# Patient Record
Sex: FEMALE | Race: WHITE | NOT HISPANIC OR LATINO | Employment: PART TIME | URBAN - METROPOLITAN AREA
[De-identification: names, ages, dates, MRNs, and addresses within clinical notes are randomized per-mention and may not be internally consistent; named-entity substitution may affect disease eponyms.]

---

## 2017-01-09 NOTE — TELEPHONE ENCOUNTER
Pharmacy sent refill request for sertraline.  Pt due for AE.  Last PHQ-9 was 5/2016. TC.  Bebe Soares RN      Pending Prescriptions:                       Disp   Refills    sertraline (ZOLOFT) 100 MG tablet         90 tab*0            Sig: Take 1 tablet (100 mg) by mouth daily

## 2017-01-10 RX ORDER — SERTRALINE HYDROCHLORIDE 100 MG/1
100 TABLET, FILM COATED ORAL DAILY
Qty: 90 TABLET | Refills: 0 | Status: SHIPPED | OUTPATIENT
Start: 2017-01-10 | End: 2017-04-07

## 2017-01-10 NOTE — TELEPHONE ENCOUNTER
Pt scheduled AE for 4/7/17 with Dr. Han.  Sent PHQ-9 via [employer].  Refill sent.  Bebe Soares RN

## 2017-04-07 ENCOUNTER — OFFICE VISIT (OUTPATIENT)
Dept: OBGYN | Facility: CLINIC | Age: 37
End: 2017-04-07
Payer: COMMERCIAL

## 2017-04-07 VITALS
HEIGHT: 66 IN | SYSTOLIC BLOOD PRESSURE: 96 MMHG | WEIGHT: 139 LBS | BODY MASS INDEX: 22.34 KG/M2 | DIASTOLIC BLOOD PRESSURE: 60 MMHG | TEMPERATURE: 97.7 F

## 2017-04-07 DIAGNOSIS — Z01.419 ENCOUNTER FOR WELL WOMAN EXAM WITH ROUTINE GYNECOLOGICAL EXAM: Primary | ICD-10-CM

## 2017-04-07 PROCEDURE — 99395 PREV VISIT EST AGE 18-39: CPT | Performed by: OBSTETRICS & GYNECOLOGY

## 2017-04-07 PROCEDURE — 87624 HPV HI-RISK TYP POOLED RSLT: CPT | Performed by: OBSTETRICS & GYNECOLOGY

## 2017-04-07 PROCEDURE — G0124 SCREEN C/V THIN LAYER BY MD: HCPCS | Performed by: OBSTETRICS & GYNECOLOGY

## 2017-04-07 PROCEDURE — G0145 SCR C/V CYTO,THINLAYER,RESCR: HCPCS | Performed by: OBSTETRICS & GYNECOLOGY

## 2017-04-07 RX ORDER — SERTRALINE HYDROCHLORIDE 100 MG/1
100 TABLET, FILM COATED ORAL DAILY
Qty: 90 TABLET | Refills: 3 | Status: SHIPPED | OUTPATIENT
Start: 2017-04-07 | End: 2018-04-16

## 2017-04-07 NOTE — NURSING NOTE
"Chief Complaint   Patient presents with     Physical       Initial BP 96/60  Temp 97.7  F (36.5  C) (Oral)  Ht 5' 5.5\" (1.664 m)  Wt 139 lb (63 kg)  LMP 2017  BMI 22.78 kg/m2 Estimated body mass index is 22.78 kg/(m^2) as calculated from the following:    Height as of this encounter: 5' 5.5\" (1.664 m).    Weight as of this encounter: 139 lb (63 kg).  BP completed using cuff size: regular        The following HM Due: pap smear      The following patient reported/Care Every where data was sent to:  P ABSTRACT QUALITY INITIATIVES [[id number]]       N/a      Rachell Thompson MA               "

## 2017-04-07 NOTE — MR AVS SNAPSHOT
After Visit Summary   4/7/2017    Idalia Hinojosa    MRN: [id number]           Patient Information     Date Of Birth          1980        Visit Information        Provider Department      4/7/2017 2:00 PM Monique Han MD [facility]        Today's Diagnoses     Encounter for well woman exam with routine gynecological exam    -  1    Postpartum depression          Care Instructions    IUD choices--Mirena (popular) good for 5 years with less bleeding or no cycles  ParaGard (no hormones) good for 10 years, cycles last day longer and heavier      Would want to take ibuprofen 600-800 mg one hour prior        Follow-ups after your visit        Who to contact     If you have questions or need follow up information about today's clinic visit or your schedule please contact [facility] directly at [phone].  Normal or non-critical lab and imaging results will be communicated to you by Vobihart, letter or phone within 4 business days after the clinic has received the results. If you do not hear from us within 7 days, please contact the clinic through Vobihart or phone. If you have a critical or abnormal lab result, we will notify you by phone as soon as possible.  Submit refill requests through TabletKiosk or call your pharmacy and they will forward the refill request to us. Please allow 3 business days for your refill to be completed.          Additional Information About Your Visit        MyChart Information     [employer] gives you secure access to your electronic health record. If you see a primary care provider, you can also send messages to your care team and make appointments. If you have questions, please call your primary care clinic.  If you do not have a primary care provider, please call [phone] and they will assist you.        Care EveryWhere ID     This is your Care EveryWhere ID. This could be used by other organizations to access your  "[facility] medical records  [id number]        Your Vitals Were     Temperature Height Last Period BMI (Body Mass Index)          97.7  F (36.5  C) (Oral) 5' 5.5\" (1.664 m) 04/02/2017 22.78 kg/m2         Blood Pressure from Last 3 Encounters:   04/07/17 96/60   12/29/15 116/64   11/12/15 116/76    Weight from Last 3 Encounters:   04/07/17 139 lb (63 kg)   12/29/15 149 lb (67.6 kg)   11/12/15 154 lb (69.9 kg)              We Performed the Following     HPV High Risk Types DNA Cervical     Pap imaged thin layer screen with HPV - recommended age 30 - 65 years (select HPV order below)          Where to get your medicines      These medications were sent to [place] [id number] [place] [facility] [address]  [address] [facility] [address]     Phone:  [phone]     sertraline 100 MG tablet          Primary Care Provider Office Phone # Fax #    Monique Han [phone]       [facility] [address] [facility] [address]  [facility] [address]        Thank you!     Thank you for choosing [facility]  for your care. Our goal is always to provide you with excellent care. Hearing back from our patients is one way we can continue to improve our services. Please take a few minutes to complete the written survey that you may receive in the mail after your visit with us. Thank you!             Your Updated Medication List - Protect others around you: Learn how to safely use, store and throw away your medicines at www.disposemymeds.org.          This list is accurate as of: 4/7/17  2:30 PM.  Always use your most recent med list.                   Brand Name Dispense Instructions for use    calcium carbonate 500 MG tablet    OS-NICO 500 mg [place]. [place]     Take 500 mg by mouth 2 times daily       sertraline 100 MG tablet    ZOLOFT    90 tablet    Take 1 tablet (100 mg) by mouth daily         "

## 2017-04-07 NOTE — PATIENT INSTRUCTIONS
IUD choices--Mirena (popular) good for 5 years with less bleeding or no cycles  ParaGard (no hormones) good for 10 years, cycles last day longer and heavier      Would want to take ibuprofen 600-800 mg one hour prior

## 2017-04-07 NOTE — PROGRESS NOTES
Idalia is a 37 year old  female who presents for annual exam.     Menses are regular q 28-30 days and normal and crampy lasting 4 days.  Menses flow: light and medium.  Patient's last menstrual period was 2017.. Using condoms.  She is not currently considering pregnancy.  Besides routine health maintenance,  she would like to discuss  Birth control.  She doesn't want to have anymore kids but spouse doesn't want her to do anything permanent.  She wants something more effective than condoms.  Still breastfeeding daughter who is 17 months. Has had a lot of colds in the last year. Mood doing well on zoloft and scared to wean down or come off since doing well and was in a bad place when we started it.   GYNECOLOGIC HISTORY:  Menarche: 14  Idalia is sexually active with 1 male partner(s) and is currently in monogamous relationship with .    History sexually transmitted infections:No STD history  STI testing offered?  Declined  DEBORA exposure: Unknown  History of abnormal Pap smear: NO - age 30- 65 PAP every 3 years recommended  Family history of breast CA: No  Family history of uterine/ovarian CA: No    Family history of colon CA: No    HEALTH MAINTENANCE:  Cholesterol: (  Cholesterol   Date Value Ref Range Status   12/10/2013 177 0 - 200 mg/dL Final     Comment:     LDL Cholesterol is the primary guide to therapy.   The NCEP recommends further evaluation of: patients with cholesterol greater   than 200 mg/dL if additional risk factors are present, cholesterol greater   than   240 mg/dL, triglycerides greater than 150 mg/dL, or HDL less than 40 mg/dL.    History of abnormal lipids: No    Mammo: no  . History of abnormal Mammo: Not applicable.  Regular Self Breast Exams: No    Current or Past (Physical, Sexual or Emotional):  No  Do you feel safe in your environment:  Yes    Calcium/Vitamin D intake: source:  dairy, dietary supplement(s) Adequate? Yes   Last TDAP? 2015 Offered today? No    TSH: (No  "results found for: TSH )  Pap; (  Lab Results   Component Value Date    PAP NIL 12/10/2013    )    HISTORY:  Obstetric History       T0      TAB0   SAB0   E0   M0   L1       # Outcome Date GA Lbr Alfredito/2nd Weight Sex Delivery Anes PTL Lv   1  10/28/15 34w5d  5 lb 8.5 oz (2.509 kg) F CS-LTranv   Y      Name: Alma Delia      Apgar1:  5                Apgar5: 7        Past Medical History:   Diagnosis Date     Seasonal affective disorder (H)     no meds     Past Surgical History:   Procedure Laterality Date      SECTION N/A 10/28/2015    Procedure:  SECTION;  Surgeon: Mónica Madrid MD;  Location: [facility]     DENTAL SURGERY      wisdom teeth     TONSILLECTOMY  age 10     Family History   Problem Relation Age of Onset     Thyroid Disease Paternal Grandmother      Other Cancer Paternal Grandmother      HEART DISEASE Maternal Grandmother      Social History     Social History     Marital status:      Spouse name: N/A     Number of children: 1     Years of education: N/A     Occupational History      [employer]     Social History Main Topics     Smoking status: Never Smoker     Smokeless tobacco: Never Used     Alcohol use No     Drug use: No     Sexual activity: Yes     Partners: Male     Other Topics Concern     Parent/Sibling W/ Cabg, Mi Or Angioplasty Before 65f 55m? No         Current Outpatient Prescriptions:      sertraline (ZOLOFT) 100 MG tablet, Take 1 tablet (100 mg) by mouth daily, Disp: 90 tablet, Rfl: 3     [DISCONTINUED] sertraline (ZOLOFT) 100 MG tablet, Take 1 tablet (100 mg) by mouth daily, Disp: 90 tablet, Rfl: 0     calcium carbonate (OS-NICO 500 MG [place]. [place]) 500 MG tablet, Take 500 mg by mouth 2 times daily, Disp: , Rfl:      Allergies   Allergen Reactions     Seasonal Allergies        Past medical, surgical, social and family history were reviewed and updated in [facility].      EXAM:  BP 96/60  Temp 97.7  F (36.5  C) (Oral)  Ht 5' 5.5\" (1.664 m)  Wt " 139 lb (63 kg)  LMP 04/02/2017  BMI 22.78 kg/m2   BMI: Body mass index is 22.78 kg/(m^2).  Constitutional: healthy, alert and no distress  Head: Normocephalic. No masses, lesions, tenderness or abnormalities  Neck: Neck supple. Trachea midline. No adenopathy. Thyroid symmetric, normal size.   Cardiovascular: RRR.   Respiratory: Negative.   Breast: Breasts reveal mild symmetric fibrocystic densities, but there are no dominant, discrete, fixed or suspicious masses found.  Gastrointestinal: Abdomen soft, non-tender, non-distended. No masses, organomegaly.  :  Vulva:  No external lesions, normal female hair distribution, no inguinal adenopathy.    Urethra:  Midline, non-tender, well supported, no discharge  Vagina:  Moist, pink, no abnormal discharge, no lesions  Uterus:  Normal size, AV , non-tender, freely mobile  Ovaries:  No masses appreciated, non-tender, mobile  Rectal Exam: deferred  Musculoskeletal: extremities normal  Skin: no suspicious lesions or rashes  Psychiatric: Affect appropriate, cooperative,mentation appears normal.     COUNSELING:   Reviewed preventive health counseling, as reflected in patient instructions       Contraception   reports that she has never smoked. She has never used smokeless tobacco.    Body mass index is 22.78 kg/(m^2).    FRAX Risk Assessment    ASSESSMENT:  37 year old female with satisfactory annual exam  (Z01.419) Encounter for well woman exam with routine gynecological exam  (primary encounter diagnosis)  Comment: condoms  Plan: Pap imaged thin layer screen with HPV -         recommended age 30 - 65 years (select HPV order        below), HPV High Risk Types DNA Cervical        Discussed sending pap smear for HPV typing as well and that if both pap and HPV are negative, then can have q5 year pap smears.    Birthcontrol options discussed and she did not do well with OCP prior.  Discussed IUD options since very effective and yet reversible.  She was given info and can look at  web sites, may make appt for placement if desires.     Due for cholesterol check, but plan for next year since still breastfeeding.    (F53) Postpartum depression  Comment: resolved   Plan: sertraline (ZOLOFT) 100 MG tablet        Refill same dose.  Will not try and come off or down yet.       Monique Han MD

## 2017-04-08 ASSESSMENT — PATIENT HEALTH QUESTIONNAIRE - PHQ9: SUM OF ALL RESPONSES TO PHQ QUESTIONS 1-9: 2

## 2017-04-12 LAB
COPATH REPORT: ABNORMAL
PAP: ABNORMAL

## 2017-04-13 LAB
FINAL DIAGNOSIS: NORMAL
HPV HR 12 DNA CVX QL NAA+PROBE: NEGATIVE
HPV16 DNA SPEC QL NAA+PROBE: NEGATIVE
HPV18 DNA SPEC QL NAA+PROBE: NEGATIVE
SPECIMEN DESCRIPTION: NORMAL

## 2017-05-31 ENCOUNTER — TELEPHONE (OUTPATIENT)
Dept: PSYCHOLOGY | Facility: CLINIC | Age: 37
End: 2017-05-31

## 2017-05-31 NOTE — TELEPHONE ENCOUNTER
"Returned voicemail from patient.  Patient indicated that she has been feeling \"down\" for the past week and wanted to schedule a follow-up appointment.      Scheduled appointment for 6/7 at 11:00 am with this writer.      Cristy Wilkinson PsyD, LP  Licensed Psychologist    "

## 2017-06-07 ENCOUNTER — OFFICE VISIT (OUTPATIENT)
Dept: PSYCHOLOGY | Facility: CLINIC | Age: 37
End: 2017-06-07
Payer: COMMERCIAL

## 2017-06-07 DIAGNOSIS — F33.0 MAJOR DEPRESSIVE DISORDER, RECURRENT EPISODE, MILD (H): Primary | ICD-10-CM

## 2017-06-07 PROCEDURE — 90834 PSYTX W PT 45 MINUTES: CPT | Performed by: PSYCHOLOGIST

## 2017-06-07 NOTE — PROGRESS NOTES
".                   Progress Note - Initial Session    Client Name:  Idalia Hinojosa Date: 6/8/2017         Service Type: Individual      Session Start Time: 11:05 am  Session End Time: 11:55 am      Session Length: 50 minutes     Session #: 1     Attendees: Client and daughter (17 months old)         Diagnostic Assessment in progress.  Unable to complete documentation at the conclusion of the first session due to needing additional time to gather intake information. Please see \"FCC Extended Documentation\" Encounter dated 6/8/2017 for full diagnostic assessment.    Reviewed initial paperwork with patient.    Gathered information around what brings her back to therapy.  Patient had a previous episode of care with this writer in [phone], due to symptoms of depression and anxiety, following the birth of her first child.      Patient reported that she did well after discontinuing therapy in May of 2016.  She stated that more recently, she noticed that her mood has been more \"down\" and she has been having more difficulties with managing her emotions, so her  suggested she schedule an appointment.  She had a list of several things she would like to address in therapy, which include: feeling \"burnt out\" from taking care of her daughter, increased marital conflict (arguing), not getting enough help, increase in worry, and feeling down.  She noted that she thinks she struggles more with irritability around her period.      Mental Status Assessment:  Appearance:   Appropriate   Eye Contact:   Good   Psychomotor Behavior: Normal   Attitude:   Cooperative   Orientation:   All  Speech   Rate / Production: Normal    Volume:  Normal   Mood:    Anxious   Affect:    Congruent with mood   Thought Content:  Clear   Thought Form:  Coherent  Logical   Insight:    Good       Safety Issues and Plan for Safety and Risk Management:  Client denies current fears or concerns for personal safety.  Client denies current or recent " suicidal ideation or behaviors.  Client denies current or recent homicidal ideation or behaviors.  Client denies current or recent self injurious behavior or ideation.  Client denies other safety concerns.  A safety and risk management plan has been developed, which includes: client was instructed to inform care team if safety concerns develop, client given the day-time and after-hours crisis number / 911 should there be a change in any of these risk factors.  Client reports there are no firearms in the house.      Diagnostic Criteria:    Will further assess next session - client has a history of major depression, recurrent episodes.  She identifies current symptoms of feeling down, irritable, increase in worry and feeling overwhelmed at times.  Symptoms have exacerbated in the past month, prompting her  to encourage her to return to therapy.        DSM5 Diagnoses: (Sustained by DSM5 Criteria Listed Above)  Diagnoses: 296.31 (F33.0) Major Depressive Disorder, Recurrent Episode, Mild _  Psychosocial & Contextual Factors: prior history of depression and anxiety, marital conflict   WHODAS 2.0 (12 item) (Please see FCC Extended Documentation Encounter for completed WHODAS)         Collateral Reports Completed:  Routed note to PCP      PLAN: (Homework, other):    1) Follow-up appointment has been scheduled.      2) Patient identified the following goal to work on between now and her next session: Will talk with  about scheduling in at least one dedicated time per week where she can go on a bike ride.      3) Will complete diagnostic assessment next session and begin to work on developing a treatment plan.        Cristy Wilkinson PsyD,   Licensed Psychologist

## 2017-06-07 NOTE — Clinical Note
Dorina Han,   This is Cristy Wilkinson from [facility].    I saw your patient in the past for individual therapy sessions to help treat symptoms of depression and anxiety she experienced following the birth of her daughter.  She has returned to therapy due to an increase in symptoms of depression and anxiety.    Please let me know if you have any questions or concerns.  I look forward to working with Idalia again and I am glad to see she has returned to therapy to help take care of herself -   Best, Cristy Wilkinson PsyD,  Licensed Psychologist [facility]

## 2017-06-07 NOTE — MR AVS SNAPSHOT
MRN:[id number]                      After Visit Summary   6/7/2017    Idalia Hinojosa    MRN: [id number]           Visit Information        Provider Department      6/7/2017 11:00 AM Cristy Wilkinson LP [facility]      Your next 10 appointments already scheduled     Jun 19, 2017 10:00 AM CDT   Return Visit with Cristy Wilkinson LP   [facility] ([facility]    [address]  [address] [place], [place] [id number]  [facility] [id number] [phone]              MyChart Information     [employer] gives you secure access to your electronic health record. If you see a primary care provider, you can also send messages to your care team and make appointments. If you have questions, please call your primary care clinic.  If you do not have a primary care provider, please call [phone] and they will assist you.        Care EveryWhere ID     This is your Care EveryWhere ID. This could be used by other organizations to access your [facility] medical records  [id number]

## 2017-06-19 ENCOUNTER — OFFICE VISIT (OUTPATIENT)
Dept: PSYCHOLOGY | Facility: CLINIC | Age: 37
End: 2017-06-19
Payer: COMMERCIAL

## 2017-06-19 DIAGNOSIS — F32.89 MINOR DEPRESSIVE DISORDER: Primary | ICD-10-CM

## 2017-06-19 PROCEDURE — 90791 PSYCH DIAGNOSTIC EVALUATION: CPT | Performed by: PSYCHOLOGIST

## 2017-06-19 NOTE — MR AVS SNAPSHOT
MRN:[id number]                      After Visit Summary   6/19/2017    Idalia Hinojosa    MRN: [id number]           Visit Information        Provider Department      6/19/2017 10:00 AM Cristy Wilkinson LP [facility] Generic      Your next 10 appointments already scheduled     Jul 10, 2017  1:00 PM CDT   Return Visit with Cristy Wilkinson LP   [facility] ([facility])    [address]  [address]  [facility] [id number] [phone]              MyChart Information     Dblur Technologieshart gives you secure access to your electronic health record. If you see a primary care provider, you can also send messages to your care team and make appointments. If you have questions, please call your primary care clinic.  If you do not have a primary care provider, please call [phone] and they will assist you.        Care EveryWhere ID     This is your Care EveryWhere ID. This could be used by other organizations to access your [place] medical records  [id number]        Equal Access to Services     RAVI RAMIREZ : Hadii abelardo payan hadasho Soomaali, waaxda luqadaha, qaybta kaalmada adeegyagale, fallon oliver. So [facility] [phone].    ATENCIÓN: Si habla español, tiene a willingham disposición servicios gratuitos de asistencia lingüística. Llame al [phone].    We comply with applicable federal civil rights laws and [place] laws. We do not discriminate on the basis of race, color, national origin, age, disability sex, sexual orientation or gender identity.

## 2017-06-21 NOTE — PROGRESS NOTES
"                                                                                               Adult Intake Structured Interview  Standard Diagnostic Assessment        CLIENT'S NAME:                 Idalia Hinojosa  MRN:                                      [id number]  :                                      1980  ACCT. NUMBER:                 [id number]  DATE OF SERVICE:            2017 and 2017        Identifying Information:  Client is a 37 year old, ,  female. Client was self-referred for counseling. Client is currently employed part time. Client attended the session alone.     Progress since last session:    Client reported that since her initial appointment with writer, she is \"feeling better\".  She stated that she made some time for herself, and her  supported this.  She also registered for a tri-athalon with her brother and father, something that she enjoys doing and that provides her with motivation to be more physically active in preparation for this.  She indicated that she has felt more motivated to complete household tasks and has been doing better in keeping up with these tasks.  She received a score of 3 on the PHQ-9 today and 3 on the DAVID-7.          Client's Statement of Presenting Concern:  Client reports the reason for seeking therapy at this time as \"I was feeling down and my  suggested I schedule an appointment\".  Client stated that her symptoms have resulted in the following functional impairments: self-care.          History of Presenting Concern:  Patient saw this writer for a prior episode of care from 2015 to May of 2016 due to \"postpartum worries and sadness\" following the birth of her first daughter, who required a NICU hospitalization.  She had identified experiencing symptoms of depression and anxiety at the onset of her pregnancy, and in particular during her first trimester when she was not feeling well and worried about " "something wrong with her pregnancy.  Please see [facility] extended documentation encounter dated November of 2015 for additional details.  She reported that after she ended in therapy in May of 2016, she did well up until a month or so ago.        Prior to this, client reported previous episodes of depression, dating back to 2005, and again in 2011.  She reported that she did not seek out help for those episodes of depression and struggled with each depressive episode for about one year.       Client has attempted to resolve these concerns in the past by attending therapy and taking an anti-depressant. Client reports that other professional(s) are involved in providing support / services: her OB/GYN Dr. Monique Han MD, prescribed and has been managing her anti-depressant medication.           Social History:  Client reported she grew up in [facility] [place] [facility]. They were the first born of 3 children. This is an intact family and parents remain . Client reported that her childhood was \"happy\". Client described her current relationships with family of origin and  as - \"pretty good\", \"We're working on the marriage thing because we have been fighting a little more so that needs a little work\".   She reported she is connecting \"really well\" with her daughter and enjoys that she is able to play more with her daughter now that she is getting older.  Her daughter is 17 months old.           Client reported a history of 1 committed relationships or marriages. Client has been  for 13 years. Client reported having 1 child. Client identified some stable and meaningful social connections. She reported \"I don't have a lot of friends\" and close friends that she does have from childhood mostly live out of state making it more challenging to be in touch with them.  Has a close friend from the [place].  Client reported that she has not been involved with the legal system.  Client's highest education level " was graduate school classes and recital. Client did not identify any learning problems. There are no ethnic, cultural or Baptism factors that may be relevant for therapy. Client identified her preferred language to be English. Client reported she does not need the assistance of an  or other support involved in therapy. Modifications will not be used to assist communication in therapy. Client did not serve in the .      Client reports family history includes Heart Disease in her maternal grandmother; Other Cancer in her paternal grandmother; Thyroid Disease in her paternal grandmother.     Mental Health History:  Client reported the following biological family members or relatives with mental health issues: Sister experienced an Eating disorder.  Client has had a history of Anxiety and Depression.  Client received mental health services from November of 2015 to May of 2016, and also has been receiving medication management from her primary care provider since 2015.  Hospitalizations: None.         Chemical Health History:  Client reported no family history of chemical health issues. Client has not received chemical dependency treatment in the past. Client is not currently receiving any chemical dependency treatment. Client reports no problems as a result of their drinking / drug use.     Client Reports:  Client reports drinking alcohol once or twice a week, 1/2 drink per time.    Client denies using tobacco.    Client denies using marijuana.  Client reports using caffeine  - 2 total cups of cafeeinated tea per week, one drink per occasion.    Client denies using street drugs.  Client denies the non-medical use of prescription or over the counter drugs.     CAGE: None of the patient's responses to the CAGE screening were positive / Negative CAGE score   Based on the negative Cage-Aid score and clinical interview there  are not indications of drug or alcohol abuse.     Discussed the general effects  "of drugs and alcohol on health and well-being. Therapist gave client printed information about the effects of chemical use on her health and well being.        Significant Losses / Trauma / Abuse / Neglect Issues:  There are indications or report of significant loss, trauma, abuse or neglect issues related to: Client denied experiencing any significant losses, trauma, abuse or neglect issues since her last episode of care with writer.  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  Client also reported that the labor and birth process of her daughter was difficult for her, and was a loss in that it was \"not what I expected it would be\".  She worked in her previous episode of care with this writer on addressing this loss.         Issues of possible neglect are not present.        Medical Issues:  Client has had a physical exam to rule out medical causes for current symptoms. Date of last physical exam was one month ago -The client has a [place] Primary Care Provider, who is named Monique Han. The client reports not having a psychiatrist. Client reports no current medical concerns and no physical pain. There are not significant nutritional concerns, but she reported she believes she could eat healthier than what she is.        Client reports current meds as:   Current Outpatient Prescriptions   Medication Sig     sertraline (ZOLOFT) 100 MG tablet Take 1 tablet (100 mg) by mouth daily     calcium carbonate (OS-NICO 500 MG [place]. [place]) 500 MG tablet Take 500 mg by mouth 2 times daily     No current facility-administered medications for this visit.         Client Allergies:       Allergies   Allergen Reactions     Seasonal Allergies        no known allergies to medications     Medical History:   Past Medical History          Past Medical History   Diagnosis Date     Seasonal affective disorder " "([facility])         no meds               Medication Adherence:  N/A - Client does not have prescribed psychiatric medications.     Client was provided recommendation to follow-up with prescribing physician.  Writer encouraged a medication evaluation to determine if medications may be appropriate to target symptoms of depression and anxiety.       Mental Status Assessment:  Appearance:                                                          Appropriate   Eye Contact:                                                         Good   Psychomotor Behavior:                    Normal   Attitude:                                                                 Cooperative   Orientation:                                                           All  Speech                        Rate / Production:                 Normal                         Volume:                                           Normal   Mood:                                                                     Worried  Affect:                                                                      Congruent with mood   Thought Content:                                        Clear   Thought Form:                                            Coherent  Logical   Insight:                                                                             Good         Review of Symptoms:  Depression:    Depressed mood, overeating, low self-esteem.  She also endorses struggling with negative thinking.    Laury:   No symptoms  Psychosis:   No symptoms  Anxiety:    Not being able to stop or control worrying.  Becoming easily annoyed or irritable.  Feeling worried as if something awful might happen.                    Panic:  No symptoms  Post Traumatic Stress Disorder:  No symptoms   Obsessive Compulsive Disorder:  No symptoms  Eating Disorder:  No symptoms (She reported a history of, at times, being \"hyperfocused\" on what she was eating, few years ago)  Oppositional Defiant Disorder:  No " "symptoms  ADD / ADHD:  No symptoms  Conduct Disorder:  No symptoms       Safety Issues and Plan for Safety and Risk Management:  Client has had a history of suicidal ideation: She reported that with previous depressive episodes in 2005 and 2011 that she had some suicidal ideation when she was feeling \"overwhelmed\".  She denied any suicidal intention or attempts. and denies a history of suicide attempts, self-injurious behavior, homicidal ideation, homicidal behavior and and other safety concerns  Client denies current fears or concerns for personal safety.  Client denies current or recent suicidal ideation or behaviors.  Client denies current or recent homicidal ideation or behaviors.  Client denies current or recent self injurious behavior or ideation.  Client denies other safety concerns.  Client reported that she has had times in the past where she has thrown things and slammed doors when upset, and has hit her .    Client reports there are no firearms in the house.  A safety and risk management plan has been developed including: client was informed of what to do if she were to develop suicidal ideation or any other safety concerns.  She agrees to access crisis or emergency resources should any safety concerns arise.          Patient's Strengths and Limitations:  Client identified the following strengths or resources that will help her succeed in counseling: family support and really care about my family and want to be strong and helpful for them, take care of myself, would really like to feel like myself again. Client identified the following supports: family and spouse, parents Things that may interfere with the clients success in counseling include:client did not identify any barriers.        Diagnostic Criteria:  311, Unspecified Depressive Disorder (F32.9):  Although client has a history of major depression, recurrent episodes, her symptoms do not currently meet criteria for a major depression " "episode.  She does note that although she and her  were feeling concerned by her feeling \"down\", she believes that overall she is doing \"much better\" than she was in November of 2015 when she first sought out psychotherapy with this writer.      She identifies the onset of current symptoms as about one month ago, she reports that symptoms do not occur every day and rather occur several days in a two week period.  She describes symptoms as making it \"somewhat difficult\" for her to function in her daily life.  She reported she wonders if symptoms worsen around her cycle, will encourage her to further monitor this.       Functional Status:  Client's symptoms are causing reduced functional status in the following areas: Activities of Daily Living - especially related to self-care, though has had some difficulties with motivation to complete household tasks as well.          DSM5 Diagnoses: (Sustained by DSM5 Criteria Listed Above)  Diagnoses:            311 (F32.9) Unspecified Depressive Disorder   Psychosocial & Contextual Factors: limited support network, some marital stress with   WHODAS 2.0 (12 item)                          This questionnaire asks about difficulties due to health conditions. Health conditions include disease or illnesses, other health problems that may be short or long lasting, injuries, mental health or emotional problems, and problems with alcohol or drugs.                              Think back over the past 30 days and answer these questions, thinking about how much difficulty you had doing the following activities. For each question, please [place] only one response.     S1 Standing for long periods such as 30 minutes? None =         1   S2 Taking care of household responsibilities? Moderate =   3   S3 Learning a new task, for example, learning how to get to a new place? None =         1   S4 How much of a problem do you have joining community activities (for example, festivals, " Lutheran or other activities) in the same way as anyone else can? None =         1   S5 How much have you been emotionally affected by your health problems? None =         1           In the past 30 days, how much difficulty did you have in:   S6 Concentrating on doing something for ten minutes? None =         1   S7 Walking a long distance such as a kilometer (or equivalent)? None =         1   S8 Washing your whole body? None =         1   S9 Getting dressed? None =         1   S10 Dealing with people you do not know? None =         1   S11 Maintaining a friendship? Mild =           2   S12 Your day to day work? Mild =           2      H1 Overall, in the past 30 days, how many days were these difficulties present? Record number of days 7   H2 In the past 30 days, for how many days were you totally unable to carry out your usual activities or work because of any health condition? Record number of days  0   H3 In the past 30 days, not counting the days that you were totally unable, for how many days did you cut back or reduce your usual activities or work because of any health condition? Record number of days 0      Attendance Agreement:  Client has signed Attendance Agreement:Yes        Preliminary Treatment Plan:  The client reports no currently identified Lutheran, ethnic or cultural issues relevant to therapy.      services are not indicated.     Modifications to assist communication are not indicated.     The concerns identified by the client will be addressed in therapy.     Initial Treatment will focus on: Depressed Mood - identify coping strategies to manage low mood and increase in worry     As a preliminary treatment goal, client will experience a reduction in depressed mood, will develop more effective coping skills to manage depressive symptoms and will develop healthy cognitive patterns and beliefs and will experience a reduction in anxiety, will develop more effective coping skills to  manage anxiety symptoms and will develop healthy cognitive patterns and beliefs.     The focus of initial interventions will be to alleviate anxiety, alleviate depressed mood, facilitate appropriate expression of feelings, increase coping skills, provide homework to reinforce skill development, provide psychoeduction regarding depression and anxiety and teach CBT skills.     The client is receiving treatment / structured support from the following professional(s) / service and treatment. Collaboration will be initiated with: primary care physician.     Referral to another professional/service is not indicated at this time.     A Release of Information is not needed at this time.     Report to child / adult protection services was NA.     Client will have access to their [facility]' medical record.     Cristy Wilkinson PsyD, KYLIE                                6/19/2017  Licensed Psychologist

## 2017-07-10 ENCOUNTER — OFFICE VISIT (OUTPATIENT)
Dept: PSYCHOLOGY | Facility: CLINIC | Age: 37
End: 2017-07-10
Payer: COMMERCIAL

## 2017-07-10 DIAGNOSIS — F32.9 MAJOR DEPRESSION: Primary | ICD-10-CM

## 2017-07-10 PROCEDURE — 90834 PSYTX W PT 45 MINUTES: CPT | Performed by: PSYCHOLOGIST

## 2017-07-10 NOTE — MR AVS SNAPSHOT
MRN:[id number]                      After Visit Summary   7/10/2017    Idalia Hinojosa    MRN: [id number]           Visit Information        Provider Department      7/10/2017 1:00 PM Cristy Wilkinson LP [facility] Generic      Your next 10 appointments already scheduled     Aug 07, 2017  1:00 PM CDT   Return Visit with Cristy Wilkinson LP   [facility] ([facility])    [place] [facility]  [address]  [facility] [phone]              MyChart Information     [employer] gives you secure access to your electronic health record. If you see a primary care provider, you can also send messages to your care team and make appointments. If you have questions, please call your primary care clinic.  If you do not have a primary care provider, please call [phone] and they will assist you.        Care EveryWhere ID     This is your Care EveryWhere ID. This could be used by other organizations to access your [facility] medical records  [id number]        Equal Access to Services     RAVI RAMIREZ : Hadii abelardo payan hadasho Soomaali, waaxda luqadaha, qaybta kaalmada adeegyagale, fallon oliver. So [facility] [phone].    ATENCIÓN: Si habla español, tiene a willingham disposición servicios gratuitos de asistencia lingüística. Llame al [phone].    We comply with applicable federal civil rights laws and [place] laws. We do not discriminate on the basis of race, color, national origin, age, disability sex, sexual orientation or gender identity.

## 2017-07-11 NOTE — PROGRESS NOTES
"                                             Progress Note    Client Name: Idalia Hinojosa  Date: 7/10/2017         Service Type: Individual      Session Start Time: 1:00 pm  Session End Time: 1:45 pm      Session Length: 45 minutes     Session #: 3     Attendees: Client attended alone    Treatment Plan Last Reviewed: Treatment plan developed 07/10/2017  PHQ-9 / DAVID-7 : 6/07/2017     DATA      Progress Since Last Session (Related to Symptoms / Goals / Homework):   Symptoms: Improved- client reported that her mood is \"better\" and she believes it is returning back to \"normal\" for her.    Homework: Achieved / completed to satisfaction.  Client was pleased to say that she has been exercising more, eating healthier, recognizing and challenging negative thinking, and she had a very positive trip to the family cabin that was restorative and fun for her.        Episode of Care Goals: Satisfactory progress - ACTION (Actively working towards change); Intervened by reinforcing change plan / affirming steps taken.  Client is in the beginning stages of this episode of care, however, she has already began to make positive changes that she reports have helped to improve her mood.       Current / Ongoing Stressors and Concerns:   Client presented for therapy at the prompting of her  due to concerns about her mood and symptoms of depression, in the context of having previously experienced episodes of depression.  She has previously participated in individual therapy with this writer.      We talked today about client's goals for therapy and treatment planning.  As she has already began to incorporate some positive changes to her self-care that she believes has positively impacted her mood, therapy will focus on helping her sustain making these positive changes.  We will also monitor her mood and help client with developing a \"wellness plan\" to assist with monitoring her mood, identification of what helps her mood to remain " "stable, identification of early warning signs and steps to take if she experiences warning signs.        Treatment Objective(s) Addressed in This Session:   Development of treatment plan  Identify negative/harsh/judgmental thinking and practice finding a compassionate voice      Intervention:   CBT: psychoeducation on identifcation of cognitive distortions and skill of cognitive reframing  In particular, we talked about some themes present in her thinking about herself as a mother, where she is harsh/critical/judgmental of herself (e.g. \"other moms would be stronger than her\", etc.).  Guided her in examining her self-talk, looking at the impact of her self-talk, and helping her to find a kinder, more compassionate voice.      She would like to begin journaling as a way to increase her awareness of her self-talk.  Suggested she consider devoting some of her journaling to identifying/recognizing positives/\"good things\" from each day.          ASSESSMENT: Current Emotional / Mental Status (status of significant symptoms):   Risk status (Self / Other harm or suicidal ideation)   Client denies current fears or concerns for personal safety.   Client denies current or recent suicidal ideation or behaviors.   Client denies current or recent homicidal ideation or behaviors.   Client denies current or recent self injurious behavior or ideation.   Client denies other safety concerns.   A safety and risk management plan has not been developed at this time, however client was given the after-hours number / 911 should there be a change in any of these risk factors.     Appearance:   Appropriate    Eye Contact:   Good    Psychomotor Behavior: Normal    Attitude:   Cooperative    Orientation:   All   Speech    Rate / Production: Normal     Volume:  Normal    Mood:    Normal, and client reported her mood is \"better\"   Affect:    Appropriate  and congruent with mood    Thought Content:  Clear    Thought Form:  Coherent  Logical "    Insight:    Good      Medication Review:   No changes to current psychiatric medication(s)     Medication Compliance:   Yes     Changes in Health Issues:   None reported     Chemical Use Review:   Substance Use: Chemical use reviewed, no active concerns identified   She describes typical use as 1/2 drink a time, once or twice per week.  However, she stated that recently, she hasn't been drinking any alcohol, because she believes she sleeps better at night when she doesn't drink alcohol.       Tobacco Use: No current tobacco use.       Collateral Reports Completed:   Not Applicable    PLAN: (Client Tasks / Therapist Tasks / Other)    1) Continue the great work you have been doing with self-care and devoting time for exercise, healthy eating, and relaxation.  Continue great work on recognizing and challenging negative thinking.  Find compassionate and kind voice to counter judgmental/self-critical thoughts (practice - what would you say to a friend?)  When journaling, take time to recognize the positives and good things you notice.      2) Follow-up appointment is scheduled for one month. Please call if symptoms worsen or if you believe you need to be seen prior to then.      3) Continue to take medications as prescribed by your doctor.    4) If you develop any suicidal thoughts or safety concerns, please contact [facility]'s crisis number, call 911, or go to the emergency department.        Cristy Wilkinson PsyD,   Licensed Psychologist                                                         ________________________________________________________________________    Treatment Plan    Client's Name: Idalia Hinojosa  YOB: 1980    Date: 07/10/2017    DSM-V Diagnoses:   311 (F32.9) Unspecified Depressive Disorder     Psychosocial / Contextual Factors: limited support system, some marital stress  WHODAS: 16    Referral / Collaboration:  Referral to another professional/service is not indicated at this  "time.  Client was encouraged to continue to work with her primary care provider, Dr. Emely MD, who prescribed and is managing her psychiatric medications.      Anticipated number of session or this episode of care: 6-8      MeasurableTreatment Goal(s) related to diagnosis / functional impairment(s)  Goal 1: Client will incorporate self-care strategies into her daily routine    I will know I've met my goal when I'm exercising and eating well.      Objective #A (Client Action)    Client will complete an assessment of her self-care strategies, identifying stregnths and areas for improvement\".  Status: New - Date: 07/10/2017     Intervention(s)  Therapist will guide client in completing self-assessment and will help client develop self-care goals based upon results of assessment.    Objective #B  Client will incorporate at least two self-care strategies into her regular routine.  Status: New - Date: 07/10/08004     Intervention(s)  Therapist will help client identify any barriers or obstacles to incorporating self-care strategies,  Will help client identify strategies  to overcome barriers or obstacles to help her with sustaining positive behavior change to her self-care.    Objective #C  Client will Decrease frequency and intensity of feeling down, depressed, hopeless.  Status: New - Date: 07/10/2017     Intervention(s)  Therapist will provide psychoeducation on use of cognitive-behavioral strategies to help with management of mood.  Therapist will teach client how to identify and reframe cognitive distortions.  Therapist will help client to understand impact of cognitive disotritons on mood..  Therapist will assign homework assignments to help client practice and strengthen use of cognitive-behavioral strategies.      Objective #A (Client Action)    Status: New - Date: 07/10/2017     Client will develop a \"wellness plan\" that identifies what they need to be doing on a regular basis to manage their mood and " "symptoms, early warning signs, and steps to take if they experience early warning signs.      Intervention(s)  Therapist will provide psychoeducation on importance of monitoring mood, paying attention to warning signs, and steps to take if warning signs develop.  Therapist will guide client in developing a customized \"wellness plan\".        Client has reviewed and agreed to the above plan.      Cristy Wilkinson PsyD, LP  July 10, 2017  Licensed Psychologist  "

## 2017-08-07 ENCOUNTER — OFFICE VISIT (OUTPATIENT)
Dept: PSYCHOLOGY | Facility: CLINIC | Age: 37
End: 2017-08-07
Payer: COMMERCIAL

## 2017-08-07 DIAGNOSIS — F32.A DEPRESSION, UNSPECIFIED DEPRESSION TYPE: Primary | ICD-10-CM

## 2017-08-07 PROCEDURE — 90834 PSYTX W PT 45 MINUTES: CPT | Performed by: PSYCHOLOGIST

## 2017-08-07 NOTE — MR AVS SNAPSHOT
MRN:[id number]                      After Visit Summary   8/7/2017    Idalia Hinojosa    MRN: [id number]           Visit Information        Provider Department      8/7/2017 1:00 PM Cristy Wilkinson LP [facility] Generic      Your next 10 appointments already scheduled     Sep 11, 2017 10:00 AM CDT   Return Visit with Cristy Wilkinson LP   [facility] ([facility])    [place] [facility]  [address]  [facility] [id number] [phone]              MyChart Information     [employer] gives you secure access to your electronic health record. If you see a primary care provider, you can also send messages to your care team and make appointments. If you have questions, please call your primary care clinic.  If you do not have a primary care provider, please call [phone] and they will assist you.        Care EveryWhere ID     This is your Care EveryWhere ID. This could be used by other organizations to access your [facility] medical records  [id number]        Equal Access to Services     RAVI RAMIREZ : Hadii abelardo payan hadasho Soomaali, waaxda luqadaha, qaybta kaalmada adeegyagale, fallon oliver. So [facility] [phone].    ATENCIÓN: Si habla español, tiene a willingham disposición servicios gratuitos de asistencia lingüística. Llame al [phone].    We comply with applicable federal civil rights laws and [place] laws. We do not discriminate on the basis of race, color, national origin, age, disability sex, sexual orientation or gender identity.

## 2017-08-07 NOTE — PROGRESS NOTES
"                                             Progress Note    Client Name: Idalia Hinojosa  Date: 7/10/2017         Service Type: Individual      Session Start Time: 1:00 pm  Session End Time: 1:45 pm      Session Length: 45 minutes     Session #: 3     Attendees: Client attended alone    Treatment Plan Last Reviewed: Treatment plan developed 07/10/2017  PHQ-9 / DAVID-7 : 6/07/2017     DATA      Progress Since Last Session (Related to Symptoms / Goals / Homework):   Symptoms: Sustained improvement- client reported that overall, she continues to feel \"better\" and she is feeling like her \"old self\" again.  She does note having periods of time where she has what she describes as \"crazy perfectionism moments\" and periods of heightened emotion, which she would like to learn how to better manage.      Homework: Achieved / completed to satisfaction.  Patient reported that she has been successful with paying more attention to her self-talk and recognizing negative thinking.  She's also been following through on her goals to be more physically active and has been training for a tri-athalon.         Episode of Care Goals: Satisfactory progress - ACTION (Actively working towards change); Intervened by reinforcing change plan / affirming steps taken.  Client is in the beginning stages of this episode of care, however, she has already began to make positive changes that she reports have helped to improve her mood.       Current / Ongoing Stressors and Concerns:   Client presented for therapy at the prompting of her  due to concerns about her mood and symptoms of depression, in the context of having previously experienced episodes of depression.  She has previously participated in individual therapy with this writer.      Client identified the following concerns she wished to discuss today: perfectionistic thinking and feelings of self-worth (in particular, attaching her self-worth to achievement).        Treatment " "Objective(s) Addressed in This Session:   Identify cognitive distortions and learn how to reframe polarized thinking so that cognitions are more balanced, helpful, and positive  Explore feelings of self-worth and identify at least 1-2 new ways to develop and nurture positive feelings of self-worth     Intervention:   CBT: psychoeducation on identifcation of cognitive distortions and skill of cognitive reframing .  In particular today we talked about her thinking trap of \"polarized\" or \"all or nothing\" thinking.  We examined recent examples of polarized thinking, which led to periods of heightened emotion for her.  We also looked at examples from her past, where polarized thinking contributed to her linking her feelings of self-worth with her achievement.  This offered an opportunity for her to recognize the impact of these cognitive distortions on her emotions and response, as well as led to identifying ways she can reframe her thinking to find more balanced, hopeful, kind, and positive self-talk.  We also further explored the connection she has made between her feelings of self-worth and achievement.  She recognizes that although she does not attach her feelings/care for others to their achievements, that she has done this for her self.  She is beginning to examine the consequences of this and is looking at more healthy and positive ways to define and nurture her feelings of self-worth and identity.          ASSESSMENT: Current Emotional / Mental Status (status of significant symptoms):   Risk status (Self / Other harm or suicidal ideation)   Client denies current fears or concerns for personal safety.   Client denies current or recent suicidal ideation or behaviors.   Client denies current or recent homicidal ideation or behaviors.   Client denies current or recent self injurious behavior or ideation.   Client denies other safety concerns.   A safety and risk management plan has not been developed at this time, " "however client was given the after-hours number / 911 should there be a change in any of these risk factors.     Appearance:   Appropriate    Eye Contact:   Good    Psychomotor Behavior: Normal    Attitude:   Cooperative    Orientation:   All   Speech    Rate / Production: Normal     Volume:  Normal    Mood:    Normal, and client reported her mood is \"better\"   Affect:    Appropriate  and congruent with mood    Thought Content:  Clear    Thought Form:  Coherent  Logical    Insight:    Good      Medication Review:   No changes to current psychiatric medication(s)     Medication Compliance:   Yes     Changes in Health Issues:   None reported     Chemical Use Review:   Substance Use: Chemical use reviewed, no active concerns identified  She stated that recently, she hasn't been drinking any alcohol, because she believes she sleeps better at night when she doesn't drink alcohol.  Her  has also decided to stop drinking alcohol for the next few months, so she wants to support him by not drinking.       Tobacco Use: No current tobacco use.       Collateral Reports Completed:   Not Applicable    PLAN: (Client Tasks / Therapist Tasks / Other)    1) Pay attention to thinking, look for \"all or nothing thinking\" and challenge self to find the \"gray\".  Don't connect self-worth with number on scale (\"remind myself of what makes me me\").      Continue the great work you have been doing with self-care and devoting time for exercise, healthy eating, and relaxation.  Continue great work on recognizing and challenging negative thinking.  Find compassionate and kind voice to counter judgmental/self-critical thoughts (practice - what would you say to a friend?)  When journaling, take time to recognize the positives and good things you notice.      2) Follow-up appointment is scheduled for one month. Please call if symptoms worsen or if you believe you need to be seen prior to then.      3) Continue to take medications as " "prescribed by your doctor.    4) If you develop any suicidal thoughts or safety concerns, please contact [facility]'s crisis number, call 911, or go to the emergency department.        Cristy Wilkinson PsyD,   Licensed Psychologist                                                         ________________________________________________________________________    Treatment Plan    Client's Name: Idalia Hinojosa  YOB: 1980    Date: 07/10/2017    DSM-V Diagnoses:   311 (F32.9) Unspecified Depressive Disorder     Psychosocial / Contextual Factors: limited support system, some marital stress  WHODAS: 16    Referral / Collaboration:  Referral to another professional/service is not indicated at this time.  Client was encouraged to continue to work with her primary care provider, Dr. Emely MD, who prescribed and is managing her psychiatric medications.      Anticipated number of session or this episode of care: 6-8      MeasurableTreatment Goal(s) related to diagnosis / functional impairment(s)  Goal 1: Client will incorporate self-care strategies into her daily routine    I will know I've met my goal when I'm exercising and eating well.      Objective #A (Client Action)    Client will complete an assessment of her self-care strategies, identifying stregnths and areas for improvement\".  Status: New - Date: 07/10/2017     Intervention(s)  Therapist will guide client in completing self-assessment and will help client develop self-care goals based upon results of assessment.    Objective #B  Client will incorporate at least two self-care strategies into her regular routine.  Status: New - Date: 07/10/59608     Intervention(s)  Therapist will help client identify any barriers or obstacles to incorporating self-care strategies,  Will help client identify strategies  to overcome barriers or obstacles to help her with sustaining positive behavior change to her self-care.    Objective #C  Client will Decrease " "frequency and intensity of feeling down, depressed, hopeless.  Status: New - Date: 07/10/2017     Intervention(s)  Therapist will provide psychoeducation on use of cognitive-behavioral strategies to help with management of mood.  Therapist will teach client how to identify and reframe cognitive distortions.  Therapist will help client to understand impact of cognitive disotritons on mood..  Therapist will assign homework assignments to help client practice and strengthen use of cognitive-behavioral strategies.      Objective #A (Client Action)    Status: New - Date: 07/10/2017     Client will develop a \"wellness plan\" that identifies what they need to be doing on a regular basis to manage their mood and symptoms, early warning signs, and steps to take if they experience early warning signs.      Intervention(s)  Therapist will provide psychoeducation on importance of monitoring mood, paying attention to warning signs, and steps to take if warning signs develop.  Therapist will guide client in developing a customized \"wellness plan\".        Client has reviewed and agreed to the above plan.      Cristy Wilkinson PsyD, LP  July 10, 2017  Licensed Psychologist  "

## 2017-09-11 ENCOUNTER — OFFICE VISIT (OUTPATIENT)
Dept: PSYCHOLOGY | Facility: CLINIC | Age: 37
End: 2017-09-11
Payer: COMMERCIAL

## 2017-09-11 ENCOUNTER — TELEPHONE (OUTPATIENT)
Dept: PSYCHOLOGY | Facility: CLINIC | Age: 37
End: 2017-09-11

## 2017-09-11 DIAGNOSIS — Z53.9 ERRONEOUS ENCOUNTER--DISREGARD: Primary | ICD-10-CM

## 2017-09-11 PROCEDURE — 99207 ZZC NO CHARGE LOS: CPT | Performed by: PSYCHOLOGIST

## 2017-09-11 NOTE — MR AVS SNAPSHOT
MRN:[id number]                      After Visit Summary   9/11/2017    Idalia Hinojosa    MRN: [id number]           Visit Information        Provider Department      9/11/2017 10:00 AM Cristy Wilkinson LP [facility] Generic      Your next 10 appointments already scheduled     Oct 02, 2017 10:00 AM CDT   Return Visit with Cristy Wilkinson LP   [facility] ([facility])    [place] [facility]  [address]  [facility] [id number] [phone]              MyChart Information     [employer] gives you secure access to your electronic health record. If you see a primary care provider, you can also send messages to your care team and make appointments. If you have questions, please call your primary care clinic.  If you do not have a primary care provider, please call [phone] and they will assist you.        Care EveryWhere ID     This is your Care EveryWhere ID. This could be used by other organizations to access your [facility] medical records  [id number]        Equal Access to Services     RAVI RAMIREZ : Hadii abelardo payan hadasho Soomaali, waaxda luqadaha, qaybta kaalmada adeegyagale, fallon oliver. So [facility] [phone].    ATENCIÓN: Si habla español, tiene a willingham disposición servicios gratuitos de asistencia lingüística. Llame al [phone].    We comply with applicable federal civil rights laws and [place] laws. We do not discriminate on the basis of race, color, national origin, age, disability sex, sexual orientation or gender identity.

## 2017-09-11 NOTE — TELEPHONE ENCOUNTER
Called and spoke to patient to reschedule her appointment from this morning.    Patient rescheduled her appointment for 10/2.    Checked-in with patient.  She reported that overall, she has been doing pretty well.  She didn't have any urgent concerns and declined needing an appointment for this week.  She will be in touch if she believes she needs to be seen prior to 10/2.    Cristy Wilkinson PsyD, LP

## 2017-10-02 ENCOUNTER — OFFICE VISIT (OUTPATIENT)
Dept: PSYCHOLOGY | Facility: CLINIC | Age: 37
End: 2017-10-02
Payer: COMMERCIAL

## 2017-10-02 DIAGNOSIS — F32.A DEPRESSION, UNSPECIFIED DEPRESSION TYPE: Primary | ICD-10-CM

## 2017-10-02 PROCEDURE — 90834 PSYTX W PT 45 MINUTES: CPT | Performed by: PSYCHOLOGIST

## 2017-10-02 NOTE — PROGRESS NOTES
"                                             Progress Note    Client Name: Idalia Hinojosa  Date: 10/2/2017         Service Type: Individual      Session Start Time: 10:00 am  Session End Time: 10:45 am      Session Length: 45 minutes     Session #: 4     Attendees: Client and her one year old daughter    Treatment Plan Last Reviewed: Treatment plan developed 07/10/2017  PHQ-9 / DAVID-7 : 6/07/2017     DATA      Progress Since Last Session (Related to Symptoms / Goals / Homework):   Symptoms: Sustained improvement- client reported that overall, she continues to feel \"better\" and has been successful with using coping strategies and strategies learned in therapy.  She has some worry about how she will do over the winter months, as that has historically been a difficult time for her.        Homework: Achieved / completed to satisfaction.  She's pleased to say that she's been more physically active and believes this is helping her.  She also has been successful with \"catching\" herself when her thinking is negative and has been reframing her thoughts.  She notices that her thinking is less polarized, and she is finding more balanced and helpful self-talk.  She notes that she has been able to cope well with situations that she believes would have been really difficult for her a year ago.  She also notes that she is not stuck in negative thinking about her self-worth and has been practicing a non-judgmental stance towards herself and others.       Episode of Care Goals: Satisfactory progress - ACTION (Actively working towards change); Intervened by reinforcing change plan / affirming steps taken.  Client is in the beginning stages of this episode of care, however, she has already began to make positive changes that she reports have helped to improve her mood.  This is her second episode of care with this writer and she believes that monthly sessions allow her to practice skills outside of session and check-in to ensure " "sustained improvement.  She has some concerns about how she will do over the winter.       Current / Ongoing Stressors and Concerns:   Client presented for therapy at the prompting of her  due to concerns about her mood and symptoms of depression, in the context of having previously experienced episodes of depression.  She has previously participated in individual therapy with this writer.      Client identified the following concerns she wished to discuss today: some difficulties related to getting things done around the house/low motivation, and some worries about how she will do in the winter when there is less light and it is colder.         Treatment Objective(s) Addressed in This Session:   Learn the \"five-minute\" rule to help with behavioral activation  Identify how to \"cope ahead\" and \"plan for success\" for winter     Intervention:   CBT: Behavioral activation.   Provided psychoeducation on the \"five-minute rule\" as a strategy to help with client's difficulties getting things done around the house.  We also talked about how polarized thinking can fuel avoidance ('if I can't do it perfectly, why bother) to help her with finding more balanced self-talk that supports her breaking things down into small steps, finding the \"good enough\", and giving herself credit for steps taken (rather than only focusing on the end outcome).        CBT: [place] ahead and cognitive restructuring.  Encouraged client to see the negative script she has in her mind about the upcoming winter (I don't do well in winter, I won't be active, I'll eat too much).  Looked at ways she can \"cope ahead\" for the challenges/obstacles and help herself plan for success.  She's very pleased with how physically active she has been, and looked at ways she can re-write her script to help her with incorporating helpful behaviors over the winter months, instead of anticipating she won't be able to incorporate these positive changes during the winter " "months.  Helped client problem-solve ways she might address challenges/obstacles.               ASSESSMENT: Current Emotional / Mental Status (status of significant symptoms):   Risk status (Self / Other harm or suicidal ideation)   Client denies current fears or concerns for personal safety.   Client denies current or recent suicidal ideation or behaviors.   Client denies current or recent homicidal ideation or behaviors.   Client denies current or recent self injurious behavior or ideation.   Client denies other safety concerns.   A safety and risk management plan has not been developed at this time, however client was given the after-hours number / 911 should there be a change in any of these risk factors.     Appearance:   Appropriate    Eye Contact:   Good    Psychomotor Behavior: Normal    Attitude:   Cooperative    Orientation:   All   Speech    Rate / Production: Normal     Volume:  Normal    Mood:    Normal, and client reported her mood is \"better\"   Affect:    Appropriate  and congruent with mood    Thought Content:  Clear    Thought Form:  Coherent  Logical    Insight:    Good      Medication Review:   No changes to current psychiatric medication(s)     Medication Compliance:   Yes     Changes in Health Issues:   None reported     Chemical Use Review:   Substance Use: Chemical use reviewed, no active concerns identified  She reported she hasn't been drinking alcohol.       Tobacco Use: No current tobacco use.       Collateral Reports Completed:   Not Applicable    PLAN: (Client Tasks / Therapist Tasks / Other)    1) Practice the  \"Five minute rule\" to help you with getting tasks done around the house.  Identify at least 2-3 ways you can \"plan for success\" for the winter months.      Pay attention to thinking, look for \"all or nothing thinking\" and challenge self to find the \"gray\".  Don't connect self-worth with number on scale (\"remind myself of what makes me me\").      Continue the great work you have " "been doing with self-care and devoting time for exercise, healthy eating, and relaxation.  Continue great work on recognizing and challenging negative thinking.  Find compassionate and kind voice to counter judgmental/self-critical thoughts (practice - what would you say to a friend?)  When journaling, take time to recognize the positives and good things you notice.      2) Follow-up appointment is scheduled for six weeks. Please call if symptoms worsen or if you believe you need to be seen prior to then.      3) Continue to take medications as prescribed by your doctor.    4) If you develop any suicidal thoughts or safety concerns, please contact [facility]'s crisis number, call 911, or go to the emergency department.        Cristy Wilkinson PsyD,   Licensed Psychologist  10/2/2017                                                           ________________________________________________________________________    Treatment Plan    Client's Name: Idalia Hinojosa  YOB: 1980    Date: 07/10/2017    DSM-V Diagnoses:   311 (F32.9) Unspecified Depressive Disorder     Psychosocial / Contextual Factors: limited support system, some marital stress  WHODAS: 16    Referral / Collaboration:  Referral to another professional/service is not indicated at this time.  Client was encouraged to continue to work with her primary care provider, Dr. Emely MD, who prescribed and is managing her psychiatric medications.      Anticipated number of session or this episode of care: 6-8      MeasurableTreatment Goal(s) related to diagnosis / functional impairment(s)  Goal 1: Client will incorporate self-care strategies into her daily routine    I will know I've met my goal when I'm exercising and eating well.      Objective #A (Client Action)    Client will complete an assessment of her self-care strategies, identifying stregnths and areas for improvement\".  Status: New - Date: 07/10/2017     Intervention(s)  Therapist will " "guide client in completing self-assessment and will help client develop self-care goals based upon results of assessment.    Objective #B  Client will incorporate at least two self-care strategies into her regular routine.  Status: New - Date: 07/10/99956     Intervention(s)  Therapist will help client identify any barriers or obstacles to incorporating self-care strategies,  Will help client identify strategies  to overcome barriers or obstacles to help her with sustaining positive behavior change to her self-care.    Objective #C  Client will Decrease frequency and intensity of feeling down, depressed, hopeless.  Status: New - Date: 07/10/2017     Intervention(s)  Therapist will provide psychoeducation on use of cognitive-behavioral strategies to help with management of mood.  Therapist will teach client how to identify and reframe cognitive distortions.  Therapist will help client to understand impact of cognitive disotritons on mood..  Therapist will assign homework assignments to help client practice and strengthen use of cognitive-behavioral strategies.      Objective #A (Client Action)    Status: New - Date: 07/10/2017     Client will develop a \"wellness plan\" that identifies what they need to be doing on a regular basis to manage their mood and symptoms, early warning signs, and steps to take if they experience early warning signs.      Intervention(s)  Therapist will provide psychoeducation on importance of monitoring mood, paying attention to warning signs, and steps to take if warning signs develop.  Therapist will guide client in developing a customized \"wellness plan\".        Client has reviewed and agreed to the above plan.      Cristy Wilkinson PsyD, LP  July 10, 2017  Licensed Psychologist  "

## 2017-10-02 NOTE — MR AVS SNAPSHOT
MRN:[id number]                      After Visit Summary   10/2/2017    Idalia Hinojosa    MRN: [id number]           Visit Information        Provider Department      10/2/2017 10:00 AM Cristy Wilkinson LP [facility]      Your next 10 appointments already scheduled     Nov 27, 2017 10:30 AM CST   Return Visit with Cristy Wilkinson LP   [facility] ([facility])    [facility]  [address]  [facility] [id number] [phone]              MyChart Information     [employer] gives you secure access to your electronic health record. If you see a primary care provider, you can also send messages to your care team and make appointments. If you have questions, please call your primary care clinic.  If you do not have a primary care provider, please call [phone] and they will assist you.        Care EveryWhere ID     This is your Care EveryWhere ID. This could be used by other organizations to access your [place] medical records  [id number]        Equal Access to Services     RAVI RAMIREZ : Hadii abelardo payan hadasho Soomaali, waaxda luqadaha, qaybta kaalmada adeegyagale, fallon oliver. So [facility] [phone].    ATENCIÓN: Si habla español, tiene a willingham disposición servicios gratuitos de asistencia lingüística. Llame al [phone].    We comply with applicable federal civil rights laws and [place] laws. We do not discriminate on the basis of race, color, national origin, age, disability, sex, sexual orientation, or gender identity.

## 2017-11-13 ENCOUNTER — ALLIED HEALTH/NURSE VISIT (OUTPATIENT)
Dept: NURSING | Facility: CLINIC | Age: 37
End: 2017-11-13
Payer: COMMERCIAL

## 2017-11-13 DIAGNOSIS — Z23 NEED FOR PROPHYLACTIC VACCINATION AND INOCULATION AGAINST INFLUENZA: Primary | ICD-10-CM

## 2017-11-13 PROCEDURE — 90471 IMMUNIZATION ADMIN: CPT

## 2017-11-13 PROCEDURE — 90686 IIV4 VACC NO PRSV 0.5 ML IM: CPT

## 2017-11-13 NOTE — MR AVS SNAPSHOT
After Visit Summary   11/13/2017    Idalia Hinojosa    MRN: [id number]           Patient Information     Date Of Birth          1980        Visit Information        Provider Department      11/13/2017 11:00 AM [facility]        Today's Diagnoses     Need for prophylactic vaccination and inoculation against influenza    -  1       Follow-ups after your visit        Your next 10 appointments already scheduled     Nov 27, 2017 10:30 AM CST   Return Visit with Cristy Wilkinson LP   [facility] ([facility]  [address]  [facility] [address] [phone]              Who to contact     If you have questions or need follow up information about today's clinic visit or your schedule please contact [facility] directly at [phone].  Normal or non-critical lab and imaging results will be communicated to you by [employer], letter or phone within 4 business days after the clinic has received the results. If you do not hear from us within 7 days, please contact the clinic through [employer] or phone. If you have a critical or abnormal lab result, we will notify you by phone as soon as possible.  Submit refill requests through [employer] or call your pharmacy and they will forward the refill request to us. Please allow 3 business days for your refill to be completed.          Additional Information About Your Visit        MyChart Information     [employer] gives you secure access to your electronic health record. If you see a primary care provider, you can also send messages to your care team and make appointments. If you have questions, please call your primary care clinic.  If you do not have a primary care provider, please call [phone] and they will assist you.        Care EveryWhere ID     This is your Care EveryWhere ID. This could be used by other organizations to access  your [place] medical records  [id number]         Blood Pressure from Last 3 Encounters:   04/07/17 96/60   12/29/15 116/64   11/12/15 116/76    Weight from Last 3 Encounters:   04/07/17 139 lb (63 kg)   12/29/15 149 lb (67.6 kg)   11/12/15 154 lb (69.9 kg)              We Performed the Following     FLU VAC, SPLIT VIRUS IM > 3 YO (QUADRIVALENT) [08064]     Vaccine Administration, Initial [93225]        Primary Care Provider Office Phone # Fax #    Monique Han [phone]       [address] [facility] [address]  [facility] [address]        Equal Access to Services     RAVI RAMIREZ : Hadii aad ku hadasho Sobarryali, waaxda luqadaha, qaybta kaalmada adeegyada, waxhelen oliver. So [facility] [phone].    ATENCIÓN: Si habla español, tiene a willingham disposición servicios gratuitos de asistencia lingüística. Nola al [phone].    We comply with applicable federal civil rights laws and [place] laws. We do not discriminate on the basis of race, color, national origin, age, disability, sex, sexual orientation, or gender identity.            Thank you!     Thank you for choosing [facility]  for your care. Our goal is always to provide you with excellent care. Hearing back from our patients is one way we can continue to improve our services. Please take a few minutes to complete the written survey that you may receive in the mail after your visit with us. Thank you!             Your Updated Medication List - Protect others around you: Learn how to safely use, store and throw away your medicines at www.disposemymeds.org.          This list is accurate as of: 11/13/17 11:13 AM.  Always use your most recent med list.                   Brand Name Dispense Instructions for use Diagnosis    calcium carbonate 1250 MG tablet    OS-NICO 500 mg [place]. Ca     Take 500 mg by mouth 2 times daily        sertraline 100 MG tablet    ZOLOFT    90 tablet    Take 1 tablet (100 mg) by  mouth daily    Postpartum depression

## 2017-11-13 NOTE — PROGRESS NOTES

## 2017-11-27 ENCOUNTER — OFFICE VISIT (OUTPATIENT)
Dept: PSYCHOLOGY | Facility: CLINIC | Age: 37
End: 2017-11-27
Payer: COMMERCIAL

## 2017-11-27 DIAGNOSIS — F32.A DEPRESSION, UNSPECIFIED DEPRESSION TYPE: Primary | ICD-10-CM

## 2017-11-27 PROCEDURE — 90834 PSYTX W PT 45 MINUTES: CPT | Performed by: PSYCHOLOGIST

## 2017-11-27 NOTE — PROGRESS NOTES
"                                             Progress Note    Client Name: Idalia Hinojosa  Date: 11/27/2017         Service Type: Individual      Session Start Time: 10:30 am  Session End Time: 11:20 am      Session Length: 45 minutes     Session #: 5     Attendees: Client and her one year old daughter    Treatment Plan Last Reviewed: 11/27/2017  PHQ-9 / DAVID-7 : 6/07/2017     DATA      Progress Since Last Session (Related to Symptoms / Goals / Homework):   Symptoms: Sustained improvement- client reported that overall, she continues to feel \"better\" and is doing well with using coping strategies.          Homework: Achieved / completed to satisfaction.       Episode of Care Goals: Satisfactory progress - ACTION (Actively working towards change); Intervened by reinforcing change plan / affirming steps taken.  Client is in the beginning stages of this episode of care, however, she has already began to make positive changes that she reports have helped to improve her mood.  This is her second episode of care with this writer and she believes that monthly sessions allow her to practice skills outside of session and check-in to ensure sustained improvement.  She has some concerns about how she will do over the winter, which historically has been a difficult time for her, but she is looking at how she can \"plan for success\" to incorporate strategies and habits that will be helpful to her over the winter months.       Current / Ongoing Stressors and Concerns:   Client presented for therapy at the prompting of her  due to concerns about her mood and symptoms of depression, in the context of having previously experienced episodes of depression.  She has previously participated in individual therapy with this writer.      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  This " "discussion appeared particularly salient as she is currently working on re-engaging with her music career and is interested in carrying forward learning from her past (and healing unresolved hurts).           Treatment Objective(s) Addressed in This Session:   Proces previous loss of music career   Identify and reframe cognitive distortions present in thinking       Intervention:     CBT: Processing of previous loss  Guided client in processing previous loss of music career.  We examined her interpretation/the meaning she has attributed to this loss and identified themes present in her thinking - in particular, client identified that the meaning she attributed to this loss was that she was as a failure. Introduced the possibility of her viewing this situation with a compassionate, caring, and non-judgemental stance, that allows client to view the situation from a more balanced place of thinking.  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  She is also seeing her identity in a more multifaceted way than she did when she was younger - realizing that she is not just a musician, but a mother, wife, sister, friend, etc.       CBT: Continue with - [place] ahead and cognitive restructuring.  Encouraged client to see the negative script she has in her mind about the upcoming winter (I don't do well in winter, I won't be active, I'll eat too much).  Looked at ways she can \"cope ahead\" for the challenges/obstacles and help herself plan for success.  She's very pleased with how physically active she has been, and looked at ways she can re-write her script to help her with incorporating helpful behaviors over the winter months, instead of " "anticipating she won't be able to incorporate these positive changes during the winter months.  Helped client problem-solve ways she might address challenges/obstacles.               ASSESSMENT: Current Emotional / Mental Status (status of significant symptoms):   Risk status (Self / Other harm or suicidal ideation)   Client denies current fears or concerns for personal safety.   Client denies current or recent suicidal ideation or behaviors.   Client denies current or recent homicidal ideation or behaviors.   Client denies current or recent self injurious behavior or ideation.   Client denies other safety concerns.   A safety and risk management plan has not been developed at this time, however client was given the after-hours number / 911 should there be a change in any of these risk factors.     Appearance:   Appropriate    Eye Contact:   Good    Psychomotor Behavior: Normal    Attitude:   Cooperative    Orientation:   All   Speech    Rate / Production: Normal     Volume:  Normal    Mood:    Normal, and client reported her mood is \"better\"   Affect:    Appropriate  and congruent with mood    Thought Content:  Clear    Thought Form:  Coherent  Logical    Insight:    Good      Medication Review:   No changes to current psychiatric medication(s)     Medication Compliance:   Yes     Changes in Health Issues:   None reported     Chemical Use Review:   Substance Use: Chemical use reviewed, no active concerns identified  She reported she hasn't been drinking alcohol.       Tobacco Use: No current tobacco use.       Collateral Reports Completed:   Not Applicable    PLAN: (Client Tasks / Therapist Tasks / Other)    1) Practice behavioral activation strategies for incorporating more music practice and engaging in activities when energy/motivation is low -  \"ten minute rule\".    Talk with your  about your goals for music.  Communicate what you think would be helpful/supportive so you can work towards these goals.  " "     Identify at least 2-3 ways you can \"plan for success\" for the winter months.      Pay attention to thinking, look for \"all or nothing thinking\" and challenge self to find the \"gray\".  Don't connect self-worth with number on scale (\"remind myself of what makes me me\").      Continue the great work you have been doing with self-care and devoting time for exercise, healthy eating, and relaxation.  Continue great work on recognizing and challenging negative thinking.  Find compassionate and kind voice to counter judgmental/self-critical thoughts (practice - what would you say to a friend?)  When journaling, take time to recognize the positives and good things you notice.      2) Follow-up appointment is scheduled for six weeks. Please call if symptoms worsen or if you believe you need to be seen prior to then.      3) Continue to take medications as prescribed by your doctor.    4) If you develop any suicidal thoughts or safety concerns, please contact [facility]'s crisis number, call 911, or go to the emergency department.        Cristy Wilkinson PsyD, LP  Licensed Psychologist  11/27/2017                                                           ________________________________________________________________________    Treatment Plan    Client's Name: Idalia Hinojosa  YOB: 1980    Date: 07/10/2017    DSM-V Diagnoses:   311 (F32.9) Unspecified Depressive Disorder     Psychosocial / Contextual Factors: limited support system, some marital stress  WHODAS: 16    Referral / Collaboration:  Referral to another professional/service is not indicated at this time.  Client was encouraged to continue to work with her primary care provider, Dr. Emely MD, who prescribed and is managing her psychiatric medications.      Anticipated number of session or this episode of care: 6-8      MeasurableTreatment Goal(s) related to diagnosis / functional impairment(s)  Goal 1: Client will incorporate self-care " "strategies into her daily routine    I will know I've met my goal when I'm exercising and eating well.      Objective #A (Client Action)    Client will complete an assessment of her self-care strategies, identifying stregnths and areas for improvement\".  Status update: 11/27/2017 - Complete  New - Date: 07/10/2017     Intervention(s)  Therapist will guide client in completing self-assessment and will help client develop self-care goals based upon results of assessment.    Objective #B  Client will incorporate at least two self-care strategies into her regular routine.  Status: Status update - 11/27/2017 - in progress, making good progress with this  New - Date: 07/10/41950     Intervention(s)  Therapist will help client identify any barriers or obstacles to incorporating self-care strategies,  Will help client identify strategies  to overcome barriers or obstacles to help her with sustaining positive behavior change to her self-care.    Objective #C  Client will Decrease frequency and intensity of feeling down, depressed, hopeless.  Status: Status update - 11/27/2017 - in progress - making good progress  New - Date: 07/10/2017     Intervention(s)  Therapist will provide psychoeducation on use of cognitive-behavioral strategies to help with management of mood.  Therapist will teach client how to identify and reframe cognitive distortions.  Therapist will help client to understand impact of cognitive disotritons on mood..  Therapist will assign homework assignments to help client practice and strengthen use of cognitive-behavioral strategies.      Objective #A (Client Action)    Status: 11/27/2017 - we have not been able to address this goal yet, we will focus on this in a future session.    New - Date: 07/10/2017     Client will develop a \"wellness plan\" that identifies what they need to be doing on a regular basis to manage their mood and symptoms, early warning signs, and steps to take if they experience early " "warning signs.      Intervention(s)  Therapist will provide psychoeducation on importance of monitoring mood, paying attention to warning signs, and steps to take if warning signs develop.  Therapist will guide client in developing a customized \"wellness plan\".        Client has reviewed and agreed to the above plan.      Cristy Wilkinson PsyD, LP  July 10, 2017  Licensed Psychologist  "

## 2017-11-27 NOTE — MR AVS SNAPSHOT
MRN:[id number]                      After Visit Summary   11/27/2017    Idalia Hinojosa    MRN: [id number]           Visit Information        Provider Department      11/27/2017 10:30 AM Cristy Wilkinson LP [facility]      Your next 10 appointments already scheduled     Jan 22, 2018 10:00 AM CST   Return Visit with Cristy Wilkinson LP   [facility] ([facility])    [facility]  [address] [place], [place] [id number]  [facility] [id number] [phone]              MyChart Information     [employer] gives you secure access to your electronic health record. If you see a primary care provider, you can also send messages to your care team and make appointments. If you have questions, please call your primary care clinic.  If you do not have a primary care provider, please call [phone] and they will assist you.        Care EveryWhere ID     This is your Care EveryWhere ID. This could be used by other organizations to access your [place] medical records  [id number]        Equal Access to Services     RAVI RAMIREZ : Hadii aad ku hadasho Soomaali, waaxda luqadaha, qaybta kaalmada adeegyagale, fallon oliver. So [facility] [place] [phone].    ATENCIÓN: Si habla español, tiene a willingham disposición servicios gratuitos de asistencia lingüística. Llame al [phone].    We comply with applicable federal civil rights laws and [place] laws. We do not discriminate on the basis of race, color, national origin, age, disability, sex, sexual orientation, or gender identity.

## 2018-01-22 ENCOUNTER — OFFICE VISIT (OUTPATIENT)
Dept: PSYCHOLOGY | Facility: CLINIC | Age: 38
End: 2018-01-22
Payer: COMMERCIAL

## 2018-01-22 DIAGNOSIS — F32.A DEPRESSION, UNSPECIFIED DEPRESSION TYPE: Primary | ICD-10-CM

## 2018-01-22 PROCEDURE — 90834 PSYTX W PT 45 MINUTES: CPT | Performed by: PSYCHOLOGIST

## 2018-01-22 ASSESSMENT — ANXIETY QUESTIONNAIRES
IF YOU CHECKED OFF ANY PROBLEMS ON THIS QUESTIONNAIRE, HOW DIFFICULT HAVE THESE PROBLEMS MADE IT FOR YOU TO DO YOUR WORK, TAKE CARE OF THINGS AT HOME, OR GET ALONG WITH OTHER PEOPLE: NOT DIFFICULT AT ALL
6. BECOMING EASILY ANNOYED OR IRRITABLE: SEVERAL DAYS
1. FEELING NERVOUS, ANXIOUS, OR ON EDGE: NOT AT ALL
2. NOT BEING ABLE TO STOP OR CONTROL WORRYING: NOT AT ALL
GAD7 TOTAL SCORE: 1
3. WORRYING TOO MUCH ABOUT DIFFERENT THINGS: NOT AT ALL
7. FEELING AFRAID AS IF SOMETHING AWFUL MIGHT HAPPEN: NOT AT ALL
5. BEING SO RESTLESS THAT IT IS HARD TO SIT STILL: NOT AT ALL

## 2018-01-22 ASSESSMENT — PATIENT HEALTH QUESTIONNAIRE - PHQ9
SUM OF ALL RESPONSES TO PHQ QUESTIONS 1-9: 1
5. POOR APPETITE OR OVEREATING: NOT AT ALL

## 2018-01-22 NOTE — MR AVS SNAPSHOT
MRN:[id number]                      After Visit Summary   1/22/2018    Idalia Hinojosa    MRN: [id number]           Visit Information        Provider Department      1/22/2018 10:00 AM Cristy Wilkinson LP [facility] Generic      Your next 10 appointments already scheduled     Mar 19, 2018 10:00 AM CDT   Return Visit with Cristy Wilkinson LP   [facility] ([facility])    [facility]  [address] [place], [place] [address]  [facility] [id number] [phone]              MyChart Information     [employer] gives you secure access to your electronic health record. If you see a primary care provider, you can also send messages to your care team and make appointments. If you have questions, please call your primary care clinic.  If you do not have a primary care provider, please call [phone] and they will assist you.        Care EveryWhere ID     This is your Care EveryWhere ID. This could be used by other organizations to access your [place] medical records  [id number]        Equal Access to Services     RAVI RAMIREZ : Hadii abelardo payan hadasho Soomaali, waaxda luqadaha, qaybta kaalmada adeegyagale, fallon oliver. So [facility] [phone].    ATENCIÓN: Si habla español, tiene a willingham disposición servicios gratuitos de asistencia lingüística. Llame al [phone].    We comply with applicable federal civil rights laws and [place] laws. We do not discriminate on the basis of race, color, national origin, age, disability, sex, sexual orientation, or gender identity.

## 2018-01-22 NOTE — PROGRESS NOTES
"                                             Progress Note    Client Name: Idalia Hinojosa  Date: 1/22/2018         Service Type: Individual      Session Start Time: 10:30 am  Session End Time: 11:20 am      Session Length: 45 minutes     Session #: 6     Attendees: Client and her one year old daughter    Treatment Plan Last Reviewed: 11/27/2017  PHQ-9 / DAVID-7 : 1/22/2018     DATA      Progress Since Last Session (Related to Symptoms / Goals / Homework):   Symptoms: Sustained improvement- client reported that she continues to feel better and was proud to say \"I'm the happiest I've ever been in the winter!\"        Homework: Achieved / completed to satisfaction - has been successfully engaging mood management strategies, including engaging in hobbies, getting physical exercise, practicing her musical instrument, spending fun time with her daughter, getting adequate rest, and engaging in positive/enjoyable activities.       Episode of Care Goals: Satisfactory progress - ACTION (Actively working towards change); Intervened by reinforcing change plan / affirming steps taken.  Client is in the middle stages of this episode of care.  She has made and sustained positive changes and has noted a positive impact on her mood and she has experienced a decrease in symptoms of depression and anxiety.  This is her second episode of care with this writert.  She had some concerns about how she would do over the winter, which historically has been a difficult time for her, but she has been able to incorporate positive strategies and has been doing well over the winter.  We were meeting on a monthly basis, she feels ready to decrease frequency of sessions due to sustained improvement - we will check-in in two months to assess how she is doing. She is aware that she can call if needed before then or if she believes she needs a sooner appointment.        Current / Ongoing Stressors and Concerns:   Client presented for therapy at the " "prompting of her  due to concerns about her mood and symptoms of depression, in the context of having previously experienced episodes of depression.  She has previously participated in individual therapy with this writer.      Client identified the following concerns she wished to discuss today: continue to sustain good progress she is making, think about ways she can \"plan ahead\" to continue with her successes. She noted that registering for a tri-athalon last year really helped her to have a positive summer, she's been wanting to do that again for this year but has had some difficulties taking the next steps.          Treatment Objective(s) Addressed in This Session:   Identify what has been helpful with making positive changes you have made so far   Identify barriers and obstacles to following through on your goal to do a tri-athalon this summer, identify ways to overcome barriers and obstacles  Consider making \"wellness plan\" at next sesion  Symptom check        Intervention:     CBT: .Client received a score of 1 on both the PHQ-9 and DAVID-7 today.  She identified that positive changes she has been able to make have contributed to her feeling better - engaging in hobbies, riding her bike inside, doing yoga, using exercise videos, knitting, doing puzzles, practicing her instrument and sleeping well. She was also happy to say that she is really enjoying her time with her daughter - \"she's so fun!\".  She was pleased to say that this is the happiest she has felt in the winter. We acknowledged her successes in \"changing her script\" for the winter months (instead of saying \"I can't do it, winter is always so tough, it never goes well, etc. She has been able to focus on doing things she enjoys and taking care of herself!)  She would like to do something similar for herself for the summer - registering for a tri-athalon last summer really helped her as it gave her a positive focus and gave her something to work " "towards. She identified that she's had a hard time getting herself to take the next step to register for the tri-athalon.  She engaged in some problem-solving around how she can overcome barriers/obstacles to doing something positive for herself and set goals for herself to work on to help her take the next steps for this.  Provided her with encouragement and affirmation for all the positive steps she has taken - we also reviewed concepts related to behavioral activation - that motivation often follows, not precedes, positive behavior changes.          ASSESSMENT: Current Emotional / Mental Status (status of significant symptoms):   Risk status (Self / Other harm or suicidal ideation)   Client denies current fears or concerns for personal safety.   Client denies current or recent suicidal ideation or behaviors.   Client denies current or recent homicidal ideation or behaviors.   Client denies current or recent self injurious behavior or ideation.   Client denies other safety concerns.   A safety and risk management plan has not been developed at this time, however client was given the after-hours number / 911 should there be a change in any of these risk factors.     Appearance:   Appropriate    Eye Contact:   Good    Psychomotor Behavior: Normal    Attitude:   Cooperative    Orientation:   All   Speech    Rate / Production: Normal     Volume:  Normal    Mood:    Normal, and bright, client reported her mood is \"better\"   Affect:    Appropriate  and congruent with mood    Thought Content:  Clear    Thought Form:  Coherent  Logical    Insight:    Good      Medication Review:   No changes to current psychiatric medication(s)     Medication Compliance:   Yes     Changes in Health Issues:   None reported     Chemical Use Review:   Substance Use: Chemical use reviewed, no active concerns identified  She reported she hasn't been drinking alcohol.       Tobacco Use: No current tobacco use.       Collateral Reports " "Completed:   Not Applicable    PLAN: (Client Tasks / Therapist Tasks / Other)    1) Look into the dates for the Women's Tri-athalon, talk to  about registering for tri-athalon.  [place] by end of February or sooner.  Continue to stay physically active - use bike  to prepare for tri-athalon, continue to do exercise videos,         Continue with:    behavioral activation strategies for incorporating more music practice and engaging in activities when energy/motivation is low -  \"ten minute rule\".     Pay attention to thinking, look for \"all or nothing thinking\" and challenge self to find the \"gray\".  Don't connect self-worth with number on scale (\"remind myself of what makes me me\").      Continue the great work you have been doing with self-care and devoting time for exercise, healthy eating, and relaxation.  Continue great work on recognizing and challenging negative thinking.  Find compassionate and kind voice to counter judgmental/self-critical thoughts (practice - what would you say to a friend?)  When journaling, take time to recognize the positives and good things you notice.      2) Follow-up appointment is scheduled for two months. Please call if symptoms worsen or if you believe you need to be seen prior to then.      3) Continue to take medications as prescribed by your doctor.    4) If you develop any suicidal thoughts or safety concerns, please contact [facility]'s crisis number, call 911, or go to the emergency department.        Cristy Wilkinson PsyD, LP  Licensed Psychologist  1/22/2018                                                             ________________________________________________________________________    Treatment Plan    Client's Name: Idalia Hinojosa  YOB: 1980    Date: 07/10/2017    DSM-V Diagnoses:   311 (F32.9) Unspecified Depressive Disorder     Psychosocial / Contextual Factors: limited support system, some marital stress  WHODAS: 16    Referral / " "Collaboration:  Referral to another professional/service is not indicated at this time.  Client was encouraged to continue to work with her primary care provider, Dr. Emely MD, who prescribed and is managing her psychiatric medications.      Anticipated number of session or this episode of care: 6-8      MeasurableTreatment Goal(s) related to diagnosis / functional impairment(s)  Goal 1: Client will incorporate self-care strategies into her daily routine    I will know I've met my goal when I'm exercising and eating well.      Objective #A (Client Action)    Client will complete an assessment of her self-care strategies, identifying stregnths and areas for improvement\".  Status update: 11/27/2017 - Complete  New - Date: 07/10/2017     Intervention(s)  Therapist will guide client in completing self-assessment and will help client develop self-care goals based upon results of assessment.    Objective #B  Client will incorporate at least two self-care strategies into her regular routine.  Status: Status update - 11/27/2017 - in progress, making good progress with this  New - Date: 07/10/64618     Intervention(s)  Therapist will help client identify any barriers or obstacles to incorporating self-care strategies,  Will help client identify strategies  to overcome barriers or obstacles to help her with sustaining positive behavior change to her self-care.    Objective #C  Client will Decrease frequency and intensity of feeling down, depressed, hopeless.  Status: Status update - 11/27/2017 - in progress - making good progress  New - Date: 07/10/2017     Intervention(s)  Therapist will provide psychoeducation on use of cognitive-behavioral strategies to help with management of mood.  Therapist will teach client how to identify and reframe cognitive distortions.  Therapist will help client to understand impact of cognitive disotritons on mood..  Therapist will assign homework assignments to help client practice and " "strengthen use of cognitive-behavioral strategies.      Objective #A (Client Action)    Status: 11/27/2017 - we have not been able to address this goal yet, we will focus on this in a future session.    New - Date: 07/10/2017     Client will develop a \"wellness plan\" that identifies what they need to be doing on a regular basis to manage their mood and symptoms, early warning signs, and steps to take if they experience early warning signs.      Intervention(s)  Therapist will provide psychoeducation on importance of monitoring mood, paying attention to warning signs, and steps to take if warning signs develop.  Therapist will guide client in developing a customized \"wellness plan\".        Client has reviewed and agreed to the above plan.      Cristy Wilkinson PsyD, LP  July 10, 2017  Licensed Psychologist  "

## 2018-01-23 ASSESSMENT — ANXIETY QUESTIONNAIRES: GAD7 TOTAL SCORE: 1

## 2018-04-16 RX ORDER — SERTRALINE HYDROCHLORIDE 100 MG/1
100 TABLET, FILM COATED ORAL DAILY
Qty: 90 TABLET | Refills: 0 | Status: SHIPPED | OUTPATIENT
Start: 2018-04-16 | End: 2018-07-01

## 2018-04-16 NOTE — TELEPHONE ENCOUNTER
Pending Prescriptions:                       Disp   Refills    sertraline (ZOLOFT) 100 MG tablet         90 tab*0            Sig: Take 1 tablet (100 mg) by mouth daily      Last OV was 4/7/17.  Due for AE. TC to patient. Scheduled AE for 5/18/18. Refill sent to pharmacy.   Mariella Burris RN-BSN      PHQ-9 SCORE 4/7/2017 6/19/2017 1/22/2018   Total Score - - -   Total Score 2 3 1     DAVID-7 SCORE 5/17/2016 6/19/2017 1/22/2018   Total Score 2 3 1

## 2018-06-27 ENCOUNTER — OFFICE VISIT (OUTPATIENT)
Dept: OBGYN | Facility: CLINIC | Age: 38
End: 2018-06-27
Payer: COMMERCIAL

## 2018-06-27 VITALS
HEIGHT: 66 IN | BODY MASS INDEX: 25.18 KG/M2 | WEIGHT: 156.7 LBS | SYSTOLIC BLOOD PRESSURE: 110 MMHG | DIASTOLIC BLOOD PRESSURE: 68 MMHG | HEART RATE: 78 BPM

## 2018-06-27 DIAGNOSIS — Z01.419 ENCOUNTER FOR GYNECOLOGICAL EXAMINATION WITHOUT ABNORMAL FINDING: Primary | ICD-10-CM

## 2018-06-27 DIAGNOSIS — Z13.220 SCREENING FOR LIPOID DISORDERS: ICD-10-CM

## 2018-06-27 LAB
ERYTHROCYTE [DISTWIDTH] IN BLOOD BY AUTOMATED COUNT: 12.4 % (ref 10–15)
HCT VFR BLD AUTO: 39.2 % (ref 35–47)
HGB BLD-MCNC: 13.1 G/DL (ref 11.7–15.7)
MCH RBC QN AUTO: 32 PG (ref 26.5–33)
MCHC RBC AUTO-ENTMCNC: 33.4 G/DL (ref 31.5–36.5)
MCV RBC AUTO: 96 FL (ref 78–100)
PLATELET # BLD AUTO: 244 10E9/L (ref 150–450)
RBC # BLD AUTO: 4.09 10E12/L (ref 3.8–5.2)
WBC # BLD AUTO: 6.1 10E9/L (ref 4–11)

## 2018-06-27 PROCEDURE — 36415 COLL VENOUS BLD VENIPUNCTURE: CPT | Performed by: OBSTETRICS & GYNECOLOGY

## 2018-06-27 PROCEDURE — 85027 COMPLETE CBC AUTOMATED: CPT | Performed by: OBSTETRICS & GYNECOLOGY

## 2018-06-27 PROCEDURE — 99395 PREV VISIT EST AGE 18-39: CPT | Performed by: OBSTETRICS & GYNECOLOGY

## 2018-06-27 PROCEDURE — 80061 LIPID PANEL: CPT | Performed by: OBSTETRICS & GYNECOLOGY

## 2018-06-27 ASSESSMENT — ANXIETY QUESTIONNAIRES
7. FEELING AFRAID AS IF SOMETHING AWFUL MIGHT HAPPEN: NOT AT ALL
GAD7 TOTAL SCORE: 3
6. BECOMING EASILY ANNOYED OR IRRITABLE: SEVERAL DAYS
2. NOT BEING ABLE TO STOP OR CONTROL WORRYING: NOT AT ALL
3. WORRYING TOO MUCH ABOUT DIFFERENT THINGS: SEVERAL DAYS
1. FEELING NERVOUS, ANXIOUS, OR ON EDGE: SEVERAL DAYS
5. BEING SO RESTLESS THAT IT IS HARD TO SIT STILL: NOT AT ALL

## 2018-06-27 ASSESSMENT — PATIENT HEALTH QUESTIONNAIRE - PHQ9: 5. POOR APPETITE OR OVEREATING: NOT AT ALL

## 2018-06-27 NOTE — MR AVS SNAPSHOT
"              After Visit Summary   6/27/2018    Idalia Hinojosa    MRN: [id number]           Patient Information     Date Of Birth          1980        Visit Information        Provider Department      6/27/2018 2:15 PM Monique Han MD [facility] [place]        Today's Diagnoses     Encounter for gynecological examination without abnormal finding    -  1    Postpartum depression        Screening for lipoid disorders           Follow-ups after your visit        Who to contact     If you have questions or need follow up information about today's clinic visit or your schedule please contact [facility] directly at [phone].  Normal or non-critical lab and imaging results will be communicated to you by MyChart, letter or phone within 4 business days after the clinic has received the results. If you do not hear from us within 7 days, please contact the clinic through [employer] or phone. If you have a critical or abnormal lab result, we will notify you by phone as soon as possible.  Submit refill requests through [employer] or call your pharmacy and they will forward the refill request to us. Please allow 3 business days for your refill to be completed.          Additional Information About Your Visit        MyChart Information     Lum[employer] gives you secure access to your electronic health record. If you see a primary care provider, you can also send messages to your care team and make appointments. If you have questions, please call your primary care clinic.  If you do not have a primary care provider, please call [phone] and they will assist you.        Care EveryWhere ID     This is your Care EveryWhere ID. This could be used by other organizations to access your [facility] medical records  [id number]        Your Vitals Were     Pulse Height Last Period BMI (Body Mass Index)          78 5' 5.5\" (1.664 m) 06/15/2018 25.68 kg/m2         Blood Pressure from Last 3 Encounters: "   06/27/18 110/68   04/07/17 96/60   12/29/15 116/64    Weight from Last 3 Encounters:   06/27/18 156 lb 11.2 oz (71.1 kg)   04/07/17 139 lb (63 kg)   12/29/15 149 lb (67.6 kg)              We Performed the Following     CBC with platelets     Lipid Profile          Today's Medication Changes          These changes are accurate as of 6/27/18 11:59 PM.  If you have any questions, ask your nurse or doctor.               These medicines have changed or have updated prescriptions.        Dose/Directions    sertraline 50 MG tablet   Commonly known as:  ZOLOFT   This may have changed:    - medication strength  - how much to take   Used for:  Postpartum depression   Changed by:  Monique Han MD        Dose:  50 mg   Take 1 tablet (50 mg) by mouth daily   Quantity:  90 tablet   Refills:  3            Where to get your medicines      These medications were sent to [place] [id number] [place] [facility] [address]  [address] [facility] [address]     Phone:  [phone]     sertraline 50 MG tablet                Primary Care Provider Office Phone # Fax #    Monique Han [phone]       [address]  [facility] [address]        Equal Access to Services     RAVI RAMIREZ AH: Hadii abelardo payan hadasho Soomaali, waaxda luqadaha, qaybta kaalmada adeegyada, fallon locke hayrigoberto herrera . So [facility] [phone].    ATENCIÓN: Si habla español, tiene a willingham disposición servicios gratuitos de asistencia lingüística. Llame al [phone].    We comply with applicable federal civil rights laws and [place] laws. We do not discriminate on the basis of race, color, national origin, age, disability, sex, sexual orientation, or gender identity.            Thank you!     Thank you for choosing [facility]  for your care. Our goal is always to provide you with excellent care. Hearing back from our patients is one way we can continue to improve our services.  Please take a few minutes to complete the written survey that you may receive in the mail after your visit with us. Thank you!             Your Updated Medication List - Protect others around you: Learn how to safely use, store and throw away your medicines at www.disposemymeds.org.          This list is accurate as of 6/27/18 11:59 PM.  Always use your most recent med list.                   Brand Name Dispense Instructions for use Diagnosis    calcium carbonate 500 MG tablet    OS-NICO 500 mg [place]. [place]     Take 500 mg by mouth 2 times daily        sertraline 50 MG tablet    ZOLOFT    90 tablet    Take 1 tablet (50 mg) by mouth daily    Postpartum depression

## 2018-06-27 NOTE — PROGRESS NOTES
Idalia is a 38 year old  female who presents for annual exam.     Menses are regular q 28-30 days and normal lasting 4-5 days.  Menses flow: normal.  Patient's last menstrual period was 06/15/2018.. Using condoms for contraception.  She is not currently considering pregnancy.  Besides routine health maintenance, she has no other health concerns today .  Daughter is 2.4 y/o and doing well. Mood stable on Zoloft but has had some sexual side effects and wonders if due to the medication. Has been doing so well has not been back to therapist for awhile now.  Not planning more kids and spouse may get vasectomy done.  Has a mole on her head that has been there for years but wants that looked at today.   GYNECOLOGIC HISTORY:  Menarche:   Idalia is sexually active with 1male partner(s) and is currently in monogamous relationship.    History sexually transmitted infections:No STD history  STI testing offered?  Declined  DEBORA exposure: Unknown  History of abnormal Pap smear: NO - age 30- 65 PAP every 3 years recommended  Family history of breast CA: No  Family history of uterine/ovarian CA: No    Family history of colon CA: No    HEALTH MAINTENANCE:  Cholesterol: (  Cholesterol   Date Value Ref Range Status   12/10/2013 177 0 - 200 mg/dL Final     Comment:     LDL Cholesterol is the primary guide to therapy.   The NCEP recommends further evaluation of: patients with cholesterol greater   than 200 mg/dL if additional risk factors are present, cholesterol greater   than   240 mg/dL, triglycerides greater than 150 mg/dL, or HDL less than 40 mg/dL.    History of abnormal lipids: No  Mammo: NA . History of abnormal Mammo: NA.  Regular Self Breast Exams: No  Calcium/Vitamin D intake: source:  none Adequate? No  TSH: (No results found for: TSH )  Pap; (  Lab Results   Component Value Date    PAP ASC-US 2017    PAP NIL 12/10/2013    )    HISTORY:  Obstetric History       T0      L1     SAB0   TAB0    "Ectopic0   Multiple0   Live Births1       # Outcome Date GA Lbr Alfredito/2nd Weight Sex Delivery Anes PTL Lv   1  10/28/15 34w5d  5 lb 8.5 oz (2.509 kg) F CS-LTranv   OPHELIA      Name: Alma Delia      Apgar1:  5                Apgar5: 7        Past Medical History:   Diagnosis Date     ASCUS of cervix with negative high risk HPV 2017 ASCUS, Neg HPV     Seasonal affective disorder (H)     no meds     Past Surgical History:   Procedure Laterality Date      SECTION N/A 10/28/2015    Procedure:  SECTION;  Surgeon: Mónica Madrid MD;  Location:  L+D     DENTAL SURGERY      wisdom teeth     TONSILLECTOMY  age 10     Family History   Problem Relation Age of Onset     Thyroid Disease Paternal Grandmother      Other Cancer Paternal Grandmother      HEART DISEASE Maternal Grandmother      Social History     Social History     Marital status:      Spouse name: N/A     Number of children: 1     Years of education: N/A     Occupational History      [employer]     Social History Main Topics     Smoking status: Never Smoker     Smokeless tobacco: Never Used     Alcohol use No     Drug use: No     Sexual activity: Yes     Partners: Male     Other Topics Concern     Parent/Sibling W/ Cabg, Mi Or Angioplasty Before 65f 55m? No         Current Outpatient Prescriptions:      calcium carbonate (OS-NICO 500 MG [place]. [place]) 500 MG tablet, Take 500 mg by mouth 2 times daily, Disp: , Rfl:      sertraline (ZOLOFT) 100 MG tablet, Take 1 tablet (100 mg) by mouth daily, Disp: 90 tablet, Rfl: 0     Allergies   Allergen Reactions     Seasonal Allergies        Past medical, surgical, social and family history were reviewed and updated in EPIC.      EXAM:  /68  Pulse 78  Ht 5' 5.5\" (1.664 m)  Wt 156 lb 11.2 oz (71.1 kg)  LMP 06/15/2018  BMI 25.68 kg/m2   BMI: Body mass index is 25.68 kg/(m^2).  Constitutional: healthy, alert and no distress  Head: Normocephalic. No masses, tenderness or " abnormalities. Has elevated mole on left side about 5 mm without color in hair.  Neck: Neck supple. Trachea midline. No adenopathy. Thyroid symmetric, normal size.   Cardiovascular: RRR.   Respiratory: Negative.   Breast: Breasts reveal mild symmetric fibrocystic densities, but there are no dominant, discrete, fixed or suspicious masses found.  Gastrointestinal: Abdomen soft, non-tender, non-distended. No masses, organomegaly.  : deferred  Musculoskeletal: extremities normal  Skin: no suspicious lesions or rashes  Psychiatric: Affect appropriate, cooperative,mentation appears normal.     COUNSELING:   Reviewed preventive health counseling, as reflected in patient instructions       Contraception   reports that she has never smoked. She has never used smokeless tobacco.    Body mass index is 25.68 kg/(m^2).--she did not want to discuss weight at today's visit    FRAX Risk Assessment    ASSESSMENT:  38 year old female with satisfactory annual exam  (Z01.419) Encounter for gynecological examination without abnormal finding  (primary encounter diagnosis)  Comment: condoms  Plan: CBC with platelets        Pap due 2020    (F53) Postpartum depression  Comment:   PHQ-9 SCORE 1/22/2018 6/27/2018 6/27/2018   Total Score - - -   Total Score 1 4 4     DAVID-7 SCORE 6/19/2017 1/22/2018 6/27/2018   Total Score 3 1 3       Plan: sertraline (ZOLOFT) 50 MG tablet        Will decrease dose of zoloft to 50mg and see how tolerates that. Discussed sexual side effects should improve and want to make sure mood is stable. Will resend david and phq in 6-8 weeks. If stable, then consider weaning down and off in 2019.    (Z13.220) Screening for lipoid disorders  Comment: not done prior  Plan: Lipid Profile        Check today, random.        Monique Han MD

## 2018-06-28 LAB
CHOLEST SERPL-MCNC: 193 MG/DL
HDLC SERPL-MCNC: 61 MG/DL
LDLC SERPL CALC-MCNC: 115 MG/DL
NONHDLC SERPL-MCNC: 132 MG/DL
TRIGL SERPL-MCNC: 86 MG/DL

## 2018-06-28 ASSESSMENT — ANXIETY QUESTIONNAIRES: GAD7 TOTAL SCORE: 3

## 2018-06-28 ASSESSMENT — PATIENT HEALTH QUESTIONNAIRE - PHQ9: SUM OF ALL RESPONSES TO PHQ QUESTIONS 1-9: 4

## 2018-11-07 ENCOUNTER — ALLIED HEALTH/NURSE VISIT (OUTPATIENT)
Dept: NURSING | Facility: CLINIC | Age: 38
End: 2018-11-07
Payer: COMMERCIAL

## 2018-11-07 DIAGNOSIS — Z23 NEED FOR PROPHYLACTIC VACCINATION AND INOCULATION AGAINST INFLUENZA: Primary | ICD-10-CM

## 2018-11-07 PROCEDURE — 90686 IIV4 VACC NO PRSV 0.5 ML IM: CPT

## 2018-11-07 PROCEDURE — 90471 IMMUNIZATION ADMIN: CPT

## 2018-11-07 PROCEDURE — 99207 ZZC NO CHARGE NURSE ONLY: CPT

## 2018-11-07 NOTE — MR AVS SNAPSHOT
After Visit Summary   11/7/2018    Idalia Hinojosa    MRN: [id number]           Patient Information     Date Of Birth          1980        Visit Information        Provider Department      11/7/2018 9:30 AM HP FLU CLINIC NURSE [facility]        Today's Diagnoses     Need for prophylactic vaccination and inoculation against influenza    -  1       Follow-ups after your visit        Who to contact     If you have questions or need follow up information about today's clinic visit or your schedule please contact [facility] directly at [phone].  Normal or non-critical lab and imaging results will be communicated to you by JLG[employer]hart, letter or phone within 4 business days after the clinic has received the results. If you do not hear from us within 7 days, please contact the clinic through [employer] or phone. If you have a critical or abnormal lab result, we will notify you by phone as soon as possible.  Submit refill requests through [employer] or call your pharmacy and they will forward the refill request to us. Please allow 3 business days for your refill to be completed.          Additional Information About Your Visit        MyChart Information     [employer] gives you secure access to your electronic health record. If you see a primary care provider, you can also send messages to your care team and make appointments. If you have questions, please call your primary care clinic.  If you do not have a primary care provider, please call [phone] and they will assist you.        Care EveryWhere ID     This is your Care EveryWhere ID. This could be used by other organizations to access your [place] medical records  [id number]         Blood Pressure from Last 3 Encounters:   06/27/18 110/68   04/07/17 96/60   12/29/15 116/64    Weight from Last 3 Encounters:   06/27/18 156 lb 11.2 oz (71.1 kg)   04/07/17 139 lb (63 kg)   12/29/15 149 lb (67.6 kg)              We  Performed the Following     FLU VACCINE, SPLIT VIRUS, IM (QUADRIVALENT) [93859]- >3 YRS     Vaccine Administration, Initial [17748]        Primary Care Provider Office Phone # Fax #    Monique Han [phone]       [address] [facility] [address]  [facility] [address]        Equal Access to Services     [facility]: Hadii aad ku hadasho Soomaali, waaxda luqadaha, qaybta kaalmada adeegyada, waxay idiin hayaan adeeg kharash la'aan . So [facility] [phone].    ATENCIÓN: Si habla español, tiene a willingham disposición servicios gratuitos de asistencia lingüística. Nola al [phone].    We comply with applicable federal civil rights laws and [place] laws. We do not discriminate on the basis of race, color, national origin, age, disability, sex, sexual orientation, or gender identity.            Thank you!     Thank you for choosing [facility]  for your care. Our goal is always to provide you with excellent care. Hearing back from our patients is one way we can continue to improve our services. Please take a few minutes to complete the written survey that you may receive in the mail after your visit with us. Thank you!             Your Updated Medication List - Protect others around you: Learn how to safely use, store and throw away your medicines at www.disposemymeds.org.          This list is accurate as of 11/7/18 10:37 AM.  Always use your most recent med list.                   Brand Name Dispense Instructions for use Diagnosis    calcium carbonate 500 mg (elemental) 500 MG tablet    OS-NICO     Take 500 mg by mouth 2 times daily        sertraline 50 MG tablet    ZOLOFT    90 tablet    Take 1 tablet (50 mg) by mouth daily    Postpartum depression

## 2018-11-07 NOTE — PROGRESS NOTES

## 2018-11-20 ENCOUNTER — OFFICE VISIT (OUTPATIENT)
Dept: FAMILY MEDICINE | Facility: CLINIC | Age: 38
End: 2018-11-20
Payer: COMMERCIAL

## 2018-11-20 VITALS
RESPIRATION RATE: 20 BRPM | BODY MASS INDEX: 25.89 KG/M2 | WEIGHT: 158 LBS | HEART RATE: 64 BPM | OXYGEN SATURATION: 96 % | SYSTOLIC BLOOD PRESSURE: 103 MMHG | DIASTOLIC BLOOD PRESSURE: 65 MMHG | TEMPERATURE: 97.5 F

## 2018-11-20 DIAGNOSIS — J01.00 ACUTE NON-RECURRENT MAXILLARY SINUSITIS: Primary | ICD-10-CM

## 2018-11-20 PROCEDURE — 99213 OFFICE O/P EST LOW 20 MIN: CPT | Performed by: PHYSICIAN ASSISTANT

## 2018-11-20 RX ORDER — FLUTICASONE PROPIONATE 50 MCG
2 SPRAY, SUSPENSION (ML) NASAL DAILY
Qty: 16 G | Refills: 3 | Status: SHIPPED | OUTPATIENT
Start: 2018-11-20 | End: 2019-10-08

## 2018-11-20 NOTE — PATIENT INSTRUCTIONS
Encouraged mucinex/guafenisin, warm salt water gargles, cepacol spray, soothers/lozenges, sinus rinses (neilmed), flonase (2 sprays per nostril daily x 2 weeks), vitamin c, fluids and rest.  May alternate tylenol and NSAIDS (ibuprofen, advil, aleve type products) every 4-6 hours for the next few days as needed.    NO need for oral antibiotic at this time.  Return to clinic with any worsening or changes in symptoms.

## 2018-11-20 NOTE — PROGRESS NOTES
SUBJECTIVE:   Idalia Hinojosa is a 38 year old female who presents to clinic today for the following health issues:      RESPIRATORY SYMPTOMS      Duration: 6 days    Description  nasal congestion, ear pain left and hoarse voice    Severity: mild    Accompanying signs and symptoms: None    History (predisposing factors):  none    Therapies tried and outcome:  Dayquil, nyquil, tylenol sinus           Problem list and histories reviewed & adjusted, as indicated.  Additional history: as documented    Patient Active Problem List   Diagnosis     Seasonal affective disorder (H)     Postpartum depression     ASCUS of cervix with negative high risk HPV     Past Surgical History:   Procedure Laterality Date      SECTION N/A 10/28/2015    Procedure:  SECTION;  Surgeon: Mónica Madrid MD;  Location:  L+D     DENTAL SURGERY      wisdom teeth     TONSILLECTOMY  age 10       Social History   Substance Use Topics     Smoking status: Never Smoker     Smokeless tobacco: Never Used     Alcohol use 0.0 oz/week     0 Standard drinks or equivalent per week      Comment: 0-2 PER WK     Family History   Problem Relation Age of Onset     Thyroid Disease Paternal Grandmother      Other Cancer Paternal Grandmother      HEART DISEASE Maternal Grandmother          Current Outpatient Prescriptions   Medication Sig Dispense Refill     calcium carbonate (OS-NICO 500 MG [place]. [place]) 500 MG tablet Take 500 mg by mouth 2 times daily       fluticasone (FLONASE) 50 MCG/ACT spray Spray 2 sprays into both nostrils daily 16 g 3     sertraline (ZOLOFT) 50 MG tablet Take 1 tablet (50 mg) by mouth daily 90 tablet 3     Allergies   Allergen Reactions     Seasonal Allergies        Reviewed and updated as needed this visit by clinical staff  Tobacco  Allergies  Meds  Problems  Med Hx  Surg Hx  Fam Hx  Soc Hx        Reviewed and updated as needed this visit by Provider  Allergies  Meds  Problems         ROS:  Constitutional,  HEENT, cardiovascular, pulmonary, GI, , musculoskeletal, neuro, skin, endocrine and psych systems are negative, except as otherwise noted.    OBJECTIVE:     /65 (BP Location: Left arm, Patient Position: Sitting, Cuff Size: Adult Regular)  Pulse 64  Temp 97.5  F (36.4  C) (Oral)  Resp 20  Wt 158 lb (71.7 kg)  SpO2 96%  BMI 25.89 kg/m2  Body mass index is 25.89 kg/(m^2).  GENERAL: healthy, alert and no distress  EYES: Eyes grossly normal to inspection, PERRL and conjunctivae and sclerae normal  HENT: ear canals and TM's normal, nose and mouth without ulcers or lesions  NECK: no adenopathy, no asymmetry, masses, or scars and thyroid normal to palpation  RESP: lungs clear to auscultation - no rales, rhonchi or wheezes  CV: regular rate and rhythm, normal S1 S2, no S3 or S4, no murmur, click or rub, no peripheral edema and peripheral pulses strong  PSYCH: mentation appears normal, affect normal/bright    Diagnostic Test Results:  none     ASSESSMENT/PLAN:       ICD-10-CM    1. Acute non-recurrent maxillary sinusitis J01.00 fluticasone (FLONASE) 50 MCG/ACT spray       Patient Instructions   Encouraged mucinex/guafenisin, warm salt water gargles, cepacol spray, soothers/lozenges, sinus rinses (neilmed), flonase (2 sprays per nostril daily x 2 weeks), vitamin c, fluids and rest.  May alternate tylenol and NSAIDS (ibuprofen, advil, aleve type products) every 4-6 hours for the next few days as needed.    NO need for oral antibiotic at this time.  Return to clinic with any worsening or changes in symptoms.       Margarita Singer PA-C  Formerly named [facility]

## 2018-11-20 NOTE — MR AVS SNAPSHOT
After Visit Summary   11/20/2018    Idalia Hinojosa    MRN: [id number]           Patient Information     Date Of Birth          1980        Visit Information        Provider Department      11/20/2018 11:40 AM Margarita Singer PA-C [facility]        Today's Diagnoses     Acute non-recurrent maxillary sinusitis    -  1      Care Instructions    Encouraged mucinex/guafenisin, warm salt water gargles, cepacol spray, soothers/lozenges, sinus rinses (neilmed), flonase (2 sprays per nostril daily x 2 weeks), vitamin c, fluids and rest.  May alternate tylenol and NSAIDS (ibuprofen, advil, aleve type products) every 4-6 hours for the next few days as needed.    NO need for oral antibiotic at this time.  Return to clinic with any worsening or changes in symptoms.           Follow-ups after your visit        Your next 10 appointments already scheduled     Nov 20, 2018 11:40 AM CST   Office Visit with Margarita Singer PA-C   [facility] ([facility])    [address] [place] [id number] [phone]           Bring a current list of meds and any records pertaining to this visit. For Physicals, please bring immunization records and any forms needing to be filled out. Please arrive 10 minutes early to complete paperwork.              Who to contact     If you have questions or need follow up information about today's clinic visit or your schedule please contact [facility] directly at [phone].  Normal or non-critical lab and imaging results will be communicated to you by MyChart, letter or phone within 4 business days after the clinic has received the results. If you do not hear from us within 7 days, please contact the clinic through MyChart or phone. If you have a critical or abnormal lab result, we will notify you by phone as soon as possible.  Submit refill requests through [employer] or call your pharmacy and  they will forward the refill request to us. Please allow 3 business days for your refill to be completed.          Additional Information About Your Visit        Rocker[employer] Information     "[employer] gives you secure access to your electronic health record. If you see a primary care provider, you can also send messages to your care team and make appointments. If you have questions, please call your primary care clinic.  If you do not have a primary care provider, please call [phone] and they will assist you.        Care EveryWhere ID     This is your Care EveryWhere ID. This could be used by other organizations to access your [place] medical records  [id number]        Your Vitals Were     Pulse Temperature Respirations Pulse Oximetry BMI (Body Mass Index)       64 97.5  F (36.4  C) (Oral) 20 96% 25.89 kg/m2        Blood Pressure from Last 3 Encounters:   11/20/18 103/65   06/27/18 110/68   04/07/17 96/60    Weight from Last 3 Encounters:   11/20/18 158 lb (71.7 kg)   06/27/18 156 lb 11.2 oz (71.1 kg)   04/07/17 139 lb (63 kg)              Today, you had the following     No orders found for display         Today's Medication Changes          These changes are accurate as of 11/20/18 11:37 AM.  If you have any questions, ask your nurse or doctor.               Start taking these medicines.        Dose/Directions    fluticasone 50 MCG/ACT spray   Commonly known as:  FLONASE   Used for:  Acute non-recurrent maxillary sinusitis   Started by:  Margarita Singer PA-C        Dose:  2 spray   Spray 2 sprays into both nostrils daily   Quantity:  16 g   Refills:  3            Where to get your medicines      These medications were sent to [place] [address] [place] [facility] [address] [facility]  [address] [facility] [address]     Phone:  [phone]     fluticasone 50 MCG/ACT spray                Primary Care Provider Office Phone # Fax #    Monique Han [phone] [facility] [address]  [facility] [address]        Equal Access to Services     RAVI RAMIREZ : Hadii aad ku hadsophiachapo Marisabelumu, wamatthewda alfredcintiaha, sukihoward muñozanthonygale jordan, fallon anthonyurbanomalgorzata oliver. So [facility] [phone].    ATENCIÓN: Si habla español, tiene a willingham disposición servicios gratuitos de asistencia lingüística. Llame al [phone].    We comply with applicable federal civil rights laws and [place] laws. We do not discriminate on the basis of race, color, national origin, age, disability, sex, sexual orientation, or gender identity.            Thank you!     Thank you for choosing [facility]  for your care. Our goal is always to provide you with excellent care. Hearing back from our patients is one way we can continue to improve our services. Please take a few minutes to complete the written survey that you may receive in the mail after your visit with us. Thank you!             Your Updated Medication List - Protect others around you: Learn how to safely use, store and throw away your medicines at www.disposemymeds.org.          This list is accurate as of 11/20/18 11:37 AM.  Always use your most recent med list.                   Brand Name Dispense Instructions for use Diagnosis    calcium carbonate 500 mg (elemental) 500 MG tablet    OS-NICO     Take 500 mg by mouth 2 times daily        fluticasone 50 MCG/ACT spray    FLONASE    16 g    Spray 2 sprays into both nostrils daily    Acute non-recurrent maxillary sinusitis       sertraline 50 MG tablet    ZOLOFT    90 tablet    Take 1 tablet (50 mg) by mouth daily    Postpartum depression

## 2018-12-12 ENCOUNTER — OFFICE VISIT (OUTPATIENT)
Dept: PSYCHOLOGY | Facility: CLINIC | Age: 38
End: 2018-12-12
Payer: COMMERCIAL

## 2018-12-12 DIAGNOSIS — F41.9 ANXIETY: Primary | ICD-10-CM

## 2018-12-12 PROCEDURE — 90834 PSYTX W PT 45 MINUTES: CPT | Performed by: PSYCHOLOGIST

## 2018-12-12 ASSESSMENT — PATIENT HEALTH QUESTIONNAIRE - PHQ9
SUM OF ALL RESPONSES TO PHQ QUESTIONS 1-9: 2
5. POOR APPETITE OR OVEREATING: SEVERAL DAYS

## 2018-12-12 ASSESSMENT — ANXIETY QUESTIONNAIRES
3. WORRYING TOO MUCH ABOUT DIFFERENT THINGS: SEVERAL DAYS
1. FEELING NERVOUS, ANXIOUS, OR ON EDGE: MORE THAN HALF THE DAYS
2. NOT BEING ABLE TO STOP OR CONTROL WORRYING: SEVERAL DAYS
GAD7 TOTAL SCORE: 8
7. FEELING AFRAID AS IF SOMETHING AWFUL MIGHT HAPPEN: NOT AT ALL
IF YOU CHECKED OFF ANY PROBLEMS ON THIS QUESTIONNAIRE, HOW DIFFICULT HAVE THESE PROBLEMS MADE IT FOR YOU TO DO YOUR WORK, TAKE CARE OF THINGS AT HOME, OR GET ALONG WITH OTHER PEOPLE: VERY DIFFICULT
5. BEING SO RESTLESS THAT IT IS HARD TO SIT STILL: NOT AT ALL
6. BECOMING EASILY ANNOYED OR IRRITABLE: NEARLY EVERY DAY

## 2018-12-12 NOTE — Clinical Note
Dorina Han,Idalia has returned to therapy with me.  She's noticed feeling more irritable and angry over the past few months and has been feeling stressed by parenting challenges with her three year old.  I look forward to working with her and helping her with these concerns.  She had mentioned that at her last appointment with you some changes were made to her psychiatric medications due to sexual side-effects she was experiencing.  I'm not sure how much this could be impacting the changes she has noticed in her mood, but I did encourage her to connect with you as well.  Please let me know if you have any questions or any thoughts regarding Idalia's care.  [facility] Services

## 2018-12-13 ASSESSMENT — ANXIETY QUESTIONNAIRES: GAD7 TOTAL SCORE: 8

## 2019-01-14 ENCOUNTER — OFFICE VISIT (OUTPATIENT)
Dept: PSYCHOLOGY | Facility: CLINIC | Age: 39
End: 2019-01-14
Payer: COMMERCIAL

## 2019-01-14 DIAGNOSIS — F41.9 ANXIETY DISORDER, UNSPECIFIED TYPE: Primary | ICD-10-CM

## 2019-01-14 PROCEDURE — 90791 PSYCH DIAGNOSTIC EVALUATION: CPT | Performed by: PSYCHOLOGIST

## 2019-01-14 NOTE — PROGRESS NOTES
"                                                                                                                                                                      Adult Intake Structured Interview  Standard Diagnostic Assessment      CLIENT'S NAME: Idalia Hinojosa  MRN:   [id number]  :   1980  ACCT. NUMBER: [id number]  DATE OF SERVICE: 19, start time 1:00 pm, end time 1:50 pm  VIDEO VISIT: No    Identifying Information:  Client is a 39 year old, ,  female. Client was referred for counseling by self. Client is currently employed part time. Client attended the session alone.       Client's Statement of Presenting Concern:  Client reports the reason for seeking therapy at this time as \"anger management problems\".  Client also reported having difficulties in her relationship with her  and parenting her three year old.  Client stated that her symptoms have resulted in the following functional impairments: childcare / parenting, management of the household and or completion of tasks and relationship(s)      History of Presenting Concern:  Client reports that these problem(s) began a few months ago. Client has attempted to resolve these concerns in the past through therapy. Client reports that other professional(s) are involved in providing support / services.  The client reported that her OBGYN, Dr. Monique Han, has prescribed psychiatric medications for her.        Social History:  Client reported she grew up in [facility] [place]. They were the first born of 2 children. This is an intact family and parents remain . Client reported that her childhood was \"great\". Client described her current relationships with family of origin as \"good\".    Client reported a history of 1 committed relationships or marriages. Client  Is  and Client reported having 1 child, who is [age] years old. Client identified some stable and meaningful social connections. Client " reported that she has not been involved with the legal system.   Client's highest education level was some masters level coursework. Client did not identify any learning problems. There are no ethnic, cultural or Yazidism factors that may be relevant for therapy. Client identified her preferred language to be English. Client reported she does not need the assistance of an  or other support involved in therapy. Modifications will not be used to assist communication in therapy. Client did not serve in the .     Client reports family history includes Heart Disease in her maternal grandmother; Other Cancer in her paternal grandmother; Thyroid Disease in her paternal grandmother.    Mental Health History:  Client reported the following biological family members or relatives with mental health issues: cousins reportedly have depression and sister reportedly was diagnosed wtih an eating disroder.  Client previously received the following mental health diagnosis: depression following the birth of her daughter three years ago.  Client has received the following mental health services in the past: counseling and medication(s) from physician / PCP.  Hospitalizations: None.  Client is currently receiving the following services: medication(s) from physician / PCP.      Chemical Health History:  Client reported no family history of chemical health issues. Client has not received chemical dependency treatment in the past. Client is not currently receiving any chemical dependency treatment. Client reports no problems as a result of their drinking / drug use.      Client Reports:  Client reports using alcohol 0-3 times per week and has 1-3 drinks at a time. Client first started drinking at age unknown. (client reported that she rarely drinks alcohol and stated that she commonly has no alcohol at all in any given week).    Client denies using tobacco.  Client denies using marijuana.  Client denies using  caffeine.  Client denies using street drugs.  Client denies the non-medical use of prescription or over the counter drugs.    CAGE: None of the patient's responses to the CAGE screening were positive / Negative CAGE score   Based on the negative Cage-Aid score and clinical interview there  are not indications of drug or alcohol abuse.    Discussed the general effects of drugs and alcohol on health and well-being. Therapist gave client printed information about the effects of chemical use on her health and well being.      Significant Losses / Trauma / Abuse / Neglect Issues:  There are no indications or report of: significant losses, trauma, abuse or neglect.    Issues of possible neglect are not present.      Medical Issues:  Client has not had a physical exam to rule out medical causes for current symptoms. Date of last physical exam was within the past year. Symptoms have developed since last physical exam and client was encouraged to follow up with PCP.  . The client has a [facility] Primary Care Provider, who is named Monique Han. The client reports not having a psychiatrist. Client reports no current medical concerns. The client denies the presence of chronic or episodic pain. There are not significant nutritional concerns.  The patient reported she had some concerns about her eating habits a few months ago, but she stated she has since taken steps to resolve the eating concerns and believes her habits are improved now.      Client reports current meds as:   Current Outpatient Medications   Medication Sig     calcium carbonate (OS-NICO 500 MG [place]. [place]) 500 MG tablet Take 500 mg by mouth 2 times daily     fluticasone (FLONASE) 50 MCG/ACT spray Spray 2 sprays into both nostrils daily     sertraline (ZOLOFT) 50 MG tablet Take 1 tablet (50 mg) by mouth daily     No current facility-administered medications for this visit.        Client Allergies:  Allergies   Allergen Reactions     Seasonal Allergies   "    no known allergies to medications    Medical History:  Past Medical History:   Diagnosis Date     ASCUS of cervix with negative high risk HPV 04/07/2017 04/07/17 ASCUS, Neg HPV     Seasonal affective disorder (H)     no meds         Medication Adherence:  Client reports taking prescribed medications as prescribed.    Client was provided recommendation to follow-up with prescribing physician.    Mental Status Assessment:  Appearance:   Appropriate   Eye Contact:   Good   Psychomotor Behavior: Normal   Attitude:   Cooperative   Orientation:   All  Speech   Rate / Production: Normal    Volume:  Normal   Mood:    Normal  Affect:    Appropriate   Thought Content:  Clear   Thought Form:  Coherent  Logical   Insight:    Good       Review of Symptoms:  Depression: difficulties sleeping, feeling tired/low energy.  PHQ-9 score of 2.  Patient denied depressed mood and anhedonia.    Laury:  No symptoms  Psychosis: No symptoms  Anxiety: Score of 8 on the DAVID-7.  Patient reported feeling nervous and anxious, not being able to control or stop worrying, worrying too much about many things, difficulties relaxing, and becoming easily annoyed or irritable.    Panic:  No symptoms  Post Traumatic Stress Disorder: No symptoms  Obsessive Compulsive Disorder: No symptoms  Eating Disorder: No symptoms  Oppositional Defiant Disorder: No symptoms  ADD / ADHD: No symptoms  Conduct Disorder: No symptoms      Safety Assessment:    History of Safety Concerns:   Client reported a history of suicidal ideation.  Client reported that she had suicidal ideation many years ago, and hasn't experienced any suicidal ideation \"for years\".  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  She reported she did not attempt suicide and has never had a suicide plan.    Client denied a history of suicide attempts.    Client denied a history of " homicidal ideation.    Client denied a history of self-injurious ideation and behaviors.    Client denied a history of personal safety concerns.    Client denied a history of assaultive behaviors.        Current Safety Concerns:  Client denies current suicidal ideation.    Client denies current homicidal ideation and behaviors.  Client denies current self-injurious ideation and behaviors.    Client denies current concerns for personal safety.    Client reports the following protective factors: positive relationships positive family connections, forward/future oriented thinking, dedication to family/friends, safe and stable environment, regular sleep, living with other people, daily obligations and committment to well-being, intelligence and commitment.      Client reports there are no firearms in the house.     Plan for Safety and Risk Management:  A safety and risk management plan has been developed- client was given the after-hours number / 911 should there be a change in any of these risk factors.    Client's Strengths and Limitations:  Client identified the following strengths or resources that will help her succeed in counseling: commitment to health and well being, family support and intelligence. Client identified the following supports: family. Things that may interfere with the client's success in counseling include: few friends.      Diagnostic Criteria:  Unspecified Anxiety disorder:  The client does not report enough symptoms for the full criteria of any specific Anxiety Disorder to have been met (patient does not meet duration criteria for a diagnosis of generalized anxiety disorder)  Client reports the following symptoms of anxiety:   - Excessive anxiety and worry about a number of events or activities (such as work or school performance).    - The person finds it difficult to control the worry.   - Being easily fatigued.    - Irritability.    - Sleep disturbance (difficulty falling or staying asleep,  "or restless unsatisfying sleep).    - The focus of the anxiety and worry is not confined to features of an Axis I disorder.   - The anxiety, worry, or physical symptoms cause clinically significant distress or impairment in social, occupational, or other important areas of functioning.    - The disturbance is not due to the direct physiological effects of a substance (e.g., a drug of abuse, a medication) or a general medical condition (e.g., hyperthyroidism) and does not occur exclusively during a Mood Disorder, a Psychotic Disorder, or a Pervasive Developmental Disorder.    The patient reported that the symptoms began several months ago, and symptoms are experienced as making it \"extremely difficult\" to take care of the things she needs to and get along with others.  Frequency of symptoms is between several days to nearly every day per week.        Functional Status:  Client's symptoms are causing reduced functional status in the following areas: Activities of Daily Living - parenting, management of the household  Social / Relational - relationship with       DSM5 Diagnoses: (Sustained by DSM5 Criteria Listed Above)  Diagnoses: 300.00 (F41.9) Unspecified Anxiety Disorder  Psychosocial & Contextual Factors: marital problems, parenting challenges/concerns  WHODAS 2.0 (12 item)            This questionnaire asks about difficulties due to health conditions. Health conditions  include  disease or illnesses, other health problems that may be short or long lasting,  injuries, mental health or emotional problems, and problems with alcohol or drugs.                     Think back over the past 30 days and answer these questions, thinking about how much  difficulty you had doing the following activities. For each question, please [place] only  one response.    S1 Standing for long periods such as 30 minutes? None =         1   S2 Taking care of household responsibilities? None =         1   S3 Learning a new task, for " example, learning how to get to a new place? None =         1   S4 How much of a problem do you have joining community activities (for example, festivals, Rastafari or other activities) in the same way as anyone else can? None =         1   S5 How much have you been emotionally affected by your health problems? Mild =           2     In the past 30 days, how much difficulty did you have in:   S6 Concentrating on doing something for ten minutes? None =         1   S7 Walking a long distance such as a kilometer (or equivalent)? None =         1   S8 Washing your whole body? None =         1   S9 Getting dressed? None =         1   S10 Dealing with people you do not know? None =         1   S11 Maintaining a friendship? None =         1   S12 Your day to day work? None =         1     H1 Overall, in the past 30 days, how many days were these difficulties present? Record number of days 0   H2 In the past 30 days, for how many days were you totally unable to carry out your usual activities or work because of any health condition? Record number of days  0   H3 In the past 30 days, not counting the days that you were totally unable, for how many days did you cut back or reduce your usual activities or work because of any health condition? Record number of days 0     Attendance Agreement:  Client has signed Attendance Agreement:Yes      Collaboration:  The client is receiving treatment / structured support from the following professional(s) / service and treatment. Collaboration will be initiated with: primary care physician.      Preliminary Treatment Plan:  The client reports no currently identified Rastafari, ethnic or cultural issues relevant to therapy.     services are not indicated.    Modifications to assist communication are not indicated.    The concerns identified by the client will be addressed in therapy.    Initial Treatment will focus on: Anxiety, anger management, and parenting    As a preliminary  treatment goal, client will experience a reduction in anxiety and will develop more effective coping skills to manage anxiety symptoms, will learn and practice emotion regulation strategies and will learn parenting strategies..    The focus of initial interventions will be to provide homework to reinforce skill development, provide psychoeduction regarding anxiety, teach CBT skills, teach distress tolerance skills, teach emotional regulation and teach calming strategies.    Referral to another professional/service is not indicated at this time.    A Release of Information is not needed at this time.    Report to child / adult protection services was NA.    Client will have access to their [facility]' medical record.    Plan -     1) Practice - when spouse is feeling emotional/upset - don't personalize his behavior, focus on your own reaction (using calming strategies), recognize tendency to want to control.      Cristy Wilkinson, KYLIE January 14, 2019

## 2019-01-17 NOTE — PROGRESS NOTES
"               Progress Note - Initial Session    Client Name:  Idalia Hinojosa Date: 12/12/2018         Service Type: Individual      Session Start Time: 10:30 am Session End Time: 11:15 am      Session Length: 45 minutes     Session #: 1     Attendees: Client attended alone         Diagnostic Assessment in progress.  Today's session also focused on helping client to manage distress she is experiencing related to parenting her three year old.  Provided psychoeducation regarding developmental norms for toddlers and also provided psychoeducation on parenting strategies.  We also reviewed calming strategies she can engage when feeling frustrated and also talked about the concept of a \"reverse time-out\".       This client is well known to writer as she has previously participated in several episodes of care with this writer.  Client reported that things have been going mostly well since she last saw this writer, however, she has noticed herself feeling more challenged by parenting since her daughter has grown a little older and has entered the toddler phase.  In particular, she has been feeling challenged by her daughter not listening, pushing the limits, and potty training.  She notes that she has been yelling, and she doesn't want to yell at her daughter.  She denies ever becoming physically aggressive with her daughter.  She reports always feeling able to call her mother for help if she ever needs it, and says she wouldn't hesitate to do this.        She noticed that she's felt more angry over the past several months, which prompted her to call to seek out therapy.  She also has noticed that the difficulties are more common around the time of her period.  Their house is currently being remodeled which she believes has also contributed to her stress.  She's also noticed feeling more stress in regards to her marriage, and notes that she and her  haven't gone on a date in a while and don't get much time alone. "        Mental Status Assessment:  Appearance:   Appropriate   Eye Contact:   Good   Psychomotor Behavior: Normal   Attitude:   Cooperative   Orientation:   All  Speech   Rate / Production: Normal    Volume:  Normal   Mood:    Anxious   Affect:    Congruent with mood   Thought Content:  Clear   Thought Form:  Coherent  Logical   Insight:    Good       Safety Issues and Plan for Safety and Risk Management:  Client denies current fears or concerns for personal safety.  Client denies current or recent suicidal ideation or behaviors.  Client denies current or recent homicidal ideation or behaviors.  Client denies current or recent self injurious behavior or ideation.  Client denies other safety concerns.  A safety and risk management plan has not been developed at this time, however client was given the after-hours number / 911 should there be a change in any of these risk factors.  Client reports there are no firearms in the house.      Diagnostic Criteria:  Unspecified anxiety disorder - further assessment of anxiety is needed -will further assess next session.    Anxiety disorder is present, but at this time therapist is unable to determine whether it is primary.  Further assessment needed.   - The person finds it difficult to control the worry.   - Irritability.         DSM5 Diagnoses: (Sustained by DSM5 Criteria Listed Above)  Diagnoses: 300.00 (F41.9) Unspecified Anxiety Disorder  Psychosocial & Contextual Factors: marital problems, difficulties with parenting  WHODAS 2.0 (12 item)            This questionnaire asks about difficulties due to health conditions. Health conditions  include  disease or illnesses, other health problems that may be short or long lasting,  injuries, mental health or emotional problems, and problems with alcohol or drugs.                     Think back over the past 30 days and answer these questions, thinking about how much  difficulty you had doing the following activities. For each  "question, please [place] only  one response.    S1 Standing for long periods such as 30 minutes? None =         1   S2 Taking care of household responsibilities? None =         1   S3 Learning a new task, for example, learning how to get to a new place? None =         1   S4 How much of a problem do you have joining community activities (for example, festivals, Muslim or other activities) in the same way as anyone else can? None =         1   S5 How much have you been emotionally affected by your health problems? Mild =           2     In the past 30 days, how much difficulty did you have in:   S6 Concentrating on doing something for ten minutes? None =         1   S7 Walking a long distance such as a kilometer (or equivalent)? None =         1   S8 Washing your whole body? None =         1   S9 Getting dressed? None =         1   S10 Dealing with people you do not know? None =         1   S11 Maintaining a friendship? None =         1   S12 Your day to day work? None =         1     H1 Overall, in the past 30 days, how many days were these difficulties present? Record number of days 0   H2 In the past 30 days, for how many days were you totally unable to carry out your usual activities or work because of any health condition? Record number of days  0   H3 In the past 30 days, not counting the days that you were totally unable, for how many days did you cut back or reduce your usual activities or work because of any health condition? Record number of days 0       Collateral Reports Completed:  Routed note to PCP      PLAN: (Homework, other):  1) Plan some time alone to engage in activities you enjoy - Call mom to arrange time to watch daughter.    Remember \"the little adult assumption\" when feeling frustrated/challenged by daughter's behavior (expecting children to change their behavior because we've provided them with a logical reason why they should).  Keep your focus on your response, versus her behavior (if " you... Then...)    Practice calming strategies when feeling frustrated    2) Schedule an appointment with PCP to rule out any medical concerns that may be contributing to her symptoms.       3) Follow-up appointment scheduled.      4) If urgent concerns arise prior to next scheduled appointment, please contact [facility]'s daytime and after-hours crisis number at [phone].  If an emergency arises, please call 911 or go to the nearest emergency room.        Cristy Wilkinson PsyD, LP  Licensed Psychologist

## 2019-02-07 ENCOUNTER — OFFICE VISIT (OUTPATIENT)
Dept: PSYCHOLOGY | Facility: CLINIC | Age: 39
End: 2019-02-07
Payer: COMMERCIAL

## 2019-02-07 DIAGNOSIS — F41.9 ANXIETY DISORDER, UNSPECIFIED TYPE: Primary | ICD-10-CM

## 2019-02-07 PROCEDURE — 90834 PSYTX W PT 45 MINUTES: CPT | Performed by: PSYCHOLOGIST

## 2019-02-07 NOTE — PROGRESS NOTES
"                                           Progress Note    Client Name: Idalia Hinojosa  Date: 2/7/2019         Service Type: Individual      Session Start Time: 1:00 pm  Session End Time: 1:50 pm      Session Length: 50 minutes     Session #: 3     Attendees: Client attended alone    Treatment Plan Last Reviewed: treatment plan will be developed next session  PHQ-9 / DAVID-7 : 1/14/2019     DATA    Interactive Complexity: No  Crisis: No       Progress Since Last Session (Related to Symptoms / Goals / Homework):   Symptoms: Improving the patient reported she is feeling \"better\", which she further described as less irritable and less \"cranky\".   She reported that she has been less \"reactive\" to challenges and frustrations in parenting her daughter, and has been patient and calm.  She notes she is not yelling as much and has been allowing herself to \"slow down\" and not be in such a garcia, which has been helpful for her daughter.  She reported that she and her  also went on vacation together, which was a really positive and re-energizing experience for her.      Homework: Achieved / completed to satisfaction      Episode of Care Goals: Satisfactory progress - PREPARATION (Decided to change - considering how); Intervened by negotiating a change plan and determining options / strategies for behavior change, identifying triggers, exploring social supports, and working towards setting a date to begin behavior change Client is in the beginning of this episode of care.  The diagnostic assessment has been completed, and we are working on developing the treatment plan.  The patient has been active in identifying goals for her treatment plan.       Current / Ongoing Stressors and Concerns:   The patient reported that she has been able to apply some of the concepts she has learned already about parenting.  For example, learning about \"the little adult assumption\" last session has helped her to be more patient with her " daughter, and she notes she has been less reactive to challenges and frustrations by thinking differently about her daughter/applying understanding of developmental norms for three year olds and focusing on her response as a parent.  She reported that the re-modeling is complete in their house, which has also helped her to feel more calm, as they are able to settle back into all of their living space.       Treatment Objective(s) Addressed in This Session:   In-depth review of presenting problems and active identification of patient's goals for therapy       Intervention:   Development of treatment plan        ASSESSMENT: Current Emotional / Mental Status (status of significant symptoms):   Risk status (Self / Other harm or suicidal ideation)   Client denies current fears or concerns for personal safety.   Client denies current or recent suicidal ideation or behaviors.   Client denies current or recent homicidal ideation or behaviors.   Client denies current or recent self injurious behavior or ideation.   Client denies other safety concerns.   Client Client reports there has been no change in risk factors since their last session.     Client Client reports there has been no change in protective factors since their last session.     A safety and risk management plan has been developed-client was given the after-hours number / 911 should there be a change in any of these risk factors.     Appearance:   Appropriate    Eye Contact:   Good    Psychomotor Behavior: Normal    Attitude:   Cooperative    Orientation:   All   Speech    Rate / Production: Normal     Volume:  Normal    Mood:    Anxious    Affect:    congruent with mood    Thought Content:  Clear    Thought Form:  Coherent  Logical    Insight:    Good      Medication Review:   No changes to current psychiatric medication(s)     Medication Compliance:   Yes     Changes in Health Issues:   None reported     Chemical Use Review:   Substance Use: Chemical use  reviewed, no active concerns identified     She reported that she has noticed an increase in her alcohol use- she has been drinking every other day (when typically she doesn't drink at all, or just once a week)- usually one drink, but sometimes 2-3, which she says is unusual for her.  We agreed to keep monitoring - assessing how she feels, and assessing any changes to her alcohol use.  I inquired about why she thinks she is drinking more and if it is to cope with emotions or stresses.  She denied this, and rather, she explained that her  had stopped drinking alcohol for a while, so she has found that they are enjoying having a drink in the evenings.  She denied any concerns or problems about this, but wanted me to be aware.  Provided psychoeducation on how alcohol use can impact mood - she agrees to continue to monitor.         Tobacco Use: No current tobacco use.        Diagnosis:   Anxiety disorder unspecified, F41.9       Collateral Reports Completed:   Not Applicable    PLAN: (Client Tasks / Therapist Tasks / Other)    1) Client identified the following things she wanted to work on between now and her next session: Check out my assumptions when feeling upset, don't assume its personal.  Maybe I don't always have to cheer everyone up.  Recgonize when predicting the future (don't lump in 's behavior with dad).      When spouse is feeling emotional/upset - don't personalize his behavior, focus on your own reaction (using calming strategies), recognize tendency to want to control    2)  Next appointment is scheduled for three weeks.  Please call if concerns arise and you need a sooner appointment.      3) Please utilize [facility]'s daytime/evening crisis number if urgent concerns arise prior to next scheduled session.  Please go to the emergency department or call 911 for any emergent safety concerns.          Cristy Wilkinson PsyD, LP  Licensed  "Psychologist  02/07/2019    ______________________________________________________________________    Treatment Plan    Client's Name: Idalia Hinojosa  YOB: 1980    Date: 02/07/2019    DSM-V Diagnoses: 300.00 (F41.9) Unspecified Anxiety Disorder  Psychosocial / Contextual Factors: relationship problems with , difficulties related to parenting  WHODAS: 13    Referral / Collaboration:  Referral to another professional/service is not indicated at this time..    Anticipated number of session or this episode of care: 8-12      MeasurableTreatment Goal(s) related to diagnosis / functional impairment(s)  Goal 1: Client will learn and practice at least 3-5 new emotion regulation strategies    I will know I've met my goal when \"I'm more patient with my daughter and I'm yelling less.      Objective #A (Client Action)    Client will identify at least 3-5 fears / thoughts that contribute to feeling anxious.  Status: New - Date: 2/7/2019     Intervention(s)  Therapist will assign homework that facilitates monitoring, tracking and self-awareness of triggers'.    Objective #B  Client will learn and practice at least 3-5 new emotion regulation strategies.  Status: New - Date: 2/7/2019     Intervention(s)  Therapist will provide psychoeducation on emotion regulation strategies and will provide homework assignments that reinforce skill development outside of therapy sessions.    Objective #C  Client will learn and practice parenting strategies.  Status: New - Date: 2/7/2019     Intervention(s)  Therapist will provide psychoeducation on developmental norms and parenting strategies.  Therapist will provide recommendations regarding parenting books.  Therapist will provide assignments that reinfroce skill development'.      Goal 2: Client will learn and practice interpersonal effectiveness strategies    I will know I've met my goal when \"I'm not taking my 's behavior personally, I will check out my " assumptions, and I will figure out what my role is when Donald's upset.      Objective #A (Client Action)    Status: New - Date: 2/7/2019     Client will learn & utilize at least 2-3 assertive communication skills weekly.    Intervention(s)  Therapist will provide psychoeducation on interpersonal effectiveness strategies and homework assigments to reinforce skill development.    Objective #B  Client will learn how to identify and challenge cognitive distortions that contribute to distress.    Status: New - Date: 2/7/2019     Intervention(s)  Therapist will teach how to complete a thought record and will provide psychoeducation on different types of cognitive distortions and how to reframe negative thinking.  .    Objective #C  Client will compile a list of boundaries that they would like to set with others. Client will practice establishing and maintaining healthy boundaries with others.  .  Status: New - Date: 2/7/2019     Intervention(s)  Therapist will guide client indentifying healthy boundaries in relationships and how to set and maintain healthy boundaries.  .      Patient participated in the co-development of this treatment plan today.  Will further review this treatment plan with the patient next session.        Cristy Wilkinson PsyD, LP  February 21, 2019

## 2019-03-20 ENCOUNTER — OFFICE VISIT (OUTPATIENT)
Dept: PSYCHOLOGY | Facility: CLINIC | Age: 39
End: 2019-03-20
Payer: COMMERCIAL

## 2019-03-20 DIAGNOSIS — F41.9 ANXIETY: Primary | ICD-10-CM

## 2019-03-20 PROCEDURE — 90834 PSYTX W PT 45 MINUTES: CPT | Performed by: PSYCHOLOGIST

## 2019-03-20 NOTE — PROGRESS NOTES
"                                           Progress Note    Client Name: Idalia Hinojosa  Date: 3/20/2019         Service Type: Individual      Session Start Time: 1:00 pm  Session End Time: 1:50 pm      Session Length: 50 minutes     Session #: 4     Attendees: Client attended alone    Treatment Plan Last Reviewed: treatment plan developed   PHQ-9 / DAVID-7 : 1/14/2019     DATA    Interactive Complexity: No  Crisis: No       Progress Since Last Session (Related to Symptoms / Goals / Homework):   Symptoms: Improving the patient reported her mood has been \"good\" - less depressed and anxious.          Homework: Achieved / completed to satisfaction      Episode of Care Goals: Satisfactory progress - PREPARATION (Decided to change - considering how); Intervened by negotiating a change plan and determining options / strategies for behavior change, identifying triggers, exploring social supports, and working towards setting a date to begin behavior change Client is in the middle of this episode of care.  The diagnostic assessment has been completed, treatment plan has been developed and reviewed, and the patient is beginning to work on goals from her treatment plan and making initial strides/progress.       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  She has been reflecting about this, as auditions in the past have been stressful for her and contributed to distress.  She believes that who she is \"now\" and what she has learned will help her to respond differently than she has in the past.       Treatment Objective(s) Addressed in This Session:    Patient will learn how to identify and challenge cognitive distortions that contribute to distress.  Patient will learn how to re-frame polarized/black and white thinking.   Patient will " "learn basic principles of mindfulness, including \"why\" and \"how\" and \"when\" this strategy may be helpful for her.           Intervention:   CBT: Provided psychoeducation on impact of cognitions on emotions.  In particular, identified theme present of polarized/black and white thinking when the patient describes situations that are upsetting for her.  We began to look at ways she can reframe polarized thinking to find more balanced, calm and hopeful thinking, that allows her to say the \"gray\" in situations.  We also talked about mindfulness, and in particular, ways to help herself stay oriented to the present moment, when she finds herself dwelling in an unhelpful way on things from the past, or worrying in an unproductive fashion about the future.          ASSESSMENT: Current Emotional / Mental Status (status of significant symptoms):   Risk status (Self / Other harm or suicidal ideation)   Client denies current fears or concerns for personal safety.   Client denies current or recent suicidal ideation or behaviors.   Client denies current or recent homicidal ideation or behaviors.   Client denies current or recent self injurious behavior or ideation.   Client denies other safety concerns.   Client Client reports there has been no change in risk factors since their last session.     Client Client reports there has been no change in protective factors since their last session.     A safety and risk management plan has been developed-client was given the after-hours number / 911 should there be a change in any of these risk factors.     Appearance:   Appropriate    Eye Contact:   Good    Psychomotor Behavior: Normal    Attitude:   Cooperative    Orientation:   All   Speech    Rate / Production: Normal     Volume:  Normal    Mood:    Anxious    Affect:    congruent with mood    Thought Content:  Clear    Thought Form:  Coherent  Logical    Insight:    Good      Medication Review:   No changes to current psychiatric " medication(s)     Medication Compliance:   Yes     Changes in Health Issues:   None reported     Chemical Use Review:   Substance Use: Chemical use reviewed, no active concerns identified     She reported that she and her  continue to enjoy a drink in the evenings a couple of times during the week.  Provided psychoeducation on how alcohol use can impact mood - she agrees to continue to monitor.         Tobacco Use: No current tobacco use.        Diagnosis:   Anxiety disorder unspecified, F41.9       Collateral Reports Completed:   Not Applicable    PLAN: (Client Tasks / Therapist Tasks / Other)    1) Client identified the following things she wanted to work on between now and her next session:  During stressful interactions with  - Check out assumptions, don't assume it is personal, validate, ask how I can help.  Have converesation with  around relationship, balance    Recgonize when predicting the future (don't lump in 's behavior with dad).      When spouse is feeling emotional/upset - don't personalize his behavior, focus on your own reaction (using calming strategies), recognize tendency to want to control    2)  Next appointment is scheduled for three weeks.  Please call if concerns arise and you need a sooner appointment.      3) Please utilize [facility]'s daytime/evening crisis number if urgent concerns arise prior to next scheduled session.  Please go to the emergency department or call 911 for any emergent safety concerns.          Cristy Wilkinson PsyD, LP  Licensed Psychologist  3/20/2019      ______________________________________________________________________    Treatment Plan    Client's Name: Idalia Hinojosa  YOB: 1980    Date: 02/07/2019    DSM-V Diagnoses: 300.00 (F41.9) Unspecified Anxiety Disorder  Psychosocial / Contextual Factors: relationship problems with , difficulties related to parenting  WHODAS: 13    Referral / Collaboration:  Referral to  "another professional/service is not indicated at this time..    Anticipated number of session or this episode of care: 8-12      MeasurableTreatment Goal(s) related to diagnosis / functional impairment(s)  Goal 1: Client will learn and practice at least 3-5 new emotion regulation strategies    I will know I've met my goal when \"I'm more patient with my daughter and I'm yelling less.      Objective #A (Client Action)    Client will identify at least 3-5 fears / thoughts that contribute to feeling anxious.  Status: New - Date: 2/7/2019     Intervention(s)  Therapist will assign homework that facilitates monitoring, tracking and self-awareness of triggers'.    Objective #B  Client will learn and practice at least 3-5 new emotion regulation strategies.  Status: New - Date: 2/7/2019     Intervention(s)  Therapist will provide psychoeducation on emotion regulation strategies and will provide homework assignments that reinforce skill development outside of therapy sessions.    Objective #C  Client will learn and practice parenting strategies.  Status: New - Date: 2/7/2019     Intervention(s)  Therapist will provide psychoeducation on developmental norms and parenting strategies.  Therapist will provide recommendations regarding parenting books.  Therapist will provide assignments that reinfroce skill development'.      Goal 2: Client will learn and practice interpersonal effectiveness strategies    I will know I've met my goal when \"I'm not taking my 's behavior personally, I will check out my assumptions, and I will figure out what my role is when Donald's upset.      Objective #A (Client Action)    Status: New - Date: 2/7/2019     Client will learn & utilize at least 2-3 assertive communication skills weekly.    Intervention(s)  Therapist will provide psychoeducation on interpersonal effectiveness strategies and homework assigments to reinforce skill development.    Objective #B  Client will learn how to identify " and challenge cognitive distortions that contribute to distress.    Status: New - Date: 2/7/2019     Intervention(s)  Therapist will teach how to complete a thought record and will provide psychoeducation on different types of cognitive distortions and how to reframe negative thinking.  .    Objective #C  Client will compile a list of boundaries that they would like to set with others. Client will practice establishing and maintaining healthy boundaries with others.  .  Status: New - Date: 2/7/2019     Intervention(s)  Therapist will guide client indentifying healthy boundaries in relationships and how to set and maintain healthy boundaries.  .      Patient participated in the co-development of this treatment plan.   Reviewed treatment plan with patient, patient agrees with treatment plan.         Cristy Wilkinson PsyD, LP 3/20/2019

## 2019-04-29 ENCOUNTER — OFFICE VISIT (OUTPATIENT)
Dept: PSYCHOLOGY | Facility: CLINIC | Age: 39
End: 2019-04-29
Payer: COMMERCIAL

## 2019-04-29 DIAGNOSIS — F41.9 ANXIETY: Primary | ICD-10-CM

## 2019-04-29 PROCEDURE — 90834 PSYTX W PT 45 MINUTES: CPT | Performed by: PSYCHOLOGIST

## 2019-04-29 NOTE — PROGRESS NOTES
Progress Note    Client Name: Idalia Hinojosa  Date: 4/29/2019         Service Type: Individual      Session Start Time: 2:00 pm  Session End Time: 2:50 pm      Session Length: 50 minutes     Session #: 5     Attendees: Client attended alone    Treatment Plan Last Reviewed: treatment plan developed 02/07/2019  PHQ-9 / DAVID-7 : 1/14/2019     DATA    Interactive Complexity: No  Crisis: No       Progress Since Last Session (Related to Symptoms / Goals / Homework):   Symptoms: Stable - (overall decreased level of depression and anxiety)        Homework: Achieved / completed to satisfaction      Episode of Care Goals: Satisfactory progress - PREPARATION (Decided to change - considering how); Intervened by negotiating a change plan and determining options / strategies for behavior change, identifying triggers, exploring social supports, and working towards setting a date to begin behavior change Client is in the middle of this episode of care.  The diagnostic assessment has been completed, treatment plan has been developed and reviewed, and the patient is beginning to work on goals from her treatment plan and making initial strides/progress.       Current / Ongoing Stressors and Concerns:  Patient reported she'd like to work on ways to calm when feeling anxious or irritated.     Treatment Objective(s) Addressed in This Session:   Patient will learn how to decrease vulnerability to emotion mind by learning factors that increase vulnerability to emotion mind.    Patient will identify factors that increase her vulnerability to emotion mind   Patient will identify ways to address factors that increase vulnerability to emotion mind.        Intervention:   DBT: . Provided psychoeducation on decreasing vulnerability to emotion mind.  The patient began to identify factors that contribute to her vulnerability, which include being hungry, stressed, and in a hurry/moving too fast.  We  talked about ways she can decrease vulnerability to emotion mind by eating on a regular basis (not skipping meals, etc.), decreasing the need to rush/slowing down, and identifying factors that has control over that contribute to her feeling stressed.  She identified that the early morning before she eats breakfast is often a difficult time for her, while she waits to cook breakfast.  She'll consider having a small snack (and having snacks that are easily accessible) to see if this helps.          ASSESSMENT: Current Emotional / Mental Status (status of significant symptoms):   Risk status (Self / Other harm or suicidal ideation)   Client denies current fears or concerns for personal safety.   Client denies current or recent suicidal ideation or behaviors.   Client denies current or recent homicidal ideation or behaviors.   Client denies current or recent self injurious behavior or ideation.   Client denies other safety concerns.   Client Client reports there has been no change in risk factors since their last session.     Client Client reports there has been no change in protective factors since their last session.     A safety and risk management plan has been developed-client was given the after-hours number / 911 should there be a change in any of these risk factors.     Appearance:   Appropriate    Eye Contact:   Good    Psychomotor Behavior: Normal    Attitude:   Cooperative    Orientation:   All   Speech    Rate / Production: Normal     Volume:  Normal    Mood:    Anxious    Affect:    congruent with mood    Thought Content:  Clear    Thought Form:  Coherent  Logical    Insight:    Good      Medication Review:   No changes to current psychiatric medication(s)     Medication Compliance:   Yes     Changes in Health Issues:   None reported     Chemical Use Review:   Substance Use: Chemical use reviewed, no active concerns identified     She reported that she and her  continue to enjoy a drink in the  evenings a couple of times during the week.  Provided psychoeducation on how alcohol use can impact mood - she agrees to continue to monitor.         Tobacco Use: No current tobacco use.        Diagnosis:   Anxiety disorder unspecified, F41.9       Collateral Reports Completed:   Not Applicable    PLAN: (Client Tasks / Therapist Tasks / Other)    1) Client identified the following things she wanted to work on between now and her next session: Practice slowing down (when rushed).  Practice setting an intention before you go to bed and in the morning.  Practice Decreasing vulnerability to emotion mind: eating regular meals, having a snack in the morning.      Continue to work on:    During stressful interactions with  - Check out assumptions, don't assume it is personal, validate, ask how I can help.  Have converesation with  around relationship, balance    Recgonize when predicting the future (don't lump in 's behavior with dad).      When spouse is feeling emotional/upset - don't personalize his behavior, focus on your own reaction (using calming strategies), recognize tendency to want to control    2)  Next appointment is scheduled for three weeks.  Please call if concerns arise and you need a sooner appointment.      3) Please utilize [facility]'s daytime/evening crisis number if urgent concerns arise prior to next scheduled session.  Please go to the emergency department or call 911 for any emergent safety concerns.          Cristy Wilkinson PsyD, LP  Licensed Psychologist  4/29/2019        ______________________________________________________________________    Treatment Plan    Client's Name: Idalia Hinojosa  YOB: 1980    Date: 02/07/2019    DSM-V Diagnoses: 300.00 (F41.9) Unspecified Anxiety Disorder  Psychosocial / Contextual Factors: relationship problems with , difficulties related to parenting  WHODAS: 13    Referral / Collaboration:  Referral to another  "professional/service is not indicated at this time..    Anticipated number of session or this episode of care: 8-12      MeasurableTreatment Goal(s) related to diagnosis / functional impairment(s)  Goal 1: Client will learn and practice at least 3-5 new emotion regulation strategies    I will know I've met my goal when \"I'm more patient with my daughter and I'm yelling less.      Objective #A (Client Action)    Client will identify at least 3-5 fears / thoughts that contribute to feeling anxious.  Status: New - Date: 2/7/2019     Intervention(s)  Therapist will assign homework that facilitates monitoring, tracking and self-awareness of triggers'.    Objective #B  Client will learn and practice at least 3-5 new emotion regulation strategies.  Status: New - Date: 2/7/2019     Intervention(s)  Therapist will provide psychoeducation on emotion regulation strategies and will provide homework assignments that reinforce skill development outside of therapy sessions.    Objective #C  Client will learn and practice parenting strategies.  Status: New - Date: 2/7/2019     Intervention(s)  Therapist will provide psychoeducation on developmental norms and parenting strategies.  Therapist will provide recommendations regarding parenting books.  Therapist will provide assignments that reinfroce skill development'.      Goal 2: Client will learn and practice interpersonal effectiveness strategies    I will know I've met my goal when \"I'm not taking my 's behavior personally, I will check out my assumptions, and I will figure out what my role is when Donald's upset.      Objective #A (Client Action)    Status: New - Date: 2/7/2019     Client will learn & utilize at least 2-3 assertive communication skills weekly.    Intervention(s)  Therapist will provide psychoeducation on interpersonal effectiveness strategies and homework assigments to reinforce skill development.    Objective #B  Client will learn how to identify and " challenge cognitive distortions that contribute to distress.    Status: New - Date: 2/7/2019     Intervention(s)  Therapist will teach how to complete a thought record and will provide psychoeducation on different types of cognitive distortions and how to reframe negative thinking.  .    Objective #C  Client will compile a list of boundaries that they would like to set with others. Client will practice establishing and maintaining healthy boundaries with others.  .  Status: New - Date: 2/7/2019     Intervention(s)  Therapist will guide client indentifying healthy boundaries in relationships and how to set and maintain healthy boundaries.  .      Patient participated in the co-development of this treatment plan.   Reviewed treatment plan with patient, patient agrees with treatment plan.         Cristy Wilkinson PsyD, LP 3/20/2019

## 2019-06-24 ENCOUNTER — OFFICE VISIT (OUTPATIENT)
Dept: PSYCHOLOGY | Facility: CLINIC | Age: 39
End: 2019-06-24
Payer: COMMERCIAL

## 2019-06-24 DIAGNOSIS — F41.9 ANXIETY: Primary | ICD-10-CM

## 2019-06-24 PROCEDURE — 90834 PSYTX W PT 45 MINUTES: CPT | Performed by: PSYCHOLOGIST

## 2019-06-24 NOTE — Clinical Note
Dorina Han,I wanted to keep you in the loop that your patient [facility].  She's done a great job in therapy accomplishing her therapy goals and reported feeling prepared to end her therapy at this time.  I've encouraged she reach out at any time in the future if she would like to re-connect with therapy or if symptoms re-emerge/new symptoms develop.  She did a great job developing her own wellness plan today, highlighting the things she needs to do on a regular basis to keep herself feeling well and identifying what her warning signs are and steps she can take if she experiences them.  Please let me know if you have any questions!Best,Cristy Wilkinson

## 2019-06-24 NOTE — PROGRESS NOTES
Discharge Summary  Multiple Sessions    Client Name: Idalia Hinojosa MRN#: [id number] YOB: 1980    Discharge Date:   June 24, 2019     Service Type: Individual      Session Start Time: 12:00 pm  Session End Time: 12:50 pm      Session Length: 50 minutes     Session #: 6     Attendees: Client attended alone    Treatment Plan Last Reviewed: 6/24/2019  PHQ-9 / DAVID-7 : 1/14/2019      Focus of Treatment Objective(s):  Client's presenting concerns included: The patient presented for therapy due to symptoms of anxiety, difficulties managing irritability/anger, and stresses related to her marriage and parenting her toddler.    Stage of Change at time of Discharge: MAINTENANCE - the patient has actively worked in therapy on learning and practicing new strategies to manage challenges.  She has experienced an improvement in her mood and coping and reported that she feels ready to discharge from therapy sessions.      Created a wellness plan with the patient today, which included identification of things she needs to do on a regular basis to stay well, identification of warning signs, and identification of steps she can take if she experiences warning signs.  We also talked about what she needs from her supports and encouraged she share this information with her support system.  She was readily able to identify things she has learned that help her to feel well.  We talked in detail about warning signs and steps to take if she experiences warning signs.  Encouraged her to reach back out to schedule a follow-up if symptoms re-emerge, if new symptoms develop, or if she needs any further assistance.  She declined a three month follow-up visit to check-in regarding stability of her mood, as she indicated feeling able to call in and schedule if she needs help.  Emphasized the importance of recognizing and reaching out early for help if symptoms develop.          Medication  Adherence:  Yes    Chemical Use:  No current concerns.  The patient reported that she occasionally (approximately once a week) has one or two drinks.      Assessment: Current Emotional / Mental Status (status of significant symptoms):    Risk status (Self / Other harm or suicidal ideation)  Client denies current fears or concerns for personal safety.  Client denies current or recent suicidal ideation or behaviors.  Client denies current or recent homicidal ideation or behaviors.  Client denies current or recent self injurious behavior or ideation.  Client denies other safety concerns.  A safety and risk management plan has been developed including the patient is agreeable to accessing crisis or emergency resources if any safety concerns arise.    Appearance:   Appropriate   Eye Contact:   Good   Psychomotor Behavior: Normal   Attitude:   Cooperative   Orientation:   All  Speech   Rate / Production: Normal    Volume:  Normal   Mood:    Improved, calm   Affect:    Appropriate  And congruent with mood   Thought Content:  Clear   Thought Form:  Coherent  Logical   Insight:   Good     DSM5 Diagnoses: (Sustained by DSM5 Criteria Listed Above)  Diagnoses: Anxiety disorder unspecified, F41.9  Psychosocial & Contextual Factors: marital stressors  WHODAS 2.0 (12 item) Score: 13    Reason for Discharge:  Client is satisfied with progress and Goals completed      Aftercare Plan:    Patient may resume counseling services at any time in the future by calling the [facility] Intake Office, [phone].   Therapist coordinated discharge plan with primary care team.  The patient was encouraged to continue to schedule visits with her primary care provider as recommended by her primary care team, and to reach out to her PCP with any concerns or problems.    Co-created a wellness plan with the patient today, which included identification of things she needs to do on a regular basis to stay well, identification of warning signs, and  "identification of steps she can take if she experiences warning signs.  We also talked about what she needs from her supports and encouraged she share this information with her support system.    Please contact [facility] crisis numbers for any crisis concerns.  Please go to the emergency department or call 911 for any emergent safety concerns.      Cristy Wilkinson PsyD,   Licensed Psychologist    6/24/2019    ______________________________________________________________________    Treatment Plan    Client's Name: Idalia Hinojosa  YOB: 1980    Date: 02/07/2019    DSM-V Diagnoses: 300.00 (F41.9) Unspecified Anxiety Disorder  Psychosocial / Contextual Factors: relationship problems with , difficulties related to parenting  WHODAS: 13    Referral / Collaboration:  Referral to another professional/service is not indicated at this time..    Anticipated number of session or this episode of care: 8-12      MeasurableTreatment Goal(s) related to diagnosis / functional impairment(s)  Goal 1: Client will learn and practice at least 3-5 new emotion regulation strategies    I will know I've met my goal when \"I'm more patient with my daughter and I'm yelling less.      Objective #A (Client Action)    Client will identify at least 3-5 fears / thoughts that contribute to feeling anxious.  Status: New - Date: 2/7/2019 , completed 6/24/2019      Intervention(s)  Therapist will assign homework that facilitates monitoring, tracking and self-awareness of triggers'.    Objective #B  Client will learn and practice at least 3-5 new emotion regulation strategies.  Status: New - Date: 2/7/2019 , completed 6/24/2019      Intervention(s)  Therapist will provide psychoeducation on emotion regulation strategies and will provide homework assignments that reinforce skill development outside of therapy sessions.    Objective #C  Client will learn and practice parenting strategies.  Status: New - Date: 2/7/2019, completed " "6/24/2019      Intervention(s)  Therapist will provide psychoeducation on developmental norms and parenting strategies.  Therapist will provide recommendations regarding parenting books.  Therapist will provide assignments that reinfroce skill development'.    Status: New - Date: 2/7/2019, completed 6/24/2019        Goal 2: Client will learn and practice interpersonal effectiveness strategies    I will know I've met my goal when \"I'm not taking my 's behavior personally, I will check out my assumptions, and I will figure out what my role is when Donald's upset.      Objective #A (Client Action)    Status: New - Date: 2/7/2019 , completed 6/24/2019      Client will learn & utilize at least 2-3 assertive communication skills weekly.    Intervention(s)  Therapist will provide psychoeducation on interpersonal effectiveness strategies and homework assigments to reinforce skill development.    Objective #B  Client will learn how to identify and challenge cognitive distortions that contribute to distress.    Status: New - Date: 2/7/2019 ,completed 6/24/2019      Intervention(s)  Therapist will teach how to complete a thought record and will provide psychoeducation on different types of cognitive distortions and how to reframe negative thinking.  .    Objective #C  Client will compile a list of boundaries that they would like to set with others. Client will practice establishing and maintaining healthy boundaries with others.  .  Status: New - Date: 2/7/2019, completed 6/24/2019     Intervention(s)  Therapist will guide client indentifying healthy boundaries in relationships and how to set and maintain healthy boundaries.  .      Patient participated in the co-development of this treatment plan.   Reviewed treatment plan with patient, patient agrees with treatment plan. Patient reported she is satisfied with the progress made in therapy and feels prepared to discharge from therapy sessions at this time.        Cristy " Jim Wilkinson PsyD, LP 6/24/2019

## 2019-08-26 NOTE — TELEPHONE ENCOUNTER
Pending Prescriptions:                       Disp   Refills    sertraline (ZOLOFT) 50 MG tablet          90 tab*0            Sig: Take 1 tablet (50 mg) by mouth daily      Last OV was 6/27/18. Due for AE. Needs updated questionnaires. Pt is scheduled for AE on 10/8/19. [employer] message sent to patient with questionnaires. Will send refill once those are filled out.     Mariella Burris RN-BSN      PHQ-9 SCORE 6/27/2018 8/20/2018 12/12/2018   PHQ-9 Total Score - - -   PHQ-9 Total Score MyChart - 0 -   PHQ-9 Total Score 4 0 2     DAVID-7 SCORE 6/27/2018 8/20/2018 12/12/2018   Total Score - 1 (minimal anxiety) -   Total Score 3 1 8

## 2019-08-26 NOTE — TELEPHONE ENCOUNTER
PHQ-9 score:    PHQ-9 SCORE 8/26/2019   PHQ-9 Total Score -   PHQ-9 Total Score MyChart 2 (Minimal depression)   PHQ-9 Total Score 2     GAD7 score: 1    Refill sent to pharmacy.   Mariella Burris, RN-BSN

## 2019-10-08 ENCOUNTER — OFFICE VISIT (OUTPATIENT)
Dept: OBGYN | Facility: CLINIC | Age: 39
End: 2019-10-08
Payer: COMMERCIAL

## 2019-10-08 VITALS
DIASTOLIC BLOOD PRESSURE: 58 MMHG | BODY MASS INDEX: 24.59 KG/M2 | SYSTOLIC BLOOD PRESSURE: 98 MMHG | WEIGHT: 153 LBS | TEMPERATURE: 98 F | HEIGHT: 66 IN

## 2019-10-08 DIAGNOSIS — Z01.419 ENCOUNTER FOR GYNECOLOGICAL EXAMINATION WITHOUT ABNORMAL FINDING: Primary | ICD-10-CM

## 2019-10-08 DIAGNOSIS — Z13.220 SCREENING FOR LIPID DISORDERS: ICD-10-CM

## 2019-10-08 DIAGNOSIS — Z23 NEED FOR PROPHYLACTIC VACCINATION AND INOCULATION AGAINST INFLUENZA: ICD-10-CM

## 2019-10-08 DIAGNOSIS — F32.5 MAJOR DEPRESSIVE DISORDER WITH SINGLE EPISODE, IN FULL REMISSION (H): ICD-10-CM

## 2019-10-08 PROCEDURE — 90471 IMMUNIZATION ADMIN: CPT | Performed by: OBSTETRICS & GYNECOLOGY

## 2019-10-08 PROCEDURE — 99395 PREV VISIT EST AGE 18-39: CPT | Mod: 25 | Performed by: OBSTETRICS & GYNECOLOGY

## 2019-10-08 PROCEDURE — 90686 IIV4 VACC NO PRSV 0.5 ML IM: CPT | Performed by: OBSTETRICS & GYNECOLOGY

## 2019-10-08 SDOH — HEALTH STABILITY: MENTAL HEALTH: HOW OFTEN DO YOU HAVE 6 OR MORE DRINKS ON ONE OCCASION?: NEVER

## 2019-10-08 SDOH — HEALTH STABILITY: MENTAL HEALTH: HOW OFTEN DO YOU HAVE A DRINK CONTAINING ALCOHOL?: MONTHLY OR LESS

## 2019-10-08 SDOH — HEALTH STABILITY: MENTAL HEALTH: HOW MANY STANDARD DRINKS CONTAINING ALCOHOL DO YOU HAVE ON A TYPICAL DAY?: 1 OR 2

## 2019-10-08 ASSESSMENT — ANXIETY QUESTIONNAIRES
2. NOT BEING ABLE TO STOP OR CONTROL WORRYING: NOT AT ALL
6. BECOMING EASILY ANNOYED OR IRRITABLE: MORE THAN HALF THE DAYS
1. FEELING NERVOUS, ANXIOUS, OR ON EDGE: MORE THAN HALF THE DAYS
5. BEING SO RESTLESS THAT IT IS HARD TO SIT STILL: NOT AT ALL
3. WORRYING TOO MUCH ABOUT DIFFERENT THINGS: SEVERAL DAYS
GAD7 TOTAL SCORE: 6
7. FEELING AFRAID AS IF SOMETHING AWFUL MIGHT HAPPEN: NOT AT ALL
IF YOU CHECKED OFF ANY PROBLEMS ON THIS QUESTIONNAIRE, HOW DIFFICULT HAVE THESE PROBLEMS MADE IT FOR YOU TO DO YOUR WORK, TAKE CARE OF THINGS AT HOME, OR GET ALONG WITH OTHER PEOPLE: SOMEWHAT DIFFICULT

## 2019-10-08 ASSESSMENT — PATIENT HEALTH QUESTIONNAIRE - PHQ9
SUM OF ALL RESPONSES TO PHQ QUESTIONS 1-9: 5
5. POOR APPETITE OR OVEREATING: SEVERAL DAYS

## 2019-10-08 ASSESSMENT — MIFFLIN-ST. JEOR: SCORE: 1387.81

## 2019-10-08 NOTE — PROGRESS NOTES
Idalia is a 39 year old  female who presents for annual exam.     Menses are regular q 28-30 days and normal lasting 5 days.  Menses flow: normal.  Patient's last menstrual period was 10/05/2019.. Using condoms for contraception.  She is not currently considering pregnancy.  Besides routine health maintenance, she has no other health concerns today . Went back to work as daughter went to  so has been more stressed.  Trying to do a little too much and she and her spouse are working on a better schedule.  Feels Zoloft dose is appropriate and would like a refill.  Does not want to discuss her weight as she puts too high pressure on herself.  Neck plans a vasectomy.  They are not going to have any more children.  GYNECOLOGIC HISTORY:  Menarche:   Idalia is sexually active with 1male partner(s) and is currently in monogamous relationship.    History sexually transmitted infections:No STD history  STI testing offered?  Declined  DEBORA exposure: Unknown  History of abnormal Pap smear: NO - age 30-65 PAP every 5 years with negative HPV co-testing recommended  Family history of breast CA: No  Family history of uterine/ovarian CA: No    Family history of colon CA: No    HEALTH MAINTENANCE:  Cholesterol: (  Cholesterol   Date Value Ref Range Status   2018 193 <200 mg/dL Final   12/10/2013 177 0 - 200 mg/dL Final     Comment:     LDL Cholesterol is the primary guide to therapy.   The NCEP recommends further evaluation of: patients with cholesterol greater   than 200 mg/dL if additional risk factors are present, cholesterol greater   than   240 mg/dL, triglycerides greater than 150 mg/dL, or HDL less than 40 mg/dL.    History of abnormal lipids: No  Mammo: na . History of abnormal Mammo: Not applicable.  Regular Self Breast Exams: No  Calcium/Vitamin D intake: source:  none Adequate? No  TSH: (No results found for: TSH )  Pap; (  Lab Results   Component Value Date    PAP ASC-US 2017    PAP NIL  12/10/2013    )    HISTORY:  OB History    Para Term  AB Living   1 1 0 1 0 1   SAB TAB Ectopic Multiple Live Births   0 0 0 0 1      # Outcome Date GA Lbr Alfredito/2nd Weight Sex Delivery Anes PTL Lv   1  10/28/15 34w5d  2.509 kg (5 lb 8.5 oz) F CS-LTranv   OPHELIA      Birth Comments: arrest of descent, LOP, cat 2 tracing after PPROM      Name: Alma Delia      Apgar1: 5  Apgar5: 7     Past Medical History:   Diagnosis Date     ASCUS of cervix with negative high risk HPV 2017 ASCUS, Neg HPV     Seasonal affective disorder (H)     no meds     Past Surgical History:   Procedure Laterality Date      SECTION N/A 10/28/2015    Procedure:  SECTION;  Surgeon: Mónica Madrid MD;  Location: [facility]     DENTAL SURGERY      wisdom teeth     TONSILLECTOMY  age 10     Family History   Problem Relation Age of Onset     Thyroid Disease Paternal Grandmother      Other Cancer Paternal Grandmother      Heart Disease Maternal Grandmother      Social History     Socioeconomic History     Marital status:      Spouse name: Not on file     Number of children: 1     Years of education: Not on file     Highest education level: Not on file   Occupational History     Employer: [employer]   Social Needs     Financial resource strain: Not on file     Food insecurity:     Worry: Not on file     Inability: Not on file     Transportation needs:     Medical: Not on file     Non-medical: Not on file   Tobacco Use     Smoking status: Never Smoker     Smokeless tobacco: Never Used   Substance and Sexual Activity     Alcohol use: Yes     Alcohol/week: 0.0 standard drinks     Comment: 0-2 PER WK     Drug use: No     Sexual activity: Yes     Partners: Male   Lifestyle     Physical activity:     Days per week: Not on file     Minutes per session: Not on file     Stress: Not on file   Relationships     Social connections:     Talks on phone: Not on file     Gets together: Not on file      "Attends Sikhism service: Not on file     Active member of club or organization: Not on file     Attends meetings of clubs or organizations: Not on file     Relationship status: Not on file     Intimate partner violence:     Fear of current or ex partner: Not on file     Emotionally abused: Not on file     Physically abused: Not on file     Forced sexual activity: Not on file   Other Topics Concern     Parent/sibling w/ CABG, MI or angioplasty before 65F 55M? No   Social History Narrative               Current Outpatient Medications:      sertraline (ZOLOFT) 50 MG tablet, Take 1 tablet (50 mg) by mouth daily, Disp: 90 tablet, Rfl: 3     Allergies   Allergen Reactions     Seasonal Allergies          EXAM:  BP 98/58   Temp 98  F (36.7  C) (Oral)   Ht 1.664 m (5' 5.5\")   Wt 69.4 kg (153 lb)   LMP 10/05/2019   BMI 25.07 kg/m     BMI: Body mass index is 25.07 kg/m .  Constitutional: healthy, alert and no distress  Head: Normocephalic. No masses, lesions, tenderness or abnormalities  Neck: Neck supple. Trachea midline. No adenopathy. Thyroid symmetric, normal size.   Cardiovascular: RRR.   Respiratory: Negative.   Breast: Breasts reveal mild symmetric fibrocystic densities, but there are no dominant, discrete, fixed or suspicious masses found.  Gastrointestinal: Abdomen soft, non-tender, non-distended. No masses, organomegaly.  : deferred--on menses  Musculoskeletal: extremities normal  Skin: no suspicious lesions or rashes  Psychiatric: Affect appropriate, cooperative,mentation appears normal.     COUNSELING:   Reviewed preventive health counseling, as reflected in patient instructions   reports that she has never smoked. She has never used smokeless tobacco.    Body mass index is 25.07 kg/m .  Weight management plan: Weight down 3 pounds since last year  FRAX Risk Assessment    ASSESSMENT:  39 year old female with satisfactory annual exam  (Z01.419) Encounter for gynecological examination without abnormal " finding  (primary encounter diagnosis)  Comment: Condoms  Plan: Pap is due in 2020 per Pap guidelines    (Z23) Need for prophylactic vaccination and inoculation against influenza  Plan: INFLUENZA VACCINE IM > 6 MONTHS VALENT IIV4         [35613], Vaccine Administration, Initial         [41799]    (Z13.220) Screening for lipid disorders  Comment: Checked random last year and was elevated  Plan: Lipid Profile        Return to clinic fasting for lab    (F32.5) Major depressive disorder with single episode, in full remission (H)  Comment: Mood is stable  Plan: sertraline (ZOLOFT) 50 MG tablet        Refill was done    Monique Han MD

## 2019-10-08 NOTE — PATIENT INSTRUCTIONS
Make lab only appointment fasting (nothing but water 10 hours prior to blood draw) at any [facility].

## 2019-10-09 ASSESSMENT — ANXIETY QUESTIONNAIRES: GAD7 TOTAL SCORE: 6

## 2019-12-11 ENCOUNTER — OFFICE VISIT (OUTPATIENT)
Dept: PSYCHOLOGY | Facility: CLINIC | Age: 39
End: 2019-12-11
Payer: COMMERCIAL

## 2019-12-11 DIAGNOSIS — F41.9 ANXIETY DISORDER, UNSPECIFIED TYPE: Primary | ICD-10-CM

## 2019-12-11 PROCEDURE — 90834 PSYTX W PT 45 MINUTES: CPT | Performed by: PSYCHOLOGIST

## 2019-12-11 NOTE — Clinical Note
Dorina Han,Keeping you in the loop that your patient has returned to individual therapy with me.  I have worked with her in the past for individual therapy.Her goals for therapy are to learn emotion regulation strategies and parenting strategies to help with managing challenging behaviors with her four year old.  She's responded well to therapy in the past and I'm glad she has reached out for help - Please let me know if you have any questions or concerns! Brandon,Cristy Wilkinson PsyD, [place] [employer] Psychologist
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
## 2019-12-17 ENCOUNTER — OFFICE VISIT (OUTPATIENT)
Dept: PSYCHOLOGY | Facility: CLINIC | Age: 39
End: 2019-12-17
Payer: COMMERCIAL

## 2019-12-17 DIAGNOSIS — F41.9 ANXIETY DISORDER, UNSPECIFIED TYPE: Primary | ICD-10-CM

## 2019-12-17 PROCEDURE — 90834 PSYTX W PT 45 MINUTES: CPT | Performed by: PSYCHOLOGIST

## 2019-12-17 ASSESSMENT — ANXIETY QUESTIONNAIRES
IF YOU CHECKED OFF ANY PROBLEMS ON THIS QUESTIONNAIRE, HOW DIFFICULT HAVE THESE PROBLEMS MADE IT FOR YOU TO DO YOUR WORK, TAKE CARE OF THINGS AT HOME, OR GET ALONG WITH OTHER PEOPLE: SOMEWHAT DIFFICULT
2. NOT BEING ABLE TO STOP OR CONTROL WORRYING: SEVERAL DAYS
6. BECOMING EASILY ANNOYED OR IRRITABLE: SEVERAL DAYS
3. WORRYING TOO MUCH ABOUT DIFFERENT THINGS: SEVERAL DAYS
7. FEELING AFRAID AS IF SOMETHING AWFUL MIGHT HAPPEN: NOT AT ALL
1. FEELING NERVOUS, ANXIOUS, OR ON EDGE: MORE THAN HALF THE DAYS
5. BEING SO RESTLESS THAT IT IS HARD TO SIT STILL: NOT AT ALL
GAD7 TOTAL SCORE: 6

## 2019-12-17 ASSESSMENT — PATIENT HEALTH QUESTIONNAIRE - PHQ9
5. POOR APPETITE OR OVEREATING: SEVERAL DAYS
SUM OF ALL RESPONSES TO PHQ QUESTIONS 1-9: 4

## 2019-12-17 NOTE — PROGRESS NOTES
Progress Note    Patient Name: Idalia Hinojosa  Date: 12/17/2019         Service Type: Individual  Video Visit: No     Session Start Time: 12:00 pm  Session End Time: 12:50 pm     Session Length: 50 minutes    Session #: 2    Attendees: Client     Treatment Plan Last Reviewed: treatment plan will be developed at next session   PHQ-9 / DAVID-7 : 12/17/2019    DATA  Interactive Complexity: No  Crisis: No       Progress Since Last Session (Related to Symptoms / Goals / Homework):   Symptoms: Improving the patient reports some initial improvement in her symptoms.   She reported she was able to use the calming plan developed last session and found this to be helpful.  She also notes that giving herself permission to slow down and not rush has been helpful.  PHQ-9=4, and DAVID-7=6, both gathered today.         Homework: Achieved / completed to satisfaction.  She was successful with using her calming plan, she noticed and paid attention to warning signs/signals that she was feeling stressed, and she gave herself permission to slow down.  She and her  also went out on a date together and this was helpful.         Episode of Care Goals: Focus of today's session was to continue with assessment and focus on emotion regulation strategies; will focus in future sessions on identifying treatment goals and treatment plan.  The patient stated that her main goals for therapy would be to learn emotion regulation strategies (in particular, to help with when she is feeling upset/angry/frustrated/distressed).  She also thinks it may be helpful for her and her  to participate in couples therapy.  I recommended that she seek out a separate therapist for couples therapy, and I can provide recommendations if needed.       Current / Ongoing Stressors and Concerns:   Current: She wished to focus today on managing her emotional reaction when her  is upset/angry.  She also wished to  "talk today about parenting strategies with her four year old.      Ongoing:Managing recent changes in their daily routine with her four year old due to school starting and returning to work, which has contributed to some adjustment and parenting challenges, lack of effective emotion regulation strategies for managing daily frustrations and upsets and marital discord due to differences in parenting styles     Treatment Objective(s) Addressed in This Session:   Today's session focused on discussing parenting strategies in regards to current challenges she is experiencing with her four year old daughter.     Identify and practice at least 1-2 new emotion regulation strategies      Intervention:   CBT: Focus on building cognitive and behavioral strategies to help with managing emotions and difficult situations more effectively.    Today's session focused on discussing parenting strategies in regards to her four year old daughter.  She talked about frustration she experiences when her daughter is \"stubborn\".  Provided psychoeducation on developmental norms and ways to offer daughter more choices when appropriate (e.g. do you want to wear the blue shirt or the red shirt) versus engaging power struggles.     She also wished to talk about ways to manage her own emotional reaction when her  is feeling upset.  She notes that she tends to personalize his emotions and assume that he is upset with her.  We talked about her checking her assumptions out with him versus mind reading and asking if there are things she can do to help when he is feeling upset.  She noted that she tends to want to \"fix\" the problem when he is feeling upset versus letting him feel his emotions and accepting that giving him space, if this is what he needs, is doing something helpful!  She identified that distraction/focusing on other things would be a helpful strategy for her to re-direct her own emotional response.          ASSESSMENT: Current " Emotional / Mental Status (status of significant symptoms):   Risk status (Self / Other harm or suicidal ideation)   Patient denies current fears or concerns for personal safety.   Patient denies current or recent suicidal ideation or behaviors.   Patientdenies current or recent homicidal ideation or behaviors.   Patient denies current or recent self injurious behavior or ideation.   Patient denies other safety concerns.   Patient Patient reports there has been no change in risk factors since their last session.     PatientPatient reports there has been no change in protective factors since their last session.     Recommended that patient call 911 or go to the local ED should there be a change in any of these risk factors.  Reviewed with the patient how to contact [facility] crisis number and [facility] crisis/COPE for urgent/crisis concerns 24/7.       Appearance:   Appropriate    Eye Contact:   Good    Psychomotor Behavior: Normal    Attitude:   Cooperative    Orientation:   All   Speech    Rate / Production: Normal     Volume:  Normal    Mood:    Anxious    Affect:    Worrisome    Thought Content:  Clear    Thought Form:  Coherent  Logical    Insight:    Good      Medication Review:   No changes to current psychiatric medication(s)     Medication Compliance:   Yes     Changes in Health Issues:   None reported     Chemical Use Review:   Substance Use: Chemical use reviewed, no active concerns identified      Tobacco Use: No current tobacco use.      Diagnosis:  Anxiety disorder unspecified    Collateral Reports Completed:   Not Applicable      PLAN: (Patient Tasks / Therapist Tasks / Other)    1) When notice Kellee is feeling upset, ask if there's anything he needs from you.  Reassure myself I don't need to fix it and that I don't need to feel the same way as him.        Continue with observing/paying attention to warning signs and signals and give self persmission to slow down,  "especially during the morning routine with Kaylee.  Practice offerring Kaylee choices when appropriate.        Continue with: Use \"my plan for success\" to help remind you of calming strategies to practice when feeling upset.  Practice decreasing overall vulnerability to emotion mind through getting adequate rest, nutrition, time for yourself, and physical activity.        Consider reading \"Fighting for your marriage\".        2) Follow-up appointment is scheduled for 1/6/2020.  If urgent or crisis concerns arise prior to next scheduled appointment, please reach out to crisis resources, call 911, or go to the emergency department.      3) We will develop your treatment plan at your next appointment, continue to think about goals and what you would like to accomplish in therapy.        Cristy Wilkinson PsyD,   Licensed Psychologist  12/17/2019                                                             ______________________________________________________________________    "

## 2019-12-18 ASSESSMENT — ANXIETY QUESTIONNAIRES: GAD7 TOTAL SCORE: 6

## 2020-01-06 ENCOUNTER — OFFICE VISIT (OUTPATIENT)
Dept: PSYCHOLOGY | Facility: CLINIC | Age: 40
End: 2020-01-06
Payer: COMMERCIAL

## 2020-01-06 DIAGNOSIS — F41.9 ANXIETY DISORDER, UNSPECIFIED TYPE: Primary | ICD-10-CM

## 2020-01-06 PROCEDURE — 90834 PSYTX W PT 45 MINUTES: CPT | Performed by: PSYCHOLOGIST

## 2020-01-06 NOTE — PROGRESS NOTES
"                                           Progress Note    Patient Name: Idalia Hinojosa  Date: 01/06/2020         Service Type: Individual  Video Visit: No     Session Start Time: 10:00 am  Session End Time: 10:50 am     Session Length: 50 minutes    Session #: 3    Attendees: Client     Treatment Plan Last Reviewed: Developed 1/06/2020  PHQ-9 / DAVID-7 : 12/17/2019    DATA  Interactive Complexity: No  Crisis: No       Progress Since Last Session (Related to Symptoms / Goals / Homework):   Symptoms: Stable - reports that her mood continues to be \"pretty good\"     PHQ-9=4, and DAVID-7=6, gathered 12/17.         Homework: Achieved / completed to satisfaction.  She found that refocusing her thinking/energy and using distractions when her  was upset was helpful for her.  She also noted that providing her daughter with choices was helpful.        Episode of Care Goals: Focus of today's session was to continue with assessment and focus on emotion regulation strategies; will focus in future sessions on identifying treatment goals and treatment plan.  The patient stated that her main goals for therapy would be to learn emotion regulation strategies (in particular, to help with when she is feeling upset/angry/frustrated/distressed).  She also thinks it may be helpful for her and her  to participate in couples therapy.  I recommended that she seek out a separate therapist for couples therapy, and I can provide recommendations if needed.       Current / Ongoing Stressors and Concerns:   Current: She enjoyed the holidays and in particular noted that engaging in new outdoor activities for herself and with her daughter was both enjoyable and helpful for her.     Ongoing:Managing recent changes in their daily routine with her four year old due to school starting and returning to work, which has contributed to some adjustment and parenting challenges, lack of effective emotion regulation strategies for managing daily " "frustrations and upsets and marital discord due to differences in parenting styles     Treatment Objective(s) Addressed in This Session:   Development of treatment plan   Continue with: Identify and practice at least 1-2 new emotion regulation strategies      Intervention:   CBT: Develop treatment plan.  We reviewed the patient's goals in depth today and developed her treatment plan (see full treatment plan below).  We also talked abut the lamont she found in going cross country skiing as well as in going ice skating with her daughter and she would like to focus more on enjoying her time and relationship with her daughter.  We talked about the concept of \"mindfulness\" to help with being in the present moment.         ASSESSMENT: Current Emotional / Mental Status (status of significant symptoms):   Risk status (Self / Other harm or suicidal ideation)   Patient denies current fears or concerns for personal safety.   Patient denies current or recent suicidal ideation or behaviors.   Patientdenies current or recent homicidal ideation or behaviors.   Patient denies current or recent self injurious behavior or ideation.   Patient denies other safety concerns.   Patient Patient reports there has been no change in risk factors since their last session.     PatientPatient reports there has been no change in protective factors since their last session.     Recommended that patient call 911 or go to the local ED should there be a change in any of these risk factors.  Reviewed with the patient how to contact [facility] crisis number and [facility] crisis/COPE for urgent/crisis concerns 24/7.       Appearance:   Appropriate    Eye Contact:   Good    Psychomotor Behavior: Normal    Attitude:   Cooperative    Orientation:   All   Speech    Rate / Production: Normal     Volume:  Normal    Mood:    patient reported her mood is \"pretty good\", appears less anxious    Affect:    congruent with mood    Thought " "Content:  Clear    Thought Form:  Coherent  Logical    Insight:    Good      Medication Review:   No changes to current psychiatric medication(s)     Medication Compliance:   Yes     Changes in Health Issues:   Yes: some concerns about allergies and she thinks it would be helpful to schedule an appointment with an allergist.     Chemical Use Review:   Substance Use: Chemical use reviewed, no active concerns identified      Tobacco Use: No current tobacco use.      Diagnosis:  Anxiety disorder unspecified    Collateral Reports Completed:   Not Applicable      PLAN: (Patient Tasks / Therapist Tasks / Other)    1) Plan fun things/activities to do outside with Kaylee (go skating), consider looking into ECFE, set up date night with  for this month and be mindful of planning dates on a more regular basis, keep working on being patient when things are tense around me.        Continue with observing/paying attention to warning signs and signals and give self persmission to slow down, especially during the morning routine with Kaylee.  Practice offerring Kaylee choices when appropriate.        Continue with: Use \"my plan for success\" to help remind you of calming strategies to practice when feeling upset.  Practice decreasing overall vulnerability to emotion mind through getting adequate rest, nutrition, time for yourself, and physical activity.        Consider reading \"Fighting for your marriage\".        2) Follow-up appointment is scheduled.   If urgent or crisis concerns arise prior to next scheduled appointment, please reach out to crisis resources, call 911, or go to the emergency department.      Cristy Wilkinson PsyD, LP  Licensed Psychologist  1/6/2020                                             ____________________________________    Treatment Plan    Patient's Name: Idalia Hinojosa  YOB: 1980    Date: 1/6/2020    DSM5 Diagnoses: 300.00 (F41.9) Unspecified Anxiety Disorder  Psychosocial / Contextual " Factors: strain in marital relationship, parenting challenges, adjustment challenges  WHODAS: will gather at diagnostic assessment update    Referral / Collaboration:  We discussed a referral to a couples/marital therapist.  The patient is thinking about this and has talked with her  about the referral, but they are unsure if they would like to follow through at this time.      Anticipated number of session or this episode of care: 8-12    MeasurableTreatment Goal(s) related to diagnosis / functional impairment(s)  Goal 1: Patient will learn emotion regulation strategies to help when she is feeling upset/angry/frustrated/distressed    I will know I've met my goal when I would yell less, I would feel calmer, and I would not push others.  I would be less physical when I'm angry (e.g. wouldn't slam doors or hit things)       Objective #A (Patient Action)    Patient will learn and practice at least 2 new emotion regulation strategies.  Status: New - Date: 01/06/2020     Intervention(s)  Therapist will provide psychoeducation on emotion regulation module from DBT and will assign patient homework to help reinforce skill development.    Objective #B  Patient will identify factors that increase vulnerability to emotion mind and identify ways to decrease vulnerability to emotion mind.  Status: New - Date: 01/.06/2020     Intervention(s)  Therapist will provide information on factors that increase vulnerabiliyt to emotion mind.    Objective #C  Patient will identify warning signs and signals that emotions are escalating and will learn ways to skillfully intervene early on.  Status: New - Date: 01/06/2020     Intervention(s)  Therapist will help patient identify warning signs and signals, and will guide the patient in identifying skillful ways to intervene when exeriencing warning signs and signals.      Goal 2: Patient will learn new parenting strategies to help with managing parenting challenges.  Parent will work on  "improving relationship with her daughter and defining what she would like this relationship to look like.      I will know I've met my goal when I'm enjoying time with my daughter, teaching her new things, and not waiting for things to change      Objective #A (Patient Action)    Status: New - Date: 01/06/2020     Patient will increase understanding of developmental norms for her daughter and ways to manage challenging behaviors.    Intervention(s)  Therapist will provide psychoeducation on developmental norms and parenting strategies.    Objective #B  Patient will spend time reflecting on her relationship with her daughter and defining what she would like this relaitonship to look like.    Status: New - Date: 01/06/2020     Intervention(s)  Therapist will guide the patient in reflecting upon her relationship with her daughter and help her define ways to move towards what she vaues in her relationship with her daughter.    Objective #C  Patient will increase time spent on fun activity and play with her daughter.  Status: New - Date: 01/06/2020     Intervention(s)  Therapist will guide the patient in identifying ways she can increase positive time with her daughter.  Therapist will also teach mindfulness strategies to help patient with being in the present moment when appropriate - .      Goal 3: Patiient will improve communication with her .      I will know I've met my goal when I feel less irritated with my  and communicate more openly with my .  I would like to be more assertive and less aggressive.   I would like to not avoid telling him things because I'm worried about his reaction or don't think he will react well.  I would like to be more present to the moment during times when this would be helpful.\"      Objective #A (Patient Action)    Status: New - Date: 01/06/2020     Patient will learn and practice at least two new interpersonal effectiveness " strategies.    Intervention(s)  Therapist will teach strategies from DBT module on interpersonal effectiveness, and will assign homework to help reinforce skill development.      Patient has reviewed and agreed to the above plan.      Cristy Wilkinson PsyD, LP  Licensed Psychologist  January 6, 2020

## 2020-01-29 ENCOUNTER — OFFICE VISIT (OUTPATIENT)
Dept: PSYCHOLOGY | Facility: CLINIC | Age: 40
End: 2020-01-29
Payer: COMMERCIAL

## 2020-01-29 DIAGNOSIS — F41.9 ANXIETY DISORDER, UNSPECIFIED TYPE: Primary | ICD-10-CM

## 2020-01-29 PROCEDURE — 90834 PSYTX W PT 45 MINUTES: CPT | Performed by: PSYCHOLOGIST

## 2020-01-29 NOTE — PROGRESS NOTES
Progress Note    Patient Name: Idalia Hinojosa  Date:1/29/2020         Service Type: Individual  Video Visit: No     Session Start Time: 2:00 pm  Session End Time: 2:50 pm     Session Length: 50 minutes    Session #: 4    Attendees: Client     Treatment Plan Last Reviewed: Developed 1/06/2020  PHQ-9 / DAVID-7 : 12/17/2019    DATA  Interactive Complexity: No  Crisis: No       Progress Since Last Session (Related to Symptoms / Goals / Homework):   Symptoms: Worsening she reports that she has felt more irritable and frustrated over the last few weeks, which she believes may in part be due to injuring her back when sledding and having more physical pain.  She does report that the pain/injury has been improving.       PHQ-9=4, and DAVID-7=6, gathered 12/17.         Homework: Achieved / completed to satisfaction.  She did plan more fun outings with her daughter, but unfortunately she also got injured on a sledding outing.        Episode of Care Goals: Focus of today's session was to continue with assessment and focus on emotion regulation strategies; will focus in future sessions on identifying treatment goals and treatment plan.  The patient stated that her main goals for therapy would be to learn emotion regulation strategies (in particular, to help with when she is feeling upset/angry/frustrated/distressed).  She also thinks it may be helpful for her and her  to participate in couples therapy.  I recommended that she seek out a separate therapist for couples therapy, and I can provide recommendations if needed.       Current / Ongoing Stressors and Concerns:   Current: She'd like to focus on managing frustrations and upsets - developing emotion regulation strategies - and also she would like to work on improving her relationship with her daughter and enjoying their time together more.    Ongoing:Managing recent changes in their daily routine with her four year old due to  "school starting and returning to work, which has contributed to some adjustment and parenting challenges, lack of effective emotion regulation strategies for managing daily frustrations and upsets and marital discord due to differences in parenting styles     Treatment Objective(s) Addressed in This Session:   Continue with - identify and practice at least 1-2 new emotion regulation strategies   Identify ways to reframe polarized/black and white thinking with more balanced and helpful/hopeful thinking     Intervention:   CBT: Identify ways to reframe polarized/black and white thinking with balanced thinking.   She was pleased to share how well she and her  did with managing a challenging situation with their daughter (temper tantrum) by not engaging a power struggle and noted that the outcome was positive for all!  She was discouraged to share that she had an incident where her daughter was going down the stairs on her stomach to grab something from her purse; she said she was worried about her daughter hurting herself with the way she was going down the stairs and also didn't want her going in her purse, so she went to grab her quickly and believes she inadvertently/accidentally grabbed her arm too hard.  She denied that her daughter was hurt or upset by the incident, but she felt frustrated with herself because she said she really would like to manage these situations without using any kind of physical intervention at all and more calmly communicating with her daughter.  She noted that when she has a challenging situation with her daughter, or when she doesn't handle something well, her thinking can become extreme (\"I'm a bad parent...).  Introduced/reviewed the concept of polarized/black and white thinking (which we have talked about in a previous episode of care) and how this impacts her mood.  We talked about an alternate response of looking to find the gray in between the extremes she has created of " "\"I'm a perfect parent who can handle everything or I'm a terrible mom\".  Normalized that all parents (and people) experience challenges; encouraged her to embrace the learning opportunities and recognize her strengths and resilience.  Pointed out to her that she is refecting upon her experiences, identifying changes she would like to make, seeking help from therapy, practicing new skills, being real about the challenges inherent in making changes and being a parent - all things that make her a good and caring parent.    She also reflected that at times she wonders if she wants to be a stay at home mom and that makes her feel badly.  Encouraged her to view this from a non-judgmental perspective, with this being neither right nor wrong, but rather a choice to make, and that different parents make different choices based upon what is right for them and their situation.     Learn and practice at least 1-2 new emotion regulation strategies.  We walked through some situations she encountered over the last few weeks where she felt frustrated and identified skills she could use to intervene differently - including slowing down, taking deep breaths, stretching or moving her body, and managing her expectations (both in terms of expectations she has for others and expectations she has for herself).    Improving relationship with her daughter - She noted that being more purposeful around having activities to do would be helpful.  We talked about some ways they might do this, including looking into ECFE (opportunity to connect with other parents and kids too) and incorporating balance during the day (some time playing together, some time for free play or quiet time, some time for getting out of the house, some time for mom, etc.)        ASSESSMENT: Current Emotional / Mental Status (status of significant symptoms):   Risk status (Self / Other harm or suicidal ideation)   Patient denies current fears or concerns for personal " safety.   Patient denies current or recent suicidal ideation or behaviors.   Patientdenies current or recent homicidal ideation or behaviors.   Patient denies current or recent self injurious behavior or ideation.   Patient denies other safety concerns.   Patient Patient reports there has been no change in risk factors since their last session.     PatientPatient reports there has been no change in protective factors since their last session.     Recommended that patient call 911 or go to the local ED should there be a change in any of these risk factors.  Reviewed with the patient how to contact [facility] crisis number and [facility] crisis/COPE for urgent/crisis concerns 24/7.       Appearance:   Appropriate    Eye Contact:   Good    Psychomotor Behavior: Normal    Attitude:   Cooperative    Orientation:   All   Speech    Rate / Production: Normal     Volume:  Normal    Mood:    patient reported that she has been feeling more irritable and frustrated    Affect:    congruent with mood    Thought Content:  Clear    Thought Form:  Coherent  Logical    Insight:    Good      Medication Review:   No changes to current psychiatric medication(s)     Medication Compliance:   Yes     Changes in Health Issues:   None reported (she has an appointment scheduled to review concerns about allergies)     Chemical Use Review:   Substance Use: Chemical use reviewed, no active concerns identified      Tobacco Use: No current tobacco use.      Diagnosis:  Anxiety disorder unspecified    Collateral Reports Completed:   Not Applicable      PLAN: (Patient Tasks / Therapist Tasks / Other)    1) Plan activities for the day (be purposeful), look for the gray with thinking, slow down, take deep breaths, manage expectations of self and others.      Continue with: consider looking into ECFE, set up date night with  for this month and be mindful of planning dates on a more regular basis, keep working on  "being patient when things are tense around me.  Continue with observing/paying attention to warning signs and signals and give self persmission to slow down, especially during the morning routine with Kaylee.  Practice offerring Kaylee choices when appropriate.        Use \"my plan for success\" to help remind you of calming strategies to practice when feeling upset.  Practice decreasing overall vulnerability to emotion mind through getting adequate rest, nutrition, time for yourself, and physical activity.        Consider reading \"Fighting for your marriage\".        2) Follow-up appointment is scheduled.   If urgent or crisis concerns arise prior to next scheduled appointment, please reach out to crisis resources, call 911, or go to the emergency department.      Cristy Wilkinson PsyD, LP  Licensed Psychologist  1/29/2020                                               ____________________________________    Treatment Plan    Patient's Name: Idalia Hinojosa  YOB: 1980    Date: 1/6/2020    DSM5 Diagnoses: 300.00 (F41.9) Unspecified Anxiety Disorder  Psychosocial / Contextual Factors: strain in marital relationship, parenting challenges, adjustment challenges  WHODAS: will gather at diagnostic assessment update    Referral / Collaboration:  We discussed a referral to a couples/marital therapist.  The patient is thinking about this and has talked with her  about the referral, but they are unsure if they would like to follow through at this time.      Anticipated number of session or this episode of care: 8-12    MeasurableTreatment Goal(s) related to diagnosis / functional impairment(s)  Goal 1: Patient will learn emotion regulation strategies to help when she is feeling upset/angry/frustrated/distressed    I will know I've met my goal when I would yell less, I would feel calmer, and I would not push others.  I would be less physical when I'm angry (e.g. wouldn't slam doors or hit things)       Objective " #A (Patient Action)    Patient will learn and practice at least 2 new emotion regulation strategies.  Status: New - Date: 01/06/2020     Intervention(s)  Therapist will provide psychoeducation on emotion regulation module from DBT and will assign patient homework to help reinforce skill development.    Objective #B  Patient will identify factors that increase vulnerability to emotion mind and identify ways to decrease vulnerability to emotion mind.  Status: New - Date: 01/.06/2020     Intervention(s)  Therapist will provide information on factors that increase vulnerabiliyt to emotion mind.    Objective #C  Patient will identify warning signs and signals that emotions are escalating and will learn ways to skillfully intervene early on.  Status: New - Date: 01/06/2020     Intervention(s)  Therapist will help patient identify warning signs and signals, and will guide the patient in identifying skillful ways to intervene when exeriencing warning signs and signals.      Goal 2: Patient will learn new parenting strategies to help with managing parenting challenges.  Parent will work on improving relationship with her daughter and defining what she would like this relationship to look like.      I will know I've met my goal when I'm enjoying time with my daughter, teaching her new things, and not waiting for things to change      Objective #A (Patient Action)    Status: New - Date: 01/06/2020     Patient will increase understanding of developmental norms for her daughter and ways to manage challenging behaviors.    Intervention(s)  Therapist will provide psychoeducation on developmental norms and parenting strategies.    Objective #B  Patient will spend time reflecting on her relationship with her daughter and defining what she would like this relaitonship to look like.    Status: New - Date: 01/06/2020     Intervention(s)  Therapist will guide the patient in reflecting upon her relationship with her daughter and help her  "define ways to move towards what she vaues in her relationship with her daughter.    Objective #C  Patient will increase time spent on fun activity and play with her daughter.  Status: New - Date: 01/06/2020     Intervention(s)  Therapist will guide the patient in identifying ways she can increase positive time with her daughter.  Therapist will also teach mindfulness strategies to help patient with being in the present moment when appropriate - .      Goal 3: Patiient will improve communication with her .      I will know I've met my goal when I feel less irritated with my  and communicate more openly with my .  I would like to be more assertive and less aggressive.   I would like to not avoid telling him things because I'm worried about his reaction or don't think he will react well.  I would like to be more present to the moment during times when this would be helpful.\"      Objective #A (Patient Action)    Status: New - Date: 01/06/2020     Patient will learn and practice at least two new interpersonal effectiveness strategies.    Intervention(s)  Therapist will teach strategies from DBT module on interpersonal effectiveness, and will assign homework to help reinforce skill development.      Patient has reviewed and agreed to the above plan.      Cristy Wilkinson PsyD,   Licensed Psychologist  January 6, 2020  "

## 2020-02-19 ENCOUNTER — OFFICE VISIT (OUTPATIENT)
Dept: PSYCHOLOGY | Facility: CLINIC | Age: 40
End: 2020-02-19
Payer: COMMERCIAL

## 2020-02-19 DIAGNOSIS — F41.9 ANXIETY DISORDER, UNSPECIFIED TYPE: Primary | ICD-10-CM

## 2020-02-19 PROCEDURE — 90834 PSYTX W PT 45 MINUTES: CPT | Performed by: PSYCHOLOGIST

## 2020-02-19 NOTE — PROGRESS NOTES
"                                           Progress Note    Patient Name: Idalia Hinojosa  Date: 2/19/2020         Service Type: Individual  Video Visit: No     Session Start Time: 2:00 pm  Session End Time: 2:50 pm     Session Length: 50 minutes    Session #: 5    Attendees: Client     Treatment Plan Last Reviewed: Developed 1/06/2020  PHQ-9 / DAVID-7 : 12/17/2019    DATA  Interactive Complexity: No  Crisis: No       Progress Since Last Session (Related to Symptoms / Goals / Homework):   Symptoms: Improving She reports that overall her mood has been \"mostly better\" - less anxious and depressed   PHQ-9=4, and DAVID-7=6, gathered 12/17.         Homework: Achieved / completed to satisfaction.        Episode of Care Goals: Satisfactory progress - ACTION (Actively working towards change); Intervened by reinforcing change plan / affirming steps taken.  Diagnostic assessment has been completed and treatment plan has been developed.  The patient stated that her main goals for therapy would be to learn emotion regulation strategies (in particular, to help with when she is feeling upset/angry/frustrated/distressed).  We have begun to work on goals from treatment plan and the patient is making good initial progress.       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  She wished to focus today on learning emotion regulation strategies and how to help her daughter become more flexible,     Ongoing:Managing recent changes in their daily routine with her four year old due to school starting and returning to work, which has contributed to some adjustment and parenting challenges, lack of effective emotion regulation strategies for managing daily frustrations and upsets and marital discord due to differences in parenting styles     Treatment Objective(s) Addressed in This Session:   Continue with - identify and practice at least 1-2 new " "emotion regulation strategies   Identify ways to reframe polarized/black and white thinking with more balanced and helpful/hopeful thinking  Learn and practice mindfulness strategies     Intervention:   DBT: Introduced concept of mindfulness strategies.  Taught mindfulness strategy of \"observe, just notice\" and taking a non-judgmental stance.  Suggested strategies she can practice during the day to help with this.  Encouraged that she continue to focus on finding ways to incorporate structure and expectations into the daily routine with her daughter.  Suggested ways she could help her daughter with flexibility (and finding ways to practice this through play when emotions are not heightened, rewarding and reinforcing with practice and positives her demonstration of flexibility).          ASSESSMENT: Current Emotional / Mental Status (status of significant symptoms):   Risk status (Self / Other harm or suicidal ideation)   Patient denies current fears or concerns for personal safety.   Patient denies current or recent suicidal ideation or behaviors.   Patientdenies current or recent homicidal ideation or behaviors.   Patient denies current or recent self injurious behavior or ideation.   Patient denies other safety concerns.   Patient Patient reports there has been no change in risk factors since their last session.     PatientPatient reports there has been no change in protective factors since their last session.     Recommended that patient call 911 or go to the local ED should there be a change in any of these risk factors.  Reviewed with the patient how to contact [facility] crisis number and [facility] crisis/COPE for urgent/crisis concerns 24/7.       Appearance:   Appropriate    Eye Contact:   Good    Psychomotor Behavior: Normal    Attitude:   Cooperative    Orientation:   All   Speech    Rate / Production: Normal     Volume:  Normal    Mood:    patient reported that she has been " "feeling more irritable and frustrated    Affect:    congruent with mood    Thought Content:  Clear    Thought Form:  Coherent  Logical    Insight:    Good      Medication Review:   No changes to current psychiatric medication(s)    Went to an allergist, she learned she is allergic to dust mites.  She was prescribed a nasal spray to help with this.       Medication Compliance:   Yes     Changes in Health Issues:   None reported     Chemical Use Review:   Substance Use: Chemical use reviewed, no active concerns identified      Tobacco Use: No current tobacco use.      Diagnosis:  Anxiety disorder unspecified    Collateral Reports Completed:   Not Applicable      PLAN: (Patient Tasks / Therapist Tasks / Other)    1) \"It's just a thought\" - non-judgmental, observe, float down the river on a leaf, clouds in the gege, reframing unhelpful thoughts -     Plan activities for the day (be purposeful), look for the gray with thinking, slow down, take deep breaths, manage expectations of self and others.      Continue with: consider looking into ECFE, set up date night with  for this month and be mindful of planning dates on a more regular basis, keep working on being patient when things are tense around me.  Continue with observing/paying attention to warning signs and signals and give self persmission to slow down, especially during the morning routine with Kaylee.  Practice offerring Kaylee choices when appropriate.        Use \"my plan for success\" to help remind you of calming strategies to practice when feeling upset.  Practice decreasing overall vulnerability to emotion mind through getting adequate rest, nutrition, time for yourself, and physical activity.        Consider reading \"Fighting for your marriage\".        2) Follow-up appointment is scheduled.   If urgent or crisis concerns arise prior to next scheduled appointment, please reach out to crisis resources, call 911, or go to the emergency department.      Cristy " Joana Wilkinson, KYLIE  Licensed Psychologist  2/19/2020                                                 ____________________________________    Treatment Plan    Patient's Name: Idalia Hinojosa  YOB: 1980    Date: 1/6/2020    DSM5 Diagnoses: 300.00 (F41.9) Unspecified Anxiety Disorder  Psychosocial / Contextual Factors: strain in marital relationship, parenting challenges, adjustment challenges  WHODAS: will gather at diagnostic assessment update    Referral / Collaboration:  We discussed a referral to a couples/marital therapist.  The patient is thinking about this and has talked with her  about the referral, but they are unsure if they would like to follow through at this time.      Anticipated number of session or this episode of care: 8-12    MeasurableTreatment Goal(s) related to diagnosis / functional impairment(s)  Goal 1: Patient will learn emotion regulation strategies to help when she is feeling upset/angry/frustrated/distressed    I will know I've met my goal when I would yell less, I would feel calmer, and I would not push others.  I would be less physical when I'm angry (e.g. wouldn't slam doors or hit things)       Objective #A (Patient Action)    Patient will learn and practice at least 2 new emotion regulation strategies.  Status: New - Date: 01/06/2020     Intervention(s)  Therapist will provide psychoeducation on emotion regulation module from DBT and will assign patient homework to help reinforce skill development.    Objective #B  Patient will identify factors that increase vulnerability to emotion mind and identify ways to decrease vulnerability to emotion mind.  Status: New - Date: 01/.06/2020     Intervention(s)  Therapist will provide information on factors that increase vulnerabiliyt to emotion mind.    Objective #C  Patient will identify warning signs and signals that emotions are escalating and will learn ways to skillfully intervene early on.  Status: New - Date:  01/06/2020     Intervention(s)  Therapist will help patient identify warning signs and signals, and will guide the patient in identifying skillful ways to intervene when exeriencing warning signs and signals.      Goal 2: Patient will learn new parenting strategies to help with managing parenting challenges.  Parent will work on improving relationship with her daughter and defining what she would like this relationship to look like.      I will know I've met my goal when I'm enjoying time with my daughter, teaching her new things, and not waiting for things to change      Objective #A (Patient Action)    Status: New - Date: 01/06/2020     Patient will increase understanding of developmental norms for her daughter and ways to manage challenging behaviors.    Intervention(s)  Therapist will provide psychoeducation on developmental norms and parenting strategies.    Objective #B  Patient will spend time reflecting on her relationship with her daughter and defining what she would like this relaitonship to look like.    Status: New - Date: 01/06/2020     Intervention(s)  Therapist will guide the patient in reflecting upon her relationship with her daughter and help her define ways to move towards what she vaues in her relationship with her daughter.    Objective #C  Patient will increase time spent on fun activity and play with her daughter.  Status: New - Date: 01/06/2020     Intervention(s)  Therapist will guide the patient in identifying ways she can increase positive time with her daughter.  Therapist will also teach mindfulness strategies to help patient with being in the present moment when appropriate - .      Goal 3: Patiient will improve communication with her .      I will know I've met my goal when I feel less irritated with my  and communicate more openly with my .  I would like to be more assertive and less aggressive.   I would like to not avoid telling him things because I'm worried  "about his reaction or don't think he will react well.  I would like to be more present to the moment during times when this would be helpful.\"      Objective #A (Patient Action)    Status: New - Date: 01/06/2020     Patient will learn and practice at least two new interpersonal effectiveness strategies.    Intervention(s)  Therapist will teach strategies from DBT module on interpersonal effectiveness, and will assign homework to help reinforce skill development.      Patient has reviewed and agreed to the above plan.      Cristy Wilkinson PsyD, LP  Licensed Psychologist  January 6, 2020  "

## 2020-03-18 ENCOUNTER — VIRTUAL VISIT (OUTPATIENT)
Dept: PSYCHOLOGY | Facility: CLINIC | Age: 40
End: 2020-03-18
Payer: COMMERCIAL

## 2020-03-18 DIAGNOSIS — F41.9 ANXIETY DISORDER, UNSPECIFIED TYPE: Primary | ICD-10-CM

## 2020-03-18 PROCEDURE — 90832 PSYTX W PT 30 MINUTES: CPT | Mod: TEL | Performed by: PSYCHOLOGIST

## 2020-03-18 NOTE — PROGRESS NOTES
"                                           Progress Note    Patient Name: Idalia Hinojosa  Date: 3/18/2020         Service Type: Phone Visit  Video Visit: No     Session Start Time: 1:35  Session End Time: 2:05 pm     Session Length: 30  minutes    Session #: 6    Attendees: Client     Treatment Plan Last Reviewed: Developed 1/06/2020  PHQ-9 / DAVID-7 : 12/17/2019    DATA  Interactive Complexity: No  Crisis: No     Idalia Hinojosa is a 40 year old female who is being evaluated via a billable telephone visit.      The patient has been notified of following:     \"This telephone visit will be conducted via a call between you and your provider. We have found that certain health care needs can be provided without the need for a physical exam.  This service lets us provide the care you need with a short phone conversation.    If during the course of the call the physician/provider feels a telephone visit is not appropriate, you will not be charged for this service.\"       Idalia Hinojosa complains of: \"I've been feeling really anxious\"    I have reviewed and updated the patient's Past Medical History, Social History, Family History and Medication List.    ALLERGIES  Seasonal allergies    Additional provider notes: Please see progress note below for full details related to today's phone visit    Assessment/Plan:  (F41.9) Anxiety disorder, unspecified type  (primary encounter diagnosis)    Phone call contact time  Call Started at 1:35 pm  Call Ended at 2:05 pm    Cristy Wilkinson PsyD,             Progress Since Last Session (Related to Symptoms / Goals / Homework):   Symptoms: Worsening Patient reports that anxiety has worsened in the context of COVID concerns.  She stated she doesn't worry so much about getting sick, she stated she worries about how she will manage the restrictions.  She rated her anxiety as overall at a level 8 on a ten point scale (10= worst), however, she stated that she is feeling better today " "and rates her anxiety at a 4, stating that she went outside this morning, had a dance party, listened to great music, and that really helped her!   PHQ-9=4, and DAVID-7=6, gathered 12/17.         Homework: Achieved / completed to satisfaction.        Episode of Care Goals: Satisfactory progress - ACTION (Actively working towards change); Intervened by reinforcing change plan / affirming steps taken.  Diagnostic assessment has been completed and treatment plan has been developed.  The patient stated that her main goals for therapy would be to learn emotion regulation strategies (in particular, to help with when she is feeling upset/angry/frustrated/distressed).  We have begun to work on goals from treatment plan and the patient is making good initial progress.       Current / Ongoing Stressors and Concerns:   Current: Increase in feeling anxious due to restrictions that are in place as a result of COVID.  She reported that things that are normally helpful for her, like going to work, her parents helping out, and her daughter having some time in , are no longer available to her.     Ongoing:Managing recent changes in their daily routine with her four year old due to school starting and returning to work, which has contributed to some adjustment and parenting challenges, lack of effective emotion regulation strategies for managing daily frustrations and upsets and marital discord due to differences in parenting styles     Treatment Objective(s) Addressed in This Session:   Identify ways to increase and implement structure during the day, as part of developing a \"new normal\" and new routines        Intervention:   CBT: and solution-focused:  We reviewed ways she can incorporate more structure into her day and create a \"new normal/new routines\" in light of COVID related restrictions.  We talked about the following as elements that may be helpful to incorporate: getting outside every day for fresh air and movement, " educational time/reading, arts and crafts/coloring, dance party, music, free play, quiet time after lunch, healthy meals and snacks, one cartoon episode for her daughter so she can practice her instrument, etc.  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  After our session today, she was going to work on creating a daily schedule and identify times to incorporate these different elements into her day.         ASSESSMENT: Current Emotional / Mental Status (status of significant symptoms):   Risk status (Self / Other harm or suicidal ideation)   Patient denies current fears or concerns for personal safety.   Patient denies current or recent suicidal ideation or behaviors.   Patientdenies current or recent homicidal ideation or behaviors.   Patient denies current or recent self injurious behavior or ideation.   Patient denies other safety concerns.   Patient Patient reports there has been no change in risk factors since their last session.     PatientPatient reports there has been no change in protective factors since their last session.     Recommended that patient call 911 or go to the local ED should there be a change in any of these risk factors.  [facility] with the patient [facility] and [facility] crisis/COPE for urgent/crisis concerns 24/7.       Appearance:   unable to assess- phone visit    Eye Contact:   unable to assess - phone visit    Psychomotor Behavior: unable to assess - phone visit     Attitude:   Cooperative    Orientation:   All   Speech    Rate / Production: Normal     Volume:  Normal    Mood:    Anxious    Affect:    congruent with mood    Thought Content:  Clear    Thought Form:  Coherent  Logical    Insight:    Good      Medication Review:   No changes to  "current psychiatric medication(s)       Medication Compliance:   Yes     Changes in Health Issues:   None reported (recent allergies identified, she has been using nasal spray to help with this)     Chemical Use Review:   Substance Use: Chemical use reviewed, no active concerns identified      Tobacco Use: No current tobacco use.      Diagnosis:  Anxiety disorder unspecified    Collateral Reports Completed:   Not Applicable      PLAN: (Patient Tasks / Therapist Tasks / Other)    1) Build a daily schedule and work on practicing a \"new normal/new routines\".  Get outside every day.  Play your instrument.  Sing!       Continue with: \"It's just a thought\" - non-judgmental, observe, float down the river on a leaf, clouds in the gege, reframing unhelpful thoughts -     Plan activities for the day (be purposeful), look for the gray with thinking, slow down, take deep breaths, manage expectations of self and others.       keep working on being patient when things are tense around me.  Continue with observing/paying attention to warning signs and signals and give self persmission to slow down, especially during the morning routine with Kaylee.  Practice offerring Kaylee choices when appropriate.        Use \"my plan for success\" to help remind you of calming strategies to practice when feeling upset.  Practice decreasing overall vulnerability to emotion mind through getting adequate rest, nutrition, time for yourself, and physical activity.        Consider reading \"Fighting for your marriage\".        2) Follow-up telephone visit is scheduled for two weeks.   If urgent or crisis concerns arise prior to next scheduled appointment, please reach out to crisis resources, call 911, or go to the emergency department.      Cristy Wilkinson PsyD, LP  Licensed Psychologist  3/18/2020                                                   ____________________________________    Treatment Plan    Patient's Name: Idalia Hinojosa  Date Of " Birth: 1980    Date: 1/6/2020    DSM5 Diagnoses: 300.00 (F41.9) Unspecified Anxiety Disorder  Psychosocial / Contextual Factors: strain in marital relationship, parenting challenges, adjustment challenges  WHODAS: will gather at diagnostic assessment update    Referral / Collaboration:  We discussed a referral to a couples/marital therapist.  The patient is thinking about this and has talked with her  about the referral, but they are unsure if they would like to follow through at this time.      Anticipated number of session or this episode of care: 8-12    MeasurableTreatment Goal(s) related to diagnosis / functional impairment(s)  Goal 1: Patient will learn emotion regulation strategies to help when she is feeling upset/angry/frustrated/distressed    I will know I've met my goal when I would yell less, I would feel calmer, and I would not push others.  I would be less physical when I'm angry (e.g. wouldn't slam doors or hit things)       Objective #A (Patient Action)    Patient will learn and practice at least 2 new emotion regulation strategies.  Status: New - Date: 01/06/2020     Intervention(s)  Therapist will provide psychoeducation on emotion regulation module from DBT and will assign patient homework to help reinforce skill development.    Objective #B  Patient will identify factors that increase vulnerability to emotion mind and identify ways to decrease vulnerability to emotion mind.  Status: New - Date: 01/.06/2020     Intervention(s)  Therapist will provide information on factors that increase vulnerabiliyt to emotion mind.    Objective #C  Patient will identify warning signs and signals that emotions are escalating and will learn ways to skillfully intervene early on.  Status: New - Date: 01/06/2020     Intervention(s)  Therapist will help patient identify warning signs and signals, and will guide the patient in identifying skillful ways to intervene when exeriencing warning signs and  "signals.      Goal 2: Patient will learn new parenting strategies to help with managing parenting challenges.  Parent will work on improving relationship with her daughter and defining what she would like this relationship to look like.      I will know I've met my goal when I'm enjoying time with my daughter, teaching her new things, and not waiting for things to change      Objective #A (Patient Action)    Status: New - Date: 01/06/2020     Patient will increase understanding of developmental norms for her daughter and ways to manage challenging behaviors.    Intervention(s)  Therapist will provide psychoeducation on developmental norms and parenting strategies.    Objective #B  Patient will spend time reflecting on her relationship with her daughter and defining what she would like this relaitonship to look like.    Status: New - Date: 01/06/2020     Intervention(s)  Therapist will guide the patient in reflecting upon her relationship with her daughter and help her define ways to move towards what she vaues in her relationship with her daughter.    Objective #C  Patient will increase time spent on fun activity and play with her daughter.  Status: New - Date: 01/06/2020     Intervention(s)  Therapist will guide the patient in identifying ways she can increase positive time with her daughter.  Therapist will also teach mindfulness strategies to help patient with being in the present moment when appropriate - .      Goal 3: Patiient will improve communication with her .      I will know I've met my goal when I feel less irritated with my  and communicate more openly with my .  I would like to be more assertive and less aggressive.   I would like to not avoid telling him things because I'm worried about his reaction or don't think he will react well.  I would like to be more present to the moment during times when this would be helpful.\"      Objective #A (Patient Action)    Status: New - Date: " 01/06/2020     Patient will learn and practice at least two new interpersonal effectiveness strategies.    Intervention(s)  Therapist will teach strategies from DBT module on interpersonal effectiveness, and will assign homework to help reinforce skill development.      Patient has reviewed and agreed to the above plan.      Cristy Wilkinson PsyD, LP  Licensed Psychologist  January 6, 2020

## 2020-04-02 ENCOUNTER — VIRTUAL VISIT (OUTPATIENT)
Dept: PSYCHOLOGY | Facility: CLINIC | Age: 40
End: 2020-04-02
Payer: COMMERCIAL

## 2020-04-02 DIAGNOSIS — F41.9 ANXIETY DISORDER, UNSPECIFIED TYPE: Primary | ICD-10-CM

## 2020-04-02 PROCEDURE — 90834 PSYTX W PT 45 MINUTES: CPT | Mod: TEL | Performed by: PSYCHOLOGIST

## 2020-04-02 NOTE — PROGRESS NOTES
"                                           Progress Note    Patient Name: Idalia Hinojosa  Date: 04/02/2020         Service Type: Phone Visit  Video Visit: No     Session Start Time: 2:05 pm  Session End Time: 2:55 pm     Session Length: 50  minutes    Session #: 7    Attendees: Client     Treatment Plan Last Reviewed: Developed 1/06/2020  PHQ-9 / DAVID-7 : 12/17/2019    DATA  Interactive Complexity: No  Crisis: No         Idalia Hinojosa is a 40 year old female who is being evaluated via a billable telephone visit.      The patient has been notified of following:     \"This telephone visit will be conducted via a call between you and your provider. We have found that certain health care needs can be provided without the need for a physical exam.  This service lets us provide the care you need with a short phone conversation.    If during the course of the call the physician/provider feels a telephone visit is not appropriate, you will not be charged for this service.\"     Idalia Hinojosa complains of: \"II've been having ups and downs - but more good days.  I'd like to figure out how to avoid yelling when I'm feeling angry\"    I have reviewed and updated the patient's Past Medical History, Social History, Family History and Medication List.    ALLERGIES  Seasonal allergies    Additional provider notes: Please see progress note below for full details related to today's phone visit    Assessment/Plan:  (F41.9) Anxiety disorder, unspecified type  (primary encounter diagnosis)    Phone call contact time  Call Started at 2:05 pm  Call Ended at 2: 55 pm    Please note that we attempted a video visit, but due to difficulties with the technology (prolonged delays with making a successful video connection) we elected to meet by telephone today, which the patient consented to.      Cristy Wilkinson PsyD,  LP           Progress Since Last Session (Related to Symptoms / Goals / Homework):   Symptoms: Improving The patient " "reported that while she has continued to have what she describes as \"ups and downs\" with her mood, she reported that overall, she is having more good days.   She reported that she was feeling more depressed for a while, but she is feeling \"beter\" again and notes that overall she is feeling less anxious.         Homework: Achieved / completed to satisfaction.  She noted that she was successful with creating a new schedule/routine for the day and this has been very helpful.  She said that she and her daughter are getting outside every day and this has helped. She said that they are also doing quiet time after lunch, and are embracing the activities that her daughter's  is sending them.  She's also been playing her instrument and singing, which helps her.        Episode of Care Goals: Satisfactory progress - ACTION (Actively working towards change); Intervened by reinforcing change plan / affirming steps taken.  Diagnostic assessment has been completed and treatment plan has been developed.  The patient stated that her main goals for therapy would be to learn emotion regulation strategies (in particular, to help with when she is feeling upset/angry/frustrated/distressed).  We have begun to work on goals from treatment plan and the patient is making good initial progress.  More recently, the changes in her daily life due to COVID restrictions have been a focus of our work together.       Current / Ongoing Stressors and Concerns:   Current: She has made some positive changes to help cope with changes and restrictions in place due to COVID.  She reported that today she would like to focus on ways to manage when she is feeling angry so that she doesn't yell.     Ongoing:Managing recent changes in their daily routine with her four year old due to school starting and returning to work, which has contributed to some adjustment and parenting challenges, lack of effective emotion regulation strategies for managing " "daily frustrations and upsets and marital discord due to differences in parenting styles     Treatment Objective(s) Addressed in This Session:   Continue with:   Identify ways to increase and implement structure during the day, as part of developing a \"new normal\" and new routines   New today: Identify ways to calm when feeling frustrated, give self permission to take a break       Intervention:   CBT: and solution-focused: We talked about ways for her to identify when she is feeling frustrated/irritable/angry/upset and give herself permission to engage calming and self-soothing strategies.  Finding a \"flexible\" mindset was also discussed, and we discussed how engaging calming strategies can help her to more easily find and embrace flexibility in her thinking.  She also identified that setting limits on serious conversations with her spouse (e.g. both identifying when would be and would not be a good time for these discussions) would help - as there were some recent conversations when one or both wasn't in a good space to have the talk.  I encouraged her to proactively discuss this with her  - and how they could increase their communication around this -        ASSESSMENT: Current Emotional / Mental Status (status of significant symptoms):   Risk status (Self / Other harm or suicidal ideation)   Patient denies current fears or concerns for personal safety.   Patient denies current or recent suicidal ideation or behaviors.  She noted that she had what she called a \"weird thought\" - where after a fight with her  she said she had a brief passing thought of exposing herself to the virus (COVID) - she said the thought quickly came and left - she said \"like a balloon I let it go\".  She said she had no intention or plan to want to get sick, or hurt herself in anyway, but she she just noticed that she had the thought and wanted to share it.  I thanked her for sharing this with me, and I was glad she was able " to use a strategy to hep her let the thought go.  She felt able to continue to use strategies if any future thoughts like this were to arise, and she agrees to let me know if thoughts change or increase.  She also knows how to access crisis and emergency services if needed.    Patientdenies current or recent homicidal ideation or behaviors.   Patient denies current or recent self injurious behavior or ideation.   Patient denies other safety concerns.   Patient Patient reports there has been no change in risk factors since their last session.     PatientPatient reports there has been no change in protective factors since their last session.     Recommended that patient call 911 or go to the local ED should there be a change in any of these risk factors.  Reviewed with the patient how [facility] and [facility] crisis/COPE for urgent/crisis concerns 24/7.       Appearance:   unable to assess- phone visit    Eye Contact:   unable to assess - phone visit    Psychomotor Behavior: unable to assess - phone visit     Attitude:   Cooperative    Orientation:   All   Speech    Rate / Production: Normal     Volume:  Normal    Mood:    less anxious and depressed   Affect:    congruent with mood    Thought Content:  Clear    Thought Form:  Coherent  Logical    Insight:    Good      Medication Review:   No changes to current psychiatric medication(s)       Medication Compliance:   Yes     Changes in Health Issues:   None reported (recent allergies identified, she has been using nasal spray to help with this)     Chemical Use Review:   Substance Use: Chemical use reviewed, no active concerns identified      Tobacco Use: No current tobacco use.      Diagnosis:  Anxiety disorder unspecified    Collateral Reports Completed:   Not Applicable      PLAN: (Patient Tasks / Therapist Tasks / Other)    1) Practice recognizing when feeling angry, and giving self permission to use calming  "and self-soothing strategies and take a break. Look for the gray with thinking, slow down, take deep breaths, manage expectations of self and others.      Continue with: Journal every day.  Practice flexing \"flexiblity and creativity\" -     Continue the great work you are doing with your daily schedule.  Get outside every day.  Play your instrument.  Sing!       Continue with: \"It's just a thought\" - non-judgmental, observe, float down the river on a leaf, clouds in the gege, reframing unhelpful thoughts -      keep working on being patient when things are tense around me.  Continue with observing/paying attention to warning signs and signals and give self persmission to slow down, especially during the morning routine with Kaylee.  Practice offerring Kaylee choices when appropriate.        Use \"my plan for success\" to help remind you of calming strategies to practice when feeling upset.  Practice decreasing overall vulnerability to emotion mind through getting adequate rest, nutrition, time for yourself, and physical activity.        Consider reading \"Fighting for your marriage\".        2) Follow-up visit is scheduled for three weeks - we will try a video visit again.   Please call if you need a sooner appointment.  If urgent or crisis concerns arise prior to next scheduled appointment, please reach out to crisis resources, call 911, or go to the emergency department.      Cristy Wilkinson PsyD, LP  Licensed Psychologist  04/02/2020                                               ____________________________________    Treatment Plan    Patient's Name: Idalia Hinojosa  YOB: 1980    Date: 1/6/2020    DSM5 Diagnoses: 300.00 (F41.9) Unspecified Anxiety Disorder  Psychosocial / Contextual Factors: strain in marital relationship, parenting challenges, adjustment challenges  WHODAS: will gather at diagnostic assessment update    Referral / Collaboration:  We discussed a referral to a couples/marital therapist.  The " patient is thinking about this and has talked with her  about the referral, but they are unsure if they would like to follow through at this time.      Anticipated number of session or this episode of care: 8-12    MeasurableTreatment Goal(s) related to diagnosis / functional impairment(s)  Goal 1: Patient will learn emotion regulation strategies to help when she is feeling upset/angry/frustrated/distressed    I will know I've met my goal when I would yell less, I would feel calmer, and I would not push others.  I would be less physical when I'm angry (e.g. wouldn't slam doors or hit things)       Objective #A (Patient Action)    Patient will learn and practice at least 2 new emotion regulation strategies.  Status: New - Date: 01/06/2020     Intervention(s)  Therapist will provide psychoeducation on emotion regulation module from DBT and will assign patient homework to help reinforce skill development.    Objective #B  Patient will identify factors that increase vulnerability to emotion mind and identify ways to decrease vulnerability to emotion mind.  Status: New - Date: 01/.06/2020     Intervention(s)  Therapist will provide information on factors that increase vulnerabiliyt to emotion mind.    Objective #C  Patient will identify warning signs and signals that emotions are escalating and will learn ways to skillfully intervene early on.  Status: New - Date: 01/06/2020     Intervention(s)  Therapist will help patient identify warning signs and signals, and will guide the patient in identifying skillful ways to intervene when exeriencing warning signs and signals.      Goal 2: Patient will learn new parenting strategies to help with managing parenting challenges.  Parent will work on improving relationship with her daughter and defining what she would like this relationship to look like.      I will know I've met my goal when I'm enjoying time with my daughter, teaching her new things, and not waiting for  "things to change      Objective #A (Patient Action)    Status: New - Date: 01/06/2020     Patient will increase understanding of developmental norms for her daughter and ways to manage challenging behaviors.    Intervention(s)  Therapist will provide psychoeducation on developmental norms and parenting strategies.    Objective #B  Patient will spend time reflecting on her relationship with her daughter and defining what she would like this relaitonship to look like.    Status: New - Date: 01/06/2020     Intervention(s)  Therapist will guide the patient in reflecting upon her relationship with her daughter and help her define ways to move towards what she vaues in her relationship with her daughter.    Objective #C  Patient will increase time spent on fun activity and play with her daughter.  Status: New - Date: 01/06/2020     Intervention(s)  Therapist will guide the patient in identifying ways she can increase positive time with her daughter.  Therapist will also teach mindfulness strategies to help patient with being in the present moment when appropriate - .      Goal 3: Patiient will improve communication with her .      I will know I've met my goal when I feel less irritated with my  and communicate more openly with my .  I would like to be more assertive and less aggressive.   I would like to not avoid telling him things because I'm worried about his reaction or don't think he will react well.  I would like to be more present to the moment during times when this would be helpful.\"      Objective #A (Patient Action)    Status: New - Date: 01/06/2020     Patient will learn and practice at least two new interpersonal effectiveness strategies.    Intervention(s)  Therapist will teach strategies from DBT module on interpersonal effectiveness, and will assign homework to help reinforce skill development.      Patient has reviewed and agreed to the above plan.      Cristy Wilkinson PsyD, " KYLIE  Licensed Psychologist  January 6, 2020

## 2020-04-13 DIAGNOSIS — F40.10 SOCIAL ANXIETY DISORDER: ICD-10-CM

## 2020-04-13 RX ORDER — PROPRANOLOL HYDROCHLORIDE 20 MG/1
20 TABLET ORAL
Qty: 30 TABLET | Refills: 0 | OUTPATIENT
Start: 2020-04-13

## 2020-04-13 NOTE — TELEPHONE ENCOUNTER
Pharmacy sent refill request. TC to patient to find out if she requested or if it was an auto-fax. LMTC.     Pending Prescriptions:                       Disp   Refills    propranolol (INDERAL) 20 MG tablet        30 tab*0            Sig: Take 1 tablet (20 mg) by mouth once as needed           (anxiety) 30-60 minutes prior to event    Mariella Burris, INDU-BSN

## 2020-04-23 ENCOUNTER — VIRTUAL VISIT (OUTPATIENT)
Dept: PSYCHOLOGY | Facility: CLINIC | Age: 40
End: 2020-04-23
Payer: COMMERCIAL

## 2020-04-23 DIAGNOSIS — F41.9 ANXIETY DISORDER, UNSPECIFIED TYPE: Primary | ICD-10-CM

## 2020-04-23 PROCEDURE — 90834 PSYTX W PT 45 MINUTES: CPT | Mod: GT | Performed by: PSYCHOLOGIST

## 2020-05-11 NOTE — PROGRESS NOTES
"                                           Progress Note    Patient Name: Idalia Hinojosa  Date: 4/23/2020         Service Type: Individual  Video Visit: Yes, the patient's condition can be safely assessed and treated via synchronous audio and visual telemedicine encounter.      Reason for Video Visit: Services only offered telehealth    Location of Originating Site: Patient's home    Distant Site: Provider Remote Setting     Session Start Time: 1:05 pm  Session End Time:  1:55 pm     Session Length: 50  minutes    Session #: 8    Attendees: Client     Treatment Plan Last Reviewed: Developed 1/06/2020, reviewed 4/23/2020  PHQ-9 / DAVID-7 : 12/17/2019    DATA  Interactive Complexity: No  Crisis: No     Consent:  The patient/guardian has verbally consented to: the potential risks and benefits of telemedicine (video visit) versus in person care; bill my insurance or make self-payment for services provided; and responsibility for payment of non-covered services.     Mode of Communication:  Video Conference via [employer]    As the provider I attest to compliance with applicable laws and regulations related to telemedicine.        Progress Since Last Session (Related to Symptoms / Goals / Homework):   Symptoms: sustained improvement - the patient reported that her mood is \"mostly better\". She said she continues to have some \"ups and downs\", but overall believes her mood is better. She reported that she was feeling more depressed for a while, but she is feeling \"beter\" again and notes that overall she is feeling less anxious.         Homework: Achieved / completed to satisfaction.  She noted that she's continued to be successful with incorporating structure and helpful routine into her days with her daughter. She reported that they have been getting out of the house every day which has been very helpful for her.       Episode of Care Goals: Satisfactory progress - ACTION (Actively working towards change); Intervened by " "reinforcing change plan / affirming steps taken.  Diagnostic assessment has been completed and treatment plan has been developed.  The patient stated that her main goals for therapy would be to learn emotion regulation strategies (in particular, to help with when she is feeling upset/angry/frustrated/distressed).  We have begun to work on goals from treatment plan and the patient is making good initial progress.  More recently, the changes in her daily life due to COVID restrictions have been a focus of our work together.       Current / Ongoing Stressors and Concerns:   Current: She stated she continues to do well with implementing structure and routine into their day and finding a \"new normal\" in light of COVID restrictions. She said that today she wished to continue focusing on how she can manage her own reaction when she is feeling upset or frustrated.       Ongoing:Managing recent changes in their daily routine with her four year old due to school starting and returning to work, which has contributed to some adjustment and parenting challenges, lack of effective emotion regulation strategies for managing daily frustrations and upsets and marital discord due to differences in parenting styles     Treatment Objective(s) Addressed in This Session:   Continue with:   Identify ways to increase and implement structure during the day, as part of developing a \"new normal\" and new routines    Identify ways to calm when feeling frustrated, give self permission to take a break       Intervention:   CBT: and solution-focused: We continued the focus today on helping her to identify ways to manage her reaction when she is feeling frustrated.  She noted that when her daughter is upset or frustrated with something, this can cause her to feel upset.  She noted that her daughter has seemed more \"grumpy\".  We talked about how all of the many changes due to COVID can impact children and ways to help support her daughter as well " "who doesn't have all of the words to express her feelings and skills/tools to manage her own feelings.      We talked about ways for her to separate and manage her reaction, while also helping her daughter learn how to manage her emotions.  We talked about how when she makes choices to practice calming and managing her upset, that this will also help her daughter.  We talked about ways to validate, label, and teach (e.g. we can validate anger while also setting limits on what's appropriate).      We came up with the acronym \"STOP\" as an easy tool for her to remember:  S: Slow down  T: Take deep breaths  O: Observe (what do I notice?  What is going on?  What is contributing to how I'm feeling or how others are feeling/reacting?\"  P: Pick what will help me!       ASSESSMENT: Current Emotional / Mental Status (status of significant symptoms):   Risk status (Self / Other harm or suicidal ideation)   Patient denies current fears or concerns for personal safety.   Patient denies current or recent suicidal ideation or behaviors.  S   Patientdenies current or recent homicidal ideation or behaviors.   Patient denies current or recent self injurious behavior or ideation.   Patient denies other safety concerns.   Patient Patient reports there has been no change in risk factors since their last session.     PatientPatient reports there has been no change in protective factors since their last session.     Recommended that patient call 911 or go to the local ED should there be a change in any of these risk factors.  Reviewed with the patient how to contact [facility] crisis number and [facility] crisis/COPE for urgent/crisis concerns 24/7.       Appearance:   Appropriate    Eye Contact:   Good    Psychomotor Behavior: Normal    Attitude:   Cooperative    Orientation:   All   Speech    Rate / Production: Normal     Volume:  Normal    Mood:    less anxious and depressed   Affect:    congruent with " "mood    Thought Content:  Clear    Thought Form:  Coherent  Logical    Insight:    Good      Medication Review:   No changes to current psychiatric medication(s)       Medication Compliance:   Yes     Changes in Health Issues:   None reported      Chemical Use Review:   Substance Use: Chemical use reviewed, no active concerns identified      Tobacco Use: No current tobacco use.      Diagnosis:  Anxiety disorder unspecified    Collateral Reports Completed:   Not Applicable      PLAN: (Patient Tasks / Therapist Tasks / Other)    1) Practice \"STOP\" technique when you recognize yourself feeling angry    Be a  - what do you notice is happening when Kaylee feels upset?       Continue with: Practice recognizing when feeling angry, and giving self permission to use calming and self-soothing strategies and take a break. Look for the gray with thinking, slow down, take deep breaths, manage expectations of self and others.      Continue with: Journal every day.  Practice flexing \"flexiblity and creativity\" -     Continue the great work you are doing with your daily schedule.  Get outside every day.  Play your instrument.  Sing!       Continue with: \"It's just a thought\" - non-judgmental, observe, float down the river on a leaf, clouds in the gege, reframing unhelpful thoughts -      keep working on being patient when things are tense around me.  Continue with observing/paying attention to warning signs and signals and give self persmission to slow down, especially during the morning routine with Kaylee.  Practice offerring Kaylee choices when appropriate.        Use \"my plan for success\" to help remind you of calming strategies to practice when feeling upset.  Practice decreasing overall vulnerability to emotion mind through getting adequate rest, nutrition, time for yourself, and physical activity.        Consider reading \"Fighting for your marriage\".        2) Follow-up visit is scheduled for four weeks -  - May 21st at 1:00 " - we will try a video visit again.   Please call if you need a sooner appointment.  If urgent or crisis concerns arise prior to next scheduled appointment, please reach out to crisis resources, call 911, or go to the emergency department.      Cristy Wilkinson PsyD,   Licensed Psychologist  4/23/2020                                               ____________________________________    Treatment Plan    Patient's Name: Idalia Hinojosa  YOB: 1980    Date: 1/6/2020    DSM5 Diagnoses: 300.00 (F41.9) Unspecified Anxiety Disorder  Psychosocial / Contextual Factors: strain in marital relationship, parenting challenges, adjustment challenges  WHODAS: will gather at diagnostic assessment update    Referral / Collaboration:  We discussed a referral to a couples/marital therapist.  The patient is thinking about this and has talked with her  about the referral, but they are unsure if they would like to follow through at this time.      Anticipated number of session or this episode of care: 8-12    MeasurableTreatment Goal(s) related to diagnosis / functional impairment(s)  Goal 1: Patient will learn emotion regulation strategies to help when she is feeling upset/angry/frustrated/distressed    I will know I've met my goal when I would yell less, I would feel calmer, and I would not push others.  I would be less physical when I'm angry (e.g. wouldn't slam doors or hit things)       Objective #A (Patient Action)    Patient will learn and practice at least 2 new emotion regulation strategies.  Status: Continued - Date(s):4/23/2020 (has begun to learn new emotion regulation strategies and is making good progress)     Intervention(s)  Therapist will provide psychoeducation on emotion regulation module from DBT and will assign patient homework to help reinforce skill development.    Objective #B  Patient will identify factors that increase vulnerability to emotion mind and identify ways to decrease  vulnerability to emotion mind.  Status: Continued - Date(s):4/23/2020 (has learned factors that make her more vulnerable to emotion mind, and has been working on addressing vulnerability related to feeling hungry, tired and rushed)     Intervention(s)  Therapist will provide information on factors that increase vulnerabiliyt to emotion mind.    Objective #C  Patient will identify warning signs and signals that emotions are escalating and will learn ways to skillfully intervene early on.  Status: New - Date: 01/06/2020     Intervention(s)  Therapist will help patient identify warning signs and signals, and will guide the patient in identifying skillful ways to intervene when exeriencing warning signs and signals.      Goal 2: Patient will learn new parenting strategies to help with managing parenting challenges.  Parent will work on improving relationship with her daughter and defining what she would like this relationship to look like.      I will know I've met my goal when I'm enjoying time with my daughter, teaching her new things, and not waiting for things to change      Objective #A (Patient Action)    Status: Continued - Date(s):4/23/2020     Patient will increase understanding of developmental norms for her daughter and ways to manage challenging behaviors.    Intervention(s)  Therapist will provide psychoeducation on developmental norms and parenting strategies.    Objective #B  Patient will spend time reflecting on her relationship with her daughter and defining what she would like this relaitonship to look like.    Status: Continued - Date(s):4/23/2020     Intervention(s)  Therapist will guide the patient in reflecting upon her relationship with her daughter and help her define ways to move towards what she vaues in her relationship with her daughter.    Objective #C  Patient will increase time spent on fun activity and play with her daughter.  Status: Continued - Date(s):4/23/2020  "    Intervention(s)  Therapist will guide the patient in identifying ways she can increase positive time with her daughter.  Therapist will also teach mindfulness strategies to help patient with being in the present moment when appropriate - .      Goal 3: Patiient will improve communication with her .      I will know I've met my goal when I feel less irritated with my  and communicate more openly with my .  I would like to be more assertive and less aggressive.   I would like to not avoid telling him things because I'm worried about his reaction or don't think he will react well.  I would like to be more present to the moment during times when this would be helpful.\"      Objective #A (Patient Action)    Status: Continued - Date(s):4/23/2020     Patient will learn and practice at least two new interpersonal effectiveness strategies.    Intervention(s)  Therapist will teach strategies from DBT module on interpersonal effectiveness, and will assign homework to help reinforce skill development.      Patient has reviewed and agreed to the above plan.      Cristy Wilkinson PsyD, LP  Licensed Psychologist  January 6, 2020, reviewed on 4/23/2020  "

## 2020-05-21 ENCOUNTER — VIRTUAL VISIT (OUTPATIENT)
Dept: PSYCHOLOGY | Facility: CLINIC | Age: 40
End: 2020-05-21
Payer: COMMERCIAL

## 2020-05-21 DIAGNOSIS — F41.9 ANXIETY DISORDER, UNSPECIFIED TYPE: Primary | ICD-10-CM

## 2020-05-21 PROCEDURE — 90834 PSYTX W PT 45 MINUTES: CPT | Mod: GT | Performed by: PSYCHOLOGIST

## 2020-07-08 ENCOUNTER — VIRTUAL VISIT (OUTPATIENT)
Dept: PSYCHOLOGY | Facility: CLINIC | Age: 40
End: 2020-07-08
Payer: COMMERCIAL

## 2020-07-08 DIAGNOSIS — F41.9 ANXIETY DISORDER, UNSPECIFIED TYPE: Primary | ICD-10-CM

## 2020-07-08 PROCEDURE — 90834 PSYTX W PT 45 MINUTES: CPT | Mod: TEL | Performed by: PSYCHOLOGIST

## 2020-07-08 ASSESSMENT — PATIENT HEALTH QUESTIONNAIRE - PHQ9
5. POOR APPETITE OR OVEREATING: SEVERAL DAYS
SUM OF ALL RESPONSES TO PHQ QUESTIONS 1-9: 6

## 2020-07-08 ASSESSMENT — ANXIETY QUESTIONNAIRES
2. NOT BEING ABLE TO STOP OR CONTROL WORRYING: NOT AT ALL
IF YOU CHECKED OFF ANY PROBLEMS ON THIS QUESTIONNAIRE, HOW DIFFICULT HAVE THESE PROBLEMS MADE IT FOR YOU TO DO YOUR WORK, TAKE CARE OF THINGS AT HOME, OR GET ALONG WITH OTHER PEOPLE: VERY DIFFICULT
5. BEING SO RESTLESS THAT IT IS HARD TO SIT STILL: NOT AT ALL
3. WORRYING TOO MUCH ABOUT DIFFERENT THINGS: SEVERAL DAYS
7. FEELING AFRAID AS IF SOMETHING AWFUL MIGHT HAPPEN: NOT AT ALL
1. FEELING NERVOUS, ANXIOUS, OR ON EDGE: MORE THAN HALF THE DAYS
GAD7 TOTAL SCORE: 6
6. BECOMING EASILY ANNOYED OR IRRITABLE: MORE THAN HALF THE DAYS

## 2020-07-08 NOTE — PROGRESS NOTES
"                                           Progress Note    Patient Name: Idalia Hinojosa  Date: 7/8/2020         Service Type: Individual      Session Start Time: 1:10 pm  Session End Time: 2:00 pm     Session Length: 50 minutes    Session #: 10    Attendees: Client    Service Modality:  Phone Visit:    The patient has been notified of the following:      \"We have found that certain health care needs can be provided without the need for a face to face visit.  This service lets us provide the care you need with a phone conversation.       I will have full access to your [facility] medical record during this entire phone call.   I will be taking notes for your medical record.      Since this is like an office visit, we will bill your insurance company for this service.       There are potential benefits and risks of telephone visits (e.g. limits to patient confidentiality) that differ from in-person visits.?  Confidentiality still applies for telephone services, and nobody will record the visit.  It is important to be in a quiet, private space that is free of distractions (including cell phone or other devices) during the visit.??      If during the course of the call I believe a telephone visit is not appropriate, you will not be charged for this service\"     Consent has been obtained for this service by care team member: Yes     Reason for Phone Visit: Services only offered telehealth    Originating Site (Patient Location): Patient's home    Distant Site (Provider Location): Provider Remote Setting    As the provider I attest to compliance with applicable laws and regulations related to telemedicine     Treatment Plan Last Reviewed: Developed 1/06/2020, reviewed 4/23/2020, reviewed 7/8/2020  PHQ-9/DAVID-7: 7/8/2020      DATA  Interactive Complexity: No  Crisis: No   Please note that the first ten minutes was a video visit, at which point the video froze.  Patient requested the session be completed via phone versus " attempting another video visit.        Progress Since Last Session (Related to Symptoms / Goals / Homework):   Symptoms: Worsening she reported that she noticed for a few weeks her mood was good, but about a week before her period starts she is noticing a pattern that she becomes more irritable and angry.   She reported that she lost her temper a few times with her .      Homework: Partially completed.  She noted that when she is using the strategies she is learning she finds them to be helpful, but she is having a hard time implementing the strategies on a regular basis (e.g. she will use them for a while, they will be helpful, and then she will stop using them and will notice she starts having difficulties again).        Episode of Care Goals: Satisfactory progress - PREPARATION (Decided to change - considering how); Intervened by negotiating a change plan and determining options / strategies for behavior change, identifying triggers, exploring social supports, and working towards setting a date to begin behavior change.  Diagnostic assessment has been completed and treatment plan has been developed.  The patient stated that her main goals for therapy would be to learn emotion regulation strategies (in particular, to help with when she is feeling upset/angry/frustrated/distressed).  We have begun to work on goals from treatment plan and reviewed her treatment plan today. More recently, the changes in her daily life due to COVID restrictions have been a focus of our work together.       Current / Ongoing Stressors and Concerns:   Current: She reported concerns with implementing strategies/incorporating changes on a regular basis.  She also wants to continue to work on improving her interactions with her daughter (e.g. cited an example where she grabbed a toy from her daughter, rather than asking her daughter to give it to her.  She denied that her daughter was hurt or harmed, and denied worry about harming  "her daughter, but recognizes that there was not a need to grab the toy from her daughter - e.g. her daughter wasn't in harms way).       Ongoing:Managing recent changes in their daily routine with her four year old due to school starting and returning to work, which has contributed to some adjustment and parenting challenges, lack of effective emotion regulation strategies for managing daily frustrations and upsets and marital discord due to differences in parenting styles     Treatment Objective(s) Addressed in This Session:   Gather updated PHQ-9 and DAVID-7 scores   Review of treatment plan, progress in therapy, identify areas of focus,   Psychoeducation on skill development (practicing skills on a regular basis, akin to \"building a muscle\" so it is strong/resilient when needed, etc.)  .     Intervention:   CBT: and solution-focused: We reviewed two patterns the patient has identified 1) her mood worsens about a week before her period - she notices she becomes more angry and irritable, and has more difficulties managing her anger.  She reported that her mood is better and anger is not as intense the other three weeks of the month 2) She notices that she does better when she uses her skills, but also notices that when she starts to feel better she stops using her strategies, and mood tends to worsen).    Commended her for her self-awareness and identifying patterns.  Suggested that for #1) she reach out to DR. Han, who manages her psychiatric medications, to review this information and see if she has any suggestions or recommendations.  #2) We talked about the importance of practicing strategies on a regular basis (e.g. just as a runner would prepare for a race, or just as one does regular practice to strengthen a muscle, etc.).      We reviewed her treatment plan in detail, as well as gathered updated PHQ-9 and DAVID-7 (score of 6 on both).  For now, we will increase the frequency of sessions, to help support " "regular practice of skills and strategies and help address any barriers or challenges.      She will focus on completing a daily \"scan\" of her mood and notice what may be contributing to her feelings.  Also suggested the yocasta \"headspace\" to help with practicing meditations focused on calming.       ASSESSMENT: Current Emotional / Mental Status (status of significant symptoms):   Risk status (Self / Other harm or suicidal ideation)   Patient denies current fears or concerns for personal safety.   Patient denies current or recent suicidal ideation or behaviors.     Patientdenies current or recent homicidal ideation or behaviors.   Patient denies current or recent self injurious behavior or ideation.   Patient denies other safety concerns.   Patient Patient reports there has been no change in risk factors since their last session.     PatientPatient reports there has been no change in protective factors since their last session.     Recommended that patient call 911 or go to the local ED should there be a change in any of these risk factors.  Reviewed with the patient how to contact [facility] crisis number and [facility] crisis/COPE for urgent/crisis concerns 24/7.       Appearance:   Appropriate    Eye Contact:   Good    Psychomotor Behavior: Normal    Attitude:   Cooperative    Orientation:   All   Speech    Rate / Production: Normal     Volume:  Normal    Mood:    less anxious and depressed   Affect:    congruent with mood    Thought Content:  Clear    Thought Form:  Coherent  Logical    Insight:    Good      Medication Review:   No changes to current psychiatric medication(s)       Medication Compliance:   Yes     Changes in Health Issues:   None reported      Chemical Use Review:   Substance Use: Chemical use reviewed, no active concerns identified      Tobacco Use: No current tobacco use.      Diagnosis:  Anxiety disorder unspecified    Collateral Reports Completed:   Routed note to " "Dr. Han,for her input, also suggested to patient that she reach out to Dr. Han as well to review her concerns      PLAN: (Patient Tasks / Therapist Tasks / Other)    1) Complete daily \"scan\" of how you are feeling, what you notice is contributing to how you are feeling.    Consider free yocasta called \"headspace\" to help with practicing meditation (in particular calming meditations) on a regular basis.      Previous skills and strategies to remind self of and to do as needed:  Practice \"STOP\" technique when you recognize yourself feeling angry   Be a  - what do you notice is happening when Kaylee feels upset?     Practice recognizing when feeling angry, and giving self permission to use calming and self-soothing strategies and take a break.   Look for the gray with thinking  slow down  take deep breaths  manage expectations of self and others.    Journal   Practice flexing \"flexiblity and creativity\" -   Continue the great work you are doing with your daily schedule.    Get outside every day.    Play your instrument.    Sing!    Continue with: \"It's just a thought\" - non-judgmental, observe, float down the river on a leaf, clouds in the gege, reframing unhelpful thoughts -   Keep working on being patient when things are tense around me.    Continue with observing/paying attention to warning signs and signals and give self persmission to slow down, especially during the morning routine with Kaylee.    Practice offerring Kaylee choices when appropriate.      Use \"my plan for success\" to help remind you of calming strategies to practice when feeling upset.    Practice decreasing overall vulnerability to emotion mind through getting adequate rest, nutrition, time for yourself, and physical activity.         Consider reading \"Fighting for your marriage\".        2) Follow-up visit is scheduled for two weeks -  July 22nd at 1:00 -  Please call if you need a sooner appointment.  If urgent or crisis concerns arise prior " "to next scheduled appointment, please reach out to crisis resources, call 911, or go to the emergency department.      Cristy Wilkinson PsyD, LP  Licensed Psychologist  7/8/2020                                                 ____________________________________    Treatment Plan    Patient's Name: Idalia Hinojosa  YOB: 1980    Date: 1/6/2020    DSM5 Diagnoses: 300.00 (F41.9) Unspecified Anxiety Disorder  Psychosocial / Contextual Factors: strain in marital relationship, parenting challenges, adjustment challenges  WHODAS: will gather at diagnostic assessment update    Referral / Collaboration:  We discussed a referral to a couples/marital therapist.  The patient is thinking about this and has talked with her  about the referral, but they are unsure if they would like to follow through at this time.      Anticipated number of session or this episode of care: 8-12    MeasurableTreatment Goal(s) related to diagnosis / functional impairment(s)  Goal 1: Patient will learn emotion regulation strategies to help when she is feeling upset/angry/frustrated/distressed    I will know I've met my goal when I would yell less, I would feel calmer, and I would not push others.  I would be less physical when I'm angry (e.g. wouldn't slam doors or hit things)       Objective #A (Patient Action)    Patient will learn and practice at least 2 new emotion regulation strategies.  Status: Continued - Date(s):4/23/2020 (has begun to learn new emotion regulation strategies and is making good progress)   Update: 7/8/2020: \"I've learned new strategies but I'm bad at practicing them\" - will help patient incorporate regular practice and identify and problem-solve barriers.        Intervention(s)  Therapist will provide psychoeducation on emotion regulation module from DBT and will assign patient homework to help reinforce skill development.    Objective #B  Patient will identify factors that increase vulnerability to " "emotion mind and identify ways to decrease vulnerability to emotion mind.  Status: Continued - Date(s):4/23/2020 (has learned factors that make her more vulnerable to emotion mind, and has been working on addressing vulnerability related to feeling hungry, tired and rushed)   Update: 7/8/20: \"This one is harder for me.  I know I get angry when I'm hungry, and when I slow down I'm less inclined to get angry\".  Challenges - not always having food ready, not always planning in advance, (planing breakfast the night before)    Intervention(s)  Therapist will provide information on factors that increase vulnerabiliyt to emotion mind.    Objective #C  Patient will identify warning signs and signals that emotions are escalating and will learn ways to skillfully intervene early on.  Status: New - Date: 01/06/2020 7/8/2020- in progress        Intervention(s)  Therapist will help patient identify warning signs and signals, and will guide the patient in identifying skillful ways to intervene when exeriencing warning signs and signals.      Goal 2: Patient will learn new parenting strategies to help with managing parenting challenges.  Parent will work on improving relationship with her daughter and defining what she would like this relationship to look like.      I will know I've met my goal when I'm enjoying time with my daughter, teaching her new things, and not waiting for things to change      Objective #A (Patient Action)    Status: Continued - Date(s):4/23/2020     Patient will increase understanding of developmental norms for her daughter and ways to manage challenging behaviors.    Intervention(s)  Therapist will provide psychoeducation on developmental norms and parenting strategies.    Objective #B  Patient will spend time reflecting on her relationship with her daughter and defining what she would like this relaitonship to look like.    Status: Continued - Date(s):4/23/2020   Update: 7/8/2020 - \"I think some things " "are going better, she's more helpful with things.  Some things are more challenging with the pandemic and thinking of things to do with her\".        Intervention(s)  Therapist will guide the patient in reflecting upon her relationship with her daughter and help her define ways to move towards what she vaues in her relationship with her daughter.    Objective #C  Patient will increase time spent on fun activity and play with her daughter.  Status: Continued - Date(s):4/23/2020 7/8/2020 -       Intervention(s)  Therapist will guide the patient in identifying ways she can increase positive time with her daughter.  Therapist will also teach mindfulness strategies to help patient with being in the present moment when appropriate - .      Goal 3: Patiient will improve communication with her .      I will know I've met my goal when I feel less irritated with my  and communicate more openly with my .  I would like to be more assertive and less aggressive.   I would like to not avoid telling him things because I'm worried about his reaction or don't think he will react well.  I would like to be more present to the moment during times when this would be helpful.\"      Objective #A (Patient Action)    Status: Continued - Date(s):4/23/2020 7/8/2020 - suggested couples therapy to help with this.    Individual therapy focus - identify what makes it hard to be more honest and open      Patient will learn and practice at least two new interpersonal effectiveness strategies.    Intervention(s)  Therapist will teach strategies from DBT module on interpersonal effectiveness, and will assign homework to help reinforce skill development.      Patient has reviewed and agreed to the above plan.      Cristy Wilkisnon PsyD,   Licensed Psychologist  January 6, 2020, reviewed on 4/23/2020, reviewed 7/8/2020                                       "

## 2020-07-08 NOTE — PROGRESS NOTES
"                                           Progress Note    Patient Name: Idalia Hinojosa  Date: 5/21/2020         Service Type: Individual  Video Visit: Yes, the patient's condition can be safely assessed and treated via synchronous audio and visual telemedicine encounter.      Reason for Video Visit: Services only offered telehealth    Location of Originating Site: Patient's home    Distant Site: Provider Remote Setting     Session Start Time: 1:15 pm  Session End Time:  2:05 pm     Session Length: 50  minutes    Session #: 9    Attendees: Client     Treatment Plan Last Reviewed: Developed 1/06/2020, reviewed 4/23/2020  PHQ-9 / DAVID-7 : 12/17/2019    DATA  Interactive Complexity: No  Crisis: No     Consent:  The patient/guardian has verbally consented to: the potential risks and benefits of telemedicine (video visit) versus in person care; bill my insurance or make self-payment for services provided; and responsibility for payment of non-covered services.     Mode of Communication:  Video Conference via [employer]    As the provider I attest to compliance with applicable laws and regulations related to telemedicine.        Progress Since Last Session (Related to Symptoms / Goals / Homework):   Symptoms: Sustained improvement - she reported that she has gone back to work and \"overall things are pretty good\"         Homework: Achieved / completed to satisfaction.  She reported being successful with practicing the \"STOP\" technique and has found this to be helpful.  She has also been paying attention to what is going on when her daughter Kaylee is becoming upset or frustrated.        Episode of Care Goals: Satisfactory progress - ACTION (Actively working towards change); Intervened by reinforcing change plan / affirming steps taken.  Diagnostic assessment has been completed and treatment plan has been developed.  The patient stated that her main goals for therapy would be to learn emotion regulation strategies (in " "particular, to help with when she is feeling upset/angry/frustrated/distressed).  We have begun to work on goals from treatment plan and the patient is making good initial progress.  More recently, the changes in her daily life due to COVID restrictions, and the impact of this on her, have been a main focus of our work together.       Current / Ongoing Stressors and Concerns:   Current: She reported that her main/biggest challenge is coming up with things for her and her daughter to do during the day in light of COVID restrictions.  She also wished to talk about how she can help her daughter when her daughter is angry/frustrated.     Ongoing:Managing recent changes in their daily routine with her four year old due to school starting and returning to work, which has contributed to some adjustment and parenting challenges, lack of effective emotion regulation strategies for managing daily frustrations and upsets and marital discord due to differences in parenting styles     Treatment Objective(s) Addressed in This Session:   Continue with:   Identify ways to increase and implement structure during the day, as part of developing a \"new normal\" and new routines    Identify ways to calm when feeling frustrated, give self permission to take a break  Identify developmentally appropriate ways to help daughter calm when feeling upset       Intervention:   CBT: and solution-focused: The patient wished to talk about ideas for structuring time with her daughter during the day.  We talked about ways she might approach this (e.g. setting a schedule of structure with main categories, such as art time, physical activity/outside time, chores time, free choice time, reading/learning time, etc.) to help with having basic routines for the day.      She also wished to focus on ways she can help her daughter when her daughter gets upset or frustrated.  Encouraged her to continue to notice what is happening when her daughter is upset, " "look for the \"clues\" around what her daughter might be struggling with to help determine how to help.  One of the themes she notices is her daughter's difficulties with flexibility, so we talked about ways she might help her daughter practice, and reinforce/praise flexibility (e.g. demonstrating in play, with her favorite toys/dolls/animals - being flexible when something doesn't go the way they wanted it to, etc.)    We also talked about the concept of a \"calming corner\", with various kid friendly calming tools (such as a breathing ball, stuffed animal, etc) to reinforce the notion of taking a break and engaging things that help with \"calming\" when feeling upset.      We talked about what can help her remain calm when her daughter is upset - reminding herself to breathe, giving herself permission to take a break, focusing on helping her daughter to learn (versus expecting her daughter shouldn't get upset, etc).       Review of acronym \"STOP\" as an easy tool for her to remember:  S: Slow down  T: Take deep breaths  O: Observe (what do I notice?  What is going on?  What is contributing to how I'm feeling or how others are feeling/reacting?\"  P: Pick what will help me!       ASSESSMENT: Current Emotional / Mental Status (status of significant symptoms):   Risk status (Self / Other harm or suicidal ideation)   Patient denies current fears or concerns for personal safety.   Patient denies current or recent suicidal ideation or behaviors.     Patientdenies current or recent homicidal ideation or behaviors.   Patient denies current or recent self injurious behavior or ideation.   Patient denies other safety concerns.   Patient Patient reports there has been no change in risk factors since their last session.     PatientPatient reports there has been no change in protective factors since their last session.     Recommended that patient call 911 or go to the local ED should there be a change in any of these risk factors.  " "Reviewed with the patient how to contact [facility] crisis number and [facility] crisis/COPE for urgent/crisis concerns 24/7.       Appearance:   Appropriate    Eye Contact:   Good    Psychomotor Behavior: Normal    Attitude:   Cooperative    Orientation:   All   Speech    Rate / Production: Normal     Volume:  Normal    Mood:    less anxious and depressed   Affect:    congruent with mood    Thought Content:  Clear    Thought Form:  Coherent  Logical    Insight:    Good      Medication Review:   No changes to current psychiatric medication(s)       Medication Compliance:   Yes     Changes in Health Issues:   None reported      Chemical Use Review:   Substance Use: Chemical use reviewed, no active concerns identified      Tobacco Use: No current tobacco use.      Diagnosis:  Anxiety disorder unspecified    Collateral Reports Completed:   Not Applicable      PLAN: (Patient Tasks / Therapist Tasks / Other)    1) Talk with Kellee and plan a schedule so you can go running.  Make a calming corner to help Kaylee have access to tools that will help her calm when she's feeling frustrated.  Remind yourself to breathe!      Previous skills and strategies to remind self of and to do as needed:  Practice \"STOP\" technique when you recognize yourself feeling angry   Be a  - what do you notice is happening when Kaylee feels upset?     Practice recognizing when feeling angry, and giving self permission to use calming and self-soothing strategies and take a break.   Look for the gray with thinking  slow down  take deep breaths  manage expectations of self and others.    Journal   Practice flexing \"flexiblity and creativity\" -   Continue the great work you are doing with your daily schedule.    Get outside every day.    Play your instrument.    Sing!    Continue with: \"It's just a thought\" - non-judgmental, observe, float down the river on a leaf, clouds in the gege, reframing unhelpful thoughts -   Keep " "working on being patient when things are tense around me.    Continue with observing/paying attention to warning signs and signals and give self persmission to slow down, especially during the morning routine with Kaylee.    Practice offerring Kaylee choices when appropriate.      Use \"my plan for success\" to help remind you of calming strategies to practice when feeling upset.    Practice decreasing overall vulnerability to emotion mind through getting adequate rest, nutrition, time for yourself, and physical activity.        Consider reading \"Fighting for your marriage\".        2) Follow-up visit is scheduled for four weeks -  June 25th  at 1:00 - video visit.   Please call if you need a sooner appointment.  If urgent or crisis concerns arise prior to next scheduled appointment, please reach out to crisis resources, call 911, or go to the emergency department.      Cristy Wilkinson PsyD, LP  Licensed Psychologist  5/21/2020                                               ____________________________________    Treatment Plan    Patient's Name: Idalia Hinojosa  YOB: 1980    Date: 1/6/2020    DSM5 Diagnoses: 300.00 (F41.9) Unspecified Anxiety Disorder  Psychosocial / Contextual Factors: strain in marital relationship, parenting challenges, adjustment challenges  WHODAS: will gather at diagnostic assessment update    Referral / Collaboration:  We discussed a referral to a couples/marital therapist.  The patient is thinking about this and has talked with her  about the referral, but they are unsure if they would like to follow through at this time.      Anticipated number of session or this episode of care: 8-12    MeasurableTreatment Goal(s) related to diagnosis / functional impairment(s)  Goal 1: Patient will learn emotion regulation strategies to help when she is feeling upset/angry/frustrated/distressed    I will know I've met my goal when I would yell less, I would feel calmer, and I would not push " others.  I would be less physical when I'm angry (e.g. wouldn't slam doors or hit things)       Objective #A (Patient Action)    Patient will learn and practice at least 2 new emotion regulation strategies.  Status: Continued - Date(s):4/23/2020 (has begun to learn new emotion regulation strategies and is making good progress)     Intervention(s)  Therapist will provide psychoeducation on emotion regulation module from DBT and will assign patient homework to help reinforce skill development.    Objective #B  Patient will identify factors that increase vulnerability to emotion mind and identify ways to decrease vulnerability to emotion mind.  Status: Continued - Date(s):4/23/2020 (has learned factors that make her more vulnerable to emotion mind, and has been working on addressing vulnerability related to feeling hungry, tired and rushed)     Intervention(s)  Therapist will provide information on factors that increase vulnerabiliyt to emotion mind.    Objective #C  Patient will identify warning signs and signals that emotions are escalating and will learn ways to skillfully intervene early on.  Status: New - Date: 01/06/2020     Intervention(s)  Therapist will help patient identify warning signs and signals, and will guide the patient in identifying skillful ways to intervene when exeriencing warning signs and signals.      Goal 2: Patient will learn new parenting strategies to help with managing parenting challenges.  Parent will work on improving relationship with her daughter and defining what she would like this relationship to look like.      I will know I've met my goal when I'm enjoying time with my daughter, teaching her new things, and not waiting for things to change      Objective #A (Patient Action)    Status: Continued - Date(s):4/23/2020     Patient will increase understanding of developmental norms for her daughter and ways to manage challenging behaviors.    Intervention(s)  Therapist will provide  "psychoeducation on developmental norms and parenting strategies.    Objective #B  Patient will spend time reflecting on her relationship with her daughter and defining what she would like this relaitonship to look like.    Status: Continued - Date(s):4/23/2020     Intervention(s)  Therapist will guide the patient in reflecting upon her relationship with her daughter and help her define ways to move towards what she vaues in her relationship with her daughter.    Objective #C  Patient will increase time spent on fun activity and play with her daughter.  Status: Continued - Date(s):4/23/2020     Intervention(s)  Therapist will guide the patient in identifying ways she can increase positive time with her daughter.  Therapist will also teach mindfulness strategies to help patient with being in the present moment when appropriate - .      Goal 3: Patiient will improve communication with her .      I will know I've met my goal when I feel less irritated with my  and communicate more openly with my .  I would like to be more assertive and less aggressive.   I would like to not avoid telling him things because I'm worried about his reaction or don't think he will react well.  I would like to be more present to the moment during times when this would be helpful.\"      Objective #A (Patient Action)    Status: Continued - Date(s):4/23/2020     Patient will learn and practice at least two new interpersonal effectiveness strategies.    Intervention(s)  Therapist will teach strategies from DBT module on interpersonal effectiveness, and will assign homework to help reinforce skill development.      Patient has reviewed and agreed to the above plan.      Cristy Wilkinson PsyD, KYLIE  Licensed Psychologist  January 6, 2020, reviewed on 4/23/2020  "

## 2020-07-08 NOTE — Clinical Note
Dorina Han,    I hope this message finds you doing well.    I wanted to be in touch about our patient, Idalia Hinojosa.      She's noticed a pattern where a week before her period her mood becomes more angry and irritable, and that it is more difficult for her to find success with using strategies that she otherwise uses to help with managing her mood.      I wanted to loop you in case you had any thoughts or suggestions that might be beneficial for her-    Brandon,  Cristy Wilkinson PsyD, KYLIE  Licensed Psychologist

## 2020-07-09 ASSESSMENT — ANXIETY QUESTIONNAIRES: GAD7 TOTAL SCORE: 6

## 2020-07-20 ENCOUNTER — VIRTUAL VISIT (OUTPATIENT)
Dept: PSYCHOLOGY | Facility: CLINIC | Age: 40
End: 2020-07-20
Payer: COMMERCIAL

## 2020-07-20 DIAGNOSIS — F41.9 ANXIETY DISORDER, UNSPECIFIED TYPE: Primary | ICD-10-CM

## 2020-07-20 PROCEDURE — 90834 PSYTX W PT 45 MINUTES: CPT | Mod: TEL | Performed by: PSYCHOLOGIST

## 2020-07-20 NOTE — PROGRESS NOTES
"                                           Progress Note    Patient Name: Idalia Hinojosa  Date: 7/20/2020         Service Type: Individual      Session Start Time: 1:10 pm  Session End Time: 2:00 pm     Session Length: 50 minutes    Session #: 11    Attendees: Client    Service Modality:  Phone Visit:    The patient has been notified of the following:      \"We have found that certain health care needs can be provided without the need for a face to face visit.  This service lets us provide the care you need with a phone conversation.       I will have full access to your [place] medical record during this entire phone call.   I will be taking notes for your medical record.      Since this is like an office visit, we will bill your insurance company for this service.       There are potential benefits and risks of telephone visits (e.g. limits to patient confidentiality) that differ from in-person visits.?  Confidentiality still applies for telephone services, and nobody will record the visit.  It is important to be in a quiet, private space that is free of distractions (including cell phone or other devices) during the visit.??      If during the course of the call I believe a telephone visit is not appropriate, you will not be charged for this service\"     Consent has been obtained for this service by care team member: Yes     Reason for Phone Visit: Services only offered telehealth    Originating Site (Patient Location): Patient's home    Distant Site (Provider Location): Provider Remote Setting    As the provider I attest to compliance with applicable laws and regulations related to telemedicine     Treatment Plan Last Reviewed: Developed 1/06/2020, reviewed 4/23/2020, reviewed 7/8/2020  PHQ-9/DAVID-7: 7/8/2020      DATA  Interactive Complexity: No  Crisis: No   Please note that a video visit was attempted, but the patient's screen was frozen again, and she wished to complete the visit via phone rather than " "video.  I have provided her with instructions on how to contact IT support to trouble-shoot video problems for future visits.        Progress Since Last Session (Related to Symptoms / Goals / Homework):   Symptoms: Worsening she reported she has felt increasingly \"stressed out\" and \"overwhelmed\". She reported that stresses related to the pandemic and social unrest have increasingly upset her since her last appointment.      Homework: Achieved / completed to satisfaction.  She reported that she used the meditation yocasta but didn't find it to be particularly helpful.  She reported that she has been tracking her mood on a daily basis also.         Episode of Care Goals: Satisfactory progress - ACTION (Actively working towards change); Intervened by reinforcing change plan / affirming steps taken.  Diagnostic assessment has been completed and treatment plan has been developed.  The patient stated that her main goals for therapy would be to learn emotion regulation strategies (in particular, to help with when she is feeling upset/angry/frustrated/distressed).  We have begun to work on goals from treatment plan and reviewed her treatment plan today. More recently, the changes in her daily life due to COVID restrictions have been a focus of our work together.       Current / Ongoing Stressors and Concerns:   Current:  She has reported feeling increasingly distressed by the pandemic and social unrest.  She noted that she \"spiraled\" over the weekend, (which she attributed to spending more time watching the news, and hearing from friends upsetting information about social unrest in their home towns) but was able to take a step back, recognize that she was getting caught in polarized/all or nothing thinking, and was able to find the \"gray\" and find more balanced and helpful way of thinking about things.         Ongoing:Managing recent changes in their daily routine with her four year old due to school starting and returning to " "work, which has contributed to some adjustment and parenting challenges, lack of effective emotion regulation strategies for managing daily frustrations and upsets and marital discord due to differences in parenting styles     Treatment Objective(s) Addressed in This Session:    Patient will learn and practice at least 2 new emotion regulation strategies.  Patient will identify steps they can take if experiencing a crisis     Intervention:   DBT: Provided psychoeducation on concept of justified versus unjustified guilt.  She noted feeling guilt when  encouraged her to take part in self-care activities that sounded really helpful to her, but then worried that she was being \"selfish\".  Encouraged her to recognize and release unjustified guilt.    We also talked about ways to manage distress related to news/media by limiting exposure, avoiding when not in a good place, and using emotion regulation strategies when feeling upset (practice balanced thinking, focus on what you can control).    We reviewed safety planning.  If safety concerns develop, we reviewed supports she can reach out to who would be helpful to her (she identified her ) as well as crisis/emergency supports she can access.  She agreed to access resources if safety concerns develop.  Information is included in after-visit summary and patient was instructed how to access via MY-CHART.         ASSESSMENT: Current Emotional / Mental Status (status of significant symptoms):   Risk status (Self / Other harm or suicidal ideation)   Patient denies current fears or concerns for personal safety.   Patient denies current or recent suicidal ideation or behaviors.   She reported that when she was feeling upset over the weekend, she had thoughts of \"would it matter if something happened to me\", which she described as thinking about if she got hit by a car, would it matter?  She further described that she had no intention, plan, or thoughts to harm " "herself, but described it as being more of a contemplation of \"would it matter\" if something happened to her.  She reported when she had this thought, she was feeling frustrated by news stories related to some people not wanting to wear masks, and this made her worry that even if she is taking steps to protect herself, others might not be making good choices.  She also worried about the unrest - wanting to do her part - but then feeling overwhelmed.  She stated that she had ultimately talked to her  and was able to recognize that she was thinking in a polarized fashion, and encouraged herself to find the gray, which was that it is important for her to take care of herself and do her part, and that she can play a role in change.  She stated she also recognized that it would matter if something happened to her.  She adamantly denied any current suicidal ideation, intention or plan, and stated she would reach out to her  for help, and use crisis/emergency resources, if safety concerns developed.     Patientdenies current or recent homicidal ideation or behaviors.   Patient denies current or recent self injurious behavior or ideation.   Patient denies other safety concerns.   Patient Patient reports there has been no change in risk factors since their last session.     PatientPatient reports there has been no change in protective factors since their last session.     Recommended that patient call 911 or go to the local ED should there be a change in any of these risk factors.  Reviewed with the patient how to contact [facility] crisis number and [facility] crisis/COPE for urgent/crisis concerns 24/7.       Appearance:   NA - phone call    Eye Contact:   NA- phone call    Psychomotor Behavior: NA- phone call    Attitude:   Cooperative    Orientation:   All   Speech    Rate / Production: Normal     Volume:  Normal    Mood:    Anxious  Depressed    Affect:    congruent with mood "    Thought Content:  Clear    Thought Form:  Coherent  Logical    Insight:    Good      Medication Review:   No changes to current psychiatric medication(s)       Medication Compliance:   Yes     Changes in Health Issues:   None reported      Chemical Use Review:   Substance Use: Chemical use reviewed, no active concerns identified      Tobacco Use: No current tobacco use.      Diagnosis:  Anxiety disorder unspecified    Collateral Reports Completed:   Reviewed with patient Dr. Han's message that OCP may be helpful given the difficulties she is experiencin with her mood one week before her cycle, suggested she reach out to Dr. Han if she would like to discuss further,      PLAN: (Patient Tasks / Therapist Tasks / Other)    1) Technical Assistance for video visits:    Offer patient the website ([email or web address]) and/or phone number ([phone]) for video visit instructions/assistance if needed.    1) Things to focus on between now and my next appointment:    a. Pay attention to and write down how I m feeling each day and what I need for my well-being.  b. Limit exposure to news and media.  If feeling upset by news/media, re-focus my self on the following: Find balanced and hopeful thinking, and focus on what I can control.  c. Recognize  unjustified  guilt and give myself permission to release unjustified guilt.   d. Allow myself to have a good time at the cabin and remind myself that self-care is NOT selfish  e. Consider reaching out to Dr. Han to further discuss her recommendation that OCP may be helpful given the worsening in symptoms you experience the week before your cycle.    f. Next appointment is 7/28 at 2:00 pm.      2) Professionals or agencies I can contact during a crisis:    [facility] Daytime Number: [phone]    Suicide Prevention Lifeline: 1-475-361-TALK (5519)    Crisis Text Line Service (available 24 hours a day, 7 days a week): Text [place] to  "707238    Local Crisis Services: [facility] Crisis Services: [phone]    Call 911 or go to my nearest emergency department.     I also thought you might find this information helpful about the Crisis Text Line Service, and I ve also included a link to Frequently Asked questions.    Is Crisis Text Line Confidential?  Yes. Crisis Counselors only know what texters share with them, and that information stays between you, unless sharing it with emergency services is absolutely necessary for your safety. We take your confidentiality seriously. Check out our Terms of Service here.    https://www.crisistextline.org/about-us/faq/      2) Follow-up visit is scheduled for one week - July 28th at 2:00 pm -  Please call if you need a sooner appointment.  If urgent or crisis concerns arise prior to next scheduled appointment, please reach out to crisis resources, call 911, or go to the emergency department.      Cristy Wilkinson PsyD, LP  Licensed Psychologist  7/20/2020      Previous skills and strategies to remind self of and to do as needed:    Practice \"STOP\" technique when you recognize yourself feeling angry   Be a  - what do you notice is happening when Kaylee feels upset?     Practice recognizing when feeling angry, and giving self permission to use calming and self-soothing strategies and take a break.   Look for the gray with thinking  slow down  take deep breaths  manage expectations of self and others.    Journal   Practice flexing \"flexiblity and creativity\" -   Continue the great work you are doing with your daily schedule.    Get outside every day.    Play your instrument.    Sing!    Continue with: \"It's just a thought\" - non-judgmental, observe, float down the river on a leaf, clouds in the gege, reframing unhelpful thoughts -   Keep working on being patient when things are tense around me.    Continue with observing/paying attention to warning signs and signals and give self persmission to slow down, " "especially during the morning routine with Kaylee.    Practice offerring Kaylee choices when appropriate.      Use \"my plan for success\" to help remind you of calming strategies to practice when feeling upset.    Practice decreasing overall vulnerability to emotion mind through getting adequate rest, nutrition, time for yourself, and physical activity.         Consider reading \"Fighting for your marriage\".                                                   ____________________________________    Treatment Plan    Patient's Name: Idalia Hinojosa  YOB: 1980    Date: 1/6/2020    DSM5 Diagnoses: 300.00 (F41.9) Unspecified Anxiety Disorder  Psychosocial / Contextual Factors: strain in marital relationship, parenting challenges, adjustment challenges  WHODAS: will gather at diagnostic assessment update    Referral / Collaboration:  We discussed a referral to a couples/marital therapist.  The patient is thinking about this and has talked with her  about the referral, but they are unsure if they would like to follow through at this time.      Anticipated number of session or this episode of care: 8-12    MeasurableTreatment Goal(s) related to diagnosis / functional impairment(s)  Goal 1: Patient will learn emotion regulation strategies to help when she is feeling upset/angry/frustrated/distressed    I will know I've met my goal when I would yell less, I would feel calmer, and I would not push others.  I would be less physical when I'm angry (e.g. wouldn't slam doors or hit things)       Objective #A (Patient Action)    Patient will learn and practice at least 2 new emotion regulation strategies.  Status: Continued - Date(s):4/23/2020 (has begun to learn new emotion regulation strategies and is making good progress)   Update: 7/8/2020: \"I've learned new strategies but I'm bad at practicing them\" - will help patient incorporate regular practice and identify and problem-solve barriers.  " "      Intervention(s)  Therapist will provide psychoeducation on emotion regulation module from DBT and will assign patient homework to help reinforce skill development.    Objective #B  Patient will identify factors that increase vulnerability to emotion mind and identify ways to decrease vulnerability to emotion mind.  Status: Continued - Date(s):4/23/2020 (has learned factors that make her more vulnerable to emotion mind, and has been working on addressing vulnerability related to feeling hungry, tired and rushed)   Update: 7/8/20: \"This one is harder for me.  I know I get angry when I'm hungry, and when I slow down I'm less inclined to get angry\".  Challenges - not always having food ready, not always planning in advance, (planing breakfast the night before)    Intervention(s)  Therapist will provide information on factors that increase vulnerabiliyt to emotion mind.    Objective #C  Patient will identify warning signs and signals that emotions are escalating and will learn ways to skillfully intervene early on.  Status: New - Date: 01/06/2020 7/8/2020- in progress        Intervention(s)  Therapist will help patient identify warning signs and signals, and will guide the patient in identifying skillful ways to intervene when exeriencing warning signs and signals.      Goal 2: Patient will learn new parenting strategies to help with managing parenting challenges.  Parent will work on improving relationship with her daughter and defining what she would like this relationship to look like.      I will know I've met my goal when I'm enjoying time with my daughter, teaching her new things, and not waiting for things to change      Objective #A (Patient Action)    Status: Continued - Date(s):4/23/2020     Patient will increase understanding of developmental norms for her daughter and ways to manage challenging behaviors.    Intervention(s)  Therapist will provide psychoeducation on developmental norms and parenting " "strategies.    Objective #B  Patient will spend time reflecting on her relationship with her daughter and defining what she would like this relaitonship to look like.    Status: Continued - Date(s):4/23/2020   Update: 7/8/2020 - \"I think some things are going better, she's more helpful with things.  Some things are more challenging with the pandemic and thinking of things to do with her\".        Intervention(s)  Therapist will guide the patient in reflecting upon her relationship with her daughter and help her define ways to move towards what she vaues in her relationship with her daughter.    Objective #C  Patient will increase time spent on fun activity and play with her daughter.  Status: Continued - Date(s):4/23/2020 7/8/2020 -       Intervention(s)  Therapist will guide the patient in identifying ways she can increase positive time with her daughter.  Therapist will also teach mindfulness strategies to help patient with being in the present moment when appropriate - .      Goal 3: Patiient will improve communication with her .      I will know I've met my goal when I feel less irritated with my  and communicate more openly with my .  I would like to be more assertive and less aggressive.   I would like to not avoid telling him things because I'm worried about his reaction or don't think he will react well.  I would like to be more present to the moment during times when this would be helpful.\"      Objective #A (Patient Action)    Status: Continued - Date(s):4/23/2020 7/8/2020 - suggested couples therapy to help with this.    Individual therapy focus - identify what makes it hard to be more honest and open      Patient will learn and practice at least two new interpersonal effectiveness strategies.    Intervention(s)  Therapist will teach strategies from DBT module on interpersonal effectiveness, and will assign homework to help reinforce skill development.      Patient has reviewed and " agreed to the above plan.      Cristy Wilkinson PsyD, LP  Licensed Psychologist  January 6, 2020, reviewed on 4/23/2020, reviewed 7/8/2020

## 2020-07-20 NOTE — PATIENT INSTRUCTIONS
Dorina Friedman,    Here is a summary of what we talked about today.  Please let me know if you have any questions.  I hope you find your trip to the cabin to be enjoyable and rejuvenating!  Please take good care-      1) Technical Assistance for video visits:    Offer patient the website ([email or web address]) and/or phone number (021-866-4154) for video visit instructions/assistance if needed.    1) Things to focus on between now and my next appointment:    a. Pay attention to and write down how I m feeling each day and what I need for my well-being.  b. Limit exposure to news and media.  If feeling upset by news/media, re-focus my self on the following: Find balanced and hopeful thinking, and focus on what I can control.  c. Recognize  unjustified  guilt and give myself permission to release unjustified guilt.   d. Allow myself to have a good time at the cabin and remind myself that self-care is NOT selfish  e. Consider reaching out to Dr. Han to further discuss her recommendation that OCP may be helpful given the worsening in symptoms you experience the week before your cycle.    f. Next appointment is 7/28 at 2:00 pm.      2) Professionals or agencies I can contact during a crisis:    [facility] Daytime Number: [phone]    Suicide Prevention Lifeline: 4-625-964-TALK (8255)    Crisis Text Line Service (available 24 hours a day, 7 days a week): Text [place] to 266980    Local Crisis Services: [facility] Crisis Services: [phone]    Call 911 or go to my nearest emergency department.     I also thought you might find this information helpful about the Crisis Text Line Service, and I ve also included a link to Frequently Asked questions.    Is Crisis Text Line Confidential?  Yes. Crisis Counselors only know what texters share with them, and that information stays between you, unless sharing it with emergency services is absolutely necessary for your safety. We take your  confidentiality seriously. Check out our Terms of Service here.    https://www.crisistextline.org/about-us/faq/

## 2020-07-28 ENCOUNTER — VIRTUAL VISIT (OUTPATIENT)
Dept: PSYCHOLOGY | Facility: CLINIC | Age: 40
End: 2020-07-28
Payer: COMMERCIAL

## 2020-07-28 DIAGNOSIS — F41.9 ANXIETY DISORDER, UNSPECIFIED TYPE: Primary | ICD-10-CM

## 2020-07-28 PROCEDURE — 90834 PSYTX W PT 45 MINUTES: CPT | Mod: TEL | Performed by: PSYCHOLOGIST

## 2020-07-28 NOTE — PROGRESS NOTES
"                                           Progress Note    Patient Name: Idalia Hinojosa  Date: 7/28/2020         Service Type: Individual      Session Start Time: 2:04 pm  Session End Time: 2:54 pm     Session Length: 50 minutes    Session #: 12    Attendees: Client    Service Modality:  Phone Visit:    The patient has been notified of the following:      \"We have found that certain health care needs can be provided without the need for a face to face visit.  This service lets us provide the care you need with a phone conversation.       I will have full access to your [place] medical record during this entire phone call.   I will be taking notes for your medical record.      Since this is like an office visit, we will bill your insurance company for this service.       There are potential benefits and risks of telephone visits (e.g. limits to patient confidentiality) that differ from in-person visits.?  Confidentiality still applies for telephone services, and nobody will record the visit.  It is important to be in a quiet, private space that is free of distractions (including cell phone or other devices) during the visit.??      If during the course of the call I believe a telephone visit is not appropriate, you will not be charged for this service\"     Consent has been obtained for this service by care team member: Yes     Reason for Phone Visit: Services only offered telehealth    Originating Site (Patient Location): Patient's home    Distant Site (Provider Location): Provider Remote Setting    As the provider I attest to compliance with applicable laws and regulations related to telemedicine     Treatment Plan Last Reviewed: Developed 1/06/2020, reviewed 4/23/2020, reviewed 7/8/2020  PHQ-9/DAVID-7: 7/8/2020      DATA  Interactive Complexity: No  Crisis: No   Please note that a video visit was attempted, and the first few minutes were successful, and then the patient was unable to hear or see this writer, and " "she wished to complete the remainder of the visit via phone rather than trying the video visit again.  I have provided her with instructions on how to contact IT support to trouble-shoot video problems for future visits.        Progress Since Last Session (Related to Symptoms / Goals / Homework):   Symptoms: Improving She reported that her trip to the cabin was \"amazing\" and really helped her.   She was able to use the time to take a break, do things she enjoyed, as well as reflect upon herself and practice meditation strategies from [email or web address] yocasta.  It also provided her with some helpful realizations/insights.       Homework: Achieved / completed to satisfaction.  She reported that she tried using the [email or web address] yocasta again and this time found it helpful.  She gave herself permission to slow down, which was helpful!  She also did assignment on both tracking her mood and paying attention to what she needed.        Episode of Care Goals: Satisfactory progress - ACTION (Actively working towards change); Intervened by reinforcing change plan / affirming steps taken.  Diagnostic assessment has been completed and treatment plan has been developed.  The patient stated that her main goals for therapy would be to learn emotion regulation strategies (in particular, to help with when she is feeling upset/angry/frustrated/distressed).  More recently, the changes in her daily life due to COVID restrictions have been a focus of our work together.       Current / Ongoing Stressors and Concerns:   Current:  She reported her trip to the cabin was a powerful experience for her.  She not only needed a break and was able to take one, but she used the time to reflect about what she needed.         Ongoing:Managing recent changes in their daily routine with her four year old due to school starting and returning to work, which has contributed to some adjustment and parenting challenges, lack of effective emotion regulation strategies for " "managing daily frustrations and upsets and marital discord due to differences in parenting styles     Treatment Objective(s) Addressed in This Session:    Patient will learn and practice at least 2 new emotion regulation strategies.  Patient will identify impact of avoidance on anxiety and ways to decrease avoidant and increase constructive coping     Intervention:   CBT: The patient noted that she gave herself persmission to slow down when at the cabin, and when doing so, she was able to have powerful \"mindful moments\" - wher she was able to really notice, observe and enjoy what she was doing.  She said she is often otherwise focused on an \"outcome\", finishing or completing a task.  We talked about other times in her life it might be helpful for her to slow down and practice mindfulness.      She also noted that time at the cabin and being mindful provided her with a few helpful insights - including the recognition she has been using distraction/avoidance and not allowing herself to pay attention to what is contributing to her anxiety - and was experiencing the short-term benefits of relief but the longer-term costs of lack of resolution of stressors.  As she allowed herself to be present with her feelings and thoughts, and pay attention to what it was telling her, she reflected that she believes she needs to talk more with others about how she is feeling and what she needs.  She also recognized that she had some thoughts and worries that she didn't want to acknowledge/admit, out of worry about what others will think about her.  She was willing to explore these thoughts further and recognized that some of her worries come from the pressures she places on herself to be \"perfect\" and how she can view things in a polarized fashion.  She recognized that when she makes a mistake or has a hard time with something, she tends to think she's not a good mom, etc.and it makes her worry if she made the right decision in " becoming a mom.  This opened the opportunity for her to look for the gray when her thinking becomes polarized.  She also tends to feel guilty when she wants a break, or when she misses things she used to be able to do, like sleeping in, which is another opportunity for her to practice identifying whether her guilt is justified or unjustified.      She noted that they are going back to the cabin next week as a family.  Encouraged her to identify what might be helpful for her at the cabin  - she identified reading - running - time to play her instruments -sleeping in - She said she struggles with being able to ask her  for what she needs or what would be helpful, even though he has encouraged her to practice self-care.  Suggestd she spend some time reflecting upon what she thinks gets in the way of her asking for what she needs and bring to her next session -        ASSESSMENT: Current Emotional / Mental Status (status of significant symptoms):   Risk status (Self / Other harm or suicidal ideation)   Patient denies current fears or concerns for personal safety.   Patient denies current or recent suicidal ideation or behaviors.      Patientdenies current or recent homicidal ideation or behaviors.   Patient denies current or recent self injurious behavior or ideation.   Patient denies other safety concerns.   Patient Patient reports there has been no change in risk factors since their last session.     PatientPatient reports there has been no change in protective factors since their last session.     Recommended that patient call 911 or go to the local ED should there be a change in any of these risk factors.  Reviewed with the patient how to contact [facility] crisis number and [facility] crisis/COPE for urgent/crisis concerns 24/7.  Provided patient with crisis resources and she agrees to use safety plan should any safety concerns arise.      Professionals or agencies I can contact  "during a crisis:    [facility] Daytime Number: [phone]    Suicide Prevention Lifeline: 0-267-654-QDBN (1185)    Crisis Text Line Service (available 24 hours a day, 7 days a week): Text [place] to 054933    [facility] Crisis Services: [facility] Crisis Services: [phone]    Call 911 or go to my nearest emergency department.        Appearance:   NA - phone call    Eye Contact:   NA- phone call    Psychomotor Behavior: NA- phone call    Attitude:   Cooperative    Orientation:   All   Speech    Rate / Production: Normal     Volume:  Normal    Mood:    Anxious  .   Affect:    congruent with mood    Thought Content:  Clear    Thought Form:  Coherent  Logical    Insight:    Good      Medication Review:   No changes to current psychiatric medication(s)       Medication Compliance:   Yes     Changes in Health Issues:   None reported      Chemical Use Review:   Substance Use: Chemical use reviewed, no active concerns identified      Tobacco Use: No current tobacco use.      Diagnosis:  Anxiety disorder unspecified    Collateral Reports Completed:   Not Applicable,      PLAN: (Patient Tasks / Therapist Tasks / Other)    1) Continue to give myself permission to slow down and practice mindful moments!  Continue to practice meditations on [employer] yocasta.  Continue to practice awareness of emotions and thoughts and pay attention to what they may be telling you you need.  Pay attention to what makes it hard to ask  for what you need.          2) Follow-up visit is scheduled for two weeks - August 11th at 2:00 pm -  Please call if you need a sooner appointment.  If urgent or crisis concerns arise prior to next scheduled appointment, please reach out to crisis resources, call 911, or go to the emergency department.      Cristy Wilkinson PsyD, LP  Licensed Psychologist  7/28/2020      Previous skills and strategies to remind self of and to do as needed:    Practice \"STOP\" technique when you recognize yourself " "feeling angry   Be a  - what do you notice is happening when Kaylee feels upset?     Practice recognizing when feeling angry, and giving self permission to use calming and self-soothing strategies and take a break.   Look for the gray with thinking  slow down  take deep breaths  manage expectations of self and others.    Journal   Practice flexing \"flexiblity and creativity\" -   Continue the great work you are doing with your daily schedule.    Get outside every day.    Play your instrument.    Sing!    Continue with: \"It's just a thought\" - non-judgmental, observe, float down the river on a leaf, clouds in the gege, reframing unhelpful thoughts -   Keep working on being patient when things are tense around me.    Continue with observing/paying attention to warning signs and signals and give self persmission to slow down, especially during the morning routine with Kaylee.    Practice offerring Kaylee choices when appropriate.      Use \"my plan for success\" to help remind you of calming strategies to practice when feeling upset.    Practice decreasing overall vulnerability to emotion mind through getting adequate rest, nutrition, time for yourself, and physical activity.         Consider reading \"Fighting for your marriage\".      Technical Assistance for video visits:    Offer patient the website ([email or web address]) and/or phone number ([phone]) for video visit instructions/assistance if needed.    Things to focus on between now and my next appointment:    a. Pay attention to and write down how I m feeling each day and what I need for my well-being.  b. Limit exposure to news and media.  If feeling upset by news/media, re-focus my self on the following: Find balanced and hopeful thinking, and focus on what I can control.  c. Recognize  unjustified  guilt and give myself permission to release unjustified guilt.   d. Allow myself to have a good time at the cabin and remind myself that self-care is NOT " "selfish  e. Consider reaching out to Dr. Han to further discuss her recommendation that OCP may be helpful given the worsening in symptoms you experience the week before your cycle.                                                     ____________________________________    Treatment Plan    Patient's Name: Idalia Hinojosa  YOB: 1980    Date: 1/6/2020    DSM5 Diagnoses: 300.00 (F41.9) Unspecified Anxiety Disorder  Psychosocial / Contextual Factors: strain in marital relationship, parenting challenges, adjustment challenges  WHODAS: will gather at diagnostic assessment update    Referral / Collaboration:  We discussed a referral to a couples/marital therapist.  The patient is thinking about this and has talked with her  about the referral, but they are unsure if they would like to follow through at this time.      Anticipated number of session or this episode of care: 8-12    MeasurableTreatment Goal(s) related to diagnosis / functional impairment(s)  Goal 1: Patient will learn emotion regulation strategies to help when she is feeling upset/angry/frustrated/distressed    I will know I've met my goal when I would yell less, I would feel calmer, and I would not push others.  I would be less physical when I'm angry (e.g. wouldn't slam doors or hit things)       Objective #A (Patient Action)    Patient will learn and practice at least 2 new emotion regulation strategies.  Status: Continued - Date(s):4/23/2020 (has begun to learn new emotion regulation strategies and is making good progress)   Update: 7/8/2020: \"I've learned new strategies but I'm bad at practicing them\" - will help patient incorporate regular practice and identify and problem-solve barriers.        Intervention(s)  Therapist will provide psychoeducation on emotion regulation module from DBT and will assign patient homework to help reinforce skill development.    Objective #B  Patient will identify factors that increase " "vulnerability to emotion mind and identify ways to decrease vulnerability to emotion mind.  Status: Continued - Date(s):4/23/2020 (has learned factors that make her more vulnerable to emotion mind, and has been working on addressing vulnerability related to feeling hungry, tired and rushed)   Update: 7/8/20: \"This one is harder for me.  I know I get angry when I'm hungry, and when I slow down I'm less inclined to get angry\".  Challenges - not always having food ready, not always planning in advance, (planing breakfast the night before)    Intervention(s)  Therapist will provide information on factors that increase vulnerabiliyt to emotion mind.    Objective #C  Patient will identify warning signs and signals that emotions are escalating and will learn ways to skillfully intervene early on.  Status: New - Date: 01/06/2020 7/8/2020- in progress        Intervention(s)  Therapist will help patient identify warning signs and signals, and will guide the patient in identifying skillful ways to intervene when exeriencing warning signs and signals.      Goal 2: Patient will learn new parenting strategies to help with managing parenting challenges.  Parent will work on improving relationship with her daughter and defining what she would like this relationship to look like.      I will know I've met my goal when I'm enjoying time with my daughter, teaching her new things, and not waiting for things to change      Objective #A (Patient Action)    Status: Continued - Date(s):4/23/2020     Patient will increase understanding of developmental norms for her daughter and ways to manage challenging behaviors.    Intervention(s)  Therapist will provide psychoeducation on developmental norms and parenting strategies.    Objective #B  Patient will spend time reflecting on her relationship with her daughter and defining what she would like this relaitonship to look like.    Status: Continued - Date(s):4/23/2020   Update: 7/8/2020 - \"I " "think some things are going better, she's more helpful with things.  Some things are more challenging with the pandemic and thinking of things to do with her\".        Intervention(s)  Therapist will guide the patient in reflecting upon her relationship with her daughter and help her define ways to move towards what she vaues in her relationship with her daughter.    Objective #C  Patient will increase time spent on fun activity and play with her daughter.  Status: Continued - Date(s):4/23/2020 7/8/2020 -       Intervention(s)  Therapist will guide the patient in identifying ways she can increase positive time with her daughter.  Therapist will also teach mindfulness strategies to help patient with being in the present moment when appropriate - .      Goal 3: Patiient will improve communication with her .      I will know I've met my goal when I feel less irritated with my  and communicate more openly with my .  I would like to be more assertive and less aggressive.   I would like to not avoid telling him things because I'm worried about his reaction or don't think he will react well.  I would like to be more present to the moment during times when this would be helpful.\"      Objective #A (Patient Action)    Status: Continued - Date(s):4/23/2020 7/8/2020 - suggested couples therapy to help with this.    Individual therapy focus - identify what makes it hard to be more honest and open      Patient will learn and practice at least two new interpersonal effectiveness strategies.    Intervention(s)  Therapist will teach strategies from DBT module on interpersonal effectiveness, and will assign homework to help reinforce skill development.      Patient has reviewed and agreed to the above plan.      Cristy Wilkinson PsyD,   Licensed Psychologist  January 6, 2020, reviewed on 4/23/2020, reviewed 7/8/2020                                   "

## 2020-08-11 ENCOUNTER — VIRTUAL VISIT (OUTPATIENT)
Dept: PSYCHOLOGY | Facility: CLINIC | Age: 40
End: 2020-08-11
Payer: COMMERCIAL

## 2020-08-11 DIAGNOSIS — F41.9 ANXIETY DISORDER, UNSPECIFIED TYPE: Primary | ICD-10-CM

## 2020-08-11 PROCEDURE — 90834 PSYTX W PT 45 MINUTES: CPT | Mod: TEL | Performed by: PSYCHOLOGIST

## 2020-08-11 NOTE — PROGRESS NOTES
"                                           Progress Note    Patient Name: Idalia Hinojosa  Date: 8/11/2020         Service Type: Individual      Session Start Time: 2:04 pm  Session End Time: 2:54 pm     Session Length: 50 minutes    Session #: 12    Attendees: Client    Service Modality:  Phone Visit:    The patient has been notified of the following:      \"We have found that certain health care needs can be provided without the need for a face to face visit.  This service lets us provide the care you need with a phone conversation.       I will have full access to your [place] medical record during this entire phone call.   I will be taking notes for your medical record.      Since this is like an office visit, we will bill your insurance company for this service.       There are potential benefits and risks of telephone visits (e.g. limits to patient confidentiality) that differ from in-person visits.?  Confidentiality still applies for telephone services, and nobody will record the visit.  It is important to be in a quiet, private space that is free of distractions (including cell phone or other devices) during the visit.??      If during the course of the call I believe a telephone visit is not appropriate, you will not be charged for this service\"     Consent has been obtained for this service by care team member: Yes     Reason for Phone Visit: Services only offered telehealth    Originating Site (Patient Location): Patient's home    Distant Site (Provider Location): Provider Remote Setting    As the provider I attest to compliance with applicable laws and regulations related to telemedicine     Treatment Plan Last Reviewed: Developed 1/06/2020, reviewed 4/23/2020, reviewed 7/8/2020  PHQ-9/DAVID-7: 7/8/2020      DATA  Interactive Complexity: No  Crisis: No   Please note that a video visit was attempted again, and the first 15 minutes were successful, and then the patient was unable to hear or see this " "writer, and she wished to complete the remainder of the visit via phone rather than trying the video visit again.  I have provided her with instructions on how to contact IT support to trouble-shoot video problems for future visits, but she states she prefers phone visits.        Progress Since Last Session (Related to Symptoms / Goals / Homework):   Symptoms: Improving She reported that her mood is \"mostly better\".  She stated she had a few \"sad\" days, but most were better.  They went to a cabin as a family and she stated this was really good for her.         Homework: Achieved / completed to satisfaction.  She reported that she continues to use the [employer] yocasta and finds this to be very helpful.  She continues to practice mindfulness strategies to help with staying present to the moment, to counteract worrying about future unknowns, jumping to conclusions, or predicting the worst outcomes.         Episode of Care Goals: Satisfactory progress - ACTION (Actively working towards change); Intervened by reinforcing change plan / affirming steps taken.  Diagnostic assessment has been completed and treatment plan has been developed.  The patient stated that her main goals for therapy would be to learn emotion regulation strategies (in particular, to help with when she is feeling upset/angry/frustrated/distressed).  More recently, the changes in her daily life due to COVID restrictions have been a focus of our work together.       Current / Ongoing Stressors and Concerns:   Current:  She reported that a week at the cabin with her family was really helpful for her.  She stated she's been reflecting more upon the pandemic, loss she has felt, connection she has missed and the challenges of things being monotonous.  Due to childcare needs, she and her  have also decided she won't be returning to work.  She wished to focus today on \"how to keep living day by day\" and not getting caught up in " "anxious/futuristic/worrysome thinking.         Ongoing:Managing daily routine which has contributed to some adjustment and parenting challenges, lack of effective emotion regulation strategies for managing daily frustrations and upsets and marital discord due to differences in parenting styles.       Treatment Objective(s) Addressed in This Session:    Patient will learn and practice at least 2 new mindfulness strategies.   Patient will learn importance of practicing mindfulness strategies on a regular basis to help \"build mindfulness muscles\"  Continue with:  Patient will identify impact of avoidance on anxiety and ways to decrease avoidant and increase constructive coping     Intervention:   DBT: Today's session focused on mindfulness.  She noticed that her practice of mindfulness thus far has helped her to be more aware to physical sensations, and we talked about how helpful this is to recognize what she notices in her body and what she needs (e.g. muscles tense - engage a calming strategy or deep breathing, hunger - focus on healthy eating, etc.).  She notes an obstacle that surfaces for her when she is trying to be in the moment, such as spending time with others, she thinks \"I should be doing something else\".   Reinforced evaluating the thoughts and re-direct unhelpful thinking.        We talked about the following process to help with practicing mindfulness: 1) Recognize and label when mind is worrying about the future (she identified she will call this \"futuring\").  2) Re-direct back to the present moment (\"This is now\", \"Be Here Now\").    We also talked about the loss of connection she feels due to the pandemic, and ways to nurture connection with others during a pandemic.  She set goals related to reaching out to friends and setting a time to talk with them by phone.         ASSESSMENT: Current Emotional / Mental Status (status of significant symptoms):   Risk status (Self / Other harm or suicidal " "ideation)   Patient denies current fears or concerns for personal safety.   Patient denies current or recent suicidal ideation or behaviors.      Patientdenies current or recent homicidal ideation or behaviors.   Patient denies current or recent self injurious behavior or ideation.   Patient denies other safety concerns.   Patient Patient reports there has been no change in risk factors since their last session.     PatientPatient reports there has been no change in protective factors since their last session.     Recommended that patient call 911 or go to the local ED should there be a change in any of these risk factors.  Reviewed with the patient how to contact [facility] crisis number and [facility] crisis/COPE for urgent/crisis concerns 24/7.  Provided patient with crisis resources and she agrees to use safety plan should any safety concerns arise.      Professionals or agencies I can contact during a crisis:    [facility] Daytime Number: [phone]    Suicide Prevention Lifeline: 6-136-104-TALK (8255)    Crisis Text Line Service (available 24 hours a day, 7 days a week): Text [place] to 883357    Local Crisis Services: [facility] Crisis Services: [phone]    Call 911 or go to my nearest emergency department.        Appearance:   NA - phone call    Eye Contact:   NA- phone call    Psychomotor Behavior: NA- phone call    Attitude:   Cooperative    Orientation:   All   Speech    Rate / Production: Normal     Volume:  Normal    Mood:    She reports her mood is \"mostly better\", some days when feeling sad, other days mood was better.   Affect:    congruent with mood    Thought Content:  Clear    Thought Form:  Coherent  Logical    Insight:    Good      Medication Review:   No changes to current psychiatric medication(s)       Medication Compliance:   Yes     Changes in Health Issues:   None reported   She reported she has an upcoming appointment with Dr. Han " "to discuss concerns she has about her mood worsening before her cycle.       Chemical Use Review:   Substance Use: Chemical use reviewed, no active concerns identified      Tobacco Use: No current tobacco use.      Diagnosis:  Anxiety disorder unspecified    Collateral Reports Completed:   Not Applicable,      PLAN: (Patient Tasks / Therapist Tasks / Other)    1) Try to notice when my mind is \"futuring\".  Send a message to friends who live elsewhere - (good friend in high school in [place], orchestra friend in [place]) set up a time to talk to friend in [place], send mayte friend a message before the next time we talk -      Continue to give myself permission to slow down and practice mindful moments!  Continue to practice meditations on [email or web address] yocasta.  Continue to practice awareness of emotions and thoughts and pay attention to what they may be telling you you need.  Pay attention to what makes it hard to ask  for what you need.          2) Follow-up visit is scheduled for two weeks - August 26th at 1:00 pm- appointment will be phone appointment -  Please call if you need a sooner appointment.  If urgent or crisis concerns arise prior to next scheduled appointment, please reach out to crisis resources, call 911, or go to the emergency department.      Cristy Wilkinson PsyD, LP  Licensed Psychologist  8/11/2020        Previous skills and strategies to remind self of and to do as needed:    Practice \"STOP\" technique when you recognize yourself feeling angry   Be a  - what do you notice is happening when Kaylee feels upset?     Practice recognizing when feeling angry, and giving self permission to use calming and self-soothing strategies and take a break.   Look for the gray with thinking  slow down  take deep breaths  manage expectations of self and others.    Journal   Practice flexing \"flexiblity and creativity\" -   Continue the great work you are doing with your daily schedule.    Get outside every " "day.    Play your instrument.    Sing!    Continue with: \"It's just a thought\" - non-judgmental, observe, float down the river on a leaf, clouds in the gege, reframing unhelpful thoughts -   Keep working on being patient when things are tense around me.    Continue with observing/paying attention to warning signs and signals and give self persmission to slow down, especially during the morning routine with Kaylee.    Practice offerring Kaylee choices when appropriate.      Use \"my plan for success\" to help remind you of calming strategies to practice when feeling upset.    Practice decreasing overall vulnerability to emotion mind through getting adequate rest, nutrition, time for yourself, and physical activity.         Consider reading \"Fighting for your marriage\".      Technical Assistance for video visits:    Offer patient the website ([email or web address]) and/or phone number ([phone]) for video visit instructions/assistance if needed.    Things to focus on between now and my next appointment:    a. Pay attention to and write down how I m feeling each day and what I need for my well-being.  b. Limit exposure to news and media.  If feeling upset by news/media, re-focus my self on the following: Find balanced and hopeful thinking, and focus on what I can control.  c. Recognize  unjustified  guilt and give myself permission to release unjustified guilt.   d. Allow myself to have a good time at the cabin and remind myself that self-care is NOT selfish  e. Consider reaching out to Dr. Han to further discuss her recommendation that OCP may be helpful given the worsening in symptoms you experience the week before your cycle.                                                     ____________________________________    Treatment Plan    Patient's Name: Idalia Hinojosa  YOB: 1980    Date: 1/6/2020    DSM5 Diagnoses: 300.00 (F41.9) Unspecified Anxiety Disorder  Psychosocial / Contextual Factors: " "strain in marital relationship, parenting challenges, adjustment challenges  WHODAS: will gather at diagnostic assessment update    Referral / Collaboration:  We discussed a referral to a couples/marital therapist.  The patient is thinking about this and has talked with her  about the referral, but they are unsure if they would like to follow through at this time.      Anticipated number of session or this episode of care: 8-12    MeasurableTreatment Goal(s) related to diagnosis / functional impairment(s)  Goal 1: Patient will learn emotion regulation strategies to help when she is feeling upset/angry/frustrated/distressed    I will know I've met my goal when I would yell less, I would feel calmer, and I would not push others.  I would be less physical when I'm angry (e.g. wouldn't slam doors or hit things)       Objective #A (Patient Action)    Patient will learn and practice at least 2 new emotion regulation strategies.  Status: Continued - Date(s):4/23/2020 (has begun to learn new emotion regulation strategies and is making good progress)   Update: 7/8/2020: \"I've learned new strategies but I'm bad at practicing them\" - will help patient incorporate regular practice and identify and problem-solve barriers.        Intervention(s)  Therapist will provide psychoeducation on emotion regulation module from DBT and will assign patient homework to help reinforce skill development.    Objective #B  Patient will identify factors that increase vulnerability to emotion mind and identify ways to decrease vulnerability to emotion mind.  Status: Continued - Date(s):4/23/2020 (has learned factors that make her more vulnerable to emotion mind, and has been working on addressing vulnerability related to feeling hungry, tired and rushed)   Update: 7/8/20: \"This one is harder for me.  I know I get angry when I'm hungry, and when I slow down I'm less inclined to get angry\".  Challenges - not always having food ready, not " "always planning in advance, (planing breakfast the night before)    Intervention(s)  Therapist will provide information on factors that increase vulnerabiliyt to emotion mind.    Objective #C  Patient will identify warning signs and signals that emotions are escalating and will learn ways to skillfully intervene early on.  Status: New - Date: 01/06/2020 7/8/2020- in progress        Intervention(s)  Therapist will help patient identify warning signs and signals, and will guide the patient in identifying skillful ways to intervene when exeriencing warning signs and signals.      Goal 2: Patient will learn new parenting strategies to help with managing parenting challenges.  Parent will work on improving relationship with her daughter and defining what she would like this relationship to look like.      I will know I've met my goal when I'm enjoying time with my daughter, teaching her new things, and not waiting for things to change      Objective #A (Patient Action)    Status: Continued - Date(s):4/23/2020     Patient will increase understanding of developmental norms for her daughter and ways to manage challenging behaviors.    Intervention(s)  Therapist will provide psychoeducation on developmental norms and parenting strategies.    Objective #B  Patient will spend time reflecting on her relationship with her daughter and defining what she would like this relaitonship to look like.    Status: Continued - Date(s):4/23/2020   Update: 7/8/2020 - \"I think some things are going better, she's more helpful with things.  Some things are more challenging with the pandemic and thinking of things to do with her\".        Intervention(s)  Therapist will guide the patient in reflecting upon her relationship with her daughter and help her define ways to move towards what she vaues in her relationship with her daughter.    Objective #C  Patient will increase time spent on fun activity and play with her daughter.  Status: " "Continued - Date(s):4/23/2020 7/8/2020 -       Intervention(s)  Therapist will guide the patient in identifying ways she can increase positive time with her daughter.  Therapist will also teach mindfulness strategies to help patient with being in the present moment when appropriate - .      Goal 3: Patiient will improve communication with her .      I will know I've met my goal when I feel less irritated with my  and communicate more openly with my .  I would like to be more assertive and less aggressive.   I would like to not avoid telling him things because I'm worried about his reaction or don't think he will react well.  I would like to be more present to the moment during times when this would be helpful.\"      Objective #A (Patient Action)    Status: Continued - Date(s):4/23/2020 7/8/2020 - suggested couples therapy to help with this.    Individual therapy focus - identify what makes it hard to be more honest and open      Patient will learn and practice at least two new interpersonal effectiveness strategies.    Intervention(s)  Therapist will teach strategies from DBT module on interpersonal effectiveness, and will assign homework to help reinforce skill development.      Patient has reviewed and agreed to the above plan.      Cristy Wilkinson PsyD, LP  Licensed Psychologist  January 6, 2020, reviewed on 4/23/2020, reviewed 7/8/2020                                   "

## 2020-08-20 ENCOUNTER — VIRTUAL VISIT (OUTPATIENT)
Dept: OBGYN | Facility: CLINIC | Age: 40
End: 2020-08-20
Payer: COMMERCIAL

## 2020-08-20 DIAGNOSIS — F32.81 PMDD (PREMENSTRUAL DYSPHORIC DISORDER): Primary | ICD-10-CM

## 2020-08-20 PROCEDURE — 99214 OFFICE O/P EST MOD 30 MIN: CPT | Mod: GT | Performed by: OBSTETRICS & GYNECOLOGY

## 2020-08-20 RX ORDER — NORETHINDRONE ACETATE AND ETHINYL ESTRADIOL .02; 1 MG/1; MG/1
1 TABLET ORAL DAILY
Qty: 84 TABLET | Refills: 4 | Status: SHIPPED | OUTPATIENT
Start: 2020-08-20 | End: 2021-01-11

## 2020-08-20 RX ORDER — MONTELUKAST SODIUM 10 MG/1
10 TABLET ORAL AT BEDTIME
COMMUNITY
End: 2021-08-17

## 2020-08-20 RX ORDER — MOMETASONE FUROATE MONOHYDRATE 50 UG/1
2 SPRAY, METERED NASAL DAILY
COMMUNITY
End: 2021-08-17

## 2020-08-20 NOTE — PROGRESS NOTES
"Idalia Hinojosa is a 40 year old female who is being evaluated via a billable video visit.      The patient has been notified of following:     \"This video visit will be conducted via a call between you and your physician/provider. We have found that certain health care needs can be provided without the need for an in-person physical exam.  This service lets us provide the care you need with a video conversation.  If a prescription is necessary we can send it directly to your pharmacy.  If lab work is needed we can place an order for that and you can then stop by our lab to have the test done at a later time.    Video visits are billed at different rates depending on your insurance coverage.  Please reach out to your insurance provider with any questions.    If during the course of the call the physician/provider feels a video visit is not appropriate, you will not be charged for this service.\"    Patient has given verbal consent for Video visit? Yes  How would you like to obtain your AVS? MyChart  If you are dropped from the video visit, the video invite should be resent to: Text to cell phone: [phone]  Will anyone else be joining your video visit? No      Video-Visit Details    Type of service:  Video Visit    Video Start Time: 11:43 AM  Video End Time: 11:57 AM    Originating Location (pt. Location): Home    Distant Location (provider location):  [facility]     Platform used for Video Visit: Doximity     Patient states she has been talking to her therapist about her mood changing with her menses.  Overall mood is very stable but week prior to her menses she notes becoming a lot more angry and irritable.  Cannot seem to control her temper.  Within 1 to 2 days of getting her menses, those feelings go away and her mood is totally fine.  Has been very stable on 50 mg of Zoloft.    Has been using condoms for birth control.    (F32.81) PMDD (premenstrual dysphoric disorder)  (primary encounter " diagnosis)  Comment: Irritability prior to cycles  Plan: norethindrone-ethinyl estradiol (MICROGESTIN         1/20) 1-20 MG-MCG tablet        Discussed best way to suppress ovulation is with birth control pill.  She had heard a lot about birth control pills and bad side effects in college but has never taken them herself.  Discussed how the birth control works and expected side effects.  She is willing to try this with her next menses and reviewed if it does suppress ovulation, she should notice a difference with that cycle.    We will do a low dose to minimize side effects and discussed may need to increase if has a lot of breakthrough bleeding.  Discussed monophasic OCP and that we can do pill stacking after we find appropriate birth control pill.  Questions were answered and she will contact me if having any issues.    CHRIS ODEN MD

## 2020-08-20 NOTE — PATIENT INSTRUCTIONS
"1) Try to notice when my mind is \"futuring\".  Practice the two step process we talked about to help with this -        1) Recognize and label when mind is worrying about the future (\"futuring\").     2) Re-direct back to the present moment (\"This is now\", \"Be Here Now\")       2) Send a message to friends who live elsewhere - (good friend in high school in [place], orchestra friend in [place]) set up a time to talk to friend in [place], send mayte friend a message before the next time we talk -      3) Continue to give myself permission to slow down and practice mindful moments!  Continue to practice meditations on [employer] yocasta.  Continue to practice awareness of emotions and thoughts and pay attention to what they may be telling you you need.  Pay attention to what makes it hard to ask  for what you need.          4) Follow-up visit is scheduled for two weeks - August 26th at 1:00 pm- appointment will be phone appointment -  Please call if you need a sooner appointment.  If urgent or crisis concerns arise prior to next scheduled appointment, please reach out to crisis resources, call 911, or go to the emergency department.       Professionals or agencies I can contact during a crisis:    [facility] Daytime Number: [phone]    Suicide Prevention Lifeline: 7-982-561-YKEQ (8034)    Crisis Text Line Service (available 24 hours a day, 7 days a week): Text [place] to 052229    Local Crisis Services: [facility] Crisis Services: [phone]    Call 911 or go to my nearest emergency department.           "

## 2020-08-27 ENCOUNTER — VIRTUAL VISIT (OUTPATIENT)
Dept: PSYCHOLOGY | Facility: CLINIC | Age: 40
End: 2020-08-27
Payer: COMMERCIAL

## 2020-08-27 DIAGNOSIS — F41.9 ANXIETY DISORDER, UNSPECIFIED TYPE: Primary | ICD-10-CM

## 2020-08-27 PROCEDURE — 90837 PSYTX W PT 60 MINUTES: CPT | Mod: TEL | Performed by: PSYCHOLOGIST

## 2020-08-27 NOTE — PROGRESS NOTES
"                                              Progress Note    Patient Name: Idalia Hinojosa  Date: 8/27/2020         Service Type: Individual      Session Start Time: 1:05  pm  Session End Time: 2:00 pm     Session Length: 50 minutes    Session #: 13    Attendees: Client    Service Modality:  Phone Visit:    The patient has been notified of the following:      \"We have found that certain health care needs can be provided without the need for a face to face visit.  This service lets us provide the care you need with a phone conversation.       I will have full access to your [place] medical record during this entire phone call.   I will be taking notes for your medical record.      Since this is like an office visit, we will bill your insurance company for this service.       There are potential benefits and risks of telephone visits (e.g. limits to patient confidentiality) that differ from in-person visits.?  Confidentiality still applies for telephone services, and nobody will record the visit.  It is important to be in a quiet, private space that is free of distractions (including cell phone or other devices) during the visit.??      If during the course of the call I believe a telephone visit is not appropriate, you will not be charged for this service\"     Consent has been obtained for this service by care team member: Yes     Reason for Phone Visit: Services only offered telehealth    Originating Site (Patient Location): Patient's home    Distant Site (Provider Location): Provider Remote Setting    As the provider I attest to compliance with applicable laws and regulations related to telemedicine     Treatment Plan Last Reviewed: Developed 1/06/2020, reviewed 4/23/2020, reviewed 7/8/2020  PHQ-9/DAVID-7: 7/8/2020      DATA  Interactive Complexity: No  Crisis: No        Progress Since Last Session (Related to Symptoms / Goals / Homework):   Symptoms: Worsening she reported she's been feeling more anxious.  She " "also reported feeling urges to go out and do something \"daring\", which she described as taking on a big project like building a garage.  Further assessed mood and symptoms - she denied symptoms of colten and hypomania, including decreased need for sleep, hyperverbal, racing thoughts, grandiose thinking, engaging in risk-taking behaviors, elevated and expansive mood.  She does endorse some feelings of irritability and anger.  She notes that urges to do something \"daring\" or desire to take on big projects stem from feelings of boredom due to COVID, monotony due to same routines every day and inability to do a lot of the things she enjoys doing outside of the house.  She explained she feels bored with the things she used to consider fun, mostly because she has already been doing them so much (e.g. puzzles, reading, etc.)    Homework: Achieved / completed to satisfaction.  She was successful with reaching out to both friends she had planned to.  One friend responded, the other friend did not.        Episode of Care Goals: Satisfactory progress - ACTION (Actively working towards change); Intervened by reinforcing change plan / affirming steps taken.  Diagnostic assessment has been completed and treatment plan has been developed.  The patient stated that her main goals for therapy would be to learn emotion regulation strategies (in particular, to help with when she is feeling upset/angry/frustrated/distressed).  More recently, the changes in her daily life due to COVID restrictions have been a focus of our work together.       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  She also noted she feels more impatient, anxious and \"bored\" from the monotony of daily routines during COVID.        Ongoing:Managing daily routine which has contributed to some adjustment and " "parenting challenges, lack of effective emotion regulation strategies for managing daily frustrations and upsets and marital discord due to differences in parenting styles.       Treatment Objective(s) Addressed in This Session:    Patient will learn and practice at least 2 new mindfulness strategies.   Patient will learn importance of practicing mindfulness strategies on a regular basis to help \"build mindfulness muscles\"  Continue with:  Patient will identify strategies to help her honor and work through feelings of grief and loss   Patient will identify constructive ways to respond to urges     Intervention:   CBT: Today's session focused on helping patient identify ways to recognize when she is experiencing urges and respond constructively.   1) Recognize the urge 2) Label the feeling 3) Pause, take deep breaths 4) Engage rational thinking/problem-solving on how to respond to the emotional need.    We also talked about doing an assessment of her needs - physical, emotional, interpersonal/social, spiritual - to identify areas that need attention.  IN particular, she talked about the isolation she feels and we continue to discuss ways to nurture connection with others during a pandemic.  She set additional goals related to reaching out to others -     Normalized and validated the sense of loss she feels about her job and loss of music, and talked about ways for her to recognize, honor, and work through/move through feelings of grief and loss -        ASSESSMENT: Current Emotional / Mental Status (status of significant symptoms):   Risk status (Self / Other harm or suicidal ideation)   Patient denies current fears or concerns for personal safety.   Patient denies current or recent suicidal ideation or behaviors.      Patientdenies current or recent homicidal ideation or behaviors.   Patient denies current or recent self injurious behavior or ideation.   Patient denies other safety concerns.   Patient Patient " reports there has been no change in risk factors since their last session.     PatientPatient reports there has been no change in protective factors since their last session.     Recommended that patient call 911 or go to the local ED should there be a change in any of these risk factors.  Reviewed with the patient how to contact [facility] crisis number and [facility] crisis/COPE for urgent/crisis concerns 24/7.  Provided patient with crisis resources and she agrees to use safety plan should any safety concerns arise.      Professionals or agencies I can contact during a crisis:    [facility] Daytime Number: [phone]    Suicide Prevention Lifeline: 2-067-280-TALK (9983)    Crisis Text Line Service (available 24 hours a day, 7 days a week): Text [place] to 049415    [facility] Crisis Services: [facility] Crisis Services: [phone]    Call 911 or go to my nearest emergency department.        Appearance:   NA - phone call    Eye Contact:   NA- phone call    Psychomotor Behavior: NA- phone call    Attitude:   Cooperative    Orientation:   All   Speech    Rate / Production: Normal     Volume:  Normal    Mood:    Anxious  and impatient, sad due to loss   Affect:    congruent with mood    Thought Content:  Clear    Thought Form:  Coherent  Logical    Insight:    Good      Medication Review:   No changes to current psychiatric medication(s)    OB/GYN, Dr. Han, started her on OCP to help with PMDD.        Medication Compliance:   Yes     Changes in Health Issues:   None reported        Chemical Use Review:   Substance Use: Chemical use reviewed, no active concerns identified      Tobacco Use: No current tobacco use.      Diagnosis:  Anxiety disorder unspecified    Collateral Reports Completed:   Not Applicable,      PLAN: (Patient Tasks / Therapist Tasks / Other)    1) Focus on one day at a time.  Invite brother and/or neighbor over for a gathering.      Continue with:  "Try to notice when my mind is \"futuring\".  Continue to give myself permission to slow down and practice mindful moments!  Continue to practice meditations on headspace yocasta.  Continue to practice awareness of emotions and thoughts and pay attention to what they may be telling you you need.  Pay attention to what makes it hard to ask  for what you need.          2) Follow-up visit is scheduled for 9/15/2020  at 2:30 pm- patient would like to try a video visit again, with a text invitation instead of entering via my-chart-  Please call if you need a sooner appointment.  If urgent or crisis concerns arise prior to next scheduled appointment, please reach out to crisis resources, call 911, or go to the emergency department.      Cristy Wilkinson PsyD,   Licensed Psychologist  8/27/2020          Previous skills and strategies to remind self of and to do as needed:    Practice \"STOP\" technique when you recognize yourself feeling angry   Be a  - what do you notice is happening when Kaylee feels upset?     Practice recognizing when feeling angry, and giving self permission to use calming and self-soothing strategies and take a break.   Look for the gray with thinking  slow down  take deep breaths  manage expectations of self and others.    Journal   Practice flexing \"flexiblity and creativity\" -   Continue the great work you are doing with your daily schedule.    Get outside every day.    Play your instrument.    Sing!    Continue with: \"It's just a thought\" - non-judgmental, observe, float down the river on a leaf, clouds in the gege, reframing unhelpful thoughts -   Keep working on being patient when things are tense around me.    Continue with observing/paying attention to warning signs and signals and give self persmission to slow down, especially during the morning routine with Kaylee.    Practice offerring Kaylee choices when appropriate.      Use \"my plan for success\" to help remind you of calming strategies to " "practice when feeling upset.    Practice decreasing overall vulnerability to emotion mind through getting adequate rest, nutrition, time for yourself, and physical activity.         Consider reading \"Fighting for your marriage\".      Technical Assistance for video visits:    Offer patient the website ([email or web address]) and/or phone number ([phone]) for video visit instructions/assistance if needed.                                             ____________________________________    Treatment Plan    Patient's Name: Idalia Hinojosa  YOB: 1980    Date: 1/6/2020    DSM5 Diagnoses: 300.00 (F41.9) Unspecified Anxiety Disorder  Psychosocial / Contextual Factors: strain in marital relationship, parenting challenges, adjustment challenges  WHODAS: will gather at diagnostic assessment update    Referral / Collaboration:  We discussed a referral to a couples/marital therapist.  The patient is thinking about this and has talked with her  about the referral, but they are unsure if they would like to follow through at this time.      Anticipated number of session or this episode of care: 8-12    MeasurableTreatment Goal(s) related to diagnosis / functional impairment(s)  Goal 1: Patient will learn emotion regulation strategies to help when she is feeling upset/angry/frustrated/distressed    I will know I've met my goal when I would yell less, I would feel calmer, and I would not push others.  I would be less physical when I'm angry (e.g. wouldn't slam doors or hit things)       Objective #A (Patient Action)    Patient will learn and practice at least 2 new emotion regulation strategies.  Status: Continued - Date(s):4/23/2020 (has begun to learn new emotion regulation strategies and is making good progress)   Update: 7/8/2020: \"I've learned new strategies but I'm bad at practicing them\" - will help patient incorporate regular practice and identify and problem-solve barriers.  " "      Intervention(s)  Therapist will provide psychoeducation on emotion regulation module from DBT and will assign patient homework to help reinforce skill development.    Objective #B  Patient will identify factors that increase vulnerability to emotion mind and identify ways to decrease vulnerability to emotion mind.  Status: Continued - Date(s):4/23/2020 (has learned factors that make her more vulnerable to emotion mind, and has been working on addressing vulnerability related to feeling hungry, tired and rushed)   Update: 7/8/20: \"This one is harder for me.  I know I get angry when I'm hungry, and when I slow down I'm less inclined to get angry\".  Challenges - not always having food ready, not always planning in advance, (planing breakfast the night before)    Intervention(s)  Therapist will provide information on factors that increase vulnerabiliyt to emotion mind.    Objective #C  Patient will identify warning signs and signals that emotions are escalating and will learn ways to skillfully intervene early on.  Status: New - Date: 01/06/2020 7/8/2020- in progress        Intervention(s)  Therapist will help patient identify warning signs and signals, and will guide the patient in identifying skillful ways to intervene when exeriencing warning signs and signals.      Goal 2: Patient will learn new parenting strategies to help with managing parenting challenges.  Parent will work on improving relationship with her daughter and defining what she would like this relationship to look like.      I will know I've met my goal when I'm enjoying time with my daughter, teaching her new things, and not waiting for things to change      Objective #A (Patient Action)    Status: Continued - Date(s):4/23/2020     Patient will increase understanding of developmental norms for her daughter and ways to manage challenging behaviors.    Intervention(s)  Therapist will provide psychoeducation on developmental norms and parenting " "strategies.    Objective #B  Patient will spend time reflecting on her relationship with her daughter and defining what she would like this relaitonship to look like.    Status: Continued - Date(s):4/23/2020   Update: 7/8/2020 - \"I think some things are going better, she's more helpful with things.  Some things are more challenging with the pandemic and thinking of things to do with her\".        Intervention(s)  Therapist will guide the patient in reflecting upon her relationship with her daughter and help her define ways to move towards what she vaues in her relationship with her daughter.    Objective #C  Patient will increase time spent on fun activity and play with her daughter.  Status: Continued - Date(s):4/23/2020 7/8/2020 -       Intervention(s)  Therapist will guide the patient in identifying ways she can increase positive time with her daughter.  Therapist will also teach mindfulness strategies to help patient with being in the present moment when appropriate - .      Goal 3: Patiient will improve communication with her .      I will know I've met my goal when I feel less irritated with my  and communicate more openly with my .  I would like to be more assertive and less aggressive.   I would like to not avoid telling him things because I'm worried about his reaction or don't think he will react well.  I would like to be more present to the moment during times when this would be helpful.\"      Objective #A (Patient Action)    Status: Continued - Date(s):4/23/2020 7/8/2020 - suggested couples therapy to help with this.    Individual therapy focus - identify what makes it hard to be more honest and open      Patient will learn and practice at least two new interpersonal effectiveness strategies.    Intervention(s)  Therapist will teach strategies from DBT module on interpersonal effectiveness, and will assign homework to help reinforce skill development.      Patient has reviewed and " agreed to the above plan.      Cristy Wilkinson PsyD, LP  Licensed Psychologist    January 6, 2020, reviewed on 4/23/2020, reviewed 7/8/2020

## 2020-09-17 ENCOUNTER — VIRTUAL VISIT (OUTPATIENT)
Dept: PSYCHOLOGY | Facility: CLINIC | Age: 40
End: 2020-09-17
Payer: COMMERCIAL

## 2020-09-17 DIAGNOSIS — F41.9 ANXIETY DISORDER, UNSPECIFIED TYPE: Primary | ICD-10-CM

## 2020-09-17 PROCEDURE — 90834 PSYTX W PT 45 MINUTES: CPT | Mod: GT | Performed by: PSYCHOLOGIST

## 2020-09-17 NOTE — PROGRESS NOTES
Progress Note    Patient Name: Idalia Hinojosa  Date: 9/17/2020         Service Type: Individual      Session Start Time: 10:04 am  Session End Time: 10:56 am     Session Length: 52 minutes    Session #: 14    Attendees: Client    Service Modality:  Video visit    Telemedicine Visit: The patient's condition can be safely assessed and treated via synchronous audio and visual telemedicine encounter.    Reason for Telemedicine Visit: Services only offered telehealth    Originating Site (Patient Location): Patient's home    Distant Site (Provider Location): Provider Remote Setting    Consent:  The patient/guardian has verbally consented to: the potential risks and benefits of telemedicine (video visit) versus in person care; bill my insurance or make self-payment for services provided; and responsibility for payment of non-covered services.     Mode of Communication:  Video Conference via [employer]    As the provider I attest to compliance with applicable laws and regulations related to telemedicine.    Reason for Phone Visit: Services only offered telehealth    Originating Site (Patient Location): Patient's home    Distant Site (Provider Location): Provider Remote Setting    As the provider I attest to compliance with applicable laws and regulations related to telemedicine     Treatment Plan Last Reviewed: Developed 1/06/2020, reviewed 4/23/2020, reviewed 7/8/2020  PHQ-9/DAVID-7: 7/8/2020      DATA  Interactive Complexity: No  Crisis: No        Progress Since Last Session (Related to Symptoms / Goals / Homework):   Symptoms: Worsening she reported that she's felt an increase in irritability since she started taking birth control pills, she reached out to her OB/GYN to review this.  She reported they indicated this can happen during the transition period of starting on birth control.      Homework: Achieved / completed to satisfaction.  She was successful with reaching  "out to friends, got in touch with orchestra friend, was really good to catch up, scheduled gathering with neighbor, and really enjoyed the opportunity for adult conversation and they plan to do this again this weekend.        Episode of Care Goals: Satisfactory progress - ACTION (Actively working towards change); Intervened by reinforcing change plan / affirming steps taken.  Diagnostic assessment has been completed and treatment plan has been developed.  The patient stated that her main goals for therapy would be to learn emotion regulation strategies (in particular, to help with when she is feeling upset/angry/frustrated/distressed).  More recently, the changes in her daily life due to COVID restrictions have been a focus of our work together.       Current / Ongoing Stressors and Concerns:   Current:  Recent increase in irritability since starting birth control pills, she had a situation at [employer] yesterday where she lost her temper and yelled and was upset by this because she said this isn't normal or typical for her.  Her daughter has returned to pre-school, so some adjustments to new routines with this.         Ongoing:Managing daily routine which has contributed to some adjustment and parenting challenges, lack of effective emotion regulation strategies for managing daily frustrations and upsets and marital discord due to differences in parenting styles.       Treatment Objective(s) Addressed in This Session:     Patient will identify ways to adopt a flexible mindset   Continue with:   Patient will learn and practice at least 2 new mindfulness strategies.   Patient will learn importance of practicing mindfulness strategies on a regular basis to help \"build mindfulness muscles\"     Intervention:   CBT: Today's session focused on helping patient recognize how adopting a flexible mindset can help her find more patience with situations  that are frustrating/upsetting for her.   Processed the incident from [employer] " "yesterday - she was able to recognize self-talk that exacerbated feeling frustrated and irritable (in particular, expectations that things should have happened in a different way).  We talked about ways she can recognize automatic thoughts and rigid thoughts, and practice/choose a more flexible mindset.    We also talked about ways to support and nurture development of new routines during transition period with daughter going back to .      She identified that for her own self-care, finding new routines for exercise and practicing headspace yocasta would be helpful for her.  She stated that when she does these things, she feels better.  With recent transitions and changes in routines, she hasn't been doing either.  She will work on identifying a new schedule, while also practicing a flexible mindset when other things come up.         ASSESSMENT: Current Emotional / Mental Status (status of significant symptoms):   Risk status (Self / Other harm or suicidal ideation)   Patient denies current fears or concerns for personal safety.   Patient reports the following current or recent suicidal ideation or behaviors: she reported that two weeks ago, she was feeling upset following an argument with her spouse, and had suicidal thoughts..  She described the thoughts as ego-dystomic, stating she had no intention or plan to act on the thoughts, and was upset by them.  She stated that she practiced distraction, talked to her , received comfort from her , reminded herself that she is getting help and has a treatment team who is helping her, and went to bed.  She noted that the thoughts resolved by the next morning, and she hasn't had any since.  She continues to agree to use a safety plan should any additional thoughts arise, and will remind herself \"I've gotten through this before and can get through this again\".  She also agrees to reach our to her spouse or a family member for help if needed, or access " crisis/emergency resources.        Patientdenies current or recent homicidal ideation or behaviors.   Patient denies current or recent self injurious behavior or ideation.   Patient denies other safety concerns.   Patient Patient reports there has been no change in risk factors since their last session.     PatientPatient reports there has been no change in protective factors since their last session.     Recommended that patient call 911 or go to the local ED should there be a change in any of these risk factors.  Reviewed with the patient how to contact [facility] crisis number and [facility] crisis/COPE for urgent/crisis concerns 24/7.  Provided patient with crisis resources and she agrees to use safety plan should any safety concerns arise.      Professionals or agencies I can contact during a crisis:    [facility] Daytime Number: [phone]    Suicide Prevention Lifeline: 9-202-782-TALK (4355)    Crisis Text Line Service (available 24 hours a day, 7 days a week): Text [place] to 177612    Local Crisis Services: [facility] Crisis Services: [phone]    Call 911 or go to my nearest emergency department.        Appearance:   Appropriate    Eye Contact:   Good    Psychomotor Behavior: Normal    Attitude:   Cooperative    Orientation:   All   Speech    Rate / Production: Normal     Volume:  Normal    Mood:    Anxious ,   Affect:    congruent with mood    Thought Content:  Clear    Thought Form:  Coherent  Logical    Insight:    Good      Medication Review:   No changes to current psychiatric medication(s)    OB/GYN, Dr. Han, recently started her on OCP to help with PMDD.   She reported she's noticed an increase in irritability since she started taking OCP, which she has reviewed with Dr. Han.         Medication Compliance:   Yes     Changes in Health Issues:   None reported        Chemical Use Review:   Substance Use: Chemical use reviewed, no active  "concerns identified      Tobacco Use: No current tobacco use.      Diagnosis:  Anxiety disorder unspecified    Collateral Reports Completed:   Not Applicable,      PLAN: (Patient Tasks / Therapist Tasks / Other)    1) Focus on one day at a time.  Create a routine/schedule for regular exercise and practice of head space yocasta, while also allowing myself to have a flexible mindset.  Practice being patient with self and others- mindful awareness of expectations, allow self to create realistic expectations.      Continue with reaching out/connecting with others.       Continue with: Try to notice when my mind is \"futuring\".  Continue to give myself permission to slow down and practice mindful moments!  Continue to practice meditations on headspace yocasta.  Continue to practice awareness of emotions and thoughts and pay attention to what they may be telling you you need.  Pay attention to what makes it hard to ask  for what you need.        2) Follow-up visit is scheduled for 10/1/2020  at 10:00 am- video visit - text invitation will be sent.   Please call if you need a sooner appointment.  If urgent or crisis concerns arise prior to next scheduled appointment, please reach out to crisis resources, call 911, or go to the emergency department.      Cristy Wilkinson PsyD, LP  Licensed Psychologist  9/17/2020            Previous skills and strategies to remind self of and to do as needed:    Practice \"STOP\" technique when you recognize yourself feeling angry   Be a  - what do you notice is happening when Kaylee feels upset?     Practice recognizing when feeling angry, and giving self permission to use calming and self-soothing strategies and take a break.   Look for the gray with thinking  slow down  take deep breaths  manage expectations of self and others.    Journal   Practice flexing \"flexiblity and creativity\" -   Continue the great work you are doing with your daily schedule.    Get outside every day.    Play " "your instrument.    Sing!    Continue with: \"It's just a thought\" - non-judgmental, observe, float down the river on a leaf, clouds in the gege, reframing unhelpful thoughts -   Keep working on being patient when things are tense around me.    Continue with observing/paying attention to warning signs and signals and give self persmission to slow down, especially during the morning routine with Kaylee.    Practice offerring Kaylee choices when appropriate.      Use \"my plan for success\" to help remind you of calming strategies to practice when feeling upset.    Practice decreasing overall vulnerability to emotion mind through getting adequate rest, nutrition, time for yourself, and physical activity.         Consider reading \"Fighting for your marriage\".      Technical Assistance for video visits:    Offer patient the website ([email or web address]) and/or phone number ([phone]) for video visit instructions/assistance if needed.                                             ____________________________________    Treatment Plan    Patient's Name: Idalia Hinojosa  YOB: 1980    Date: 1/6/2020    DSM5 Diagnoses: 300.00 (F41.9) Unspecified Anxiety Disorder  Psychosocial / Contextual Factors: strain in marital relationship, parenting challenges, adjustment challenges  WHODAS: will gather at diagnostic assessment update    Referral / Collaboration:  We discussed a referral to a couples/marital therapist.  The patient is thinking about this and has talked with her  about the referral, but they are unsure if they would like to follow through at this time.      Anticipated number of session or this episode of care: 8-12    MeasurableTreatment Goal(s) related to diagnosis / functional impairment(s)  Goal 1: Patient will learn emotion regulation strategies to help when she is feeling upset/angry/frustrated/distressed    I will know I've met my goal when I would yell less, I would feel calmer, and I " "would not push others.  I would be less physical when I'm angry (e.g. wouldn't slam doors or hit things)       Objective #A (Patient Action)    Patient will learn and practice at least 2 new emotion regulation strategies.  Status: Continued - Date(s):4/23/2020 (has begun to learn new emotion regulation strategies and is making good progress)   Update: 7/8/2020: \"I've learned new strategies but I'm bad at practicing them\" - will help patient incorporate regular practice and identify and problem-solve barriers.        Intervention(s)  Therapist will provide psychoeducation on emotion regulation module from DBT and will assign patient homework to help reinforce skill development.    Objective #B  Patient will identify factors that increase vulnerability to emotion mind and identify ways to decrease vulnerability to emotion mind.  Status: Continued - Date(s):4/23/2020 (has learned factors that make her more vulnerable to emotion mind, and has been working on addressing vulnerability related to feeling hungry, tired and rushed)   Update: 7/8/20: \"This one is harder for me.  I know I get angry when I'm hungry, and when I slow down I'm less inclined to get angry\".  Challenges - not always having food ready, not always planning in advance, (planing breakfast the night before)    Intervention(s)  Therapist will provide information on factors that increase vulnerabiliyt to emotion mind.    Objective #C  Patient will identify warning signs and signals that emotions are escalating and will learn ways to skillfully intervene early on.  Status: New - Date: 01/06/2020 7/8/2020- in progress        Intervention(s)  Therapist will help patient identify warning signs and signals, and will guide the patient in identifying skillful ways to intervene when exeriencing warning signs and signals.      Goal 2: Patient will learn new parenting strategies to help with managing parenting challenges.  Parent will work on improving relationship " "with her daughter and defining what she would like this relationship to look like.      I will know I've met my goal when I'm enjoying time with my daughter, teaching her new things, and not waiting for things to change      Objective #A (Patient Action)    Status: Continued - Date(s):4/23/2020     Patient will increase understanding of developmental norms for her daughter and ways to manage challenging behaviors.    Intervention(s)  Therapist will provide psychoeducation on developmental norms and parenting strategies.    Objective #B  Patient will spend time reflecting on her relationship with her daughter and defining what she would like this relaitonship to look like.    Status: Continued - Date(s):4/23/2020   Update: 7/8/2020 - \"I think some things are going better, she's more helpful with things.  Some things are more challenging with the pandemic and thinking of things to do with her\".        Intervention(s)  Therapist will guide the patient in reflecting upon her relationship with her daughter and help her define ways to move towards what she vaues in her relationship with her daughter.    Objective #C  Patient will increase time spent on fun activity and play with her daughter.  Status: Continued - Date(s):4/23/2020 7/8/2020 -       Intervention(s)  Therapist will guide the patient in identifying ways she can increase positive time with her daughter.  Therapist will also teach mindfulness strategies to help patient with being in the present moment when appropriate - .      Goal 3: Patiient will improve communication with her .      I will know I've met my goal when I feel less irritated with my  and communicate more openly with my .  I would like to be more assertive and less aggressive.   I would like to not avoid telling him things because I'm worried about his reaction or don't think he will react well.  I would like to be more present to the moment during times when this would be " "helpful.\"      Objective #A (Patient Action)    Status: Continued - Date(s):4/23/2020 7/8/2020 - suggested couples therapy to help with this.    Individual therapy focus - identify what makes it hard to be more honest and open      Patient will learn and practice at least two new interpersonal effectiveness strategies.    Intervention(s)  Therapist will teach strategies from DBT module on interpersonal effectiveness, and will assign homework to help reinforce skill development.      Patient has reviewed and agreed to the above plan.      Cristy Wilkinson PsyD, LP  Licensed Psychologist    January 6, 2020, reviewed on 4/23/2020, reviewed 7/8/2020                                          "

## 2020-09-17 NOTE — PATIENT INSTRUCTIONS
1) Focus on one day at a time.  Create a routine/schedule for regular exercise and practice of head space yocasta, while also allowing myself to have a flexible mindset.  Practice being patient with self and others- mindful awareness of expectations, allow self to create realistic expectations.      Continue with reaching out/connecting with others.         2) Follow-up visit is scheduled for 10/1/2020  at 10:00 am- video visit - text invitation will be sent.   Please call if you need a sooner appointment.  If urgent or crisis concerns arise prior to next scheduled appointment, please reach out to crisis resources, call 911, or go to the emergency department.       Professionals or agencies I can contact during a crisis:      [facility] Daytime Number: [phone]    Suicide Prevention Lifeline: 7-055-090-TALK (6411)    Crisis Text Line Service (available 24 hours a day, 7 days a week): Text [place] to 923058    Local Crisis Services: [facility] Crisis Services: [phone]    Call 911 or go to my nearest emergency department.

## 2020-09-29 ENCOUNTER — MYC MEDICAL ADVICE (OUTPATIENT)
Dept: OBGYN | Facility: CLINIC | Age: 40
End: 2020-09-29

## 2020-09-30 NOTE — TELEPHONE ENCOUNTER
"Pt started new birth control on 9/7/2020. Has been having side effects of it with irritability on and off of the pill. She is on her 2nd day of the \"off week\" and now feeling depressed. Does she need to take the whole week off and then start again? When can this medicine start helping her feel better more regularly? Would you recommend she take continuously and start the pills again? Please see [employer] message from 9/16 as well. Thanks.   Mariella Burris, RN-BSN     "

## 2020-10-01 ENCOUNTER — VIRTUAL VISIT (OUTPATIENT)
Dept: PSYCHOLOGY | Facility: CLINIC | Age: 40
End: 2020-10-01
Payer: COMMERCIAL

## 2020-10-01 DIAGNOSIS — F41.9 ANXIETY DISORDER, UNSPECIFIED TYPE: Primary | ICD-10-CM

## 2020-10-01 PROCEDURE — 90834 PSYTX W PT 45 MINUTES: CPT | Mod: TEL | Performed by: PSYCHOLOGIST

## 2020-10-01 ASSESSMENT — PATIENT HEALTH QUESTIONNAIRE - PHQ9: SUM OF ALL RESPONSES TO PHQ QUESTIONS 1-9: 6

## 2020-10-01 NOTE — PROGRESS NOTES
"                                              Progress Note    Patient Name: Idalia Hinojosa  Date: 10/01/2020         Service Type: Individual      Session Start Time: 10:10 am  Session End Time: 11:00 am     Session Length: 50 minutes    Session #: 15    Attendees: Client    Service Modality:  Telephone visit    Idalia Hinojosa is a 40 year old female who is being evaluated via a telephone visit.      The patient has been notified of the following:     \"We have found that certain health care needs can be provided without the need for a face to face visit.  This service lets us provide the care you need with a short phone conversation.      I will have full access to your [facility] medical record during this entire phone call.   I will be taking notes for your medical record.     Since this is like an office visit, we will bill your insurance company for this service.  Please check with your medical insurance if this type of telephone visit/virtual care is covered.  You may be responsible for the cost of this service if insurance coverage is denied.      There are potential benefits and risks of telephone visits (e.g. limits to patient confidentiality) that differ from in-person visits.?  Confidentiality still applies for telephone services, and nobody will record the visit.  It is important to be in a quiet, private space that is free of distractions (including cell phone or other devices) during the visit.??     If during the course of the call I believe a telephone visit is not appropriate, you will not be charged for this service\"    Consent has been obtained for this service by care team member: yes.    Start time: 10:10 am    End time: 11:00 am    As the provider I attest to compliance with applicable laws and regulations related to telemedicine.    Reason for Phone Visit: Services only offered telehealth      Treatment Plan Last Reviewed: Developed 1/06/2020, reviewed 4/23/2020, reviewed " 7/8/2020  PHQ-9/DAVID-7: 7/8/2020      DATA  Interactive Complexity: No  Crisis: No        Progress Since Last Session (Related to Symptoms / Goals / Homework):   Symptoms: Some improvement, but not consistent..  She reported that she had experienced an initial improvement in her mood, but noted she did have periods of irritability.  She reported that she's been following the directions for taking the OCP, but noted that the starting and stopping of the pills (per the directions) has been hard, and she's reached out to her prescriber to review this concern.      Homework: Achieved / completed to satisfaction.  She reported that she's been doing the hetras yocasta almost every day and finds this to be really helpful.  She's also been practicing having a flexible mindset and finds this helpful too.  She's still trying to figure out what is the best new routine for her to incorporate exercise.        Episode of Care Goals: Satisfactory progress - ACTION (Actively working towards change); Intervened by reinforcing change plan / affirming steps taken.  Diagnostic assessment has been completed and treatment plan has been developed.  The patient stated that her main goals for therapy would be to learn emotion regulation strategies (in particular, to help with when she is feeling upset/angry/frustrated/distressed).  More recently, the changes in her daily life due to COVID restrictions have been a focus of our work together.       Current / Ongoing Stressors and Concerns:   Current:  Adjusting to new routines with daughter returning to , identifying what has been missing from her self-care and how to incorporate this into a regular routine.        Ongoing:Managing daily routine which has contributed to some adjustment and parenting challenges, lack of effective emotion regulation strategies for managing daily frustrations and upsets and marital discord due to differences in parenting styles.       Treatment  "Objective(s) Addressed in This Session:     Identify areas of strength and opportunities for improvement with self-care   Identify strategies to incorporate self-care into a regular routine    Continue with:   Patient will learn and practice at least 2 new mindfulness strategies.   Patient will learn importance of practicing mindfulness strategies on a regular basis to help \"build mindfulness muscles\"     Intervention:   CBT: Today's session focused on identifying stregnths and opportunities for improvement in regards to having balanced self-care. She noted last session that finding new routines for exercise and practicing her headspace yocasta would be really helpful for her, but that she's struggled to find a routine that supports her doing these things.  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   We talked about strategies for incorporating new routines/structure to support her self-care, while also having a \"flexible mindset\" and making adjustments as needed.  We reviewed the five minute rule, as a behavioral activation strategy to help counteract low motivation.       ASSESSMENT: Current Emotional / Mental Status (status of significant symptoms):   Risk status (Self / Other harm or suicidal ideation)   Patient denies current fears or concerns for personal safety.   Patient denies current or recent suicidal ideation or behaviors.  She shared a success that she had a difficult situation, recognized her distress, and proactively reached out to her supports and engaged positive coping, and she noted that it helped her to mange the situation without it escalating.    She continues to agree to use a safety plan should any safety concerns arise, and will remind herself \"I've gotten through this before and can get through this again\".  " She also agrees to reach our to her spouse or a family member for help if needed, or access crisis/emergency resources.        Patientdenies current or recent homicidal ideation or behaviors.   Patient denies current or recent self injurious behavior or ideation.   Patient denies other safety concerns.   Patient Patient reports there has been no change in risk factors since their last session.     PatientPatient reports there has been no change in protective factors since their last session.     Recommended that patient call 911 or go to the local ED should there be a change in any of these risk factors.  Reviewed with the patient how to contact [facility] crisis number and [facility] crisis/COPE for urgent/crisis concerns 24/7.  Provided patient with crisis resources and she agrees to use safety plan should any safety concerns arise.      Professionals or agencies I can contact during a crisis:    [facility] Daytime Number: [phone]    Suicide Prevention Lifeline: 9-220-973-TALK (8255)    Crisis Text Line Service (available 24 hours a day, 7 days a week): Text [place] to 010813    Local Crisis Services: [facility] Crisis Services: [phone]    Call 911 or go to my nearest emergency department.        Appearance:   unable to assess (phone)    Eye Contact:   unable to assess/phone    Psychomotor Behavior: unable to assess (phone)    Attitude:   Cooperative    Orientation:   All   Speech    Rate / Production: Normal     Volume:  Normal    Mood:    calm,   Affect:    congruent with mood    Thought Content:  Clear    Thought Form:  Coherent  Logical    Insight:    Good      Medication Review:   No changes to current psychiatric medication(s)    OB/GYN, Dr. Han, recently started her on OCP to help with PMDD.   She reported she's noticed an increase in irritability since she started taking OCP, she's been communicating with Dr. Han to get further direction on  "this.        Medication Compliance:   Yes     Changes in Health Issues:   None reported        Chemical Use Review:   Substance Use: Chemical use reviewed, no active concerns identified      Tobacco Use: No current tobacco use.      Diagnosis:  Anxiety disorder unspecified    Collateral Reports Completed:   Not Applicable,      PLAN: (Patient Tasks / Therapist Tasks / Other)    1) Keep doing headspace every day (regular every day helps), practice the five minute rule when motivation or energy is low, start playing oboe again while Kaylee is at school, (every other day, 3 days a week, be patient with self, think about what you would tell a student).       Focus on one day at a time.  Create a routine/schedule for regular exercise and practice of head space yocasta, while also allowing myself to have a flexible mindset.  Practice being patient with self and others- mindful awareness of expectations, allow self to create realistic expectations.      Continue with reaching out/connecting with others.       Continue with: Try to notice when my mind is \"futuring\".  Continue to give myself permission to slow down and practice mindful moments!  Continue to practice meditations on headspace yocasta.  Continue to practice awareness of emotions and thoughts and pay attention to what they may be telling you you need.  Pay attention to what makes it hard to ask  for what you need.        2) Follow-up visit is scheduled for 10/8/2020  at 10:00 am- phone visit.   Please call if you need a sooner appointment.  If urgent or crisis concerns arise prior to next scheduled appointment, please reach out to crisis resources, call 911, or go to the emergency department.      Cristy Wilkinson PsyD, LP  Licensed Psychologist  10/01/2020            Previous skills and strategies to remind self of and to do as needed:    Practice \"STOP\" technique when you recognize yourself feeling angry   Be a  - what do you notice is happening when Kaylee " "feels upset?     Practice recognizing when feeling angry, and giving self permission to use calming and self-soothing strategies and take a break.   Look for the gray with thinking  slow down  take deep breaths  manage expectations of self and others.    Journal   Practice flexing \"flexiblity and creativity\" -   Continue the great work you are doing with your daily schedule.    Get outside every day.    Play your instrument.    Sing!    Continue with: \"It's just a thought\" - non-judgmental, observe, float down the river on a leaf, clouds in the gege, reframing unhelpful thoughts -   Keep working on being patient when things are tense around me.    Continue with observing/paying attention to warning signs and signals and give self persmission to slow down, especially during the morning routine with Kaylee.    Practice offerring Kaylee choices when appropriate.      Use \"my plan for success\" to help remind you of calming strategies to practice when feeling upset.    Practice decreasing overall vulnerability to emotion mind through getting adequate rest, nutrition, time for yourself, and physical activity.         Consider reading \"Fighting for your marriage\".      Technical Assistance for video visits:    Offer patient the website ([email or web address]) and/or phone number ([phone]) for video visit instructions/assistance if needed.                                             ____________________________________    Treatment Plan    Patient's Name: Idalia Hinojosa  YOB: 1980    Date: 1/6/2020    DSM5 Diagnoses: 300.00 (F41.9) Unspecified Anxiety Disorder  Psychosocial / Contextual Factors: strain in marital relationship, parenting challenges, adjustment challenges  WHODAS: will gather at diagnostic assessment update    Referral / Collaboration:  We discussed a referral to a couples/marital therapist.  The patient is thinking about this and has talked with her  about the referral, but they " "are unsure if they would like to follow through at this time.      Anticipated number of session or this episode of care: 8-12    MeasurableTreatment Goal(s) related to diagnosis / functional impairment(s)  Goal 1: Patient will learn emotion regulation strategies to help when she is feeling upset/angry/frustrated/distressed    I will know I've met my goal when I would yell less, I would feel calmer, and I would not push others.  I would be less physical when I'm angry (e.g. wouldn't slam doors or hit things)       Objective #A (Patient Action)    Patient will learn and practice at least 2 new emotion regulation strategies.  Status: Continued - Date(s):4/23/2020 (has begun to learn new emotion regulation strategies and is making good progress)   Update: 7/8/2020: \"I've learned new strategies but I'm bad at practicing them\" - will help patient incorporate regular practice and identify and problem-solve barriers.        Intervention(s)  Therapist will provide psychoeducation on emotion regulation module from DBT and will assign patient homework to help reinforce skill development.    Objective #B  Patient will identify factors that increase vulnerability to emotion mind and identify ways to decrease vulnerability to emotion mind.  Status: Continued - Date(s):4/23/2020 (has learned factors that make her more vulnerable to emotion mind, and has been working on addressing vulnerability related to feeling hungry, tired and rushed)   Update: 7/8/20: \"This one is harder for me.  I know I get angry when I'm hungry, and when I slow down I'm less inclined to get angry\".  Challenges - not always having food ready, not always planning in advance, (planing breakfast the night before)    Intervention(s)  Therapist will provide information on factors that increase vulnerabiliyt to emotion mind.    Objective #C  Patient will identify warning signs and signals that emotions are escalating and will learn ways to skillfully intervene " "early on.  Status: New - Date: 01/06/2020 7/8/2020- in progress        Intervention(s)  Therapist will help patient identify warning signs and signals, and will guide the patient in identifying skillful ways to intervene when exeriencing warning signs and signals.      Goal 2: Patient will learn new parenting strategies to help with managing parenting challenges.  Parent will work on improving relationship with her daughter and defining what she would like this relationship to look like.      I will know I've met my goal when I'm enjoying time with my daughter, teaching her new things, and not waiting for things to change      Objective #A (Patient Action)    Status: Continued - Date(s):4/23/2020     Patient will increase understanding of developmental norms for her daughter and ways to manage challenging behaviors.    Intervention(s)  Therapist will provide psychoeducation on developmental norms and parenting strategies.    Objective #B  Patient will spend time reflecting on her relationship with her daughter and defining what she would like this relaitonship to look like.    Status: Continued - Date(s):4/23/2020   Update: 7/8/2020 - \"I think some things are going better, she's more helpful with things.  Some things are more challenging with the pandemic and thinking of things to do with her\".        Intervention(s)  Therapist will guide the patient in reflecting upon her relationship with her daughter and help her define ways to move towards what she vaues in her relationship with her daughter.    Objective #C  Patient will increase time spent on fun activity and play with her daughter.  Status: Continued - Date(s):4/23/2020 7/8/2020 -       Intervention(s)  Therapist will guide the patient in identifying ways she can increase positive time with her daughter.  Therapist will also teach mindfulness strategies to help patient with being in the present moment when appropriate - .      Goal 3: Patiient will " "improve communication with her .      I will know I've met my goal when I feel less irritated with my  and communicate more openly with my .  I would like to be more assertive and less aggressive.   I would like to not avoid telling him things because I'm worried about his reaction or don't think he will react well.  I would like to be more present to the moment during times when this would be helpful.\"      Objective #A (Patient Action)    Status: Continued - Date(s):4/23/2020 7/8/2020 - suggested couples therapy to help with this.    Individual therapy focus - identify what makes it hard to be more honest and open      Patient will learn and practice at least two new interpersonal effectiveness strategies.    Intervention(s)  Therapist will teach strategies from DBT module on interpersonal effectiveness, and will assign homework to help reinforce skill development.      Patient has reviewed and agreed to the above plan.      Cristy Wilkinson PsyD, LP  Licensed Psychologist    January 6, 2020, reviewed on 4/23/2020, reviewed 7/8/2020                                        at 10:00 am- video visit - text invitation will be sent.   Please call if you need a sooner appointment.  If urgent or crisis concerns arise prior to next scheduled appointment, please reach out to crisis resources, call 911, or go to the emergency department.       Professionals or agencies I can contact during a crisis:      [facility] Daytime Number: [phone]    Suicide Prevention Lifeline: 9-273-722-TALK (8255)    Crisis Text Line Service (available 24 hours a day, 7 days a week): Text [place] to 950115    Local Crisis Services: [facility] Crisis Services: [phone]    Call 911 or go to my nearest emergency department.     "

## 2020-10-06 ENCOUNTER — VIRTUAL VISIT (OUTPATIENT)
Dept: FAMILY MEDICINE | Facility: OTHER | Age: 40
End: 2020-10-06
Payer: COMMERCIAL

## 2020-10-06 PROCEDURE — 99421 OL DIG E/M SVC 5-10 MIN: CPT | Performed by: EMERGENCY MEDICINE

## 2020-10-06 NOTE — PROGRESS NOTES
"Date: 10/06/2020 07:40:18  Clinician: Siddharth Guillory  Clinician NPI: [id number]  Patient: Idalia Hinojosa  Patient : 1980  Patient Address: [address] [place] [address]  Patient Phone: [phone]  Visit Protocol: URI  Patient Summary:  Idalia is a 40 year old ( : 1980 ) female who initiated a [facility] Visit for COVID-19 (Coronavirus) evaluation and screening. When asked the question \"Please sign me up to receive news, health information and promotions from [facility].\", Idalia responded \"No\".    Idalia states her symptoms started 1-2 days ago.   Her symptoms consist of nasal congestion and a sore throat. She is experiencing difficulty breathing due to nasal congestion but she is not short of breath.   Symptom details     Nasal secretions: The color of her mucus is clear.    Sore throat: Idalia reports having moderate throat pain (4-6 on a 10 point pain scale), does not have exudate on her tonsils, and can swallow liquids. She is not sure if the lymph nodes in her neck are enlarged. A rash has not appeared on the skin since the sore throat started.      Idalia denies having ear pain, headache, wheezing, fever, cough, anosmia, vomiting, rhinitis, nausea, facial pain or pressure, myalgias, chills, malaise, teeth pain, ageusia, and diarrhea. She also denies taking antibiotic medication in the past month and having recent facial or sinus surgery in the past 60 days.   Precipitating events  Within the past week, Idalia has not been exposed to someone with strep throat.   Pertinent COVID-19 (Coronavirus) information  In the past 14 days, Idalia has not worked in a congregate living setting.   She does not work or volunteer as healthcare worker or a  and does not work or volunteer in a healthcare facility.   Idalia also has not lived in a congregate living setting in the past 14 days. She does not live with a healthcare worker.   Idalia has not had a close contact with a " laboratory-confirmed COVID-19 patient within 14 days of symptom onset.   Since December 2019, Idalia and has not had upper respiratory infection or influenza-like illness. Has not been diagnosed with lab-confirmed COVID-19 test   Pertinent medical history  Idalia does not get yeast infections when she takes antibiotics.   Idalia does not need a return to work/school note.   Weight: 160 lbs   Idalia does not smoke or use smokeless tobacco.   She denies pregnancy and denies breastfeeding. Her last period was over a month ago.   Additional information as reported by the patient (free text): My  also has a sore throat this morning and my daughter has a stuffy nose and has coughed a few times   Weight: 160 lbs    MEDICATIONS: mometasone nasal, montelukast oral, norethindrone-ethinyl estradiol oral, sertraline oral, ALLERGIES: NKDA  Clinician Response:  Dear Idalia,   Your symptoms show that you may have coronavirus (COVID-19). This illness can cause fever, cough and trouble breathing. Many people get a mild case and get better on their own. Some people can get very sick.  What should I do?  We would like to test you for this virus.   1. Please call [phone] to schedule your visit. Explain that you were referred by [facility] to have a COVID-19 test. Be ready to share your [facility] visit ID number.  The following will serve as your written order for this COVID Test, ordered by me, for the indication of suspected COVID [Z20.828]: The test will be ordered in [facility], our electronic health record, after you are scheduled. It will show as ordered and authorized by Byron Loera MD.  Order: COVID-19 (Coronavirus) PCR for SYMPTOMATIC testing from [facility].      2. When it's time for your COVID test:  Stay at least 6 feet away from others. (If someone will drive you to your test, stay in the backseat, as far away from the  as you can.)   Cover your mouth and nose with a mask, tissue or washcloth.  Go straight to the  "testing site. Don't make any stops on the way there or back.      3.Starting now: Stay home and away from others (self-isolate) until:   You've had no fever---and no medicine that reduces fever---for one full day (24 hours). And...   Your other symptoms have gotten better. For example, your cough or breathing has improved. And...   At least 10 days have passed since your symptoms started.       During this time, don't leave the house except for testing or medical care.   Stay in your own room, even for meals. Use your own bathroom if you can.   Stay away from others in your home. No hugging, kissing or shaking hands. No visitors.  Don't go to work, school or anywhere else.    Clean \"high touch\" surfaces often (doorknobs, counters, handles, etc.). Use a household cleaning spray or wipes. You'll find a full list of  on the EPA website: www.epa.gov/pesticide-registration/list-n-disinfectants-use-against-sars-cov-2.   Cover your mouth and nose with a mask, tissue or washcloth to avoid spreading germs.  Wash your hands and face often. Use soap and water.  Caregivers in these groups are at risk for severe illness due to COVID-19:  o People 65 years and older  o People who live in a nursing home or long-term care facility  o People with chronic disease (lung, heart, cancer, diabetes, kidney, liver, immunologic)  o People who have a weakened immune system, including those who:   Are in cancer treatment  Take medicine that weakens the immune system, such as corticosteroids  Had a bone marrow or organ transplant  Have an immune deficiency  Have poorly controlled HIV or AIDS  Are obese (body mass index of 40 or higher)  Smoke regularly   o Caregivers should wear gloves while washing dishes, handling laundry and cleaning bedrooms and bathrooms.  o Use caution when washing and drying laundry: Don't shake dirty laundry, and use the warmest water setting that you can.  o For more tips, go to " www.cdc.gov/coronavirus/2019-ncov/downloads/10Things.pdf.    4.Sign up for [employer]. We know it's scary to hear that you might have COVID-19. We want to track your symptoms to make sure you're okay over the next 2 weeks. Please look for an email from [employer]---this is a free, online program that we'll use to keep in touch. To sign up, follow the link in the email. Learn more at [email or web address]  How can I take care of myself?   Get lots of rest. Drink extra fluids (unless a doctor has told you not to).   Take Tylenol (acetaminophen) for fever or pain. If you have liver or kidney problems, ask your family doctor if it's okay to take Tylenol.   Adults can take either:    650 mg (two 325 mg pills) every 4 to 6 hours, or...   1,000 mg (two 500 mg pills) every 8 hours as needed.    Note: Don't take more than 3,000 mg in one day. Acetaminophen is found in many medicines (both prescribed and over-the-counter medicines). Read all labels to be sure you don't take too much.   For children, check the Tylenol bottle for the right dose. The dose is based on the child's age or weight.    If you have other health problems (like cancer, heart failure, an organ transplant or severe kidney disease): Call your specialty clinic if you don't feel better in the next 2 days.       Know when to call 911. Emergency warning signs include:    Trouble breathing or shortness of breath Pain or pressure in the chest that doesn't go away Feeling confused like you haven't felt before, or not being able to wake up Bluish-colored lips or face.  Where can I get more information?    [facility] -- About COVID-19: [email or web address]   CDC -- What to Do If You're Sick: www.cdc.gov/coronavirus/2019-ncov/about/steps-when-sick.html   CDC -- Ending Home Isolation: www.cdc.gov/coronavirus/2019-ncov/hcp/disposition-in-home-patients.html   CDC -- Caring for Someone:  www.cdc.gov/coronavirus/2019-ncov/if-you-are-sick/care-for-someone.html   [facility] -- Interim Guidance for Hospital Discharge to Home: www.health.[facility].[place]/diseases/coronavirus/hcp/hospdischarge.pdf   [facility] clinical trials (COVID-19 research studies): clinicalaffairs.[facility]-clinical-trials    Below are the COVID-19 hotlines at the [place] Department of [facility] ([facility]). Interpreters are available.    For health questions: Call 675-997-8023 or 1-281.434.8371 (7 a.m. to 7 p.m.) For questions about schools and childcare: Call [phone] or 1-675.788.3458 (7 a.m. to 7 p.m.)    Diagnosis: Cough  Diagnosis ICD: R05

## 2020-10-08 ENCOUNTER — VIRTUAL VISIT (OUTPATIENT)
Dept: PSYCHOLOGY | Facility: CLINIC | Age: 40
End: 2020-10-08
Payer: COMMERCIAL

## 2020-10-08 DIAGNOSIS — F41.9 ANXIETY DISORDER, UNSPECIFIED TYPE: Primary | ICD-10-CM

## 2020-10-08 PROCEDURE — 90832 PSYTX W PT 30 MINUTES: CPT | Mod: GT | Performed by: PSYCHOLOGIST

## 2020-10-08 NOTE — PROGRESS NOTES
"                                              Progress Note    Patient Name: Idalia Hinojosa  Date: 10/8/2020         Service Type: Individual      Session Start Time: 10:07 am  Session End Time:10:43 am     Session Length: 36 minutes    Session #: 16    Attendees: Client    Service Modality:  Telephone visit    Idalia Hinojosa is a 40 year old female who is being evaluated via a telephone visit.      The patient has been notified of the following:     \"We have found that certain health care needs can be provided without the need for a face to face visit.  This service lets us provide the care you need with a short phone conversation.      I will have full access to your [facility] medical record during this entire phone call.   I will be taking notes for your medical record.     Since this is like an office visit, we will bill your insurance company for this service.  Please check with your medical insurance if this type of telephone visit/virtual care is covered.  You may be responsible for the cost of this service if insurance coverage is denied.      There are potential benefits and risks of telephone visits (e.g. limits to patient confidentiality) that differ from in-person visits.?  Confidentiality still applies for telephone services, and nobody will record the visit.  It is important to be in a quiet, private space that is free of distractions (including cell phone or other devices) during the visit.??     If during the course of the call I believe a telephone visit is not appropriate, you will not be charged for this service\"    Consent has been obtained for this service by care team member: yes.    Start time: 10:07 am    End time: 10:43 am    As the provider I attest to compliance with applicable laws and regulations related to telemedicine.    Reason for Phone Visit: Services only offered telehealth      Treatment Plan Last Reviewed: Developed 1/06/2020, reviewed 4/23/2020, reviewed 7/8/2020, reviewed " "10/08/2020  PHQ-9/DAVID-7: 7/8/2020      DATA  Interactive Complexity: No  Crisis: No        Progress Since Last Session (Related to Symptoms / Goals / Homework):   Symptoms: Some improvement, but not consistent.  She reported that her family has all recently developed colds, which she believes has contributed to her feeling more irritable, as well as she noted that upon re-starting the OCP pills she has felt more angry, irritable and had what she calls \"PMS\" thoughts (where she feels more frustrated and irritable with others).  However, she stated she was able to recognize her emotions escalating, and successfully communicated to her  her need to take a break, go outside, and practice deep breathing, which helped her to feel better.        Homework: Achieved / completed to satisfaction.  She was successful with starting to play her instrument again (had to stop when she got a cold), she was successful with incorporating regular exercise last week and identifying a new routine for this (hit the pause button when feeling sick), and she has been practicing having a flexible mindset.         Episode of Care Goals: Satisfactory progress - ACTION (Actively working towards change); Intervened by reinforcing change plan / affirming steps taken.  Diagnostic assessment has been completed and treatment plan has been developed.  The patient stated that her main goals for therapy would be to learn emotion regulation strategies (in particular, to help with when she is feeling upset/angry/frustrated/distressed).  More recently, the changes in her daily life due to COVID restrictions have been a focus of our work together.       Current / Ongoing Stressors and Concerns:   Current:  Family has all been sick with a cold, so her daughter has not been able to go to pre-school and they have all been tested for COVID.  She's been working on using strategies to manage increase in feelings of irritability.  She's been working with " "her doctor on medication changes and adjustment.       Ongoing:Managing daily routine which has contributed to some adjustment and parenting challenges, lack of effective emotion regulation strategies for managing daily frustrations and upsets and marital discord due to differences in parenting styles.       Treatment Objective(s) Addressed in This Session:        identify warning signs and signals, practice inserting skills early on   Increase awareness of intensity of emotions and use rating scale to monitor response to using strategies (and to help determine need to try different strategies)       Continue with:   Patient will learn and practice at least 2 new emotion regulation strategies.   Patient will learn importance of practicing mindfulness strategies on a regular basis to help \"build mindfulness muscles\"  Identify strategies to incorporate self-care into a regular routine   Identify areas of strength and opportunities for improvement with self-care     Intervention:   CBT: Today's session focused on identifying ways for patient to continue to her practice of slowing down when feeling strong emotions and inserting calming strategies and thinking before acting. She stated that regular practice of headspace yocasta has really helped increase her awareness and focus on this.  We also talked about creating visual cues and reminders around the house (e.g. post-it notes, pictures, phrases, mantras, etc.) as well as setting a daily intention, in particular before she goes to bed and when she wakes up in the morning.      We also talked about using a rating scale (1-10) to help her with monitoring and rating the intensity of her emotions, as well as monitoring how strategies impact her emotions (and if a particular strategy doesn't help, to see this as a signal to try another coping strategy).  Also, encouraged her to look for the shades of gray - to look for progress, not perfection.      ASSESSMENT: Current " "Emotional / Mental Status (status of significant symptoms):   Risk status (Self / Other harm or suicidal ideation)   Patient denies current fears or concerns for personal safety.   Patient denies current or recent suicidal ideation or behaviors.    She continues to agree to use a safety plan should any safety concerns arise, and will remind herself \"I've gotten through this before and can get through this again\".  She also agrees to reach our to her spouse or a family member for help if needed, or access crisis/emergency resources.        Patientdenies current or recent homicidal ideation or behaviors.   Patient denies current or recent self injurious behavior or ideation.   Patient denies other safety concerns.   Patient Patient reports there has been no change in risk factors since their last session.     PatientPatient reports there has been no change in protective factors since their last session.     Recommended that patient call 911 or go to the local ED should there be a change in any of these risk factors.  Reviewed with the patient how to contact [facility] crisis number and [facility] crisis/COPE for urgent/crisis concerns 24/7.  Provided patient with crisis resources and she agrees to use safety plan should any safety concerns arise.      Professionals or agencies I can contact during a crisis:    [facility] Daytime Number: [phone]    Suicide Prevention Lifeline: 0-278-947-TALK (5565)    Crisis Text Line Service (available 24 hours a day, 7 days a week): Text [place] to 803912    Local Crisis Services: [facility] Crisis Services: [phone]    Call 911 or go to my nearest emergency department.        Appearance:   unable to assess (phone)    Eye Contact:   unable to assess/phone    Psychomotor Behavior: unable to assess (phone)    Attitude:   Cooperative    Orientation:   All   Speech    Rate / Production: Normal     Volume:  Normal    Mood:    calm " "during session, however, she reports having felt more irritable and angry lately,   Affect:    congruent with mood    Thought Content:  Clear    Thought Form:  Coherent  Logical    Insight:    Good      Medication Review:   No changes to current psychiatric medication(s)    She has been in communication with Dr. Han regarding her questions with recent OCP prescription       Medication Compliance:   Yes     Changes in Health Issues:   Yes: she reported that she and her family all have minor colds, which she believes is due to her daughter having recently started  again and being around other kids.  She was recently tested for COVID.   She is awaiting the test results.         Chemical Use Review:   Substance Use: Chemical use reviewed, no active concerns identified      Tobacco Use: No current tobacco use.      Diagnosis:  Anxiety disorder unspecified    Collateral Reports Completed:   Not Applicable,      PLAN: (Patient Tasks / Therapist Tasks / Other)    1) Continue to practice [employer]pace yocasta every day.  Mindful awareness of realistic expectations of self and others.  Remind self to slow down, take a breath, and focus on breath out.  Consider setting visual cues to help remind you, as well as consider setting mindful intention before going to bed and after waking up in the morning.       Focus on one day at a time.  Create a routine/schedule for regular exercise and practice of head space yocasta, while also allowing myself to have a flexible mindset.      Continue with reaching out/connecting with others.   (when feeling well again)    Continue with: Try to notice when my mind is \"futuring\".  Continue to give myself permission to slow down and practice mindful moments!  Continue to practice meditations on headspace yocasta.  Continue to practice awareness of emotions and thoughts and pay attention to what they may be telling you you need.  Pay attention to what makes it hard to ask  for what you need.  " "      2) Follow-up visit is scheduled for 10/22/2020  at 10:00 am- phone visit.   Please call if you need a sooner appointment.  If urgent or crisis concerns arise prior to next scheduled appointment, please reach out to crisis resources, call 911, or go to the emergency department.      Cristy Wilkinson PsyD,   Licensed Psychologist  10/8/2020        Previous skills and strategies to remind self of and to do as needed:    Practice \"STOP\" technique when you recognize yourself feeling angry   Be a  - what do you notice is happening when Kaylee feels upset?     Practice recognizing when feeling angry, and giving self permission to use calming and self-soothing strategies and take a break.   Look for the gray with thinking  slow down  take deep breaths  manage expectations of self and others.    Journal   Practice flexing \"flexiblity and creativity\" -   Continue the great work you are doing with your daily schedule.    Get outside every day.    Play your instrument.    Sing!    Continue with: \"It's just a thought\" - non-judgmental, observe, float down the river on a leaf, clouds in the gege, reframing unhelpful thoughts -   Keep working on being patient when things are tense around me.    Continue with observing/paying attention to warning signs and signals and give self persmission to slow down, especially during the morning routine with Kaylee.    Practice offerring Kaylee choices when appropriate.      Use \"my plan for success\" to help remind you of calming strategies to practice when feeling upset.    Practice decreasing overall vulnerability to emotion mind through getting adequate rest, nutrition, time for yourself, and physical activity.         Consider reading \"Fighting for your marriage\".      Technical Assistance for video visits:    Offer patient the website ([email or web address]) and/or phone number ([phone]) for video visit instructions/assistance if needed.                                       " "      ____________________________________    Treatment Plan    Patient's Name: Idalia Hinojosa  YOB: 1980    Date: 1/6/2020    DSM5 Diagnoses: 300.00 (F41.9) Unspecified Anxiety Disorder  Psychosocial / Contextual Factors: strain in marital relationship, parenting challenges, adjustment challenges  WHODAS: will gather at diagnostic assessment update    Referral / Collaboration:  We discussed a referral to a couples/marital therapist.  The patient is thinking about this and has talked with her  about the referral, but they are unsure if they would like to follow through at this time.      Anticipated number of session or this episode of care: 8-12    MeasurableTreatment Goal(s) related to diagnosis / functional impairment(s)  Goal 1: Patient will learn emotion regulation strategies to help when she is feeling upset/angry/frustrated/distressed    I will know I've met my goal when I would yell less, I would feel calmer, and I would not push others.  I would be less physical when I'm angry (e.g. wouldn't slam doors or hit things)       Objective #A (Patient Action)    Patient will learn and practice at least 2 new emotion regulation strategies.  Status: Continued - Date(s):4/23/2020 (has begun to learn new emotion regulation strategies and is making good progress)   Update: 7/8/2020: \"I've learned new strategies but I'm bad at practicing them\" - will help patient incorporate regular practice and identify and problem-solve barriers.    Reviewed: 10/8/2020: Patient has been successfully practicing emotion regulation strategies, is working on identifying earlier on when to intervene and engage strategies, as well as practice strategies on a regular basis to help strength use of strategies.        Intervention(s)  Therapist will provide psychoeducation on emotion regulation module from DBT and will assign patient homework to help reinforce skill development.    Objective #B  Patient will identify " "factors that increase vulnerability to emotion mind and identify ways to decrease vulnerability to emotion mind.  Status: Continued - Date(s):4/23/2020 (has learned factors that make her more vulnerable to emotion mind, and has been working on addressing vulnerability related to feeling hungry, tired and rushed)   Update: 7/8/20: \"This one is harder for me.  I know I get angry when I'm hungry, and when I slow down I'm less inclined to get angry\".  Challenges - not always having food ready, not always planning in advance, (planing breakfast the night before)  Reviewed: 10/8/2020: Patient's awareness has increased of factors that increase her vulnerability to emotion mind, and she has been using this as a signal to tell her what she needs.      Intervention(s)  Therapist will provide information on factors that increase vulnerabiliyt to emotion mind.    Objective #C  Patient will identify warning signs and signals that emotions are escalating and will learn ways to skillfully intervene early on.  Status: New - Date: 01/06/2020 7/8/2020- in progress  Update: 10/08/2020: Patient has been making good progress on identifying signs and signals that her emotions are escalating.          Intervention(s)  Therapist will help patient identify warning signs and signals, and will guide the patient in identifying skillful ways to intervene when exeriencing warning signs and signals.      Goal 2: Patient will learn new parenting strategies to help with managing parenting challenges.  Parent will work on improving relationship with her daughter and defining what she would like this relationship to look like.      I will know I've met my goal when I'm enjoying time with my daughter, teaching her new things, and not waiting for things to change      Objective #A (Patient Action)    Status: Continued - Date(s):4/23/2020     Patient will increase understanding of developmental norms for her daughter and ways to manage challenging " "behaviors.    Intervention(s)  Therapist will provide psychoeducation on developmental norms and parenting strategies.    Objective #B  Patient will spend time reflecting on her relationship with her daughter and defining what she would like this relaitonship to look like.    Status: Continued - Date(s):4/23/2020   Update: 7/8/2020 - \"I think some things are going better, she's more helpful with things.  Some things are more challenging with the pandemic and thinking of things to do with her\".    Update: 10/08/2020: Patient is making progress on learning and practicing strategies to manage parenting challenges.        Intervention(s)  Therapist will guide the patient in reflecting upon her relationship with her daughter and help her define ways to move towards what she vaues in her relationship with her daughter.    Objective #C  Patient will increase time spent on fun activity and play with her daughter.  Status: Continued - Date(s):4/23/2020 7/8/2020 -   10/08/2020 - will further assess this goal at next session       Intervention(s)  Therapist will guide the patient in identifying ways she can increase positive time with her daughter.  Therapist will also teach mindfulness strategies to help patient with being in the present moment when appropriate - .      Goal 3: Patiient will improve communication with her .      I will know I've met my goal when I feel less irritated with my  and communicate more openly with my .  I would like to be more assertive and less aggressive.   I would like to not avoid telling him things because I'm worried about his reaction or don't think he will react well.  I would like to be more present to the moment during times when this would be helpful.\"      Objective #A (Patient Action)    Status: Continued - Date(s):4/23/2020 7/8/2020 - suggested couples therapy to help with this.    Individual therapy focus - identify what makes it hard to be more honest and " open   10/08/2020 - will further assess this goal at next session       Patient will learn and practice at least two new interpersonal effectiveness strategies.    Intervention(s)  Therapist will teach strategies from DBT module on interpersonal effectiveness, and will assign homework to help reinforce skill development.      Patient has reviewed and agreed to the above plan.      Cristy Wilkinson PsyD,   Licensed Psychologist    January 6, 2020, reviewed on 4/23/2020, reviewed 7/8/2020, reviewed 10/08/2020                                        at 10:00 am- video visit - text invitation will be sent.   Please call if you need a sooner appointment.  If urgent or crisis concerns arise prior to next scheduled appointment, please reach out to crisis resources, call 911, or go to the emergency department.       Professionals or agencies I can contact during a crisis:      [facility] Daytime Number: [phone]    Suicide Prevention Lifeline: 1-301-851-TALK (8255)    Crisis Text Line Service (available 24 hours a day, 7 days a week): Text [place] to 415883    Local Crisis Services: [facility] Crisis Services: [phone]    Call 911 or go to my nearest emergency department.

## 2020-10-22 ENCOUNTER — VIRTUAL VISIT (OUTPATIENT)
Dept: PSYCHOLOGY | Facility: CLINIC | Age: 40
End: 2020-10-22
Payer: COMMERCIAL

## 2020-10-22 DIAGNOSIS — F41.9 ANXIETY DISORDER, UNSPECIFIED TYPE: Primary | ICD-10-CM

## 2020-10-22 PROCEDURE — 90834 PSYTX W PT 45 MINUTES: CPT | Mod: TEL | Performed by: PSYCHOLOGIST

## 2020-10-22 NOTE — PROGRESS NOTES
"                                              Progress Note    Patient Name: Idalia Hinojosa  Date: 10/22/2020         Service Type: Individual      Session Start Time: 10:05 am  Session End Time:10:55 am     Session Length: 50 minutes    Session #: 17    Attendees: Client    Service Modality:  Telephone visit    Idalia Hinojosa is a 40 year old female who is being evaluated via a telephone visit.      The patient has been notified of the following:     \"We have found that certain health care needs can be provided without the need for a face to face visit.  This service lets us provide the care you need with a short phone conversation.      I will have full access to your [facility] medical record during this entire phone call.   I will be taking notes for your medical record.     Since this is like an office visit, we will bill your insurance company for this service.  Please check with your medical insurance if this type of telephone visit/virtual care is covered.  You may be responsible for the cost of this service if insurance coverage is denied.      There are potential benefits and risks of telephone visits (e.g. limits to patient confidentiality) that differ from in-person visits.?  Confidentiality still applies for telephone services, and nobody will record the visit.  It is important to be in a quiet, private space that is free of distractions (including cell phone or other devices) during the visit.??     If during the course of the call I believe a telephone visit is not appropriate, you will not be charged for this service\"    Consent has been obtained for this service by care team member: yes.    Start time: 10:05 am    End time: 10:55 am    As the provider I attest to compliance with applicable laws and regulations related to telemedicine.    Reason for Phone Visit: Services only offered telehealth      Treatment Plan Last Reviewed: Developed 1/06/2020, reviewed 4/23/2020, reviewed 7/8/2020, reviewed " "10/08/2020  PHQ-9/DAVID-7: 7/8/2020      DATA  Interactive Complexity: No  Crisis: No        Progress Since Last Session (Related to Symptoms / Goals / Homework):   Symptoms: Improving Patient reported that there has been \"more good than bad\" since her last appointment.  She reported that she believes her mood has been more stable, they had a good family vacation, she has been doing headspace yocasta every day and it has been very helpful, and she recognizes that some of the challenges she has experienced have had underlying unrealistic expectations (e.g. everything should go perfectly, I should never feel irritable) which has helped her to approach from a different perspective.         Homework: Achieved / completed to satisfaction.  Successful (headspace yocasta every day, more mindful of expectations of self and others, allowing her to adopt a more flexible mindset and realistic expectations when expectations are unrealistic).        Episode of Care Goals: Satisfactory progress - ACTION (Actively working towards change); Intervened by reinforcing change plan / affirming steps taken.  Diagnostic assessment has been completed, treatment plan has been developed and patient has been making good progress on treatment goals.  The patient stated that her main goals for therapy would be to learn emotion regulation strategies (in particular, to help with when she is feeling upset/angry/frustrated/distressed).  More recently, the changes in her daily life due to COVID restrictions have been a focus of our work together.       Current / Ongoing Stressors and Concerns:   Current:  She noted she's concerned about the winter ahead - changes in weather, cold, snow, etc. In light of all of the continued restrictions and changes to daily activities because of COVID.  Her daughter's birthday is next week, she turns five years old.        Ongoing:Managing daily routine which has contributed to some adjustment and parenting challenges, lack " "of effective emotion regulation strategies for managing daily frustrations and upsets and marital discord due to differences in parenting styles.       Treatment Objective(s) Addressed in This Session:       Identify ways to proactively plan/prepare for anticipated challenges due to winter     Continue with:    identify warning signs and signals, practice inserting skills early on   Increase awareness of intensity of emotions and use rating scale to monitor response to using strategies (and to help determine need to try different strategies)        Intervention:   CBT: and Solution-Focused Therapy Today's session focused on how to help patient identify ways to constructively cope with anticipated challenges ahead due to winter.  She will ask  to identify a time when they can talk together about plan some fun things for the winter months ahead.  She said that last year she did some cross country skiing and really enjoyed this; she'll consider how she could incorporate this activity on a regular basis.  She identified things that would be important and helpful for her to do on a regular basis throughout the winter months to help her feel well (exercise, get outside, headspace yocasta, fun activities, etc.) as well as identified things that aren't helpful/tend to make things worse for her.  She'll also focus the conversation with her  around understanding what they each need from each other to help support each.  She said conversations like this in the past have sometimes been difficult - we talked about approach of listening, validating feelings, and asking \"how can I be helpful?\", and similarly, being prepared to share what she thinks will help her.  Recognized that it is normal and common for couples to have different ways they like to be supported - so rather than assuming or expecting, opening the door for conversation to listen and learn and determine what each can do -     She also talked about her " "daughter's upcoming birthday next week.  She noted that because of some of the challenges she experienced around her daughter's birth, sometimes her birthday can trigger feelings of sadness or focusing on the difficult things from the past.  She would like the celebration this year to focus on the positives and fun.  She'll work with daughter to plan some fun food and activities, as well as will give herself permission to reflect upon, and give herself credit for, the things she has done, while also giving herself miranda and space to recognize and feel other emotions she may have -         ASSESSMENT: Current Emotional / Mental Status (status of significant symptoms):   Risk status (Self / Other harm or suicidal ideation)   Patient denies current fears or concerns for personal safety.   Patient denies current or recent suicidal ideation or behaviors.    She continues to agree to use a safety plan should any safety concerns arise, and will remind herself \"I've gotten through this before and can get through this again\".  She also agrees to reach our to her spouse or a family member for help if needed, or access crisis/emergency resources.        Patientdenies current or recent homicidal ideation or behaviors.   Patient denies current or recent self injurious behavior or ideation.   Patient denies other safety concerns.   Patient Patient reports there has been no change in risk factors since their last session.     PatientPatient reports there has been no change in protective factors since their last session.     Recommended that patient call 911 or go to the local ED should there be a change in any of these risk factors.  Reviewed with the patient how to contact [facility] crisis number and [facility] crisis/COPE for urgent/crisis concerns 24/7.  Provided patient with crisis resources and she agrees to use safety plan should any safety concerns arise.      Professionals or agencies I can " contact during a crisis:    [facility] Daytime Number: [phone]    Suicide Prevention Lifeline: 0-221-847-NQUI (9775)    Crisis Text Line Service (available 24 hours a day, 7 days a week): Text [place] to 831085    [facility] Crisis Services: [facility] Crisis Services: [phone]    Call 911 or go to my nearest emergency department.        Appearance:   unable to assess (phone)    Eye Contact:   unable to assess/phone    Psychomotor Behavior: unable to assess (phone)    Attitude:   Cooperative    Orientation:   All   Speech    Rate / Production: Normal     Volume:  Normal    Mood:    Normal .,   Affect:    congruent with mood    Thought Content:  Clear    Thought Form:  Coherent  Logical    Insight:    Good      Medication Review:   No changes to current psychiatric medication(s)    She has been in communication with Dr. aHn regarding her questions with recent OCP prescription, and overall she believes that her mood has been more stable.        Medication Compliance:   Yes     Changes in Health Issues:   None reported   She reported that her cold has resolved and COVID results came back negative.         Chemical Use Review:   Substance Use: Chemical use reviewed, no active concerns identified      Tobacco Use: No current tobacco use.      Diagnosis:  Anxiety disorder unspecified    Collateral Reports Completed:   Not Applicable,      PLAN: (Patient Tasks / Therapist Tasks / Other)    1) Headspace yocasta every day, schedule time with to talk with Kellee, proactively plan for the winter - what do I need to be doing on a regular basis to stay well, what do I need to set limits on or avoid, what activities can we do over the winter to get outside and enjoy winter.  Continue with - Mindful awareness of realistic expectations of self and others.  Remind self to slow down, take a breath, and focus on breath out.  Consider setting visual cues to help remind you, as well as consider setting mindful  "intention before going to bed and after waking up in the morning.       Focus on one day at a time.  Create a routine/schedule for regular exercise and practice of head space yocasta, while also allowing myself to have a flexible mindset.      Continue with reaching out/connecting with others.      2) Follow-up visit is scheduled for 11/05  at 10:00 am- phone visit.   Please call if you need a sooner appointment.  If urgent or crisis concerns arise prior to next scheduled appointment, please reach out to crisis resources, call 911, or go to the emergency department.      Cristy Wilkinson PsyD,   Licensed Psychologist  10/22/2020      Previous skills and strategies to remind self of and to do as needed:    Practice \"STOP\" technique when you recognize yourself feeling angry   Be a  - what do you notice is happening when Kaylee feels upset?     Practice recognizing when feeling angry, and giving self permission to use calming and self-soothing strategies and take a break.   Look for the gray with thinking  slow down  take deep breaths  manage expectations of self and others.    Journal   Practice flexing \"flexiblity and creativity\" -   Continue the great work you are doing with your daily schedule.    Get outside every day.    Play your instrument.    Sing!    Continue with: \"It's just a thought\" - non-judgmental, observe, float down the river on a leaf, clouds in the gege, reframing unhelpful thoughts -   Keep working on being patient when things are tense around me.    Continue with observing/paying attention to warning signs and signals and give self persmission to slow down, especially during the morning routine with Kaylee.    Practice offerring Kaylee choices when appropriate.      Use \"my plan for success\" to help remind you of calming strategies to practice when feeling upset.    Practice decreasing overall vulnerability to emotion mind through getting adequate rest, nutrition, time for yourself, and physical " "activity.         Consider reading \"Fighting for your marriage\".      Technical Assistance for video visits:    Offer patient the website ([email or web address]) and/or phone number ([phone]) for video visit instructions/assistance if needed.                                             ____________________________________    Treatment Plan    Patient's Name: Idalia Hinojosa  YOB: 1980    Date: 1/6/2020    DSM5 Diagnoses: 300.00 (F41.9) Unspecified Anxiety Disorder  Psychosocial / Contextual Factors: strain in marital relationship, parenting challenges, adjustment challenges  WHODAS: will gather at diagnostic assessment update    Referral / Collaboration:  We discussed a referral to a couples/marital therapist.  The patient is thinking about this and has talked with her  about the referral, but they are unsure if they would like to follow through at this time.      Anticipated number of session or this episode of care: 8-12    MeasurableTreatment Goal(s) related to diagnosis / functional impairment(s)  Goal 1: Patient will learn emotion regulation strategies to help when she is feeling upset/angry/frustrated/distressed    I will know I've met my goal when I would yell less, I would feel calmer, and I would not push others.  I would be less physical when I'm angry (e.g. wouldn't slam doors or hit things)       Objective #A (Patient Action)    Patient will learn and practice at least 2 new emotion regulation strategies.  Status: Continued - Date(s):4/23/2020 (has begun to learn new emotion regulation strategies and is making good progress)   Update: 7/8/2020: \"I've learned new strategies but I'm bad at practicing them\" - will help patient incorporate regular practice and identify and problem-solve barriers.    Reviewed: 10/8/2020: Patient has been successfully practicing emotion regulation strategies, is working on identifying earlier on when to intervene and engage strategies, as well " "as practice strategies on a regular basis to help strength use of strategies.        Intervention(s)  Therapist will provide psychoeducation on emotion regulation module from DBT and will assign patient homework to help reinforce skill development.    Objective #B  Patient will identify factors that increase vulnerability to emotion mind and identify ways to decrease vulnerability to emotion mind.  Status: Continued - Date(s):4/23/2020 (has learned factors that make her more vulnerable to emotion mind, and has been working on addressing vulnerability related to feeling hungry, tired and rushed)   Update: 7/8/20: \"This one is harder for me.  I know I get angry when I'm hungry, and when I slow down I'm less inclined to get angry\".  Challenges - not always having food ready, not always planning in advance, (planing breakfast the night before)  Reviewed: 10/8/2020: Patient's awareness has increased of factors that increase her vulnerability to emotion mind, and she has been using this as a signal to tell her what she needs.      Intervention(s)  Therapist will provide information on factors that increase vulnerabiliyt to emotion mind.    Objective #C  Patient will identify warning signs and signals that emotions are escalating and will learn ways to skillfully intervene early on.  Status: New - Date: 01/06/2020 7/8/2020- in progress  Update: 10/08/2020: Patient has been making good progress on identifying signs and signals that her emotions are escalating.          Intervention(s)  Therapist will help patient identify warning signs and signals, and will guide the patient in identifying skillful ways to intervene when exeriencing warning signs and signals.      Goal 2: Patient will learn new parenting strategies to help with managing parenting challenges.  Parent will work on improving relationship with her daughter and defining what she would like this relationship to look like.      I will know I've met my goal when " "I'm enjoying time with my daughter, teaching her new things, and not waiting for things to change      Objective #A (Patient Action)    Status: Continued - Date(s):4/23/2020     Patient will increase understanding of developmental norms for her daughter and ways to manage challenging behaviors.    Intervention(s)  Therapist will provide psychoeducation on developmental norms and parenting strategies.    Objective #B  Patient will spend time reflecting on her relationship with her daughter and defining what she would like this relaitonship to look like.    Status: Continued - Date(s):4/23/2020   Update: 7/8/2020 - \"I think some things are going better, she's more helpful with things.  Some things are more challenging with the pandemic and thinking of things to do with her\".    Update: 10/08/2020: Patient is making progress on learning and practicing strategies to manage parenting challenges.        Intervention(s)  Therapist will guide the patient in reflecting upon her relationship with her daughter and help her define ways to move towards what she vaues in her relationship with her daughter.    Objective #C  Patient will increase time spent on fun activity and play with her daughter.  Status: Continued - Date(s):4/23/2020 7/8/2020 -   10/08/2020 - will further assess this goal at next session       Intervention(s)  Therapist will guide the patient in identifying ways she can increase positive time with her daughter.  Therapist will also teach mindfulness strategies to help patient with being in the present moment when appropriate - .      Goal 3: Patiient will improve communication with her .      I will know I've met my goal when I feel less irritated with my  and communicate more openly with my .  I would like to be more assertive and less aggressive.   I would like to not avoid telling him things because I'm worried about his reaction or don't think he will react well.  I would like to be " "more present to the moment during times when this would be helpful.\"      Objective #A (Patient Action)    Status: Continued - Date(s):4/23/2020 7/8/2020 - suggested couples therapy to help with this.    Individual therapy focus - identify what makes it hard to be more honest and open   10/08/2020 - will further assess this goal at next session       Patient will learn and practice at least two new interpersonal effectiveness strategies.    Intervention(s)  Therapist will teach strategies from DBT module on interpersonal effectiveness, and will assign homework to help reinforce skill development.      Patient has reviewed and agreed to the above plan.      Cristy Wilkinson PsyD,   Licensed Psychologist    January 6, 2020, reviewed on 4/23/2020, reviewed 7/8/2020, reviewed 10/08/2020                                        at 10:00 am- video visit - text invitation will be sent.   Please call if you need a sooner appointment.  If urgent or crisis concerns arise prior to next scheduled appointment, please reach out to crisis resources, call 911, or go to the emergency department.       Professionals or agencies I can contact during a crisis:      [facility] Daytime Number: [phone]    Suicide Prevention Lifeline: 7-755-818-TALK (8285)    Crisis Text Line Service (available 24 hours a day, 7 days a week): Text [place] to 347234    Local Crisis Services: [facility] Crisis Services: [phone]    Call 911 or go to my nearest emergency department.   "

## 2020-11-05 ENCOUNTER — VIRTUAL VISIT (OUTPATIENT)
Dept: PSYCHOLOGY | Facility: CLINIC | Age: 40
End: 2020-11-05
Payer: COMMERCIAL

## 2020-11-05 DIAGNOSIS — F41.9 ANXIETY DISORDER, UNSPECIFIED TYPE: Primary | ICD-10-CM

## 2020-11-05 PROCEDURE — 90834 PSYTX W PT 45 MINUTES: CPT | Mod: TEL | Performed by: PSYCHOLOGIST

## 2020-11-05 NOTE — PROGRESS NOTES
"                                              Progress Note    Patient Name: Idalia Hinojosa  Date:11/5/2020         Service Type: Individual      Session Start Time: 10:07 am  Session End Time:10:57 am     Session Length: 50 minutes    Session #: 18    Attendees: Client    Service Modality:  Telephone visit    Idalia Hinojosa is a 40 year old female who is being evaluated via a telephone visit.      The patient has been notified of the following:     \"We have found that certain health care needs can be provided without the need for a face to face visit.  This service lets us provide the care you need with a short phone conversation.      I will have full access to your [place] medical record during this entire phone call.   I will be taking notes for your medical record.     Since this is like an office visit, we will bill your insurance company for this service.  Please check with your medical insurance if this type of telephone visit/virtual care is covered.  You may be responsible for the cost of this service if insurance coverage is denied.      There are potential benefits and risks of telephone visits (e.g. limits to patient confidentiality) that differ from in-person visits.?  Confidentiality still applies for telephone services, and nobody will record the visit.  It is important to be in a quiet, private space that is free of distractions (including cell phone or other devices) during the visit.??     If during the course of the call I believe a telephone visit is not appropriate, you will not be charged for this service\"    Consent has been obtained for this service by care team member: yes.    Start time: 10:05 am    End time: 10:57 am    As the provider I attest to compliance with applicable laws and regulations related to telemedicine.    Reason for Phone Visit: Services only offered telehealth      Treatment Plan Last Reviewed: Developed 1/06/2020, reviewed 4/23/2020, reviewed 7/8/2020, reviewed " 10/08/2020  PHQ-9/DAVID-7: 7/8/2020      DATA  Interactive Complexity: No  Crisis: No        Progress Since Last Session (Related to Symptoms / Goals / Homework):   Symptoms: Improving She reported that she had some really special moments and conversation with Kaylee, her daughter.  She also has been doing really well with practicing self-care strategies while her daughter is at pre-school.        Homework: Achieved / completed to satisfaction.  Making good progress on practicing and utilizing what she is learning about in her sessions.  She reported she has ordered cross-country skis, has been exercising every day and has been loving the at-home videos she has found.        Episode of Care Goals: Satisfactory progress - ACTION (Actively working towards change); Intervened by reinforcing change plan / affirming steps taken.  Diagnostic assessment has been completed, treatment plan has been developed and patient has been making good progress on treatment goals.  The patient stated that her main goals for therapy would be to learn emotion regulation strategies (in particular, to help with when she is feeling upset/angry/frustrated/distressed).  More recently, the changes in her daily life due to COVID restrictions have been a focus of our work together.       Current / Ongoing Stressors and Concerns:   Current:  She noted that her daughter's birthday went really well; she's been spending some good time with Kaylee and had some special moments and conversations with her.  Main focus is on expectations she has set for herself and feelings of self-worth.       Ongoing:Managing daily routine which has contributed to some adjustment and parenting challenges, lack of effective emotion regulation strategies for managing daily frustrations and upsets and marital discord due to differences in parenting styles.       Treatment Objective(s) Addressed in This Session:       Identify ways to manage emotional reaction when daughter is  "upset or escalating (how you react will impact your daughter)   Explore feelings of self-worth - what do you connect to your self-worth?  Evaluate and update any unhelpful beliefs of unrealistic expectations.  Do you allow others or yourself to define what is important to you and what matters to you?     Explore what does \"success\" mean to you?  Who defines what \"success\" is for you - you or others?            Intervention:   CBT: and Solution-Focused Therapy Today's session focused on how to manage her emotional reaction when her daughter is upset or escalating.  She reported she's becoming increasingly aware of how she feels, and has been focusing on staying relaxed and calm for her family.  She also noted that she has become more aware of the dynamic between she and Kaylee - how she feels impacts how her daughter reacts and feels.  She identified the ways in which she can put unnecessary pressure or have unrealistic expectations of herself.  She reflected that she has put pressure on herself for \"not having a job\" - she recognizes that this minimizes the important role she has in taking care of Kaylee and being a parent.  She explored themes of self-worth and how she defines \"success\".  She identified that doing the best job she can parenting is what is important to her right now.      ASSESSMENT: Current Emotional / Mental Status (status of significant symptoms):   Risk status (Self / Other harm or suicidal ideation)   Patient denies current fears or concerns for personal safety.   Patient denies current or recent suicidal ideation or behaviors.    She has agreed to use a safety plan should any safety concerns arise, and will remind herself \"I've gotten through this before and can get through this again\".  She has also agreed to reach our to her spouse or a family member for help if needed, or access crisis/emergency resources.        Patientdenies current or recent homicidal ideation or behaviors.   Patient denies " "current or recent self injurious behavior or ideation.   Patient denies other safety concerns.   Patient Patient reports there has been no change in risk factors since their last session.     PatientPatient reports there has been no change in protective factors since their last session.     Recommended that patient call 911 or go to the local ED should there be a change in any of these risk factors.  Reviewed with the patient how to contact [facility] crisis number and [facility] crisis/COPE for urgent/crisis concerns 24/7.  Provided patient with crisis resources and she agrees to use safety plan should any safety concerns arise.      Professionals or agencies I can contact during a crisis:    [facility] Daytime Number: [phone]    Suicide Prevention Lifeline: 8-632-511-TALK (8860)    Crisis Text Line Service (available 24 hours a day, 7 days a week): Text [place] to 303172    [facility] Crisis Services: [facility] Crisis Services: [phone]    Call 911 or go to my nearest emergency department.        Appearance:   unable to assess (phone)    Eye Contact:   unable to assess/phone    Psychomotor Behavior: unable to assess (phone)    Attitude:   Cooperative    Orientation:   All   Speech    Rate / Production: Normal     Volume:  Normal    Mood:    Normal .,   Affect:    congruent with mood    Thought Content:  Clear    Thought Form:  Coherent  Logical    Insight:    Good      Medication Review:   No changes to current psychiatric medication(s).  She reported that her medications are \"settling in\" and she feels more \"stable\".             Medication Compliance:   Yes     Changes in Health Issues:   None reported          Chemical Use Review:   Substance Use: Chemical use reviewed, no active concerns identified      Tobacco Use: No current tobacco use.      Diagnosis:  Anxiety disorder unspecified    Collateral Reports Completed:   Not Applicable,      PLAN: (Patient Tasks " "/ Therapist Tasks / Other)    1)  Keep exercising regularly.  Identify a plan for how you can cross-country ski during the winter.  Talk with Kellee about this.  Continue using headspace yocasta to help with regular practice of meditation.       Focus on one day at a time.  Create a routine/schedule for regular exercise and practice of head space yocasta, while also allowing myself to have a flexible mindset.      Continue with reaching out/connecting with others.      2) Follow-up visit is scheduled for 11/19  at 10:00 am- phone visit.   Please call if you need a sooner appointment.  If urgent or crisis concerns arise prior to next scheduled appointment, please reach out to crisis resources, call 911, or go to the emergency department.      Cristy Wilkinson PsyD, LP  Licensed Psychologist  11/5/2020        Previous skills and strategies to remind self of and to do as needed:    Practice \"STOP\" technique when you recognize yourself feeling angry   Be a  - what do you notice is happening when Kaylee feels upset?     Practice recognizing when feeling angry, and giving self permission to use calming and self-soothing strategies and take a break.   Look for the gray with thinking  slow down  take deep breaths  manage expectations of self and others.    Journal   Practice flexing \"flexiblity and creativity\" -   Continue the great work you are doing with your daily schedule.    Get outside every day.    Play your instrument.    Sing!    Continue with: \"It's just a thought\" - non-judgmental, observe, float down the river on a leaf, clouds in the gege, reframing unhelpful thoughts -   Keep working on being patient when things are tense around me.    Continue with observing/paying attention to warning signs and signals and give self persmission to slow down, especially during the morning routine with Kaylee.    Practice offerring Kaylee choices when appropriate.      Use \"my plan for success\" to help remind you of calming strategies to " "practice when feeling upset.    Practice decreasing overall vulnerability to emotion mind through getting adequate rest, nutrition, time for yourself, and physical activity.         Consider reading \"Fighting for your marriage\".      Technical Assistance for video visits:    Offer patient the website ([email or web address]) and/or phone number ([phone]) for video visit instructions/assistance if needed.                                             ____________________________________    Treatment Plan    Patient's Name: Idalia Hinojosa  YOB: 1980    Date: 1/6/2020    DSM5 Diagnoses: 300.00 (F41.9) Unspecified Anxiety Disorder  Psychosocial / Contextual Factors: strain in marital relationship, parenting challenges, adjustment challenges  WHODAS: will gather at diagnostic assessment update    Referral / Collaboration:  We discussed a referral to a couples/marital therapist.  The patient is thinking about this and has talked with her  about the referral, but they are unsure if they would like to follow through at this time.      Anticipated number of session or this episode of care: 8-12    MeasurableTreatment Goal(s) related to diagnosis / functional impairment(s)  Goal 1: Patient will learn emotion regulation strategies to help when she is feeling upset/angry/frustrated/distressed    I will know I've met my goal when I would yell less, I would feel calmer, and I would not push others.  I would be less physical when I'm angry (e.g. wouldn't slam doors or hit things)       Objective #A (Patient Action)    Patient will learn and practice at least 2 new emotion regulation strategies.  Status: Continued - Date(s):4/23/2020 (has begun to learn new emotion regulation strategies and is making good progress)   Update: 7/8/2020: \"I've learned new strategies but I'm bad at practicing them\" - will help patient incorporate regular practice and identify and problem-solve barriers.    Reviewed: " "10/8/2020: Patient has been successfully practicing emotion regulation strategies, is working on identifying earlier on when to intervene and engage strategies, as well as practice strategies on a regular basis to help strength use of strategies.        Intervention(s)  Therapist will provide psychoeducation on emotion regulation module from DBT and will assign patient homework to help reinforce skill development.    Objective #B  Patient will identify factors that increase vulnerability to emotion mind and identify ways to decrease vulnerability to emotion mind.  Status: Continued - Date(s):4/23/2020 (has learned factors that make her more vulnerable to emotion mind, and has been working on addressing vulnerability related to feeling hungry, tired and rushed)   Update: 7/8/20: \"This one is harder for me.  I know I get angry when I'm hungry, and when I slow down I'm less inclined to get angry\".  Challenges - not always having food ready, not always planning in advance, (planing breakfast the night before)  Reviewed: 10/8/2020: Patient's awareness has increased of factors that increase her vulnerability to emotion mind, and she has been using this as a signal to tell her what she needs.      Intervention(s)  Therapist will provide information on factors that increase vulnerabiliyt to emotion mind.    Objective #C  Patient will identify warning signs and signals that emotions are escalating and will learn ways to skillfully intervene early on.  Status: New - Date: 01/06/2020 7/8/2020- in progress  Update: 10/08/2020: Patient has been making good progress on identifying signs and signals that her emotions are escalating.          Intervention(s)  Therapist will help patient identify warning signs and signals, and will guide the patient in identifying skillful ways to intervene when exeriencing warning signs and signals.      Goal 2: Patient will learn new parenting strategies to help with managing parenting " "challenges.  Parent will work on improving relationship with her daughter and defining what she would like this relationship to look like.      I will know I've met my goal when I'm enjoying time with my daughter, teaching her new things, and not waiting for things to change      Objective #A (Patient Action)    Status: Continued - Date(s):4/23/2020     Patient will increase understanding of developmental norms for her daughter and ways to manage challenging behaviors.    Intervention(s)  Therapist will provide psychoeducation on developmental norms and parenting strategies.    Objective #B  Patient will spend time reflecting on her relationship with her daughter and defining what she would like this relaitonship to look like.    Status: Continued - Date(s):4/23/2020   Update: 7/8/2020 - \"I think some things are going better, she's more helpful with things.  Some things are more challenging with the pandemic and thinking of things to do with her\".    Update: 10/08/2020: Patient is making progress on learning and practicing strategies to manage parenting challenges.        Intervention(s)  Therapist will guide the patient in reflecting upon her relationship with her daughter and help her define ways to move towards what she vaues in her relationship with her daughter.    Objective #C  Patient will increase time spent on fun activity and play with her daughter.  Status: Continued - Date(s):4/23/2020 7/8/2020 -   10/08/2020 - will further assess this goal at next session       Intervention(s)  Therapist will guide the patient in identifying ways she can increase positive time with her daughter.  Therapist will also teach mindfulness strategies to help patient with being in the present moment when appropriate - .      Goal 3: Patiient will improve communication with her .      I will know I've met my goal when I feel less irritated with my  and communicate more openly with my .  I would like to be " "more assertive and less aggressive.   I would like to not avoid telling him things because I'm worried about his reaction or don't think he will react well.  I would like to be more present to the moment during times when this would be helpful.\"      Objective #A (Patient Action)    Status: Continued - Date(s):4/23/2020 7/8/2020 - suggested couples therapy to help with this.    Individual therapy focus - identify what makes it hard to be more honest and open   10/08/2020 - will further assess this goal at next session       Patient will learn and practice at least two new interpersonal effectiveness strategies.    Intervention(s)  Therapist will teach strategies from DBT module on interpersonal effectiveness, and will assign homework to help reinforce skill development.      Patient has reviewed and agreed to the above plan.      Cristy Wilkinson PsyD, LP  Licensed Psychologist    January 6, 2020, reviewed on 4/23/2020, reviewed 7/8/2020, reviewed 10/08/2020                                        at 10:00 am- video visit - text invitation will be sent.   Please call if you need a sooner appointment.  If urgent or crisis concerns arise prior to next scheduled appointment, please reach out to crisis resources, call 911, or go to the emergency department.       Professionals or agencies I can contact during a crisis:      [facility] Daytime Number: [phone]    Suicide Prevention Lifeline: 4-202-593-TALK (8249)    Crisis Text Line Service (available 24 hours a day, 7 days a week): Text [place] to 094215    Local Crisis Services: [facility] Crisis Services: [phone]    Call 911 or go to my nearest emergency department.   "

## 2020-11-19 ENCOUNTER — VIRTUAL VISIT (OUTPATIENT)
Dept: PSYCHOLOGY | Facility: CLINIC | Age: 40
End: 2020-11-19
Payer: COMMERCIAL

## 2020-11-19 DIAGNOSIS — F41.9 ANXIETY DISORDER, UNSPECIFIED TYPE: Primary | ICD-10-CM

## 2020-11-19 PROCEDURE — 90832 PSYTX W PT 30 MINUTES: CPT | Mod: TEL | Performed by: PSYCHOLOGIST

## 2020-11-19 NOTE — PROGRESS NOTES
"                                              Progress Note    Patient Name: Idalia Hinojosa  Date:1119/2020         Service Type: Individual      Session Start Time: 10:04 am  Session End Time:10:34 am     Session Length: 30 minutes    Session #: 19    Attendees: Client    Service Modality:  Telephone visit    Idalia Hinojosa is a 40 year old female who is being evaluated via a telephone visit.      The patient has been notified of the following:     \"We have found that certain health care needs can be provided without the need for a face to face visit.  This service lets us provide the care you need with a short phone conversation.      I will have full access to your [place] medical record during this entire phone call.   I will be taking notes for your medical record.     Since this is like an office visit, we will bill your insurance company for this service.  Please check with your medical insurance if this type of telephone visit/virtual care is covered.  You may be responsible for the cost of this service if insurance coverage is denied.      There are potential benefits and risks of telephone visits (e.g. limits to patient confidentiality) that differ from in-person visits.?  Confidentiality still applies for telephone services, and nobody will record the visit.  It is important to be in a quiet, private space that is free of distractions (including cell phone or other devices) during the visit.??     If during the course of the call I believe a telephone visit is not appropriate, you will not be charged for this service\"    Consent has been obtained for this service by care team member: yes.    Start time: 10:04 am    End time: 10:34 am    As the provider I attest to compliance with applicable laws and regulations related to telemedicine.    Reason for Phone Visit: Services only offered telehealth      Treatment Plan Last Reviewed: Developed 1/06/2020, reviewed 4/23/2020, reviewed 7/8/2020, reviewed " 10/08/2020  PHQ-9/DAVID-7: 7/8/2020      DATA  Interactive Complexity: No  Crisis: No        Progress Since Last Session (Related to Symptoms / Goals / Homework):   Symptoms: Some worsening of symptoms - she noted that she feels tired early due to daylight savings change, and she has felt more internal stress and irritability. She reported that she's been experiencing worry related to COVID.           Homework: Achieved / completed to satisfaction.  Continues to practice and engage coping strategies.  [employer] yocasta continues to be helpful for her.        Episode of Care Goals: Satisfactory progress - ACTION (Actively working towards change); Intervened by reinforcing change plan / affirming steps taken.  Diagnostic assessment has been completed, treatment plan has been developed and patient has been making good progress on treatment goals.  The patient stated that her main goals for therapy would be to learn emotion regulation strategies (in particular, to help with when she is feeling upset/angry/frustrated/distressed).  More recently, the changes in her daily life due to COVID restrictions have been a focus of our work together.       Current / Ongoing Stressors and Concerns:   Current:  She noted she feels tired early on due to less sunlight.  She's been experiencing some frustration due to parenting challenges.  She's experiencing worry related to COVID.        Ongoing:Managing daily routine which has contributed to some adjustment and parenting challenges, lack of effective emotion regulation strategies for managing daily frustrations and upsets and marital discord due to differences in parenting styles.       Treatment Objective(s) Addressed in This Session:      Identify and practice behavioral activation strategies to help with feeling more tired due to less sunlight  Identify and practice parenting strategies to help with parenting challenges  Identify and practice strategies to help with managing worry  related to COVID     Continue with:    identify warning signs and signals, practice inserting skills early on   Increase awareness of intensity of emotions and use rating scale to monitor response to using strategies (and to help determine need to try different strategies)        Intervention:   CBT: and Solution-Focused Therapy Today's session focused on how to use behavioral activation strategies to help with increase in feeling tired she is experiencing due to daylight savings change/less sunlight.  She noted that she tends to start feeling more tired right when the sun goes down and it makes her feel like she wants to go to bed.  Discussed planning a mid to later afternoon walk or activity outside to help with energy levels before it gets dark.      Talked about parenting challenges she has been experiencing, in particular she identified challenges related to the bedtime routine.  Encouraged calming and quiet activities before bed, consistent routine, allowing daughter to engage in some quiet and calming activities in her bedroom if she's not tired at bedtime, managing her emotional reaction and staying calm, and helping daughter to engage calming strategies when she's feeling upset.  We talked about some ways they might practice this together outside of times where her daughter is feeling upset (e.g. using stuffed animals or dolls, etc. And having them practice good calming strategies to help with her with learning new strategies - e.g. taking deep breaths, hugging a stuffed animal, saying calm thoughts, taking a break, asking an adult for help, trying to solve the problem, etc.)    We also talked about ways she can continue to work on managing worry and anxiety related to COVID - focus on what you can control, believe in yourself and your ability to cope.  Recognize your strengths, all you have learned and all you are doing.         ASSESSMENT: Current Emotional / Mental Status (status of significant  "symptoms):   Risk status (Self / Other harm or suicidal ideation)   Patient denies current fears or concerns for personal safety.   Patient denies current or recent suicidal ideation or behaviors.    She has agreed to use a safety plan should any safety concerns arise, and will remind herself \"I've gotten through this before and can get through this again\".  She has also agreed to reach our to her spouse or a family member for help if needed, or access crisis/emergency resources.        Patientdenies current or recent homicidal ideation or behaviors.   Patient denies current or recent self injurious behavior or ideation.   Patient denies other safety concerns.   Patient Patient reports there has been no change in risk factors since their last session.     PatientPatient reports there has been no change in protective factors since their last session.     Recommended that patient call 911 or go to the local ED should there be a change in any of these risk factors.  Reviewed with the patient how to contact [facility] crisis number and [facility] crisis/COPE for urgent/crisis concerns 24/7.  Provided patient with crisis resources and she agrees to use safety plan should any safety concerns arise.      Professionals or agencies I can contact during a crisis:    [facility] Daytime Number: [phone]    Suicide Prevention Lifeline: 8-700-093-TALK (5215)    Crisis Text Line Service (available 24 hours a day, 7 days a week): Text [place] to 562979    [facility] Crisis Services: [facility] Crisis Services: [phone]    Call 911 or go to my nearest emergency department.        Appearance:   unable to assess (phone)    Eye Contact:   unable to assess/phone    Psychomotor Behavior: unable to assess (phone)    Attitude:   Cooperative    Orientation:   All   Speech    Rate / Production: Normal     Volume:  Normal    Mood:    Normal .,   Affect:    congruent with mood    Thought " "Content:  Clear    Thought Form:  Coherent  Logical    Insight:    Good      Medication Review:   No changes to current psychiatric medication(s)        Medication Compliance:   Yes     Changes in Health Issues:   None reported        Chemical Use Review:   Substance Use: Chemical use reviewed, no active concerns identified      Tobacco Use: No current tobacco use.      Diagnosis:  Anxiety disorder unspecified    Collateral Reports Completed:   Not Applicable,      PLAN: (Patient Tasks / Therapist Tasks / Other)    1) keep doing [employer]space yocasta every day, exercise regularly, think about how I can be more calming for Kaylee when she's feeling frustrated.    Continue practicing flexible mindset!      Continue with reaching out/connecting with others.      2) Follow-up visit is scheduled for 12/3  At 9:30 am- phone visit.   Please call if you need a sooner appointment.  If urgent or crisis concerns arise prior to next scheduled appointment, please reach out to crisis resources, call 911, or go to the emergency department.      Cristy Wilkinson PsyD,   Licensed Psychologist  11/19/2020          Previous skills and strategies to remind self of and to do as needed:    Practice \"STOP\" technique when you recognize yourself feeling angry   Be a  - what do you notice is happening when Kaylee feels upset?     Practice recognizing when feeling angry, and giving self permission to use calming and self-soothing strategies and take a break.   Look for the gray with thinking  slow down  take deep breaths  manage expectations of self and others.    Journal   Practice flexing \"flexiblity and creativity\" -   Continue the great work you are doing with your daily schedule.    Get outside every day.    Play your instrument.    Sing!    Continue with: \"It's just a thought\" - non-judgmental, observe, float down the river on a leaf, clouds in the gege, reframing unhelpful thoughts -   Keep working on being patient when things are tense " "around me.    Continue with observing/paying attention to warning signs and signals and give self persmission to slow down, especially during the morning routine with Kaylee.    Practice offerring Kaylee choices when appropriate.      Use \"my plan for success\" to help remind you of calming strategies to practice when feeling upset.    Practice decreasing overall vulnerability to emotion mind through getting adequate rest, nutrition, time for yourself, and physical activity.         Consider reading \"Fighting for your marriage\".      Technical Assistance for video visits:    Offer patient the website ([email or web address]) and/or phone number ([phone]) for video visit instructions/assistance if needed.                                             ____________________________________    Treatment Plan    Patient's Name: Idalia Hinojosa  YOB: 1980    Date: 1/6/2020    DSM5 Diagnoses: 300.00 (F41.9) Unspecified Anxiety Disorder  Psychosocial / Contextual Factors: strain in marital relationship, parenting challenges, adjustment challenges  WHODAS: will gather at diagnostic assessment update    Referral / Collaboration:  We discussed a referral to a couples/marital therapist.  The patient is thinking about this and has talked with her  about the referral, but they are unsure if they would like to follow through at this time.      Anticipated number of session or this episode of care: 8-12    MeasurableTreatment Goal(s) related to diagnosis / functional impairment(s)  Goal 1: Patient will learn emotion regulation strategies to help when she is feeling upset/angry/frustrated/distressed    I will know I've met my goal when I would yell less, I would feel calmer, and I would not push others.  I would be less physical when I'm angry (e.g. wouldn't slam doors or hit things)       Objective #A (Patient Action)    Patient will learn and practice at least 2 new emotion regulation strategies.  Status: " "Continued - Date(s):4/23/2020 (has begun to learn new emotion regulation strategies and is making good progress)   Update: 7/8/2020: \"I've learned new strategies but I'm bad at practicing them\" - will help patient incorporate regular practice and identify and problem-solve barriers.    Reviewed: 10/8/2020: Patient has been successfully practicing emotion regulation strategies, is working on identifying earlier on when to intervene and engage strategies, as well as practice strategies on a regular basis to help strength use of strategies.        Intervention(s)  Therapist will provide psychoeducation on emotion regulation module from DBT and will assign patient homework to help reinforce skill development.    Objective #B  Patient will identify factors that increase vulnerability to emotion mind and identify ways to decrease vulnerability to emotion mind.  Status: Continued - Date(s):4/23/2020 (has learned factors that make her more vulnerable to emotion mind, and has been working on addressing vulnerability related to feeling hungry, tired and rushed)   Update: 7/8/20: \"This one is harder for me.  I know I get angry when I'm hungry, and when I slow down I'm less inclined to get angry\".  Challenges - not always having food ready, not always planning in advance, (planing breakfast the night before)  Reviewed: 10/8/2020: Patient's awareness has increased of factors that increase her vulnerability to emotion mind, and she has been using this as a signal to tell her what she needs.      Intervention(s)  Therapist will provide information on factors that increase vulnerabiliyt to emotion mind.    Objective #C  Patient will identify warning signs and signals that emotions are escalating and will learn ways to skillfully intervene early on.  Status: New - Date: 01/06/2020 7/8/2020- in progress  Update: 10/08/2020: Patient has been making good progress on identifying signs and signals that her emotions are escalating.  " "        Intervention(s)  Therapist will help patient identify warning signs and signals, and will guide the patient in identifying skillful ways to intervene when exeriencing warning signs and signals.      Goal 2: Patient will learn new parenting strategies to help with managing parenting challenges.  Parent will work on improving relationship with her daughter and defining what she would like this relationship to look like.      I will know I've met my goal when I'm enjoying time with my daughter, teaching her new things, and not waiting for things to change      Objective #A (Patient Action)    Status: Continued - Date(s):4/23/2020     Patient will increase understanding of developmental norms for her daughter and ways to manage challenging behaviors.    Intervention(s)  Therapist will provide psychoeducation on developmental norms and parenting strategies.    Objective #B  Patient will spend time reflecting on her relationship with her daughter and defining what she would like this relaitonship to look like.    Status: Continued - Date(s):4/23/2020   Update: 7/8/2020 - \"I think some things are going better, she's more helpful with things.  Some things are more challenging with the pandemic and thinking of things to do with her\".    Update: 10/08/2020: Patient is making progress on learning and practicing strategies to manage parenting challenges.        Intervention(s)  Therapist will guide the patient in reflecting upon her relationship with her daughter and help her define ways to move towards what she vaues in her relationship with her daughter.    Objective #C  Patient will increase time spent on fun activity and play with her daughter.  Status: Continued - Date(s):4/23/2020 7/8/2020 -   10/08/2020 - will further assess this goal at next session       Intervention(s)  Therapist will guide the patient in identifying ways she can increase positive time with her daughter.  Therapist will also teach " "mindfulness strategies to help patient with being in the present moment when appropriate - .      Goal 3: Patiient will improve communication with her .      I will know I've met my goal when I feel less irritated with my  and communicate more openly with my .  I would like to be more assertive and less aggressive.   I would like to not avoid telling him things because I'm worried about his reaction or don't think he will react well.  I would like to be more present to the moment during times when this would be helpful.\"      Objective #A (Patient Action)    Status: Continued - Date(s):4/23/2020 7/8/2020 - suggested couples therapy to help with this.    Individual therapy focus - identify what makes it hard to be more honest and open   10/08/2020 - will further assess this goal at next session       Patient will learn and practice at least two new interpersonal effectiveness strategies.    Intervention(s)  Therapist will teach strategies from DBT module on interpersonal effectiveness, and will assign homework to help reinforce skill development.      Patient has reviewed and agreed to the above plan.      Cristy Wilkinson PsyD,   Licensed Psychologist    January 6, 2020, reviewed on 4/23/2020, reviewed 7/8/2020, reviewed 10/08/2020                                        at 10:00 am- video visit - text invitation will be sent.   Please call if you need a sooner appointment.  If urgent or crisis concerns arise prior to next scheduled appointment, please reach out to crisis resources, call 911, or go to the emergency department.       Professionals or agencies I can contact during a crisis:      [facility] Daytime Number: [phone]    Suicide Prevention Lifeline: 7-685-563-TALK (4749)    Crisis Text Line Service (available 24 hours a day, 7 days a week): Text [place] to 703567    Local Crisis Services: [facility] Crisis Services: [phone]    Call 911 or go to my " nearest emergency department.

## 2020-12-03 ENCOUNTER — VIRTUAL VISIT (OUTPATIENT)
Dept: PSYCHOLOGY | Facility: CLINIC | Age: 40
End: 2020-12-03
Payer: COMMERCIAL

## 2020-12-03 DIAGNOSIS — F41.9 ANXIETY DISORDER, UNSPECIFIED TYPE: Primary | ICD-10-CM

## 2020-12-03 PROCEDURE — 90834 PSYTX W PT 45 MINUTES: CPT | Mod: TEL | Performed by: PSYCHOLOGIST

## 2020-12-03 NOTE — PROGRESS NOTES
"                                              Progress Note    Patient Name: Idalia Hinojosa  Date:12/3/2020         Service Type: Individual      Session Start Time: 9:34  am  Session End Time:10:24 am     Session Length: 50 minutes    Session #: 20    Attendees: Client    Service Modality:  Telephone visit    Idalia Hinojosa is a 40 year old female who is being evaluated via a telephone visit.      The patient has been notified of the following:     \"We have found that certain health care needs can be provided without the need for a face to face visit.  This service lets us provide the care you need with a short phone conversation.      I will have full access to your [facility] medical record during this entire phone call.   I will be taking notes for your medical record.     Since this is like an office visit, we will bill your insurance company for this service.  Please check with your medical insurance if this type of telephone visit/virtual care is covered.  You may be responsible for the cost of this service if insurance coverage is denied.      There are potential benefits and risks of telephone visits (e.g. limits to patient confidentiality) that differ from in-person visits.?  Confidentiality still applies for telephone services, and nobody will record the visit.  It is important to be in a quiet, private space that is free of distractions (including cell phone or other devices) during the visit.??     If during the course of the call I believe a telephone visit is not appropriate, you will not be charged for this service\"    Consent has been obtained for this service by care team member: yes.    Start time: 9:34  am    End time: 10: 24 am    As the provider I attest to compliance with applicable laws and regulations related to telemedicine.    Reason for Phone Visit: Services only offered telehealth      Treatment Plan Last Reviewed: Developed 1/06/2020, reviewed 4/23/2020, reviewed 7/8/2020, reviewed " 10/08/2020  PHQ-9/DAVID-7: 7/8/2020      DATA  Interactive Complexity: No  Crisis: No        Progress Since Last Session (Related to Symptoms / Goals / Homework):   Symptoms: Improving .  She reported that symptoms had been mostly improving until this morning when she learned that her daughter was exposed to another child at  with COVID and they will now have to quarantine, which intensified her emotional distress.      Homework: Achieved / completed to satisfaction.  Continues to do an excellent job practicing and engaging coping strategies.        Episode of Care Goals: Satisfactory progress - ACTION (Actively working towards change); Intervened by reinforcing change plan / affirming steps taken.  Diagnostic assessment has been completed, treatment plan has been developed and patient has been making good progress on treatment goals.  The patient stated that her main goals for therapy would be to learn emotion regulation strategies (in particular, to help with when she is feeling upset/angry/frustrated/distressed).  More recently, the changes in her daily life due to COVID restrictions have been a focus of our work together.       Current / Ongoing Stressors and Concerns:   Current:  She noted she's concerned about how she will manage the next few weeks with even more restrictions due to needing to quarantine.  She also expressed some concerns about her daughter's behavior at home and how to best manage this.       Ongoing:Managing daily routine which has contributed to some adjustment and parenting challenges, lack of effective emotion regulation strategies for managing daily frustrations and upsets and marital discord due to differences in parenting styles.       Treatment Objective(s) Addressed in This Session:      Identify and practice coping strategies to help with managing change in routine due to quarantine   Identify and practice parenting strategies to help with managing challenging behaviors     "     Intervention:   CBT: and Solution-Focused Therapy Today's session focused on discussing distress patient is experiencing about learning she and her family will need to quarantine for the next 14 days due to a possible COVID exposure.  She reported \"I feel like not getting out of bed\" and \"I've been trying so hard\" and felt like everything she has been doing hasn't made a difference because of the place they are in right now.  Gently encouraged her to see the polarization in these thoughts and encouraged finding a more balanced perspective that recognizes the work she has done, the progress she has made, and that the current situation doesn't negate that.  We also talked about ways to help she and her family during their time in quarantine - including continue with structure and developing a routine, get out of bed at a set time, get dressed, schedule activities and things to do during the day.  We also talked about giving each other and self some extra miranda, kindness and understanding.      She talked about some of the parenting challenges she is experiencing with her daughter.  Acknowledged that her daughter too is also impacted in her ways by the many changes due to COVID, and unable to articulate or express these thoughts or feelings in the same way an adult might.  Encouraged her to look for opportunities to recognize and validate her daughters emotions, continue the good work she is doing with providing structure and opportunities for connection.  We also discussed ways to decrease power struggles and focus on offering choices with logical consequences.    She processed an experience where she was frustrated with her daughter and had walked by her and knocked her over.  She reported her daughter didn't get hurt in the incident, but she felt upset with herself that she has made so much progress with managing her emotions and frustration.  Recognized her tendency to see things in a polarized fashion " "(perfect or failure, all good or all bad) and instead find the gray (recognize the successes and the challenges) and focus on learning, growing and allowing herself to \"get back on track\".      ASSESSMENT: Current Emotional / Mental Status (status of significant symptoms):   Risk status (Self / Other harm or suicidal ideation)   Patient denies current fears or concerns for personal safety.   Patient denies current or recent suicidal ideation or behaviors.    She has agreed to use a safety plan should any safety concerns arise,  She has also agreedto reach our to her spouse or a family member for help if needed, or access crisis/emergency resources.        Patientdenies current or recent homicidal ideation or behaviors.   Patient denies current or recent self injurious behavior or ideation.   Patient denies other safety concerns.   Patient Patient reports there has been no change in risk factors since their last session.     PatientPatient reports there has been no change in protective factors since their last session.     Recommended that patient call 911 or go to the local ED should there be a change in any of these risk factors.  Reviewed with the patient how to contact [facility] daytime crisis number and [facility] crisis/COPE for urgent/crisis concerns 24/7.  Provided patient with crisis resources and she agrees to use safety plan should any safety concerns arise.      Professionals or agencies I can contact during a crisis:    [facility] Daytime Number: [phone]    Suicide Prevention Lifeline: 5-181-281-TALK (8255)    Crisis Text Line Service (available 24 hours a day, 7 days a week): Text [place] to 044259    Local Crisis Services: [facility] Crisis Services: [phone]    Call 911 or go to my nearest emergency department.        Appearance:   unable to assess (phone)    Eye Contact:   unable to assess/phone    Psychomotor Behavior: unable to assess (phone) " "   Attitude:   Cooperative    Orientation:   All   Speech    Rate / Production: Normal     Volume:  Normal    Mood:    Sad  .,   Affect:    congruent with mood    Thought Content:  Clear    Thought Form:  Coherent  Logical    Insight:    Good      Medication Review:   No changes to current psychiatric medication(s)       Medication Compliance:   Yes     Changes in Health Issues:   None reported   She will be getting tested for COVID due to her daughter being exposed at school, she stated she doesn't currently have any symptoms.       Chemical Use Review:   Substance Use: Chemical use reviewed, no active concerns identified      Tobacco Use: No current tobacco use.      Diagnosis:  Anxiety disorder unspecified    Collateral Reports Completed:   Not Applicable,      PLAN: (Patient Tasks / Therapist Tasks / Other)    1) Continue to focus on - One day at a time and one moment at a time.  Continue using utoopiapace yocasta.  Consider referral for child therapist to help with challenges with Kaylee and/or family therapy,     Focus on routine and structure.  Get out of bed at a set time, get dressed, and plan activities and things to do for the day.  Exercise/phsyical activity with at-home workouts.      Continue to practice flexible mind-set!    2) Follow-up visit is scheduled for 12/10 at 9:30 am- phone visit.   Please call if you need a sooner appointment.  If urgent or crisis concerns arise prior to next scheduled appointment, please reach out to crisis resources, call 911, or go to the emergency department.      Cristy Wilkinson PsyD, LP  Licensed Psychologist  12/3/2020        Previous skills and strategies to remind self of and to do as needed:    Practice \"STOP\" technique when you recognize yourself feeling angry   Be a  - what do you notice is happening when Kaylee feels upset?     Practice recognizing when feeling angry, and giving self permission to use calming and self-soothing strategies and take a break.   Look " "for the gray with thinking  slow down  take deep breaths  manage expectations of self and others.    Journal   Practice flexing \"flexiblity and creativity\" -   Continue the great work you are doing with your daily schedule.    Get outside every day.    Play your instrument.    Sing!    Continue with: \"It's just a thought\" - non-judgmental, observe, float down the river on a leaf, clouds in the gege, reframing unhelpful thoughts -   Keep working on being patient when things are tense around me.    Continue with observing/paying attention to warning signs and signals and give self persmission to slow down, especially during the morning routine with Kaylee.    Practice offerring Kaylee choices when appropriate.      Use \"my plan for success\" to help remind you of calming strategies to practice when feeling upset.    Practice decreasing overall vulnerability to emotion mind through getting adequate rest, nutrition, time for yourself, and physical activity.         Consider reading \"Fighting for your marriage\".      Technical Assistance for video visits:    Offer patient the website ([email or web address]) and/or phone number ([phone]) for video visit instructions/assistance if needed.                                             ____________________________________    Treatment Plan    Patient's Name: Idalia Hinojosa  YOB: 1980    Date: 1/6/2020    DSM5 Diagnoses: 300.00 (F41.9) Unspecified Anxiety Disorder  Psychosocial / Contextual Factors: strain in marital relationship, parenting challenges, adjustment challenges  WHODAS: will gather at diagnostic assessment update    Referral / Collaboration:  We discussed a referral to a couples/marital therapist.  The patient is thinking about this and has talked with her  about the referral, but they are unsure if they would like to follow through at this time.      Anticipated number of session or this episode of care: 8-12    MeasurableTreatment " "Goal(s) related to diagnosis / functional impairment(s)  Goal 1: Patient will learn emotion regulation strategies to help when she is feeling upset/angry/frustrated/distressed    I will know I've met my goal when I would yell less, I would feel calmer, and I would not push others.  I would be less physical when I'm angry (e.g. wouldn't slam doors or hit things)       Objective #A (Patient Action)    Patient will learn and practice at least 2 new emotion regulation strategies.  Status: Continued - Date(s):4/23/2020 (has begun to learn new emotion regulation strategies and is making good progress)   Update: 7/8/2020: \"I've learned new strategies but I'm bad at practicing them\" - will help patient incorporate regular practice and identify and problem-solve barriers.    Reviewed: 10/8/2020: Patient has been successfully practicing emotion regulation strategies, is working on identifying earlier on when to intervene and engage strategies, as well as practice strategies on a regular basis to help strength use of strategies.        Intervention(s)  Therapist will provide psychoeducation on emotion regulation module from DBT and will assign patient homework to help reinforce skill development.    Objective #B  Patient will identify factors that increase vulnerability to emotion mind and identify ways to decrease vulnerability to emotion mind.  Status: Continued - Date(s):4/23/2020 (has learned factors that make her more vulnerable to emotion mind, and has been working on addressing vulnerability related to feeling hungry, tired and rushed)   Update: 7/8/20: \"This one is harder for me.  I know I get angry when I'm hungry, and when I slow down I'm less inclined to get angry\".  Challenges - not always having food ready, not always planning in advance, (planing breakfast the night before)  Reviewed: 10/8/2020: Patient's awareness has increased of factors that increase her vulnerability to emotion mind, and she has been using " "this as a signal to tell her what she needs.      Intervention(s)  Therapist will provide information on factors that increase vulnerabiliyt to emotion mind.    Objective #C  Patient will identify warning signs and signals that emotions are escalating and will learn ways to skillfully intervene early on.  Status: New - Date: 01/06/2020 7/8/2020- in progress  Update: 10/08/2020: Patient has been making good progress on identifying signs and signals that her emotions are escalating.          Intervention(s)  Therapist will help patient identify warning signs and signals, and will guide the patient in identifying skillful ways to intervene when exeriencing warning signs and signals.      Goal 2: Patient will learn new parenting strategies to help with managing parenting challenges.  Parent will work on improving relationship with her daughter and defining what she would like this relationship to look like.      I will know I've met my goal when I'm enjoying time with my daughter, teaching her new things, and not waiting for things to change      Objective #A (Patient Action)    Status: Continued - Date(s):4/23/2020     Patient will increase understanding of developmental norms for her daughter and ways to manage challenging behaviors.    Intervention(s)  Therapist will provide psychoeducation on developmental norms and parenting strategies.    Objective #B  Patient will spend time reflecting on her relationship with her daughter and defining what she would like this relaitonship to look like.    Status: Continued - Date(s):4/23/2020   Update: 7/8/2020 - \"I think some things are going better, she's more helpful with things.  Some things are more challenging with the pandemic and thinking of things to do with her\".    Update: 10/08/2020: Patient is making progress on learning and practicing strategies to manage parenting challenges.        Intervention(s)  Therapist will guide the patient in reflecting upon her " "relationship with her daughter and help her define ways to move towards what she vaues in her relationship with her daughter.    Objective #C  Patient will increase time spent on fun activity and play with her daughter.  Status: Continued - Date(s):4/23/2020 7/8/2020 -   10/08/2020 - will further assess this goal at next session       Intervention(s)  Therapist will guide the patient in identifying ways she can increase positive time with her daughter.  Therapist will also teach mindfulness strategies to help patient with being in the present moment when appropriate - .      Goal 3: Patiient will improve communication with her .      I will know I've met my goal when I feel less irritated with my  and communicate more openly with my .  I would like to be more assertive and less aggressive.   I would like to not avoid telling him things because I'm worried about his reaction or don't think he will react well.  I would like to be more present to the moment during times when this would be helpful.\"      Objective #A (Patient Action)    Status: Continued - Date(s):4/23/2020 7/8/2020 - suggested couples therapy to help with this.    Individual therapy focus - identify what makes it hard to be more honest and open   10/08/2020 - will further assess this goal at next session       Patient will learn and practice at least two new interpersonal effectiveness strategies.    Intervention(s)  Therapist will teach strategies from DBT module on interpersonal effectiveness, and will assign homework to help reinforce skill development.      Patient has reviewed and agreed to the above plan.      Cristy Wilkinson PsyD, LP  Licensed Psychologist    January 6, 2020, reviewed on 4/23/2020, reviewed 7/8/2020, reviewed 10/08/2020                                        at 10:00 am- video visit - text invitation will be sent.   Please call if you need a sooner appointment.  If urgent or crisis concerns arise " prior to next scheduled appointment, please reach out to crisis resources, call 911, or go to the emergency department.       Professionals or agencies I can contact during a crisis:      [facility] Daytime Number: [phone]    Suicide Prevention Lifeline: 4-339-033-TALK (6401)    Crisis Text Line Service (available 24 hours a day, 7 days a week): Text [place] to 132235    Local Crisis Services: [facility] Crisis Services: [phone]    Call 911 or go to my nearest emergency department.

## 2020-12-10 ENCOUNTER — VIRTUAL VISIT (OUTPATIENT)
Dept: PSYCHOLOGY | Facility: CLINIC | Age: 40
End: 2020-12-10
Payer: COMMERCIAL

## 2020-12-10 DIAGNOSIS — F41.9 ANXIETY DISORDER, UNSPECIFIED TYPE: Primary | ICD-10-CM

## 2020-12-10 PROCEDURE — 90834 PSYTX W PT 45 MINUTES: CPT | Mod: TEL | Performed by: PSYCHOLOGIST

## 2020-12-10 NOTE — PATIENT INSTRUCTIONS
PLAN:     1) Have conversation with Kellee about having some time on the weekend to herself to do something that would help her to re-charge.  Check out the facts about how he is feeling or reacting versus assuming.  During Kaylee's movie time, practice mindfulness and set intention - what do I most need during this time today to re-charge my batteries?  Let go of unrealistic expectations about time.      2) If there is a crisis situation, remember you are not alone and that there are people who can help you twenty-four hours a day, seven days a week.  Call [facility] ([facility] Crisis Services): [phone].    Professionals or agencies I can contact during a crisis:      [facility] Daytime Number: [phone]    Suicide Prevention Lifeline: 6-144-720-TALK (7759)    Crisis Text Line Service (available 24 hours a day, 7 days a week): Text [place] to 136990    Local Crisis Services: [facility] Crisis Services: [phone]    Call 911 or go to my nearest emergency department.       3) Follow-up visit is scheduled for 12/17  at 9:30 am- phone visit.   Please call if you need a sooner appointment.  If urgent or crisis concerns arise prior to next scheduled appointment, please reach out to crisis resources, call 911, or go to the emergency department.

## 2020-12-10 NOTE — PROGRESS NOTES
"                                              Progress Note    Patient Name: Idalia Hinojosa  Date: 12/10/2020           Service Type: Individual      Session Start Time: 09:40 am  Session End Time:10:30  am     Session Length: 50 minutes    Session #: 21    Attendees: Client    Service Modality:  Telephone visit    Idalia Hinojosa is a 40 year old female who is being evaluated via a telephone visit.      The patient has been notified of the following:     \"We have found that certain health care needs can be provided without the need for a face to face visit.  This service lets us provide the care you need with a short phone conversation.      I will have full access to your [place] medical record during this entire phone call.   I will be taking notes for your medical record.     Since this is like an office visit, we will bill your insurance company for this service.  Please check with your medical insurance if this type of telephone visit/virtual care is covered.  You may be responsible for the cost of this service if insurance coverage is denied.      There are potential benefits and risks of telephone visits (e.g. limits to patient confidentiality) that differ from in-person visits.?  Confidentiality still applies for telephone services, and nobody will record the visit.  It is important to be in a quiet, private space that is free of distractions (including cell phone or other devices) during the visit.??     If during the course of the call I believe a telephone visit is not appropriate, you will not be charged for this service\"    Consent has been obtained for this service by care team member: yes.    Start time: 09:40 am    End time: 10:30 am    As the provider I attest to compliance with applicable laws and regulations related to telemedicine.    Reason for Phone Visit: Services only offered telehealth      Treatment Plan Last Reviewed: Developed 1/06/2020, reviewed 4/23/2020, reviewed 7/8/2020, " "reviewed 10/08/2020  PHQ-9/DAVID-7: 7/8/2020      DATA  Interactive Complexity: No  Crisis: No        Progress Since Last Session (Related to Symptoms / Goals / Homework):   Symptoms: No change she reports symptoms have been mostly stable since her last appointment.          Homework: Achieved / completed to satisfaction.  She reported that she has been trying to do \"all the right things\" with using good coping strategies and shared several examples of managing challenging situations and stressors skillfully.        Episode of Care Goals: Satisfactory progress - ACTION (Actively working towards change); Intervened by reinforcing change plan / affirming steps taken.  Diagnostic assessment has been completed, treatment plan has been developed and patient has been making good progress on treatment goals.  The patient stated that her main goals for therapy would be to learn emotion regulation strategies (in particular, to help with when she is feeling upset/angry/frustrated/distressed).  More recently, the changes in her daily life due to COVID restrictions have been a focus of our work together.       Current / Ongoing Stressors and Concerns:   Current:  Her family continues to be quarantined due to a potential COVID exposure at her daughter's .  This has brought on additional changes and stressors for the family.        Ongoing:Managing daily routine which has contributed to some adjustment and parenting challenges, lack of effective emotion regulation strategies for managing daily frustrations and upsets and marital discord due to differences in parenting styles.       Treatment Objective(s) Addressed in This Session:       Identify ways to incorporate self-care strategies into daily routine.     Identify any potential barriers or obstacles and problem-solve ways to overcome barriers.       Continue with:    identify warning signs and signals, practice inserting skills early on   Increase awareness of intensity " of emotions and use rating scale to monitor response to using strategies (and to help determine need to try different strategies)        Intervention:   CBT: and Solution-Focused Therapy Today's session focused on identifying ways to incorporate self-care strategies into daily routine.  She processed some of the challenges her spouse has been experiencing, and she has been working hard on being supportive and helpful to him.  At the same time, she continues to experience her own emotional struggles and we discussed the importance of her finding ways to attend her own self-care and assert what she needs to help her re-charge.  She noted that she has found her music to really help her connect with and express her emotions; encouraged her continuing to create space for this.  She identified additional things she believes would help support her self-care- including asking for some time on the weekends to herself, where she can choose something she would like to do.      Encouraged continued focus on structure and engaging strategies she has previously found helpful including medication and exercise.      ASSESSMENT: Current Emotional / Mental Status (status of significant symptoms):   Risk status (Self / Other harm or suicidal ideation)   Patient denies current fears or concerns for personal safety.   Patient denies current or recent suicidal ideation or behaviors.    She agrees to use a safety plan should any safety concerns arise.  She also agrees to reach our to her spouse or a family member for help if needed, and/or access crisis/emergency resources.        Patientdenies current or recent homicidal ideation or behaviors.   Patient denies current or recent self injurious behavior or ideation.   Patient denies other safety concerns.   Patient Patient reports there has been no change in risk factors since their last session.     PatientPatient reports there has been no change in protective factors since their last  session.     Recommended that patient call 911 or go to the local ED should there be a change in any of these risk factors.  She has previously been informed on how to contact [facility] crisis number and [facility] crisis/COPE for urgent/crisis concerns 24/7.  Provided patient with crisis resources and she agrees to use safety plan should any safety concerns arise.      Professionals or agencies I can contact during a crisis:    [facility] Daytime Number: [phone]    Suicide Prevention Lifeline: 9-010-371-SACE (4937)    Crisis Text Line Service (available 24 hours a day, 7 days a week): Text [place] to 099168    [facility] Crisis Services: [facility] Crisis Services: [phone]    Call 911 or go to my nearest emergency department.        Appearance:   unable to assess (phone)    Eye Contact:   unable to assess/phone    Psychomotor Behavior: unable to assess (phone)    Attitude:   Cooperative    Orientation:   All   Speech    Rate / Production: Normal     Volume:  Normal    Mood:    Normal .,   Affect:    congruent with mood    Thought Content:  Clear    Thought Form:  Coherent  Logical    Insight:    Good      Medication Review:   No changes to current psychiatric medication(s)       Medication Compliance:   Yes     Changes in Health Issues:  None reported   She is awaiting COVID test results due to daughter's potential exposure at , but she denies any symptoms and states she is feeling fine.       Chemical Use Review:   Substance Use: Chemical use reviewed, no active concerns identified      Tobacco Use: No current tobacco use.      Diagnosis:  Anxiety disorder unspecified    Collateral Reports Completed:   Not Applicable,      PLAN: (Patient Tasks / Therapist Tasks / Other)    1) Have conversation with Kellee about having some time on the weekend to herself to do something that would help her to re-charge.  Check out the facts about how he is feeling or  "reacting versus assuming.  During Kaylee's movie time, practice mindfulness and set intention - what do I most need during this time today to re-charge my batteries?  Let go of unrealistic expectations about time.      2) If there is a crisis situation, remember you are not alone and that there are people who can help you twenty-four hours a day, seven days a week.  Call [facility] ([facility] Crisis Services): [phone]    3) Follow-up visit is scheduled for 12/17  at 9:30 am- phone visit.   Please call if you need a sooner appointment.  If urgent or crisis concerns arise prior to next scheduled appointment, please reach out to crisis resources, call 911, or go to the emergency department.      Cristy Wilkinson PsyD, LP  Licensed Psychologist  12/10/2020      Previous skills and strategies to remind self of and to do as needed:    Practice \"STOP\" technique when you recognize yourself feeling angry   Be a  - what do you notice is happening when Kaylee feels upset?     Practice recognizing when feeling angry, and giving self permission to use calming and self-soothing strategies and take a break.   Look for the gray with thinking  slow down  take deep breaths  manage expectations of self and others.    Journal   Practice flexing \"flexiblity and creativity\" -   Continue the great work you are doing with your daily schedule.    Get outside every day.    Play your instrument.    Sing!    Continue with: \"It's just a thought\" - non-judgmental, observe, float down the river on a leaf, clouds in the gege, reframing unhelpful thoughts -   Keep working on being patient when things are tense around me.    Continue with observing/paying attention to warning signs and signals and give self persmission to slow down, especially during the morning routine with Kaylee.    Practice offerring Kaylee choices when appropriate.      Use \"my plan for success\" to help remind you of calming strategies to practice when feeling upset.    Practice " "decreasing overall vulnerability to emotion mind through getting adequate rest, nutrition, time for yourself, and physical activity.         Consider reading \"Fighting for your marriage\".      Technical Assistance for video visits:    Offer patient the website ([email or web address]) and/or phone number ([phone]) for video visit instructions/assistance if needed.                                             ____________________________________    Treatment Plan    Patient's Name: Idalia Hinojosa  YOB: 1980    Date: 1/6/2020    DSM5 Diagnoses: 300.00 (F41.9) Unspecified Anxiety Disorder  Psychosocial / Contextual Factors: strain in marital relationship, parenting challenges, adjustment challenges  WHODAS: will gather at diagnostic assessment update    Referral / Collaboration:  We discussed a referral to a couples/marital therapist.  The patient is thinking about this and has talked with her  about the referral, but they are unsure if they would like to follow through at this time.      Anticipated number of session or this episode of care: 8-12    MeasurableTreatment Goal(s) related to diagnosis / functional impairment(s)  Goal 1: Patient will learn emotion regulation strategies to help when she is feeling upset/angry/frustrated/distressed    I will know I've met my goal when I would yell less, I would feel calmer, and I would not push others.  I would be less physical when I'm angry (e.g. wouldn't slam doors or hit things)       Objective #A (Patient Action)    Patient will learn and practice at least 2 new emotion regulation strategies.  Status: Continued - Date(s):4/23/2020 (has begun to learn new emotion regulation strategies and is making good progress)   Update: 7/8/2020: \"I've learned new strategies but I'm bad at practicing them\" - will help patient incorporate regular practice and identify and problem-solve barriers.    Reviewed: 10/8/2020: Patient has been successfully " "practicing emotion regulation strategies, is working on identifying earlier on when to intervene and engage strategies, as well as practice strategies on a regular basis to help strength use of strategies.        Intervention(s)  Therapist will provide psychoeducation on emotion regulation module from DBT and will assign patient homework to help reinforce skill development.    Objective #B  Patient will identify factors that increase vulnerability to emotion mind and identify ways to decrease vulnerability to emotion mind.  Status: Continued - Date(s):4/23/2020 (has learned factors that make her more vulnerable to emotion mind, and has been working on addressing vulnerability related to feeling hungry, tired and rushed)   Update: 7/8/20: \"This one is harder for me.  I know I get angry when I'm hungry, and when I slow down I'm less inclined to get angry\".  Challenges - not always having food ready, not always planning in advance, (planing breakfast the night before)  Reviewed: 10/8/2020: Patient's awareness has increased of factors that increase her vulnerability to emotion mind, and she has been using this as a signal to tell her what she needs.      Intervention(s)  Therapist will provide information on factors that increase vulnerabiliyt to emotion mind.    Objective #C  Patient will identify warning signs and signals that emotions are escalating and will learn ways to skillfully intervene early on.  Status: New - Date: 01/06/2020 7/8/2020- in progress  Update: 10/08/2020: Patient has been making good progress on identifying signs and signals that her emotions are escalating.          Intervention(s)  Therapist will help patient identify warning signs and signals, and will guide the patient in identifying skillful ways to intervene when exeriencing warning signs and signals.      Goal 2: Patient will learn new parenting strategies to help with managing parenting challenges.  Parent will work on improving " "relationship with her daughter and defining what she would like this relationship to look like.      I will know I've met my goal when I'm enjoying time with my daughter, teaching her new things, and not waiting for things to change      Objective #A (Patient Action)    Status: Continued - Date(s):4/23/2020     Patient will increase understanding of developmental norms for her daughter and ways to manage challenging behaviors.    Intervention(s)  Therapist will provide psychoeducation on developmental norms and parenting strategies.    Objective #B  Patient will spend time reflecting on her relationship with her daughter and defining what she would like this relaitonship to look like.    Status: Continued - Date(s):4/23/2020   Update: 7/8/2020 - \"I think some things are going better, she's more helpful with things.  Some things are more challenging with the pandemic and thinking of things to do with her\".    Update: 10/08/2020: Patient is making progress on learning and practicing strategies to manage parenting challenges.        Intervention(s)  Therapist will guide the patient in reflecting upon her relationship with her daughter and help her define ways to move towards what she vaues in her relationship with her daughter.    Objective #C  Patient will increase time spent on fun activity and play with her daughter.  Status: Continued - Date(s):4/23/2020 7/8/2020 -   10/08/2020 - will further assess this goal at next session       Intervention(s)  Therapist will guide the patient in identifying ways she can increase positive time with her daughter.  Therapist will also teach mindfulness strategies to help patient with being in the present moment when appropriate - .      Goal 3: Patiient will improve communication with her .      I will know I've met my goal when I feel less irritated with my  and communicate more openly with my .  I would like to be more assertive and less aggressive.   I " "would like to not avoid telling him things because I'm worried about his reaction or don't think he will react well.  I would like to be more present to the moment during times when this would be helpful.\"      Objective #A (Patient Action)    Status: Continued - Date(s):4/23/2020 7/8/2020 - suggested couples therapy to help with this.    Individual therapy focus - identify what makes it hard to be more honest and open   10/08/2020 - will further assess this goal at next session       Patient will learn and practice at least two new interpersonal effectiveness strategies.    Intervention(s)  Therapist will teach strategies from DBT module on interpersonal effectiveness, and will assign homework to help reinforce skill development.      Patient has reviewed and agreed to the above plan.      Cristy Wilkinson PsyD, LP  Licensed Psychologist    January 6, 2020, reviewed on 4/23/2020, reviewed 7/8/2020, reviewed 10/08/2020                                          "

## 2020-12-17 ENCOUNTER — VIRTUAL VISIT (OUTPATIENT)
Dept: PSYCHOLOGY | Facility: CLINIC | Age: 40
End: 2020-12-17
Payer: COMMERCIAL

## 2020-12-17 DIAGNOSIS — F41.9 ANXIETY DISORDER, UNSPECIFIED TYPE: Primary | ICD-10-CM

## 2020-12-17 PROCEDURE — 90834 PSYTX W PT 45 MINUTES: CPT | Mod: TEL | Performed by: PSYCHOLOGIST

## 2020-12-17 NOTE — PROGRESS NOTES
"                                              Progress Note    Patient Name: Idalia Hinojosa  Date: 12/17/2020         Service Type: Individual      Session Start Time: 09:38 am  Session End Time:10:30  am     Session Length: 52 minutes    Session #: 22    Attendees: Client    Service Modality:  Telephone visit    Idalia Hinoojsa is a 40 year old female who is being evaluated via a telephone visit.      The patient has been notified of the following:     \"We have found that certain health care needs can be provided without the need for a face to face visit.  This service lets us provide the care you need with a short phone conversation.      I will have full access to your [facility] medical record during this entire phone call.   I will be taking notes for your medical record.     Since this is like an office visit, we will bill your insurance company for this service.  Please check with your medical insurance if this type of telephone visit/virtual care is covered.  You may be responsible for the cost of this service if insurance coverage is denied.      There are potential benefits and risks of telephone visits (e.g. limits to patient confidentiality) that differ from in-person visits.?  Confidentiality still applies for telephone services, and nobody will record the visit.  It is important to be in a quiet, private space that is free of distractions (including cell phone or other devices) during the visit.??     If during the course of the call I believe a telephone visit is not appropriate, you will not be charged for this service\"    Consent has been obtained for this service by care team member: yes.    Start time: 09:40 am    End time: 10:30 am    As the provider I attest to compliance with applicable laws and regulations related to telemedicine.    Reason for Phone Visit: Services only offered telehealth      Treatment Plan Last Reviewed: Developed 1/06/2020, reviewed 4/23/2020, reviewed 7/8/2020, " reviewed 10/08/2020  PHQ-9/DAVID-7: 7/8/2020      DATA  Interactive Complexity: No  Crisis: No        Progress Since Last Session (Related to Symptoms / Goals / Homework):   Symptoms: No change from last session, she reports that her mood depends upon the day, she notes that some days she is feeling more irritable.          Homework: Achieved / completed to satisfaction.  She was able to talk to Kellee about needing some time to herself and for breaks.  She is looking into some options for cross-country skiing, where they are making artificial snow, as it would give her time to do something she enjoys, be outside and be active.          Episode of Care Goals: Satisfactory progress - ACTION (Actively working towards change); Intervened by reinforcing change plan / affirming steps taken.  Diagnostic assessment has been completed, treatment plan has been developed and patient has been making good progress on treatment goals.  The patient stated that her main goals for therapy would be to learn emotion regulation strategies (in particular, to help with when she is feeling upset/angry/frustrated/distressed).  More recently, the changes in her daily life due to COVID restrictions have been a focus of our work together.       Current / Ongoing Stressors and Concerns:   Current:  Her family has made it through their 14 day quarantine, no one got sick, and her daughter is able to go back to pre-school.  She noted that she's feeling sadness and loneliness about the holidays in light of all of the changes and restrictions this year.         Ongoing:Managing daily routine which has contributed to some adjustment and parenting challenges, lack of effective emotion regulation strategies for managing daily frustrations and upsets and marital discord due to differences in parenting styles.       Treatment Objective(s) Addressed in This Session:      Identify and practice strategies to cope with sadness related to the holidays.   "Identify ways to connect virtually with others.  Think about, and plan some holiday fun!   Mindfulness strategies - focus on the present   Identify ways to incorporate structure and self-care strategies into daily routine.          Continue with:    identify warning signs and signals, practice inserting skills early on   Increase awareness of intensity of emotions and use rating scale to monitor response to using strategies (and to help determine need to try different strategies)        Intervention:   CBT: and Solution-Focused Therapy Today's session focused on the upcoming holidays.  She wished to talk today about ways to \"get through the next few weeks\" and \"focus on the present\".  She noted that she was experiencing more sadness and feelings of loneliness related to the upcoming holidays, as she realizes how different this year will be and not being able to do many of the things she typically enjoys.  We talked about ways to connect virtually with family and friends she won't be able to see, and some ways they may sprinkle in holiday fun virtually.   Reviewed other strategies to help with holiday stress - including staying active, eating healthy, good sleep routine, and limiting media/social media activity.      We also talked about what has helped her to get through difficult times in the past.  She reported that her determination has helped her through in the past.  She identified \"I'm determined to keep our family together\" as a helpful intention and mantra to keep her going when times are feeling tough.  We also talked about the importance of her continuing to nurture and attend to her self-care to help her to be at her best.      ASSESSMENT: Current Emotional / Mental Status (status of significant symptoms):   Risk status (Self / Other harm or suicidal ideation)   Patient denies current fears or concerns for personal safety.   Patient denies current or recent suicidal ideation or behaviors.    She agrees to " use a safety plan should any safety concerns arise.  She also agrees to reach our to her spouse or a family member for help if needed, and/or access crisis/emergency resources.        Patientdenies current or recent homicidal ideation or behaviors.   Patient denies current or recent self injurious behavior or ideation.   Patient denies other safety concerns.   Patient Patient reports there has been no change in risk factors since their last session.     PatientPatient reports there has been no change in protective factors since their last session.     Recommended that patient call 911 or go to the local ED should there be a change in any of these risk factors.  She has previously been informed on how to contact [facility] crisis number and [facility] crisis/[facility] for urgent/crisis concerns 24/7.  Provided patient with crisis resources and she agrees to use safety plan should any safety concerns arise.      Professionals or agencies I can contact during a crisis:    [facility] Daytime Number: [phone]    Suicide Prevention Lifeline: 8-408-584-TALK (6207)    Crisis Text Line Service (available 24 hours a day, 7 days a week): Text [place] to 517000    [facility] Crisis Services: [facility] Crisis Services: [phone]    Call 911 or go to my nearest emergency department.        Appearance:   unable to assess (phone)    Eye Contact:   unable to assess/phone    Psychomotor Behavior: unable to assess (phone)    Attitude:   Cooperative    Orientation:   All   Speech    Rate / Production: Normal     Volume:  Normal    Mood:    Calm today during session; she reports that her mood has depended upon the day recently, noting that some days she feels more irritable,   Affect:    congruent with mood    Thought Content:  Clear    Thought Form:  Coherent  Logical    Insight:    Good      Medication Review:   No changes to current psychiatric medication(s).      Medication  "Compliance:   Yes     Changes in Health Issues:  Yes: she notes that she struggles more in the winter when there is less sunlight and shorter days.  I suggested she might ask her doctor about light box therapy.     She reported that due to working out regularly over the last few months, she feels strong and muscular      Chemical Use Review:   Substance Use: Chemical use reviewed, no active concerns identified      Tobacco Use: No current tobacco use.      Diagnosis:  Anxiety disorder unspecified    Collateral Reports Completed:   Not Applicable,      PLAN: (Patient Tasks / Therapist Tasks / Other)    1) Remember my determination and do things to nurture my determination and resilience.  \"I am determined to keep our family together\".  Take the breaks I need, recognize this is healthy and important.  Connect with people virtually - set up times to do this.  Focus on the present - gently redirect my mind when it is spending too much time dwelling on the past or worrying about the future.  Focus on what I can do during the holidays to help with focusing on the present.  Plan some fun virtual activities.  Limit news consumption.     2) If there is a crisis situation, remember you are not alone and that there are people who can help you twenty-four hours a day, seven days a week.  Call [facility] ([facility]): [phone]    3) Follow-up visit is scheduled for 1/6/2021  at 9:30 am- phone visit.   Please call if you need a sooner appointment.  If urgent or crisis concerns arise prior to next scheduled appointment, please reach out to crisis resources, call 911, or go to the emergency department.      Cristy Wilkinson PsyD,   Licensed Psychologist  12/17/2020      Previous skills and strategies to remind self of and to do as needed:    Practice \"STOP\" technique when you recognize yourself feeling angry   Be a  - what do you notice is happening when Kaylee feels upset?     Practice recognizing when " "feeling angry, and giving self permission to use calming and self-soothing strategies and take a break.   Look for the gray with thinking  slow down  take deep breaths  manage expectations of self and others.    Journal   Practice flexing \"flexiblity and creativity\" -   Continue the great work you are doing with your daily schedule.    Get outside every day.    Play your instrument.    Sing!    Continue with: \"It's just a thought\" - non-judgmental, observe, float down the river on a leaf, clouds in the gege, reframing unhelpful thoughts -   Keep working on being patient when things are tense around me.    Continue with observing/paying attention to warning signs and signals and give self persmission to slow down, especially during the morning routine with Kaylee.    Practice offerring Kaylee choices when appropriate.      Use \"my plan for success\" to help remind you of calming strategies to practice when feeling upset.    Practice decreasing overall vulnerability to emotion mind through getting adequate rest, nutrition, time for yourself, and physical activity.         Consider reading \"Fighting for your marriage\".      Technical Assistance for video visits:    Offer patient the website ([email or web address]) and/or phone number ([phone]) for video visit instructions/assistance if needed.                                             ____________________________________    Treatment Plan    Patient's Name: Idalia Hinojosa  YOB: 1980    Date: 1/6/2020    DSM5 Diagnoses: 300.00 (F41.9) Unspecified Anxiety Disorder  Psychosocial / Contextual Factors: strain in marital relationship, parenting challenges, adjustment challenges  WHODAS: will gather at diagnostic assessment update    Referral / Collaboration:  We discussed a referral to a couples/marital therapist.  The patient is thinking about this and has talked with her  about the referral, but they are unsure if they would like to follow " "through at this time.      Anticipated number of session or this episode of care: 8-12    MeasurableTreatment Goal(s) related to diagnosis / functional impairment(s)  Goal 1: Patient will learn emotion regulation strategies to help when she is feeling upset/angry/frustrated/distressed    I will know I've met my goal when I would yell less, I would feel calmer, and I would not push others.  I would be less physical when I'm angry (e.g. wouldn't slam doors or hit things)       Objective #A (Patient Action)    Patient will learn and practice at least 2 new emotion regulation strategies.  Status: Continued - Date(s):4/23/2020 (has begun to learn new emotion regulation strategies and is making good progress)   Update: 7/8/2020: \"I've learned new strategies but I'm bad at practicing them\" - will help patient incorporate regular practice and identify and problem-solve barriers.    Reviewed: 10/8/2020: Patient has been successfully practicing emotion regulation strategies, is working on identifying earlier on when to intervene and engage strategies, as well as practice strategies on a regular basis to help strength use of strategies.        Intervention(s)  Therapist will provide psychoeducation on emotion regulation module from DBT and will assign patient homework to help reinforce skill development.    Objective #B  Patient will identify factors that increase vulnerability to emotion mind and identify ways to decrease vulnerability to emotion mind.  Status: Continued - Date(s):4/23/2020 (has learned factors that make her more vulnerable to emotion mind, and has been working on addressing vulnerability related to feeling hungry, tired and rushed)   Update: 7/8/20: \"This one is harder for me.  I know I get angry when I'm hungry, and when I slow down I'm less inclined to get angry\".  Challenges - not always having food ready, not always planning in advance, (planing breakfast the night before)  Reviewed: 10/8/2020: Patient's " "awareness has increased of factors that increase her vulnerability to emotion mind, and she has been using this as a signal to tell her what she needs.      Intervention(s)  Therapist will provide information on factors that increase vulnerabiliyt to emotion mind.    Objective #C  Patient will identify warning signs and signals that emotions are escalating and will learn ways to skillfully intervene early on.  Status: New - Date: 01/06/2020 7/8/2020- in progress  Update: 10/08/2020: Patient has been making good progress on identifying signs and signals that her emotions are escalating.          Intervention(s)  Therapist will help patient identify warning signs and signals, and will guide the patient in identifying skillful ways to intervene when exeriencing warning signs and signals.      Goal 2: Patient will learn new parenting strategies to help with managing parenting challenges.  Parent will work on improving relationship with her daughter and defining what she would like this relationship to look like.      I will know I've met my goal when I'm enjoying time with my daughter, teaching her new things, and not waiting for things to change      Objective #A (Patient Action)    Status: Continued - Date(s):4/23/2020     Patient will increase understanding of developmental norms for her daughter and ways to manage challenging behaviors.    Intervention(s)  Therapist will provide psychoeducation on developmental norms and parenting strategies.    Objective #B  Patient will spend time reflecting on her relationship with her daughter and defining what she would like this relaitonship to look like.    Status: Continued - Date(s):4/23/2020   Update: 7/8/2020 - \"I think some things are going better, she's more helpful with things.  Some things are more challenging with the pandemic and thinking of things to do with her\".    Update: 10/08/2020: Patient is making progress on learning and practicing strategies to manage " "parenting challenges.        Intervention(s)  Therapist will guide the patient in reflecting upon her relationship with her daughter and help her define ways to move towards what she vaues in her relationship with her daughter.    Objective #C  Patient will increase time spent on fun activity and play with her daughter.  Status: Continued - Date(s):4/23/2020 7/8/2020 -   10/08/2020 - will further assess this goal at next session       Intervention(s)  Therapist will guide the patient in identifying ways she can increase positive time with her daughter.  Therapist will also teach mindfulness strategies to help patient with being in the present moment when appropriate - .      Goal 3: Patiient will improve communication with her .      I will know I've met my goal when I feel less irritated with my  and communicate more openly with my .  I would like to be more assertive and less aggressive.   I would like to not avoid telling him things because I'm worried about his reaction or don't think he will react well.  I would like to be more present to the moment during times when this would be helpful.\"      Objective #A (Patient Action)    Status: Continued - Date(s):4/23/2020 7/8/2020 - suggested couples therapy to help with this.    Individual therapy focus - identify what makes it hard to be more honest and open   10/08/2020 - will further assess this goal at next session       Patient will learn and practice at least two new interpersonal effectiveness strategies.    Intervention(s)  Therapist will teach strategies from DBT module on interpersonal effectiveness, and will assign homework to help reinforce skill development.      Patient has reviewed and agreed to the above plan.      Cristy Wilkinson PsyD,   Licensed Psychologist    January 6, 2020, reviewed on 4/23/2020, reviewed 7/8/2020, reviewed 10/08/2020                                          "

## 2020-12-20 ENCOUNTER — HEALTH MAINTENANCE LETTER (OUTPATIENT)
Age: 40
End: 2020-12-20

## 2020-12-22 DIAGNOSIS — F40.10 SOCIAL ANXIETY DISORDER: ICD-10-CM

## 2020-12-22 RX ORDER — PROPRANOLOL HYDROCHLORIDE 20 MG/1
20 TABLET ORAL
Qty: 30 TABLET | Refills: 1 | Status: SHIPPED | OUTPATIENT
Start: 2020-12-22 | End: 2022-10-10

## 2020-12-22 NOTE — TELEPHONE ENCOUNTER
Pending Prescriptions:                       Disp   Refills    propranolol (INDERAL) 20 MG tablet        30 tab*1            Sig: Take 1 tablet (20 mg) by mouth once as needed           (anxiety) 30-60 minutes prior to event    Patient takes medication as needed for anxiety. Last OV was a virtual visit on 8/20/20. Ok to send refill?     DAVID-7 SCORE 10/8/2019 12/17/2019 7/8/2020   Total Score - - -   Total Score 6 6 6       PHQ 12/17/2019 7/8/2020 10/1/2020   PHQ-9 Total Score 4 6 6   Q9: Thoughts of better off dead/self-harm past 2 weeks Not at all Not at all Not at all     Mariella Burris, RN-BSN

## 2021-01-06 ENCOUNTER — VIRTUAL VISIT (OUTPATIENT)
Dept: PSYCHOLOGY | Facility: CLINIC | Age: 41
End: 2021-01-06
Payer: COMMERCIAL

## 2021-01-06 DIAGNOSIS — F41.9 ANXIETY DISORDER, UNSPECIFIED TYPE: Primary | ICD-10-CM

## 2021-01-06 PROCEDURE — 90832 PSYTX W PT 30 MINUTES: CPT | Mod: TEL | Performed by: PSYCHOLOGIST

## 2021-01-06 NOTE — PROGRESS NOTES
"                                              Progress Note    Patient Name: Idalia Hinojosa  Date: 1/6/2021       Service Type: Individual      Session Start Time: 09:21 am  Session End Time: 9:56  am     Session Length: 35 minutes    Session #: 23    Attendees: Client    Service Modality:  Telephone visit    Idalia Hinojosa is a 40 year old female who is being evaluated via a telephone visit.      The patient has been notified of the following:     \"We have found that certain health care needs can be provided without the need for a face to face visit.  This service lets us provide the care you need with a short phone conversation.      I will have full access to your [facility] medical record during this entire phone call.   I will be taking notes for your medical record.     Since this is like an office visit, we will bill your insurance company for this service.  Please check with your medical insurance if this type of telephone visit/virtual care is covered.  You may be responsible for the cost of this service if insurance coverage is denied.      There are potential benefits and risks of telephone visits (e.g. limits to patient confidentiality) that differ from in-person visits.?  Confidentiality still applies for telephone services, and nobody will record the visit.  It is important to be in a quiet, private space that is free of distractions (including cell phone or other devices) during the visit.??     If during the course of the call I believe a telephone visit is not appropriate, you will not be charged for this service\"    Consent has been obtained for this service by care team member: yes.    Start time: 09:21 am    End time: 9:56 am    As the provider I attest to compliance with applicable laws and regulations related to telemedicine.    Reason for Phone Visit: Services only offered telehealth      Treatment Plan Last Reviewed: Developed 1/06/2020, reviewed 4/23/2020, reviewed 7/8/2020, reviewed " "10/08/2020  PHQ-9/DAVID-7: 2020      DATA  Interactive Complexity: No  Crisis: No        Progress Since Last Session (Related to Symptoms / Goals / Homework):   Symptoms: Improving She reported that \"I've been feeling really good\".  Going cross country skiing has been really helpful for her.            Homework: Achieved / completed to satisfaction.  Cross-country skiing has been helpful, as well as a meditation course she took on happiness.  She connected virtually with a friend which was so great and helpful!  They also did several virtual present openings as a way to connect with family over the holidays.        Episode of Care Goals: Satisfactory progress - ACTION (Actively working towards change); Intervened by reinforcing change plan / affirming steps taken.  Diagnostic assessment has been completed, treatment plan has been developed and patient has been making good progress on treatment goals.  The patient stated that her main goals for therapy would be to learn emotion regulation strategies (in particular, to help with when she is feeling upset/angry/frustrated/distressed).  More recently, the changes in her daily life due to COVID restrictions have been a focus of our work together.       Current / Ongoing Stressors and Concerns:   Current: The holidays went well.  She did some of the things she normally enjoys doing during the holidays with her daughter and had fun with this.  She's been reading more and enjoys this.  Sadly, her grandmother , though after living a long life and she as able to honor her grandmother via playing her oboe.       Ongoing:Managing daily routine which has contributed to some adjustment and parenting challenges, lack of effective emotion regulation strategies for managing daily frustrations and upsets and marital discord due to differences in parenting styles.       Treatment Objective(s) Addressed in This Session:      Set intention for the year  Mindfulness strategies - " focus on the present   Identify ways to incorporate structure and self-care strategies into daily routine.        Continue with:    identify warning signs and signals, practice inserting skills early on   Increase awareness of intensity of emotions and use rating scale to monitor response to using strategies (and to help determine need to try different strategies)        Intervention:   CBT: and Solution-Focused Therapy She reflected that a meditation course she took on happiness was really helpful for her.  She has been thinking about how serious the events of the past year have been, and recognized in turn how serious her own mood has been.  She found it so helpful to have some lighter and more playful moments, and recognized that giving herself permission to do so was really helpful for her.  We talked about setting an intention for the year  - she'd like to be determined, but in a less serious way.  She'd like to have a more encouraging, positive and supportive focus and identified ways she may translate this into her parenting with her daughter, in particular around some of the challenges they have.  Reinforced what a great focus and mindset this is and identified some ways she could incorporate this into the daily routine with her daughter.      We will review her treatment plan next session -     ASSESSMENT: Current Emotional / Mental Status (status of significant symptoms):   Risk status (Self / Other harm or suicidal ideation)   Patient denies current fears or concerns for personal safety.   Patient denies current or recent suicidal ideation or behaviors.    She agrees to use a safety plan should any safety concerns arise.  She also agrees to reach our to her spouse or a family member for help if needed, and/or access crisis/emergency resources.        Patientdenies current or recent homicidal ideation or behaviors.   Patient denies current or recent self injurious behavior or ideation.   Patient denies  "other safety concerns.   Patient Patient reports there has been no change in risk factors since their last session.     PatientPatient reports there has been no change in protective factors since their last session.     Recommended that patient call 911 or go to the local ED should there be a change in any of these risk factors.  She has previously been informed on how to contact [facility] crisis number and [facility] crisis/COPE for urgent/crisis concerns 24/7.  Provided patient with crisis resources and she agrees to use safety plan should any safety concerns arise.      Professionals or agencies I can contact during a crisis:    [facility] Daytime Number: [phone]    Suicide Prevention Lifeline: 0-715-395-TALK (6352)    Crisis Text Line Service (available 24 hours a day, 7 days a week): Text [place] to 808105    [facility] Crisis Services: [facility] Crisis Services: [phone]    Call 911 or go to my nearest emergency department.        Appearance:   unable to assess (phone)    Eye Contact:   unable to assess/phone    Psychomotor Behavior: unable to assess (phone)    Attitude:   Cooperative    Orientation:   All   Speech    Rate / Production: Normal     Volume:  Normal    Mood:    she reported that \"I've been feeling really good\".,   Affect:    congruent with mood    Thought Content:  Clear    Thought Form:  Coherent  Logical    Insight:    Good      Medication Review:   No changes to current psychiatric medication(s).      Medication Compliance:   Yes     Changes in Health Issues:  None reported     She has an annual physical coming up with her PCP.       Chemical Use Review:   Substance Use: Chemical use reviewed, no active concerns identified      Tobacco Use: No current tobacco use.      Diagnosis:  Anxiety disorder unspecified    Collateral Reports Completed:   Not Applicable,      PLAN: (Patient Tasks / Therapist Tasks / Other)    1) Keep skiing a few times " "a week.  Allow self to laugh more!  Consider word or intention for the year-    2) If there is a crisis situation, remember you are not alone and that there are people who can help you twenty-four hours a day, seven days a week.  Call [facility] ([facility] Crisis Services): [phone]    3) Follow-up visit is scheduled for 1/6/2021  at 9:30 am- phone visit.   Please call if you need a sooner appointment.  If urgent or crisis concerns arise prior to next scheduled appointment, please reach out to crisis resources, call 911, or go to the emergency department.      Cristy Wilkinson PsyD, LP  Licensed Psychologist  1/6/2021    Previous skills and strategies to remind self of and to do as needed:    Practice \"STOP\" technique when you recognize yourself feeling angry   Be a  - what do you notice is happening when Kaylee feels upset?     Practice recognizing when feeling angry, and giving self permission to use calming and self-soothing strategies and take a break.   Look for the gray with thinking  slow down  take deep breaths  manage expectations of self and others.    Journal   Practice flexing \"flexiblity and creativity\" -   Continue the great work you are doing with your daily schedule.    Get outside every day.    Play your instrument.    Sing!    Continue with: \"It's just a thought\" - non-judgmental, observe, float down the river on a leaf, clouds in the gege, reframing unhelpful thoughts -   Keep working on being patient when things are tense around me.    Continue with observing/paying attention to warning signs and signals and give self persmission to slow down, especially during the morning routine with Kaylee.    Practice offerring Kaylee choices when appropriate.      Use \"my plan for success\" to help remind you of calming strategies to practice when feeling upset.    Practice decreasing overall vulnerability to emotion mind through getting adequate rest, nutrition, time for yourself, and physical activity.    " "     Consider reading \"Fighting for your marriage\".      Technical Assistance for video visits:    Offer patient the website ([email or web address]) and/or phone number ([phone]) for video visit instructions/assistance if needed.                                             ____________________________________    Treatment Plan    Patient's Name: Idalia Hinojosa  YOB: 1980    Date: 1/6/2020    DSM5 Diagnoses: 300.00 (F41.9) Unspecified Anxiety Disorder  Psychosocial / Contextual Factors: strain in marital relationship, parenting challenges, adjustment challenges  WHODAS: will gather at diagnostic assessment update    Referral / Collaboration:  We discussed a referral to a couples/marital therapist.  The patient is thinking about this and has talked with her  about the referral, but they are unsure if they would like to follow through at this time.      Anticipated number of session or this episode of care: 8-12    MeasurableTreatment Goal(s) related to diagnosis / functional impairment(s)  Goal 1: Patient will learn emotion regulation strategies to help when she is feeling upset/angry/frustrated/distressed    I will know I've met my goal when I would yell less, I would feel calmer, and I would not push others.  I would be less physical when I'm angry (e.g. wouldn't slam doors or hit things)       Objective #A (Patient Action)    Patient will learn and practice at least 2 new emotion regulation strategies.  Status: Continued - Date(s):4/23/2020 (has begun to learn new emotion regulation strategies and is making good progress)   Update: 7/8/2020: \"I've learned new strategies but I'm bad at practicing them\" - will help patient incorporate regular practice and identify and problem-solve barriers.    Reviewed: 10/8/2020: Patient has been successfully practicing emotion regulation strategies, is working on identifying earlier on when to intervene and engage strategies, as well as practice " "strategies on a regular basis to help strength use of strategies.        Intervention(s)  Therapist will provide psychoeducation on emotion regulation module from DBT and will assign patient homework to help reinforce skill development.    Objective #B  Patient will identify factors that increase vulnerability to emotion mind and identify ways to decrease vulnerability to emotion mind.  Status: Continued - Date(s):4/23/2020 (has learned factors that make her more vulnerable to emotion mind, and has been working on addressing vulnerability related to feeling hungry, tired and rushed)   Update: 7/8/20: \"This one is harder for me.  I know I get angry when I'm hungry, and when I slow down I'm less inclined to get angry\".  Challenges - not always having food ready, not always planning in advance, (planing breakfast the night before)  Reviewed: 10/8/2020: Patient's awareness has increased of factors that increase her vulnerability to emotion mind, and she has been using this as a signal to tell her what she needs.      Intervention(s)  Therapist will provide information on factors that increase vulnerabiliyt to emotion mind.    Objective #C  Patient will identify warning signs and signals that emotions are escalating and will learn ways to skillfully intervene early on.  Status: New - Date: 01/06/2020 7/8/2020- in progress  Update: 10/08/2020: Patient has been making good progress on identifying signs and signals that her emotions are escalating.          Intervention(s)  Therapist will help patient identify warning signs and signals, and will guide the patient in identifying skillful ways to intervene when exeriencing warning signs and signals.      Goal 2: Patient will learn new parenting strategies to help with managing parenting challenges.  Parent will work on improving relationship with her daughter and defining what she would like this relationship to look like.      I will know I've met my goal when I'm enjoying " "time with my daughter, teaching her new things, and not waiting for things to change      Objective #A (Patient Action)    Status: Continued - Date(s):4/23/2020     Patient will increase understanding of developmental norms for her daughter and ways to manage challenging behaviors.    Intervention(s)  Therapist will provide psychoeducation on developmental norms and parenting strategies.    Objective #B  Patient will spend time reflecting on her relationship with her daughter and defining what she would like this relaitonship to look like.    Status: Continued - Date(s):4/23/2020   Update: 7/8/2020 - \"I think some things are going better, she's more helpful with things.  Some things are more challenging with the pandemic and thinking of things to do with her\".    Update: 10/08/2020: Patient is making progress on learning and practicing strategies to manage parenting challenges.        Intervention(s)  Therapist will guide the patient in reflecting upon her relationship with her daughter and help her define ways to move towards what she vaues in her relationship with her daughter.    Objective #C  Patient will increase time spent on fun activity and play with her daughter.  Status: Continued - Date(s):4/23/2020 7/8/2020 -   10/08/2020 - will further assess this goal at next session       Intervention(s)  Therapist will guide the patient in identifying ways she can increase positive time with her daughter.  Therapist will also teach mindfulness strategies to help patient with being in the present moment when appropriate - .      Goal 3: Patiient will improve communication with her .      I will know I've met my goal when I feel less irritated with my  and communicate more openly with my .  I would like to be more assertive and less aggressive.   I would like to not avoid telling him things because I'm worried about his reaction or don't think he will react well.  I would like to be more present " "to the moment during times when this would be helpful.\"      Objective #A (Patient Action)    Status: Continued - Date(s):4/23/2020 7/8/2020 - suggested couples therapy to help with this.    Individual therapy focus - identify what makes it hard to be more honest and open   10/08/2020 - will further assess this goal at next session       Patient will learn and practice at least two new interpersonal effectiveness strategies.    Intervention(s)  Therapist will teach strategies from DBT module on interpersonal effectiveness, and will assign homework to help reinforce skill development.      Patient has reviewed and agreed to the above plan.      Cirsty Wilkinson PsyD, LP  Licensed Psychologist    January 6, 2020, reviewed on 4/23/2020, reviewed 7/8/2020, reviewed 10/08/2020                                          "

## 2021-01-11 ENCOUNTER — OFFICE VISIT (OUTPATIENT)
Dept: OBGYN | Facility: CLINIC | Age: 41
End: 2021-01-11
Payer: COMMERCIAL

## 2021-01-11 VITALS
SYSTOLIC BLOOD PRESSURE: 110 MMHG | DIASTOLIC BLOOD PRESSURE: 74 MMHG | BODY MASS INDEX: 26.36 KG/M2 | HEIGHT: 66 IN | OXYGEN SATURATION: 96 % | WEIGHT: 164 LBS

## 2021-01-11 DIAGNOSIS — Z01.419 ENCOUNTER FOR GYNECOLOGICAL EXAMINATION WITHOUT ABNORMAL FINDING: Primary | ICD-10-CM

## 2021-01-11 DIAGNOSIS — F32.81 PMDD (PREMENSTRUAL DYSPHORIC DISORDER): ICD-10-CM

## 2021-01-11 DIAGNOSIS — Z12.4 PAP SMEAR FOR CERVICAL CANCER SCREENING: ICD-10-CM

## 2021-01-11 DIAGNOSIS — F32.5 MAJOR DEPRESSIVE DISORDER WITH SINGLE EPISODE, IN FULL REMISSION (H): ICD-10-CM

## 2021-01-11 PROCEDURE — 87624 HPV HI-RISK TYP POOLED RSLT: CPT | Performed by: OBSTETRICS & GYNECOLOGY

## 2021-01-11 PROCEDURE — 88175 CYTOPATH C/V AUTO FLUID REDO: CPT | Performed by: OBSTETRICS & GYNECOLOGY

## 2021-01-11 PROCEDURE — 99395 PREV VISIT EST AGE 18-39: CPT | Performed by: OBSTETRICS & GYNECOLOGY

## 2021-01-11 RX ORDER — NORETHINDRONE ACETATE AND ETHINYL ESTRADIOL .02; 1 MG/1; MG/1
1 TABLET ORAL DAILY
Qty: 112 TABLET | Refills: 4 | Status: SHIPPED | OUTPATIENT
Start: 2021-01-11 | End: 2022-01-10

## 2021-01-11 ASSESSMENT — ANXIETY QUESTIONNAIRES
GAD7 TOTAL SCORE: 0
6. BECOMING EASILY ANNOYED OR IRRITABLE: NOT AT ALL
2. NOT BEING ABLE TO STOP OR CONTROL WORRYING: NOT AT ALL
1. FEELING NERVOUS, ANXIOUS, OR ON EDGE: NOT AT ALL
3. WORRYING TOO MUCH ABOUT DIFFERENT THINGS: NOT AT ALL
5. BEING SO RESTLESS THAT IT IS HARD TO SIT STILL: NOT AT ALL
7. FEELING AFRAID AS IF SOMETHING AWFUL MIGHT HAPPEN: NOT AT ALL

## 2021-01-11 ASSESSMENT — PATIENT HEALTH QUESTIONNAIRE - PHQ9
SUM OF ALL RESPONSES TO PHQ QUESTIONS 1-9: 2
5. POOR APPETITE OR OVEREATING: NOT AT ALL

## 2021-01-11 ASSESSMENT — MIFFLIN-ST. JEOR: SCORE: 1432.71

## 2021-01-11 NOTE — PROGRESS NOTES
Idalia is a 40 year old  female who presents for annual exam.     Menses are rare   No LMP recorded.. Using oral contraceptives for contraception.  She is not currently considering pregnancy.  Besides routine health maintenance, she has no other health concerns today . Daughter is 4 y/o now and will do  this fall.  Started doing the continuous birth control pill and since then, PMS is gone.  Doing very well and would like to continue.  Did lose both of her jobs with Covid.  Continues to practice on occasion.  GYNECOLOGIC HISTORY:  Menarche:   Idalia is sexually active with 1male partner(s) and is currently in monogamous relationship.    History sexually transmitted infections:No STD history  STI testing offered?  Declined  DEBORA exposure: Unknown  History of abnormal Pap smear: NO - age 30- 65 PAP every 3 years recommended  Family history of breast CA: No  Family history of uterine/ovarian CA: No    Family history of colon CA: No    HEALTH MAINTENANCE:  Cholesterol: (  Cholesterol   Date Value Ref Range Status   2018 193 <200 mg/dL Final   12/10/2013 177 0 - 200 mg/dL Final     Comment:     LDL Cholesterol is the primary guide to therapy.   The NCEP recommends further evaluation of: patients with cholesterol greater   than 200 mg/dL if additional risk factors are present, cholesterol greater   than   240 mg/dL, triglycerides greater than 150 mg/dL, or HDL less than 40 mg/dL.    History of abnormal lipids: No  Mammo: na . History of abnormal Mammo: Not applicable.  Regular Self Breast Exams: No  Calcium/Vitamin D intake: source:  none Adequate?   TSH: (No results found for: TSH )  Pap; (  Lab Results   Component Value Date    PAP ASC-US 2017    PAP NIL 12/10/2013    )    HISTORY:  OB History    Para Term  AB Living   1 1 0 1 0 1   SAB TAB Ectopic Multiple Live Births   0 0 0 0 1      # Outcome Date GA Lbr Alfredito/2nd Weight Sex Delivery Anes PTL Lv   1  10/28/15 34w5d   2.509 kg (5 lb 8.5 oz) F CS-LTranv   OPHELIA      Birth Comments: arrest of descent, LOP, cat 2 tracing after PPROM      Name: Alma Delia      Apgar1: 5  Apgar5: 7     Past Medical History:   Diagnosis Date     ASCUS of cervix with negative high risk HPV 2017 ASCUS, Neg HPV     Seasonal affective disorder (H)     no meds     Past Surgical History:   Procedure Laterality Date      SECTION N/A 10/28/2015    Procedure:  SECTION;  Surgeon: Mónica Madrid MD;  Location:  [facility]     DENTAL SURGERY      wisdom teeth     TONSILLECTOMY  age 10     Family History   Problem Relation Age of Onset     Thyroid Disease Paternal Grandmother      Other Cancer Paternal Grandmother      Heart Disease Maternal Grandmother      Social History     Socioeconomic History     Marital status:      Spouse name: Not on file     Number of children: 1     Years of education: Not on file     Highest education level: Not on file   Occupational History     Employer: [employer]   Social Needs     Financial resource strain: Not on file     Food insecurity     Worry: Not on file     Inability: Not on file     Transportation needs     Medical: Not on file     Non-medical: Not on file   Tobacco Use     Smoking status: Never Smoker     Smokeless tobacco: Never Used   Substance and Sexual Activity     Alcohol use: Yes     Alcohol/week: 0.0 standard drinks     Frequency: Monthly or less     Drinks per session: 1 or 2     Binge frequency: Never     Comment: 0-2 PER WK     Drug use: No     Sexual activity: Yes     Partners: Male     Birth control/protection: Condom   Lifestyle     Physical activity     Days per week: Not on file     Minutes per session: Not on file     Stress: Not on file   Relationships     Social connections     Talks on phone: Not on file     Gets together: Not on file     Attends [employer] service: Not on file     Active member of club or organization: Not on file     Attends meetings of  "clubs or organizations: Not on file     Relationship status: Not on file     Intimate partner violence     Fear of current or ex partner: Not on file     Emotionally abused: Not on file     Physically abused: Not on file     Forced sexual activity: Not on file   Other Topics Concern     Parent/sibling w/ CABG, MI or angioplasty before 65F 55M? No   Social History Narrative               Current Outpatient Medications:      mometasone (NASONEX) 50 MCG/ACT nasal spray, Spray 2 sprays into both nostrils daily, Disp: , Rfl:      montelukast (SINGULAIR) 10 MG tablet, Take 10 mg by mouth At Bedtime, Disp: , Rfl:      norethindrone-ethinyl estradiol (MICROGESTIN 1/20) 1-20 MG-MCG tablet, Take 1 tablet by mouth daily, Disp: 84 tablet, Rfl: 4     propranolol (INDERAL) 20 MG tablet, Take 1 tablet (20 mg) by mouth once as needed (anxiety) 30-60 minutes prior to event, Disp: 30 tablet, Rfl: 1     sertraline (ZOLOFT) 50 MG tablet, Take 1 tablet (50 mg) by mouth daily, Disp: 90 tablet, Rfl: 3     Allergies   Allergen Reactions     Seasonal Allergies        Past medical, surgical, social and family history were reviewed and updated in [facility].    EXAM:  /74   Ht 1.664 m (5' 5.5\")   Wt 74.4 kg (164 lb)   SpO2 96%   BMI 26.88 kg/m     BMI: Body mass index is 26.88 kg/m .  Constitutional: healthy, alert and no distress  Head: Normocephalic. No tenderness or abnormalities, has small elevated mole on top of her head approximately 7 mm pale and unchanged.  Neck: Neck supple. Trachea midline. No adenopathy. Thyroid symmetric, normal size.   Cardiovascular: RRR.   Respiratory: Negative.   Breast: Breasts reveal mild symmetric fibrocystic densities, but there are no dominant, discrete, fixed or suspicious masses found.  Gastrointestinal: Abdomen soft, non-tender, non-distended. No masses, organomegaly.  :  Vulva:  No external lesions, normal female hair distribution, no inguinal adenopathy.    Urethra:  Midline, non-tender, well " supported, no discharge  Vagina:  Moist, pink, no abnormal discharge, no lesions  Uterus:  Normal size , non-tender, freely mobile  Ovaries:  No masses appreciated, non-tender, mobile  Rectal Exam: deferred  Musculoskeletal: extremities normal  Skin: no suspicious lesions or rashes  Psychiatric: Affect appropriate, cooperative,mentation appears normal.     COUNSELING:   Reviewed preventive health counseling, as reflected in patient instructions       Contraception   reports that she has never smoked. She has never used smokeless tobacco.    Body mass index is 26.88 kg/m .  Weight management plan: Not addressed at patient request  FRAX Risk Assessment    ASSESSMENT:  40 year old female with satisfactory annual exam  (Z01.419) Encounter for gynecological examination without abnormal finding  (primary encounter diagnosis)  Comment: OCP and condoms  Plan: We do not discuss weight    Plan fasting cholesterol 2022    (F32.81) PMDD (premenstrual dysphoric disorder)  Comment: Fixed with continuous OCP  Plan: norethindrone-ethinyl estradiol (MICROGESTIN         1/20) 1-20 MG-MCG tablet        Refill was done and continuous use discussed unless has red bleeding.  Then would take 4-5 days off and restart    (F32.5) Major depressive disorder with single episode, in full remission (H)  Comment: Stable  Plan: sertraline (ZOLOFT) 50 MG tablet        Refill was done    (Z12.4) Pap smear for cervical cancer screening  Comment: Last Pap ASCUS and HPV negative  Plan: Pap imaged thin layer screen with HPV -         recommended age 30 - 65 years (select HPV order        below), HPV High Risk Types DNA Cervical        Discussed if this Pap smear is normal and HPV negative, next Pap would be 5 years.      Monique Han MD

## 2021-01-12 ASSESSMENT — ANXIETY QUESTIONNAIRES: GAD7 TOTAL SCORE: 0

## 2021-01-13 LAB
COPATH REPORT: NORMAL
PAP: NORMAL

## 2021-01-14 LAB
FINAL DIAGNOSIS: NORMAL
HPV HR 12 DNA CVX QL NAA+PROBE: NEGATIVE
HPV16 DNA SPEC QL NAA+PROBE: NEGATIVE
HPV18 DNA SPEC QL NAA+PROBE: NEGATIVE
SPECIMEN DESCRIPTION: NORMAL
SPECIMEN SOURCE CVX/VAG CYTO: NORMAL

## 2021-01-19 PROBLEM — R87.610 ASCUS OF CERVIX WITH NEGATIVE HIGH RISK HPV: Status: RESOLVED | Noted: 2017-04-07 | Resolved: 2021-01-19

## 2021-01-19 PROBLEM — R87.610 ASCUS OF CERVIX WITH NEGATIVE HIGH RISK HPV: Status: ACTIVE | Noted: 2017-04-07

## 2021-01-27 ENCOUNTER — VIRTUAL VISIT (OUTPATIENT)
Dept: PSYCHOLOGY | Facility: CLINIC | Age: 41
End: 2021-01-27
Payer: COMMERCIAL

## 2021-01-27 DIAGNOSIS — F41.9 ANXIETY DISORDER, UNSPECIFIED TYPE: Primary | ICD-10-CM

## 2021-01-27 PROCEDURE — 90834 PSYTX W PT 45 MINUTES: CPT | Mod: TEL | Performed by: PSYCHOLOGIST

## 2021-01-27 NOTE — PROGRESS NOTES
"                                              Progress Note    Patient Name: Idalia Hinojosa  Date: 1/27/2021       Service Type: Individual      Session Start Time: 10:06 am  Session End Time: 10:56 am     Session Length: 50 minutes    Session #: 24    Attendees: Client    Service Modality:  Telephone visit    Idalia Hinojosa is a 40 year old female who is being evaluated via a telephone visit.      The patient has been notified of the following:     \"We have found that certain health care needs can be provided without the need for a face to face visit.  This service lets us provide the care you need with a short phone conversation.      I will have full access to your [facility] medical record during this entire phone call.   I will be taking notes for your medical record.     Since this is like an office visit, we will bill your insurance company for this service.  Please check with your medical insurance if this type of telephone visit/virtual care is covered.  You may be responsible for the cost of this service if insurance coverage is denied.      There are potential benefits and risks of telephone visits (e.g. limits to patient confidentiality) that differ from in-person visits.?  Confidentiality still applies for telephone services, and nobody will record the visit.  It is important to be in a quiet, private space that is free of distractions (including cell phone or other devices) during the visit.??     If during the course of the call I believe a telephone visit is not appropriate, you will not be charged for this service\"    Consent has been obtained for this service by care team member: yes.    Start time: 10:06 am    End time: 10:56 am    As the provider I attest to compliance with applicable laws and regulations related to telemedicine.    Reason for Phone Visit: Services only offered telehealth      Treatment Plan Last Reviewed: Developed 1/06/2020, reviewed 4/23/2020, reviewed 7/8/2020, reviewed " 10/08/2020, reviewed 01/27/2021  PHQ-9/DAVID-7: 7/8/2020      DATA  Interactive Complexity: No  Crisis: No        Progress Since Last Session (Related to Symptoms / Goals / Homework):   Symptoms: Improving she noticed that she has not been feeling as irritable          Homework: Achieved / completed to satisfaction. She's been successful with not dwelling on mistakes and recognizing her successes!  She's also been cross-country skiing and having a lot of fun with this!  This has been a great way for her to both get outdoors, stay active, have time for herself and have fun!        Episode of Care Goals: Satisfactory progress - ACTION (Actively working towards change); Intervened by reinforcing change plan / affirming steps taken.  Diagnostic assessment has been completed, treatment plan has been developed and patient has been making good progress on treatment goals.  The patient stated that her main goals for therapy would be to learn emotion regulation strategies (in particular, to help with when she is feeling upset/angry/frustrated/distressed).  More recently, the changes in her daily life due to COVID restrictions have been a focus of our work together.       Current / Ongoing Stressors and Concerns:   Current: Review of treatment plan today - reviewed progress made and what needs/goals remain -      Ongoing:Managing daily routine which has contributed to some adjustment and parenting challenges, lack of effective emotion regulation strategies for managing daily frustrations and upsets and marital discord due to differences in parenting styles.       Treatment Objective(s) Addressed in This Session:      Review of treatment plan - review of progress, successes, skills learned and identification of goals and areas to continue to address     Continue with:    identify warning signs and signals, practice inserting skills early on   Increase awareness of intensity of emotions and use rating scale to monitor response to  using strategies (and to help determine need to try different strategies)        Intervention:   CBT: and Solution-Focused Therapy     ASSESSMENT: Current Emotional / Mental Status (status of significant symptoms):   Risk status (Self / Other harm or suicidal ideation)   Patient denies current fears or concerns for personal safety.   Patient denies current or recent suicidal ideation or behaviors.    She agrees to use a safety plan should any safety concerns arise.  She also agrees to reach our to her spouse or a family member for help if needed, and/or access crisis/emergency resources.        Patientdenies current or recent homicidal ideation or behaviors.   Patient denies current or recent self injurious behavior or ideation.   Patient denies other safety concerns.   Patient Patient reports there has been no change in risk factors since their last session.     PatientPatient reports there has been no change in protective factors since their last session.     Recommended that patient call 911 or go to the local ED should there be a change in any of these risk factors.  She has previously been informed on how to contact [facility] crisis number and [facility] crisis/COPE for urgent/crisis concerns 24/7.  Provided patient with crisis resources and she agrees to use safety plan should any safety concerns arise.      Professionals or agencies I can contact during a crisis:    [facility] Daytime Number: [phone]    Suicide Prevention Lifeline: 2-279-628-ANWT (7861)    Crisis Text Line Service (available 24 hours a day, 7 days a week): Text [place] to 743998    Local Crisis Services: [facility] Crisis Services: [phone]    Call 911 or go to my nearest emergency department.        Appearance:   unable to assess (phone)    Eye Contact:   unable to assess/phone    Psychomotor Behavior: unable to assess (phone)    Attitude:   Cooperative  "   Orientation:   All   Speech    Rate / Production: Normal     Volume:  Normal    Mood:    she noted that symptoms have been better and she has been feeling less irritable,   Affect:    congruent with mood    Thought Content:  Clear    Thought Form:  Coherent  Logical    Insight:    Good      Medication Review:   No changes to current psychiatric medication(s).      Medication Compliance:   Yes     Changes in Health Issues:  None reported      She had her annual exam with Dr. Han - no concerns.     Chemical Use Review:   Substance Use: Chemical use reviewed, no active concerns identified      Tobacco Use: No current tobacco use.      Diagnosis:  Anxiety disorder unspecified    Collateral Reports Completed:   Not Applicable,      PLAN: (Patient Tasks / Therapist Tasks / Other)    1) Slow down, pay attention to how I'm feeling and reacting to things, pay attention to warning signs and intervene early on, talk to Colia    Keep skiing a few times a week.  Allow self to laugh more!  Consider word or intention for the year-    2) If there is a crisis situation, remember you are not alone and that there are people who can help you twenty-four hours a day, seven days a week.  Call [facility] ([facility] Crisis [facility]): [phone]    3) Follow-up visit is scheduled for 2/10/21  - phone visit.   Please call if you need a sooner appointment.  If urgent or crisis concerns arise prior to next scheduled appointment, please reach out to crisis resources, call 911, or go to the emergency department.      Cristy Wilkinson PsyD, LP  Licensed Psychologist  1/27/2021      Previous skills and strategies to remind self of and to do as needed:    Practice \"STOP\" technique when you recognize yourself feeling angry   Be a  - what do you notice is happening when Kaylee feels upset?     Practice recognizing when feeling angry, and giving self permission to use calming and self-soothing strategies and take a break.   Look for the " "gray with thinking  slow down  take deep breaths  manage expectations of self and others.    Journal   Practice flexing \"flexiblity and creativity\" -   Continue the great work you are doing with your daily schedule.    Get outside every day.    Play your instrument.    Sing!    Continue with: \"It's just a thought\" - non-judgmental, observe, float down the river on a leaf, clouds in the gege, reframing unhelpful thoughts -   Keep working on being patient when things are tense around me.    Continue with observing/paying attention to warning signs and signals and give self persmission to slow down, especially during the morning routine with Kaylee.    Practice offerring Kaylee choices when appropriate.      Use \"my plan for success\" to help remind you of calming strategies to practice when feeling upset.    Practice decreasing overall vulnerability to emotion mind through getting adequate rest, nutrition, time for yourself, and physical activity.         Consider reading \"Fighting for your marriage\".      Technical Assistance for video visits:    Offer patient the website ([email or web address]) and/or phone number ([phone]) for video visit instructions/assistance if needed.                                             ____________________________________    Treatment Plan    Patient's Name: Idalia Hinojosa  YOB: 1980    Date: 1/6/2020    DSM5 Diagnoses: 300.00 (F41.9) Unspecified Anxiety Disorder  Psychosocial / Contextual Factors: strain in marital relationship, parenting challenges, adjustment challenges  WHODAS: will gather at diagnostic assessment update    Referral / Collaboration:  We discussed a referral to a couples/marital therapist.  The patient is thinking about this and has talked with her  about the referral, but they are unsure if they would like to follow through at this time.      Anticipated number of session or this episode of care: 8-12    MeasurableTreatment Goal(s) " "related to diagnosis / functional impairment(s)  Goal 1: Patient will learn emotion regulation strategies to help when she is feeling upset/angry/frustrated/distressed    I will know I've met my goal when I would yell less, I would feel calmer, and I would not push others.  I would be less physical when I'm angry (e.g. wouldn't slam doors or hit things)       Objective #A (Patient Action)    Patient will learn and practice at least 2 new emotion regulation strategies.  Status: Continued - Date(s):4/23/2020 (has begun to learn new emotion regulation strategies and is making good progress)   Update: 7/8/2020: \"I've learned new strategies but I'm bad at practicing them\" - will help patient incorporate regular practice and identify and problem-solve barriers.    Reviewed: 10/8/2020: Patient has been successfully practicing emotion regulation strategies, is working on identifying earlier on when to intervene and engage strategies, as well as practice strategies on a regular basis to help strength use of strategies.    Reviewed and in progress: 1/27/2021: \"Overall things are going a lot better.  Sometimes I fall into old habits\", but recognizing when she's feeling more vulnerable to her emotions and reminds herself to use different skills/intervene in a different way.         Intervention(s)  Therapist will provide psychoeducation on emotion regulation module from DBT and will assign patient homework to help reinforce skill development.    Objective #B  Patient will identify factors that increase vulnerability to emotion mind and identify ways to decrease vulnerability to emotion mind.  Status: Continued - Date(s):4/23/2020 (has learned factors that make her more vulnerable to emotion mind, and has been working on addressing vulnerability related to feeling hungry, tired and rushed)   Update: 7/8/20: \"This one is harder for me.  I know I get angry when I'm hungry, and when I slow down I'm less inclined to get angry\".  " "Challenges - not always having food ready, not always planning in advance, (planing breakfast the night before)  Reviewed: 10/8/2020: Patient's awareness has increased of factors that increase her vulnerability to emotion mind, and she has been using this as a signal to tell her what she needs.    Reviewed and resolved/complete: 1/27/2021: \"I feel like we've done really good work with this and I've gotten what I need from this\".      Intervention(s)  Therapist will provide information on factors that increase vulnerabiliyt to emotion mind.    Objective #C  Patient will identify warning signs and signals that emotions are escalating and will learn ways to skillfully intervene early on.  Status: New - Date: 01/06/2020 7/8/2020- in progress  Update: 10/08/2020: Patient has been making good progress on identifying signs and signals that her emotions are escalating.    Reviewed and in progress: 1/27/2021: \"I feel like we've done good work on this but this is one of the things I need to keep working on\".        Intervention(s)  Therapist will help patient identify warning signs and signals, and will guide the patient in identifying skillful ways to intervene when exeriencing warning signs and signals.      Goal 2: Patient will learn new parenting strategies to help with managing parenting challenges.  Parent will work on improving relationship with her daughter and defining what she would like this relationship to look like.      I will know I've met my goal when I'm enjoying time with my daughter, teaching her new things, and not waiting for things to change      Objective #A (Patient Action)    Status: Continued - Date(s):4/23/2020     Patient will increase understanding of developmental norms for her daughter and ways to manage challenging behaviors.    Intervention(s)  Therapist will provide psychoeducation on developmental norms and parenting strategies.    Objective #B  Patient will spend time reflecting on her " "relationship with her daughter and defining what she would like this relaitonship to look like.    Status: Continued - Date(s):4/23/2020   Update: 7/8/2020 - \"I think some things are going better, she's more helpful with things.  Some things are more challenging with the pandemic and thinking of things to do with her\".    Update: 10/08/2020: Patient is making progress on learning and practicing strategies to manage parenting challenges.    Reviewed: 1/27/2021: \"I feel like things have gotten better with this, we've figured out a good routine, I'm doing meditation, I'm enjoying my time with my daughter more than I used to\".        Intervention(s)  Therapist will guide the patient in reflecting upon her relationship with her daughter and help her define ways to move towards what she vaues in her relationship with her daughter.    Objective #C  Patient will increase time spent on fun activity and play with her daughter.  Status: Continued - Date(s):4/23/2020 7/8/2020 -   10/08/2020 - will further assess this goal at next session   Reviewed: 1/27/2021 - also going really well, we play together all of the time now\", she finds herself enjoying their time together.        Intervention(s)  Therapist will guide the patient in identifying ways she can increase positive time with her daughter.  Therapist will also teach mindfulness strategies to help patient with being in the present moment when appropriate - .      Goal 3: Patiient will improve communication with her .      I will know I've met my goal when I feel less irritated with my  and communicate more openly with my .  I would like to be more assertive and less aggressive.   I would like to not avoid telling him things because I'm worried about his reaction or don't think he will react well.  I would like to be more present to the moment during times when this would be helpful.\"      Objective #A (Patient Action)    Status: Continued - " "Date(s):4/23/2020 7/8/2020 - suggested couples therapy to help with this.    Individual therapy focus - identify what makes it hard to be more honest and open   10/08/2020 - will further assess this goal at next session   Reviewed: 1/27/2021 - \"Things are better, I feel like we've worked really hard on how to respond when he's having a hard time and how to talk to him.  I'm doing better listening, being more empathetic, and not \".        Patient will learn and practice at least two new interpersonal effectiveness strategies.    Intervention(s)  Therapist will teach strategies from DBT module on interpersonal effectiveness, and will assign homework to help reinforce skill development.      Patient has reviewed and agreed to the above plan.      Cristy Wilkinson PsyD, LP  Licensed Psychologist    January 6, 2020, reviewed on 4/23/2020, reviewed 7/8/2020, reviewed 10/08/2020, reviewed 1/27/2021                                            "

## 2021-02-10 ENCOUNTER — TELEPHONE (OUTPATIENT)
Dept: ALLERGY | Facility: CLINIC | Age: 41
End: 2021-02-10

## 2021-02-10 ENCOUNTER — VIRTUAL VISIT (OUTPATIENT)
Dept: PSYCHOLOGY | Facility: CLINIC | Age: 41
End: 2021-02-10
Payer: COMMERCIAL

## 2021-02-10 DIAGNOSIS — F41.9 ANXIETY DISORDER, UNSPECIFIED TYPE: Primary | ICD-10-CM

## 2021-02-10 PROCEDURE — 90834 PSYTX W PT 45 MINUTES: CPT | Mod: TEL | Performed by: PSYCHOLOGIST

## 2021-02-10 NOTE — PROGRESS NOTES
"                                              Progress Note    Patient Name: Idalia Hinojosa  Date: 2/10/2021       Service Type: Individual      Session Start Time: 09:45 am  Session End Time: 10:30  am     Session Length: 45 minutes    Session #: 25    Attendees: Client    Service Modality:  Telephone visit    Idalia Hinojosa is a 40 year old female who is being evaluated via a telephone visit.      The patient has been notified of the following:     \"We have found that certain health care needs can be provided without the need for a face to face visit.  This service lets us provide the care you need with a short phone conversation.      I will have full access to your [facility] medical record during this entire phone call.   I will be taking notes for your medical record.     Since this is like an office visit, we will bill your insurance company for this service.  Please check with your medical insurance if this type of telephone visit/virtual care is covered.  You may be responsible for the cost of this service if insurance coverage is denied.      There are potential benefits and risks of telephone visits (e.g. limits to patient confidentiality) that differ from in-person visits.?  Confidentiality still applies for telephone services, and nobody will record the visit.  It is important to be in a quiet, private space that is free of distractions (including cell phone or other devices) during the visit.??     If during the course of the call I believe a telephone visit is not appropriate, you will not be charged for this service\"    Consent has been obtained for this service by care team member: yes.    Start time: 09:45 am    End time: 10:30 am    As the provider I attest to compliance with applicable laws and regulations related to telemedicine.    Reason for Phone Visit: Services only offered telehealth      Treatment Plan Last Reviewed: Developed 1/06/2020, reviewed 4/23/2020, reviewed 7/8/2020, reviewed " 10/08/2020, 1/27/2021  PHQ-9/DAVID-7: 7/8/2020      DATA  Interactive Complexity: No  Crisis: No        Progress Since Last Session (Related to Symptoms / Goals / Homework):   Symptoms: Worsening she reported symptoms have worsened, she's felt more frustrated and stressed.          Homework: Achieved / completed to satisfaction. Continues to be successful with homework and areas of focus outside of therapy sessions!        Episode of Care Goals: Satisfactory progress - ACTION (Actively working towards change); Intervened by reinforcing change plan / affirming steps taken.  Diagnostic assessment has been completed, treatment plan has been developed and patient has been making good progress on treatment goals.  The patient stated that her main goals for therapy would be to learn emotion regulation strategies (in particular, to help with when she is feeling upset/angry/frustrated/distressed).  More recently, the changes in her daily life due to COVID restrictions have been a focus of our work together.       Current / Ongoing Stressors and Concerns:   Current: She noted that she has been feeling more frustrated with her spouse.  She also wished to process some feelings about her weight and body-image.       Ongoing:Managing daily routine which has contributed to some adjustment and parenting challenges, lack of effective emotion regulation strategies for managing daily frustrations and upsets and marital discord due to differences in parenting styles.       Treatment Objective(s) Addressed in This Session:      Learn principles of mindful eating and strategies to practice mindfulness when eating     Continue with:    identify warning signs and signals, practice inserting skills early on   Increase awareness of intensity of emotions and use rating scale to monitor response to using strategies (and to help determine need to try different strategies)        Intervention:   CBT: and Solution-Focused Therapy She shared some  reflections she has been having about her body-image and weight.  She spent some time sharing reflections she has had about how she eats when she is feeling stressed or as a reward.  We talked about ways to connect mindfulness  - slowing down and paying attention, awareness of thoughts, feelings, physical sensations, needs and wants, compassionate and nonjudgmental stance - to help with making decisions  - and when eating, practicing mindfulness - to help with slowing down, recognizing, noticing, savoring and enjoying what she is eating.  Suggested Dr. Nithya Alvarez's website and books on mindful eating as a great resource.      She also talked about body-image - where it is at currently and where she would like it to be.  She would like to feel proud of herself, recognize and celebrate that her body has allowed her to have and raise a child, and that her body is beautiful.  We talked about ways to help her bring her focus to this and nurture a positive and healthy body image-    ASSESSMENT: Current Emotional / Mental Status (status of significant symptoms):   Risk status (Self / Other harm or suicidal ideation)   Patient denies current fears or concerns for personal safety.   Patient denies current or recent suicidal ideation or behaviors.    She agrees to use a safety plan should any safety concerns arise.  She also agrees to reach our to her spouse or a family member for help if needed, and/or access crisis/emergency resources.        Patientdenies current or recent homicidal ideation or behaviors.   Patient denies current or recent self injurious behavior or ideation.   Patient denies other safety concerns.   Patient Patient reports there has been no change in risk factors since their last session.     PatientPatient reports there has been no change in protective factors since their last session.     Recommended that patient call 911 or go to the local ED should there be a change in any of these risk factors.  She  has previously been informed on how to contact [facility] daytime crisis number and [facility] crisis/COPE for urgent/crisis concerns 24/7.  Provided patient with crisis resources and she agrees to use safety plan should any safety concerns arise.      Professionals or agencies I can contact during a crisis:    [facility] Daytime Number: [phone]    Suicide Prevention Lifeline: 4-967-131-SGCV (7493)    Crisis Text Line Service (available 24 hours a day, 7 days a week): Text [place] to 644956    Local Crisis Services: [facility] Crisis Services: [phone]    Call 911 or go to my nearest emergency department.        Appearance:   unable to assess (phone)    Eye Contact:   unable to assess/phone    Psychomotor Behavior: unable to assess (phone)    Attitude:   Cooperative    Orientation:   All   Speech    Rate / Production: Normal     Volume:  Normal    Mood:    she reports feeling more frustrated and stressed,   Affect:    congruent with mood    Thought Content:  Clear    Thought Form:  Coherent  Logical    Insight:    Good      Medication Review:   No changes to current psychiatric medication(s).  (ran out of allergy medications - has an appointment set up)      Medication Compliance:   Yes     Changes in Health Issues:  None reported         Chemical Use Review:   Substance Use: Chemical use reviewed, no active concerns identified      Tobacco Use: No current tobacco use.      Diagnosis:  Anxiety disorder unspecified    Collateral Reports Completed:   Not Applicable,      PLAN: (Patient Tasks / Therapist Tasks / Other)    1) Mindful eating -look at mindful eating website - eatingmindfully.com- Dr. Nithya Juarez , practice self-compassion     Slow down, pay attention to how I'm feeling and reacting to things, pay attention to warning signs and intervene early on, talk to Colia    Keep skiing a few times a week.  Allow self to laugh more!  Consider word or intention for the  "year-    2) If there is a crisis situation, remember you are not alone and that there are people who can help you twenty-four hours a day, seven days a week.  Call [facility] ([facility] Crisis [facility]): [phone]    3) Follow-up visit is scheduled for 2/24/2021 -phone visit.   Please call if you need a sooner appointment.  If urgent or crisis concerns arise prior to next scheduled appointment, please reach out to crisis resources, call 911, or go to the emergency department.      Cristy Wilkinson PsyD,   Licensed Psychologist  2/10/2021        Previous skills and strategies to remind self of and to do as needed:    Practice \"STOP\" technique when you recognize yourself feeling angry   Be a  - what do you notice is happening when Kaylee feels upset?     Practice recognizing when feeling angry, and giving self permission to use calming and self-soothing strategies and take a break.   Look for the gray with thinking  slow down  take deep breaths  manage expectations of self and others.    Journal   Practice flexing \"flexiblity and creativity\" -   Continue the great work you are doing with your daily schedule.    Get outside every day.    Play your instrument.    Sing!    Continue with: \"It's just a thought\" - non-judgmental, observe, float down the river on a leaf, clouds in the gege, reframing unhelpful thoughts -   Keep working on being patient when things are tense around me.    Continue with observing/paying attention to warning signs and signals and give self persmission to slow down, especially during the morning routine with Kaylee.    Practice offerring Kaylee choices when appropriate.      Use \"my plan for success\" to help remind you of calming strategies to practice when feeling upset.    Practice decreasing overall vulnerability to emotion mind through getting adequate rest, nutrition, time for yourself, and physical activity.         Consider reading \"Fighting for your marriage\".      Technical Assistance " "for video visits:    Offer patient the website ([email or web address]) and/or phone number ([phone]) for video visit instructions/assistance if needed.                                             ____________________________________    Treatment Plan    Patient's Name: Idalia Hinojosa  YOB: 1980    Date: 1/6/2020    DSM5 Diagnoses: 300.00 (F41.9) Unspecified Anxiety Disorder  Psychosocial / Contextual Factors: strain in marital relationship, parenting challenges, adjustment challenges  WHODAS: will gather at diagnostic assessment update    Referral / Collaboration:  We discussed a referral to a couples/marital therapist.  The patient is thinking about this and has talked with her  about the referral, but they are unsure if they would like to follow through at this time.      Anticipated number of session or this episode of care: 8-12    MeasurableTreatment Goal(s) related to diagnosis / functional impairment(s)  Goal 1: Patient will learn emotion regulation strategies to help when she is feeling upset/angry/frustrated/distressed    I will know I've met my goal when I would yell less, I would feel calmer, and I would not push others.  I would be less physical when I'm angry (e.g. wouldn't slam doors or hit things)       Objective #A (Patient Action)    Patient will learn and practice at least 2 new emotion regulation strategies.  Status: Continued - Date(s):4/23/2020 (has begun to learn new emotion regulation strategies and is making good progress)   Update: 7/8/2020: \"I've learned new strategies but I'm bad at practicing them\" - will help patient incorporate regular practice and identify and problem-solve barriers.    Reviewed: 10/8/2020: Patient has been successfully practicing emotion regulation strategies, is working on identifying earlier on when to intervene and engage strategies, as well as practice strategies on a regular basis to help strength use of strategies.    Reviewed " "and in progress: 1/27/2021: \"Overall things are going a lot better.  Sometimes I fall into old habits\", but recognizing when she's feeling more vulnerable to her emotions and reminds herself to use different skills/intervene in a different way.         Intervention(s)  Therapist will provide psychoeducation on emotion regulation module from DBT and will assign patient homework to help reinforce skill development.    Objective #B  Patient will identify factors that increase vulnerability to emotion mind and identify ways to decrease vulnerability to emotion mind.  Status: Continued - Date(s):4/23/2020 (has learned factors that make her more vulnerable to emotion mind, and has been working on addressing vulnerability related to feeling hungry, tired and rushed)   Update: 7/8/20: \"This one is harder for me.  I know I get angry when I'm hungry, and when I slow down I'm less inclined to get angry\".  Challenges - not always having food ready, not always planning in advance, (planing breakfast the night before)  Reviewed: 10/8/2020: Patient's awareness has increased of factors that increase her vulnerability to emotion mind, and she has been using this as a signal to tell her what she needs.    Reviewed and resolved/complete: 1/27/2021: \"I feel like we've done really good work with this and I've gotten what I need from this\".      Intervention(s)  Therapist will provide information on factors that increase vulnerabiliyt to emotion mind.    Objective #C  Patient will identify warning signs and signals that emotions are escalating and will learn ways to skillfully intervene early on.  Status: New - Date: 01/06/2020 7/8/2020- in progress  Update: 10/08/2020: Patient has been making good progress on identifying signs and signals that her emotions are escalating.    Reviewed and in progress: 1/27/2021: \"I feel like we've done good work on this but this is one of the things I need to keep working on\".  " "      Intervention(s)  Therapist will help patient identify warning signs and signals, and will guide the patient in identifying skillful ways to intervene when exeriencing warning signs and signals.      Goal 2: Patient will learn new parenting strategies to help with managing parenting challenges.  Parent will work on improving relationship with her daughter and defining what she would like this relationship to look like.      I will know I've met my goal when I'm enjoying time with my daughter, teaching her new things, and not waiting for things to change      Objective #A (Patient Action)    Status: Continued - Date(s):4/23/2020     Patient will increase understanding of developmental norms for her daughter and ways to manage challenging behaviors.    Intervention(s)  Therapist will provide psychoeducation on developmental norms and parenting strategies.    Objective #B  Patient will spend time reflecting on her relationship with her daughter and defining what she would like this relaitonship to look like.    Status: Continued - Date(s):4/23/2020   Update: 7/8/2020 - \"I think some things are going better, she's more helpful with things.  Some things are more challenging with the pandemic and thinking of things to do with her\".    Update: 10/08/2020: Patient is making progress on learning and practicing strategies to manage parenting challenges.    Reviewed: 1/27/2021: \"I feel like things have gotten better with this, we've figured out a good routine, I'm doing meditation, I'm enjoying my time with my daughter more than I used to\".        Intervention(s)  Therapist will guide the patient in reflecting upon her relationship with her daughter and help her define ways to move towards what she vaues in her relationship with her daughter.    Objective #C  Patient will increase time spent on fun activity and play with her daughter.  Status: Continued - Date(s):4/23/2020 7/8/2020 -   10/08/2020 - will further assess " "this goal at next session   Reviewed: 1/27/2021 - also going really well, we play together all of the time now\", she finds herself enjoying their time together.        Intervention(s)  Therapist will guide the patient in identifying ways she can increase positive time with her daughter.  Therapist will also teach mindfulness strategies to help patient with being in the present moment when appropriate - .      Goal 3: Patiient will improve communication with her .      I will know I've met my goal when I feel less irritated with my  and communicate more openly with my .  I would like to be more assertive and less aggressive.   I would like to not avoid telling him things because I'm worried about his reaction or don't think he will react well.  I would like to be more present to the moment during times when this would be helpful.\"      Objective #A (Patient Action)    Status: Continued - Date(s):4/23/2020 7/8/2020 - suggested couples therapy to help with this.    Individual therapy focus - identify what makes it hard to be more honest and open   10/08/2020 - will further assess this goal at next session   Reviewed: 1/27/2021 - \"Things are better, I feel like we've worked really hard on how to respond when he's having a hard time and how to talk to him.  I'm doing better listening, being more empathetic, and not \".        Patient will learn and practice at least two new interpersonal effectiveness strategies.    Intervention(s)  Therapist will teach strategies from DBT module on interpersonal effectiveness, and will assign homework to help reinforce skill development.      Patient has reviewed and agreed to the above plan.      Cristy Wilkinson PsyD, LP  Licensed Psychologist    January 6, 2020, reviewed on 4/23/2020, reviewed 7/8/2020, reviewed 10/08/2020, reviewed 1/27/2021                                            "

## 2021-02-11 NOTE — TELEPHONE ENCOUNTER
Please call patient to reschedule her appointment. She is a new patient to me and will need a 40 minute appointment. This can be scheduled in-person or virtually based on patient preference.

## 2021-02-12 NOTE — TELEPHONE ENCOUNTER
Patient scheduled on Monday 2/15/2021 at 3pm for a virtual consult with Dr. Lewis.  Jocelyne FALLON MA     [phone]

## 2021-02-15 ENCOUNTER — VIRTUAL VISIT (OUTPATIENT)
Dept: ALLERGY | Facility: CLINIC | Age: 41
End: 2021-02-15
Payer: COMMERCIAL

## 2021-02-15 DIAGNOSIS — J30.89 ALLERGIC RHINITIS DUE TO DUST MITE: Primary | ICD-10-CM

## 2021-02-15 DIAGNOSIS — J30.0 CHRONIC VASOMOTOR RHINITIS: ICD-10-CM

## 2021-02-15 PROCEDURE — 99203 OFFICE O/P NEW LOW 30 MIN: CPT | Mod: GT | Performed by: ALLERGY & IMMUNOLOGY

## 2021-02-15 RX ORDER — IPRATROPIUM BROMIDE 42 UG/1
2 SPRAY, METERED NASAL 4 TIMES DAILY PRN
Qty: 15 ML | Refills: 3 | Status: SHIPPED | OUTPATIENT
Start: 2021-02-15 | End: 2021-06-24

## 2021-02-15 RX ORDER — MONTELUKAST SODIUM 10 MG/1
10 TABLET ORAL AT BEDTIME
Qty: 90 TABLET | Refills: 3 | Status: SHIPPED | OUTPATIENT
Start: 2021-02-15 | End: 2022-02-03

## 2021-02-15 RX ORDER — FLUTICASONE PROPIONATE 50 MCG
2 SPRAY, SUSPENSION (ML) NASAL DAILY
Qty: 48 G | Refills: 3 | Status: SHIPPED | OUTPATIENT
Start: 2021-02-15 | End: 2021-08-17

## 2021-02-15 NOTE — LETTER
"    2/15/2021         RE: Idalia Hinojosa  [address]  [facility] [address]        Dear Colleague,    Thank you for referring your patient, Idalia Hinojosa, to the [facility]. Please see a copy of my visit note below.    Idalia is a 40 year old who is being evaluated via a billable video visit.      How would you like to obtain your AVS? MyChart  If the video visit is dropped, the invitation should be resent by: Text to cell phone: [phone]  Will anyone else be joining your video visit? No      Video Start Time: 3:01 PM    Video-Visit Details    Type of service:  Video Visit    Video End Time: 3:14 PM    Originating Location (pt. Location): Home    Distant Location (provider location):  [facility]     Platform used for Video Visit: [employer]     Idalia Hinojosa was seen in the Allergy Clinic at [facility].    Idalia Hinojosa is a 40 year old White female being seen today in consultation for allergies. She saw Dr. Vasquez at [facility] last year. She underwent skin testing for allergies and it was positive for sensitization to dust mite. She was prescribed montelukast at that visit and has been taking it for the past year. Idalia reports that she was having both nasal congestion and rhinorrhea at the time. Her nasal congestion has improved though she continues to have persistent rhinorrhea. She has not taken OTC antihistamines for these symptoms. Since her last visit she has purchased allergy protective covers for her pillows and mattress and she washes her bedding more frequently.    Idalia notes that her rhinorrhea is constant and she always needs to carry tissues. 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 She also finds that being in a car for a longer trip aggravates her " symptoms.      Past Medical History:   Diagnosis Date     ASCUS of cervix with negative high risk HPV 2017 ASCUS, Neg HPV     Seasonal affective disorder (H)     no meds     Family History   Problem Relation Age of Onset     Thyroid Disease Paternal Grandmother      Other Cancer Paternal Grandmother      Heart Disease Maternal Grandmother      Past Surgical History:   Procedure Laterality Date      SECTION N/A 10/28/2015    Procedure:  SECTION;  Surgeon: Mónica Madrid MD;  Location:  L+D     DENTAL SURGERY      wisdom teeth     TONSILLECTOMY  age 10       ENVIRONMENTAL HISTORY: [place]. The home is heated with a forced air. They do have central air conditioning. The patient's bedroom is furnished with feather/wool bedding or pillows, hard dana in bedroom, allergen mattress cover, allergen pillowcase cover and fabric window coverings.  Pets inside the house include 2 cat(s). There is no history of cockroach or mice infestation. Do you smoke cigarettes or other recreational drugs No? Do you vape or use an e-cigarette No?There is/are 0 smokers living in the house.  There is/are 0 who smoke ecigarettes/vape living in the house.The house does have a damp basement.     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  Her spouse works for [employer].    REVIEW OF SYSTEMS:  General: negative for weight gain. negative for weight loss. negative for changes in sleep.   Eyes: negative for itching. negative for redness. negative for tearing/watering. negative for vision changes  Ears: negative for fullness. negative for hearing loss. negative for dizziness.   Nose: negative for snoring.negative for changes in smell. positive  for drainage. Positive for congestion.  Throat: negative for hoarseness. negative for sore throat. negative for trouble swallowing.   Lungs: positive  for cough. negative for  shortness of breath.negative for wheezing. positive  for sputum production.   Cardiovascular: negative for chest pain. negative for swelling of ankles. negative for fast or irregular heartbeat.   Gastrointestinal: negative for nausea. positive  for heartburn. positive  for acid reflux.   Musculoskeletal: negative for joint pain. negative for joint stiffness. negative for joint swelling.   Neurologic: negative for seizures. negative for fainting. negative for weakness.   Psychiatric: negative for changes in mood. negative for anxiety.   Endocrine: negative for cold intolerance. negative for heat intolerance. negative for tremors.   Hematologic: negative for easy bruising. negative for easy bleeding.  Integumentary: negative for rash. negative for scaling. negative for nail changes.       Current Outpatient Medications:      fluticasone (FLONASE) 50 MCG/ACT nasal spray, Spray 2 sprays into both nostrils daily, Disp: 48 g, Rfl: 3     ipratropium (ATROVENT) 0.06 % nasal spray, Spray 2 sprays into both nostrils 4 times daily as needed for rhinitis, Disp: 15 mL, Rfl: 3     mometasone (NASONEX) 50 MCG/ACT nasal spray, Spray 2 sprays into both nostrils daily, Disp: , Rfl:      montelukast (SINGULAIR) 10 MG tablet, Take 1 tablet (10 mg) by mouth At Bedtime, Disp: 90 tablet, Rfl: 3     montelukast (SINGULAIR) 10 MG tablet, Take 10 mg by mouth At Bedtime, Disp: , Rfl:      norethindrone-ethinyl estradiol (MICROGESTIN 1/20) 1-20 MG-MCG tablet, Take 1 tablet by mouth daily Active tablets only for prevention of menses, Disp: 112 tablet, Rfl: 4     propranolol (INDERAL) 20 MG tablet, Take 1 tablet (20 mg) by mouth once as needed (anxiety) 30-60 minutes prior to event, Disp: 30 tablet, Rfl: 1     sertraline (ZOLOFT) 50 MG tablet, Take 1 tablet (50 mg) by mouth daily, Disp: 90 tablet, Rfl: 4  Immunization History   Administered Date(s) Administered     Influenza Vaccine IM > 6 months Valent IIV4 11/10/2016, 11/13/2017, 11/07/2018,  10/08/2019     Influenza Vaccine, 6+MO IM (QUADRIVALENT W/PRESERVATIVES) 10/02/2015     TDAP Vaccine (Adacel) 09/15/2015     No Known Allergies      EXAM:   There were no vitals taken for this visit.  GENERAL APPEARANCE: alert, cooperative and not in distress  SKIN: no rashes, no lesions  HEAD: atraumatic, normocephalic  EYES: lids and lashes normal, EOM full and intact  ENT: normal external ears and nose, no nasal drainage  NECK: no asymmetry, masses, or scars  LUNGS: unlabored respirations, no intercostal retractions or accessory muscle use, speaking comfortably in full sentences, no cough or audible wheezing  MUSCULOSKELETAL: no musculoskeletal defects are noted  NEURO: no focal deficits noted  PSYCH: does not appear depressed or anxious    WORKUP: None    ASSESSMENT/PLAN:  Idalia Hinojosa is a 40 year old female seen for evaluation of allergies.    1. Allergic rhinitis due to dust mite - Idalia reports having perennial symptoms of rhinorrhea and nasal congestion. Previous skin testing was notable for sensitization to dust mite. She notes that her congestion has improved with montelukast and fluticasone nasal spray as well as since implementing environmental modifications to reduce allergen exposure. However, despite this improvement Idalia continues to have persistent rhinorrhea. Her symptoms worsen after playing the oboe or going outdoors however they never seem to stop. Idalia's symptoms may be due to both chronic vasomotor rhinitis as well as allergic rhinitis.     - montelukast (SINGULAIR) 10 MG tablet; Take 1 tablet (10 mg) by mouth At Bedtime  Dispense: 90 tablet; Refill: 3  - fluticasone (FLONASE) 50 MCG/ACT nasal spray; Spray 2 sprays into both nostrils daily  Dispense: 48 g; Refill: 3    2. Chronic vasomotor rhinitis    - ipratropium (ATROVENT) 0.06 % nasal spray; Spray 2 sprays into both nostrils 4 times daily as needed for rhinitis  Dispense: 15 mL; Refill: 3  - fluticasone (FLONASE) 50 MCG/ACT  nasal spray; Spray 2 sprays into both nostrils daily  Dispense: 48 g; Refill: 3      Follow-up in 1 year, sooner if needed      Thank you for allowing me to participate in the care of Idalia Hinojosa.      Gerber Lewis MD, [facility]  Allergy/Immunology  [facility]      Chart documentation done in part with Dragon Voice Recognition Software. Although reviewed after completion, some word and grammatical errors may remain.      Again, thank you for allowing me to participate in the care of your patient.        Sincerely,        Gerber Lewis MD

## 2021-02-15 NOTE — PROGRESS NOTES
"Idalia is a 40 year old who is being evaluated via a billable video visit.      How would you like to obtain your AVS? MyChart  If the video visit is dropped, the invitation should be resent by: Text to cell phone: [phone]  Will anyone else be joining your video visit? No      Video Start Time: 3:01 PM    Video-Visit Details    Type of service:  Video Visit    Video End Time: 3:14 PM    Originating Location (pt. Location): Home    Distant Location (provider location):  [facility]     Platform used for Video Visit: [employer]     Idalia Hinojosa was seen in the Allergy Clinic at [facility].    Idalia Hinojosa is a 40 year old White female being seen today in consultation for allergies. She saw Dr. Vasquez at [facility] last year. She underwent skin testing for allergies and it was positive for sensitization to dust mite. She was prescribed montelukast at that visit and has been taking it for the past year. Idalia reports that she was having both nasal congestion and rhinorrhea at the time. Her nasal congestion has improved though she continues to have persistent rhinorrhea. She has not taken OTC antihistamines for these symptoms. Since her last visit she has purchased allergy protective covers for her pillows and mattress and she washes her bedding more frequently.    Idalia notes that her rhinorrhea is constant and she always needs to carry tissues. 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 She also finds that being in a car for a longer trip aggravates her symptoms.      Past Medical History:   Diagnosis Date     ASCUS of cervix with negative high risk HPV 04/07/2017 04/07/17 ASCUS, Neg HPV     Seasonal affective disorder (H)     no meds     Family History   Problem Relation Age of Onset     Thyroid Disease Paternal " Grandmother      Other Cancer Paternal Grandmother      Heart Disease Maternal Grandmother      Past Surgical History:   Procedure Laterality Date      SECTION N/A 10/28/2015    Procedure:  SECTION;  Surgeon: Mónica Madrid MD;  Location:  L+D     DENTAL SURGERY      wisdom teeth     TONSILLECTOMY  age 10       ENVIRONMENTAL HISTORY: [place]. The home is heated with a forced air. They do have central air conditioning. The patient's bedroom is furnished with feather/wool bedding or pillows, hard dana in bedroom, allergen mattress cover, allergen pillowcase cover and fabric window coverings.  Pets inside the house include 2 cat(s). There is no history of cockroach or mice infestation. Do you smoke cigarettes or other recreational drugs No? Do you vape or use an e-cigarette No?There is/are 0 smokers living in the house.  There is/are 0 who smoke ecigarettes/vape living in the house.The house does have a damp basement.     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  Her spouse works for [employer].    REVIEW OF SYSTEMS:  General: negative for weight gain. negative for weight loss. negative for changes in sleep.   Eyes: negative for itching. negative for redness. negative for tearing/watering. negative for vision changes  Ears: negative for fullness. negative for hearing loss. negative for dizziness.   Nose: negative for snoring.negative for changes in smell. positive  for drainage. Positive for congestion.  Throat: negative for hoarseness. negative for sore throat. negative for trouble swallowing.   Lungs: positive  for cough. negative for shortness of breath.negative for wheezing. positive  for sputum production.   Cardiovascular: negative for chest pain. negative for swelling of ankles. negative for fast or irregular heartbeat.   Gastrointestinal: negative for nausea. positive  for heartburn. positive   for acid reflux.   Musculoskeletal: negative for joint pain. negative for joint stiffness. negative for joint swelling.   Neurologic: negative for seizures. negative for fainting. negative for weakness.   Psychiatric: negative for changes in mood. negative for anxiety.   Endocrine: negative for cold intolerance. negative for heat intolerance. negative for tremors.   Hematologic: negative for easy bruising. negative for easy bleeding.  Integumentary: negative for rash. negative for scaling. negative for nail changes.       Current Outpatient Medications:      fluticasone (FLONASE) 50 MCG/ACT nasal spray, Spray 2 sprays into both nostrils daily, Disp: 48 g, Rfl: 3     ipratropium (ATROVENT) 0.06 % nasal spray, Spray 2 sprays into both nostrils 4 times daily as needed for rhinitis, Disp: 15 mL, Rfl: 3     mometasone (NASONEX) 50 MCG/ACT nasal spray, Spray 2 sprays into both nostrils daily, Disp: , Rfl:      montelukast (SINGULAIR) 10 MG tablet, Take 1 tablet (10 mg) by mouth At Bedtime, Disp: 90 tablet, Rfl: 3     montelukast (SINGULAIR) 10 MG tablet, Take 10 mg by mouth At Bedtime, Disp: , Rfl:      norethindrone-ethinyl estradiol (MICROGESTIN 1/20) 1-20 MG-MCG tablet, Take 1 tablet by mouth daily Active tablets only for prevention of menses, Disp: 112 tablet, Rfl: 4     propranolol (INDERAL) 20 MG tablet, Take 1 tablet (20 mg) by mouth once as needed (anxiety) 30-60 minutes prior to event, Disp: 30 tablet, Rfl: 1     sertraline (ZOLOFT) 50 MG tablet, Take 1 tablet (50 mg) by mouth daily, Disp: 90 tablet, Rfl: 4  Immunization History   Administered Date(s) Administered     Influenza Vaccine IM > 6 months Valent IIV4 11/10/2016, 11/13/2017, 11/07/2018, 10/08/2019     Influenza Vaccine, 6+MO IM (QUADRIVALENT W/PRESERVATIVES) 10/02/2015     TDAP Vaccine (Adacel) 09/15/2015     No Known Allergies      EXAM:   There were no vitals taken for this visit.  GENERAL APPEARANCE: alert, cooperative and not in distress  SKIN: no  rashes, no lesions  HEAD: atraumatic, normocephalic  EYES: lids and lashes normal, EOM full and intact  ENT: normal external ears and nose, no nasal drainage  NECK: no asymmetry, masses, or scars  LUNGS: unlabored respirations, no intercostal retractions or accessory muscle use, speaking comfortably in full sentences, no cough or audible wheezing  MUSCULOSKELETAL: no musculoskeletal defects are noted  NEURO: no focal deficits noted  PSYCH: does not appear depressed or anxious    WORKUP: None    ASSESSMENT/PLAN:  Idalia Hinojosa is a 40 year old female seen for evaluation of allergies.    1. Allergic rhinitis due to dust mite - Idalia reports having perennial symptoms of rhinorrhea and nasal congestion. Previous skin testing was notable for sensitization to dust mite. She notes that her congestion has improved with montelukast and fluticasone nasal spray as well as since implementing environmental modifications to reduce allergen exposure. However, despite this improvement Idalia continues to have persistent rhinorrhea. Her symptoms worsen after playing the oboe or going outdoors however they never seem to stop. Ashlies symptoms may be due to both chronic vasomotor rhinitis as well as allergic rhinitis.     - montelukast (SINGULAIR) 10 MG tablet; Take 1 tablet (10 mg) by mouth At Bedtime  Dispense: 90 tablet; Refill: 3  - fluticasone (FLONASE) 50 MCG/ACT nasal spray; Spray 2 sprays into both nostrils daily  Dispense: 48 g; Refill: 3    2. Chronic vasomotor rhinitis    - ipratropium (ATROVENT) 0.06 % nasal spray; Spray 2 sprays into both nostrils 4 times daily as needed for rhinitis  Dispense: 15 mL; Refill: 3  - fluticasone (FLONASE) 50 MCG/ACT nasal spray; Spray 2 sprays into both nostrils daily  Dispense: 48 g; Refill: 3      Follow-up in 1 year, sooner if needed      Thank you for allowing me to participate in the care of Idalia Hinojosa.      Gerber Lewis MD, [facility]  Allergy/Immunology  [facility]  [facility]      Chart documentation done in part with Dragon Voice Recognition Software. Although reviewed after completion, some word and grammatical errors may remain.

## 2021-02-24 ENCOUNTER — VIRTUAL VISIT (OUTPATIENT)
Dept: PSYCHOLOGY | Facility: CLINIC | Age: 41
End: 2021-02-24
Payer: COMMERCIAL

## 2021-02-24 DIAGNOSIS — F41.9 ANXIETY DISORDER, UNSPECIFIED TYPE: Primary | ICD-10-CM

## 2021-02-24 PROCEDURE — 90834 PSYTX W PT 45 MINUTES: CPT | Mod: TEL | Performed by: PSYCHOLOGIST

## 2021-02-24 NOTE — PROGRESS NOTES
"                                              Progress Note    Patient Name: Idalia Hinojosa  Date: 2/24/2021         Service Type: Individual      Session Start Time: 09:45 am  Session End Time: 10:30  am     Session Length: 45 minutes    Session #: 26    Attendees: Client    Service Modality:  Telephone visit    Idalia Hinojosa is a 40 year old female who is being evaluated via a telephone visit.      The patient has been notified of the following:     \"We have found that certain health care needs can be provided without the need for a face to face visit.  This service lets us provide the care you need with a short phone conversation.      I will have full access to your [facility] medical record during this entire phone call.   I will be taking notes for your medical record.     Since this is like an office visit, we will bill your insurance company for this service.  Please check with your medical insurance if this type of telephone visit/virtual care is covered.  You may be responsible for the cost of this service if insurance coverage is denied.      There are potential benefits and risks of telephone visits (e.g. limits to patient confidentiality) that differ from in-person visits.?  Confidentiality still applies for telephone services, and nobody will record the visit.  It is important to be in a quiet, private space that is free of distractions (including cell phone or other devices) during the visit.??     If during the course of the call I believe a telephone visit is not appropriate, you will not be charged for this service\"    Consent has been obtained for this service by care team member: yes.    Start time: 09:45 am    End time: 10:30 am    As the provider I attest to compliance with applicable laws and regulations related to telemedicine.    Reason for Phone Visit: Services only offered telehealth      Treatment Plan Last Reviewed: Developed 1/06/2020, reviewed 4/23/2020, reviewed 7/8/2020, " reviewed 10/08/2020  PHQ-9/DAVID-7: 7/8/2020      DATA  Interactive Complexity: No  Crisis: No        Progress Since Last Session (Related to Symptoms / Goals / Homework):   Symptoms: Worsening she reported it has been a difficult few weeks.  Her daughter has been having problems at school and her spouse has been struggling more.        Homework: Achieved / completed to satisfaction.  She found a headspace course on mindful eating which was helpful!  She has also been slowing down when eating and enjoying her food more.         Episode of Care Goals: Satisfactory progress - ACTION (Actively working towards change); Intervened by reinforcing change plan / affirming steps taken.  Diagnostic assessment has been completed, treatment plan has been developed and patient has been making good progress on treatment goals.  The patient stated that her main goals for therapy would be to learn emotion regulation strategies (in particular, to help with when she is feeling upset/angry/frustrated/distressed).  More recently, the changes in her daily life due to COVID restrictions have been a focus of our work together.       Current / Ongoing Stressors and Concerns:   Current:  Daughter is having problems at school, spouse is struggling more, and the continued changes and stressors related to COVID have been hard, in particular that her parents aren't able to help her out, which historically was how she was able to get a break and have some time to herself.       Ongoing:Managing daily routine which has contributed to some adjustment and parenting challenges, lack of effective emotion regulation strategies for managing daily frustrations and upsets and marital discord due to differences in parenting styles.       Treatment Objective(s) Addressed in This Session:      Identify ways to help support daughter in light of challenges she is having at school     Continue with:    identify warning signs and signals, practice inserting  "skills early on   Increase awareness of intensity of emotions and use rating scale to monitor response to using strategies (and to help determine need to try different strategies)        Intervention:   CBT: and Solution-Focused Therapy She wished to focus the session today on how to be patient with her daughter's struggles, how to connect with her, and how to see her as a \"whole\" person and not just the problems.  Commended her for her awareness, insight and perspective on some of the challenges she has been experiencing and how she wants to help support her daughter.  We talked about considering the perspective that kids show us through their problems and challenges what they need to learn and what they need help with!  Talked about how this frame could be used to help with focus at home (e.g. difficulties her daughter is having with transitions is showing that she needs help, support, strategies, skills to help with this).  Recognized the successes and progress she has been making by working with the school, listening to their recommendations to get her daughter evaluated, and the work she has done to research options and get an appointment scheduled.  This is great work!      She also noted that she has been over-personalizing and blaming herself.  Looked at ways to reframe unhelpful thinking (and in particular polarzied thinking) to find a more balanced and helpful approach, while practicing a compassionate stance.  She also identified it would be helpful to recognize that everyone has struggles and deserves support.      She identified her daughter's strengths to help with shifting focusing to her as a whole person (not being defined by challenges or struggles) and identified ways she can connect with her daughter over these next few weeks.  Her daughter likes art and being creative - she thought that it would be positive for her and her daughter to do art together - and not worry about the mess but focus on " the fun and creativity - nurture her daughter's love of art and imagination -     ASSESSMENT: Current Emotional / Mental Status (status of significant symptoms):   Risk status (Self / Other harm or suicidal ideation)   Patient denies current fears or concerns for personal safety.   Patient denies current or recent suicidal ideation or behaviors.    She agrees to use a safety plan should any safety concerns arise.  She also agrees to reach our to her spouse or a family member for help if needed, and/or access crisis/emergency resources.        Patientdenies current or recent homicidal ideation or behaviors.   Patient denies current or recent self injurious behavior or ideation.   Patient denies other safety concerns.   Patient Patient reports there has been no change in risk factors since their last session.     PatientPatient reports there has been no change in protective factors since their last session.     Recommended that patient call 911 or go to the local ED should there be a change in any of these risk factors.  She has previously been informed on how to contact [facility] crisis number and [facility] crisis/COPE for urgent/crisis concerns 24/7.  Provided patient with crisis resources and she agrees to use safety plan should any safety concerns arise.      Professionals or agencies I can contact during a crisis:    [facility] Daytime Number: [phone]    Suicide Prevention Lifeline: 0-762-572-MZVC (1078)    Crisis Text Line Service (available 24 hours a day, 7 days a week): Text [place] to 558892    Local Crisis Services: [facility]: [phone]    Call 911 or go to my nearest emergency department.        Appearance:   unable to assess (phone)    Eye Contact:   unable to assess/phone    Psychomotor Behavior: unable to assess (phone)    Attitude:   Cooperative    Orientation:   All   Speech    Rate / Production: Normal     Volume:  Normal  "   Mood:    Sad ,   Affect:    congruent with mood    Thought Content:  Clear    Thought Form:  Coherent  Logical    Insight:    Good      Medication Review:   No changes to current psychiatric medication(s).      Medication Compliance:   Yes     Changes in Health Issues:  None reported    Saw allergist, aretha      Chemical Use Review:   Substance Use: Chemical use reviewed, no active concerns identified      Tobacco Use: No current tobacco use.      Diagnosis:  Anxiety disorder unspecified    Collateral Reports Completed:   Not Applicable,      PLAN: (Patient Tasks / Therapist Tasks / Other)    1) Connect with patience and creativity in approach with parenting.  Spend time connecting with daughter - doing art together.  Find enjoyment in life and allow yourself to do fun things-Enjoy reading and make time for this.  Look at the \"whole person\" with your daughter.      2) If there is a crisis situation, remember you are not alone and that there are people who can help you twenty-four hours a day, seven days a week.  Call [facility] ([facility]): [phone]    3) Follow-up visit is scheduled for 3/8 - phone visit.   Please call if you need a sooner appointment.  If urgent or crisis concerns arise prior to next scheduled appointment, please reach out to crisis resources, call 911, or go to the emergency department.      Cristy Wilkinson PsyD, LP  Licensed Psychologist  2/24/2021          Previous skills and strategies to remind self of and to do as needed:    Practice \"STOP\" technique when you recognize yourself feeling angry   Be a  - what do you notice is happening when Kaylee feels upset?     Practice recognizing when feeling angry, and giving self permission to use calming and self-soothing strategies and take a break.   Look for the gray with thinking  slow down  take deep breaths  manage expectations of self and others.    Journal   Practice flexing \"flexiblity and creativity\" -   Continue " "the great work you are doing with your daily schedule.    Get outside every day.    Play your instrument.    Sing!    Continue with: \"It's just a thought\" - non-judgmental, observe, float down the river on a leaf, clouds in the gege, reframing unhelpful thoughts -   Keep working on being patient when things are tense around me.    Continue with observing/paying attention to warning signs and signals and give self persmission to slow down, especially during the morning routine with Kaylee.    Practice offerring Kaylee choices when appropriate.      Use \"my plan for success\" to help remind you of calming strategies to practice when feeling upset.    Practice decreasing overall vulnerability to emotion mind through getting adequate rest, nutrition, time for yourself, and physical activity.         Consider reading \"Fighting for your marriage\".      Technical Assistance for video visits:    Offer patient the website ([email or web address]) and/or phone number ([phone]) for video visit instructions/assistance if needed.                                             ____________________________________    Treatment Plan    Patient's Name: Idalia Hinojosa  YOB: 1980    Date: 1/6/2020    DSM5 Diagnoses: 300.00 (F41.9) Unspecified Anxiety Disorder  Psychosocial / Contextual Factors: strain in marital relationship, parenting challenges, adjustment challenges  WHODAS: will gather at diagnostic assessment update    Referral / Collaboration:  We discussed a referral to a couples/marital therapist.  The patient is thinking about this and has talked with her  about the referral, but they are unsure if they would like to follow through at this time.      Anticipated number of session or this episode of care: 8-12    MeasurableTreatment Goal(s) related to diagnosis / functional impairment(s)  Goal 1: Patient will learn emotion regulation strategies to help when she is feeling " "upset/angry/frustrated/distressed    I will know I've met my goal when I would yell less, I would feel calmer, and I would not push others.  I would be less physical when I'm angry (e.g. wouldn't slam doors or hit things)       Objective #A (Patient Action)    Patient will learn and practice at least 2 new emotion regulation strategies.  Status: Continued - Date(s):4/23/2020 (has begun to learn new emotion regulation strategies and is making good progress)   Update: 7/8/2020: \"I've learned new strategies but I'm bad at practicing them\" - will help patient incorporate regular practice and identify and problem-solve barriers.    Reviewed: 10/8/2020: Patient has been successfully practicing emotion regulation strategies, is working on identifying earlier on when to intervene and engage strategies, as well as practice strategies on a regular basis to help strength use of strategies.    Reviewed and in progress: 1/27/2021: \"Overall things are going a lot better.  Sometimes I fall into old habits\", but recognizing when she's feeling more vulnerable to her emotions and reminds herself to use different skills/intervene in a different way.         Intervention(s)  Therapist will provide psychoeducation on emotion regulation module from DBT and will assign patient homework to help reinforce skill development.    Objective #B  Patient will identify factors that increase vulnerability to emotion mind and identify ways to decrease vulnerability to emotion mind.  Status: Continued - Date(s):4/23/2020 (has learned factors that make her more vulnerable to emotion mind, and has been working on addressing vulnerability related to feeling hungry, tired and rushed)   Update: 7/8/20: \"This one is harder for me.  I know I get angry when I'm hungry, and when I slow down I'm less inclined to get angry\".  Challenges - not always having food ready, not always planning in advance, (planing breakfast the night before)  Reviewed: 10/8/2020: " "Patient's awareness has increased of factors that increase her vulnerability to emotion mind, and she has been using this as a signal to tell her what she needs.    Reviewed and resolved/complete: 1/27/2021: \"I feel like we've done really good work with this and I've gotten what I need from this\".      Intervention(s)  Therapist will provide information on factors that increase vulnerabiliyt to emotion mind.    Objective #C  Patient will identify warning signs and signals that emotions are escalating and will learn ways to skillfully intervene early on.  Status: New - Date: 01/06/2020 7/8/2020- in progress  Update: 10/08/2020: Patient has been making good progress on identifying signs and signals that her emotions are escalating.    Reviewed and in progress: 1/27/2021: \"I feel like we've done good work on this but this is one of the things I need to keep working on\".        Intervention(s)  Therapist will help patient identify warning signs and signals, and will guide the patient in identifying skillful ways to intervene when exeriencing warning signs and signals.      Goal 2: Patient will learn new parenting strategies to help with managing parenting challenges.  Parent will work on improving relationship with her daughter and defining what she would like this relationship to look like.      I will know I've met my goal when I'm enjoying time with my daughter, teaching her new things, and not waiting for things to change      Objective #A (Patient Action)    Status: Continued - Date(s):4/23/2020     Patient will increase understanding of developmental norms for her daughter and ways to manage challenging behaviors.    Intervention(s)  Therapist will provide psychoeducation on developmental norms and parenting strategies.    Objective #B  Patient will spend time reflecting on her relationship with her daughter and defining what she would like this relaitonship to look like.    Status: Continued - Date(s):4/23/2020 " "  Update: 7/8/2020 - \"I think some things are going better, she's more helpful with things.  Some things are more challenging with the pandemic and thinking of things to do with her\".    Update: 10/08/2020: Patient is making progress on learning and practicing strategies to manage parenting challenges.    Reviewed: 1/27/2021: \"I feel like things have gotten better with this, we've figured out a good routine, I'm doing meditation, I'm enjoying my time with my daughter more than I used to\".        Intervention(s)  Therapist will guide the patient in reflecting upon her relationship with her daughter and help her define ways to move towards what she vaues in her relationship with her daughter.    Objective #C  Patient will increase time spent on fun activity and play with her daughter.  Status: Continued - Date(s):4/23/2020 7/8/2020 -   10/08/2020 - will further assess this goal at next session   Reviewed: 1/27/2021 - also going really well, we play together all of the time now\", she finds herself enjoying their time together.        Intervention(s)  Therapist will guide the patient in identifying ways she can increase positive time with her daughter.  Therapist will also teach mindfulness strategies to help patient with being in the present moment when appropriate - .      Goal 3: Patiient will improve communication with her .      I will know I've met my goal when I feel less irritated with my  and communicate more openly with my .  I would like to be more assertive and less aggressive.   I would like to not avoid telling him things because I'm worried about his reaction or don't think he will react well.  I would like to be more present to the moment during times when this would be helpful.\"      Objective #A (Patient Action)    Status: Continued - Date(s):4/23/2020 7/8/2020 - suggested couples therapy to help with this.    Individual therapy focus - identify what makes it hard to be more " "honest and open   10/08/2020 - will further assess this goal at next session   Reviewed: 1/27/2021 - \"Things are better, I feel like we've worked really hard on how to respond when he's having a hard time and how to talk to him.  I'm doing better listening, being more empathetic, and not \".        Patient will learn and practice at least two new interpersonal effectiveness strategies.    Intervention(s)  Therapist will teach strategies from DBT module on interpersonal effectiveness, and will assign homework to help reinforce skill development.      Patient has reviewed and agreed to the above plan.      Cristy Wilkinson PsyD, LP  Licensed Psychologist    January 6, 2020, reviewed on 4/23/2020, reviewed 7/8/2020, reviewed 10/08/2020, reviewed 1/27/2021                                            "

## 2021-03-16 ENCOUNTER — VIRTUAL VISIT (OUTPATIENT)
Dept: PSYCHOLOGY | Facility: CLINIC | Age: 41
End: 2021-03-16
Payer: COMMERCIAL

## 2021-03-16 DIAGNOSIS — F41.9 ANXIETY DISORDER, UNSPECIFIED TYPE: Primary | ICD-10-CM

## 2021-03-16 PROCEDURE — 90834 PSYTX W PT 45 MINUTES: CPT | Mod: TEL | Performed by: PSYCHOLOGIST

## 2021-03-30 ENCOUNTER — VIRTUAL VISIT (OUTPATIENT)
Dept: PSYCHOLOGY | Facility: CLINIC | Age: 41
End: 2021-03-30
Payer: COMMERCIAL

## 2021-03-30 DIAGNOSIS — F41.9 ANXIETY DISORDER, UNSPECIFIED TYPE: Primary | ICD-10-CM

## 2021-03-30 PROCEDURE — 90834 PSYTX W PT 45 MINUTES: CPT | Mod: TEL | Performed by: PSYCHOLOGIST

## 2021-03-30 NOTE — PROGRESS NOTES
"                                              Progress Note    Patient Name: Idalia Hinojosa  Date: 3/30/2021         Service Type: Individual      Session Start Time: 09:08 am  Session End Time: 10:00  am     Session Length: 52 minutes    Session #: 28    Attendees: Client    Service Modality:  Telephone visit    Idalia Hinojosa is a 40 year old female who is being evaluated via a telephone visit.      The patient has been notified of the following:     \"We have found that certain health care needs can be provided without the need for a face to face visit.  This service lets us provide the care you need with a short phone conversation.      I will have full access to your [facility] medical record during this entire phone call.   I will be taking notes for your medical record.     Since this is like an office visit, we will bill your insurance company for this service.  Please check with your medical insurance if this type of telephone visit/virtual care is covered.  You may be responsible for the cost of this service if insurance coverage is denied.      There are potential benefits and risks of telephone visits (e.g. limits to patient confidentiality) that differ from in-person visits.?  Confidentiality still applies for telephone services, and nobody will record the visit.  It is important to be in a quiet, private space that is free of distractions (including cell phone or other devices) during the visit.??     If during the course of the call I believe a telephone visit is not appropriate, you will not be charged for this service\"    Consent has been obtained for this service by care team member: yes.    Start time: 09:08 am    End time: 10:00 am    As the provider I attest to compliance with applicable laws and regulations related to telemedicine.    Reason for Phone Visit: Services only offered telehealth      Treatment Plan Last Reviewed: Developed 1/06/2020, reviewed 4/23/2020, reviewed 7/8/2020, " "reviewed 10/08/2020, 1/27/2021  PHQ-9/DAVID-7: 7/8/2020      DATA  Interactive Complexity: No  Crisis: No        Progress Since Last Session (Related to Symptoms / Goals / Homework):   Symptoms: Improving She reported feeling less stressful and less angry in the context of trying some new strategies in response to stresses        Homework: Achieved / completed to satisfaction.  Successful with paying attention to her needs and feelings, identifying what she needed, reaching out for what she needed.  Also successful with being aware of the \"cheery trap\" and not holding herself responsible for other's feelings.  She noted this made her feel less stressed and less angry.  She's also been doing a new type of Yoga that has been really helpful for her!        Episode of Care Goals: Satisfactory progress - ACTION (Actively working towards change); Intervened by reinforcing change plan / affirming steps taken.  Diagnostic assessment has been completed, treatment plan has been developed and patient has been making good progress on treatment goals.  The patient stated that her main goals for therapy would be to learn emotion regulation strategies (in particular, to help with when she is feeling upset/angry/frustrated/distressed).  More recently, the changes in her daily life due to COVID restrictions have been a focus of our work together.       Current / Ongoing Stressors and Concerns:   Current:  She's happy that her vaccine appointment is scheduled, and her parents will have one too, which she's excited about as it will allow her to be able to see her parents again and also they are very helpful with taking care of Kaylee so she can have a break.  Kayele has started OT and they've already been learning and practicing new strategies at home.       Ongoing:Managing daily routine which has contributed to some adjustment and parenting challenges, lack of effective emotion regulation strategies for managing daily frustrations and " "upsets and marital discord due to differences in parenting styles.       Treatment Objective(s) Addressed in This Session:     Patient will learn emotion regulation strategies to help when she is feeling upset/angry/frustrated/distressed  Identify when falling into \"cheery trap\" (expecting self to be responsible for other's emotions) and allow self to take a step back and look at what I do and don't have control over  Identify how to transition winter self-care routine into spring/summer  Be proactive with how to change routines for spring break/more time at home with daughter over the next week and a half    Continue with:    identify warning signs and signals, practice inserting skills early on   Increase awareness of intensity of emotions and use rating scale to monitor response to using strategies (and to help determine need to try different strategies)        Intervention:   CBT: and Solution-Focused Therapy  - Her daughter will be home from school on spring break for ten days, so she wished to proactively plan for the change in routines, which is a great strength of hers.  We talked about ways to plan for and structure the time, and I encouraged that she establish a basic routine she can write out/draw out to share with her daughter to help reinforce the routine for the day.  We talked about ways to make it positive and fun - she thought going on some adventure hikes would be a good way to spend time together that they would both enjoy, as well as making a list of schneider, putting it in a jar, and selecting a different/new park to go to.  She also recognized it will be important to have time to herself - problem-solved how she can incorporate this (\"quiet time\" after lunch, as well as asking Colia for some time at the end of the day/evening).  Playing her oboe everyday also helps.  She noted that as winter is over, she is working on finding new things to do, given how helpful [place] skiing was for her.  She " "has started running and Yoga - yoga is very helpful, helps her to feel centered, and it has a spiritual and meditative focus.      She's made some great progress on recognizing the \"cheery trap\" and allowing herself to be in a supportive role without feeling responsible for other's feelings/changing other's feelings.  She recognized that when she did feel responsible for other's feelings, this ultimately led her to feel more angry and upset.      She also successfully tuned into her feelings and needs and this allowed her to recognize the need to connect, and she reached out to her mom a few times.  One time was helpful, the other time was hard as her mom was with her sisters and she noticed feeling upset and wishing she could be with others too.  Recognized her successes in identifying her needs and ways to meet them, and that it is also very understandable how much she is missing being able to be with others in light of all the restrictions over the past year.       ASSESSMENT: Current Emotional / Mental Status (status of significant symptoms):   Risk status (Self / Other harm or suicidal ideation)   Patient denies current fears or concerns for personal safety.   Patient denies current or recent suicidal ideation or behaviors.    She agrees to use a safety plan should any safety concerns arise.  She also agrees to reach our to her spouse or a family member for help if needed, and/or access crisis/emergency resources.        Patientdenies current or recent homicidal ideation or behaviors.   Patient denies current or recent self injurious behavior or ideation.   Patient denies other safety concerns.   Patient Patient reports there has been no change in risk factors since their last session.     PatientPatient reports there has been no change in protective factors since their last session.     Recommended that patient call 911 or go to the local ED should there be a change in any of these risk factors.  She has " "previously been informed on how to contact [facility] daytime crisis number and [facility] crisis/COPE for urgent/crisis concerns 24/7.  Provided patient with crisis resources and she agrees to use safety plan should any safety concerns arise.      Professionals or agencies I can contact during a crisis:    [facility] Daytime Number: [phone]    Suicide Prevention Lifeline: 3-009-891-YHHQ (9826)    Crisis Text Line Service (available 24 hours a day, 7 days a week): Text [place] to 474154    Local Crisis Services: [facility] Crisis Services: [phone]    Call 911 or go to my nearest emergency department.        Appearance:   unable to assess (phone)    Eye Contact:   unable to assess/phone    Psychomotor Behavior: unable to assess (phone)    Attitude:   Cooperative    Orientation:   All   Speech    Rate / Production: Normal     Volume:  Normal    Mood:    Calm, and she reported feeling less stressed and upset,   Affect:    congruent with mood    Thought Content:  Clear    Thought Form:  Coherent  Logical    Insight:    Good      Medication Review:   No changes to current psychiatric medication(s).      Medication Compliance:   Yes     Changes in Health Issues:  None reported         Chemical Use Review:   Substance Use: Chemical use reviewed, no active concerns identified      Tobacco Use: No current tobacco use.      Diagnosis:  Anxiety disorder unspecified    Collateral Reports Completed:   Not Applicable,      PLAN: (Patient Tasks / Therapist Tasks / Other)    1) Approach spring break from a \"fun and joyful perspective\" - plan fun things to do togethr as well as time for myself.  Continue with great work you are doing to make time for self-care (running, yoga, playing the oboe, quiet time)    Think about what I need when frustrated (what is the unmet need) , call my mom, text my sister-in-law, be mindfully aware of when getting caught in a \"cheery trap\" (expecting I'm " "responsible for other's emotions),       2) If there is a crisis situation, remember you are not alone and that there are people who can help you twenty-four hours a day, seven days a week.  Call [facility] ([facility]): [phone]    3) Follow-up visit is scheduled for 3/8 - phone visit.   Please call if you need a sooner appointment.  If urgent or crisis concerns arise prior to next scheduled appointment, please reach out to crisis resources, call 911, or go to the emergency department.      Cristy Wilkinson PsyD,   Licensed Psychologist  3/30/2021          Previous skills and strategies to remind self of and to do as needed:    Practice \"STOP\" technique when you recognize yourself feeling angry   Be a  - what do you notice is happening when Kaylee feels upset?     Practice recognizing when feeling angry, and giving self permission to use calming and self-soothing strategies and take a break.   Look for the gray with thinking  slow down  take deep breaths  manage expectations of self and others.    Journal   Practice flexing \"flexiblity and creativity\" -   Continue the great work you are doing with your daily schedule.    Get outside every day.    Play your instrument.    Sing!    Continue with: \"It's just a thought\" - non-judgmental, observe, float down the river on a leaf, clouds in the gege, reframing unhelpful thoughts -   Keep working on being patient when things are tense around me.    Continue with observing/paying attention to warning signs and signals and give self persmission to slow down, especially during the morning routine with Kaylee.    Practice offerring Kaylee choices when appropriate.      Use \"my plan for success\" to help remind you of calming strategies to practice when feeling upset.    Practice decreasing overall vulnerability to emotion mind through getting adequate rest, nutrition, time for yourself, and physical activity.         Consider reading \"Fighting for your " "marriage\".      Technical Assistance for video visits:    Offer patient the website ([email or web address]) and/or phone number ([phone]) for video visit instructions/assistance if needed.                                             ____________________________________    Treatment Plan    Patient's Name: Idalia Hinojosa  YOB: 1980    Date: 1/6/2020    DSM5 Diagnoses: 300.00 (F41.9) Unspecified Anxiety Disorder  Psychosocial / Contextual Factors: strain in marital relationship, parenting challenges, adjustment challenges  WHODAS: will gather at diagnostic assessment update    Referral / Collaboration:  We discussed a referral to a couples/marital therapist.  The patient is thinking about this and has talked with her  about the referral, but they are unsure if they would like to follow through at this time.      Anticipated number of session or this episode of care: 8-12    MeasurableTreatment Goal(s) related to diagnosis / functional impairment(s)  Goal 1: Patient will learn emotion regulation strategies to help when she is feeling upset/angry/frustrated/distressed    I will know I've met my goal when I would yell less, I would feel calmer, and I would not push others.  I would be less physical when I'm angry (e.g. wouldn't slam doors or hit things)       Objective #A (Patient Action)    Patient will learn and practice at least 2 new emotion regulation strategies.  Status: Continued - Date(s):4/23/2020 (has begun to learn new emotion regulation strategies and is making good progress)   Update: 7/8/2020: \"I've learned new strategies but I'm bad at practicing them\" - will help patient incorporate regular practice and identify and problem-solve barriers.    Reviewed: 10/8/2020: Patient has been successfully practicing emotion regulation strategies, is working on identifying earlier on when to intervene and engage strategies, as well as practice strategies on a regular basis to help " "strength use of strategies.    Reviewed and in progress: 1/27/2021: \"Overall things are going a lot better.  Sometimes I fall into old habits\", but recognizing when she's feeling more vulnerable to her emotions and reminds herself to use different skills/intervene in a different way.         Intervention(s)  Therapist will provide psychoeducation on emotion regulation module from DBT and will assign patient homework to help reinforce skill development.    Objective #B  Patient will identify factors that increase vulnerability to emotion mind and identify ways to decrease vulnerability to emotion mind.  Status: Continued - Date(s):4/23/2020 (has learned factors that make her more vulnerable to emotion mind, and has been working on addressing vulnerability related to feeling hungry, tired and rushed)   Update: 7/8/20: \"This one is harder for me.  I know I get angry when I'm hungry, and when I slow down I'm less inclined to get angry\".  Challenges - not always having food ready, not always planning in advance, (planing breakfast the night before)  Reviewed: 10/8/2020: Patient's awareness has increased of factors that increase her vulnerability to emotion mind, and she has been using this as a signal to tell her what she needs.    Reviewed and resolved/complete: 1/27/2021: \"I feel like we've done really good work with this and I've gotten what I need from this\".      Intervention(s)  Therapist will provide information on factors that increase vulnerabiliyt to emotion mind.    Objective #C  Patient will identify warning signs and signals that emotions are escalating and will learn ways to skillfully intervene early on.  Status: New - Date: 01/06/2020 7/8/2020- in progress  Update: 10/08/2020: Patient has been making good progress on identifying signs and signals that her emotions are escalating.    Reviewed and in progress: 1/27/2021: \"I feel like we've done good work on this but this is one of the things I need to " "keep working on\".        Intervention(s)  Therapist will help patient identify warning signs and signals, and will guide the patient in identifying skillful ways to intervene when exeriencing warning signs and signals.      Goal 2: Patient will learn new parenting strategies to help with managing parenting challenges.  Parent will work on improving relationship with her daughter and defining what she would like this relationship to look like.      I will know I've met my goal when I'm enjoying time with my daughter, teaching her new things, and not waiting for things to change      Objective #A (Patient Action)    Status: Continued - Date(s):4/23/2020     Patient will increase understanding of developmental norms for her daughter and ways to manage challenging behaviors.    Intervention(s)  Therapist will provide psychoeducation on developmental norms and parenting strategies.    Objective #B  Patient will spend time reflecting on her relationship with her daughter and defining what she would like this relaitonship to look like.    Status: Continued - Date(s):4/23/2020   Update: 7/8/2020 - \"I think some things are going better, she's more helpful with things.  Some things are more challenging with the pandemic and thinking of things to do with her\".    Update: 10/08/2020: Patient is making progress on learning and practicing strategies to manage parenting challenges.    Reviewed: 1/27/2021: \"I feel like things have gotten better with this, we've figured out a good routine, I'm doing meditation, I'm enjoying my time with my daughter more than I used to\".        Intervention(s)  Therapist will guide the patient in reflecting upon her relationship with her daughter and help her define ways to move towards what she vaues in her relationship with her daughter.    Objective #C  Patient will increase time spent on fun activity and play with her daughter.  Status: Continued - Date(s):4/23/2020 7/8/2020 -   10/08/2020 - " "will further assess this goal at next session   Reviewed: 1/27/2021 - also going really well, we play together all of the time now\", she finds herself enjoying their time together.        Intervention(s)  Therapist will guide the patient in identifying ways she can increase positive time with her daughter.  Therapist will also teach mindfulness strategies to help patient with being in the present moment when appropriate - .      Goal 3: Patiient will improve communication with her .      I will know I've met my goal when I feel less irritated with my  and communicate more openly with my .  I would like to be more assertive and less aggressive.   I would like to not avoid telling him things because I'm worried about his reaction or don't think he will react well.  I would like to be more present to the moment during times when this would be helpful.\"      Objective #A (Patient Action)    Status: Continued - Date(s):4/23/2020 7/8/2020 - suggested couples therapy to help with this.    Individual therapy focus - identify what makes it hard to be more honest and open   10/08/2020 - will further assess this goal at next session   Reviewed: 1/27/2021 - \"Things are better, I feel like we've worked really hard on how to respond when he's having a hard time and how to talk to him.  I'm doing better listening, being more empathetic, and not \".        Patient will learn and practice at least two new interpersonal effectiveness strategies.    Intervention(s)  Therapist will teach strategies from DBT module on interpersonal effectiveness, and will assign homework to help reinforce skill development.      Patient has reviewed and agreed to the above plan.      Cristy Wilkinson PsyD, LP  Licensed Psychologist    January 6, 2020, reviewed on 4/23/2020, reviewed 7/8/2020, reviewed 10/08/2020, reviewed 1/27/2021                                            "

## 2021-04-13 ENCOUNTER — VIRTUAL VISIT (OUTPATIENT)
Dept: PSYCHOLOGY | Facility: CLINIC | Age: 41
End: 2021-04-13
Payer: COMMERCIAL

## 2021-04-13 DIAGNOSIS — F41.9 ANXIETY DISORDER, UNSPECIFIED TYPE: Primary | ICD-10-CM

## 2021-04-13 PROCEDURE — 90834 PSYTX W PT 45 MINUTES: CPT | Mod: TEL | Performed by: PSYCHOLOGIST

## 2021-04-13 NOTE — PROGRESS NOTES
"                                              Progress Note    Patient Name: Idalia Hinojosa  Date: 4/13/2021         Service Type: Individual      Session Start Time: 09:05 am  Session End Time: 9:57  am     Session Length: 52 minutes    Session #: 29    Attendees: Client    Service Modality:  Telephone visit    Idalia Hinojosa is a 40 year old female who is being evaluated via a telephone visit.      The patient has been notified of the following:     \"We have found that certain health care needs can be provided without the need for a face to face visit.  This service lets us provide the care you need with a short phone conversation.      I will have full access to your [facility] medical record during this entire phone call.   I will be taking notes for your medical record.     Since this is like an office visit, we will bill your insurance company for this service.  Please check with your medical insurance if this type of telephone visit/virtual care is covered.  You may be responsible for the cost of this service if insurance coverage is denied.      There are potential benefits and risks of telephone visits (e.g. limits to patient confidentiality) that differ from in-person visits.?  Confidentiality still applies for telephone services, and nobody will record the visit.  It is important to be in a quiet, private space that is free of distractions (including cell phone or other devices) during the visit.??     If during the course of the call I believe a telephone visit is not appropriate, you will not be charged for this service\"    Consent has been obtained for this service by care team member: yes.    Start time: 09:05 am    End time: 9:57  am    As the provider I attest to compliance with applicable laws and regulations related to telemedicine.    Reason for Phone Visit: Services only offered telehealth      Treatment Plan Last Reviewed: Developed 1/06/2020, reviewed 4/23/2020, reviewed 7/8/2020, reviewed " 10/08/2020, 1/27/2021  PHQ-9/DAVID-7: 7/8/2020      DATA  Interactive Complexity: No  Crisis: No        Progress Since Last Session (Related to Symptoms / Goals / Homework):   Symptoms: Stable - she reported that she had several good successes over the past few weeks, along with a few challenges.         Homework: Achieved / completed to satisfaction.  Successful with taking time for YOGA, has found this to be very helpful.  Has been practicing having a flexible mindset and this has been helpful for her.  Over spring break with her daughter was able to incorporate fun activities, play dates and outings, as well as a trip to the cabin.         Episode of Care Goals: Satisfactory progress - ACTION (Actively working towards change); Intervened by reinforcing change plan / affirming steps taken.  Diagnostic assessment has been completed, treatment plan has been developed and patient has been making good progress on treatment goals.  The patient stated that her main goals for therapy would be to learn emotion regulation strategies (in particular, to help with when she is feeling upset/angry/frustrated/distressed).  More recently, the changes in her daily life due to COVID restrictions have been a focus of our work together.       Current / Ongoing Stressors and Concerns:   Current:  Was successful over spring break with incorporating structure and fun activities, enjoyed time with her daughter.  Challenges related to relationship with spouse.         Ongoing:Managing daily routine which has contributed to some adjustment and parenting challenges, lack of effective emotion regulation strategies for managing daily frustrations and upsets and marital discord due to differences in parenting styles.       Treatment Objective(s) Addressed in This Session:     Learn and practice interpersonal effectiveness communication strategies  Identify and assert healthy boundaries    Continue with/review/practice:  learn emotion regulation  "strategies to help when she is feeling upset/angry/frustrated/distressed  Identify when falling into \"cheery trap\" (expecting self to be responsible for other's emotions) and allow self to take a step back and look at what I do and don't have control over  Identify how to transition winter self-care routine into spring/summer  identify warning signs and signals, practice inserting skills early on  Increase awareness of intensity of emotions and use rating scale to monitor response to using strategies (and to help determine need to try different strategies)        Intervention:   DBT: interpersonal effectiveness strategies-  She shared communication and relationship challenges she has been experiencing with her spouse.  She shared a success of recognizing her limits and asserting healthy boundaries.  She processed an examples of being caught in the middle of a conflict between her dad and spouse, we talked about the concept of \"triangulation\" and how to set boundaries and avoid being in the \"middle\" of the conflict between two other people.  We also talked about how her sense of wanting to protect her spouse from feeling upset can lead her to try and intervene in conflicts between her spouse and father.  She is continuing to to be mindful of when she is expecting herself to be responsible for other's emotions, examining what she does and doesn't have control over, and asserting healthy boundaries.      She also wished to talk about previous instances where she has felt angry and has had a hard time letting go of the anger.  She'll spend some time thinking about what she thinks may be keeping her stuck and not allowing herself to move forward.  In the example she shared, it seemed she may have remained stuck in part because she wishes she would have responded differently in the situation.  We talked about ways to \"look back\", \"be in the present moment\" and \"look ahead\".  In this example, looking back may be an " "opportunity to affirm that she did the best she could at the time, being in the present allows her to look for opportunities to reflect upon what she has learned and how to apply it in her life, and \"look ahead\" allows her to see the opportunity to continue to grow and learn from previous experiences.        ASSESSMENT: Current Emotional / Mental Status (status of significant symptoms):   Risk status (Self / Other harm or suicidal ideation)   Patient denies current fears or concerns for personal safety.   Patient denies current or recent suicidal ideation or behaviors.    She agrees to use a safety plan should any safety concerns arise.  She also agrees to reach our to her spouse or a family member for help if needed, and/or access crisis/emergency resources.        Patientdenies current or recent homicidal ideation or behaviors.   Patient denies current or recent self injurious behavior or ideation.   Patient denies other safety concerns.   Patient Patient reports there has been no change in risk factors since their last session.     PatientPatient reports there has been no change in protective factors since their last session.     Recommended that patient call 911 or go to the local ED should there be a change in any of these risk factors.  She has previously been informed on how to contact [facility] daytime crisis number and [facility] crisis/[facility] for urgent/crisis concerns 24/7.  Provided patient with crisis resources and she agrees to use safety plan should any safety concerns arise.      Professionals or agencies I can contact during a crisis:    [facility] Daytime Number: [phone]    Suicide Prevention Lifeline: 9-195-144-TALK (8246)    Crisis Text Line Service (available 24 hours a day, 7 days a week): Text [place] to 967160    Local Crisis Services: [facility] Crisis Services: [phone]    Call 911 or go to my nearest emergency " "department.        Appearance:   unable to assess (phone)    Eye Contact:   unable to assess/phone    Psychomotor Behavior: unable to assess (phone)    Attitude:   Cooperative    Orientation:   All   Speech    Rate / Production: Normal     Volume:  Normal    Mood:    Anxious ,   Affect:    congruent with mood    Thought Content:  Clear    Thought Form:  Coherent  Logical    Insight:    Good      Medication Review:   No changes to current psychiatric medication(s).      Medication Compliance:   Yes     Changes in Health Issues:  None reported         Chemical Use Review:   Substance Use: Chemical use reviewed, no active concerns identified      Tobacco Use: No current tobacco use.      Diagnosis:  Anxiety disorder unspecified    Collateral Reports Completed:   Not Applicable,      PLAN: (Patient Tasks / Therapist Tasks / Other)    1) Keep thinking present and future focused.   Practice \"flexible mindset\".      Continue with great work you are doing to make time for self-care (running, yoga, playing the oboe, quiet time)      2) If there is a crisis situation, remember you are not alone and that there are people who can help you twenty-four hours a day, seven days a week.  Call [facility] ([facility] Crisis Services): [phone]    3) Follow-up visit is scheduled.   Please call if you need a sooner appointment.  If urgent or crisis concerns arise prior to next scheduled appointment, please reach out to crisis resources, call 911, or go to the emergency department.      Cristy Wilkinson PsyD,   Licensed Psychologist  4/13/2021      Previous skills and strategies to remind self of and to do as needed:    Practice \"STOP\" technique when you recognize yourself feeling angry   Be a  - what do you notice is happening when Kaylee feels upset?     Practice recognizing when feeling angry, and giving self permission to use calming and self-soothing strategies and take a break.   Look for the gray with thinking  slow " "down  take deep breaths  manage expectations of self and others.    Journal   Practice flexing \"flexiblity and creativity\" -   Continue the great work you are doing with your daily schedule.    Get outside every day.    Play your instrument.    Sing!    Continue with: \"It's just a thought\" - non-judgmental, observe, float down the river on a leaf, clouds in the gege, reframing unhelpful thoughts -   Keep working on being patient when things are tense around me.    Continue with observing/paying attention to warning signs and signals and give self persmission to slow down, especially during the morning routine with Kaylee.    Practice offerring Kaylee choices when appropriate.      Use \"my plan for success\" to help remind you of calming strategies to practice when feeling upset.    Practice decreasing overall vulnerability to emotion mind through getting adequate rest, nutrition, time for yourself, and physical activity.         Consider reading \"Fighting for your marriage\".      Technical Assistance for video visits:    Offer patient the website ([email or web address]) and/or phone number ([phone]) for video visit instructions/assistance if needed.                                             ____________________________________    Treatment Plan    Patient's Name: Idalia Hinojosa  YOB: 1980    Date: 1/6/2020    DSM5 Diagnoses: 300.00 (F41.9) Unspecified Anxiety Disorder  Psychosocial / Contextual Factors: strain in marital relationship, parenting challenges, adjustment challenges  WHODAS: will gather at diagnostic assessment update    Referral / Collaboration:  We discussed a referral to a couples/marital therapist.  The patient is thinking about this and has talked with her  about the referral, but they are unsure if they would like to follow through at this time.      Anticipated number of session or this episode of care: 8-12    MeasurableTreatment Goal(s) related to diagnosis / " "functional impairment(s)  Goal 1: Patient will learn emotion regulation strategies to help when she is feeling upset/angry/frustrated/distressed    I will know I've met my goal when I would yell less, I would feel calmer, and I would not push others.  I would be less physical when I'm angry (e.g. wouldn't slam doors or hit things)       Objective #A (Patient Action)    Patient will learn and practice at least 2 new emotion regulation strategies.  Status: Continued - Date(s):4/23/2020 (has begun to learn new emotion regulation strategies and is making good progress)   Update: 7/8/2020: \"I've learned new strategies but I'm bad at practicing them\" - will help patient incorporate regular practice and identify and problem-solve barriers.    Reviewed: 10/8/2020: Patient has been successfully practicing emotion regulation strategies, is working on identifying earlier on when to intervene and engage strategies, as well as practice strategies on a regular basis to help strength use of strategies.    Reviewed and in progress: 1/27/2021: \"Overall things are going a lot better.  Sometimes I fall into old habits\", but recognizing when she's feeling more vulnerable to her emotions and reminds herself to use different skills/intervene in a different way.         Intervention(s)  Therapist will provide psychoeducation on emotion regulation module from DBT and will assign patient homework to help reinforce skill development.    Objective #B  Patient will identify factors that increase vulnerability to emotion mind and identify ways to decrease vulnerability to emotion mind.  Status: Continued - Date(s):4/23/2020 (has learned factors that make her more vulnerable to emotion mind, and has been working on addressing vulnerability related to feeling hungry, tired and rushed)   Update: 7/8/20: \"This one is harder for me.  I know I get angry when I'm hungry, and when I slow down I'm less inclined to get angry\".  Challenges - not always " "having food ready, not always planning in advance, (planing breakfast the night before)  Reviewed: 10/8/2020: Patient's awareness has increased of factors that increase her vulnerability to emotion mind, and she has been using this as a signal to tell her what she needs.    Reviewed and resolved/complete: 1/27/2021: \"I feel like we've done really good work with this and I've gotten what I need from this\".      Intervention(s)  Therapist will provide information on factors that increase vulnerabiliyt to emotion mind.    Objective #C  Patient will identify warning signs and signals that emotions are escalating and will learn ways to skillfully intervene early on.  Status: New - Date: 01/06/2020 7/8/2020- in progress  Update: 10/08/2020: Patient has been making good progress on identifying signs and signals that her emotions are escalating.    Reviewed and in progress: 1/27/2021: \"I feel like we've done good work on this but this is one of the things I need to keep working on\".        Intervention(s)  Therapist will help patient identify warning signs and signals, and will guide the patient in identifying skillful ways to intervene when exeriencing warning signs and signals.      Goal 2: Patient will learn new parenting strategies to help with managing parenting challenges.  Parent will work on improving relationship with her daughter and defining what she would like this relationship to look like.      I will know I've met my goal when I'm enjoying time with my daughter, teaching her new things, and not waiting for things to change      Objective #A (Patient Action)    Status: Continued - Date(s):4/23/2020     Patient will increase understanding of developmental norms for her daughter and ways to manage challenging behaviors.    Intervention(s)  Therapist will provide psychoeducation on developmental norms and parenting strategies.    Objective #B  Patient will spend time reflecting on her relationship with her " "daughter and defining what she would like this relaitonship to look like.    Status: Continued - Date(s):4/23/2020   Update: 7/8/2020 - \"I think some things are going better, she's more helpful with things.  Some things are more challenging with the pandemic and thinking of things to do with her\".    Update: 10/08/2020: Patient is making progress on learning and practicing strategies to manage parenting challenges.    Reviewed: 1/27/2021: \"I feel like things have gotten better with this, we've figured out a good routine, I'm doing meditation, I'm enjoying my time with my daughter more than I used to\".        Intervention(s)  Therapist will guide the patient in reflecting upon her relationship with her daughter and help her define ways to move towards what she vaues in her relationship with her daughter.    Objective #C  Patient will increase time spent on fun activity and play with her daughter.  Status: Continued - Date(s):4/23/2020 7/8/2020 -   10/08/2020 - will further assess this goal at next session   Reviewed: 1/27/2021 - also going really well, we play together all of the time now\", she finds herself enjoying their time together.        Intervention(s)  Therapist will guide the patient in identifying ways she can increase positive time with her daughter.  Therapist will also teach mindfulness strategies to help patient with being in the present moment when appropriate - .      Goal 3: Patiient will improve communication with her .      I will know I've met my goal when I feel less irritated with my  and communicate more openly with my .  I would like to be more assertive and less aggressive.   I would like to not avoid telling him things because I'm worried about his reaction or don't think he will react well.  I would like to be more present to the moment during times when this would be helpful.\"      Objective #A (Patient Action)    Status: Continued - Date(s):4/23/2020 7/8/2020 - " "suggested couples therapy to help with this.    Individual therapy focus - identify what makes it hard to be more honest and open   10/08/2020 - will further assess this goal at next session   Reviewed: 1/27/2021 - \"Things are better, I feel like we've worked really hard on how to respond when he's having a hard time and how to talk to him.  I'm doing better listening, being more empathetic, and not \".        Patient will learn and practice at least two new interpersonal effectiveness strategies.    Intervention(s)  Therapist will teach strategies from DBT module on interpersonal effectiveness, and will assign homework to help reinforce skill development.      Patient has reviewed and agreed to the above plan.      Cristy Wilkinson PsyD, LP  Licensed Psychologist    January 6, 2020, reviewed on 4/23/2020, reviewed 7/8/2020, reviewed 10/08/2020, reviewed 1/27/2021                                            "

## 2021-04-27 ENCOUNTER — VIRTUAL VISIT (OUTPATIENT)
Dept: PSYCHOLOGY | Facility: CLINIC | Age: 41
End: 2021-04-27
Payer: COMMERCIAL

## 2021-04-27 DIAGNOSIS — F41.9 ANXIETY DISORDER, UNSPECIFIED TYPE: Primary | ICD-10-CM

## 2021-04-27 PROCEDURE — 90834 PSYTX W PT 45 MINUTES: CPT | Mod: TEL | Performed by: PSYCHOLOGIST

## 2021-04-27 ASSESSMENT — ANXIETY QUESTIONNAIRES
1. FEELING NERVOUS, ANXIOUS, OR ON EDGE: NOT AT ALL
7. FEELING AFRAID AS IF SOMETHING AWFUL MIGHT HAPPEN: NOT AT ALL
5. BEING SO RESTLESS THAT IT IS HARD TO SIT STILL: NOT AT ALL
2. NOT BEING ABLE TO STOP OR CONTROL WORRYING: NOT AT ALL
GAD7 TOTAL SCORE: 1
6. BECOMING EASILY ANNOYED OR IRRITABLE: SEVERAL DAYS
IF YOU CHECKED OFF ANY PROBLEMS ON THIS QUESTIONNAIRE, HOW DIFFICULT HAVE THESE PROBLEMS MADE IT FOR YOU TO DO YOUR WORK, TAKE CARE OF THINGS AT HOME, OR GET ALONG WITH OTHER PEOPLE: NOT DIFFICULT AT ALL
3. WORRYING TOO MUCH ABOUT DIFFERENT THINGS: NOT AT ALL

## 2021-04-27 ASSESSMENT — PATIENT HEALTH QUESTIONNAIRE - PHQ9
SUM OF ALL RESPONSES TO PHQ QUESTIONS 1-9: 2
5. POOR APPETITE OR OVEREATING: NOT AT ALL

## 2021-04-27 NOTE — PROGRESS NOTES
"                                              Progress Note    Patient Name: Idalia Hinojosa  Date: 4/27/2021         Service Type: Individual      Session Start Time: 09:06 am  Session End Time: 9:58  am     Session Length:  52 minutes    Session #: 30    Attendees: Client    Service Modality:  Telephone visit    Idalia Hinojosa is a 40 year old female who is being evaluated via a telephone visit.      The patient has been notified of the following:     \"We have found that certain health care needs can be provided without the need for a face to face visit.  This service lets us provide the care you need with a short phone conversation.      I will have full access to your [facility] medical record during this entire phone call.   I will be taking notes for your medical record.     Since this is like an office visit, we will bill your insurance company for this service.  Please check with your medical insurance if this type of telephone visit/virtual care is covered.  You may be responsible for the cost of this service if insurance coverage is denied.      There are potential benefits and risks of telephone visits (e.g. limits to patient confidentiality) that differ from in-person visits.?  Confidentiality still applies for telephone services, and nobody will record the visit.  It is important to be in a quiet, private space that is free of distractions (including cell phone or other devices) during the visit.??     If during the course of the call I believe a telephone visit is not appropriate, you will not be charged for this service\"    Consent has been obtained for this service by care team member: yes.    Start time: 09:06 am    End time: 9:58  am    As the provider I attest to compliance with applicable laws and regulations related to telemedicine.    Reason for Phone Visit: Services only offered telehealth      Treatment Plan Last Reviewed: Developed 1/06/2020, reviewed 4/23/2020, reviewed 7/8/2020, " "reviewed 10/08/2020, 1/27/2021  PHQ-9/DAVID-7: 4/27/2021    DATA  Interactive Complexity: No  Crisis: No        Progress Since Last Session (Related to Symptoms / Goals / Homework):   Symptoms: Improving She reported that she had a good few weeks; parents have been able to help out with childcre, she has some future breaks planned, as well as some fun things planned.  These factors are all helpful -         Homework: Achieved / completed to satisfaction.  Finding yoga meditation to be very helpful.  Enjoyed time she had to herself and with her  when her daughter slept over at grandparents house.  Also had success related to  her emotional response from her spouse's emotional response, and setting boundaries around this.          Episode of Care Goals: Satisfactory progress - ACTION (Actively working towards change); Intervened by reinforcing change plan / affirming steps taken.  Diagnostic assessment has been completed, treatment plan has been developed and patient has been making good progress on treatment goals.  The patient stated that her main goals for therapy would be to learn emotion regulation strategies (in particular, to help with when she is feeling upset/angry/frustrated/distressed).  More recently, the changes in her daily life due to COVID restrictions have been a focus of our work together.       Current / Ongoing Stressors and Concerns:   Current:  Challenges related to communication and relationship with spouse, working on  her emotional response from spouse's emotional response, setting boundaries, and not taking on responsbility for other people's feelings or needing to \"fix/change\" other people's feelings.         Ongoing:Managing daily routine which has contributed to some adjustment and parenting challenges, lack of effective emotion regulation strategies for managing daily frustrations and upsets and marital discord due to differences in parenting styles.  " "     Treatment Objective(s) Addressed in This Session:     Reviewed and updated treatment plan  Learn and practice interpersonal effectiveness communication strategies  Identify and assert healthy boundaries    Continue with/review/practice:  learn emotion regulation strategies to help when she is feeling upset/angry/frustrated/distressed  Identify when falling into \"cheery trap\" (expecting self to be responsible for other's emotions) and allow self to take a step back and look at what I do and don't have control over  Identify how to transition winter self-care routine into spring/summer  identify warning signs and signals, practice inserting skills early on  Increase awareness of intensity of emotions and use rating scale to monitor response to using strategies (and to help determine need to try different strategies)        Intervention:   DBT: interpersonal effectiveness strategies-  Gathered updated PHQ and DAVID score, which were 2 and 1 respectively.  We also did a comprehensive review of her treatment plan, progress on goals, review of strategies learned, and what work remains.  See treatment plan for updates.      She wished to focus today on continuing work to separate her emotional response from her spouse's and identifying and asserting healthy boundaries.  Affirmed the progress she has made on this, in addition to validating her belief that \"I don't have to feel and think what he does\".      ASSESSMENT: Current Emotional / Mental Status (status of significant symptoms):   Risk status (Self / Other harm or suicidal ideation)   Patient denies current fears or concerns for personal safety.   Patient denies current or recent suicidal ideation or behaviors.    She agrees to use a safety plan should any safety concerns arise.  She also agrees to reach our to her spouse or a family member for help if needed, and/or access crisis/emergency resources.        Patientdenies current or recent homicidal ideation or " behaviors.   Patient denies current or recent self injurious behavior or ideation.   Patient denies other safety concerns.   Patient Patient reports there has been no change in risk factors since their last session.     PatientPatient reports there has been no change in protective factors since their last session.     Recommended that patient call 911 or go to the local ED should there be a change in any of these risk factors.  She has previously been informed on how to contact [facility] crisis number and [facility] crisis/COPE for urgent/crisis concerns 24/7.  Provided patient with crisis resources and she agrees to use safety plan should any safety concerns arise.      Professionals or agencies I can contact during a crisis:    [facility] Daytime Number: [phone]    Suicide Prevention Lifeline: 3-248-861-OMDM (9116)    Crisis Text Line Service (available 24 hours a day, 7 days a week): Text [place] to 295980    Local Crisis Services: [facility] Crisis Services: [phone]    Call 911 or go to my nearest emergency department.        Appearance:   unable to assess (phone)    Eye Contact:   unable to assess/phone    Psychomotor Behavior: unable to assess (phone)    Attitude:   Cooperative    Orientation:   All   Speech    Rate / Production: Normal     Volume:  Normal    Mood:    She reported she is feeling better, less anxious and depressed, PHQ and Kingsley scores support this,   Affect:    congruent with mood    Thought Content:  Clear    Thought Form:  Coherent  Logical    Insight:    Good      Medication Review:   No changes to current psychiatric medication(s).      Medication Compliance:   Yes     Changes in Health Issues:  None reported         Chemical Use Review:   Substance Use: Chemical use reviewed, no active concerns identified      Tobacco Use: No current tobacco use.      Diagnosis:  Anxiety disorder unspecified    Collateral Reports Completed:   Not  "Applicable,      PLAN: (Patient Tasks / Therapist Tasks / Other)    1) Take time for Yoga/meditation exercises, start playing oboe again (play 15 minutes a day on weekdays), \"continue to maintain equillibrium even when other people are being challenging around me\".     Keep thinking present and future focused.   Practice \"flexible mindset\".        2) If there is a crisis situation, remember you are not alone and that there are people who can help you twenty-four hours a day, seven days a week.  Call [facility] ([facility] Crisis Services): [phone].      3) Follow-up visit is scheduled.   Please call if you need a sooner appointment.  If urgent or crisis concerns arise prior to next scheduled appointment, please reach out to crisis resources, call 911, or go to the emergency department.      Cristy Wilkinson PsyD,   Licensed Psychologist  4/27/2021        Previous skills and strategies to remind self of and to do as needed:    Practice \"STOP\" technique when you recognize yourself feeling angry   Be a  - what do you notice is happening when Kaylee feels upset?     Practice recognizing when feeling angry, and giving self permission to use calming and self-soothing strategies and take a break.   Look for the gray with thinking  slow down  take deep breaths  manage expectations of self and others.    Journal   Practice flexing \"flexiblity and creativity\" -   Continue the great work you are doing with your daily schedule.    Get outside every day.    Play your instrument.    Sing!    Continue with: \"It's just a thought\" - non-judgmental, observe, float down the river on a leaf, clouds in the gege, reframing unhelpful thoughts -   Keep working on being patient when things are tense around me.    Continue with observing/paying attention to warning signs and signals and give self persmission to slow down, especially during the morning routine with Kaylee.    Practice offerring Kaylee choices when appropriate.      Use \"my " "plan for success\" to help remind you of calming strategies to practice when feeling upset.    Practice decreasing overall vulnerability to emotion mind through getting adequate rest, nutrition, time for yourself, and physical activity.         Consider reading \"Fighting for your marriage\".      Technical Assistance for video visits:    Offer patient the website ([email or web address]) and/or phone number ([phone]) for video visit instructions/assistance if needed.                                             ____________________________________    Treatment Plan    Patient's Name: Idalia Hinojosa  YOB: 1980    Date: 1/6/2020    DSM5 Diagnoses: 300.00 (F41.9) Unspecified Anxiety Disorder  Psychosocial / Contextual Factors: strain in marital relationship, parenting challenges, adjustment challenges  WHODAS: will gather at diagnostic assessment update    Referral / Collaboration:  We discussed a referral to a couples/marital therapist.  The patient is thinking about this and has talked with her  about the referral, but they are unsure if they would like to follow through at this time.      Anticipated number of session or this episode of care: 8-12    MeasurableTreatment Goal(s) related to diagnosis / functional impairment(s)  Goal 1: Patient will learn emotion regulation strategies to help when she is feeling upset/angry/frustrated/distressed    I will know I've met my goal when I would yell less, I would feel calmer, and I would not push others.  I would be less physical when I'm angry (e.g. wouldn't slam doors or hit things)       Objective #A (Patient Action)    Patient will learn and practice at least 2 new emotion regulation strategies.  Status: Continued - Date(s):4/23/2020 (has begun to learn new emotion regulation strategies and is making good progress)   Update: 7/8/2020: \"I've learned new strategies but I'm bad at practicing them\" - will help patient incorporate regular " "practice and identify and problem-solve barriers.    Reviewed: 10/8/2020: Patient has been successfully practicing emotion regulation strategies, is working on identifying earlier on when to intervene and engage strategies, as well as practice strategies on a regular basis to help strength use of strategies.    Reviewed and in progress: 1/27/2021: \"Overall things are going a lot better.  Sometimes I fall into old habits\", but recognizing when she's feeling more vulnerable to her emotions and reminds herself to use different skills/intervene in a different way.     4/27/2021-\"Things have definitely been better\".  Recognizing when feeling vulnerable (e.g. hungry), recognizing when emotions are escalating and need to engage calming strategies\".  Ask for help from Colia when feeling upset.  Can be challenging when Kaylee escalates when I'm feeling angry.        Intervention(s)  Therapist will provide psychoeducation on emotion regulation module from DBT and will assign patient homework to help reinforce skill development.    Objective #B  Patient will identify factors that increase vulnerability to emotion mind and identify ways to decrease vulnerability to emotion mind.  Status: Continued - Date(s):4/23/2020 (has learned factors that make her more vulnerable to emotion mind, and has been working on addressing vulnerability related to feeling hungry, tired and rushed)   Update: 7/8/20: \"This one is harder for me.  I know I get angry when I'm hungry, and when I slow down I'm less inclined to get angry\".  Challenges - not always having food ready, not always planning in advance, (planing breakfast the night before)  Reviewed: 10/8/2020: Patient's awareness has increased of factors that increase her vulnerability to emotion mind, and she has been using this as a signal to tell her what she needs.    Reviewed and resolved/complete: 1/27/2021: \"I feel like we've done really good work with this and I've gotten what I need from " "this\".  4/27/2021  - I am doing better with this - I know I get more irritable when I'm hungry and I'm trying to do better with this.  Drinking more water.        Intervention(s)  Therapist will provide information on factors that increase vulnerabiliyt to emotion mind.    Objective #C  Patient will identify warning signs and signals that emotions are escalating and will learn ways to skillfully intervene early on.  Status: New - Date: 01/06/2020 7/8/2020- in progress  Update: 10/08/2020: Patient has been making good progress on identifying signs and signals that her emotions are escalating.    Reviewed and in progress: 1/27/2021: \"I feel like we've done good work on this but this is one of the things I need to keep working on\".        Intervention(s)  Therapist will help patient identify warning signs and signals, and will guide the patient in identifying skillful ways to intervene when exeriencing warning signs and signals.      Goal 2: Patient will learn new parenting strategies to help with managing parenting challenges.  Parent will work on improving relationship with her daughter and defining what she would like this relationship to look like.      I will know I've met my goal when I'm enjoying time with my daughter, teaching her new things, and not waiting for things to change      Objective #A (Patient Action)    Status: Continued - Date(s):4/23/2020     Patient will increase understanding of developmental norms for her daughter and ways to manage challenging behaviors.    Intervention(s)  Therapist will provide psychoeducation on developmental norms and parenting strategies.    Objective #B  Patient will spend time reflecting on her relationship with her daughter and defining what she would like this relaitonship to look like.    Status: Continued - Date(s):4/23/2020   Update: 7/8/2020 - \"I think some things are going better, she's more helpful with things.  Some things are more challenging with the " "pandemic and thinking of things to do with her\".    Update: 10/08/2020: Patient is making progress on learning and practicing strategies to manage parenting challenges.    Reviewed: 1/27/2021: \"I feel like things have gotten better with this, we've figured out a good routine, I'm doing meditation, I'm enjoying my time with my daughter more than I used to\".   4/27/2021 - Challenges of parenting during a pandemic, working with occupational therapist on strategies to help daughter,         Intervention(s)  Therapist will guide the patient in reflecting upon her relationship with her daughter and help her define ways to move towards what she vaues in her relationship with her daughter.    Objective #C  Patient will increase time spent on fun activity and play with her daughter.  Status: Continued - Date(s):4/23/2020 7/8/2020 -   10/08/2020 - will further assess this goal at next session   Reviewed: 1/27/2021 - also going really well, we play together all of the time now\", she finds herself enjoying their time together.   4/27/2021- she will spend some time reflecting upon this and we will discuss further next time.  They have several fun things planned for the future, as well as she has worked hard during the pandemic to plan structure and time together within COVID restrictions.        Intervention(s)  Therapist will guide the patient in identifying ways she can increase positive time with her daughter.  Therapist will also teach mindfulness strategies to help patient with being in the present moment when appropriate - .      Goal 3: Patiient will improve communication with her .      I will know I've met my goal when I feel less irritated with my  and communicate more openly with my .  I would like to be more assertive and less aggressive.   I would like to not avoid telling him things because I'm worried about his reaction or don't think he will react well.  I would like to be more present to " "the moment during times when this would be helpful.\"      Objective #A (Patient Action)    Status: Continued - Date(s):4/23/2020 7/8/2020 - suggested couples therapy to help with this.    Individual therapy focus - identify what makes it hard to be more honest and open   10/08/2020 - will further assess this goal at next session   Reviewed: 1/27/2021 - \"Things are better, I feel like we've worked really hard on how to respond when he's having a hard time and how to talk to him.  I'm doing better listening, being more empathetic, and not \".  4/27/2021 - She has been making progress on examining and updating unhelpful beliefs (e.g. feeling responsible for other's emotions, feeling like she needs to fix or change other's emotions) and has made good progress on identifying and asserting healthy boundaries, in addition to  her emotional response from her spouse's emotional response.        Patient will learn and practice at least two new interpersonal effectiveness strategies.    Intervention(s)  Therapist will teach strategies from DBT module on interpersonal effectiveness, and will assign homework to help reinforce skill development.      Patient has reviewed and agreed to the above plan.      Cristy Wilkinson PsyD,   Licensed Psychologist    January 6, 2020, reviewed on 4/23/2020, reviewed 7/8/2020, reviewed 10/08/2020, reviewed 1/27/2021, reviewed 4/27/2021                                                "

## 2021-04-28 ASSESSMENT — ANXIETY QUESTIONNAIRES: GAD7 TOTAL SCORE: 1

## 2021-05-18 ENCOUNTER — VIRTUAL VISIT (OUTPATIENT)
Dept: PSYCHOLOGY | Facility: CLINIC | Age: 41
End: 2021-05-18
Payer: COMMERCIAL

## 2021-05-18 DIAGNOSIS — F41.9 ANXIETY DISORDER, UNSPECIFIED TYPE: Primary | ICD-10-CM

## 2021-05-18 PROCEDURE — 90834 PSYTX W PT 45 MINUTES: CPT | Mod: TEL | Performed by: PSYCHOLOGIST

## 2021-05-18 NOTE — PROGRESS NOTES
"                                              Progress Note    Patient Name: Idalia Hinojosa  Date: 5/18/2021         Service Type: Individual      Session Start Time: 09:05 am  Session End Time: 9:48  am     Session Length:  43 minutes    Session #: 31    Attendees: Client    Service Modality:  Telephone visit    Idalia Hinojosa is a 40 year old female who is being evaluated via a telephone visit.      The patient has been notified of the following:     \"We have found that certain health care needs can be provided without the need for a face to face visit.  This service lets us provide the care you need with a short phone conversation.      I will have full access to your [place] medical record during this entire phone call.   I will be taking notes for your medical record.     Since this is like an office visit, we will bill your insurance company for this service.  Please check with your medical insurance if this type of telephone visit/virtual care is covered.  You may be responsible for the cost of this service if insurance coverage is denied.      There are potential benefits and risks of telephone visits (e.g. limits to patient confidentiality) that differ from in-person visits.?  Confidentiality still applies for telephone services, and nobody will record the visit.  It is important to be in a quiet, private space that is free of distractions (including cell phone or other devices) during the visit.??     If during the course of the call I believe a telephone visit is not appropriate, you will not be charged for this service\"    Consent has been obtained for this service by care team member: yes.    Start time: 09:05 am    End time: 9:48 am    As the provider I attest to compliance with applicable laws and regulations related to telemedicine.    Reason for Phone Visit: Services only offered telehealth      Treatment Plan Last Reviewed: Developed 1/06/2020, reviewed 4/23/2020, reviewed 7/8/2020, reviewed " "10/08/2020, 1/27/2021  PHQ-9/DAVID-7: 4/27/2021    DATA  Interactive Complexity: No  Crisis: No        Progress Since Last Session (Related to Symptoms / Goals / Homework):   Symptoms: Improving She reported that she continues to feel \"better\" and that things are \"improving\".  She stated that her daughter has been able to have more sleep-overs with grandparents, which has given her and her  more time together.  She reported they have been able to gather as a family and hug each other - all of this has been so positive and helpful.         Homework: Achieved / completed to satisfaction. She's been successful with playing the oboe, has been practicing a flexible mindset, and has been managing/ her emotional response from others emotions.  She's been practicing the strategies her daughter's occupational therapist is teaching them.  She continues to practice yoga and this has been really helpful for her.        Episode of Care Goals: Satisfactory progress - ACTION (Actively working towards change); Intervened by reinforcing change plan / affirming steps taken.  Diagnostic assessment has been completed, treatment plan has been developed and patient has been making good progress on treatment goals.  The patient stated that her main goals for therapy would be to learn emotion regulation strategies (in particular, to help with when she is feeling upset/angry/frustrated/distressed).  More recently, the changes in her daily life due to COVID restrictions have been a focus of our work together.       Current / Ongoing Stressors and Concerns:   Current: She's been working on maintaining equilibrium and finding her calm when others around her are escalating.  She's also been working on incorporating strategies the OT is teaching her and her .       Ongoing:Managing daily routine which has contributed to some adjustment and parenting challenges, lack of effective emotion regulation strategies for managing " "daily frustrations and upsets and marital discord due to differences in parenting styles.       Treatment Objective(s) Addressed in This Session:     Incorporate self-care strategies into daily practice   Identify factors that increase vulnerability to emotion mind and engage strategies to help decrease vulnerability to emotion mind     Continue with/review/practice:  learn emotion regulation strategies to help when she is feeling upset/angry/frustrated/distressed  Identify when falling into \"cheery trap\" (expecting self to be responsible for other's emotions) and allow self to take a step back and look at what I do and don't have control over  Identify how to transition winter self-care routine into spring/summer  identify warning signs and signals, practice inserting skills early on  Increase awareness of intensity of emotions and use rating scale to monitor response to using strategies (and to help determine need to try different strategies)        Intervention:   CBT: Yoga has been very helpful for her and she wished to talk about how to incorporate yoga practice into her regular self-care routine.  She purchased a program and identified ways she can incorporate this into her regular self-care over the next month.         We reviewed successes she has had with maintaining equilibrium/calm when others are escalating.  She had several great examples of challenging moments where she was able to practice this.  She identified some challenges she has had as well, and I normalized this as part of the process, learning and growth - as well as part of being human (no one is perfect) and to allow herself to continue to practice balanced and flexible thinking (versus rigid/all or nothing thinking).  Encouraged practice of mindfulness - nonjudgmental and compassionate stance that allows her to focus on seeing each moment as new and allowing for growth and learning.      ASSESSMENT: Current Emotional / Mental Status (status " of significant symptoms):   Risk status (Self / Other harm or suicidal ideation)   Patient denies current fears or concerns for personal safety.   Patient denies current or recent suicidal ideation or behaviors.    She agrees to use a safety plan should any safety concerns arise.  She also agrees to reach our to her spouse or a family member for help if needed, and/or access crisis/emergency resources.        Patientdenies current or recent homicidal ideation or behaviors.   Patient denies current or recent self injurious behavior or ideation.   Patient denies other safety concerns.   Patient Patient reports there has been no change in risk factors since their last session.     PatientPatient reports there has been no change in protective factors since their last session.     Recommended that patient call 911 or go to the local ED should there be a change in any of these risk factors.  She has previously been informed on how to contact [facility] crisis number and [facility] crisis/COPE for urgent/crisis concerns 24/7.  Provided patient with crisis resources and she agrees to use safety plan should any safety concerns arise.      Professionals or agencies I can contact during a crisis:    [facility] Daytime Number: [phone]    Suicide Prevention Lifeline: 0-653-751-TALK (8233)    Crisis Text Line Service (available 24 hours a day, 7 days a week): Text [place] to 565787    Local Crisis Services: [facility] Crisis Services: [phone]    Call 911 or go to my nearest emergency department.        Appearance:   unable to assess (phone)    Eye Contact:   unable to assess/phone    Psychomotor Behavior: unable to assess (phone)    Attitude:   Cooperative    Orientation:   All   Speech    Rate / Production: Normal     Volume:  Normal    Mood:    She reported that her mood is improved - less anxious,and enjoying the ability to spend more time with family and  "friends.,   Affect:    congruent with mood    Thought Content:  Clear    Thought Form:  Coherent  Logical    Insight:    Good      Medication Review:   No changes to current psychiatric medication(s).      Medication Compliance:   Yes     Changes in Health Issues:  None reported         Chemical Use Review:   Substance Use: Chemical use reviewed, no active concerns identified      Tobacco Use: No current tobacco use.      Diagnosis:  Anxiety disorder unspecified    Collateral Reports Completed:   Not Applicable,      PLAN: (Patient Tasks / Therapist Tasks / Other)    1) Follow through on yoga training plan (5 week plan), keep having flexible mindset with Kaylee, learning, trying new things to help her,     Keep thinking present and future focused.   Practice \"flexible mindset\".        2) If there is a crisis situation, remember you are not alone and that there are people who can help you twenty-four hours a day, seven days a week.  Call [facility] ([facility]): [phone].      3) Follow-up visit is scheduled.   Please call if you need a sooner appointment.  If urgent or crisis concerns arise prior to next scheduled appointment, please reach out to crisis resources, call 911, or go to the emergency department.      Cristy Wilkinson PsyD,   Licensed Psychologist  5/18/2021            Previous skills and strategies to remind self of and to do as needed:    Practice \"STOP\" technique when you recognize yourself feeling angry   Be a  - what do you notice is happening when Kaylee feels upset?     Practice recognizing when feeling angry, and giving self permission to use calming and self-soothing strategies and take a break.   Look for the gray with thinking  slow down  take deep breaths  manage expectations of self and others.    Journal   Practice flexing \"flexiblity and creativity\" -   Continue the great work you are doing with your daily schedule.    Get outside every day.    Play your instrument.  " "  Sing!    Continue with: \"It's just a thought\" - non-judgmental, observe, float down the river on a leaf, clouds in the gege, reframing unhelpful thoughts -   Keep working on being patient when things are tense around me.    Continue with observing/paying attention to warning signs and signals and give self persmission to slow down, especially during the morning routine with Kaylee.    Practice offerring Kaylee choices when appropriate.      Use \"my plan for success\" to help remind you of calming strategies to practice when feeling upset.    Practice decreasing overall vulnerability to emotion mind through getting adequate rest, nutrition, time for yourself, and physical activity.         Consider reading \"Fighting for your marriage\".      Technical Assistance for video visits:    Offer patient the website ([email or web address]) and/or phone number ([phone]) for video visit instructions/assistance if needed.                                             ____________________________________    Treatment Plan    Patient's Name: Idalia Hinojosa  YOB: 1980    Date: 1/6/2020    DSM5 Diagnoses: 300.00 (F41.9) Unspecified Anxiety Disorder  Psychosocial / Contextual Factors: strain in marital relationship, parenting challenges, adjustment challenges  WHODAS: will gather at diagnostic assessment update    Referral / Collaboration:  We discussed a referral to a couples/marital therapist.  The patient is thinking about this and has talked with her  about the referral, but they are unsure if they would like to follow through at this time.      Anticipated number of session or this episode of care: 8-12    MeasurableTreatment Goal(s) related to diagnosis / functional impairment(s)  Goal 1: Patient will learn emotion regulation strategies to help when she is feeling upset/angry/frustrated/distressed    I will know I've met my goal when I would yell less, I would feel calmer, and I would not push others. " " I would be less physical when I'm angry (e.g. wouldn't slam doors or hit things)       Objective #A (Patient Action)    Patient will learn and practice at least 2 new emotion regulation strategies.  Status: Continued - Date(s):4/23/2020 (has begun to learn new emotion regulation strategies and is making good progress)   Update: 7/8/2020: \"I've learned new strategies but I'm bad at practicing them\" - will help patient incorporate regular practice and identify and problem-solve barriers.    Reviewed: 10/8/2020: Patient has been successfully practicing emotion regulation strategies, is working on identifying earlier on when to intervene and engage strategies, as well as practice strategies on a regular basis to help strength use of strategies.    Reviewed and in progress: 1/27/2021: \"Overall things are going a lot better.  Sometimes I fall into old habits\", but recognizing when she's feeling more vulnerable to her emotions and reminds herself to use different skills/intervene in a different way.     4/27/2021-\"Things have definitely been better\".  Recognizing when feeling vulnerable (e.g. hungry), recognizing when emotions are escalating and need to engage calming strategies\".  Ask for help from Colia when feeling upset.  Can be challenging when Kaylee escalates when I'm feeling angry.        Intervention(s)  Therapist will provide psychoeducation on emotion regulation module from DBT and will assign patient homework to help reinforce skill development.    Objective #B  Patient will identify factors that increase vulnerability to emotion mind and identify ways to decrease vulnerability to emotion mind.  Status: Continued - Date(s):4/23/2020 (has learned factors that make her more vulnerable to emotion mind, and has been working on addressing vulnerability related to feeling hungry, tired and rushed)   Update: 7/8/20: \"This one is harder for me.  I know I get angry when I'm hungry, and when I slow down I'm less inclined " "to get angry\".  Challenges - not always having food ready, not always planning in advance, (planing breakfast the night before)  Reviewed: 10/8/2020: Patient's awareness has increased of factors that increase her vulnerability to emotion mind, and she has been using this as a signal to tell her what she needs.    Reviewed and resolved/complete: 1/27/2021: \"I feel like we've done really good work with this and I've gotten what I need from this\".  4/27/2021  - I am doing better with this - I know I get more irritable when I'm hungry and I'm trying to do better with this.  Drinking more water.        Intervention(s)  Therapist will provide information on factors that increase vulnerabiliyt to emotion mind.    Objective #C  Patient will identify warning signs and signals that emotions are escalating and will learn ways to skillfully intervene early on.  Status: New - Date: 01/06/2020 7/8/2020- in progress  Update: 10/08/2020: Patient has been making good progress on identifying signs and signals that her emotions are escalating.    Reviewed and in progress: 1/27/2021: \"I feel like we've done good work on this but this is one of the things I need to keep working on\".        Intervention(s)  Therapist will help patient identify warning signs and signals, and will guide the patient in identifying skillful ways to intervene when exeriencing warning signs and signals.      Goal 2: Patient will learn new parenting strategies to help with managing parenting challenges.  Parent will work on improving relationship with her daughter and defining what she would like this relationship to look like.      I will know I've met my goal when I'm enjoying time with my daughter, teaching her new things, and not waiting for things to change      Objective #A (Patient Action)    Status: Continued - Date(s):4/23/2020     Patient will increase understanding of developmental norms for her daughter and ways to manage challenging " "behaviors.    Intervention(s)  Therapist will provide psychoeducation on developmental norms and parenting strategies.    Objective #B  Patient will spend time reflecting on her relationship with her daughter and defining what she would like this relaitonship to look like.    Status: Continued - Date(s):4/23/2020   Update: 7/8/2020 - \"I think some things are going better, she's more helpful with things.  Some things are more challenging with the pandemic and thinking of things to do with her\".    Update: 10/08/2020: Patient is making progress on learning and practicing strategies to manage parenting challenges.    Reviewed: 1/27/2021: \"I feel like things have gotten better with this, we've figured out a good routine, I'm doing meditation, I'm enjoying my time with my daughter more than I used to\".   4/27/2021 - Challenges of parenting during a pandemic, working with occupational therapist on strategies to help daughter,         Intervention(s)  Therapist will guide the patient in reflecting upon her relationship with her daughter and help her define ways to move towards what she vaues in her relationship with her daughter.    Objective #C  Patient will increase time spent on fun activity and play with her daughter.  Status: Continued - Date(s):4/23/2020 7/8/2020 -   10/08/2020 - will further assess this goal at next session   Reviewed: 1/27/2021 - also going really well, we play together all of the time now\", she finds herself enjoying their time together.   4/27/2021- she will spend some time reflecting upon this and we will discuss further next time.  They have several fun things planned for the future, as well as she has worked hard during the pandemic to plan structure and time together within COVID restrictions.        Intervention(s)  Therapist will guide the patient in identifying ways she can increase positive time with her daughter.  Therapist will also teach mindfulness strategies to help patient with " "being in the present moment when appropriate - .      Goal 3: Patiient will improve communication with her .      I will know I've met my goal when I feel less irritated with my  and communicate more openly with my .  I would like to be more assertive and less aggressive.   I would like to not avoid telling him things because I'm worried about his reaction or don't think he will react well.  I would like to be more present to the moment during times when this would be helpful.\"      Objective #A (Patient Action)    Status: Continued - Date(s):4/23/2020 7/8/2020 - suggested couples therapy to help with this.    Individual therapy focus - identify what makes it hard to be more honest and open   10/08/2020 - will further assess this goal at next session   Reviewed: 1/27/2021 - \"Things are better, I feel like we've worked really hard on how to respond when he's having a hard time and how to talk to him.  I'm doing better listening, being more empathetic, and not \".  4/27/2021 - She has been making progress on examining and updating unhelpful beliefs (e.g. feeling responsible for other's emotions, feeling like she needs to fix or change other's emotions) and has made good progress on identifying and asserting healthy boundaries, in addition to  her emotional response from her spouse's emotional response.        Patient will learn and practice at least two new interpersonal effectiveness strategies.    Intervention(s)  Therapist will teach strategies from DBT module on interpersonal effectiveness, and will assign homework to help reinforce skill development.      Patient has reviewed and agreed to the above plan.      Cristy Wilkinson PsyD, LP  Licensed Psychologist    January 6, 2020, reviewed on 4/23/2020, reviewed 7/8/2020, reviewed 10/08/2020, reviewed 1/27/2021, reviewed 4/27/2021                                                "

## 2021-06-16 ENCOUNTER — VIRTUAL VISIT (OUTPATIENT)
Dept: PSYCHOLOGY | Facility: CLINIC | Age: 41
End: 2021-06-16
Payer: COMMERCIAL

## 2021-06-16 DIAGNOSIS — F41.9 ANXIETY DISORDER, UNSPECIFIED TYPE: Primary | ICD-10-CM

## 2021-06-16 PROCEDURE — 90832 PSYTX W PT 30 MINUTES: CPT | Mod: TEL | Performed by: PSYCHOLOGIST

## 2021-06-16 NOTE — PROGRESS NOTES
"                                              Progress Note    Patient Name: Idalia Hinojosa  Date: 6/16/2021         Service Type: Individual      Session Start Time: 09:04 am  Session End Time: 9:32  am     Session Length:  28 minutes    Session #: 32    Attendees: Client    Service Modality:  Telephone visit    Idalia Hinojosa is a 41 year old female who is being evaluated via a telephone visit.      The patient has been notified of the following:     \"We have found that certain health care needs can be provided without the need for a face to face visit.  This service lets us provide the care you need with a short phone conversation.      I will have full access to your [place] medical record during this entire phone call.   I will be taking notes for your medical record.     Since this is like an office visit, we will bill your insurance company for this service.  Please check with your medical insurance if this type of telephone visit/virtual care is covered.  You may be responsible for the cost of this service if insurance coverage is denied.      There are potential benefits and risks of telephone visits (e.g. limits to patient confidentiality) that differ from in-person visits.?  Confidentiality still applies for telephone services, and nobody will record the visit.  It is important to be in a quiet, private space that is free of distractions (including cell phone or other devices) during the visit.??     If during the course of the call I believe a telephone visit is not appropriate, you will not be charged for this service\"    Consent has been obtained for this service by care team member: yes.    Start time: 09:04 am    End time: 9:32 am    As the provider I attest to compliance with applicable laws and regulations related to telemedicine.    Reason for Phone Visit: Services only offered telehealth      Treatment Plan Last Reviewed: Developed 1/06/2020, reviewed 4/23/2020, reviewed 7/8/2020, reviewed " "10/08/2020, 1/27/2021  PHQ-9/DAVID-7: 4/27/2021    DATA  Interactive Complexity: No  Crisis: No        Progress Since Last Session (Related to Symptoms / Goals / Homework):   Symptoms: Stable - mood continues to be improved, less anxious, less irritable and overall mood is feeling better as she has been able to return to activities and things that bring normalcy.      Homework: Achieved / completed to satisfaction. She's done a great job over the past few weeks incorporating positive self-care strategies.  She's been successful with practicing YOGA on a regular and consistent basis.  There's a calendar with the program she signed up for, which has been really helpful for her.  She's working on healthier eating habits, eating more fruits and vegetables and less sugar.  She played with the [employer] and this was great for her to get back to this as well as see friends from [employer].  She continues to practice strategies the OT has suggested to help her daughter at home, and is also practicing being patient and flexible.          Episode of Care Goals: Satisfactory progress - ACTION (Actively working towards change); Intervened by reinforcing change plan / affirming steps taken.  Diagnostic assessment has been completed, treatment plan has been developed and patient has been making good progress on treatment goals.  The patient stated that her main goals for therapy would be to learn emotion regulation strategies (in particular, to help with when she is feeling upset/angry/frustrated/distressed).  More recently, the changes in her daily life due to COVID restrictions have been a focus of our work together.       Current / Ongoing Stressors and Concerns:   Current: She's been working on incorporating self-care strategies on a regular basis, and practicing strategies suggested by the OT to help her daughter at home.  She wished to focus today on \"not panicing\" about her weight due to some weight gain she experienced over " "COVID.        Ongoing:Managing daily routine which has contributed to some adjustment and parenting challenges, lack of effective emotion regulation strategies for managing daily frustrations and upsets and marital discord due to differences in parenting styles.       Treatment Objective(s) Addressed in This Session:     Incorporate self-care strategies into daily practice   Focus on the big picture in relation to your health and well-being   Be mindful of negative and self-critical thoughts and practice finding kind, compassionate, balanced and flexible thinking    Continue with/review/practice:  learn emotion regulation strategies to help when she is feeling upset/angry/frustrated/distressed  Identify when falling into \"cheery trap\" (expecting self to be responsible for other's emotions) and allow self to take a step back and look at what I do and don't have control over  Identify how to transition winter self-care routine into spring/summer  identify warning signs and signals, practice inserting skills early on  Increase awareness of intensity of emotions and use rating scale to monitor response to using strategies (and to help determine need to try different strategies)        Intervention:   CBT: Idalia said she wished to focus today on \"not panicing\" about her weight.  She reported that she gained some weight during COVID,and although has been working on incorporating regular exercise and healthy eating, she's aware of past thinking traps related to all or nothing thinking and being impatient when she doesn't see change quickly.  We talked about how to use this awareness to help with creating realistic expectations, helpful self-talk, and to take a balanced approach that allows her to focus on the big picture of the really wonderful job she is doing with incorporating healthy eating and exercise and the benefits that come from this - feeling better, more energy, feeling stronger, improved health, etc.  She " said she would like to focus on changing her priority away from losing weight to improved health/feeling better.         ASSESSMENT: Current Emotional / Mental Status (status of significant symptoms):   Risk status (Self / Other harm or suicidal ideation)   Patient denies current fears or concerns for personal safety.   Patient denies current or recent suicidal ideation or behaviors.    She agrees to use a safety plan should any safety concerns arise.  She also agrees to reach our to her spouse or a family member for help if needed, and/or access crisis/emergency resources.        Patientdenies current or recent homicidal ideation or behaviors.   Patient denies current or recent self injurious behavior or ideation.   Patient denies other safety concerns.   Patient Patient reports there has been no change in risk factors since their last session.     PatientPatient reports there has been no change in protective factors since their last session.     Recommended that patient call 911 or go to the local ED should there be a change in any of these risk factors.  She has previously been informed on how to contact [facility] daytime crisis number and [facility] crisis/COPE for urgent/crisis concerns 24/7.  Provided patient with crisis resources and she agrees to use safety plan should any safety concerns arise.      Professionals or agencies I can contact during a crisis:    [facility] Daytime Number: [phone]    Suicide Prevention Lifeline: 0-899-715-TALK (7400)    Crisis Text Line Service (available 24 hours a day, 7 days a week): Text [place] to 581400    Local Crisis Services: [facility] Crisis Services: [phone]    Call 911 or go to my nearest emergency department.        Appearance:   unable to assess (phone)    Eye Contact:   unable to assess/phone    Psychomotor Behavior: unable to assess (phone)    Attitude:   Cooperative    Orientation:   All   Speech    Rate /  "Production: Normal     Volume:  Normal    Mood:    Mood remains improved  - less anxious and irritable.  ,   Affect:    congruent with mood    Thought Content:  Clear    Thought Form:  Coherent  Logical    Insight:    Good      Medication Review:   No changes to current psychiatric medication(s).      Medication Compliance:   Yes     Changes in Health Issues:  None reported         Chemical Use Review:   Substance Use: Chemical use reviewed, no active concerns identified      Tobacco Use: No current tobacco use.      Diagnosis:  Anxiety disorder unspecified    Collateral Reports Completed:   Not Applicable,      PLAN: (Patient Tasks / Therapist Tasks / Other)    1) Idalia identified the following goals: Don't look in mirror so much, don't over-analyze, focus on how I feel, continue to re-direct negative and critical thinking to kind, compassionate and encouraging self-talk.      Continue with: Follow through on yoga training plan (5 week plan), keep having flexible mindset with Kaylee, learning, trying new things to help her,     Keep thinking present and future focused.   Practice \"flexible mindset\".        2) If there is a crisis situation, remember you are not alone and that there are people who can help you twenty-four hours a day, seven days a week.  Call [facility] ([facility]): [phone].      3) Follow-up visit is scheduled for July 28th.  She is getting her wisdom teeth removed in July and would like to wait until after this for her next appointment.  Please call if you need a sooner appointment.  If urgent or crisis concerns arise prior to next scheduled appointment, please reach out to crisis resources, call 911, or go to the emergency department.      Cristy Wilkinson PsyD,   Licensed Psychologist  6/16/2021              Previous skills and strategies to remind self of and to do as needed:    Practice \"STOP\" technique when you recognize yourself feeling angry   Be a  - what do " "you notice is happening when Kaylee feels upset?     Practice recognizing when feeling angry, and giving self permission to use calming and self-soothing strategies and take a break.   Look for the gray with thinking  slow down  take deep breaths  manage expectations of self and others.    Journal   Practice flexing \"flexiblity and creativity\" -   Continue the great work you are doing with your daily schedule.    Get outside every day.    Play your instrument.    Sing!    Continue with: \"It's just a thought\" - non-judgmental, observe, float down the river on a leaf, clouds in the gege, reframing unhelpful thoughts -   Keep working on being patient when things are tense around me.    Continue with observing/paying attention to warning signs and signals and give self persmission to slow down, especially during the morning routine with Kaylee.    Practice offerring Kaylee choices when appropriate.      Use \"my plan for success\" to help remind you of calming strategies to practice when feeling upset.    Practice decreasing overall vulnerability to emotion mind through getting adequate rest, nutrition, time for yourself, and physical activity.         Consider reading \"Fighting for your marriage\".      Technical Assistance for video visits:    Offer patient the website ([email or web address]) and/or phone number ([phone]) for video visit instructions/assistance if needed.                                             ____________________________________    Treatment Plan    Patient's Name: Idalia Hinojosa  YOB: 1980    Date: 1/6/2020    DSM5 Diagnoses: 300.00 (F41.9) Unspecified Anxiety Disorder  Psychosocial / Contextual Factors: strain in marital relationship, parenting challenges, adjustment challenges  WHODAS: will gather at diagnostic assessment update    Referral / Collaboration:  We discussed a referral to a couples/marital therapist.  The patient is thinking about this and has talked with her " " about the referral, but they are unsure if they would like to follow through at this time.      Anticipated number of session or this episode of care: 8-12    MeasurableTreatment Goal(s) related to diagnosis / functional impairment(s)  Goal 1: Patient will learn emotion regulation strategies to help when she is feeling upset/angry/frustrated/distressed    I will know I've met my goal when I would yell less, I would feel calmer, and I would not push others.  I would be less physical when I'm angry (e.g. wouldn't slam doors or hit things)       Objective #A (Patient Action)    Patient will learn and practice at least 2 new emotion regulation strategies.  Status: Continued - Date(s):4/23/2020 (has begun to learn new emotion regulation strategies and is making good progress)   Update: 7/8/2020: \"I've learned new strategies but I'm bad at practicing them\" - will help patient incorporate regular practice and identify and problem-solve barriers.    Reviewed: 10/8/2020: Patient has been successfully practicing emotion regulation strategies, is working on identifying earlier on when to intervene and engage strategies, as well as practice strategies on a regular basis to help strength use of strategies.    Reviewed and in progress: 1/27/2021: \"Overall things are going a lot better.  Sometimes I fall into old habits\", but recognizing when she's feeling more vulnerable to her emotions and reminds herself to use different skills/intervene in a different way.     4/27/2021-\"Things have definitely been better\".  Recognizing when feeling vulnerable (e.g. hungry), recognizing when emotions are escalating and need to engage calming strategies\".  Ask for help from Colia when feeling upset.  Can be challenging when Kayele escalates when I'm feeling angry.        Intervention(s)  Therapist will provide psychoeducation on emotion regulation module from DBT and will assign patient homework to help reinforce skill " "development.    Objective #B  Patient will identify factors that increase vulnerability to emotion mind and identify ways to decrease vulnerability to emotion mind.  Status: Continued - Date(s):4/23/2020 (has learned factors that make her more vulnerable to emotion mind, and has been working on addressing vulnerability related to feeling hungry, tired and rushed)   Update: 7/8/20: \"This one is harder for me.  I know I get angry when I'm hungry, and when I slow down I'm less inclined to get angry\".  Challenges - not always having food ready, not always planning in advance, (planing breakfast the night before)  Reviewed: 10/8/2020: Patient's awareness has increased of factors that increase her vulnerability to emotion mind, and she has been using this as a signal to tell her what she needs.    Reviewed and resolved/complete: 1/27/2021: \"I feel like we've done really good work with this and I've gotten what I need from this\".  4/27/2021  - I am doing better with this - I know I get more irritable when I'm hungry and I'm trying to do better with this.  Drinking more water.        Intervention(s)  Therapist will provide information on factors that increase vulnerabiliyt to emotion mind.    Objective #C  Patient will identify warning signs and signals that emotions are escalating and will learn ways to skillfully intervene early on.  Status: New - Date: 01/06/2020 7/8/2020- in progress  Update: 10/08/2020: Patient has been making good progress on identifying signs and signals that her emotions are escalating.    Reviewed and in progress: 1/27/2021: \"I feel like we've done good work on this but this is one of the things I need to keep working on\".        Intervention(s)  Therapist will help patient identify warning signs and signals, and will guide the patient in identifying skillful ways to intervene when exeriencing warning signs and signals.      Goal 2: Patient will learn new parenting strategies to help with " "managing parenting challenges.  Parent will work on improving relationship with her daughter and defining what she would like this relationship to look like.      I will know I've met my goal when I'm enjoying time with my daughter, teaching her new things, and not waiting for things to change      Objective #A (Patient Action)    Status: Continued - Date(s):4/23/2020     Patient will increase understanding of developmental norms for her daughter and ways to manage challenging behaviors.    Intervention(s)  Therapist will provide psychoeducation on developmental norms and parenting strategies.    Objective #B  Patient will spend time reflecting on her relationship with her daughter and defining what she would like this relaitonship to look like.    Status: Continued - Date(s):4/23/2020   Update: 7/8/2020 - \"I think some things are going better, she's more helpful with things.  Some things are more challenging with the pandemic and thinking of things to do with her\".    Update: 10/08/2020: Patient is making progress on learning and practicing strategies to manage parenting challenges.    Reviewed: 1/27/2021: \"I feel like things have gotten better with this, we've figured out a good routine, I'm doing meditation, I'm enjoying my time with my daughter more than I used to\".   4/27/2021 - Challenges of parenting during a pandemic, working with occupational therapist on strategies to help daughter,         Intervention(s)  Therapist will guide the patient in reflecting upon her relationship with her daughter and help her define ways to move towards what she vaues in her relationship with her daughter.    Objective #C  Patient will increase time spent on fun activity and play with her daughter.  Status: Continued - Date(s):4/23/2020 7/8/2020 -   10/08/2020 - will further assess this goal at next session   Reviewed: 1/27/2021 - also going really well, we play together all of the time now\", she finds herself enjoying their " "time together.   4/27/2021- she will spend some time reflecting upon this and we will discuss further next time.  They have several fun things planned for the future, as well as she has worked hard during the pandemic to plan structure and time together within COVID restrictions.        Intervention(s)  Therapist will guide the patient in identifying ways she can increase positive time with her daughter.  Therapist will also teach mindfulness strategies to help patient with being in the present moment when appropriate - .      Goal 3: Patiient will improve communication with her .      I will know I've met my goal when I feel less irritated with my  and communicate more openly with my .  I would like to be more assertive and less aggressive.   I would like to not avoid telling him things because I'm worried about his reaction or don't think he will react well.  I would like to be more present to the moment during times when this would be helpful.\"      Objective #A (Patient Action)    Status: Continued - Date(s):4/23/2020 7/8/2020 - suggested couples therapy to help with this.    Individual therapy focus - identify what makes it hard to be more honest and open   10/08/2020 - will further assess this goal at next session   Reviewed: 1/27/2021 - \"Things are better, I feel like we've worked really hard on how to respond when he's having a hard time and how to talk to him.  I'm doing better listening, being more empathetic, and not \".  4/27/2021 - She has been making progress on examining and updating unhelpful beliefs (e.g. feeling responsible for other's emotions, feeling like she needs to fix or change other's emotions) and has made good progress on identifying and asserting healthy boundaries, in addition to  her emotional response from her spouse's emotional response.        Patient will learn and practice at least two new interpersonal effectiveness " strategies.    Intervention(s)  Therapist will teach strategies from DBT module on interpersonal effectiveness, and will assign homework to help reinforce skill development.      Patient has reviewed and agreed to the above plan.      Cristy Wilkinson PsyD, LP  Licensed Psychologist    January 6, 2020, reviewed on 4/23/2020, reviewed 7/8/2020, reviewed 10/08/2020, reviewed 1/27/2021, reviewed 4/27/2021

## 2021-06-23 ENCOUNTER — MYC MEDICAL ADVICE (OUTPATIENT)
Dept: ALLERGY | Facility: CLINIC | Age: 41
End: 2021-06-23

## 2021-06-23 DIAGNOSIS — J30.0 CHRONIC VASOMOTOR RHINITIS: ICD-10-CM

## 2021-06-24 RX ORDER — IPRATROPIUM BROMIDE 42 UG/1
2 SPRAY, METERED NASAL 4 TIMES DAILY PRN
Qty: 15 ML | Refills: 3 | Status: SHIPPED | OUTPATIENT
Start: 2021-06-24 | End: 2022-05-23

## 2021-06-24 NOTE — TELEPHONE ENCOUNTER
"RN refilled medication per [facility] Refill Protocol.     Do MARIO RN      Requested Prescriptions   Pending Prescriptions Disp Refills     ipratropium (ATROVENT) 0.06 % nasal spray 15 mL 3     Sig: Spray 2 sprays into both nostrils 4 times daily as needed for rhinitis       Nasal Allergy Protocol Passed - 6/24/2021  7:53 AM        Passed - Patient is age 12 or older        Passed - Recent (12 mo) or future (30 days) visit within the authorizing provider's specialty     Patient has had an office visit with the authorizing provider or a provider within the authorizing providers department within the previous 12 mos or has a future within next 30 days. See \"Patient Info\" tab in inbasket, or \"Choose Columns\" in Meds & Orders section of the refill encounter.              Passed - Medication is active on med list             "

## 2021-07-28 ENCOUNTER — VIRTUAL VISIT (OUTPATIENT)
Dept: PSYCHOLOGY | Facility: CLINIC | Age: 41
End: 2021-07-28
Payer: COMMERCIAL

## 2021-07-28 DIAGNOSIS — F41.9 ANXIETY DISORDER, UNSPECIFIED TYPE: Primary | ICD-10-CM

## 2021-07-28 PROCEDURE — 90834 PSYTX W PT 45 MINUTES: CPT | Mod: TEL | Performed by: PSYCHOLOGIST

## 2021-07-28 NOTE — PROGRESS NOTES
"                                              Progress Note    Patient Name: Idalia Hinojosa  Date: 7/28/2021         Service Type: Individual      Session Start Time: 09:03 am  Session End Time: 9:48  am     Session Length:  45 minutes    Session #: 33    Attendees: Client    Service Modality:  Telephone visit    Idalia Hinojosa is a 41 year old female who is being evaluated via a telephone visit.      The patient has been notified of the following:     \"We have found that certain health care needs can be provided without the need for a face to face visit.  This service lets us provide the care you need with a short phone conversation.      I will have full access to your [facility] medical record during this entire phone call.   I will be taking notes for your medical record.     Since this is like an office visit, we will bill your insurance company for this service.  Please check with your medical insurance if this type of telephone visit/virtual care is covered.  You may be responsible for the cost of this service if insurance coverage is denied.      There are potential benefits and risks of telephone visits (e.g. limits to patient confidentiality) that differ from in-person visits.?  Confidentiality still applies for telephone services, and nobody will record the visit.  It is important to be in a quiet, private space that is free of distractions (including cell phone or other devices) during the visit.??     If during the course of the call I believe a telephone visit is not appropriate, you will not be charged for this service\"    Consent has been obtained for this service by care team member: yes.    Start time: 09:04 am    End time: 9:32 am    As the provider I attest to compliance with applicable laws and regulations related to telemedicine.    Reason for Phone Visit: Services only offered telehealth      Treatment Plan Last Reviewed: Developed 1/06/2020, reviewed 4/23/2020, reviewed 7/8/2020, reviewed " 10/08/2020, 1/27/2021, 4/27/21  PHQ-9/DAVID-7: 4/27/2021    DATA  Interactive Complexity: No  Crisis: No        Progress Since Last Session (Related to Symptoms / Goals / Homework):   Symptoms: Worsening She reported that symptoms have worsened since her last appointment, in the context of experiencing more challenges related to her daughter's behaviors, marital stress, and stress due to a remodeling project.  She noted that she's struggled with feeling more frustrated, irritable and upset.        Homework: Achieved / completed to satisfaction. Continues to practice strategies and is working with other professionals to help with managing daughter's difficulties.        Episode of Care Goals: Satisfactory progress - ACTION (Actively working towards change); Intervened by reinforcing change plan / affirming steps taken.  Diagnostic assessment has been completed, treatment plan has been developed and patient has been making good progress on treatment goals.  The patient stated that her main goals for therapy would be to learn emotion regulation strategies (in particular, to help with when she is feeling upset/angry/frustrated/distressed).  More recently, the changes in her daily life due to COVID restrictions have been a focus of our work together.       Current / Ongoing Stressors and Concerns:   Current:  She reported that the last several weeks have been more challenging for her.  She said that they had to transition to a new OT for her daughter, as the one they had been working with resigned.  This has been difficult.  She reported she's been having more challenges at home with her daughter's behavior, and there's also been more stress between she and her .  They are also doing some remodeling which has contributed to more stress around the house.          Ongoing:Managing daily routine which has contributed to some adjustment and parenting challenges, lack of effective emotion regulation strategies for  "managing daily frustrations and upsets and marital discord due to differences in parenting styles.       Treatment Objective(s) Addressed in This Session:     Review of calming strategies - practice awareness of emotions, identifying emotion, recognizing when feeling angry/upset/irritable, intervene as early on as possible using calming strategies  Pay attention to what is contributing to feelings of frustration/anger/upset - what might this be telling you?  What do you need?  What might help?          Continue with/review/practice:    Incorporate self-care strategies into daily practice   Focus on the big picture in relation to your health and well-being   Be mindful of negative and self-critical thoughts and practice finding kind, compassionate, balanced and flexible thinking  learn emotion regulation strategies to help when she is feeling upset/angry/frustrated/distressed  Identify when falling into \"cheery trap\" (expecting self to be responsible for other's emotions) and allow self to take a step back and look at what I do and don't have control over  identify warning signs and signals, practice inserting skills early on  Increase awareness of intensity of emotions and use rating scale to monitor response to using strategies (and to help determine need to try different strategies)        Intervention:   CBT: Idalia said she wished to focus today on better managing her anger/frustration.  We reviewed warning signs and signals, triggers, and how to engage calming strategies.  We also did some exploration around themes related to feeling angry, and the feeling of being overwhelmed and feeling alone were two duval themes for her.  We looked at ways to help when feeling overwhelmed (break things down into small tasks, ask for help, etc.) and talked about the importance of having support.  Asked her to consider what would help her to feel less alone with the struggles she is going through and how she can reach out to " "supports to help her with this.      She processed an incident where her daughter was spinning the cat around.  She worried the cat was going to get hurt, so she rushed to get the cat from her daughter and said this accidentally knocked her daughter over when she grabbed the cat.  She said her daughter did not get hurt, but she felt very badly about this and said she did not intend to do this.  When something doesn't go well she tends to fall into polarized thinking (e.g. thoughts like \"I'm a terrible mom\") - encouraged her continued awareness of this thinking trap and looking for the shades of gray in-between black and white thinking.  She recognizes this perfectionist thinking that can lead her to disregard the positives and what she is doing.  I also suggested some ways to talk with her daughter about what happened and repair.       I reviewed with Idalia that I was not able to see her my chart message until I entered her chart today.  I apologized for the delay in being able to see and respond to the message.      ASSESSMENT: Current Emotional / Mental Status (status of significant symptoms):   Risk status (Self / Other harm or suicidal ideation)   Patient denies current fears or concerns for personal safety.   Patient denies current or recent suicidal ideation or behaviors.    She agrees to use a safety plan should any safety concerns arise.  She also agrees to reach our to her spouse or a family member for help if needed, and/or access crisis/emergency resources.        Patientdenies current or recent homicidal ideation or behaviors.   Patient denies current or recent self injurious behavior or ideation.   Patient denies other safety concerns.   Patient Patient reports there has been no change in risk factors since their last session.     PatientPatient reports there has been no change in protective factors since their last session.     Recommended that patient call 911 or go to the local ED should there be a " "change in any of these risk factors.  She has previously been informed on how to contact [facility] daytime crisis number and [facility] crisis/COPE for urgent/crisis concerns 24/7.  Provided patient with crisis resources and she agrees to use safety plan should any safety concerns arise.      Professionals or agencies I can contact during a crisis:    [facility] Daytime Number: [phone]    Suicide Prevention Lifeline: 2-693-428-SSNS (5742)    Crisis Text Line Service (available 24 hours a day, 7 days a week): Text [place] to 423708    Local Crisis Services: [facility]: [phone]    Call 911 or go to my nearest emergency department.        Appearance:   unable to assess (phone)    Eye Contact:   unable to assess/phone    Psychomotor Behavior: unable to assess (phone)    Attitude:   Cooperative    Orientation:   All   Speech    Rate / Production: Normal     Volume:  Normal    Mood:    Anxious  Sad  and reports that symptoms have worsened over past few weeks,   Affect:    congruent with mood    Thought Content:  Clear    Thought Form:  Coherent  Logical    Insight:    Good      Medication Review:   No changes to current psychiatric medication(s).      Medication Compliance:   Yes     Changes in Health Issues:  None reported         Chemical Use Review:   Substance Use: Chemical use reviewed, no active concerns identified      Tobacco Use: No current tobacco use.      Diagnosis:  Anxiety disorder unspecified    Collateral Reports Completed:   Not Applicable,      PLAN: (Patient Tasks / Therapist Tasks / Other)    1) Idalia identified the following goals: Practice calming strategies when feeling upset.  Continue to re-direct negative and critical thinking to kind, compassionate and encouraging self-talk.      Keep thinking present and future focused.   Practice \"flexible mindset\".        2) If there is a crisis situation, remember you are not alone and that " "there are people who can help you twenty-four hours a day, seven days a week.  Call [facility] ([facility]): [phone].      3) Follow-up visit is scheduled for July 29th.  If urgent or crisis concerns arise prior to next scheduled appointment, please reach out to crisis resources, call 911, or go to the emergency department.      Cristy Wilkinson PsyD,   Licensed Psychologist  7/28/2021          Previous skills and strategies to remind self of and to do as needed:    Practice \"STOP\" technique when you recognize yourself feeling angry   Be a  - what do you notice is happening when Kaylee feels upset?     Practice recognizing when feeling angry, and giving self permission to use calming and self-soothing strategies and take a break.   Look for the gray with thinking  slow down  take deep breaths  manage expectations of self and others.    Journal   Practice flexing \"flexiblity and creativity\" -   Continue the great work you are doing with your daily schedule.    Get outside every day.    Play your instrument.    Sing!    Continue with: \"It's just a thought\" - non-judgmental, observe, float down the river on a leaf, clouds in the gege, reframing unhelpful thoughts -   Keep working on being patient when things are tense around me.    Continue with observing/paying attention to warning signs and signals and give self persmission to slow down, especially during the morning routine with Kaylee.    Practice offerring Kaylee choices when appropriate.      Use \"my plan for success\" to help remind you of calming strategies to practice when feeling upset.    Practice decreasing overall vulnerability to emotion mind through getting adequate rest, nutrition, time for yourself, and physical activity.         Consider reading \"Fighting for your marriage\".      Technical Assistance for video visits:    Offer patient the website ([email or web address]) and/or phone number ([phone]) for video visit " "instructions/assistance if needed.                                             ____________________________________    Treatment Plan    Patient's Name: Idalia Hinojosa  YOB: 1980    Date: 1/6/2020    DSM5 Diagnoses: 300.00 (F41.9) Unspecified Anxiety Disorder  Psychosocial / Contextual Factors: strain in marital relationship, parenting challenges, adjustment challenges  WHODAS: will gather at diagnostic assessment update    Referral / Collaboration:  We discussed a referral to a couples/marital therapist.  The patient is thinking about this and has talked with her  about the referral, but they are unsure if they would like to follow through at this time.      Anticipated number of session or this episode of care: 8-12    MeasurableTreatment Goal(s) related to diagnosis / functional impairment(s)  Goal 1: Patient will learn emotion regulation strategies to help when she is feeling upset/angry/frustrated/distressed    I will know I've met my goal when I would yell less, I would feel calmer, and I would not push others.  I would be less physical when I'm angry (e.g. wouldn't slam doors or hit things)       Objective #A (Patient Action)    Patient will learn and practice at least 2 new emotion regulation strategies.  Status: Continued - Date(s):4/23/2020 (has begun to learn new emotion regulation strategies and is making good progress)   Update: 7/8/2020: \"I've learned new strategies but I'm bad at practicing them\" - will help patient incorporate regular practice and identify and problem-solve barriers.    Reviewed: 10/8/2020: Patient has been successfully practicing emotion regulation strategies, is working on identifying earlier on when to intervene and engage strategies, as well as practice strategies on a regular basis to help strength use of strategies.    Reviewed and in progress: 1/27/2021: \"Overall things are going a lot better.  Sometimes I fall into old habits\", but recognizing when " "she's feeling more vulnerable to her emotions and reminds herself to use different skills/intervene in a different way.     4/27/2021-\"Things have definitely been better\".  Recognizing when feeling vulnerable (e.g. hungry), recognizing when emotions are escalating and need to engage calming strategies\".  Ask for help from Colia when feeling upset.  Can be challenging when Kaylee escalates when I'm feeling angry.        Intervention(s)  Therapist will provide psychoeducation on emotion regulation module from DBT and will assign patient homework to help reinforce skill development.    Objective #B  Patient will identify factors that increase vulnerability to emotion mind and identify ways to decrease vulnerability to emotion mind.  Status: Continued - Date(s):4/23/2020 (has learned factors that make her more vulnerable to emotion mind, and has been working on addressing vulnerability related to feeling hungry, tired and rushed)   Update: 7/8/20: \"This one is harder for me.  I know I get angry when I'm hungry, and when I slow down I'm less inclined to get angry\".  Challenges - not always having food ready, not always planning in advance, (planing breakfast the night before)  Reviewed: 10/8/2020: Patient's awareness has increased of factors that increase her vulnerability to emotion mind, and she has been using this as a signal to tell her what she needs.    Reviewed and resolved/complete: 1/27/2021: \"I feel like we've done really good work with this and I've gotten what I need from this\".  4/27/2021  - I am doing better with this - I know I get more irritable when I'm hungry and I'm trying to do better with this.  Drinking more water.        Intervention(s)  Therapist will provide information on factors that increase vulnerabiliyt to emotion mind.    Objective #C  Patient will identify warning signs and signals that emotions are escalating and will learn ways to skillfully intervene early on.  Status: New - Date: " "01/06/2020 7/8/2020- in progress  Update: 10/08/2020: Patient has been making good progress on identifying signs and signals that her emotions are escalating.    Reviewed and in progress: 1/27/2021: \"I feel like we've done good work on this but this is one of the things I need to keep working on\".        Intervention(s)  Therapist will help patient identify warning signs and signals, and will guide the patient in identifying skillful ways to intervene when exeriencing warning signs and signals.      Goal 2: Patient will learn new parenting strategies to help with managing parenting challenges.  Parent will work on improving relationship with her daughter and defining what she would like this relationship to look like.      I will know I've met my goal when I'm enjoying time with my daughter, teaching her new things, and not waiting for things to change      Objective #A (Patient Action)    Status: Continued - Date(s):4/23/2020     Patient will increase understanding of developmental norms for her daughter and ways to manage challenging behaviors.    Intervention(s)  Therapist will provide psychoeducation on developmental norms and parenting strategies.    Objective #B  Patient will spend time reflecting on her relationship with her daughter and defining what she would like this relaitonship to look like.    Status: Continued - Date(s):4/23/2020   Update: 7/8/2020 - \"I think some things are going better, she's more helpful with things.  Some things are more challenging with the pandemic and thinking of things to do with her\".    Update: 10/08/2020: Patient is making progress on learning and practicing strategies to manage parenting challenges.    Reviewed: 1/27/2021: \"I feel like things have gotten better with this, we've figured out a good routine, I'm doing meditation, I'm enjoying my time with my daughter more than I used to\".   4/27/2021 - Challenges of parenting during a pandemic, working with occupational " "therapist on strategies to help daughter,         Intervention(s)  Therapist will guide the patient in reflecting upon her relationship with her daughter and help her define ways to move towards what she vaues in her relationship with her daughter.    Objective #C  Patient will increase time spent on fun activity and play with her daughter.  Status: Continued - Date(s):4/23/2020 7/8/2020 -   10/08/2020 - will further assess this goal at next session   Reviewed: 1/27/2021 - also going really well, we play together all of the time now\", she finds herself enjoying their time together.   4/27/2021- she will spend some time reflecting upon this and we will discuss further next time.  They have several fun things planned for the future, as well as she has worked hard during the pandemic to plan structure and time together within COVID restrictions.        Intervention(s)  Therapist will guide the patient in identifying ways she can increase positive time with her daughter.  Therapist will also teach mindfulness strategies to help patient with being in the present moment when appropriate - .      Goal 3: Patiient will improve communication with her .      I will know I've met my goal when I feel less irritated with my  and communicate more openly with my .  I would like to be more assertive and less aggressive.   I would like to not avoid telling him things because I'm worried about his reaction or don't think he will react well.  I would like to be more present to the moment during times when this would be helpful.\"      Objective #A (Patient Action)    Status: Continued - Date(s):4/23/2020 7/8/2020 - suggested couples therapy to help with this.    Individual therapy focus - identify what makes it hard to be more honest and open   10/08/2020 - will further assess this goal at next session   Reviewed: 1/27/2021 - \"Things are better, I feel like we've worked really hard on how to respond when he's " "having a hard time and how to talk to him.  I'm doing better listening, being more empathetic, and not \".  4/27/2021 - She has been making progress on examining and updating unhelpful beliefs (e.g. feeling responsible for other's emotions, feeling like she needs to fix or change other's emotions) and has made good progress on identifying and asserting healthy boundaries, in addition to  her emotional response from her spouse's emotional response.        Patient will learn and practice at least two new interpersonal effectiveness strategies.    Intervention(s)  Therapist will teach strategies from DBT module on interpersonal effectiveness, and will assign homework to help reinforce skill development.      Patient has reviewed and agreed to the above plan.      Cristy Wilkinson PsyD, LP  Licensed Psychologist    January 6, 2020, reviewed on 4/23/2020, reviewed 7/8/2020, reviewed 10/08/2020, reviewed 1/27/2021, reviewed 4/27/2021                                                "

## 2021-07-29 ENCOUNTER — VIRTUAL VISIT (OUTPATIENT)
Dept: PSYCHOLOGY | Facility: CLINIC | Age: 41
End: 2021-07-29
Payer: COMMERCIAL

## 2021-07-29 DIAGNOSIS — F41.9 ANXIETY DISORDER, UNSPECIFIED TYPE: Primary | ICD-10-CM

## 2021-07-29 PROCEDURE — 90834 PSYTX W PT 45 MINUTES: CPT | Mod: GT | Performed by: PSYCHOLOGIST

## 2021-08-02 NOTE — PROGRESS NOTES
Progress Note    Patient Name: Idalia Hinojosa  Date: 7/29/2021         Service Type: Individual      Session Start Time: 2:05 pm   Session End Time: 2:57  pm     Session Length:  52 minutes    Session #: 34    Attendees: Client     Service Modality:  Video Visit:      Provider verified identity through the following two step process.  Patient provided:  Patient is known previously to provider    Telemedicine Visit: The patient's condition can be safely assessed and treated via synchronous audio and visual telemedicine encounter.      Reason for Telemedicine Visit: Services only offered telehealth    Originating Site (Patient Location): Patient's home    Distant Site (Provider Location): Provider Remote Setting- Home Office    Consent:  The patient/guardian has verbally consented to: the potential risks and benefits of telemedicine (video visit) versus in person care; bill my insurance or make self-payment for services provided; and responsibility for payment of non-covered services.     Patient would like the video invitation sent by:  My Chart    Mode of Communication:  Video Conference via [employer]    As the provider I attest to compliance with applicable laws and regulations related to telemedicine.         Treatment Plan Last Reviewed: Developed 1/06/2020, reviewed 4/23/2020, reviewed 7/8/2020, reviewed 10/08/2020, 1/27/2021, 4/27/21, 7/28/2021  PHQ-9/DAVID-7: 4/27/2021    DATA  Interactive Complexity: No  Crisis: No        Progress Since Last Session (Related to Symptoms / Goals / Homework):   Symptoms: No change since yesterday, overall symptoms have intensified and worsened over the past month.      Homework: Achieved / completed to satisfaction. Continues to practice strategies and is working with other professionals to help with managing daughter's difficulties.  She was able to talk with her  last evening about how she has been feeling overwhelmed, and  they problem-solved [facility]ining the [facility], so that she can have time to work out and her daughter can attend childcare while she is working out.        Episode of Care Goals: Satisfactory progress - ACTION (Actively working towards change); Intervened by reinforcing change plan / affirming steps taken.  Diagnostic assessment has been completed, treatment plan has been developed and patient has been making good progress on treatment goals.  The patient stated that her main goals for therapy would be to learn emotion regulation strategies (in particular, to help with when she is feeling upset/angry/frustrated/distressed).  More recently, the changes in her daily life due to COVID restrictions have been a focus of our work together.       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  We spent most of the session today helping her to identify ways to manage these challenges.            Ongoing:Managing daily routine which has contributed to some adjustment and parenting challenges, lack of effective emotion regulation strategies for managing daily frustrations and upsets and marital discord due to differences in parenting styles.       Treatment Objective(s) Addressed in This Session:     Reflect upon times where things have gone well at home - what was helpful?   Identify ways to bring more structure back into the day, including planning at least one thing a day that brings you lamont  Notice when predicting/anticipating that things won't work out and practice writing a positive script          Continue with/review/practice:    Incorporate self-care strategies into daily practice   Focus on the big picture in relation to your health and  "well-being   Be mindful of negative and self-critical thoughts and practice finding kind, compassionate, balanced and flexible thinking  learn emotion regulation strategies to help when she is feeling upset/angry/frustrated/distressed  Identify when falling into \"cheery trap\" (expecting self to be responsible for other's emotions) and allow self to take a step back and look at what I do and don't have control over  identify warning signs and signals, practice inserting skills early on  Increase awareness of intensity of emotions and use rating scale to monitor response to using strategies (and to help determine need to try different strategies)        Intervention:   CBT: Idalia wished to focus today on how she has been feeling overwhelmed by the 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  We spent some time reflecting upon when things have been going well, what has been helpful, including having good structure, having time where she can work-out, and help from the grandparents.  Looked at ways she can work on incorporating some of these elements back into the routines and we also talked about obstacles that may get in the way.    Also encouraged structuring time that allows her to positively connect daughter with her daughter and have fun by incorporating activities that her daughter and she find enjoyable.  She identified that going to the pool, park and taking nature hikes are all fun and enjoyable activities, but that she hasn't been doing them, in part because she often feels low energy/motivation and also because she worries it won't go well.  Acknowledged how symptoms of low energy and motivation can lead to a decrease in planning/engaging in enjoyable activities, which can further exacerbate symptoms of depression.  Discussed ways to engage behavioral activation strategies and that the motivation/energy often follows, not precedes.  She identifies that she does typically feel better after starting " "an activity she enjoys, so we talked about allowing some structure to help influence the day versus \"waiting for\" the right feeling or mood.      We also talked about the thinking trap she can fall into of worrying an activity won't go well (e.g. worrying her daughter won't cooperate or behave) and allowing herself to be present to the moment and the facts, and writing a positive script to help set up for success.  Encouraged her to also plan for the \"what ifs\" in a helpful way (if my daughter is having a hard time I can...)    I suggested she consider talking with her pediatrician about the increased difficulties she has seen in her daughter's behaviors at home and see if there are any other resources or supports they may recommend, such as family therapy or in-home services.  I shared my limitations in being able to offer more specific recommendations as I've not evaluated her daughter, but based upon the frustration she is feeling I do strongly encourage she talk with the professionals she has been working with regarding her daughter's difficulties so they are aware of her concerns.      ASSESSMENT: Current Emotional / Mental Status (status of significant symptoms):   Risk status (Self / Other harm or suicidal ideation)   Patient denies current fears or concerns for personal safety.   Patient denies current or recent suicidal ideation or behaviors.    She agrees to use a safety plan should any safety concerns arise.  She also agrees to reach our to her spouse or a family member for help if needed, and/or access crisis/emergency resources.        Patientdenies current or recent homicidal ideation or behaviors.   Patient denies current or recent self injurious behavior or ideation.   Patient denies other safety concerns.   Patient Patient reports there has been no change in risk factors since their last session.     PatientPatient reports there has been no change in protective factors since their last session.  "    Recommended that patient call 911 or go to the local ED should there be a change in any of these risk factors.  She has previously been informed on how to contact [facility] crisis number and [facility] crisis/COPE for urgent/crisis concerns 24/7.  Provided patient with crisis resources and she agrees to use safety plan should any safety concerns arise.      Professionals or agencies I can contact during a crisis:    [facility] Daytime Number: [phone]    Suicide Prevention Lifeline: 5-781-429-YFVP (3804)    Crisis Text Line Service (available 24 hours a day, 7 days a week): Text [place] to 279266    [facility] Crisis Services: [facility] Crisis Services: [phone]    Call 911 or go to my nearest emergency department.        Appearance:   Appropriate    Eye Contact:   Good    Psychomotor Behavior: Normal    Attitude:   Cooperative    Orientation:   All   Speech    Rate / Production: Normal     Volume:  Normal    Mood:    Anxious  Sad  and she reports feeling more irritable and upset,   Affect:    congruent with mood    Thought Content:  Clear    Thought Form:  Coherent  Logical    Insight:    Good      Medication Review:   No changes to current psychiatric medication(s).      Medication Compliance:   Yes     Changes in Health Issues:  None reported         Chemical Use Review:   Substance Use: Chemical use reviewed, no active concerns identified      Tobacco Use: No current tobacco use.      Diagnosis:  Anxiety disorder unspecified    Collateral Reports Completed:   Not Applicable,      PLAN: (Patient Tasks / Therapist Tasks / Other)    1) Idalia identified the following goals: Practice calming strategies when feeling upset.  Continue to re-direct negative and critical thinking to kind, compassionate and encouraging self-talk.  Plan structure for the day, incorporate fun and enjoyable activities into the day, recognize when falling into thinking trap of predicting  "things won't go well and reframe to present oriented, helpful, positive thinking.      Keep thinking present and future focused.   Practice \"flexible mindset\".        2) If there is a crisis situation, remember you are not alone and that there are people who can help you twenty-four hours a day, seven days a week.  Call [facility] ([facility] Crisis Services): [phone].      3) Follow-up visit is scheduled for August 5th.  If urgent or crisis concerns arise prior to next scheduled appointment, please reach out to crisis resources, call 911, or go to the emergency department.      Cristy Wilkinson PsyD, LP  Licensed Psychologist  7/29/2021          Previous skills and strategies to remind self of and to do as needed:    Practice \"STOP\" technique when you recognize yourself feeling angry   Be a  - what do you notice is happening when Kaylee feels upset?     Practice recognizing when feeling angry, and giving self permission to use calming and self-soothing strategies and take a break.   Look for the gray with thinking  slow down  take deep breaths  manage expectations of self and others.    Journal   Practice flexing \"flexiblity and creativity\" -   Continue the great work you are doing with your daily schedule.    Get outside every day.    Play your instrument.    Sing!    Continue with: \"It's just a thought\" - non-judgmental, observe, float down the river on a leaf, clouds in the gege, reframing unhelpful thoughts -   Keep working on being patient when things are tense around me.    Continue with observing/paying attention to warning signs and signals and give self persmission to slow down, especially during the morning routine with Kaylee.    Practice offerring Kaylee choices when appropriate.      Use \"my plan for success\" to help remind you of calming strategies to practice when feeling upset.    Practice decreasing overall vulnerability to emotion mind through getting adequate rest, nutrition, time for yourself, " "and physical activity.         Consider reading \"Fighting for your marriage\".      Technical Assistance for video visits:    Offer patient the website ([email or web address]) and/or phone number ([phone]) for video visit instructions/assistance if needed.                                             ____________________________________    Treatment Plan    Patient's Name: Idalia Hinojosa  YOB: 1980    Date: 1/6/2020    DSM5 Diagnoses: 300.00 (F41.9) Unspecified Anxiety Disorder  Psychosocial / Contextual Factors: strain in marital relationship, parenting challenges, adjustment challenges  WHODAS: will gather at diagnostic assessment update    Referral / Collaboration:  We discussed a referral to a couples/marital therapist.  The patient is thinking about this and has talked with her  about the referral, but they are unsure if they would like to follow through at this time.      Anticipated number of session or this episode of care: 8-12    MeasurableTreatment Goal(s) related to diagnosis / functional impairment(s)  Goal 1: Patient will learn emotion regulation strategies to help when she is feeling upset/angry/frustrated/distressed    I will know I've met my goal when I would yell less, I would feel calmer, and I would not push others.  I would be less physical when I'm angry (e.g. wouldn't slam doors or hit things)       Objective #A (Patient Action)    Patient will learn and practice at least 2 new emotion regulation strategies.  Status: Continued - Date(s):4/23/2020 (has begun to learn new emotion regulation strategies and is making good progress)   Update: 7/8/2020: \"I've learned new strategies but I'm bad at practicing them\" - will help patient incorporate regular practice and identify and problem-solve barriers.    Reviewed: 10/8/2020: Patient has been successfully practicing emotion regulation strategies, is working on identifying earlier on when to intervene and engage " "strategies, as well as practice strategies on a regular basis to help strength use of strategies.    Reviewed and in progress: 1/27/2021: \"Overall things are going a lot better.  Sometimes I fall into old habits\", but recognizing when she's feeling more vulnerable to her emotions and reminds herself to use different skills/intervene in a different way.     4/27/2021-\"Things have definitely been better\".  Recognizing when feeling vulnerable (e.g. hungry), recognizing when emotions are escalating and need to engage calming strategies\".  Ask for help from Colia when feeling upset.  Can be challenging when Kaylee escalates when I'm feeling angry.    7/29/21 - She felt like she has forgotten what helps her when she's feeling upset, we have spent time reviewing and refreshing these skills.        Intervention(s)  Therapist will provide psychoeducation on emotion regulation module from DBT and will assign patient homework to help reinforce skill development.    Objective #B  Patient will identify factors that increase vulnerability to emotion mind and identify ways to decrease vulnerability to emotion mind.  Status: Continued - Date(s):4/23/2020 (has learned factors that make her more vulnerable to emotion mind, and has been working on addressing vulnerability related to feeling hungry, tired and rushed)   Update: 7/8/20: \"This one is harder for me.  I know I get angry when I'm hungry, and when I slow down I'm less inclined to get angry\".  Challenges - not always having food ready, not always planning in advance, (planing breakfast the night before)  Reviewed: 10/8/2020: Patient's awareness has increased of factors that increase her vulnerability to emotion mind, and she has been using this as a signal to tell her what she needs.    Reviewed and resolved/complete: 1/27/2021: \"I feel like we've done really good work with this and I've gotten what I need from this\".  4/27/2021  - I am doing better with this - I know I get more " "irritable when I'm hungry and I'm trying to do better with this.  Drinking more water.      7/29/21 - Overall she had been feeling like things were going better with this, though over the past several weeks has felt more challenged due to increase in stressors.      Intervention(s)  Therapist will provide information on factors that increase vulnerabiliyt to emotion mind.    Objective #C  Patient will identify warning signs and signals that emotions are escalating and will learn ways to skillfully intervene early on.  Status: New - Date: 01/06/2020 7/8/2020- in progress  Update: 10/08/2020: Patient has been making good progress on identifying signs and signals that her emotions are escalating.    Reviewed and in progress: 1/27/2021: \"I feel like we've done good work on this but this is one of the things I need to keep working on\".    7/29.21 - In progress - actively working on this -       Intervention(s)  Therapist will help patient identify warning signs and signals, and will guide the patient in identifying skillful ways to intervene when exeriencing warning signs and signals.      Goal 2: Patient will learn new parenting strategies to help with managing parenting challenges.  Parent will work on improving relationship with her daughter and defining what she would like this relationship to look like.      I will know I've met my goal when I'm enjoying time with my daughter, teaching her new things, and not waiting for things to change      Objective #A (Patient Action)    Status: Continued - Date(s):4/23/2020     Patient will increase understanding of developmental norms for her daughter and ways to manage challenging behaviors.    Intervention(s)  Therapist will provide psychoeducation on developmental norms and parenting strategies.    Objective #B  Patient will spend time reflecting on her relationship with her daughter and defining what she would like this relaitonship to look like.    Status: Continued - " "Date(s):4/23/2020   Update: 7/8/2020 - \"I think some things are going better, she's more helpful with things.  Some things are more challenging with the pandemic and thinking of things to do with her\".    Update: 10/08/2020: Patient is making progress on learning and practicing strategies to manage parenting challenges.    Reviewed: 1/27/2021: \"I feel like things have gotten better with this, we've figured out a good routine, I'm doing meditation, I'm enjoying my time with my daughter more than I used to\".   4/27/2021 - Challenges of parenting during a pandemic, working with occupational therapist on strategies to help daughter, 7/29/21 - She's been working on incorporating more structure into the daily routine, as well as managing her emotional reaction when feeling frustrated.  She's also worked on managing her mindset and expectations.  She's been experiencing some frustrations due to two of the OT's she's worked with has recently resigned, resulting in transitioning to several new providers in a short period of time.  I've encouraged she continue to work closely with her daughter's pediatrician to get connected to resources and supports to help with managing her daughter's difficulties.          Intervention(s)  Therapist will guide the patient in reflecting upon her relationship with her daughter and help her define ways to move towards what she vaues in her relationship with her daughter.    Objective #C  Patient will increase time spent on fun activity and play with her daughter.  Status: Continued - Date(s):4/23/2020 7/8/2020 -   10/08/2020 - will further assess this goal at next session   Reviewed: 1/27/2021 - also going really well, we play together all of the time now\", she finds herself enjoying their time together.   4/27/2021- she will spend some time reflecting upon this and we will discuss further next time.  They have several fun things planned for the future, as well as she has worked hard during " "the pandemic to plan structure and time together within COVID restrictions.  7/29/21 - She's seen some positive changes with being able to spend time with grandparents and at the cabin since being vaccinated, but also has experienced some challenges with planning fun activities out of worries things won't go well and feeling low energy/motivation to do these things - working to address both of these obstacles.        Intervention(s)  Therapist will guide the patient in identifying ways she can increase positive time with her daughter.  Therapist will also teach mindfulness strategies to help patient with being in the present moment when appropriate - .      Goal 3: Patiient will improve communication with her .      I will know I've met my goal when I feel less irritated with my  and communicate more openly with my .  I would like to be more assertive and less aggressive.   I would like to not avoid telling him things because I'm worried about his reaction or don't think he will react well.  I would like to be more present to the moment during times when this would be helpful.\"      Objective #A (Patient Action)    Status: Continued - Date(s):4/23/2020 7/8/2020 - suggested couples therapy to help with this.    Individual therapy focus - identify what makes it hard to be more honest and open   10/08/2020 - will further assess this goal at next session   Reviewed: 1/27/2021 - \"Things are better, I feel like we've worked really hard on how to respond when he's having a hard time and how to talk to him.  I'm doing better listening, being more empathetic, and not \".  4/27/2021 - She has been making progress on examining and updating unhelpful beliefs (e.g. feeling responsible for other's emotions, feeling like she needs to fix or change other's emotions) and has made good progress on identifying and asserting healthy boundaries, in addition to  her emotional response from her " spouse's emotional response.  7/29/21 - will review at next session       Patient will learn and practice at least two new interpersonal effectiveness strategies.    Intervention(s)  Therapist will teach strategies from DBT module on interpersonal effectiveness, and will assign homework to help reinforce skill development.      Patient has reviewed and agreed to the above plan.      Cristy Wilkinson PsyD, LP  Licensed Psychologist    January 6, 2020, reviewed on 4/23/2020, reviewed 7/8/2020, reviewed 10/08/2020, reviewed 1/27/2021, reviewed 4/27/2021, 7/29/2021

## 2021-08-05 ENCOUNTER — VIRTUAL VISIT (OUTPATIENT)
Dept: PSYCHOLOGY | Facility: CLINIC | Age: 41
End: 2021-08-05
Payer: COMMERCIAL

## 2021-08-05 DIAGNOSIS — F41.9 ANXIETY DISORDER, UNSPECIFIED TYPE: Primary | ICD-10-CM

## 2021-08-05 PROCEDURE — 90832 PSYTX W PT 30 MINUTES: CPT | Mod: GT | Performed by: PSYCHOLOGIST

## 2021-08-05 NOTE — PROGRESS NOTES
Progress Note    Patient Name: Idalia Hinojosa  Date: 8/5/2021         Service Type: Individual      Session Start Time: 1:05 pm   Session End Time: 1:41 pm     Session Length:  36 minutes    Session #: 35    Attendees: Client     Service Modality:  Video Visit:      Provider verified identity through the following two step process.  Patient provided:  Patient is known previously to provider    Telemedicine Visit: The patient's condition can be safely assessed and treated via synchronous audio and visual telemedicine encounter.      Reason for Telemedicine Visit: Services only offered telehealth    Originating Site (Patient Location): Patient's home    Distant Site (Provider Location): Provider Remote Setting- Home Office    Consent:  The patient/guardian has verbally consented to: the potential risks and benefits of telemedicine (video visit) versus in person care; bill my insurance or make self-payment for services provided; and responsibility for payment of non-covered services.     Patient would like the video invitation sent by:  My Chart    Mode of Communication:  Video Conference via [employer]    As the provider I attest to compliance with applicable laws and regulations related to telemedicine.         Treatment Plan Last Reviewed: Developed 1/06/2020, reviewed 4/23/2020, reviewed 7/8/2020, reviewed 10/08/2020, 1/27/2021, 4/27/21, 7/28/2021  PHQ-9/DAVID-7: 4/27/2021    DATA  Interactive Complexity: No  Crisis: No        Progress Since Last Session (Related to Symptoms / Goals / Homework):   Symptoms: Improving Idalia reported that her mood has improved and symptoms have decreased.   with her       Homework: Achieved / completed to satisfaction.  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        Episode of Care Goals: Satisfactory progress - ACTION (Actively working towards change); Intervened by reinforcing change plan / affirming steps taken.  Diagnostic assessment has been completed, treatment plan has been developed and patient has been making good progress on treatment goals.  The patient stated that her main goals for therapy would be to learn emotion regulation strategies (in particular, to help with when she is feeling upset/angry/frustrated/distressed).  More recently, the changes in her daily life due to COVID restrictions have been a focus of our work together.       Current / Ongoing Stressors and Concerns:   Current:  She's been working hard to incorporate strategies to help address some of the daily challenges she had been experiencing and found good success with this over the past week - structure, fun activities, time with friends, exercising, deep breathing, etc.  She noted that her daughter's behaviors have improved as well so overall is feeling more positive about managing daily routine.       Ongoing:Managing daily routine which has contributed to some adjustment and parenting challenges, lack of effective emotion regulation strategies for managing daily frustrations and upsets and marital discord due to differences in parenting styles.       Treatment Objective(s) Addressed in This Session:     Practice mindfulness  - complete a daily or regular scan to tune into - what am I feeling?  What am I thinking?  What do I need?  Reflect upon times where things have gone well at home - what was helpful?   Recognize the role that you have played in helping to address and problem-solve challenges, recognize the work you have done to incorporate strategies and address challenges!        Continue with/review/practice:    Identify ways to bring more structure back  "into the day, including planning at least one thing a day that brings you lamont  Notice when predicting/anticipating that things won't work out and practice writing a positive script      Incorporate self-care strategies into daily practice   Focus on the big picture in relation to your health and well-being   Be mindful of negative and self-critical thoughts and practice finding kind, compassionate, balanced and flexible thinking  learn emotion regulation strategies to help when she is feeling upset/angry/frustrated/distressed  Identify when falling into \"cheery trap\" (expecting self to be responsible for other's emotions) and allow self to take a step back and look at what I do and don't have control over  identify warning signs and signals, practice inserting skills early on  Increase awareness of intensity of emotions and use rating scale to monitor response to using strategies (and to help determine need to try different strategies)        Intervention:   CBT: We reviewed successes from the past week and what has helped her.  Encouraged her to recognize her role in contributing to the positives!  We talked about ways to continue to practice and sustain these good habits and strategies.  She's done an excellent job problem-solving and trying new things.      She shared an observation that her  noticed she seemed rushed and sped up, and pointed this out to her, but that until she did she didn't recognize this or the potential need to practice some deep breathing.  Talked about ways to increase mindfulness/awareness by tuning into how she is feeling.    We also continued discussion from last week about paying attention to what contributes to feeling overwhelmed.  She identified the need to have some breaks throughout the day, and has two ideas for what will help with this - utilizing  at the  so she can work out, and taking up her mom's offer to spend one morning a week with her daughter.  We talked " about what will help her to follow through on these ideas and what may get in the way.  Problem-solved ways to overcome barriers or obstacles.      ASSESSMENT: Current Emotional / Mental Status (status of significant symptoms):   Risk status (Self / Other harm or suicidal ideation)   Patient denies current fears or concerns for personal safety.   Patient denies current or recent suicidal ideation or behaviors.    She agrees to use a safety plan should any safety concerns arise.  She also agrees to reach our to her spouse or a family member for help if needed, and/or access crisis/emergency resources.        Patientdenies current or recent homicidal ideation or behaviors.   Patient denies current or recent self injurious behavior or ideation.   Patient denies other safety concerns.   Patient Patient reports there has been no change in risk factors since their last session.     PatientPatient reports there has been no change in protective factors since their last session.     Recommended that patient call 911 or go to the local ED should there be a change in any of these risk factors.  She has previously been informed on how to contact [facility] crisis number and [facility] crisis/COPE for urgent/crisis concerns 24/7.  Provided patient with crisis resources and she agrees to use safety plan should any safety concerns arise.      Professionals or agencies I can contact during a crisis:    [facility] Daytime Number: [phone]    Suicide Prevention Lifeline: 7-424-868-TALK (7755)    Crisis Text Line Service (available 24 hours a day, 7 days a week): Text [place] to 574339    Local Crisis Services: [facility] Crisis Services: [phone]    Call 911 or go to my nearest emergency department.        Appearance:   Appropriate    Eye Contact:   Good    Psychomotor Behavior: Normal    Attitude:   Cooperative    Orientation:   All   Speech    Rate / Production: Normal  "    Volume:  Normal    Mood:    Mood is improved and Mary reports decrease in symptoms,   Affect:    Bright  and congruent with mood    Thought Content:  Clear    Thought Form:  Coherent  Logical    Insight:    Good      Medication Review:   No changes to current psychiatric medication(s).      Medication Compliance:   Yes     Changes in Health Issues:  None reported         Chemical Use Review:   Substance Use: Chemical use reviewed, no active concerns identified      Tobacco Use: No current tobacco use.      Diagnosis:  Anxiety disorder unspecified    Collateral Reports Completed:   Not Applicable,      PLAN: (Patient Tasks / Therapist Tasks / Other)    1) Mary identified the following goals: Keep focus on creating daily structure and routine, plan activities outside of the house, keep new exercise routine going, play instrument and make reeds, scan - what am I feeling, what am I thinking, what do I need?      Practice calming strategies when feeling upset.  Continue to re-direct negative and critical thinking to kind, compassionate and encouraging self-talk.  Plan structure for the day, incorporate fun and enjoyable activities into the day, recognize when falling into thinking trap of predicting things won't go well and reframe to present oriented, helpful, positive thinking.      Keep thinking present and future focused.   Practice \"flexible mindset\".        2) If there is a crisis situation, remember you are not alone and that there are people who can help you twenty-four hours a day, seven days a week.  Call [facility] ([facility] Crisis Services): [phone].      3) Follow-up visit is scheduled for August 19th at 1:00.  If urgent or crisis concerns arise prior to next scheduled appointment, please reach out to crisis resources, call 911, or go to the emergency department.      Cristy Wilkinson PsyD, LP  Licensed Psychologist  8/5/2021        Previous skills and strategies to remind self of and to do as " "needed:    Practice \"STOP\" technique when you recognize yourself feeling angry   Be a  - what do you notice is happening when Kaylee feels upset?     Practice recognizing when feeling angry, and giving self permission to use calming and self-soothing strategies and take a break.   Look for the gray with thinking  slow down  take deep breaths  manage expectations of self and others.    Journal   Practice flexing \"flexiblity and creativity\" -   Continue the great work you are doing with your daily schedule.    Get outside every day.    Play your instrument.    Sing!    Continue with: \"It's just a thought\" - non-judgmental, observe, float down the river on a leaf, clouds in the gege, reframing unhelpful thoughts -   Keep working on being patient when things are tense around me.    Continue with observing/paying attention to warning signs and signals and give self persmission to slow down, especially during the morning routine with Kaylee.    Practice offerring Kaylee choices when appropriate.      Use \"my plan for success\" to help remind you of calming strategies to practice when feeling upset.    Practice decreasing overall vulnerability to emotion mind through getting adequate rest, nutrition, time for yourself, and physical activity.         Consider reading \"Fighting for your marriage\".      Technical Assistance for video visits:    Offer patient the website ([email or web address]) and/or phone number ([phone]) for video visit instructions/assistance if needed.                                             ____________________________________    Treatment Plan    Patient's Name: Idalia Hinojosa  YOB: 1980    Date: 1/6/2020    DSM5 Diagnoses: 300.00 (F41.9) Unspecified Anxiety Disorder  Psychosocial / Contextual Factors: strain in marital relationship, parenting challenges, adjustment challenges  WHODAS: will gather at diagnostic assessment update    Referral / Collaboration:  We discussed a " "referral to a couples/marital therapist.  The patient is thinking about this and has talked with her  about the referral, but they are unsure if they would like to follow through at this time.      Anticipated number of session or this episode of care: 8-12    MeasurableTreatment Goal(s) related to diagnosis / functional impairment(s)  Goal 1: Patient will learn emotion regulation strategies to help when she is feeling upset/angry/frustrated/distressed    I will know I've met my goal when I would yell less, I would feel calmer, and I would not push others.  I would be less physical when I'm angry (e.g. wouldn't slam doors or hit things)       Objective #A (Patient Action)    Patient will learn and practice at least 2 new emotion regulation strategies.  Status: Continued - Date(s):4/23/2020 (has begun to learn new emotion regulation strategies and is making good progress)   Update: 7/8/2020: \"I've learned new strategies but I'm bad at practicing them\" - will help patient incorporate regular practice and identify and problem-solve barriers.    Reviewed: 10/8/2020: Patient has been successfully practicing emotion regulation strategies, is working on identifying earlier on when to intervene and engage strategies, as well as practice strategies on a regular basis to help strength use of strategies.    Reviewed and in progress: 1/27/2021: \"Overall things are going a lot better.  Sometimes I fall into old habits\", but recognizing when she's feeling more vulnerable to her emotions and reminds herself to use different skills/intervene in a different way.     4/27/2021-\"Things have definitely been better\".  Recognizing when feeling vulnerable (e.g. hungry), recognizing when emotions are escalating and need to engage calming strategies\".  Ask for help from Colia when feeling upset.  Can be challenging when Kaylee escalates when I'm feeling angry.    7/29/21 - She felt like she has forgotten what helps her when she's " "feeling upset, we have spent time reviewing and refreshing these skills.        Intervention(s)  Therapist will provide psychoeducation on emotion regulation module from DBT and will assign patient homework to help reinforce skill development.    Objective #B  Patient will identify factors that increase vulnerability to emotion mind and identify ways to decrease vulnerability to emotion mind.  Status: Continued - Date(s):4/23/2020 (has learned factors that make her more vulnerable to emotion mind, and has been working on addressing vulnerability related to feeling hungry, tired and rushed)   Update: 7/8/20: \"This one is harder for me.  I know I get angry when I'm hungry, and when I slow down I'm less inclined to get angry\".  Challenges - not always having food ready, not always planning in advance, (planing breakfast the night before)  Reviewed: 10/8/2020: Patient's awareness has increased of factors that increase her vulnerability to emotion mind, and she has been using this as a signal to tell her what she needs.    Reviewed and resolved/complete: 1/27/2021: \"I feel like we've done really good work with this and I've gotten what I need from this\".  4/27/2021  - I am doing better with this - I know I get more irritable when I'm hungry and I'm trying to do better with this.  Drinking more water.      7/29/21 - Overall she had been feeling like things were going better with this, though over the past several weeks has felt more challenged due to increase in stressors.      Intervention(s)  Therapist will provide information on factors that increase vulnerabiliyt to emotion mind.    Objective #C  Patient will identify warning signs and signals that emotions are escalating and will learn ways to skillfully intervene early on.  Status: New - Date: 01/06/2020 7/8/2020- in progress  Update: 10/08/2020: Patient has been making good progress on identifying signs and signals that her emotions are escalating.    Reviewed " "and in progress: 1/27/2021: \"I feel like we've done good work on this but this is one of the things I need to keep working on\".    7/29.21 - In progress - actively working on this -       Intervention(s)  Therapist will help patient identify warning signs and signals, and will guide the patient in identifying skillful ways to intervene when exeriencing warning signs and signals.      Goal 2: Patient will learn new parenting strategies to help with managing parenting challenges.  Parent will work on improving relationship with her daughter and defining what she would like this relationship to look like.      I will know I've met my goal when I'm enjoying time with my daughter, teaching her new things, and not waiting for things to change      Objective #A (Patient Action)    Status: Continued - Date(s):4/23/2020     Patient will increase understanding of developmental norms for her daughter and ways to manage challenging behaviors.    Intervention(s)  Therapist will provide psychoeducation on developmental norms and parenting strategies.    Objective #B  Patient will spend time reflecting on her relationship with her daughter and defining what she would like this relaitonship to look like.    Status: Continued - Date(s):4/23/2020   Update: 7/8/2020 - \"I think some things are going better, she's more helpful with things.  Some things are more challenging with the pandemic and thinking of things to do with her\".    Update: 10/08/2020: Patient is making progress on learning and practicing strategies to manage parenting challenges.    Reviewed: 1/27/2021: \"I feel like things have gotten better with this, we've figured out a good routine, I'm doing meditation, I'm enjoying my time with my daughter more than I used to\".   4/27/2021 - Challenges of parenting during a pandemic, working with occupational therapist on strategies to help daughter, 7/29/21 - She's been working on incorporating more structure into the daily " "routine, as well as managing her emotional reaction when feeling frustrated.  She's also worked on managing her mindset and expectations.  She's been experiencing some frustrations due to two of the OT's she's worked with has recently resigned, resulting in transitioning to several new providers in a short period of time.  I've encouraged she continue to work closely with her daughter's pediatrician to get connected to resources and supports to help with managing her daughter's difficulties.          Intervention(s)  Therapist will guide the patient in reflecting upon her relationship with her daughter and help her define ways to move towards what she vaues in her relationship with her daughter.    Objective #C  Patient will increase time spent on fun activity and play with her daughter.  Status: Continued - Date(s):4/23/2020 7/8/2020 -   10/08/2020 - will further assess this goal at next session   Reviewed: 1/27/2021 - also going really well, we play together all of the time now\", she finds herself enjoying their time together.   4/27/2021- she will spend some time reflecting upon this and we will discuss further next time.  They have several fun things planned for the future, as well as she has worked hard during the pandemic to plan structure and time together within COVID restrictions.  7/29/21 - She's seen some positive changes with being able to spend time with grandparents and at the cabin since being vaccinated, but also has experienced some challenges with planning fun activities out of worries things won't go well and feeling low energy/motivation to do these things - working to address both of these obstacles.        Intervention(s)  Therapist will guide the patient in identifying ways she can increase positive time with her daughter.  Therapist will also teach mindfulness strategies to help patient with being in the present moment when appropriate - .      Goal 3: Patiient will improve communication " "with her .      I will know I've met my goal when I feel less irritated with my  and communicate more openly with my .  I would like to be more assertive and less aggressive.   I would like to not avoid telling him things because I'm worried about his reaction or don't think he will react well.  I would like to be more present to the moment during times when this would be helpful.\"      Objective #A (Patient Action)    Status: Continued - Date(s):4/23/2020 7/8/2020 - suggested couples therapy to help with this.    Individual therapy focus - identify what makes it hard to be more honest and open   10/08/2020 - will further assess this goal at next session   Reviewed: 1/27/2021 - \"Things are better, I feel like we've worked really hard on how to respond when he's having a hard time and how to talk to him.  I'm doing better listening, being more empathetic, and not \".  4/27/2021 - She has been making progress on examining and updating unhelpful beliefs (e.g. feeling responsible for other's emotions, feeling like she needs to fix or change other's emotions) and has made good progress on identifying and asserting healthy boundaries, in addition to  her emotional response from her spouse's emotional response.  7/29/21 - will review at next session       Patient will learn and practice at least two new interpersonal effectiveness strategies.    Intervention(s)  Therapist will teach strategies from DBT module on interpersonal effectiveness, and will assign homework to help reinforce skill development.      Patient has reviewed and agreed to the above plan.      Cristy Wilkinson PsyD,   Licensed Psychologist    January 6, 2020, reviewed on 4/23/2020, reviewed 7/8/2020, reviewed 10/08/2020, reviewed 1/27/2021, reviewed 4/27/2021, 7/29/2021                                        "

## 2021-08-16 ENCOUNTER — MYC MEDICAL ADVICE (OUTPATIENT)
Dept: ALLERGY | Facility: CLINIC | Age: 41
End: 2021-08-16

## 2021-08-16 DIAGNOSIS — J30.0 CHRONIC VASOMOTOR RHINITIS: ICD-10-CM

## 2021-08-16 DIAGNOSIS — J30.89 ALLERGIC RHINITIS DUE TO DUST MITE: Primary | ICD-10-CM

## 2021-08-17 RX ORDER — MOMETASONE FUROATE MONOHYDRATE 50 UG/1
2 SPRAY, METERED NASAL DAILY
Qty: 51 G | Refills: 1 | Status: SHIPPED | OUTPATIENT
Start: 2021-08-17 | End: 2022-02-07

## 2021-08-17 NOTE — TELEPHONE ENCOUNTER
"Requested Prescriptions   Pending Prescriptions Disp Refills     mometasone (NASONEX) 50 MCG/ACT nasal spray 17 g 1     Sig: Spray 2 sprays into both nostrils daily       Nasal Allergy Protocol Passed - 8/17/2021  8:11 AM        Passed - Patient is age 12 or older        Passed - Recent (12 mo) or future (30 days) visit within the authorizing provider's specialty     Patient has had an office visit with the authorizing provider or a provider within the authorizing providers department within the previous 12 mos or has a future within next 30 days. See \"Patient Info\" tab in inbasket, or \"Choose Columns\" in Meds & Orders section of the refill encounter.              Passed - Medication is active on med list           Routing refill request to provider for review/approval because:  Medication is reported/historical    KATHE Kay, RN    "

## 2021-08-30 ENCOUNTER — VIRTUAL VISIT (OUTPATIENT)
Dept: PSYCHOLOGY | Facility: CLINIC | Age: 41
End: 2021-08-30
Payer: COMMERCIAL

## 2021-08-30 DIAGNOSIS — F41.9 ANXIETY DISORDER, UNSPECIFIED TYPE: Primary | ICD-10-CM

## 2021-08-30 PROCEDURE — 90832 PSYTX W PT 30 MINUTES: CPT | Mod: GT | Performed by: PSYCHOLOGIST

## 2021-08-30 NOTE — PROGRESS NOTES
Progress Note    Patient Name: Idalia Hinojosa  Date: 8/30/2021         Service Type: Individual      Session Start Time: 12:35 pm   Session End Time: 1:02 pm     Session Length:  27 minutes    Session #: 36    Attendees: Client     Service Modality:  Video Visit:      Provider verified identity through the following two step process.  Patient provided:  Patient is known previously to provider    Telemedicine Visit: The patient's condition can be safely assessed and treated via synchronous audio and visual telemedicine encounter.      Reason for Telemedicine Visit: Services only offered telehealth    Originating Site (Patient Location): Patient's home    Distant Site (Provider Location): Provider Remote Setting- Home Office    Consent:  The patient/guardian has verbally consented to: the potential risks and benefits of telemedicine (video visit) versus in person care; bill my insurance or make self-payment for services provided; and responsibility for payment of non-covered services.     Patient would like the video invitation sent by:  My Chart    Mode of Communication:  Video Conference via [employer]    As the provider I attest to compliance with applicable laws and regulations related to telemedicine.         Treatment Plan Last Reviewed: Developed 1/06/2020, reviewed 4/23/2020, reviewed 7/8/2020, reviewed 10/08/2020, 1/27/2021, 4/27/21, 7/28/2021  PHQ-9/DAVID-7: 4/27/2021    DATA  Interactive Complexity: No  Crisis: No        Progress Since Last Session (Related to Symptoms / Goals / Homework):   Symptoms: Worsening she reported that her symptoms and mood have worsened over the last few weeks, in the context of increased stress around the house (related to having their house remodeled) and difficulties with her daughter.       Homework: Partially completed.  She noted forgetting some of the things that are helpful for her when she's feeling heightened emotion and asked  to be able to review these things today.          Episode of Care Goals: Satisfactory progress - ACTION (Actively working towards change); Intervened by reinforcing change plan / affirming steps taken.  Diagnostic assessment has been completed, treatment plan has been developed and patient has been making good progress on treatment goals.  The patient stated that her main goals for therapy would be to learn emotion regulation strategies (in particular, to help with when she is feeling upset/angry/frustrated/distressed).  More recently, the changes in her daily life due to COVID restrictions have been a focus of our work together.       Current / Ongoing Stressors and Concerns:   Current: She reported things have been very stressful around the house; they are getting their bathroom remodeled and it is their only bathroom.  She noted her  has been more stressed and she has found herself taking on his emotions.  She stated when she has been feeling stressed, she's found herself turning to buying Legos and eating chocolate, both of which provide some temporary emotion regulation but some longer-term consequence and she'd like to be using other strategies as well.       Ongoing:Managing daily routine which has contributed to some adjustment and parenting challenges, lack of effective emotion regulation strategies for managing daily frustrations and upsets and marital discord due to differences in parenting styles.       Treatment Objective(s) Addressed in This Session:     Learn and practice one to two new emotion regulation strategies  Continue with - Practice mindfulness  - complete a daily or regular scan to tune into - what am I feeling?  What am I thinking?  What do I need? (to help with identifying which kind of emotion regulation strategy may be helpful)        Continue with/review/practice:    Identify ways to bring more structure back into the day, including planning at least one thing a day that brings  "you lamont  Notice when predicting/anticipating that things won't work out and practice writing a positive script      Incorporate self-care strategies into daily practice   Focus on the big picture in relation to your health and well-being   Be mindful of negative and self-critical thoughts and practice finding kind, compassionate, balanced and flexible thinking  learn emotion regulation strategies to help when she is feeling upset/angry/frustrated/distressed  Identify when falling into \"cheery trap\" (expecting self to be responsible for other's emotions) and allow self to take a step back and look at what I do and don't have control over  identify warning signs and signals, practice inserting skills early on  Increase awareness of intensity of emotions and use rating scale to monitor response to using strategies (and to help determine need to try different strategies)        Intervention:   DBT: We focused on how to help Idalia learn and practice emotion regulation strategies.   we talked about how when she is feeling stressed, and buys legos or eats chocolate - has the immediate benefit of providing some emotion regulation, and we talked about how to help build up her tool box with emotion regulation strategies that allow her to experience some relief with less negative conequence (e.g. she feels the financial strain of purchasing too much, etc.,) or to be able to have multiple from which to choose - maybe sometimes buying or eating something pleasurable is helpful, while balancing this with other ways to recognize her emotions, identify what may be contributing, and think about what may be helpful.  We talked about strategies o engage and soothe the five senses, as well as deep breathing as a way to help her calm and use mindfulness to recognize what she is feeling and what may be contributing.      She also noted that her  has been feeling stressed, and she finds herself taking on his emotions.  We talked " "about ways to \"recognize and notice this\", label it in order to help tame it, and ask herself - what is mine and what is his?  And continue to give herself permission to feel something different if she does!  (analogies - he can get on the train and I can choose to stay at the train station, he can have an emotional reaction and I can choose my own reaction/thoughts/response, etc.)    ASSESSMENT: Current Emotional / Mental Status (status of significant symptoms):   Risk status (Self / Other harm or suicidal ideation)   Patient denies current fears or concerns for personal safety.   Patient denies current or recent suicidal ideation or behaviors.    She agrees to use a safety plan should any safety concerns arise.  She also agrees to reach our to her spouse or a family member for help if needed, and/or access crisis/emergency resources.        Patientdenies current or recent homicidal ideation or behaviors.   Patient denies current or recent self injurious behavior or ideation.   Patient denies other safety concerns.   Patient Patient reports there has been no change in risk factors since their last session.     PatientPatient reports there has been no change in protective factors since their last session.     Recommended that patient call 911 or go to the local ED should there be a change in any of these risk factors.  She has previously been informed on how to contact [facility] crisis number and [facility] crisis/COPE for urgent/crisis concerns 24/7.  Provided patient with crisis resources and she agrees to use safety plan should any safety concerns arise.      Professionals or agencies I can contact during a crisis:    [facility] Daytime Number: [phone]    Suicide Prevention Lifeline: 5-999-165-TALK (9792)    Crisis Text Line Service (available 24 hours a day, 7 days a week): Text [place] to 351328    Local Crisis Services: [facility] Crisis Services: " "[phone]    Call 911 or go to my nearest emergency department.        Appearance:   Appropriate    Eye Contact:   Good    Psychomotor Behavior: Normal    Attitude:   Cooperative    Orientation:   All   Speech    Rate / Production: Normal     Volume:  Normal    Mood:    Anxious and reports feeling more stressed,   Affect:    Worrisome  congruent with mood    Thought Content:  Clear    Thought Form:  Coherent  Logical    Insight:    Good      Medication Review:   No changes to current psychiatric medication(s).      Medication Compliance:   Yes     Changes in Health Issues:  None reported         Chemical Use Review:   Substance Use: Chemical use reviewed, no active concerns identified      Tobacco Use: No current tobacco use.      Diagnosis:  Anxiety disorder unspecified    Collateral Reports Completed:   Not Applicable,      PLAN: (Patient Tasks / Therapist Tasks / Other)    1) Idalia identified the following goals: Practice mindfulness - observe, just notice - give myself persmission to feel something different - I don't have to feel the same way as Donald.  Remember to breathe!      Continue with: Keep focus on creating daily structure and routine, plan activities outside of the house, keep new exercise routine going, play instrument and make reeds, scan - what am I feeling, what am I thinking, what do I need?  Practice calming strategies when feeling upset.  Continue to re-direct negative and critical thinking to kind, compassionate and encouraging self-talk. incorporate fun and enjoyable activities into the day, recognize when falling into thinking trap of predicting things won't go well and reframe to present oriented, helpful, positive thinking.  Practice \"flexible mindset\".        2) If there is a crisis situation, remember you are not alone and that there are people who can help you twenty-four hours a day, seven days a week.  Call [facility] ([facility] Crisis Services): [phone].      3) Follow-up " "visit is scheduled for September 15th at 11:00.   If urgent or crisis concerns arise prior to next scheduled appointment, please reach out to crisis resources, call 911, or go to the emergency department.      Cristy Wilkinson PsyD,   Licensed Psychologist  8/30/2021          Previous skills and strategies to remind self of and to do as needed:    Practice \"STOP\" technique when you recognize yourself feeling angry   Be a  - what do you notice is happening when Kaylee feels upset?     Practice recognizing when feeling angry, and giving self permission to use calming and self-soothing strategies and take a break.   Look for the gray with thinking  slow down  take deep breaths  manage expectations of self and others.    Journal   Practice flexing \"flexiblity and creativity\" -   Continue the great work you are doing with your daily schedule.    Get outside every day.    Play your instrument.    Sing!    Continue with: \"It's just a thought\" - non-judgmental, observe, float down the river on a leaf, clouds in the gege, reframing unhelpful thoughts -   Keep working on being patient when things are tense around me.    Continue with observing/paying attention to warning signs and signals and give self persmission to slow down, especially during the morning routine with Kaylee.    Practice offerring Kaylee choices when appropriate.      Use \"my plan for success\" to help remind you of calming strategies to practice when feeling upset.    Practice decreasing overall vulnerability to emotion mind through getting adequate rest, nutrition, time for yourself, and physical activity.         Consider reading \"Fighting for your marriage\".      Technical Assistance for video visits:    Offer patient the website ([email or web address]) and/or phone number ([phone]) for video visit instructions/assistance if needed.                                             ____________________________________    Treatment Plan    Patient's " "Name: Idalia Hinojosa  YOB: 1980    Date: 1/6/2020    DSM5 Diagnoses: 300.00 (F41.9) Unspecified Anxiety Disorder  Psychosocial / Contextual Factors: strain in marital relationship, parenting challenges, adjustment challenges  WHODAS: will gather at diagnostic assessment update    Referral / Collaboration:  We discussed a referral to a couples/marital therapist.  The patient is thinking about this and has talked with her  about the referral, but they are unsure if they would like to follow through at this time.      Anticipated number of session or this episode of care: 8-12    MeasurableTreatment Goal(s) related to diagnosis / functional impairment(s)  Goal 1: Patient will learn emotion regulation strategies to help when she is feeling upset/angry/frustrated/distressed    I will know I've met my goal when I would yell less, I would feel calmer, and I would not push others.  I would be less physical when I'm angry (e.g. wouldn't slam doors or hit things)       Objective #A (Patient Action)    Patient will learn and practice at least 2 new emotion regulation strategies.  Status: Continued - Date(s):4/23/2020 (has begun to learn new emotion regulation strategies and is making good progress)   Update: 7/8/2020: \"I've learned new strategies but I'm bad at practicing them\" - will help patient incorporate regular practice and identify and problem-solve barriers.    Reviewed: 10/8/2020: Patient has been successfully practicing emotion regulation strategies, is working on identifying earlier on when to intervene and engage strategies, as well as practice strategies on a regular basis to help strength use of strategies.    Reviewed and in progress: 1/27/2021: \"Overall things are going a lot better.  Sometimes I fall into old habits\", but recognizing when she's feeling more vulnerable to her emotions and reminds herself to use different skills/intervene in a different way.     4/27/2021-\"Things have " "definitely been better\".  Recognizing when feeling vulnerable (e.g. hungry), recognizing when emotions are escalating and need to engage calming strategies\".  Ask for help from Colia when feeling upset.  Can be challenging when Kaylee escalates when I'm feeling angry.    7/29/21 - She felt like she has forgotten what helps her when she's feeling upset, we have spent time reviewing and refreshing these skills.        Intervention(s)  Therapist will provide psychoeducation on emotion regulation module from DBT and will assign patient homework to help reinforce skill development.    Objective #B  Patient will identify factors that increase vulnerability to emotion mind and identify ways to decrease vulnerability to emotion mind.  Status: Continued - Date(s):4/23/2020 (has learned factors that make her more vulnerable to emotion mind, and has been working on addressing vulnerability related to feeling hungry, tired and rushed)   Update: 7/8/20: \"This one is harder for me.  I know I get angry when I'm hungry, and when I slow down I'm less inclined to get angry\".  Challenges - not always having food ready, not always planning in advance, (planing breakfast the night before)  Reviewed: 10/8/2020: Patient's awareness has increased of factors that increase her vulnerability to emotion mind, and she has been using this as a signal to tell her what she needs.    Reviewed and resolved/complete: 1/27/2021: \"I feel like we've done really good work with this and I've gotten what I need from this\".  4/27/2021  - I am doing better with this - I know I get more irritable when I'm hungry and I'm trying to do better with this.  Drinking more water.      7/29/21 - Overall she had been feeling like things were going better with this, though over the past several weeks has felt more challenged due to increase in stressors.      Intervention(s)  Therapist will provide information on factors that increase vulnerabiliyt to emotion " "mind.    Objective #C  Patient will identify warning signs and signals that emotions are escalating and will learn ways to skillfully intervene early on.  Status: New - Date: 01/06/2020 7/8/2020- in progress  Update: 10/08/2020: Patient has been making good progress on identifying signs and signals that her emotions are escalating.    Reviewed and in progress: 1/27/2021: \"I feel like we've done good work on this but this is one of the things I need to keep working on\".    7/29.21 - In progress - actively working on this -       Intervention(s)  Therapist will help patient identify warning signs and signals, and will guide the patient in identifying skillful ways to intervene when exeriencing warning signs and signals.      Goal 2: Patient will learn new parenting strategies to help with managing parenting challenges.  Parent will work on improving relationship with her daughter and defining what she would like this relationship to look like.      I will know I've met my goal when I'm enjoying time with my daughter, teaching her new things, and not waiting for things to change      Objective #A (Patient Action)    Status: Continued - Date(s):4/23/2020     Patient will increase understanding of developmental norms for her daughter and ways to manage challenging behaviors.    Intervention(s)  Therapist will provide psychoeducation on developmental norms and parenting strategies.    Objective #B  Patient will spend time reflecting on her relationship with her daughter and defining what she would like this relaitonship to look like.    Status: Continued - Date(s):4/23/2020   Update: 7/8/2020 - \"I think some things are going better, she's more helpful with things.  Some things are more challenging with the pandemic and thinking of things to do with her\".    Update: 10/08/2020: Patient is making progress on learning and practicing strategies to manage parenting challenges.    Reviewed: 1/27/2021: \"I feel like things have " "gotten better with this, we've figured out a good routine, I'm doing meditation, I'm enjoying my time with my daughter more than I used to\".   4/27/2021 - Challenges of parenting during a pandemic, working with occupational therapist on strategies to help daughter, 7/29/21 - She's been working on incorporating more structure into the daily routine, as well as managing her emotional reaction when feeling frustrated.  She's also worked on managing her mindset and expectations.  She's been experiencing some frustrations due to two of the OT's she's worked with has recently resigned, resulting in transitioning to several new providers in a short period of time.  I've encouraged she continue to work closely with her daughter's pediatrician to get connected to resources and supports to help with managing her daughter's difficulties.          Intervention(s)  Therapist will guide the patient in reflecting upon her relationship with her daughter and help her define ways to move towards what she vaues in her relationship with her daughter.    Objective #C  Patient will increase time spent on fun activity and play with her daughter.  Status: Continued - Date(s):4/23/2020 7/8/2020 -   10/08/2020 - will further assess this goal at next session   Reviewed: 1/27/2021 - also going really well, we play together all of the time now\", she finds herself enjoying their time together.   4/27/2021- she will spend some time reflecting upon this and we will discuss further next time.  They have several fun things planned for the future, as well as she has worked hard during the pandemic to plan structure and time together within COVID restrictions.  7/29/21 - She's seen some positive changes with being able to spend time with grandparents and at the cabin since being vaccinated, but also has experienced some challenges with planning fun activities out of worries things won't go well and feeling low energy/motivation to do these things - " "working to address both of these obstacles.        Intervention(s)  Therapist will guide the patient in identifying ways she can increase positive time with her daughter.  Therapist will also teach mindfulness strategies to help patient with being in the present moment when appropriate - .      Goal 3: Patiient will improve communication with her .      I will know I've met my goal when I feel less irritated with my  and communicate more openly with my .  I would like to be more assertive and less aggressive.   I would like to not avoid telling him things because I'm worried about his reaction or don't think he will react well.  I would like to be more present to the moment during times when this would be helpful.\"      Objective #A (Patient Action)    Status: Continued - Date(s):4/23/2020 7/8/2020 - suggested couples therapy to help with this.    Individual therapy focus - identify what makes it hard to be more honest and open   10/08/2020 - will further assess this goal at next session   Reviewed: 1/27/2021 - \"Things are better, I feel like we've worked really hard on how to respond when he's having a hard time and how to talk to him.  I'm doing better listening, being more empathetic, and not \".  4/27/2021 - She has been making progress on examining and updating unhelpful beliefs (e.g. feeling responsible for other's emotions, feeling like she needs to fix or change other's emotions) and has made good progress on identifying and asserting healthy boundaries, in addition to  her emotional response from her spouse's emotional response.  7/29/21 - will review at next session       Patient will learn and practice at least two new interpersonal effectiveness strategies.    Intervention(s)  Therapist will teach strategies from DBT module on interpersonal effectiveness, and will assign homework to help reinforce skill development.      Patient has reviewed and agreed to the above " plan.      Cristy Wilkinson PsyD, LP  Licensed Psychologist    January 6, 2020, reviewed on 4/23/2020, reviewed 7/8/2020, reviewed 10/08/2020, reviewed 1/27/2021, reviewed 4/27/2021, 7/29/2021

## 2021-09-15 ENCOUNTER — VIRTUAL VISIT (OUTPATIENT)
Dept: PSYCHOLOGY | Facility: CLINIC | Age: 41
End: 2021-09-15
Payer: COMMERCIAL

## 2021-09-15 DIAGNOSIS — F41.9 ANXIETY DISORDER, UNSPECIFIED TYPE: Primary | ICD-10-CM

## 2021-09-15 PROCEDURE — 90832 PSYTX W PT 30 MINUTES: CPT | Mod: GT | Performed by: PSYCHOLOGIST

## 2021-09-15 NOTE — PROGRESS NOTES
"                                              Progress Note    Patient Name: Idalia Hinojosa  Date: 9/15/2021         Service Type: Individual      Session Start Time: 11:03 am Session End Time: 11:35 am     Session Length:  32 minutes    Session #: 37    Attendees: Client     Service Modality:  Video Visit: - started as video visit, changed to telephone visit as patient had difficulties with audio on their end, she believed due to difficulties connecting to [place].        Provider verified identity through the following two step process.  Patient provided:  Patient is known previously to provider    Telemedicine Visit: The patient's condition can be safely assessed and treated via synchronous audio and visual telemedicine encounter.      Reason for Telemedicine Visit: Services only offered telehealth    Originating Site (Patient Location): Patient's home    Distant Site (Provider Location): Provider Remote Setting- Home Office    Consent:  The patient/guardian has verbally consented to: the potential risks and benefits of telemedicine (telephone visit) versus in person care; bill my insurance or make self-payment for services provided; and responsibility for payment of non-covered services.     Patient would like the video invitation sent by:  My Chart    Mode of Communication:  [facility], then switched to telephone visit    As the provider I attest to compliance with applicable laws and regulations related to telemedicine.         Treatment Plan Last Reviewed: Developed 1/06/2020, reviewed 4/23/2020, reviewed 7/8/2020, reviewed 10/08/2020, 1/27/2021, 4/27/21, 7/28/2021  PHQ-9/DAVID-7: 4/27/2021    DATA  Interactive Complexity: No  Crisis: No        Progress Since Last Session (Related to Symptoms / Goals / Homework):   Symptoms: Improving She reported that her mood is \"pretty good\" today and that overall, things have been going well with multiple transitions happening right now - daughter has " started  and she has returned to work four days a week.  Continued challenges and frustrations related to their re-modeling project at home and delays with this, but overall she believes she has been coping with the frustrations well.         Homework: Achieved / completed to satisfaction.  She shared multiple examples of successfully practicing mindful awareness of emotions and choosing constructive coping - she was able to recognize when she was feeling frustrated with daughter, took a break, took deep breaths, examined how she was thinking about the situation, choose a more helpful response that recognized the changes everyone was experiencing in routine, and was able to interact more calmly with her daughter as a result.  Also has been successful with  her emotional response from her 's response.            Episode of Care Goals: Satisfactory progress - ACTION (Actively working towards change); Intervened by reinforcing change plan / affirming steps taken.  Diagnostic assessment has been completed, treatment plan has been developed and patient has been making good progress on treatment goals.  The patient stated that her main goals for therapy would be to learn emotion regulation strategies (in particular, to help with when she is feeling upset/angry/frustrated/distressed).  More recently, the changes in her daily life due to COVID restrictions have been a focus of our work together, and now working through changes to routine as daughter starts  and she returns to work.       Current / Ongoing Stressors and Concerns:   Current: Daughter has started  and she has returned to work.  Remodeling continues and due to some delays has created some challenges (as their main/only bathroom is under construction).       Ongoing:Managing daily routine which has contributed to some adjustment and parenting challenges, lack of effective emotion regulation strategies for  "managing daily frustrations and upsets and marital discord due to differences in parenting styles.       Treatment Objective(s) Addressed in This Session:       Learn and practice 3-5 new emotion regulation strategies  Practice mindfulness  - complete a daily or regular scan to tune into - what am I feeling?  What am I thinking?  What do I need? (to help with identifying which kind of emotion regulation strategy may be helpful)        Continue with/review/practice:    Identify ways to bring more structure back into the day, including planning at least one thing a day that brings you lamont  Notice when predicting/anticipating that things won't work out and practice writing a positive script      Incorporate self-care strategies into daily practice   Focus on the big picture in relation to your health and well-being   Be mindful of negative and self-critical thoughts and practice finding kind, compassionate, balanced and flexible thinking  learn emotion regulation strategies to help when she is feeling upset/angry/frustrated/distressed  Identify when falling into \"cheery trap\" (expecting self to be responsible for other's emotions) and allow self to take a step back and look at what I do and don't have control over  identify warning signs and signals, practice inserting skills early on  Increase awareness of intensity of emotions and use rating scale to monitor response to using strategies (and to help determine need to try different strategies)        Intervention:   DBT: learn and practice 3-5 emotion regulation practices.   We continue to focus on helping Idalia to learn and practice emotion regulation strategies.  She cited several great examples of practicing mindful awareness of her emotions, which has allowed her to recognize earlier on that she is feeling frustrated or upset.  She's been doing a great job of recognizing physical signs and engaging calming strategies - both via taking calming breaths and " relaxing her muscles (e.g. noticing tense jaw and relaxing her jaw).  She's also doing a great job recognizing her cognitions, identifying distortions and choosing a more helpful and balanced response (e.g. was able to recognize that daughter's behavior was likely related to the significant changes and adjustment to starting , new morning routine, etc.) which allowed her to respond more calmly to the situation.  We talked about ways to continue to support establishing new and helpful routines.  We also talked about how she has been doing - feeling more tired, exhausted - due to changes in her day with returning to work, interacting with customers, being on her feet, etc. And giving herself what she needs - extra rest at night, some time alone to read, etc.      She's also been doing a great job practicing awareness of her emotions when her spouse is feeling emotional, and giving herself permission to feel her own feelings - separate her feelings from his.  She said she has been reminding herself she's a separate person and this has helped her.      ASSESSMENT: Current Emotional / Mental Status (status of significant symptoms):   Risk status (Self / Other harm or suicidal ideation)   Patient denies current fears or concerns for personal safety.   Patient denies current or recent suicidal ideation or behaviors.    She agrees to use a safety plan should any safety concerns arise.  She also agrees to reach our to her spouse or a family member for help if needed, and/or access crisis/emergency resources.        Patientdenies current or recent homicidal ideation or behaviors.   Patient denies current or recent self injurious behavior or ideation.   Patient denies other safety concerns.   Patient Patient reports there has been no change in risk factors since their last session.     PatientPatient reports there has been no change in protective factors since their last session.     Recommended that patient call  911 or go to the local ED should there be a change in any of these risk factors.  She has previously been informed on how to contact [facility] crisis number and [facility] crisis/COPE for urgent/crisis concerns 24/7.  Provided patient with crisis resources and she agrees to use safety plan should any safety concerns arise.      Professionals or agencies I can contact during a crisis:    [facility] Daytime Number: [phone]    Suicide Prevention Lifeline: 5-305-176-LKVQ (8710)    Crisis Text Line Service (available 24 hours a day, 7 days a week): Text [place] to 057287    [facility] Crisis Services: [facility] Crisis Services: [phone]    Call 911 or go to my nearest emergency department.        Appearance:   Appropriate    Eye Contact:   Good    Psychomotor Behavior: Normal    Attitude:   Cooperative    Orientation:   All   Speech    Rate / Production: Normal     Volume:  Normal    Mood:    Normal and she reports feeling better - less anxious and depressed.   Affect:    Bright, congruent with mood    Thought Content:  Clear    Thought Form:  Coherent  Logical    Insight:    Good      Medication Review:   No changes to current psychiatric medication(s).      Medication Compliance:   Yes     Changes in Health Issues:  None reported         Chemical Use Review:   Substance Use: Chemical use reviewed, no active concerns identified      Tobacco Use: No current tobacco use.      Diagnosis:  Anxiety disorder unspecified    Collateral Reports Completed:   Not Applicable,      PLAN: (Patient Tasks / Therapist Tasks / Other)    1) Idalia identified the following goals: Keep remembering to stop and take a deep breath.  Notice if I am feeling frustrated, especially during the morning when getting Kaylee ready for the day, and remind myself to take a break and take deep breaths.  Remember - I can separate my feelings from Colia, I don't have to feel the way he does.        Practice  "mindfulness - observe, just notice -Remember to breathe!      Continue with: Keep focus on creating daily structure and routine, plan activities outside of the house, keep new exercise routine going, play instrument and make reeds, scan - what am I feeling, what am I thinking, what do I need?  Practice calming strategies when feeling upset.  Continue to re-direct negative and critical thinking to kind, compassionate and encouraging self-talk. incorporate fun and enjoyable activities into the day, recognize when falling into thinking trap of predicting things won't go well and reframe to present oriented, helpful, positive thinking.  Practice \"flexible mindset\".        2) If there is a crisis situation, remember you are not alone and that there are people who can help you twenty-four hours a day, seven days a week.  Call [facility] ([facility] Crisis Services): [phone].      3) Follow-up visit is scheduled for October 6th at 2:00 pm.   If urgent or crisis concerns arise prior to next scheduled appointment, please reach out to crisis resources, call 911, or go to the emergency department.      Cristy Wilkinson PsyD,   Licensed Psychologist  9/15/2021            Previous skills and strategies to remind self of and to do as needed:    Practice \"STOP\" technique when you recognize yourself feeling angry   Be a  - what do you notice is happening when Kaylee feels upset?     Practice recognizing when feeling angry, and giving self permission to use calming and self-soothing strategies and take a break.   Look for the gray with thinking  slow down  take deep breaths  manage expectations of self and others.    Journal   Practice flexing \"flexiblity and creativity\" -   Continue the great work you are doing with your daily schedule.    Get outside every day.    Play your instrument.    Sing!    Continue with: \"It's just a thought\" - non-judgmental, observe, float down the river on a leaf, clouds in the gege, reframing " "unhelpful thoughts -   Keep working on being patient when things are tense around me.    Continue with observing/paying attention to warning signs and signals and give self persmission to slow down, especially during the morning routine with Kaylee.    Practice offerring Kaylee choices when appropriate.      Use \"my plan for success\" to help remind you of calming strategies to practice when feeling upset.    Practice decreasing overall vulnerability to emotion mind through getting adequate rest, nutrition, time for yourself, and physical activity.         Consider reading \"Fighting for your marriage\".      Technical Assistance for video visits:    Offer patient the website ([email or web address]) and/or phone number ([phone]) for video visit instructions/assistance if needed.                                             ____________________________________    Treatment Plan    Patient's Name: Idalia Hinojosa  YOB: 1980    Date: 1/6/2020    DSM5 Diagnoses: 300.00 (F41.9) Unspecified Anxiety Disorder  Psychosocial / Contextual Factors: strain in marital relationship, parenting challenges, adjustment challenges  WHODAS: will gather at diagnostic assessment update    Referral / Collaboration:  We discussed a referral to a couples/marital therapist.  The patient is thinking about this and has talked with her  about the referral, but they are unsure if they would like to follow through at this time.      Anticipated number of session or this episode of care: 8-12    MeasurableTreatment Goal(s) related to diagnosis / functional impairment(s)  Goal 1: Patient will learn emotion regulation strategies to help when she is feeling upset/angry/frustrated/distressed    I will know I've met my goal when I would yell less, I would feel calmer, and I would not push others.  I would be less physical when I'm angry (e.g. wouldn't slam doors or hit things)       Objective #A (Patient Action)    Patient will " "learn and practice at least 2 new emotion regulation strategies.  Status: Continued - Date(s):4/23/2020 (has begun to learn new emotion regulation strategies and is making good progress)   Update: 7/8/2020: \"I've learned new strategies but I'm bad at practicing them\" - will help patient incorporate regular practice and identify and problem-solve barriers.    Reviewed: 10/8/2020: Patient has been successfully practicing emotion regulation strategies, is working on identifying earlier on when to intervene and engage strategies, as well as practice strategies on a regular basis to help strength use of strategies.    Reviewed and in progress: 1/27/2021: \"Overall things are going a lot better.  Sometimes I fall into old habits\", but recognizing when she's feeling more vulnerable to her emotions and reminds herself to use different skills/intervene in a different way.     4/27/2021-\"Things have definitely been better\".  Recognizing when feeling vulnerable (e.g. hungry), recognizing when emotions are escalating and need to engage calming strategies\".  Ask for help from Colia when feeling upset.  Can be challenging when Kaylee escalates when I'm feeling angry.    7/29/21 - She felt like she has forgotten what helps her when she's feeling upset, we have spent time reviewing and refreshing these skills.        Intervention(s)  Therapist will provide psychoeducation on emotion regulation module from DBT and will assign patient homework to help reinforce skill development.    Objective #B  Patient will identify factors that increase vulnerability to emotion mind and identify ways to decrease vulnerability to emotion mind.  Status: Continued - Date(s):4/23/2020 (has learned factors that make her more vulnerable to emotion mind, and has been working on addressing vulnerability related to feeling hungry, tired and rushed)   Update: 7/8/20: \"This one is harder for me.  I know I get angry when I'm hungry, and when I slow down I'm less " "inclined to get angry\".  Challenges - not always having food ready, not always planning in advance, (planing breakfast the night before)  Reviewed: 10/8/2020: Patient's awareness has increased of factors that increase her vulnerability to emotion mind, and she has been using this as a signal to tell her what she needs.    Reviewed and resolved/complete: 1/27/2021: \"I feel like we've done really good work with this and I've gotten what I need from this\".  4/27/2021  - I am doing better with this - I know I get more irritable when I'm hungry and I'm trying to do better with this.  Drinking more water.      7/29/21 - Overall she had been feeling like things were going better with this, though over the past several weeks has felt more challenged due to increase in stressors.      Intervention(s)  Therapist will provide information on factors that increase vulnerabiliyt to emotion mind.    Objective #C  Patient will identify warning signs and signals that emotions are escalating and will learn ways to skillfully intervene early on.  Status: New - Date: 01/06/2020 7/8/2020- in progress  Update: 10/08/2020: Patient has been making good progress on identifying signs and signals that her emotions are escalating.    Reviewed and in progress: 1/27/2021: \"I feel like we've done good work on this but this is one of the things I need to keep working on\".    7/29.21 - In progress - actively working on this -       Intervention(s)  Therapist will help patient identify warning signs and signals, and will guide the patient in identifying skillful ways to intervene when exeriencing warning signs and signals.      Goal 2: Patient will learn new parenting strategies to help with managing parenting challenges.  Parent will work on improving relationship with her daughter and defining what she would like this relationship to look like.      I will know I've met my goal when I'm enjoying time with my daughter, teaching her new things, " "and not waiting for things to change      Objective #A (Patient Action)    Status: Continued - Date(s):4/23/2020     Patient will increase understanding of developmental norms for her daughter and ways to manage challenging behaviors.    Intervention(s)  Therapist will provide psychoeducation on developmental norms and parenting strategies.    Objective #B  Patient will spend time reflecting on her relationship with her daughter and defining what she would like this relaitonship to look like.    Status: Continued - Date(s):4/23/2020   Update: 7/8/2020 - \"I think some things are going better, she's more helpful with things.  Some things are more challenging with the pandemic and thinking of things to do with her\".    Update: 10/08/2020: Patient is making progress on learning and practicing strategies to manage parenting challenges.    Reviewed: 1/27/2021: \"I feel like things have gotten better with this, we've figured out a good routine, I'm doing meditation, I'm enjoying my time with my daughter more than I used to\".   4/27/2021 - Challenges of parenting during a pandemic, working with occupational therapist on strategies to help daughter, 7/29/21 - She's been working on incorporating more structure into the daily routine, as well as managing her emotional reaction when feeling frustrated.  She's also worked on managing her mindset and expectations.  She's been experiencing some frustrations due to two of the OT's she's worked with has recently resigned, resulting in transitioning to several new providers in a short period of time.  I've encouraged she continue to work closely with her daughter's pediatrician to get connected to resources and supports to help with managing her daughter's difficulties.          Intervention(s)  Therapist will guide the patient in reflecting upon her relationship with her daughter and help her define ways to move towards what she vaues in her relationship with her " "daughter.    Objective #C  Patient will increase time spent on fun activity and play with her daughter.  Status: Continued - Date(s):4/23/2020 7/8/2020 -   10/08/2020 - will further assess this goal at next session   Reviewed: 1/27/2021 - also going really well, we play together all of the time now\", she finds herself enjoying their time together.   4/27/2021- she will spend some time reflecting upon this and we will discuss further next time.  They have several fun things planned for the future, as well as she has worked hard during the pandemic to plan structure and time together within COVID restrictions.  7/29/21 - She's seen some positive changes with being able to spend time with grandparents and at the cabin since being vaccinated, but also has experienced some challenges with planning fun activities out of worries things won't go well and feeling low energy/motivation to do these things - working to address both of these obstacles.        Intervention(s)  Therapist will guide the patient in identifying ways she can increase positive time with her daughter.  Therapist will also teach mindfulness strategies to help patient with being in the present moment when appropriate - .      Goal 3: Patiient will improve communication with her .      I will know I've met my goal when I feel less irritated with my  and communicate more openly with my .  I would like to be more assertive and less aggressive.   I would like to not avoid telling him things because I'm worried about his reaction or don't think he will react well.  I would like to be more present to the moment during times when this would be helpful.\"      Objective #A (Patient Action)    Status: Continued - Date(s):4/23/2020 7/8/2020 - suggested couples therapy to help with this.    Individual therapy focus - identify what makes it hard to be more honest and open   10/08/2020 - will further assess this goal at next session   Reviewed: " "1/27/2021 - \"Things are better, I feel like we've worked really hard on how to respond when he's having a hard time and how to talk to him.  I'm doing better listening, being more empathetic, and not \".  4/27/2021 - She has been making progress on examining and updating unhelpful beliefs (e.g. feeling responsible for other's emotions, feeling like she needs to fix or change other's emotions) and has made good progress on identifying and asserting healthy boundaries, in addition to  her emotional response from her spouse's emotional response.  7/29/21 - will review at next session       Patient will learn and practice at least two new interpersonal effectiveness strategies.    Intervention(s)  Therapist will teach strategies from DBT module on interpersonal effectiveness, and will assign homework to help reinforce skill development.      Patient has reviewed and agreed to the above plan.      Cristy Wilkinson PsyD,   Licensed Psychologist    January 6, 2020, reviewed on 4/23/2020, reviewed 7/8/2020, reviewed 10/08/2020, reviewed 1/27/2021, reviewed 4/27/2021, 7/29/2021                                        "

## 2021-10-03 ENCOUNTER — HEALTH MAINTENANCE LETTER (OUTPATIENT)
Age: 41
End: 2021-10-03

## 2021-10-06 ENCOUNTER — VIRTUAL VISIT (OUTPATIENT)
Dept: PSYCHOLOGY | Facility: CLINIC | Age: 41
End: 2021-10-06
Payer: COMMERCIAL

## 2021-10-06 DIAGNOSIS — F41.9 ANXIETY DISORDER, UNSPECIFIED TYPE: Primary | ICD-10-CM

## 2021-10-06 PROCEDURE — 90832 PSYTX W PT 30 MINUTES: CPT | Mod: GT | Performed by: PSYCHOLOGIST

## 2021-10-06 NOTE — PROGRESS NOTES
"                                              Progress Note    Patient Name: Idalia Hinojosa  Date: 10/6/2021         Service Type: Individual      Session Start Time: 2:05 pm Session End Time: 2:41 am     Session Length:  36 minutes    Session #: 38    Attendees: Client     Service Modality:  Video Visit: - I.        Provider verified identity through the following two step process.  Patient provided:  Patient is known previously to provider    Telemedicine Visit: The patient's condition can be safely assessed and treated via synchronous audio and visual telemedicine encounter.      Reason for Telemedicine Visit: Services only offered telehealth    Originating Site (Patient Location): Patient's home    Distant Site (Provider Location): Provider Remote Setting- Home Office    Consent:  The patient/guardian has verbally consented to: the potential risks and benefits of telemedicine (telephone visit) versus in person care; bill my insurance or make self-payment for services provided; and responsibility for payment of non-covered services.     Patient would like the video invitation sent by:  My Chart    Mode of Communication:  Video Conference via [place], then switched to telephone visit    As the provider I attest to compliance with applicable laws and regulations related to telemedicine.         Treatment Plan Last Reviewed: Developed 1/06/2020, reviewed 4/23/2020, reviewed 7/8/2020, reviewed 10/08/2020, 1/27/2021, 4/27/21, 7/28/2021  PHQ-9/DAVID-7: 4/27/2021    DATA  Interactive Complexity: No  Crisis: No        Progress Since Last Session (Related to Symptoms / Goals / Homework):   Symptoms: Stable - she reported that she has been feeling \"pretty good\".  She's been feeling some stress related to difficulties her daughter has been having at school - has been working with professionals to evaluate her daughter and communicating with the teachers.         Homework: Achieved / completed to satisfaction.  She's been " "successful with  her emotional response from [facility], giving herself permission to feel her own feelings and choose her own reaction.  She's been exercising - which is helpful for her - doing more yoga and swimming on her day off.  She notices some positive differences in how she is managing challenges this year than how she was one year ago - able to still focus on having fun and \"move on\" after something challenging or difficult has happened.            Episode of Care Goals: Satisfactory progress - ACTION (Actively working towards change); Intervened by reinforcing change plan / affirming steps taken.  Diagnostic assessment has been completed, treatment plan has been developed and patient has been making good progress on treatment goals.  The patient stated that her main goals for therapy would be to learn emotion regulation strategies (in particular, to help with when she is feeling upset/angry/frustrated/distressed).  More recently, the changes in her daily life due to COVID restrictions have been a focus of our work together, and now working through changes to routine as daughter starts  and she returns to work.       Current / Ongoing Stressors and Concerns:   Current: Remodeling continues but they now have a toilet and sink at home which has made things much better!  Getting into new routines with returning to work four days.  Her daughter has been having some challenges/difficulties at school which she has been working with the school to address.  Her daughter is also getting evaluated for ADHD and they are awaiting the results.         Ongoing:Managing daily routine which has contributed to some adjustment and parenting challenges, lack of effective emotion regulation strategies for managing daily frustrations and upsets and marital discord due to differences in parenting styles.       Treatment Objective(s) Addressed in This Session:     Learn and practice ways of self-soothing that " "don't involve food to increase choices/options when feeling stressed or overwhelmed  Practice mindful awareness when feelings urges to eat when feeling stressed - how am I feeling?  What am I thinking?  What is contributing?  What do I need?  Make mindful decision around what you believe will help.    Learn and practice 3-5 new emotion regulation strategies          Continue with/review/practice:    Identify ways to bring more structure back into the day, including planning at least one thing a day that brings you lamont  Notice when predicting/anticipating that things won't work out and practice writing a positive script      Incorporate self-care strategies into daily practice   Focus on the big picture in relation to your health and well-being   Be mindful of negative and self-critical thoughts and practice finding kind, compassionate, balanced and flexible thinking  learn emotion regulation strategies to help when she is feeling upset/angry/frustrated/distressed  Identify when falling into \"cheery trap\" (expecting self to be responsible for other's emotions) and allow self to take a step back and look at what I do and don't have control over  identify warning signs and signals, practice inserting skills early on  Increase awareness of intensity of emotions and use rating scale to monitor response to using strategies (and to help determine need to try different strategies)        Intervention:   DBT: learn and practice 3-5 emotion regulation practices.   We continue to focus on helping Idalia to learn and practice emotion regulation strategies.  She shared examples of feeling stressed due to difficulties her daughter had at school, and urges she had to eat chocolate, which she said she finds herself falling into \"all or nothing\" patterns with sweets.  Talked about ways to practice mindful awareness of feelings to help with identifying emotion regulation strategies to help self-soothe, as well as finding a balanced " approach to help with all or nothing patterns.  She also identified some of the self-talk that helps as well as patterns that can exacerbate stress - including having unrealistic expectations, and we talked about ways to find a compassionate, understanding and helpful voice/self-talk.      She also processed some thoughts and feelings related to her body image.      She reported she's had good success with  her emotional response and reaction from [facility].      ASSESSMENT: Current Emotional / Mental Status (status of significant symptoms):   Risk status (Self / Other harm or suicidal ideation)   Patient denies current fears or concerns for personal safety.   Patient denies current or recent suicidal ideation or behaviors.    She agrees to use a safety plan should any safety concerns arise.  She also agrees to reach our to her spouse or a family member for help if needed, and/or access crisis/emergency resources.        Patientdenies current or recent homicidal ideation or behaviors.   Patient denies current or recent self injurious behavior or ideation.   Patient denies other safety concerns.   Patient Patient reports there has been no change in risk factors since their last session.     PatientPatient reports there has been no change in protective factors since their last session.     Recommended that patient call 911 or go to the local ED should there be a change in any of these risk factors.  She has previously been informed on how to contact [facility] crisis number and [facility] crisis/COPE for urgent/crisis concerns 24/7.  Provided patient with crisis resources and she agrees to use safety plan should any safety concerns arise.      Professionals or agencies I can contact during a crisis:    [facility] Daytime Number: [phone]    Suicide Prevention Lifeline: 8-419-584-EHMY (3822)    Crisis Text Line Service (available 24 hours a day, 7 days a week):  "Text [place] to 295456    Local Crisis Services: [facility] Crisis Services: [phone]    Call 911 or go to my nearest emergency department.        Appearance:   Appropriate    Eye Contact:   Good    Psychomotor Behavior: Normal    Attitude:   Cooperative    Orientation:   All   Speech    Rate / Production: Normal     Volume:  Normal    Mood:    Normal she reports that her mood is \"pretty good\" today, has had periods of mood feeling stressed and anxious since last appointment   Affect:    Congruent with mood '   Thought Content:  Clear    Thought Form:  Coherent  Logical    Insight:    Good      Medication Review:   No changes to current psychiatric medication(s).      Medication Compliance:   Yes     Changes in Health Issues:  None reported         Chemical Use Review:   Substance Use: Chemical use reviewed, no active concerns identified      Tobacco Use: No current tobacco use.      Diagnosis:  Anxiety disorder unspecified    Collateral Reports Completed:   Not Applicable,      PLAN: (Patient Tasks / Therapist Tasks / Other)    1) Idalia identified the following goals: Continue to practice mindful awareness - what am I thinking, feeling, and what is contributing?  What do I need?  Allow myself to let go of guilt about choosing to reward myself with chocolate - allow myself to mindfully choose and enjoy, practice compassion in thinking about my body    Continue with:    Continue with: Keep focus on creating daily structure and routine, plan activities outside of the house, keep new exercise routine going, play instrument and make reeds, scan - what am I feeling, what am I thinking, what do I need?  Practice calming strategies when feeling upset.  Continue to re-direct negative and critical thinking to kind, compassionate and encouraging self-talk. incorporate fun and enjoyable activities into the day, recognize when falling into thinking trap of predicting things won't go well and reframe to present oriented, " "helpful, positive thinking.  Practice \"flexible mindset\".        2) If there is a crisis situation, remember you are not alone and that there are people who can help you twenty-four hours a day, seven days a week.  Call [facility] ([facility]): [phone].      3) Follow-up visit is scheduled for November 3rd at 2:00 pm.   If urgent or crisis concerns arise prior to next scheduled appointment, please reach out to crisis resources, call 911, or go to the emergency department.      Cristy Wilkinson PsyD,   Licensed Psychologist  10/6/2021        Previous skills and strategies to remind self of and to do as needed:    Practice \"STOP\" technique when you recognize yourself feeling angry   Be a  - what do you notice is happening when Kaylee feels upset?     Practice recognizing when feeling angry, and giving self permission to use calming and self-soothing strategies and take a break.   Look for the gray with thinking  slow down  take deep breaths  manage expectations of self and others.    Journal   Practice flexing \"flexiblity and creativity\" -   Continue the great work you are doing with your daily schedule.    Get outside every day.    Play your instrument.    Sing!    Continue with: \"It's just a thought\" - non-judgmental, observe, float down the river on a leaf, clouds in the gege, reframing unhelpful thoughts -   Keep working on being patient when things are tense around me.    Continue with observing/paying attention to warning signs and signals and give self persmission to slow down, especially during the morning routine with Kaylee.    Practice offerring Kaylee choices when appropriate.      Use \"my plan for success\" to help remind you of calming strategies to practice when feeling upset.    Practice decreasing overall vulnerability to emotion mind through getting adequate rest, nutrition, time for yourself, and physical activity.         Consider reading \"Fighting for your marriage\".  " "    Technical Assistance for video visits:    Offer patient the website ([email or web address]) and/or phone number (901-740-4950) for video visit instructions/assistance if needed.                                             ____________________________________    Treatment Plan    Patient's Name: Idalia Hinojosa  YOB: 1980    Date: 1/6/2020    DSM5 Diagnoses: 300.00 (F41.9) Unspecified Anxiety Disorder  Psychosocial / Contextual Factors: strain in marital relationship, parenting challenges, adjustment challenges  WHODAS: will gather at diagnostic assessment update    Referral / Collaboration:  We discussed a referral to a couples/marital therapist.  The patient is thinking about this and has talked with her  about the referral, but they are unsure if they would like to follow through at this time.      Anticipated number of session or this episode of care: 8-12    MeasurableTreatment Goal(s) related to diagnosis / functional impairment(s)  Goal 1: Patient will learn emotion regulation strategies to help when she is feeling upset/angry/frustrated/distressed    I will know I've met my goal when I would yell less, I would feel calmer, and I would not push others.  I would be less physical when I'm angry (e.g. wouldn't slam doors or hit things)       Objective #A (Patient Action)    Patient will learn and practice at least 2 new emotion regulation strategies.  Status: Continued - Date(s):4/23/2020 (has begun to learn new emotion regulation strategies and is making good progress)   Update: 7/8/2020: \"I've learned new strategies but I'm bad at practicing them\" - will help patient incorporate regular practice and identify and problem-solve barriers.    Reviewed: 10/8/2020: Patient has been successfully practicing emotion regulation strategies, is working on identifying earlier on when to intervene and engage strategies, as well as practice strategies on a regular basis to help strength use of " "strategies.    Reviewed and in progress: 1/27/2021: \"Overall things are going a lot better.  Sometimes I fall into old habits\", but recognizing when she's feeling more vulnerable to her emotions and reminds herself to use different skills/intervene in a different way.     4/27/2021-\"Things have definitely been better\".  Recognizing when feeling vulnerable (e.g. hungry), recognizing when emotions are escalating and need to engage calming strategies\".  Ask for help from Colia when feeling upset.  Can be challenging when Kaylee escalates when I'm feeling angry.    7/29/21 - She felt like she has forgotten what helps her when she's feeling upset, we have spent time reviewing and refreshing these skills.        Intervention(s)  Therapist will provide psychoeducation on emotion regulation module from DBT and will assign patient homework to help reinforce skill development.    Objective #B  Patient will identify factors that increase vulnerability to emotion mind and identify ways to decrease vulnerability to emotion mind.  Status: Continued - Date(s):4/23/2020 (has learned factors that make her more vulnerable to emotion mind, and has been working on addressing vulnerability related to feeling hungry, tired and rushed)   Update: 7/8/20: \"This one is harder for me.  I know I get angry when I'm hungry, and when I slow down I'm less inclined to get angry\".  Challenges - not always having food ready, not always planning in advance, (planing breakfast the night before)  Reviewed: 10/8/2020: Patient's awareness has increased of factors that increase her vulnerability to emotion mind, and she has been using this as a signal to tell her what she needs.    Reviewed and resolved/complete: 1/27/2021: \"I feel like we've done really good work with this and I've gotten what I need from this\".  4/27/2021  - I am doing better with this - I know I get more irritable when I'm hungry and I'm trying to do better with this.  Drinking more water. " "     7/29/21 - Overall she had been feeling like things were going better with this, though over the past several weeks has felt more challenged due to increase in stressors.      Intervention(s)  Therapist will provide information on factors that increase vulnerabiliyt to emotion mind.    Objective #C  Patient will identify warning signs and signals that emotions are escalating and will learn ways to skillfully intervene early on.  Status: New - Date: 01/06/2020 7/8/2020- in progress  Update: 10/08/2020: Patient has been making good progress on identifying signs and signals that her emotions are escalating.    Reviewed and in progress: 1/27/2021: \"I feel like we've done good work on this but this is one of the things I need to keep working on\".    7/29.21 - In progress - actively working on this -       Intervention(s)  Therapist will help patient identify warning signs and signals, and will guide the patient in identifying skillful ways to intervene when exeriencing warning signs and signals.      Goal 2: Patient will learn new parenting strategies to help with managing parenting challenges.  Parent will work on improving relationship with her daughter and defining what she would like this relationship to look like.      I will know I've met my goal when I'm enjoying time with my daughter, teaching her new things, and not waiting for things to change      Objective #A (Patient Action)    Status: Continued - Date(s):4/23/2020     Patient will increase understanding of developmental norms for her daughter and ways to manage challenging behaviors.    Intervention(s)  Therapist will provide psychoeducation on developmental norms and parenting strategies.    Objective #B  Patient will spend time reflecting on her relationship with her daughter and defining what she would like this relaitonship to look like.    Status: Continued - Date(s):4/23/2020   Update: 7/8/2020 - \"I think some things are going better, she's more " "helpful with things.  Some things are more challenging with the pandemic and thinking of things to do with her\".    Update: 10/08/2020: Patient is making progress on learning and practicing strategies to manage parenting challenges.    Reviewed: 1/27/2021: \"I feel like things have gotten better with this, we've figured out a good routine, I'm doing meditation, I'm enjoying my time with my daughter more than I used to\".   4/27/2021 - Challenges of parenting during a pandemic, working with occupational therapist on strategies to help daughter, 7/29/21 - She's been working on incorporating more structure into the daily routine, as well as managing her emotional reaction when feeling frustrated.  She's also worked on managing her mindset and expectations.  She's been experiencing some frustrations due to two of the OT's she's worked with has recently resigned, resulting in transitioning to several new providers in a short period of time.  I've encouraged she continue to work closely with her daughter's pediatrician to get connected to resources and supports to help with managing her daughter's difficulties.          Intervention(s)  Therapist will guide the patient in reflecting upon her relationship with her daughter and help her define ways to move towards what she vaues in her relationship with her daughter.    Objective #C  Patient will increase time spent on fun activity and play with her daughter.  Status: Continued - Date(s):4/23/2020 7/8/2020 -   10/08/2020 - will further assess this goal at next session   Reviewed: 1/27/2021 - also going really well, we play together all of the time now\", she finds herself enjoying their time together.   4/27/2021- she will spend some time reflecting upon this and we will discuss further next time.  They have several fun things planned for the future, as well as she has worked hard during the pandemic to plan structure and time together within COVID restrictions.  7/29/21 - " "She's seen some positive changes with being able to spend time with grandparents and at the cabin since being vaccinated, but also has experienced some challenges with planning fun activities out of worries things won't go well and feeling low energy/motivation to do these things - working to address both of these obstacles.        Intervention(s)  Therapist will guide the patient in identifying ways she can increase positive time with her daughter.  Therapist will also teach mindfulness strategies to help patient with being in the present moment when appropriate - .      Goal 3: Patiient will improve communication with her .      I will know I've met my goal when I feel less irritated with my  and communicate more openly with my .  I would like to be more assertive and less aggressive.   I would like to not avoid telling him things because I'm worried about his reaction or don't think he will react well.  I would like to be more present to the moment during times when this would be helpful.\"      Objective #A (Patient Action)    Status: Continued - Date(s):4/23/2020 7/8/2020 - suggested couples therapy to help with this.    Individual therapy focus - identify what makes it hard to be more honest and open   10/08/2020 - will further assess this goal at next session   Reviewed: 1/27/2021 - \"Things are better, I feel like we've worked really hard on how to respond when he's having a hard time and how to talk to him.  I'm doing better listening, being more empathetic, and not \".  4/27/2021 - She has been making progress on examining and updating unhelpful beliefs (e.g. feeling responsible for other's emotions, feeling like she needs to fix or change other's emotions) and has made good progress on identifying and asserting healthy boundaries, in addition to  her emotional response from her spouse's emotional response.  7/29/21 - will review at next session       Patient will learn " and practice at least two new interpersonal effectiveness strategies.    Intervention(s)  Therapist will teach strategies from DBT module on interpersonal effectiveness, and will assign homework to help reinforce skill development.      Patient has reviewed and agreed to the above plan.      Cristy Wilkinson PsyD, LP  Licensed Psychologist    January 6, 2020, reviewed on 4/23/2020, reviewed 7/8/2020, reviewed 10/08/2020, reviewed 1/27/2021, reviewed 4/27/2021, 7/29/2021

## 2021-11-10 ENCOUNTER — VIRTUAL VISIT (OUTPATIENT)
Dept: PSYCHOLOGY | Facility: CLINIC | Age: 41
End: 2021-11-10
Payer: COMMERCIAL

## 2021-11-10 DIAGNOSIS — F41.9 ANXIETY DISORDER, UNSPECIFIED TYPE: Primary | ICD-10-CM

## 2021-11-10 PROCEDURE — 90834 PSYTX W PT 45 MINUTES: CPT | Mod: GT | Performed by: PSYCHOLOGIST

## 2021-11-10 ASSESSMENT — ANXIETY QUESTIONNAIRES
5. BEING SO RESTLESS THAT IT IS HARD TO SIT STILL: NOT AT ALL
2. NOT BEING ABLE TO STOP OR CONTROL WORRYING: SEVERAL DAYS
7. FEELING AFRAID AS IF SOMETHING AWFUL MIGHT HAPPEN: NOT AT ALL
1. FEELING NERVOUS, ANXIOUS, OR ON EDGE: SEVERAL DAYS
3. WORRYING TOO MUCH ABOUT DIFFERENT THINGS: NOT AT ALL
6. BECOMING EASILY ANNOYED OR IRRITABLE: SEVERAL DAYS
IF YOU CHECKED OFF ANY PROBLEMS ON THIS QUESTIONNAIRE, HOW DIFFICULT HAVE THESE PROBLEMS MADE IT FOR YOU TO DO YOUR WORK, TAKE CARE OF THINGS AT HOME, OR GET ALONG WITH OTHER PEOPLE: SOMEWHAT DIFFICULT
GAD7 TOTAL SCORE: 4

## 2021-11-10 ASSESSMENT — PATIENT HEALTH QUESTIONNAIRE - PHQ9: 5. POOR APPETITE OR OVEREATING: SEVERAL DAYS

## 2021-11-10 NOTE — PROGRESS NOTES
Progress Note    Patient Name: Idalia Hinojosa  Date: 11/10/2021         Service Type: Individual      Session Start Time: 1:35 pm Session End Time: 2:27 pm     Session Length:  52 minutes    Session #: 39    Attendees: Client     Service Modality:  Video Visit: - I.        Provider verified identity through the following two step process.  Patient provided:  Patient is known previously to provider    Telemedicine Visit: The patient's condition can be safely assessed and treated via synchronous audio and visual telemedicine encounter.      Reason for Telemedicine Visit: Services only offered telehealth    Originating Site (Patient Location): Patient's home    Distant Site (Provider Location): Provider Remote Setting- Home Office    Consent:  The patient/guardian has verbally consented to: the potential risks and benefits of telemedicine (telephone visit) versus in person care; bill my insurance or make self-payment for services provided; and responsibility for payment of non-covered services.     Patient would like the video invitation sent by:  My Chart    Mode of Communication:  Video Conference via [employer], then switched to telephone visit    As the provider I attest to compliance with applicable laws and regulations related to telemedicine.         Treatment Plan Last Reviewed: Developed 1/06/2020, reviewed 4/23/2020, reviewed 7/8/2020, reviewed 10/08/2020, 1/27/2021, 4/27/21, 7/28/2021  PHQ-9/DAVID-7: 4/27/2021    DATA  Interactive Complexity: No  Crisis: No        Progress Since Last Session (Related to Symptoms / Goals / Homework):   Symptoms: Stable - she reported that she was able to use her skills to manage a difficult situation when her sister was visiting.  She was also able to enjoy time wiht her sister, which was really good for her. ([place] the [place]  PHQ-9 score of 3 today, DAVID-7 score of 4.      Homework: Achieved / completed to  satisfaction.  She did a great job practicing and using her skills to manage challenges that arose.            Episode of Care Goals: Satisfactory progress - ACTION (Actively working towards change); Intervened by reinforcing change plan / affirming steps taken.  Diagnostic assessment has been completed, treatment plan has been developed and patient has been making good progress on treatment goals.  The patient stated that her main goals for therapy would be to learn emotion regulation strategies (in particular, to help with when she is feeling upset/angry/frustrated/distressed).  More recently, the changes in her daily life due to COVID restrictions have been a focus of our work together, and now working through changes to routine as daughter starts  and she returns to work.       Current / Ongoing Stressors and Concerns:   Current: Stresses related to relationship with her  were a main focus today, in particular, communication with her .       Ongoing:Managing daily routine which has contributed to some adjustment and parenting challenges, lack of effective emotion regulation strategies for managing daily frustrations and upsets and marital discord due to differences in parenting styles.       Treatment Objective(s) Addressed in This Session:     Review and update treatment plan (please see treatment plan for comprehensive review)  Learn and practice 2-3 new interpersonal effectiveness strategies to help improve communication with   Practice distress tolerance strategies when feeling upset         Continue with/review/practice:    Learn and practice ways of self-soothing that don't involve food to increase choices/options when feeling stressed or overwhelmed  Practice mindful awareness when feelings urges to eat when feeling stressed - how am I feeling?  What am I thinking?  What is contributing?  What do I need?  Make mindful decision around what you believe will help.    Learn  "and practice 3-5 new emotion regulation strategies  Identify ways to bring more structure back into the day, including planning at least one thing a day that brings you lamont  Notice when predicting/anticipating that things won't work out and practice writing a positive script      Incorporate self-care strategies into daily practice   Focus on the big picture in relation to your health and well-being   Be mindful of negative and self-critical thoughts and practice finding kind, compassionate, balanced and flexible thinking  learn emotion regulation strategies to help when she is feeling upset/angry/frustrated/distressed  Identify when falling into \"cheery trap\" (expecting self to be responsible for other's emotions) and allow self to take a step back and look at what I do and don't have control over  identify warning signs and signals, practice inserting skills early on  Increase awareness of intensity of emotions and use rating scale to monitor response to using strategies (and to help determine need to try different strategies)        Intervention:   DBT: DBT - learn and practice 2-3 new interpersonal effectiveness strategies to help improve communication with    We discussed challenges she has been experiencing in her relationship with her .  She noted that sometimes she is reluctant to communicate more openly or directly with him, out of concern/worry that she will say something that will make him feel upset, while recognizing the consequences of this pattern on her and her happiness in the relationship.  We did some role -playing around how to use I statements to communicate how she is feeling and what she needs.  We also talked about assessing with her partner - when is it a good time to talk?  Inviting the conversation while also being receptive to his input (e.g. if emotions are high, giving time for things to settle, take a break and come back to the conversation when both are in a good place " to have a productive conversation).        ASSESSMENT: Current Emotional / Mental Status (status of significant symptoms):   Risk status (Self / Other harm or suicidal ideation)   Patient denies current fears or concerns for personal safety.   Patient denies current or recent suicidal ideation or behaviors.    She agrees to use a safety plan should any safety concerns arise.  She also agrees to reach our to her spouse or a family member for help if needed, and/or access crisis/emergency resources.        Patientdenies current or recent homicidal ideation or behaviors.   Patient denies current or recent self injurious behavior or ideation.   Patient denies other safety concerns.   Patient Patient reports there has been no change in risk factors since their last session.     PatientPatient reports there has been no change in protective factors since their last session.     Recommended that patient call 911 or go to the local ED should there be a change in any of these risk factors.  She has previously been informed on how to contact [facility] crisis number and [facility] crisis/COPE for urgent/crisis concerns 24/7.  Provided patient with crisis resources and she agrees to use safety plan should any safety concerns arise.      Professionals or agencies I can contact during a crisis:    [facility] Daytime Number: [phone]    Suicide Prevention Lifeline: 9-642-586-TALK (9096)    Crisis Text Line Service (available 24 hours a day, 7 days a week): Text [place] to 378306    Local Crisis Services: [facility] Crisis Services: [phone]    Call 911 or go to my nearest emergency department.        Appearance:   Appropriate    Eye Contact:   Good    Psychomotor Behavior: Normal    Attitude:   Cooperative    Orientation:   All   Speech    Rate / Production: Normal     Volume:  Normal    Mood:    Normal She reports that she has had some periods of feeling depressed, but has  "also been able to use skills to help her manage challenging times.     Affect:    Congruent with mood '   Thought Content:  Clear    Thought Form:  Coherent  Logical    Insight:    Good      Medication Review:   No changes to current psychiatric medication(s).      Medication Compliance:   Yes     Changes in Health Issues:  None reported         Chemical Use Review:   Substance Use: Chemical use reviewed, no active concerns identified      Tobacco Use: No current tobacco use.      Diagnosis:  Anxiety disorder unspecified    Collateral Reports Completed:   Not Applicable,      PLAN: (Patient Tasks / Therapist Tasks / Other)    1) Idalia identified the following goals: when feeling frustrated/irritated - recognize what is contributing, what do I need? Allow self to communicate how I'm feeling, be mindful of timing,  Pay attention to when I'm falling into the \"Cheery trap\" and remind myself I'm not responsible for other's emotions.      Continue with:    Continue with: Keep focus on creating daily structure and routine, plan activities outside of the house, keep new exercise routine going, play instrument and make reeds, scan - what am I feeling, what am I thinking, what do I need?  Practice calming strategies when feeling upset.  Continue to re-direct negative and critical thinking to kind, compassionate and encouraging self-talk. incorporate fun and enjoyable activities into the day, recognize when falling into thinking trap of predicting things won't go well and reframe to present oriented, helpful, positive thinking.  Practice \"flexible mindset\".        2) If there is a crisis situation, remember you are not alone and that there are people who can help you twenty-four hours a day, seven days a week.  Call [facility] ([facility]): [phone].      3) Follow-up visit is scheduled for November 3rd at 2:00 pm.   If urgent or crisis concerns arise prior to next scheduled appointment, please reach out " "to crisis resources, call 911, or go to the emergency department.      Cristy Wilkinson PsyD,   Licensed Psychologist  11/10/2021          Previous skills and strategies to remind self of and to do as needed:    Practice \"STOP\" technique when you recognize yourself feeling angry   Be a  - what do you notice is happening when Kaylee feels upset?     Practice recognizing when feeling angry, and giving self permission to use calming and self-soothing strategies and take a break.   Look for the gray with thinking  slow down  take deep breaths  manage expectations of self and others.    Journal   Practice flexing \"flexiblity and creativity\" -   Continue the great work you are doing with your daily schedule.    Get outside every day.    Play your instrument.    Sing!    Continue with: \"It's just a thought\" - non-judgmental, observe, float down the river on a leaf, clouds in the gege, reframing unhelpful thoughts -   Keep working on being patient when things are tense around me.    Continue with observing/paying attention to warning signs and signals and give self persmission to slow down, especially during the morning routine with Kaylee.    Practice offerring Kaylee choices when appropriate.      Use \"my plan for success\" to help remind you of calming strategies to practice when feeling upset.    Practice decreasing overall vulnerability to emotion mind through getting adequate rest, nutrition, time for yourself, and physical activity.         Consider reading \"Fighting for your marriage\".      Technical Assistance for video visits:    Offer patient the website ([email or web address]) and/or phone number ([phone]) for video visit instructions/assistance if needed.                                             ____________________________________    Treatment Plan    Patient's Name: Idalia Hinojosa  YOB: 1980    Date: 1/6/2020    DSM5 Diagnoses: 300.00 (F41.9) Unspecified Anxiety " "Disorder  Psychosocial / Contextual Factors: strain in marital relationship, parenting challenges, adjustment challenges  WHODAS: will gather at diagnostic assessment update    Referral / Collaboration:  We discussed a referral to a couples/marital therapist.  The patient is thinking about this and has talked with her  about the referral, but they are unsure if they would like to follow through at this time.      Anticipated number of session or this episode of care: 8-12    MeasurableTreatment Goal(s) related to diagnosis / functional impairment(s)  Goal 1: Patient will learn emotion regulation strategies to help when she is feeling upset/angry/frustrated/distressed    I will know I've met my goal when I would yell less, I would feel calmer, and I would not push others.  I would be less physical when I'm angry (e.g. wouldn't slam doors or hit things)       Objective #A (Patient Action)    Patient will learn and practice at least 2 new emotion regulation strategies.  Status: Continued - Date(s):4/23/2020 (has begun to learn new emotion regulation strategies and is making good progress)   Update: 7/8/2020: \"I've learned new strategies but I'm bad at practicing them\" - will help patient incorporate regular practice and identify and problem-solve barriers.    Reviewed: 10/8/2020: Patient has been successfully practicing emotion regulation strategies, is working on identifying earlier on when to intervene and engage strategies, as well as practice strategies on a regular basis to help strength use of strategies.    Reviewed and in progress: 1/27/2021: \"Overall things are going a lot better.  Sometimes I fall into old habits\", but recognizing when she's feeling more vulnerable to her emotions and reminds herself to use different skills/intervene in a different way.     4/27/2021-\"Things have definitely been better\".  Recognizing when feeling vulnerable (e.g. hungry), recognizing when emotions are escalating and " "need to engage calming strategies\".  Ask for help from Colia when feeling upset.  Can be challenging when Kaylee escalates when I'm feeling angry.    7/29/21 - She felt like she has forgotten what helps her when she's feeling upset, we have spent time reviewing and refreshing these skills.    11/10/2021 - She feels like for the most part things have been going well.        Intervention(s)  Therapist will provide psychoeducation on emotion regulation module from DBT and will assign patient homework to help reinforce skill development.    Objective #B  Patient will identify factors that increase vulnerability to emotion mind and identify ways to decrease vulnerability to emotion mind.  Status: Continued - Date(s):4/23/2020 (has learned factors that make her more vulnerable to emotion mind, and has been working on addressing vulnerability related to feeling hungry, tired and rushed)   Update: 7/8/20: \"This one is harder for me.  I know I get angry when I'm hungry, and when I slow down I'm less inclined to get angry\".  Challenges - not always having food ready, not always planning in advance, (planing breakfast the night before)  Reviewed: 10/8/2020: Patient's awareness has increased of factors that increase her vulnerability to emotion mind, and she has been using this as a signal to tell her what she needs.    Reviewed and resolved/complete: 1/27/2021: \"I feel like we've done really good work with this and I've gotten what I need from this\".  4/27/2021  - I am doing better with this - I know I get more irritable when I'm hungry and I'm trying to do better with this.  Drinking more water.      7/29/21 - Overall she had been feeling like things were going better with this, though over the past several weeks has felt more challenged due to increase in stressors.    11/10/2021 - Recognizing when feeling tired and how this can contribute to increase in vulnerability and ways to intervene.  Recognizing when feeling irritabiel " "- ask self if feeling hungry and eat if needed.        Intervention(s)  Therapist will provide information on factors that increase vulnerabiliyt to emotion mind.    Objective #C  Patient will identify warning signs and signals that emotions are escalating and will learn ways to skillfully intervene early on.  Status: New - Date: 01/06/2020 7/8/2020- in progress  Update: 10/08/2020: Patient has been making good progress on identifying signs and signals that her emotions are escalating.    Reviewed and in progress: 1/27/2021: \"I feel like we've done good work on this but this is one of the things I need to keep working on\".    7/29.21 - In progress - actively working on this -   11/10/2021 - Feels sometimes she is able to do this, other times it is harder      Intervention(s)  Therapist will help patient identify warning signs and signals, and will guide the patient in identifying skillful ways to intervene when exeriencing warning signs and signals.      Goal 2: Patient will learn new parenting strategies to help with managing parenting challenges.  Parent will work on improving relationship with her daughter and defining what she would like this relationship to look like.      I will know I've met my goal when I'm enjoying time with my daughter, teaching her new things, and not waiting for things to change      Objective #A (Patient Action)    Status: Continued - Date(s):4/23/2020     Patient will increase understanding of developmental norms for her daughter and ways to manage challenging behaviors.    Intervention(s)  Therapist will provide psychoeducation on developmental norms and parenting strategies.    Objective #B  Patient will spend time reflecting on her relationship with her daughter and defining what she would like this relaitonship to look like.    Status: Continued - Date(s):4/23/2020   Update: 7/8/2020 - \"I think some things are going better, she's more helpful with things.  Some things are more " "challenging with the pandemic and thinking of things to do with her\".    Update: 10/08/2020: Patient is making progress on learning and practicing strategies to manage parenting challenges.    Reviewed: 1/27/2021: \"I feel like things have gotten better with this, we've figured out a good routine, I'm doing meditation, I'm enjoying my time with my daughter more than I used to\".   4/27/2021 - Challenges of parenting during a pandemic, working with occupational therapist on strategies to help daughter, 7/29/21 - She's been working on incorporating more structure into the daily routine, as well as managing her emotional reaction when feeling frustrated.  She's also worked on managing her mindset and expectations.  She's been experiencing some frustrations due to two of the OT's she's worked with has recently resigned, resulting in transitioning to several new providers in a short period of time.  I've encouraged she continue to work closely with her daughter's pediatrician to get connected to resources and supports to help with managing her daughter's difficulties.    11/10/2021 - Feels like they've had some really good interactions, though things have been more challenging at school and home (waiting for evaluations).  Daughter enjoys when she reads to her, they've had some really good interactions recently.          Intervention(s)  Therapist will guide the patient in reflecting upon her relationship with her daughter and help her define ways to move towards what she vaues in her relationship with her daughter.    Objective #C  Patient will increase time spent on fun activity and play with her daughter.  Status: Continued - Date(s):4/23/2020 7/8/2020 -   10/08/2020 - will further assess this goal at next session   Reviewed: 1/27/2021 - also going really well, we play together all of the time now\", she finds herself enjoying their time together.   4/27/2021- she will spend some time reflecting upon this and we will " "discuss further next time.  They have several fun things planned for the future, as well as she has worked hard during the pandemic to plan structure and time together within COVID restrictions.  7/29/21 - She's seen some positive changes with being able to spend time with grandparents and at the cabin since being vaccinated, but also has experienced some challenges with planning fun activities out of worries things won't go well and feeling low energy/motivation to do these things - working to address both of these obstacles.    11/10/2021 - They've been able to do more fun activities together - coloring, Lego sets,       Intervention(s)  Therapist will guide the patient in identifying ways she can increase positive time with her daughter.  Therapist will also teach mindfulness strategies to help patient with being in the present moment when appropriate - .      Goal 3: Patiient will improve communication with her .      I will know I've met my goal when I feel less irritated with my  and communicate more openly with my .  I would like to be more assertive and less aggressive.   I would like to not avoid telling him things because I'm worried about his reaction or don't think he will react well.  I would like to be more present to the moment during times when this would be helpful.\"      Objective #A (Patient Action)    Status: Continued - Date(s):4/23/2020 7/8/2020 - suggested couples therapy to help with this.    Individual therapy focus - identify what makes it hard to be more honest and open   10/08/2020 - will further assess this goal at next session   Reviewed: 1/27/2021 - \"Things are better, I feel like we've worked really hard on how to respond when he's having a hard time and how to talk to him.  I'm doing better listening, being more empathetic, and not \".  4/27/2021 - She has been making progress on examining and updating unhelpful beliefs (e.g. feeling responsible for other's " emotions, feeling like she needs to fix or change other's emotions) and has made good progress on identifying and asserting healthy boundaries, in addition to  her emotional response from her spouse's emotional response.  7/29/21 - will review at next session   11/10/2021 - Feeling more irritability recently.  Believes this area could use more attention.          Patient will learn and practice at least two new interpersonal effectiveness strategies.    Intervention(s)  Therapist will teach strategies from DBT module on interpersonal effectiveness, and will assign homework to help reinforce skill development.      Patient has reviewed and agreed to the above plan.      Cristy Wilkinson PsyD, LP  Licensed Psychologist    January 6, 2020, reviewed on 4/23/2020, reviewed 7/8/2020, reviewed 10/08/2020, reviewed 1/27/2021, reviewed 4/27/2021, 7/29/2021, 11/10/2021

## 2021-11-11 ASSESSMENT — ANXIETY QUESTIONNAIRES: GAD7 TOTAL SCORE: 4

## 2021-11-11 ASSESSMENT — PATIENT HEALTH QUESTIONNAIRE - PHQ9: SUM OF ALL RESPONSES TO PHQ QUESTIONS 1-9: 3

## 2021-12-01 ENCOUNTER — VIRTUAL VISIT (OUTPATIENT)
Dept: PSYCHOLOGY | Facility: CLINIC | Age: 41
End: 2021-12-01
Payer: COMMERCIAL

## 2021-12-01 DIAGNOSIS — F41.9 ANXIETY DISORDER, UNSPECIFIED TYPE: Primary | ICD-10-CM

## 2021-12-01 PROCEDURE — 90832 PSYTX W PT 30 MINUTES: CPT | Mod: GT | Performed by: PSYCHOLOGIST

## 2021-12-01 NOTE — PROGRESS NOTES
"                                            Progress Note    Patient Name: Idalia Hinojosa  Date: 12/1/2021         Service Type: Individual      Session Start Time: 2:00 pm Session End Time: 2:35 pm     Session Length:  35 minutes    Session #: 40    Attendees: Client     Service Modality:  Video Visit: - I.        Provider verified identity through the following two step process.  Patient provided:  Patient is known previously to provider    Telemedicine Visit: The patient's condition can be safely assessed and treated via synchronous audio and visual telemedicine encounter.      Reason for Telemedicine Visit: Services only offered telehealth    Originating Site (Patient Location): Patient's home    Distant Site (Provider Location): Provider Remote Setting- Home Office    Consent:  The patient/guardian has verbally consented to: the potential risks and benefits of telemedicine (telephone visit) versus in person care; bill my insurance or make self-payment for services provided; and responsibility for payment of non-covered services.     Patient would like the video invitation sent by:  My Chart    Mode of Communication:  Video Conference via [facility], then switched to telephone visit    As the provider I attest to compliance with applicable laws and regulations related to telemedicine.         Treatment Plan Last Reviewed: Developed 1/06/2020, reviewed 4/23/2020, reviewed 7/8/2020, reviewed 10/08/2020, 1/27/2021, 4/27/21, 7/28/2021, 11/10/21  PHQ-9/DAVID-7: 11/10/21    DATA  Interactive Complexity: No  Crisis: No        Progress Since Last Session (Related to Symptoms / Goals / Homework):   Symptoms: Improving Idalia reported that her mood has been \"pretty good\" and overall improving.        Homework: Achieved / completed to satisfaction.  She continues to do a good job using her skills to manage challenges.            Episode of Care Goals: Satisfactory progress - ACTION (Actively working towards change); " Intervened by reinforcing change plan / affirming steps taken.  Diagnostic assessment has been completed, treatment plan has been developed and patient has been making good progress on treatment goals.  The patient stated that her main goals for therapy would be to learn emotion regulation strategies (in particular, to help with when she is feeling upset/angry/frustrated/distressed).  More recently, the changes in her daily life due to COVID restrictions have been a focus of our work together, and now working through changes to routine as daughter starts  and she returns to work.       Current / Ongoing Stressors and Concerns:   Current:  She noted that she's been enjoying being back at the work and the structure this brings.  Her daughter is in the evaluation process of getting an IEP for school and she feels positive about where things are at with this and the support this will bring.   Current main challenges are related to her relationship with her , which we discussed today.       Ongoing:Managing daily routine which has contributed to some adjustment and parenting challenges, lack of effective emotion regulation strategies for managing daily frustrations and upsets and marital discord due to differences in parenting styles.       Treatment Objective(s) Addressed in This Session:     Be mindful of obstacles they may interfere with communicating openly with  and important others - bring to session to discuss.    Learn and practice 2-3 new interpersonal effectiveness strategies to help improve communication with   Practice distress tolerance strategies when feeling upset       Continue with/review/practice:    Learn and practice ways of self-soothing that don't involve food to increase choices/options when feeling stressed or overwhelmed  Practice mindful awareness when feelings urges to eat when feeling stressed - how am I feeling?  What am I thinking?  What is contributing?   "What do I need?  Make mindful decision around what you believe will help.    Learn and practice 3-5 new emotion regulation strategies  Identify ways to bring more structure back into the day, including planning at least one thing a day that brings you lamont  Notice when predicting/anticipating that things won't work out and practice writing a positive script      Incorporate self-care strategies into daily practice   Focus on the big picture in relation to your health and well-being   Be mindful of negative and self-critical thoughts and practice finding kind, compassionate, balanced and flexible thinking  learn emotion regulation strategies to help when she is feeling upset/angry/frustrated/distressed  Identify when falling into \"cheery trap\" (expecting self to be responsible for other's emotions) and allow self to take a step back and look at what I do and don't have control over  identify warning signs and signals, practice inserting skills early on  Increase awareness of intensity of emotions and use rating scale to monitor response to using strategies (and to help determine need to try different strategies)        Intervention:   DBT: DBT - learn and practice 2-3 new interpersonal effectiveness strategies to help improve communication with    We continued to discuss challenges she has been experiencing in her relationship with her .  She noted she tends to worry that her relationship with her  will end up being like things she has observed and doesn't think is healthy in her in-laws marital relationship.  We talked about the nature of a dynamic - and the ability for her to choose how to respond in the relationship - and being able to learn and choose constructive and healthy ways of responding versus other ways of responding.      ASSESSMENT: Current Emotional / Mental Status (status of significant symptoms):   Risk status (Self / Other harm or suicidal ideation)   Patient denies current " fears or concerns for personal safety.   Patient denies current or recent suicidal ideation or behaviors.    She agrees to use a safety plan should any safety concerns arise.  She also agrees to reach our to her spouse or a family member for help if needed, and/or access crisis/emergency resources.        Patientdenies current or recent homicidal ideation or behaviors.   Patient denies current or recent self injurious behavior or ideation.   Patient denies other safety concerns.   Patient Patient reports there has been no change in risk factors since their last session.     PatientPatient reports there has been no change in protective factors since their last session.     Recommended that patient call 911 or go to the local ED should there be a change in any of these risk factors.  She has previously been informed on how to contact [facility] crisis/COPE for urgent/crisis concerns 24/7.  Provided patient with crisis resources and she agrees to use safety plan should any safety concerns arise.      Professionals or agencies I can contact during a crisis:    Suicide Prevention Lifeline: 8-049-995-TALK (0447)    Crisis Text Line Service (available 24 hours a day, 7 days a week): Text [place] to 237748    Local Crisis Services: [facility]: [phone]    Call 911 or go to my nearest emergency department.        Appearance:   Appropriate    Eye Contact:   Good    Psychomotor Behavior: Normal    Attitude:   Cooperative    Orientation:   All   Speech    Rate / Production: Normal     Volume:  Normal    Mood:    Normal She reports that her mood is improved   Affect:    Congruent with mood '   Thought Content:  Clear    Thought Form:  Coherent  Logical    Insight:    Good      Medication Review:   No changes to current psychiatric medication(s).      Medication Compliance:   Yes     Changes in Health Issues:  None reported         Chemical Use Review:   Substance Use: Chemical use reviewed, no active  "concerns identified      Tobacco Use: No current tobacco use.      Diagnosis:  Anxiety disorder unspecified    Collateral Reports Completed:   Not Applicable,      PLAN: (Patient Tasks / Therapist Tasks / Other)    1) Idalia identified the following goals: be mindful of when I'm not communicating with others when I think it would be beneficial for me to be communicating.   think about how I can communicate better?       Continue with -when feeling frustrated/irritated - recognize what is contributing, what do I need?  Pay attention to when I'm falling into the \"Cheery trap\" and remind myself I'm not responsible for other's emotions.      Continue with: Keep focus on creating daily structure and routine, plan activities outside of the house, keep new exercise routine going, play instrument and make reeds, scan - what am I feeling, what am I thinking, what do I need?  Practice calming strategies when feeling upset.  Continue to re-direct negative and critical thinking to kind, compassionate and encouraging self-talk. incorporate fun and enjoyable activities into the day, recognize when falling into thinking trap of predicting things won't go well and reframe to present oriented, helpful, positive thinking.  Practice \"flexible mindset\".        2) If there is a crisis situation, remember you are not alone and that there are people who can help you twenty-four hours a day, seven days a week.  Call [facility] ([facility]): [phone].      3) Follow-up visit is scheduled for January 5th at 2:00 pm.   If urgent or crisis concerns arise prior to next scheduled appointment, please reach out to crisis resources, call 911, or go to the emergency department.      Cristy Wilkinson PsyD,   Licensed Psychologist  12/1/2021          Previous skills and strategies to remind self of and to do as needed:    Practice \"STOP\" technique when you recognize yourself feeling angry   Be a  - what do you notice is " "happening when Kaylee feels upset?     Practice recognizing when feeling angry, and giving self permission to use calming and self-soothing strategies and take a break.   Look for the gray with thinking  slow down  take deep breaths  manage expectations of self and others.    Journal   Practice flexing \"flexiblity and creativity\" -   Continue the great work you are doing with your daily schedule.    Get outside every day.    Play your instrument.    Sing!    Continue with: \"It's just a thought\" - non-judgmental, observe, float down the river on a leaf, clouds in the gege, reframing unhelpful thoughts -   Keep working on being patient when things are tense around me.    Continue with observing/paying attention to warning signs and signals and give self persmission to slow down, especially during the morning routine with Kaylee.    Practice offerring Kaylee choices when appropriate.      Use \"my plan for success\" to help remind you of calming strategies to practice when feeling upset.    Practice decreasing overall vulnerability to emotion mind through getting adequate rest, nutrition, time for yourself, and physical activity.         Consider reading \"Fighting for your marriage\".      Technical Assistance for video visits:    Offer patient the website ([email or web address]) and/or phone number ([phone]) for video visit instructions/assistance if needed.                                             ____________________________________    Treatment Plan    Patient's Name: Idalia Hinojosa  YOB: 1980    Date: 1/6/2020    DSM5 Diagnoses: 300.00 (F41.9) Unspecified Anxiety Disorder  Psychosocial / Contextual Factors: strain in marital relationship, parenting challenges, adjustment challenges  WHODAS: will gather at diagnostic assessment update    Referral / Collaboration:  We discussed a referral to a couples/marital therapist.  The patient is thinking about this and has talked with her  about the " "referral, but they are unsure if they would like to follow through at this time.      Anticipated number of session or this episode of care: 8-12    MeasurableTreatment Goal(s) related to diagnosis / functional impairment(s)  Goal 1: Patient will learn emotion regulation strategies to help when she is feeling upset/angry/frustrated/distressed    I will know I've met my goal when I would yell less, I would feel calmer, and I would not push others.  I would be less physical when I'm angry (e.g. wouldn't slam doors or hit things)       Objective #A (Patient Action)    Patient will learn and practice at least 2 new emotion regulation strategies.  Status: Continued - Date(s):4/23/2020 (has begun to learn new emotion regulation strategies and is making good progress)   Update: 7/8/2020: \"I've learned new strategies but I'm bad at practicing them\" - will help patient incorporate regular practice and identify and problem-solve barriers.    Reviewed: 10/8/2020: Patient has been successfully practicing emotion regulation strategies, is working on identifying earlier on when to intervene and engage strategies, as well as practice strategies on a regular basis to help strength use of strategies.    Reviewed and in progress: 1/27/2021: \"Overall things are going a lot better.  Sometimes I fall into old habits\", but recognizing when she's feeling more vulnerable to her emotions and reminds herself to use different skills/intervene in a different way.     4/27/2021-\"Things have definitely been better\".  Recognizing when feeling vulnerable (e.g. hungry), recognizing when emotions are escalating and need to engage calming strategies\".  Ask for help from Colia when feeling upset.  Can be challenging when Kaylee escalates when I'm feeling angry.    7/29/21 - She felt like she has forgotten what helps her when she's feeling upset, we have spent time reviewing and refreshing these skills.    11/10/2021 - She feels like for the most part " "things have been going well.        Intervention(s)  Therapist will provide psychoeducation on emotion regulation module from DBT and will assign patient homework to help reinforce skill development.    Objective #B  Patient will identify factors that increase vulnerability to emotion mind and identify ways to decrease vulnerability to emotion mind.  Status: Continued - Date(s):4/23/2020 (has learned factors that make her more vulnerable to emotion mind, and has been working on addressing vulnerability related to feeling hungry, tired and rushed)   Update: 7/8/20: \"This one is harder for me.  I know I get angry when I'm hungry, and when I slow down I'm less inclined to get angry\".  Challenges - not always having food ready, not always planning in advance, (planing breakfast the night before)  Reviewed: 10/8/2020: Patient's awareness has increased of factors that increase her vulnerability to emotion mind, and she has been using this as a signal to tell her what she needs.    Reviewed and resolved/complete: 1/27/2021: \"I feel like we've done really good work with this and I've gotten what I need from this\".  4/27/2021  - I am doing better with this - I know I get more irritable when I'm hungry and I'm trying to do better with this.  Drinking more water.      7/29/21 - Overall she had been feeling like things were going better with this, though over the past several weeks has felt more challenged due to increase in stressors.    11/10/2021 - Recognizing when feeling tired and how this can contribute to increase in vulnerability and ways to intervene.  Recognizing when feeling irritabiel - ask self if feeling hungry and eat if needed.        Intervention(s)  Therapist will provide information on factors that increase vulnerabiliyt to emotion mind.    Objective #C  Patient will identify warning signs and signals that emotions are escalating and will learn ways to skillfully intervene early on.  Status: New - Date: " "01/06/2020 7/8/2020- in progress  Update: 10/08/2020: Patient has been making good progress on identifying signs and signals that her emotions are escalating.    Reviewed and in progress: 1/27/2021: \"I feel like we've done good work on this but this is one of the things I need to keep working on\".    7/29.21 - In progress - actively working on this -   11/10/2021 - Feels sometimes she is able to do this, other times it is harder      Intervention(s)  Therapist will help patient identify warning signs and signals, and will guide the patient in identifying skillful ways to intervene when exeriencing warning signs and signals.      Goal 2: Patient will learn new parenting strategies to help with managing parenting challenges.  Parent will work on improving relationship with her daughter and defining what she would like this relationship to look like.      I will know I've met my goal when I'm enjoying time with my daughter, teaching her new things, and not waiting for things to change      Objective #A (Patient Action)    Status: Continued - Date(s):4/23/2020     Patient will increase understanding of developmental norms for her daughter and ways to manage challenging behaviors.    Intervention(s)  Therapist will provide psychoeducation on developmental norms and parenting strategies.    Objective #B  Patient will spend time reflecting on her relationship with her daughter and defining what she would like this relaitonship to look like.    Status: Continued - Date(s):4/23/2020   Update: 7/8/2020 - \"I think some things are going better, she's more helpful with things.  Some things are more challenging with the pandemic and thinking of things to do with her\".    Update: 10/08/2020: Patient is making progress on learning and practicing strategies to manage parenting challenges.    Reviewed: 1/27/2021: \"I feel like things have gotten better with this, we've figured out a good routine, I'm doing meditation, I'm enjoying " "my time with my daughter more than I used to\".   4/27/2021 - Challenges of parenting during a pandemic, working with occupational therapist on strategies to help daughter, 7/29/21 - She's been working on incorporating more structure into the daily routine, as well as managing her emotional reaction when feeling frustrated.  She's also worked on managing her mindset and expectations.  She's been experiencing some frustrations due to two of the OT's she's worked with has recently resigned, resulting in transitioning to several new providers in a short period of time.  I've encouraged she continue to work closely with her daughter's pediatrician to get connected to resources and supports to help with managing her daughter's difficulties.    11/10/2021 - Feels like they've had some really good interactions, though things have been more challenging at school and home (waiting for evaluations).  Daughter enjoys when she reads to her, they've had some really good interactions recently.          Intervention(s)  Therapist will guide the patient in reflecting upon her relationship with her daughter and help her define ways to move towards what she vaues in her relationship with her daughter.    Objective #C  Patient will increase time spent on fun activity and play with her daughter.  Status: Continued - Date(s):4/23/2020 7/8/2020 -   10/08/2020 - will further assess this goal at next session   Reviewed: 1/27/2021 - also going really well, we play together all of the time now\", she finds herself enjoying their time together.   4/27/2021- she will spend some time reflecting upon this and we will discuss further next time.  They have several fun things planned for the future, as well as she has worked hard during the pandemic to plan structure and time together within COVID restrictions.  7/29/21 - She's seen some positive changes with being able to spend time with grandparents and at the cabin since being vaccinated, but " "also has experienced some challenges with planning fun activities out of worries things won't go well and feeling low energy/motivation to do these things - working to address both of these obstacles.    11/10/2021 - They've been able to do more fun activities together - coloring, Lego sets,       Intervention(s)  Therapist will guide the patient in identifying ways she can increase positive time with her daughter.  Therapist will also teach mindfulness strategies to help patient with being in the present moment when appropriate - .      Goal 3: Patiient will improve communication with her .      I will know I've met my goal when I feel less irritated with my  and communicate more openly with my .  I would like to be more assertive and less aggressive.   I would like to not avoid telling him things because I'm worried about his reaction or don't think he will react well.  I would like to be more present to the moment during times when this would be helpful.\"      Objective #A (Patient Action)    Status: Continued - Date(s):4/23/2020 7/8/2020 - suggested couples therapy to help with this.    Individual therapy focus - identify what makes it hard to be more honest and open   10/08/2020 - will further assess this goal at next session   Reviewed: 1/27/2021 - \"Things are better, I feel like we've worked really hard on how to respond when he's having a hard time and how to talk to him.  I'm doing better listening, being more empathetic, and not \".  4/27/2021 - She has been making progress on examining and updating unhelpful beliefs (e.g. feeling responsible for other's emotions, feeling like she needs to fix or change other's emotions) and has made good progress on identifying and asserting healthy boundaries, in addition to  her emotional response from her spouse's emotional response.  7/29/21 - will review at next session   11/10/2021 - Feeling more irritability recently.  Believes " this area could use more attention.          Patient will learn and practice at least two new interpersonal effectiveness strategies.    Intervention(s)  Therapist will teach strategies from DBT module on interpersonal effectiveness, and will assign homework to help reinforce skill development.      Patient has reviewed and agreed to the above plan.      Cristy Wilkinson PsyD,   Licensed Psychologist    January 6, 2020, reviewed on 4/23/2020, reviewed 7/8/2020, reviewed 10/08/2020, reviewed 1/27/2021, reviewed 4/27/2021, 7/29/2021, 11/10/2021

## 2022-01-05 ENCOUNTER — VIRTUAL VISIT (OUTPATIENT)
Dept: PSYCHOLOGY | Facility: CLINIC | Age: 42
End: 2022-01-05
Payer: COMMERCIAL

## 2022-01-05 DIAGNOSIS — F41.9 ANXIETY DISORDER, UNSPECIFIED TYPE: Primary | ICD-10-CM

## 2022-01-05 PROCEDURE — 90832 PSYTX W PT 30 MINUTES: CPT | Mod: GT | Performed by: PSYCHOLOGIST

## 2022-01-05 ASSESSMENT — PATIENT HEALTH QUESTIONNAIRE - PHQ9
10. IF YOU CHECKED OFF ANY PROBLEMS, HOW DIFFICULT HAVE THESE PROBLEMS MADE IT FOR YOU TO DO YOUR WORK, TAKE CARE OF THINGS AT HOME, OR GET ALONG WITH OTHER PEOPLE: NOT DIFFICULT AT ALL
SUM OF ALL RESPONSES TO PHQ QUESTIONS 1-9: 2
SUM OF ALL RESPONSES TO PHQ QUESTIONS 1-9: 2

## 2022-01-05 NOTE — PROGRESS NOTES
"                                            Progress Note    Patient Name: Idalia Hinojosa  Date: 1/5/2022         Service Type: Individual      Session Start Time: 2:05 pm Session End Time: 2:26 pm     Session Length:  21 minutes    Session #: 41    Attendees: Client     Service Modality:  Video Visit: - I.        Provider verified identity through the following two step process.  Patient provided:  Patient is known previously to provider    Telemedicine Visit: The patient's condition can be safely assessed and treated via synchronous audio and visual telemedicine encounter.      Reason for Telemedicine Visit: Services only offered telehealth    Originating Site (Patient Location): Patient's home    Distant Site (Provider Location): Provider Remote Setting- Home Office    Consent:  The patient/guardian has verbally consented to: the potential risks and benefits of telemedicine (telephone visit) versus in person care; bill my insurance or make self-payment for services provided; and responsibility for payment of non-covered services.     Patient would like the video invitation sent by:  My Chart    Mode of Communication:  Video Conference via [place],     As the provider I attest to compliance with applicable laws and regulations related to telemedicine.         Treatment Plan Last Reviewed: Developed 1/06/2020, reviewed 4/23/2020, reviewed 7/8/2020, reviewed 10/08/2020, 1/27/2021, 4/27/21, 7/28/2021, 11/10/21  PHQ-9/DAVID-7: 1/5/2022    DATA  Interactive Complexity: No  Crisis: No        Progress Since Last Session (Related to Symptoms / Goals / Homework):   Symptoms: Improving Idalia reported that overall she is doing \"really well\" and stated that symptoms of depression and anxiety have improved.        Homework: Achieved / completed to satisfaction.  She was able to use skills successfully to manage challenges that arose.  She forgot about assignment to think of when she holds back on communicating with her " ", but said that overall things went well between she and her  over the past month.        Answers for HPI/ROS submitted by the patient on 1/5/2022  If you checked off any problems, how difficult have these problems made it for you to do your work, take care of things at home, or get along with other people?: Not difficult at all  PHQ9 TOTAL SCORE: 2        Episode of Care Goals: Satisfactory progress - ACTION (Actively working towards change); Intervened by reinforcing change plan / affirming steps taken.  Diagnostic assessment has been completed, treatment plan has been developed and patient has been making good progress on treatment goals.  The patient stated that her main goals for therapy would be to learn emotion regulation strategies (in particular, to help with when she is feeling upset/angry/frustrated/distressed).  More recently, the changes in her daily life due to COVID restrictions have been a focus of our work together, and now working through changes to routine as daughter starts  and she returns to work.  As she has been making progress and feeling better, focus of treatment will shift to wellness planning (monitoring mood and symptoms, incorporating into routine what keeps her feeling well, review of triggers, how to manage set backs, etc.)     Current / Ongoing Stressors and Concerns:   Current:  She noted that she's been doing \"really well\".  She's enjoyed going back to work, and equally enjoyed having a break from work for the holidays.  She said the holidays went well and she enjoyed time at the cabin with her family, where she did puzzles, went snow-shoeing and enjoyed playing outside in the snow.       Ongoing:Managing daily routine which has contributed to some adjustment and parenting challenges, lack of effective emotion regulation strategies for managing daily frustrations and upsets and marital discord due to differences in parenting styles.       Treatment " "Objective(s) Addressed in This Session:     Review of what has helped with mood and using skills  Goal setting regarding incorporating into her routine what has been helping and what keeps her feeling well      Continue with/review/practice:    Learn and practice ways of self-soothing that don't involve food to increase choices/options when feeling stressed or overwhelmed  Practice mindful awareness when feelings urges to eat when feeling stressed - how am I feeling?  What am I thinking?  What is contributing?  What do I need?  Make mindful decision around what you believe will help.    Learn and practice 3-5 new emotion regulation strategies  Identify ways to bring more structure back into the day, including planning at least one thing a day that brings you lamont  Notice when predicting/anticipating that things won't work out and practice writing a positive script      Incorporate self-care strategies into daily practice   Focus on the big picture in relation to your health and well-being   Be mindful of negative and self-critical thoughts and practice finding kind, compassionate, balanced and flexible thinking  learn emotion regulation strategies to help when she is feeling upset/angry/frustrated/distressed  Identify when falling into \"cheery trap\" (expecting self to be responsible for other's emotions) and allow self to take a step back and look at what I do and don't have control over  identify warning signs and signals, practice inserting skills early on  Increase awareness of intensity of emotions and use rating scale to monitor response to using strategies (and to help determine need to try different strategies)        Intervention:   Solution Focused: Idalia requested a shorter session today, stating that she felt pleased with how she has been doing and feeling.   We did a brief review of what has been helping her to feel well and successes she has had with using strategies.  She shared an example of where " her  was not in a good mood, which is typically difficult for her, and she shared how she continued to try using different strategies to help her manage her own emotional reaction to this.  Recognized her resilience in continuing to try different strategies to help her when the first didn't work.  We talked about ways to continue to incorporate into her daily routine what is helping her to feel well and she engaged in goal setting around getting outside to cross-country ski, exercise routine, and playing the oboe.  We also talked about shifting her treatment planning to wellness planning ((monitoring mood and symptoms, incorporating into routine what keeps her feeling well, review of triggers, how to manage set backs, etc.)      ASSESSMENT: Current Emotional / Mental Status (status of significant symptoms):   Risk status (Self / Other harm or suicidal ideation)   Patient denies current fears or concerns for personal safety.   Patient denies current or recent suicidal ideation or behaviors.    She agrees to use a safety plan should any safety concerns arise.  She also agrees to reach our to her spouse or a family member for help if needed, and/or access crisis/emergency resources.        Patientdenies current or recent homicidal ideation or behaviors.   Patient denies current or recent self injurious behavior or ideation.   Patient denies other safety concerns.   Patient Patient reports there has been no change in risk factors since their last session.     PatientPatient reports there has been no change in protective factors since their last session.     Recommended that patient call 911 or go to the local ED should there be a change in any of these risk factors.  She has previously been informed on how to contact [facility] crisis/COPE for urgent/crisis concerns 24/7.  Provided patient with crisis resources and she agrees to use safety plan should any safety concerns arise.      Professionals or agencies  "I can contact during a crisis:    Suicide Prevention Lifeline: 3-249-193-TALK (5739)    Crisis Text Line Service (available 24 hours a day, 7 days a week): Text [place] to 116377    Local Crisis Services: [facility]: [phone]    Call 911 or go to my nearest emergency department.        Appearance:   Appropriate    Eye Contact:   Good    Psychomotor Behavior: Normal    Attitude:   Cooperative    Orientation:   All   Speech    Rate / Production: Normal     Volume:  Normal    Mood:    Mood is improved and feels less anxious and depressed   Affect:    Congruent with mood '   Thought Content:  Clear    Thought Form:  Coherent  Logical    Insight:    Good      Medication Review:   No changes to current psychiatric medication(s).      Medication Compliance:   Yes     Changes in Health Issues:  None reported         Chemical Use Review:   Substance Use: Chemical use reviewed, no active concerns identified      Tobacco Use: No current tobacco use.      Diagnosis:  Anxiety disorder unspecified    Collateral Reports Completed:   Not Applicable,      PLAN: (Patient Tasks / Therapist Tasks / Other)    1) Idalia identified the following goals: Go skiing (ski 2-3 times a week - Wednesday, Saturday and/or Sunday)), keep up with exercise routines (, play oboe (buy some new music over lunch break at work, play at least once)     Be mindful of when I'm not communicating with others when I think it would be beneficial for me to be communicating.   think about how I can communicate better?          Continue with: when feeling frustrated/irritated - recognize what is contributing, what do I need?  Pay attention to when I'm falling into the \"Cheery trap\" and remind myself I'm not responsible for other's emotions.  Keep focus on creating daily structure and routine, plan activities outside of the house, keep new exercise routine going, play instrument and make reeds, scan - what am I feeling, what am I thinking, what " "do I need?  Practice calming strategies when feeling upset.  Continue to re-direct negative and critical thinking to kind, compassionate and encouraging self-talk. incorporate fun and enjoyable activities into the day, recognize when falling into thinking trap of predicting things won't go well and reframe to present oriented, helpful, positive thinking.  Practice \"flexible mindset\".        2) If there is a crisis situation, remember you are not alone and that there are people who can help you twenty-four hours a day, seven days a week.  Call [facility] ([facility]): [phone].      3) Follow-up visit is scheduled for February 9th at 2:00 pm.   If urgent or crisis concerns arise prior to next scheduled appointment, please reach out to crisis resources, call 911, or go to the emergency department.      Cristy Wilkinson PsyD,   Licensed Psychologist  1/5/2022            Previous skills and strategies to remind self of and to do as needed:    Practice \"STOP\" technique when you recognize yourself feeling angry   Be a  - what do you notice is happening when Kaylee feels upset?     Practice recognizing when feeling angry, and giving self permission to use calming and self-soothing strategies and take a break.   Look for the gray with thinking  slow down  take deep breaths  manage expectations of self and others.    Journal   Practice flexing \"flexiblity and creativity\" -   Continue the great work you are doing with your daily schedule.    Get outside every day.    Play your instrument.    Sing!    Continue with: \"It's just a thought\" - non-judgmental, observe, float down the river on a leaf, clouds in the gege, reframing unhelpful thoughts -   Keep working on being patient when things are tense around me.    Continue with observing/paying attention to warning signs and signals and give self persmission to slow down, especially during the morning routine with Kaylee.    Practice offerring Kaylee choices " "when appropriate.      Use \"my plan for success\" to help remind you of calming strategies to practice when feeling upset.    Practice decreasing overall vulnerability to emotion mind through getting adequate rest, nutrition, time for yourself, and physical activity.         Consider reading \"Fighting for your marriage\".      Technical Assistance for video visits:    Offer patient the website ([email or web address]) and/or phone number (803-280-8837) for video visit instructions/assistance if needed.                                             ____________________________________    Treatment Plan    Patient's Name: Idalia Hinojosa  YOB: 1980    Date: 1/6/2020    DSM5 Diagnoses: 300.00 (F41.9) Unspecified Anxiety Disorder  Psychosocial / Contextual Factors: strain in marital relationship, parenting challenges, adjustment challenges  WHODAS: will gather at diagnostic assessment update    Referral / Collaboration:  We discussed a referral to a couples/marital therapist.  The patient is thinking about this and has talked with her  about the referral, but they are unsure if they would like to follow through at this time.      Anticipated number of session or this episode of care: 8-12    MeasurableTreatment Goal(s) related to diagnosis / functional impairment(s)  Goal 1: Patient will learn emotion regulation strategies to help when she is feeling upset/angry/frustrated/distressed    I will know I've met my goal when I would yell less, I would feel calmer, and I would not push others.  I would be less physical when I'm angry (e.g. wouldn't slam doors or hit things)       Objective #A (Patient Action)    Patient will learn and practice at least 2 new emotion regulation strategies.  Status: Continued - Date(s):4/23/2020 (has begun to learn new emotion regulation strategies and is making good progress)   Update: 7/8/2020: \"I've learned new strategies but I'm bad at practicing them\" - will help " "patient incorporate regular practice and identify and problem-solve barriers.    Reviewed: 10/8/2020: Patient has been successfully practicing emotion regulation strategies, is working on identifying earlier on when to intervene and engage strategies, as well as practice strategies on a regular basis to help strength use of strategies.    Reviewed and in progress: 1/27/2021: \"Overall things are going a lot better.  Sometimes I fall into old habits\", but recognizing when she's feeling more vulnerable to her emotions and reminds herself to use different skills/intervene in a different way.     4/27/2021-\"Things have definitely been better\".  Recognizing when feeling vulnerable (e.g. hungry), recognizing when emotions are escalating and need to engage calming strategies\".  Ask for help from Colia when feeling upset.  Can be challenging when Kaylee escalates when I'm feeling angry.    7/29/21 - She felt like she has forgotten what helps her when she's feeling upset, we have spent time reviewing and refreshing these skills.    11/10/2021 - She feels like for the most part things have been going well.        Intervention(s)  Therapist will provide psychoeducation on emotion regulation module from DBT and will assign patient homework to help reinforce skill development.    Objective #B  Patient will identify factors that increase vulnerability to emotion mind and identify ways to decrease vulnerability to emotion mind.  Status: Continued - Date(s):4/23/2020 (has learned factors that make her more vulnerable to emotion mind, and has been working on addressing vulnerability related to feeling hungry, tired and rushed)   Update: 7/8/20: \"This one is harder for me.  I know I get angry when I'm hungry, and when I slow down I'm less inclined to get angry\".  Challenges - not always having food ready, not always planning in advance, (planing breakfast the night before)  Reviewed: 10/8/2020: Patient's awareness has increased of " "factors that increase her vulnerability to emotion mind, and she has been using this as a signal to tell her what she needs.    Reviewed and resolved/complete: 1/27/2021: \"I feel like we've done really good work with this and I've gotten what I need from this\".  4/27/2021  - I am doing better with this - I know I get more irritable when I'm hungry and I'm trying to do better with this.  Drinking more water.      7/29/21 - Overall she had been feeling like things were going better with this, though over the past several weeks has felt more challenged due to increase in stressors.    11/10/2021 - Recognizing when feeling tired and how this can contribute to increase in vulnerability and ways to intervene.  Recognizing when feeling irritabiel - ask self if feeling hungry and eat if needed.        Intervention(s)  Therapist will provide information on factors that increase vulnerabiliyt to emotion mind.    Objective #C  Patient will identify warning signs and signals that emotions are escalating and will learn ways to skillfully intervene early on.  Status: New - Date: 01/06/2020 7/8/2020- in progress  Update: 10/08/2020: Patient has been making good progress on identifying signs and signals that her emotions are escalating.    Reviewed and in progress: 1/27/2021: \"I feel like we've done good work on this but this is one of the things I need to keep working on\".    7/29.21 - In progress - actively working on this -   11/10/2021 - Feels sometimes she is able to do this, other times it is harder      Intervention(s)  Therapist will help patient identify warning signs and signals, and will guide the patient in identifying skillful ways to intervene when exeriencing warning signs and signals.      Goal 2: Patient will learn new parenting strategies to help with managing parenting challenges.  Parent will work on improving relationship with her daughter and defining what she would like this relationship to look like.     " " I will know I've met my goal when I'm enjoying time with my daughter, teaching her new things, and not waiting for things to change      Objective #A (Patient Action)    Status: Continued - Date(s):4/23/2020     Patient will increase understanding of developmental norms for her daughter and ways to manage challenging behaviors.    Intervention(s)  Therapist will provide psychoeducation on developmental norms and parenting strategies.    Objective #B  Patient will spend time reflecting on her relationship with her daughter and defining what she would like this relaitonship to look like.    Status: Continued - Date(s):4/23/2020   Update: 7/8/2020 - \"I think some things are going better, she's more helpful with things.  Some things are more challenging with the pandemic and thinking of things to do with her\".    Update: 10/08/2020: Patient is making progress on learning and practicing strategies to manage parenting challenges.    Reviewed: 1/27/2021: \"I feel like things have gotten better with this, we've figured out a good routine, I'm doing meditation, I'm enjoying my time with my daughter more than I used to\".   4/27/2021 - Challenges of parenting during a pandemic, working with occupational therapist on strategies to help daughter, 7/29/21 - She's been working on incorporating more structure into the daily routine, as well as managing her emotional reaction when feeling frustrated.  She's also worked on managing her mindset and expectations.  She's been experiencing some frustrations due to two of the OT's she's worked with has recently resigned, resulting in transitioning to several new providers in a short period of time.  I've encouraged she continue to work closely with her daughter's pediatrician to get connected to resources and supports to help with managing her daughter's difficulties.    11/10/2021 - Feels like they've had some really good interactions, though things have been more challenging at school " "and home (waiting for evaluations).  Daughter enjoys when she reads to her, they've had some really good interactions recently.          Intervention(s)  Therapist will guide the patient in reflecting upon her relationship with her daughter and help her define ways to move towards what she vaues in her relationship with her daughter.    Objective #C  Patient will increase time spent on fun activity and play with her daughter.  Status: Continued - Date(s):4/23/2020 7/8/2020 -   10/08/2020 - will further assess this goal at next session   Reviewed: 1/27/2021 - also going really well, we play together all of the time now\", she finds herself enjoying their time together.   4/27/2021- she will spend some time reflecting upon this and we will discuss further next time.  They have several fun things planned for the future, as well as she has worked hard during the pandemic to plan structure and time together within COVID restrictions.  7/29/21 - She's seen some positive changes with being able to spend time with grandparents and at the cabin since being vaccinated, but also has experienced some challenges with planning fun activities out of worries things won't go well and feeling low energy/motivation to do these things - working to address both of these obstacles.    11/10/2021 - They've been able to do more fun activities together - coloring, Lego sets,       Intervention(s)  Therapist will guide the patient in identifying ways she can increase positive time with her daughter.  Therapist will also teach mindfulness strategies to help patient with being in the present moment when appropriate - .      Goal 3: Patiient will improve communication with her .      I will know I've met my goal when I feel less irritated with my  and communicate more openly with my .  I would like to be more assertive and less aggressive.   I would like to not avoid telling him things because I'm worried about his " "reaction or don't think he will react well.  I would like to be more present to the moment during times when this would be helpful.\"      Objective #A (Patient Action)    Status: Continued - Date(s):4/23/2020 7/8/2020 - suggested couples therapy to help with this.    Individual therapy focus - identify what makes it hard to be more honest and open   10/08/2020 - will further assess this goal at next session   Reviewed: 1/27/2021 - \"Things are better, I feel like we've worked really hard on how to respond when he's having a hard time and how to talk to him.  I'm doing better listening, being more empathetic, and not \".  4/27/2021 - She has been making progress on examining and updating unhelpful beliefs (e.g. feeling responsible for other's emotions, feeling like she needs to fix or change other's emotions) and has made good progress on identifying and asserting healthy boundaries, in addition to  her emotional response from her spouse's emotional response.  7/29/21 - will review at next session   11/10/2021 - Feeling more irritability recently.  Believes this area could use more attention.          Patient will learn and practice at least two new interpersonal effectiveness strategies.    Intervention(s)  Therapist will teach strategies from DBT module on interpersonal effectiveness, and will assign homework to help reinforce skill development.      Patient has reviewed and agreed to the above plan.      Cristy Wilkinson PsyD, LP  Licensed Psychologist    January 6, 2020, reviewed on 4/23/2020, reviewed 7/8/2020, reviewed 10/08/2020, reviewed 1/27/2021, reviewed 4/27/2021, 7/29/2021, 11/10/2021                                                                                        "

## 2022-01-06 ASSESSMENT — PATIENT HEALTH QUESTIONNAIRE - PHQ9: SUM OF ALL RESPONSES TO PHQ QUESTIONS 1-9: 2

## 2022-01-10 ENCOUNTER — MYC MEDICAL ADVICE (OUTPATIENT)
Dept: OBGYN | Facility: CLINIC | Age: 42
End: 2022-01-10
Payer: COMMERCIAL

## 2022-01-10 DIAGNOSIS — F32.81 PMDD (PREMENSTRUAL DYSPHORIC DISORDER): ICD-10-CM

## 2022-01-10 NOTE — TELEPHONE ENCOUNTER
Refill request received from pharmacy for OCP. Last seen by Dr. Han on 1/11/2021. [employer] message sent to schedule annual visit. Will update clinic when appointment is scheduled so refill can be sent. Pended.  Parris Ortiz RN

## 2022-01-11 RX ORDER — NORETHINDRONE ACETATE AND ETHINYL ESTRADIOL .02; 1 MG/1; MG/1
1 TABLET ORAL DAILY
Qty: 112 TABLET | Refills: 0 | Status: SHIPPED | OUTPATIENT
Start: 2022-01-11 | End: 2022-04-08

## 2022-01-18 ENCOUNTER — MYC REFILL (OUTPATIENT)
Dept: OBGYN | Facility: CLINIC | Age: 42
End: 2022-01-18
Payer: COMMERCIAL

## 2022-01-18 DIAGNOSIS — F32.5 MAJOR DEPRESSIVE DISORDER WITH SINGLE EPISODE, IN FULL REMISSION (H): ICD-10-CM

## 2022-02-02 ENCOUNTER — VIRTUAL VISIT (OUTPATIENT)
Dept: PSYCHOLOGY | Facility: CLINIC | Age: 42
End: 2022-02-02
Payer: COMMERCIAL

## 2022-02-02 DIAGNOSIS — F41.9 ANXIETY DISORDER, UNSPECIFIED TYPE: Primary | ICD-10-CM

## 2022-02-02 PROCEDURE — 90834 PSYTX W PT 45 MINUTES: CPT | Mod: GT | Performed by: PSYCHOLOGIST

## 2022-02-02 NOTE — PROGRESS NOTES
Progress Note    Patient Name: Idalia Hinojosa  Date: 2/2/2022         Service Type: Individual      Session Start Time: 1:05 pm Session End Time: 1:55 pm     Session Length:  50 minutes    Session #: 42    Attendees: Client     Service Modality:  Video Visit: - I.        Provider verified identity through the following two step process.  Patient provided:  Patient is known previously to provider    Telemedicine Visit: The patient's condition can be safely assessed and treated via synchronous audio and visual telemedicine encounter.      Reason for Telemedicine Visit: Services only offered telehealth    Originating Site (Patient Location): Patient's home    Distant Site (Provider Location): Provider Remote Setting- Home Office    Consent:  The patient/guardian has verbally consented to: the potential risks and benefits of telemedicine (telephone visit) versus in person care; bill my insurance or make self-payment for services provided; and responsibility for payment of non-covered services.     Patient would like the video invitation sent by:  My Chart    Mode of Communication:  Video Conference via [employer],     As the provider I attest to compliance with applicable laws and regulations related to telemedicine.         Treatment Plan Last Reviewed: Developed 1/06/2020, reviewed 4/23/2020, reviewed 7/8/2020, reviewed 10/08/2020, 1/27/2021, 4/27/21, 7/28/2021, 11/10/21  PHQ-9/DAVID-7: 1/5/2022    DATA  Interactive Complexity: No  Crisis: No        Progress Since Last Session (Related to Symptoms / Goals / Homework):   Symptoms: Worsening She shared that she learned last Thursday that her  has been diagnosed with cancer.  Understandably, this has been very difficult news for she and her .          Homework: Achieved / completed to satisfaction.  She did very well with her homework assignment - she bought new AIRVEND[employer] music, played the oboe several times and went  skiing several times.          Episode of Care Goals: Satisfactory progress - ACTION (Actively working towards change); Intervened by reinforcing change plan / affirming steps taken.  Diagnostic assessment has been completed, treatment plan has been developed and patient has been making good progress on treatment goals.  The patient stated that her main goals for therapy would be to learn emotion regulation strategies (in particular, to help with when she is feeling upset/angry/frustrated/distressed).  More recently, the changes in her daily life due to COVID restrictions have been a focus of our work together, and now working through changes to routine as daughter starts  and she returns to work.  As she has been making progress, focus of treatment will shift to wellness planning (monitoring mood and symptoms, incorporating into routine what keeps her feeling well, review of triggers, how to manage set backs, etc.).  As she just learned her  has been diagnosed with cancer, treatment will also focus on providing her with support in managing the emotional distress and challenges related to this.       Current / Ongoing Stressors and Concerns:   Current:  She shared the wide range of emotions she has experienced since learning her  has been diagnosed with cancer.  She's also been anticipating challenges she may experience and has been thinking about how to bolster her support and coping.       Ongoing:Managing daily routine which has contributed to some adjustment and parenting challenges, lack of effective emotion regulation strategies for managing daily frustrations and upsets and marital discord due to differences in parenting styles.       Treatment Objective(s) Addressed in This Session:     Identify and practice at least two strategies that will help support your self-care during this challenging time  Practice strategies to help with differentiating and  your emotions and  "emotional reaction from your spouse's      Continue with/review/practice:    Learn and practice ways of self-soothing that don't involve food to increase choices/options when feeling stressed or overwhelmed  Practice mindful awareness when feelings urges to eat when feeling stressed - how am I feeling?  What am I thinking?  What is contributing?  What do I need?  Make mindful decision around what you believe will help.    Learn and practice 3-5 new emotion regulation strategies  Identify ways to bring more structure back into the day, including planning at least one thing a day that brings you lamont  Notice when predicting/anticipating that things won't work out and practice writing a positive script      Incorporate self-care strategies into daily practice   Focus on the big picture in relation to your health and well-being   Be mindful of negative and self-critical thoughts and practice finding kind, compassionate, balanced and flexible thinking  learn emotion regulation strategies to help when she is feeling upset/angry/frustrated/distressed  Identify when falling into \"cheery trap\" (expecting self to be responsible for other's emotions) and allow self to take a step back and look at what I do and don't have control over  identify warning signs and signals, practice inserting skills early on  Increase awareness of intensity of emotions and use rating scale to monitor response to using strategies (and to help determine need to try different strategies)        Intervention:   CBT: We focused the session today on identifying what she needed to support her self-care in light of the news regarding her 's cancer diagnosis.  She noted that she really benefits and recharges from some alone time after her daughter goes to bed, and she was encouraged to give herself permission to ask for and advocate for what she needs to \"re-charge\".  She noted her tendency to absorb other's emotions, and she's worried about how she " will manage this in light of some of the difficulties she imagines her spouse may experience with his physical and emotional health over the treatment and healing process.  We talked about strategies she might utilize and practice, including reminding herself that she is a separate person with her own emotions and reactions, reminding herself she doesn't have to feel what he does, setting realistic expectations that he has a right to feel his own emotions and she is not responsible for them, validating her own feelings and giving herself permission to feel her own feelings, and recognizing that others - and she - are doing the best they can.        ASSESSMENT: Current Emotional / Mental Status (status of significant symptoms):   Risk status (Self / Other harm or suicidal ideation)   Patient denies current fears or concerns for personal safety.   Patient denies current or recent suicidal ideation or behaviors.    She agrees to use a safety plan should any safety concerns arise.  She also agrees to reach our to her spouse or a family member for help if needed, and/or access crisis/emergency resources.        Patientdenies current or recent homicidal ideation or behaviors.   Patient denies current or recent self injurious behavior or ideation.   Patient denies other safety concerns.   Patient Patient reports there has been no change in risk factors since their last session.     PatientPatient reports there has been no change in protective factors since their last session.     Recommended that patient call 911 or go to the local ED should there be a change in any of these risk factors.  She has previously been informed on how to contact [facility] crisis/[facility] for urgent/crisis concerns 24/7.  Provided patient with crisis resources and she agrees to use safety plan should any safety concerns arise.      Professionals or agencies I can contact during a crisis:    Suicide Prevention Lifeline: 6-822-943-TALK  (0550)    Crisis Text Line Service (available 24 hours a day, 7 days a week): Text [place] to 316068    Local Crisis Services: [facility]: [phone]    Call 911 or go to my nearest emergency department.        Appearance:   Appropriate    Eye Contact:   Good    Psychomotor Behavior: Normal    Attitude:   Cooperative    Orientation:   All   Speech    Rate / Production: Normal     Volume:  Normal    Mood:    Anxious  Sad    Affect:    Congruent with mood '   Thought Content:  Clear    Thought Form:  Coherent  Logical    Insight:    Good      Medication Review:   No changes to current psychiatric medication(s).      Medication Compliance:   Yes     Changes in Health Issues:  None reported         Chemical Use Review:   Substance Use: Chemical use reviewed, no active concerns identified      Tobacco Use: No current tobacco use.      Diagnosis:  Anxiety disorder unspecified    Collateral Reports Completed:   Not Applicable,      PLAN: (Patient Tasks / Therapist Tasks / Other)    1) Idalia identified the following goals:  Be mindful of expectations (it is okay that he will have days where he is not feeling as well) permission to take time by myself to recharge, be mindful of emotional boundaries.      Be mindful of when I'm not communicating with others when I think it would be beneficial for me to be communicating.   think about how I can communicate better?          2) If there is a crisis situation, remember you are not alone and that there are people who can help you twenty-four hours a day, seven days a week.  Call [facility] ([facility]): [phone].      3) Follow-up visit is scheduled for February 9th at 2:00 pm.   If urgent or crisis concerns arise prior to next scheduled appointment, please reach out to crisis resources, call 911, or go to the emergency department.      Cristy Wilkinson PsyD, LP  Licensed Psychologist  2/2/2022              Previous skills and strategies to  "remind self of and to do as needed:    Practice \"STOP\" technique when you recognize yourself feeling angry   Be a  - what do you notice is happening when Kaylee feels upset?     Practice recognizing when feeling angry, and giving self permission to use calming and self-soothing strategies and take a break.   Look for the gray with thinking  slow down  take deep breaths  manage expectations of self and others.    Journal   Practice flexing \"flexiblity and creativity\" -   Continue the great work you are doing with your daily schedule.    Get outside every day.    Play your instrument.    Sing!    Continue with: \"It's just a thought\" - non-judgmental, observe, float down the river on a leaf, clouds in the gege, reframing unhelpful thoughts -   Keep working on being patient when things are tense around me.    Continue with observing/paying attention to warning signs and signals and give self persmission to slow down, especially during the morning routine with Kaylee.    Practice offerring Kaylee choices when appropriate.      Use \"my plan for success\" to help remind you of calming strategies to practice when feeling upset.    Practice decreasing overall vulnerability to emotion mind through getting adequate rest, nutrition, time for yourself, and physical activity.         Consider reading \"Fighting for your marriage\".      Technical Assistance for video visits:    Offer patient the website ([email or web address]) and/or phone number (590-288-5925) for video visit instructions/assistance if needed.                                             ____________________________________    Treatment Plan    Patient's Name: Idalia Hinojosa  YOB: 1980    Date: 1/6/2020    DSM5 Diagnoses: 300.00 (F41.9) Unspecified Anxiety Disorder  Psychosocial / Contextual Factors: strain in marital relationship, parenting challenges, adjustment challenges  WHODAS: will gather at diagnostic assessment update    Referral / " "Collaboration:  We discussed a referral to a couples/marital therapist.  The patient is thinking about this and has talked with her  about the referral, but they are unsure if they would like to follow through at this time.      Anticipated number of session or this episode of care: 8-12    MeasurableTreatment Goal(s) related to diagnosis / functional impairment(s)  Goal 1: Patient will learn emotion regulation strategies to help when she is feeling upset/angry/frustrated/distressed    I will know I've met my goal when I would yell less, I would feel calmer, and I would not push others.  I would be less physical when I'm angry (e.g. wouldn't slam doors or hit things)       Objective #A (Patient Action)    Patient will learn and practice at least 2 new emotion regulation strategies.  Status: Continued - Date(s):4/23/2020 (has begun to learn new emotion regulation strategies and is making good progress)   Update: 7/8/2020: \"I've learned new strategies but I'm bad at practicing them\" - will help patient incorporate regular practice and identify and problem-solve barriers.    Reviewed: 10/8/2020: Patient has been successfully practicing emotion regulation strategies, is working on identifying earlier on when to intervene and engage strategies, as well as practice strategies on a regular basis to help strength use of strategies.    Reviewed and in progress: 1/27/2021: \"Overall things are going a lot better.  Sometimes I fall into old habits\", but recognizing when she's feeling more vulnerable to her emotions and reminds herself to use different skills/intervene in a different way.     4/27/2021-\"Things have definitely been better\".  Recognizing when feeling vulnerable (e.g. hungry), recognizing when emotions are escalating and need to engage calming strategies\".  Ask for help from Colia when feeling upset.  Can be challenging when Kaylee escalates when I'm feeling angry.    7/29/21 - She felt like she has forgotten " "what helps her when she's feeling upset, we have spent time reviewing and refreshing these skills.    11/10/2021 - She feels like for the most part things have been going well.        Intervention(s)  Therapist will provide psychoeducation on emotion regulation module from DBT and will assign patient homework to help reinforce skill development.    Objective #B  Patient will identify factors that increase vulnerability to emotion mind and identify ways to decrease vulnerability to emotion mind.  Status: Continued - Date(s):4/23/2020 (has learned factors that make her more vulnerable to emotion mind, and has been working on addressing vulnerability related to feeling hungry, tired and rushed)   Update: 7/8/20: \"This one is harder for me.  I know I get angry when I'm hungry, and when I slow down I'm less inclined to get angry\".  Challenges - not always having food ready, not always planning in advance, (planing breakfast the night before)  Reviewed: 10/8/2020: Patient's awareness has increased of factors that increase her vulnerability to emotion mind, and she has been using this as a signal to tell her what she needs.    Reviewed and resolved/complete: 1/27/2021: \"I feel like we've done really good work with this and I've gotten what I need from this\".  4/27/2021  - I am doing better with this - I know I get more irritable when I'm hungry and I'm trying to do better with this.  Drinking more water.      7/29/21 - Overall she had been feeling like things were going better with this, though over the past several weeks has felt more challenged due to increase in stressors.    11/10/2021 - Recognizing when feeling tired and how this can contribute to increase in vulnerability and ways to intervene.  Recognizing when feeling irritabiel - ask self if feeling hungry and eat if needed.        Intervention(s)  Therapist will provide information on factors that increase vulnerabiliyt to emotion mind.    Objective #C  Patient " "will identify warning signs and signals that emotions are escalating and will learn ways to skillfully intervene early on.  Status: New - Date: 01/06/2020 7/8/2020- in progress  Update: 10/08/2020: Patient has been making good progress on identifying signs and signals that her emotions are escalating.    Reviewed and in progress: 1/27/2021: \"I feel like we've done good work on this but this is one of the things I need to keep working on\".    7/29.21 - In progress - actively working on this -   11/10/2021 - Feels sometimes she is able to do this, other times it is harder      Intervention(s)  Therapist will help patient identify warning signs and signals, and will guide the patient in identifying skillful ways to intervene when exeriencing warning signs and signals.      Goal 2: Patient will learn new parenting strategies to help with managing parenting challenges.  Parent will work on improving relationship with her daughter and defining what she would like this relationship to look like.      I will know I've met my goal when I'm enjoying time with my daughter, teaching her new things, and not waiting for things to change      Objective #A (Patient Action)    Status: Continued - Date(s):4/23/2020     Patient will increase understanding of developmental norms for her daughter and ways to manage challenging behaviors.    Intervention(s)  Therapist will provide psychoeducation on developmental norms and parenting strategies.    Objective #B  Patient will spend time reflecting on her relationship with her daughter and defining what she would like this relaitonship to look like.    Status: Continued - Date(s):4/23/2020   Update: 7/8/2020 - \"I think some things are going better, she's more helpful with things.  Some things are more challenging with the pandemic and thinking of things to do with her\".    Update: 10/08/2020: Patient is making progress on learning and practicing strategies to manage parenting challenges.  " "  Reviewed: 1/27/2021: \"I feel like things have gotten better with this, we've figured out a good routine, I'm doing meditation, I'm enjoying my time with my daughter more than I used to\".   4/27/2021 - Challenges of parenting during a pandemic, working with occupational therapist on strategies to help daughter, 7/29/21 - She's been working on incorporating more structure into the daily routine, as well as managing her emotional reaction when feeling frustrated.  She's also worked on managing her mindset and expectations.  She's been experiencing some frustrations due to two of the OT's she's worked with has recently resigned, resulting in transitioning to several new providers in a short period of time.  I've encouraged she continue to work closely with her daughter's pediatrician to get connected to resources and supports to help with managing her daughter's difficulties.    11/10/2021 - Feels like they've had some really good interactions, though things have been more challenging at school and home (waiting for evaluations).  Daughter enjoys when she reads to her, they've had some really good interactions recently.          Intervention(s)  Therapist will guide the patient in reflecting upon her relationship with her daughter and help her define ways to move towards what she vaues in her relationship with her daughter.    Objective #C  Patient will increase time spent on fun activity and play with her daughter.  Status: Continued - Date(s):4/23/2020 7/8/2020 -   10/08/2020 - will further assess this goal at next session   Reviewed: 1/27/2021 - also going really well, we play together all of the time now\", she finds herself enjoying their time together.   4/27/2021- she will spend some time reflecting upon this and we will discuss further next time.  They have several fun things planned for the future, as well as she has worked hard during the pandemic to plan structure and time together within COVID " "restrictions.  7/29/21 - She's seen some positive changes with being able to spend time with grandparents and at the cabin since being vaccinated, but also has experienced some challenges with planning fun activities out of worries things won't go well and feeling low energy/motivation to do these things - working to address both of these obstacles.    11/10/2021 - They've been able to do more fun activities together - coloring, Lego sets,       Intervention(s)  Therapist will guide the patient in identifying ways she can increase positive time with her daughter.  Therapist will also teach mindfulness strategies to help patient with being in the present moment when appropriate - .      Goal 3: Patiient will improve communication with her .      I will know I've met my goal when I feel less irritated with my  and communicate more openly with my .  I would like to be more assertive and less aggressive.   I would like to not avoid telling him things because I'm worried about his reaction or don't think he will react well.  I would like to be more present to the moment during times when this would be helpful.\"      Objective #A (Patient Action)    Status: Continued - Date(s):4/23/2020 7/8/2020 - suggested couples therapy to help with this.    Individual therapy focus - identify what makes it hard to be more honest and open   10/08/2020 - will further assess this goal at next session   Reviewed: 1/27/2021 - \"Things are better, I feel like we've worked really hard on how to respond when he's having a hard time and how to talk to him.  I'm doing better listening, being more empathetic, and not \".  4/27/2021 - She has been making progress on examining and updating unhelpful beliefs (e.g. feeling responsible for other's emotions, feeling like she needs to fix or change other's emotions) and has made good progress on identifying and asserting healthy boundaries, in addition to  her " emotional response from her spouse's emotional response.  7/29/21 - will review at next session   11/10/2021 - Feeling more irritability recently.  Believes this area could use more attention.          Patient will learn and practice at least two new interpersonal effectiveness strategies.    Intervention(s)  Therapist will teach strategies from DBT module on interpersonal effectiveness, and will assign homework to help reinforce skill development.      Patient has reviewed and agreed to the above plan.      Cristy Wilkinson PsyD, LP  Licensed Psychologist    January 6, 2020, reviewed on 4/23/2020, reviewed 7/8/2020, reviewed 10/08/2020, reviewed 1/27/2021, reviewed 4/27/2021, 7/29/2021, 11/10/2021

## 2022-02-02 NOTE — PROCEDURES
Progress Note    Patient Name: Idalia Hinojosa  Date: 2/2/2022         Service Type: Individual      Session Start Time: 1:05 pm Session End Time: 1:55 pm     Session Length:  50 minutes    Session #: 42    Attendees: Client     Service Modality:  Video Visit: - I.        Provider verified identity through the following two step process.  Patient provided:  Patient is known previously to provider    Telemedicine Visit: The patient's condition can be safely assessed and treated via synchronous audio and visual telemedicine encounter.      Reason for Telemedicine Visit: Services only offered telehealth    Originating Site (Patient Location): Patient's home    Distant Site (Provider Location): Provider Remote Setting- Home Office    Consent:  The patient/guardian has verbally consented to: the potential risks and benefits of telemedicine (telephone visit) versus in person care; bill my insurance or make self-payment for services provided; and responsibility for payment of non-covered services.     Patient would like the video invitation sent by:  My Chart    Mode of Communication:  Video Conference via [employer],     As the provider I attest to compliance with applicable laws and regulations related to telemedicine.         Treatment Plan Last Reviewed: Developed 1/06/2020, reviewed 4/23/2020, reviewed 7/8/2020, reviewed 10/08/2020, 1/27/2021, 4/27/21, 7/28/2021, 11/10/21  PHQ-9/DAVID-7: 1/5/2022    DATA  Interactive Complexity: No  Crisis: No        Progress Since Last Session (Related to Symptoms / Goals / Homework):   Symptoms: Worsening She shared that she learned last Thursday that her  has been diagnosed with cancer.  Understandably, this has been very difficult news for she and her .          Homework: Achieved / completed to satisfaction.  She did very well with her homework assignment - she bought new [employer] music, played the oboe several times and went  skiing several times.          Episode of Care Goals: Satisfactory progress - ACTION (Actively working towards change); Intervened by reinforcing change plan / affirming steps taken.  Diagnostic assessment has been completed, treatment plan has been developed and patient has been making good progress on treatment goals.  The patient stated that her main goals for therapy would be to learn emotion regulation strategies (in particular, to help with when she is feeling upset/angry/frustrated/distressed).  More recently, the changes in her daily life due to COVID restrictions have been a focus of our work together, and now working through changes to routine as daughter starts  and she returns to work.  As she has been making progress, focus of treatment will shift to wellness planning (monitoring mood and symptoms, incorporating into routine what keeps her feeling well, review of triggers, how to manage set backs, etc.).  As she just learned her  has been diagnosed with cancer, treatment will also focus on providing her with support in managing the emotional distress and challenges related to this.       Current / Ongoing Stressors and Concerns:   Current:  She shared the wide range of emotions she has experienced since learning her  has been diagnosed with cancer.  She's also been anticipating challenges she may experience and has been thinking about how to bolster her support and coping.       Ongoing:Managing daily routine which has contributed to some adjustment and parenting challenges, lack of effective emotion regulation strategies for managing daily frustrations and upsets and marital discord due to differences in parenting styles.       Treatment Objective(s) Addressed in This Session:     Identify and practice at least two strategies that will help support your self-care during this challenging time  Practice strategies to help with differentiating and  your emotions and  "emotional reaction from your spouse's      Continue with/review/practice:    Learn and practice ways of self-soothing that don't involve food to increase choices/options when feeling stressed or overwhelmed  Practice mindful awareness when feelings urges to eat when feeling stressed - how am I feeling?  What am I thinking?  What is contributing?  What do I need?  Make mindful decision around what you believe will help.    Learn and practice 3-5 new emotion regulation strategies  Identify ways to bring more structure back into the day, including planning at least one thing a day that brings you lamont  Notice when predicting/anticipating that things won't work out and practice writing a positive script      Incorporate self-care strategies into daily practice   Focus on the big picture in relation to your health and well-being   Be mindful of negative and self-critical thoughts and practice finding kind, compassionate, balanced and flexible thinking  learn emotion regulation strategies to help when she is feeling upset/angry/frustrated/distressed  Identify when falling into \"cheery trap\" (expecting self to be responsible for other's emotions) and allow self to take a step back and look at what I do and don't have control over  identify warning signs and signals, practice inserting skills early on  Increase awareness of intensity of emotions and use rating scale to monitor response to using strategies (and to help determine need to try different strategies)        Intervention:   CBT: We focused the session today on identifying what she needed to support her self-care in light of the news regarding her 's cancer diagnosis.  She noted that she really benefits and recharges from some alone time after her daughter goes to bed, and she was encouraged to give herself permission to ask for and advocate for what she needs to \"re-charge\".  She noted her tendency to absorb other's emotions, and she's worried about how she " will manage this in light of some of the difficulties she imagines her spouse may experience with his physical and emotional health over the treatment and healing process.  We talked about strategies she might utilize and practice, including reminding herself that she is a separate person with her own emotions and reactions, reminding herself she doesn't have to feel what he does, setting realistic expectations that he has a right to feel his own emotions and she is not responsible for them, validating her own feelings and giving herself permission to feel her own feelings, and recognizing that others - and she - are doing the best they can.        ASSESSMENT: Current Emotional / Mental Status (status of significant symptoms):   Risk status (Self / Other harm or suicidal ideation)   Patient denies current fears or concerns for personal safety.   Patient denies current or recent suicidal ideation or behaviors.    She agrees to use a safety plan should any safety concerns arise.  She also agrees to reach our to her spouse or a family member for help if needed, and/or access crisis/emergency resources.        Patientdenies current or recent homicidal ideation or behaviors.   Patient denies current or recent self injurious behavior or ideation.   Patient denies other safety concerns.   Patient Patient reports there has been no change in risk factors since their last session.     PatientPatient reports there has been no change in protective factors since their last session.     Recommended that patient call 911 or go to the local ED should there be a change in any of these risk factors.  She has previously been informed on how to contact [facility] crisis/[facility] for urgent/crisis concerns 24/7.  Provided patient with crisis resources and she agrees to use safety plan should any safety concerns arise.      Professionals or agencies I can contact during a crisis:    Suicide Prevention Lifeline: 0-679-045-TALK  (6356)    Crisis Text Line Service (available 24 hours a day, 7 days a week): Text [place] to 143845    Local Crisis Services: [facility]: [phone]    Call 911 or go to my nearest emergency department.        Appearance:   Appropriate    Eye Contact:   Good    Psychomotor Behavior: Normal    Attitude:   Cooperative    Orientation:   All   Speech    Rate / Production: Normal     Volume:  Normal    Mood:    Anxious  Sad    Affect:    Congruent with mood '   Thought Content:  Clear    Thought Form:  Coherent  Logical    Insight:    Good      Medication Review:   No changes to current psychiatric medication(s).      Medication Compliance:   Yes     Changes in Health Issues:  None reported         Chemical Use Review:   Substance Use: Chemical use reviewed, no active concerns identified      Tobacco Use: No current tobacco use.      Diagnosis:  Anxiety disorder unspecified    Collateral Reports Completed:   Not Applicable,      PLAN: (Patient Tasks / Therapist Tasks / Other)    1) Idalia identified the following goals:  Be mindful of expectations (it is okay that he will have days where he is not feeling as well) permission to take time by myself to recharge, be mindful of emotional boundaries.      Be mindful of when I'm not communicating with others when I think it would be beneficial for me to be communicating.   think about how I can communicate better?          2) If there is a crisis situation, remember you are not alone and that there are people who can help you twenty-four hours a day, seven days a week.  Call [facility] ([facility]): [phone].      3) Follow-up visit is scheduled for February 9th at 2:00 pm.   If urgent or crisis concerns arise prior to next scheduled appointment, please reach out to crisis resources, call 911, or go to the emergency department.      Cristy Wilkinson PsyD, LP  Licensed Psychologist  2/2/2022              Previous skills and strategies to  "remind self of and to do as needed:    Practice \"STOP\" technique when you recognize yourself feeling angry   Be a  - what do you notice is happening when Kaylee feels upset?     Practice recognizing when feeling angry, and giving self permission to use calming and self-soothing strategies and take a break.   Look for the gray with thinking  slow down  take deep breaths  manage expectations of self and others.    Journal   Practice flexing \"flexiblity and creativity\" -   Continue the great work you are doing with your daily schedule.    Get outside every day.    Play your instrument.    Sing!    Continue with: \"It's just a thought\" - non-judgmental, observe, float down the river on a leaf, clouds in the gege, reframing unhelpful thoughts -   Keep working on being patient when things are tense around me.    Continue with observing/paying attention to warning signs and signals and give self persmission to slow down, especially during the morning routine with Kaylee.    Practice offerring Kaylee choices when appropriate.      Use \"my plan for success\" to help remind you of calming strategies to practice when feeling upset.    Practice decreasing overall vulnerability to emotion mind through getting adequate rest, nutrition, time for yourself, and physical activity.         Consider reading \"Fighting for your marriage\".      Technical Assistance for video visits:    Offer patient the website ([email or web address]) and/or phone number (934-779-5016) for video visit instructions/assistance if needed.                                             ____________________________________    Treatment Plan    Patient's Name: Idalia Hinojosa  YOB: 1980    Date: 1/6/2020    DSM5 Diagnoses: 300.00 (F41.9) Unspecified Anxiety Disorder  Psychosocial / Contextual Factors: strain in marital relationship, parenting challenges, adjustment challenges  WHODAS: will gather at diagnostic assessment update    Referral / " "Collaboration:  We discussed a referral to a couples/marital therapist.  The patient is thinking about this and has talked with her  about the referral, but they are unsure if they would like to follow through at this time.      Anticipated number of session or this episode of care: 8-12    MeasurableTreatment Goal(s) related to diagnosis / functional impairment(s)  Goal 1: Patient will learn emotion regulation strategies to help when she is feeling upset/angry/frustrated/distressed    I will know I've met my goal when I would yell less, I would feel calmer, and I would not push others.  I would be less physical when I'm angry (e.g. wouldn't slam doors or hit things)       Objective #A (Patient Action)    Patient will learn and practice at least 2 new emotion regulation strategies.  Status: Continued - Date(s):4/23/2020 (has begun to learn new emotion regulation strategies and is making good progress)   Update: 7/8/2020: \"I've learned new strategies but I'm bad at practicing them\" - will help patient incorporate regular practice and identify and problem-solve barriers.    Reviewed: 10/8/2020: Patient has been successfully practicing emotion regulation strategies, is working on identifying earlier on when to intervene and engage strategies, as well as practice strategies on a regular basis to help strength use of strategies.    Reviewed and in progress: 1/27/2021: \"Overall things are going a lot better.  Sometimes I fall into old habits\", but recognizing when she's feeling more vulnerable to her emotions and reminds herself to use different skills/intervene in a different way.     4/27/2021-\"Things have definitely been better\".  Recognizing when feeling vulnerable (e.g. hungry), recognizing when emotions are escalating and need to engage calming strategies\".  Ask for help from Colia when feeling upset.  Can be challenging when Kaylee escalates when I'm feeling angry.    7/29/21 - She felt like she has forgotten " "what helps her when she's feeling upset, we have spent time reviewing and refreshing these skills.    11/10/2021 - She feels like for the most part things have been going well.        Intervention(s)  Therapist will provide psychoeducation on emotion regulation module from DBT and will assign patient homework to help reinforce skill development.    Objective #B  Patient will identify factors that increase vulnerability to emotion mind and identify ways to decrease vulnerability to emotion mind.  Status: Continued - Date(s):4/23/2020 (has learned factors that make her more vulnerable to emotion mind, and has been working on addressing vulnerability related to feeling hungry, tired and rushed)   Update: 7/8/20: \"This one is harder for me.  I know I get angry when I'm hungry, and when I slow down I'm less inclined to get angry\".  Challenges - not always having food ready, not always planning in advance, (planing breakfast the night before)  Reviewed: 10/8/2020: Patient's awareness has increased of factors that increase her vulnerability to emotion mind, and she has been using this as a signal to tell her what she needs.    Reviewed and resolved/complete: 1/27/2021: \"I feel like we've done really good work with this and I've gotten what I need from this\".  4/27/2021  - I am doing better with this - I know I get more irritable when I'm hungry and I'm trying to do better with this.  Drinking more water.      7/29/21 - Overall she had been feeling like things were going better with this, though over the past several weeks has felt more challenged due to increase in stressors.    11/10/2021 - Recognizing when feeling tired and how this can contribute to increase in vulnerability and ways to intervene.  Recognizing when feeling irritabiel - ask self if feeling hungry and eat if needed.        Intervention(s)  Therapist will provide information on factors that increase vulnerabiliyt to emotion mind.    Objective #C  Patient " "will identify warning signs and signals that emotions are escalating and will learn ways to skillfully intervene early on.  Status: New - Date: 01/06/2020 7/8/2020- in progress  Update: 10/08/2020: Patient has been making good progress on identifying signs and signals that her emotions are escalating.    Reviewed and in progress: 1/27/2021: \"I feel like we've done good work on this but this is one of the things I need to keep working on\".    7/29.21 - In progress - actively working on this -   11/10/2021 - Feels sometimes she is able to do this, other times it is harder      Intervention(s)  Therapist will help patient identify warning signs and signals, and will guide the patient in identifying skillful ways to intervene when exeriencing warning signs and signals.      Goal 2: Patient will learn new parenting strategies to help with managing parenting challenges.  Parent will work on improving relationship with her daughter and defining what she would like this relationship to look like.      I will know I've met my goal when I'm enjoying time with my daughter, teaching her new things, and not waiting for things to change      Objective #A (Patient Action)    Status: Continued - Date(s):4/23/2020     Patient will increase understanding of developmental norms for her daughter and ways to manage challenging behaviors.    Intervention(s)  Therapist will provide psychoeducation on developmental norms and parenting strategies.    Objective #B  Patient will spend time reflecting on her relationship with her daughter and defining what she would like this relaitonship to look like.    Status: Continued - Date(s):4/23/2020   Update: 7/8/2020 - \"I think some things are going better, she's more helpful with things.  Some things are more challenging with the pandemic and thinking of things to do with her\".    Update: 10/08/2020: Patient is making progress on learning and practicing strategies to manage parenting challenges.  " "  Reviewed: 1/27/2021: \"I feel like things have gotten better with this, we've figured out a good routine, I'm doing meditation, I'm enjoying my time with my daughter more than I used to\".   4/27/2021 - Challenges of parenting during a pandemic, working with occupational therapist on strategies to help daughter, 7/29/21 - She's been working on incorporating more structure into the daily routine, as well as managing her emotional reaction when feeling frustrated.  She's also worked on managing her mindset and expectations.  She's been experiencing some frustrations due to two of the OT's she's worked with has recently resigned, resulting in transitioning to several new providers in a short period of time.  I've encouraged she continue to work closely with her daughter's pediatrician to get connected to resources and supports to help with managing her daughter's difficulties.    11/10/2021 - Feels like they've had some really good interactions, though things have been more challenging at school and home (waiting for evaluations).  Daughter enjoys when she reads to her, they've had some really good interactions recently.          Intervention(s)  Therapist will guide the patient in reflecting upon her relationship with her daughter and help her define ways to move towards what she vaues in her relationship with her daughter.    Objective #C  Patient will increase time spent on fun activity and play with her daughter.  Status: Continued - Date(s):4/23/2020 7/8/2020 -   10/08/2020 - will further assess this goal at next session   Reviewed: 1/27/2021 - also going really well, we play together all of the time now\", she finds herself enjoying their time together.   4/27/2021- she will spend some time reflecting upon this and we will discuss further next time.  They have several fun things planned for the future, as well as she has worked hard during the pandemic to plan structure and time together within COVID " "restrictions.  7/29/21 - She's seen some positive changes with being able to spend time with grandparents and at the cabin since being vaccinated, but also has experienced some challenges with planning fun activities out of worries things won't go well and feeling low energy/motivation to do these things - working to address both of these obstacles.    11/10/2021 - They've been able to do more fun activities together - coloring, Lego sets,       Intervention(s)  Therapist will guide the patient in identifying ways she can increase positive time with her daughter.  Therapist will also teach mindfulness strategies to help patient with being in the present moment when appropriate - .      Goal 3: Patiient will improve communication with her .      I will know I've met my goal when I feel less irritated with my  and communicate more openly with my .  I would like to be more assertive and less aggressive.   I would like to not avoid telling him things because I'm worried about his reaction or don't think he will react well.  I would like to be more present to the moment during times when this would be helpful.\"      Objective #A (Patient Action)    Status: Continued - Date(s):4/23/2020 7/8/2020 - suggested couples therapy to help with this.    Individual therapy focus - identify what makes it hard to be more honest and open   10/08/2020 - will further assess this goal at next session   Reviewed: 1/27/2021 - \"Things are better, I feel like we've worked really hard on how to respond when he's having a hard time and how to talk to him.  I'm doing better listening, being more empathetic, and not \".  4/27/2021 - She has been making progress on examining and updating unhelpful beliefs (e.g. feeling responsible for other's emotions, feeling like she needs to fix or change other's emotions) and has made good progress on identifying and asserting healthy boundaries, in addition to  her " emotional response from her spouse's emotional response.  7/29/21 - will review at next session   11/10/2021 - Feeling more irritability recently.  Believes this area could use more attention.          Patient will learn and practice at least two new interpersonal effectiveness strategies.    Intervention(s)  Therapist will teach strategies from DBT module on interpersonal effectiveness, and will assign homework to help reinforce skill development.      Patient has reviewed and agreed to the above plan.      Cristy Wilkinson PsyD, LP  Licensed Psychologist    January 6, 2020, reviewed on 4/23/2020, reviewed 7/8/2020, reviewed 10/08/2020, reviewed 1/27/2021, reviewed 4/27/2021, 7/29/2021, 11/10/2021

## 2022-02-03 DIAGNOSIS — J30.89 ALLERGIC RHINITIS DUE TO DUST MITE: ICD-10-CM

## 2022-02-03 RX ORDER — MONTELUKAST SODIUM 10 MG/1
10 TABLET ORAL AT BEDTIME
Qty: 90 TABLET | Refills: 0 | Status: SHIPPED | OUTPATIENT
Start: 2022-02-03 | End: 2022-04-19

## 2022-02-03 NOTE — TELEPHONE ENCOUNTER
"RN refilled medication per [facility] Refill Protocol.     Do MARIO RN      Requested Prescriptions   Pending Prescriptions Disp Refills     montelukast (SINGULAIR) 10 MG tablet 90 tablet 3     Sig: Take 1 tablet (10 mg) by mouth At Bedtime       Leukotriene Inhibitors Protocol Passed - 2/3/2022  9:24 AM        Passed - Patient is age 12 or older     If patient is under 16, ok to refill using age based dosing.           Passed - Recent (12 mo) or future (30 days) visit within the authorizing provider's specialty     Patient has had an office visit with the authorizing provider or a provider within the authorizing providers department within the previous 12 mos or has a future within next 30 days. See \"Patient Info\" tab in inbasket, or \"Choose Columns\" in Meds & Orders section of the refill encounter.              Passed - Medication is active on med list             "

## 2022-02-07 ENCOUNTER — MYC MEDICAL ADVICE (OUTPATIENT)
Dept: ALLERGY | Facility: CLINIC | Age: 42
End: 2022-02-07
Payer: COMMERCIAL

## 2022-02-07 DIAGNOSIS — J30.0 CHRONIC VASOMOTOR RHINITIS: ICD-10-CM

## 2022-02-07 DIAGNOSIS — J30.89 ALLERGIC RHINITIS DUE TO DUST MITE: ICD-10-CM

## 2022-02-07 RX ORDER — MOMETASONE FUROATE MONOHYDRATE 50 UG/1
2 SPRAY, METERED NASAL DAILY
Qty: 51 G | Refills: 0 | Status: SHIPPED | OUTPATIENT
Start: 2022-02-07 | End: 2022-04-19

## 2022-02-07 NOTE — TELEPHONE ENCOUNTER
Pending Prescriptions:                       Disp   Refills    mometasone (NASONEX) 50 MCG/ACT nasal spr*51 g   1            Sig: Spray 2 sprays into both nostrils daily

## 2022-02-07 NOTE — TELEPHONE ENCOUNTER
Rx filled x 1. Patient has not been seen in over 1 year and will need a follow-up visit for additional refills. [employer] message sent advising patient to schedule a follow-up visit.

## 2022-02-09 ENCOUNTER — VIRTUAL VISIT (OUTPATIENT)
Dept: PSYCHOLOGY | Facility: CLINIC | Age: 42
End: 2022-02-09
Payer: COMMERCIAL

## 2022-02-09 DIAGNOSIS — F41.9 ANXIETY DISORDER, UNSPECIFIED TYPE: Primary | ICD-10-CM

## 2022-02-09 PROCEDURE — 90832 PSYTX W PT 30 MINUTES: CPT | Mod: GT | Performed by: PSYCHOLOGIST

## 2022-02-09 ASSESSMENT — PATIENT HEALTH QUESTIONNAIRE - PHQ9
SUM OF ALL RESPONSES TO PHQ QUESTIONS 1-9: 4
SUM OF ALL RESPONSES TO PHQ QUESTIONS 1-9: 4
10. IF YOU CHECKED OFF ANY PROBLEMS, HOW DIFFICULT HAVE THESE PROBLEMS MADE IT FOR YOU TO DO YOUR WORK, TAKE CARE OF THINGS AT HOME, OR GET ALONG WITH OTHER PEOPLE: SOMEWHAT DIFFICULT

## 2022-02-10 ASSESSMENT — PATIENT HEALTH QUESTIONNAIRE - PHQ9: SUM OF ALL RESPONSES TO PHQ QUESTIONS 1-9: 4

## 2022-02-14 NOTE — PROGRESS NOTES
Progress Note    Patient Name: Idalia Hinojosa  Date: 2/9/2022         Service Type: Individual      Session Start Time: 11:33 am Session End Time: 11:57 am   Session Length:  24 minutes    Session #: 43    Attendees: Client     Service Modality:  Video Visit: - I.        Provider verified identity through the following two step process.  Patient provided:  Patient is known previously to provider    Telemedicine Visit: The patient's condition can be safely assessed and treated via synchronous audio and visual telemedicine encounter.      Reason for Telemedicine Visit: Services only offered telehealth    Originating Site (Patient Location): Patient's home    Distant Site (Provider Location): Provider Remote Setting- Home Office    Consent:  The patient/guardian has verbally consented to: the potential risks and benefits of telemedicine (telephone visit) versus in person care; bill my insurance or make self-payment for services provided; and responsibility for payment of non-covered services.     Patient would like the video invitation sent by:  My Chart    Mode of Communication:  Video Conference via [place],     As the provider I attest to compliance with applicable laws and regulations related to telemedicine.         Treatment Plan Last Reviewed: Developed 1/06/2020, reviewed 4/23/2020, reviewed 7/8/2020, reviewed 10/08/2020, 1/27/2021, 4/27/21, 7/28/2021, 11/10/21, 02/09/2022  PHQ-9/DAVID-7: 2/9/2022    DATA  Interactive Complexity: No  Crisis: No        Progress Since Last Session (Related to Symptoms / Goals / Homework):   Symptoms: She reported that her mood has been up and down over the past week, in the context of continued adjustment and stress to the news that her  has cancer.           Answers for HPI/ROS submitted by the patient on 2/9/2022  If you checked off any problems, how difficult have these problems made it for you to do your work, take care of  things at home, or get along with other people?: Somewhat difficult  PHQ9 TOTAL SCORE: 4    Homework: Achieved / completed to satisfaction.  She did very well with her homework assignment - she practiced self-care strategies and found it to be helpful for her        Episode of Care Goals: Satisfactory progress - ACTION (Actively working towards change); Intervened by reinforcing change plan / affirming steps taken.  Diagnostic assessment has been completed, treatment plan has been developed and patient has been making good progress on treatment goals.  The patient stated that her main goals for therapy would be to learn emotion regulation strategies (in particular, to help with when she is feeling upset/angry/frustrated/distressed).  More recently, the changes in her daily life due to COVID restrictions have been a focus of our work together, and now working through changes to routine as daughter starts  and she returns to work.  As she has been making progress, focus of treatment will shift to wellness planning (monitoring mood and symptoms, incorporating into routine what keeps her feeling well, review of triggers, how to manage set backs, etc.).  As she just learned her  has been diagnosed with cancer, treatment will also focus on providing her with support in managing the emotional distress and challenges related to this.       Current / Ongoing Stressors and Concerns:   Current:  She processed the anxiety, stress and uncertainty she feels while waiting to learn more about what next steps will look like for her 's treatment.        Ongoing:Managing daily routine which has contributed to some adjustment and parenting challenges, lack of effective emotion regulation strategies for managing daily frustrations and upsets and marital discord due to differences in parenting styles.       Treatment Objective(s) Addressed in This Session:     Identify and practice at least two strategies that  "will help support your self-care during this challenging time  Learn and practice at least two new strategies for tolerating uncertainty  Practice mindfulness to the moment when your mind wanders to worrying about the unknown       Continue with/review/practice:    Learn and practice ways of self-soothing that don't involve food to increase choices/options when feeling stressed or overwhelmed  Practice mindful awareness when feelings urges to eat when feeling stressed - how am I feeling?  What am I thinking?  What is contributing?  What do I need?  Make mindful decision around what you believe will help.    Learn and practice 3-5 new emotion regulation strategies  Identify ways to bring more structure back into the day, including planning at least one thing a day that brings you lamont  Notice when predicting/anticipating that things won't work out and practice writing a positive script      Incorporate self-care strategies into daily practice   Focus on the big picture in relation to your health and well-being   Be mindful of negative and self-critical thoughts and practice finding kind, compassionate, balanced and flexible thinking  learn emotion regulation strategies to help when she is feeling upset/angry/frustrated/distressed  Identify when falling into \"cheery trap\" (expecting self to be responsible for other's emotions) and allow self to take a step back and look at what I do and don't have control over  identify warning signs and signals, practice inserting skills early on  Increase awareness of intensity of emotions and use rating scale to monitor response to using strategies (and to help determine need to try different strategies)        Intervention:   CBT: We focused the session today on helping her to practice strategies she can use to manage stress, anxiety and tolerate uncertainty related to her 's cancer diagnosis and waiting to learn what his treatment will look like.  Reviewed her previous " "practice of and successes with mindfulness, being present to the moment, and gently redirecting her mind when it wanders to worrying about the unknown.  We also discussed her writing down questions she has for the treatment team, in regards to things she is worrying about, to help give her as much information and \"facts\" from the doctors as possible.  Encouraged her to ask for help - so that she continues to have time to focus on her self-care.  She identifies her mom as a really good source of support and helps her with taking care of her daughter when needed.      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  She anticipates her mind will wander and worry - we talked about ways to practice mindfulness to help ground her in the present moment - encouraged engaging with and attuning to five senses  - what do you see?  What do you hear?  What do you smell?  Etc.  And to gently redirect her mind when it wanders, let go of expectations that she can be fully \"100%\" present and instead encourage her focus/intention to be on practicing mindfulness and redirect back to the present moment when her mind waders.      ASSESSMENT: Current Emotional / Mental Status (status of significant symptoms):   Risk status (Self / Other harm or suicidal ideation)   Patient denies current fears or concerns for personal safety.   Patient denies current or recent suicidal ideation or behaviors.    She agrees to use a safety plan should any safety concerns arise.  She also agrees to reach our to her spouse or a family member for help if needed, and/or access crisis/emergency resources.        Patientdenies current or recent homicidal ideation or behaviors.   Patient denies current or recent self injurious behavior or ideation.   Patient denies other safety concerns.   Patient Patient reports there has been no change in risk factors " since their last session.     PatientPatient reports there has been no change in protective factors since their last session.     Recommended that patient call 911 or go to the local ED should there be a change in any of these risk factors.  She has previously been informed on how to contact [facility] crisis/COPE for urgent/crisis concerns 24/7.  Provided patient with crisis resources and she agrees to use safety plan should any safety concerns arise.      Professionals or agencies I can contact during a crisis:    Suicide Prevention Lifeline: 6-627-197-LHEA (0470)    Crisis Text Line Service (available 24 hours a day, 7 days a week): Text [place] to 385489    [facility] Crisis Services: [facility]: [phone]    Call 911 or go to my nearest emergency department.        Appearance:   Appropriate    Eye Contact:   Good    Psychomotor Behavior: Normal    Attitude:   Cooperative    Orientation:   All   Speech    Rate / Production: Normal     Volume:  Normal    Mood:    Anxious  Sad    Affect:    Congruent with mood '   Thought Content:  Clear    Thought Form:  Coherent  Logical    Insight:    Good      Medication Review:   No changes to current psychiatric medication(s).      Medication Compliance:   Yes     Changes in Health Issues:  None reported         Chemical Use Review:   Substance Use: Chemical use reviewed, no active concerns identified      Tobacco Use: No current tobacco use.      Diagnosis:  Anxiety disorder unspecified    Collateral Reports Completed:   Not Applicable,      PLAN: (Patient Tasks / Therapist Tasks / Other)    1) Idalia identified the following goals:  Practice mindfulness strategies to help with being present to the moment.  Ask for help.  Be mindful of expectations (it is okay that he will have days where he is not feeling as well) permission to take time by myself to recharge, be mindful of emotional boundaries.      Be mindful of when I'm not communicating with others  "when I think it would be beneficial for me to be communicating.   think about how I can communicate better?          2) If there is a crisis situation, remember you are not alone and that there are people who can help you twenty-four hours a day, seven days a week.  Call [facility] ([facility]): [phone].      3) Follow-up visit is scheduled for February 9th at 2:00 pm.   If urgent or crisis concerns arise prior to next scheduled appointment, please reach out to crisis resources, call 911, or go to the emergency department.      Cristy Wilkinson PsyD,   Licensed Psychologist  2/9/2022              Previous skills and strategies to remind self of and to do as needed:    Practice \"STOP\" technique when you recognize yourself feeling angry   Be a  - what do you notice is happening when Kaylee feels upset?     Practice recognizing when feeling angry, and giving self permission to use calming and self-soothing strategies and take a break.   Look for the gray with thinking  slow down  take deep breaths  manage expectations of self and others.    Journal   Practice flexing \"flexiblity and creativity\" -   Continue the great work you are doing with your daily schedule.    Get outside every day.    Play your instrument.    Sing!    Continue with: \"It's just a thought\" - non-judgmental, observe, float down the river on a leaf, clouds in the gege, reframing unhelpful thoughts -   Keep working on being patient when things are tense around me.    Continue with observing/paying attention to warning signs and signals and give self persmission to slow down, especially during the morning routine with Kaylee.    Practice offerring Kaylee choices when appropriate.      Use \"my plan for success\" to help remind you of calming strategies to practice when feeling upset.    Practice decreasing overall vulnerability to emotion mind through getting adequate rest, nutrition, time for yourself, and physical activity.    " "     Consider reading \"Fighting for your marriage\".      Technical Assistance for video visits:    Offer patient the website ([email or web address]) and/or phone number ([phone]) for video visit instructions/assistance if needed.                                             ____________________________________    Treatment Plan    Patient's Name: Idalia Hinojosa  YOB: 1980    Date: 1/6/2020    DSM5 Diagnoses: 300.00 (F41.9) Unspecified Anxiety Disorder  Psychosocial / Contextual Factors: strain in marital relationship, parenting challenges, adjustment challenges  WHODAS: will gather at diagnostic assessment update    Referral / Collaboration:  We discussed a referral to a couples/marital therapist.  The patient is thinking about this and has talked with her  about the referral, but they are unsure if they would like to follow through at this time.      Anticipated number of session or this episode of care: 8-12    MeasurableTreatment Goal(s) related to diagnosis / functional impairment(s)  Goal 1: Patient will learn emotion regulation strategies to help when she is feeling upset/angry/frustrated/distressed    I will know I've met my goal when I would yell less, I would feel calmer, and I would not push others.  I would be less physical when I'm angry (e.g. wouldn't slam doors or hit things)       Objective #A (Patient Action)    Patient will learn and practice at least 2 new emotion regulation strategies.  Status: Continued - Date(s):4/23/2020 (has begun to learn new emotion regulation strategies and is making good progress)   Update: 7/8/2020: \"I've learned new strategies but I'm bad at practicing them\" - will help patient incorporate regular practice and identify and problem-solve barriers.    Reviewed: 10/8/2020: Patient has been successfully practicing emotion regulation strategies, is working on identifying earlier on when to intervene and engage strategies, as well as practice " "strategies on a regular basis to help strength use of strategies.    Reviewed and in progress: 1/27/2021: \"Overall things are going a lot better.  Sometimes I fall into old habits\", but recognizing when she's feeling more vulnerable to her emotions and reminds herself to use different skills/intervene in a different way.     4/27/2021-\"Things have definitely been better\".  Recognizing when feeling vulnerable (e.g. hungry), recognizing when emotions are escalating and need to engage calming strategies\".  Ask for help from Colia when feeling upset.  Can be challenging when Kaylee escalates when I'm feeling angry.    7/29/21 - She felt like she has forgotten what helps her when she's feeling upset, we have spent time reviewing and refreshing these skills.    11/10/2021 - She feels like for the most part things have been going well.    2/9/2022 - Helping her to apply emotion regulation strategies in the context of a new and unexpected stressor -     Intervention(s)  Therapist will provide psychoeducation on emotion regulation module from DBT and will assign patient homework to help reinforce skill development.    Objective #B  Patient will identify factors that increase vulnerability to emotion mind and identify ways to decrease vulnerability to emotion mind.  Status: Continued - Date(s):4/23/2020 (has learned factors that make her more vulnerable to emotion mind, and has been working on addressing vulnerability related to feeling hungry, tired and rushed)   Update: 7/8/20: \"This one is harder for me.  I know I get angry when I'm hungry, and when I slow down I'm less inclined to get angry\".  Challenges - not always having food ready, not always planning in advance, (planing breakfast the night before)  Reviewed: 10/8/2020: Patient's awareness has increased of factors that increase her vulnerability to emotion mind, and she has been using this as a signal to tell her what she needs.    Reviewed and resolved/complete: " "1/27/2021: \"I feel like we've done really good work with this and I've gotten what I need from this\".  4/27/2021  - I am doing better with this - I know I get more irritable when I'm hungry and I'm trying to do better with this.  Drinking more water.      7/29/21 - Overall she had been feeling like things were going better with this, though over the past several weeks has felt more challenged due to increase in stressors.    11/10/2021 - Recognizing when feeling tired and how this can contribute to increase in vulnerability and ways to intervene.  Recognizing when feeling irritabiel - ask self if feeling hungry and eat if needed.    2/9/2022 - Self-care is an important focus right now - to ensure she is attending to her basic self-care needs while helping to take care of her  and his health -       Intervention(s)  Therapist will provide information on factors that increase vulnerabiliyt to emotion mind.    Objective #C  Patient will identify warning signs and signals that emotions are escalating and will learn ways to skillfully intervene early on.  Status: New - Date: 01/06/2020 7/8/2020- in progress  Update: 10/08/2020: Patient has been making good progress on identifying signs and signals that her emotions are escalating.    Reviewed and in progress: 1/27/2021: \"I feel like we've done good work on this but this is one of the things I need to keep working on\".    7/29.21 - In progress - actively working on this -   11/10/2021 - Feels sometimes she is able to do this, other times it is harder  2/9/2022 - continued goal -       Intervention(s)  Therapist will help patient identify warning signs and signals, and will guide the patient in identifying skillful ways to intervene when exeriencing warning signs and signals.      Goal 2: Patient will learn new parenting strategies to help with managing parenting challenges.  Parent will work on improving relationship with her daughter and defining what she would " "like this relationship to look like.      I will know I've met my goal when I'm enjoying time with my daughter, teaching her new things, and not waiting for things to change      Objective #A (Patient Action)    Status: Continued - Date(s):4/23/2020     Patient will increase understanding of developmental norms for her daughter and ways to manage challenging behaviors.    Intervention(s)  Therapist will provide psychoeducation on developmental norms and parenting strategies.    Objective #B  Patient will spend time reflecting on her relationship with her daughter and defining what she would like this relaitonship to look like.    Status: Continued - Date(s):4/23/2020   Update: 7/8/2020 - \"I think some things are going better, she's more helpful with things.  Some things are more challenging with the pandemic and thinking of things to do with her\".    Update: 10/08/2020: Patient is making progress on learning and practicing strategies to manage parenting challenges.    Reviewed: 1/27/2021: \"I feel like things have gotten better with this, we've figured out a good routine, I'm doing meditation, I'm enjoying my time with my daughter more than I used to\".   4/27/2021 - Challenges of parenting during a pandemic, working with occupational therapist on strategies to help daughter, 7/29/21 - She's been working on incorporating more structure into the daily routine, as well as managing her emotional reaction when feeling frustrated.  She's also worked on managing her mindset and expectations.  She's been experiencing some frustrations due to two of the OT's she's worked with has recently resigned, resulting in transitioning to several new providers in a short period of time.  I've encouraged she continue to work closely with her daughter's pediatrician to get connected to resources and supports to help with managing her daughter's difficulties.    11/10/2021 - Feels like they've had some really good interactions, though " "things have been more challenging at school and home (waiting for evaluations).  Daughter enjoys when she reads to her, they've had some really good interactions recently.    2/9/2022 - unable to fully assess progress on this goal today, will further assess progress on this goal and if any treatment needs remain -       Intervention(s)  Therapist will guide the patient in reflecting upon her relationship with her daughter and help her define ways to move towards what she vaues in her relationship with her daughter.    Objective #C  Patient will increase time spent on fun activity and play with her daughter.  Status: Continued - Date(s):4/23/2020 7/8/2020 -   10/08/2020 - will further assess this goal at next session   Reviewed: 1/27/2021 - also going really well, we play together all of the time now\", she finds herself enjoying their time together.   4/27/2021- she will spend some time reflecting upon this and we will discuss further next time.  They have several fun things planned for the future, as well as she has worked hard during the pandemic to plan structure and time together within COVID restrictions.  7/29/21 - She's seen some positive changes with being able to spend time with grandparents and at the cabin since being vaccinated, but also has experienced some challenges with planning fun activities out of worries things won't go well and feeling low energy/motivation to do these things - working to address both of these obstacles.    11/10/2021 - They've been able to do more fun activities together - coloring, Lego sets,    2/9/2022 - unable to fully assess progress on this goal today, will further assess progress on this goal and if any  treatment needs remain -       Intervention(s)  Therapist will guide the patient in identifying ways she can increase positive time with her daughter.  Therapist will also teach mindfulness strategies to help patient with being in the present moment when appropriate - " ".      Goal 3: Patiient will improve communication with her .      I will know I've met my goal when I feel less irritated with my  and communicate more openly with my .  I would like to be more assertive and less aggressive.   I would like to not avoid telling him things because I'm worried about his reaction or don't think he will react well.  I would like to be more present to the moment during times when this would be helpful.\"      Objective #A (Patient Action)    Status: Continued - Date(s):4/23/2020 7/8/2020 - suggested couples therapy to help with this.    Individual therapy focus - identify what makes it hard to be more honest and open   10/08/2020 - will further assess this goal at next session   Reviewed: 1/27/2021 - \"Things are better, I feel like we've worked really hard on how to respond when he's having a hard time and how to talk to him.  I'm doing better listening, being more empathetic, and not \".  4/27/2021 - She has been making progress on examining and updating unhelpful beliefs (e.g. feeling responsible for other's emotions, feeling like she needs to fix or change other's emotions) and has made good progress on identifying and asserting healthy boundaries, in addition to  her emotional response from her spouse's emotional response.  7/29/21 - will review at next session   11/10/2021 - Feeling more irritability recently.  Believes this area could use more attention.    2/9/2022 - continued goal -         Patient will learn and practice at least two new interpersonal effectiveness strategies.    Intervention(s)  Therapist will teach strategies from DBT module on interpersonal effectiveness, and will assign homework to help reinforce skill development.      Patient has reviewed and agreed to the above plan.      Cristy Wilkinson PsyD, LP  Licensed Psychologist    January 6, 2020, reviewed on 4/23/2020, reviewed 7/8/2020, reviewed 10/08/2020, reviewed " 1/27/2021, reviewed 4/27/2021, 7/29/2021, 11/10/2021, 2/9/2022

## 2022-02-23 ENCOUNTER — VIRTUAL VISIT (OUTPATIENT)
Dept: PSYCHOLOGY | Facility: CLINIC | Age: 42
End: 2022-02-23
Payer: COMMERCIAL

## 2022-02-23 DIAGNOSIS — F41.9 ANXIETY DISORDER, UNSPECIFIED TYPE: Primary | ICD-10-CM

## 2022-02-23 PROCEDURE — 90834 PSYTX W PT 45 MINUTES: CPT | Mod: GT | Performed by: PSYCHOLOGIST

## 2022-02-23 PROCEDURE — 99207 PR CDG-CODE INCORRECT PER BILLING BASED ON TIME: CPT | Performed by: PSYCHOLOGIST

## 2022-02-23 NOTE — PROGRESS NOTES
"                                            Progress Note    Patient Name: Idalia Hinojosa  Date: 2/23/2022         Service Type: Individual      Session Start Time: 10:34 am Session End Time: 11:26 am   Session Length:  52 minutes    Session #: 44    Attendees: Client     Service Modality:  Video Visit: - I.        Provider verified identity through the following two step process.  Patient provided:  Patient is known previously to provider    Telemedicine Visit: The patient's condition can be safely assessed and treated via synchronous audio and visual telemedicine encounter.      Reason for Telemedicine Visit: Services only offered telehealth    Originating Site (Patient Location): Patient's home    Distant Site (Provider Location): Provider Remote Setting- Home Office    Consent:  The patient/guardian has verbally consented to: the potential risks and benefits of telemedicine (telephone visit) versus in person care; bill my insurance or make self-payment for services provided; and responsibility for payment of non-covered services.     Patient would like the video invitation sent by:  My Chart    Mode of Communication:  Video Conference via [place],     As the provider I attest to compliance with applicable laws and regulations related to telemedicine.         Treatment Plan Last Reviewed: Developed 1/06/2020, reviewed 4/23/2020, reviewed 7/8/2020, reviewed 10/08/2020, 1/27/2021, 4/27/21, 7/28/2021, 11/10/21, 02/09/2022  PHQ-9/DAVID-7: 2/9/2022    DATA  Interactive Complexity: No  Crisis: No        Progress Since Last Session (Related to Symptoms / Goals / Homework):   Symptoms: She reported an increase in irritability, some difficulties with sleeping due to worry, and overall \"up and down\" with her mood.             Homework: Achieved / completed to satisfaction.  She continues to do well with taking time to practice self-care strategies, asking for help so she has time for herself.  She noted that exercise has " been very helpful for the stress she has been experiencing.          Episode of Care Goals: Satisfactory progress - ACTION (Actively working towards change); Intervened by reinforcing change plan / affirming steps taken.  Diagnostic assessment has been completed, treatment plan has been developed and patient has been making good progress on treatment goals.  The patient stated that her main goals for therapy would be to learn emotion regulation strategies (in particular, to help with when she is feeling upset/angry/frustrated/distressed).  The changes in her daily life due to COVID restrictions were a focus of our work together, and working through changes to routine as her daughter started  and she returned to work.  As she has been making progress, focus of treatment will shift to wellness planning (monitoring mood and symptoms, incorporating into routine what keeps her feeling well, review of triggers, how to manage set backs, etc.).  As she just learned her  has been diagnosed with cancer, treatment will also focus on providing her with support in managing the emotional distress and challenges related to this.       Current / Ongoing Stressors and Concerns:   Current:  Continued focus on processing the anxiety, stress and uncertainty she feels related to her 's cancer diagnostic and treatment process.  He has started chemo.     Ongoing:Managing daily routine which has contributed to some adjustment and parenting challenges, lack of effective emotion regulation strategies for managing daily frustrations and upsets and marital discord due to differences in parenting styles.       Treatment Objective(s) Addressed in This Session:     Be curious about your emotions - tune in to - what am I thinking?  What is going on around me?  What do I need?    Identify and practice at least two strategies that will help support your self-care during this challenging time  Learn and practice at least two  "new strategies for tolerating uncertainty  Practice mindfulness to the moment when your mind wanders to worrying about the unknown       Continue with/review/practice:    Learn and practice ways of self-soothing that don't involve food to increase choices/options when feeling stressed or overwhelmed  Practice mindful awareness when feelings urges to eat when feeling stressed - how am I feeling?  What am I thinking?  What is contributing?  What do I need?  Make mindful decision around what you believe will help.    Learn and practice 3-5 new emotion regulation strategies  Identify ways to bring more structure back into the day, including planning at least one thing a day that brings you lamont  Notice when predicting/anticipating that things won't work out and practice writing a positive script      Incorporate self-care strategies into daily practice   Focus on the big picture in relation to your health and well-being   Be mindful of negative and self-critical thoughts and practice finding kind, compassionate, balanced and flexible thinking  learn emotion regulation strategies to help when she is feeling upset/angry/frustrated/distressed  Identify when falling into \"cheery trap\" (expecting self to be responsible for other's emotions) and allow self to take a step back and look at what I do and don't have control over  identify warning signs and signals, practice inserting skills early on  Increase awareness of intensity of emotions and use rating scale to monitor response to using strategies (and to help determine need to try different strategies)        Intervention:   CBT: She processed a few examples where she experienced heightened emotions over the past few weeks and she noted that in general, she finds herself feeling more irritable and reacting with more irritability to situations than she might have in the past.  We spent time breaking down a few of these situations - being curious around what she was " "experiencing - she made some powerful insights and observations around \"perfectionisticic\" thoughts that can contribute to focusing in on small details and \"obsessing\" - is able to see the connection with feeling anxious, lack of control, and some \"magical thinking\" that if she can control this \"small thing\" that it will help - when in reality her mind just quickly shifts to another small detail she worries about -     She's been having more difficulties with sleeping due to worrying and thinking about things she needs to do.  She has found it really helpful to take time to write down things she is thinking about/worrying about.  Also suggested deep breathing as a way to help calm her body and mind.        ASSESSMENT: Current Emotional / Mental Status (status of significant symptoms):   Risk status (Self / Other harm or suicidal ideation)   Patient denies current fears or concerns for personal safety.   Patient denies current or recent suicidal ideation or behaviors.    She agrees to use a safety plan should any safety concerns arise.  She also agrees to reach our to her spouse or a family member for help if needed, and/or access crisis/emergency resources.        Patientdenies current or recent homicidal ideation or behaviors.   Patient denies current or recent self injurious behavior or ideation.   Patient denies other safety concerns.   Patient Patient reports there has been no change in risk factors since their last session.     PatientPatient reports there has been no change in protective factors since their last session.     Recommended that patient call 911 or go to the local ED should there be a change in any of these risk factors.  She has previously been informed on how to contact [facility] crisis/[facility] for urgent/crisis concerns 24/7.  Provided patient with crisis resources and she agrees to use safety plan should any safety concerns arise.      Professionals or agencies I can contact during a " crisis:    Suicide Prevention Lifeline: 1-911-552-TALK (5584)    Crisis Text Line Service (available 24 hours a day, 7 days a week): Text [place] to 364874    [facility] Crisis Services: [facility]: [phone]    Call 911 or go to my nearest emergency department.        Appearance:   Appropriate    Eye Contact:   Good    Psychomotor Behavior: Normal    Attitude:   Cooperative    Orientation:   All   Speech    Rate / Production: Normal     Volume:  Normal    Mood:    Stressed, Anxious  Sad    Affect:    Congruent with mood '   Thought Content:  Clear    Thought Form:  Coherent  Logical    Insight:    Good      Medication Review:   No changes to current psychiatric medication(s).      Medication Compliance:   Yes     Changes in Health Issues:  None reported         Chemical Use Review:   Substance Use: Chemical use reviewed, no active concerns identified      Tobacco Use: No current tobacco use.      Diagnosis:  Anxiety disorder unspecified    Collateral Reports Completed:   Not Applicable,      PLAN: (Patient Tasks / Therapist Tasks / Other)    1) Idalia identified the following goals: slow down, don't rush, let go of perfection, focus on one day at a time, keep reminder list on phone up to date.  Be curious with your emotions - tune into what you are thinking, what you are noticing, what is happening around you, what do you need?       Practice mindfulness strategies to help with being present to the moment.  Ask for help.  Be mindful of expectations (it is okay that he will have days where he is not feeling as well) permission to take time by myself to recharge, be mindful of emotional boundaries.        2) If there is a crisis situation, remember you are not alone and that there are people who can help you twenty-four hours a day, seven days a week.  Call [facility] ([facility]): [phone].      3) Follow-up visit is scheduled for 3/2/22 at 1:00 pm.   If urgent or crisis concerns  "arise prior to next scheduled appointment, please reach out to crisis resources, call 911, or go to the emergency department.      Cristy Wilkinson PsyD,   Licensed Psychologist  2/23/2022                Previous skills and strategies to remind self of and to do as needed:    Practice \"STOP\" technique when you recognize yourself feeling angry   Be a  - what do you notice is happening when Kaylee feels upset?     Practice recognizing when feeling angry, and giving self permission to use calming and self-soothing strategies and take a break.   Look for the gray with thinking  slow down  take deep breaths  manage expectations of self and others.    Journal   Practice flexing \"flexiblity and creativity\" -   Continue the great work you are doing with your daily schedule.    Get outside every day.    Play your instrument.    Sing!    Continue with: \"It's just a thought\" - non-judgmental, observe, float down the river on a leaf, clouds in the gege, reframing unhelpful thoughts -   Keep working on being patient when things are tense around me.    Continue with observing/paying attention to warning signs and signals and give self persmission to slow down, especially during the morning routine with Kaylee.    Practice offerring Kaylee choices when appropriate.      Use \"my plan for success\" to help remind you of calming strategies to practice when feeling upset.    Practice decreasing overall vulnerability to emotion mind through getting adequate rest, nutrition, time for yourself, and physical activity.         Consider reading \"Fighting for your marriage\".      Technical Assistance for video visits:    Offer patient the website ([email or web address]) and/or phone number ([phone]) for video visit instructions/assistance if needed.                                             ____________________________________    Treatment Plan    Patient's Name: Idalia Hinojosa  YOB: 1980    Date: 1/6/2020    DSM5 " "Diagnoses: 300.00 (F41.9) Unspecified Anxiety Disorder  Psychosocial / Contextual Factors: strain in marital relationship, parenting challenges, adjustment challenges  WHODAS: will gather at diagnostic assessment update    Referral / Collaboration:  We discussed a referral to a couples/marital therapist.  The patient is thinking about this and has talked with her  about the referral, but they are unsure if they would like to follow through at this time.      Anticipated number of session or this episode of care: 8-12    MeasurableTreatment Goal(s) related to diagnosis / functional impairment(s)  Goal 1: Patient will learn emotion regulation strategies to help when she is feeling upset/angry/frustrated/distressed    I will know I've met my goal when I would yell less, I would feel calmer, and I would not push others.  I would be less physical when I'm angry (e.g. wouldn't slam doors or hit things)       Objective #A (Patient Action)    Patient will learn and practice at least 2 new emotion regulation strategies.  Status: Continued - Date(s):4/23/2020 (has begun to learn new emotion regulation strategies and is making good progress)   Update: 7/8/2020: \"I've learned new strategies but I'm bad at practicing them\" - will help patient incorporate regular practice and identify and problem-solve barriers.    Reviewed: 10/8/2020: Patient has been successfully practicing emotion regulation strategies, is working on identifying earlier on when to intervene and engage strategies, as well as practice strategies on a regular basis to help strength use of strategies.    Reviewed and in progress: 1/27/2021: \"Overall things are going a lot better.  Sometimes I fall into old habits\", but recognizing when she's feeling more vulnerable to her emotions and reminds herself to use different skills/intervene in a different way.     4/27/2021-\"Things have definitely been better\".  Recognizing when feeling vulnerable (e.g. hungry), " "recognizing when emotions are escalating and need to engage calming strategies\".  Ask for help from Colia when feeling upset.  Can be challenging when Kaylee escalates when I'm feeling angry.    7/29/21 - She felt like she has forgotten what helps her when she's feeling upset, we have spent time reviewing and refreshing these skills.    11/10/2021 - She feels like for the most part things have been going well.    2/9/2022 - Helping her to apply emotion regulation strategies in the context of a new and unexpected stressor -     Intervention(s)  Therapist will provide psychoeducation on emotion regulation module from DBT and will assign patient homework to help reinforce skill development.    Objective #B  Patient will identify factors that increase vulnerability to emotion mind and identify ways to decrease vulnerability to emotion mind.  Status: Continued - Date(s):4/23/2020 (has learned factors that make her more vulnerable to emotion mind, and has been working on addressing vulnerability related to feeling hungry, tired and rushed)   Update: 7/8/20: \"This one is harder for me.  I know I get angry when I'm hungry, and when I slow down I'm less inclined to get angry\".  Challenges - not always having food ready, not always planning in advance, (planing breakfast the night before)  Reviewed: 10/8/2020: Patient's awareness has increased of factors that increase her vulnerability to emotion mind, and she has been using this as a signal to tell her what she needs.    Reviewed and resolved/complete: 1/27/2021: \"I feel like we've done really good work with this and I've gotten what I need from this\".  4/27/2021  - I am doing better with this - I know I get more irritable when I'm hungry and I'm trying to do better with this.  Drinking more water.      7/29/21 - Overall she had been feeling like things were going better with this, though over the past several weeks has felt more challenged due to increase in stressors.  " "  11/10/2021 - Recognizing when feeling tired and how this can contribute to increase in vulnerability and ways to intervene.  Recognizing when feeling irritabiel - ask self if feeling hungry and eat if needed.    2/9/2022 - Self-care is an important focus right now - to ensure she is attending to her basic self-care needs while helping to take care of her  and his health -       Intervention(s)  Therapist will provide information on factors that increase vulnerabiliyt to emotion mind.    Objective #C  Patient will identify warning signs and signals that emotions are escalating and will learn ways to skillfully intervene early on.  Status: New - Date: 01/06/2020 7/8/2020- in progress  Update: 10/08/2020: Patient has been making good progress on identifying signs and signals that her emotions are escalating.    Reviewed and in progress: 1/27/2021: \"I feel like we've done good work on this but this is one of the things I need to keep working on\".    7/29.21 - In progress - actively working on this -   11/10/2021 - Feels sometimes she is able to do this, other times it is harder  2/9/2022 - continued goal -       Intervention(s)  Therapist will help patient identify warning signs and signals, and will guide the patient in identifying skillful ways to intervene when exeriencing warning signs and signals.      Goal 2: Patient will learn new parenting strategies to help with managing parenting challenges.  Parent will work on improving relationship with her daughter and defining what she would like this relationship to look like.      I will know I've met my goal when I'm enjoying time with my daughter, teaching her new things, and not waiting for things to change      Objective #A (Patient Action)    Status: Continued - Date(s):4/23/2020     Patient will increase understanding of developmental norms for her daughter and ways to manage challenging behaviors.    Intervention(s)  Therapist will provide " "psychoeducation on developmental norms and parenting strategies.    Objective #B  Patient will spend time reflecting on her relationship with her daughter and defining what she would like this relaitonship to look like.    Status: Continued - Date(s):4/23/2020   Update: 7/8/2020 - \"I think some things are going better, she's more helpful with things.  Some things are more challenging with the pandemic and thinking of things to do with her\".    Update: 10/08/2020: Patient is making progress on learning and practicing strategies to manage parenting challenges.    Reviewed: 1/27/2021: \"I feel like things have gotten better with this, we've figured out a good routine, I'm doing meditation, I'm enjoying my time with my daughter more than I used to\".   4/27/2021 - Challenges of parenting during a pandemic, working with occupational therapist on strategies to help daughter, 7/29/21 - She's been working on incorporating more structure into the daily routine, as well as managing her emotional reaction when feeling frustrated.  She's also worked on managing her mindset and expectations.  She's been experiencing some frustrations due to two of the OT's she's worked with has recently resigned, resulting in transitioning to several new providers in a short period of time.  I've encouraged she continue to work closely with her daughter's pediatrician to get connected to resources and supports to help with managing her daughter's difficulties.    11/10/2021 - Feels like they've had some really good interactions, though things have been more challenging at school and home (waiting for evaluations).  Daughter enjoys when she reads to her, they've had some really good interactions recently.    2/9/2022 - unable to fully assess progress on this goal today, will further assess progress on this goal and if any treatment needs remain -       Intervention(s)  Therapist will guide the patient in reflecting upon her relationship with her " "daughter and help her define ways to move towards what she vaues in her relationship with her daughter.    Objective #C  Patient will increase time spent on fun activity and play with her daughter.  Status: Continued - Date(s):4/23/2020 7/8/2020 -   10/08/2020 - will further assess this goal at next session   Reviewed: 1/27/2021 - also going really well, we play together all of the time now\", she finds herself enjoying their time together.   4/27/2021- she will spend some time reflecting upon this and we will discuss further next time.  They have several fun things planned for the future, as well as she has worked hard during the pandemic to plan structure and time together within COVID restrictions.  7/29/21 - She's seen some positive changes with being able to spend time with grandparents and at the cabin since being vaccinated, but also has experienced some challenges with planning fun activities out of worries things won't go well and feeling low energy/motivation to do these things - working to address both of these obstacles.    11/10/2021 - They've been able to do more fun activities together - coloring, Lego sets,    2/9/2022 - unable to fully assess progress on this goal today, will further assess progress on this goal and if any  treatment needs remain -       Intervention(s)  Therapist will guide the patient in identifying ways she can increase positive time with her daughter.  Therapist will also teach mindfulness strategies to help patient with being in the present moment when appropriate - .      Goal 3: Patiient will improve communication with her .      I will know I've met my goal when I feel less irritated with my  and communicate more openly with my .  I would like to be more assertive and less aggressive.   I would like to not avoid telling him things because I'm worried about his reaction or don't think he will react well.  I would like to be more present to the moment " "during times when this would be helpful.\"      Objective #A (Patient Action)    Status: Continued - Date(s):4/23/2020 7/8/2020 - suggested couples therapy to help with this.    Individual therapy focus - identify what makes it hard to be more honest and open   10/08/2020 - will further assess this goal at next session   Reviewed: 1/27/2021 - \"Things are better, I feel like we've worked really hard on how to respond when he's having a hard time and how to talk to him.  I'm doing better listening, being more empathetic, and not \".  4/27/2021 - She has been making progress on examining and updating unhelpful beliefs (e.g. feeling responsible for other's emotions, feeling like she needs to fix or change other's emotions) and has made good progress on identifying and asserting healthy boundaries, in addition to  her emotional response from her spouse's emotional response.  7/29/21 - will review at next session   11/10/2021 - Feeling more irritability recently.  Believes this area could use more attention.    2/9/2022 - continued goal -         Patient will learn and practice at least two new interpersonal effectiveness strategies.    Intervention(s)  Therapist will teach strategies from DBT module on interpersonal effectiveness, and will assign homework to help reinforce skill development.      Patient has reviewed and agreed to the above plan.      Cristy Wilkinson PsyD, LP  Licensed Psychologist    January 6, 2020, reviewed on 4/23/2020, reviewed 7/8/2020, reviewed 10/08/2020, reviewed 1/27/2021, reviewed 4/27/2021, 7/29/2021, 11/10/2021, 2/9/2022                                                                                             "

## 2022-03-02 ENCOUNTER — VIRTUAL VISIT (OUTPATIENT)
Dept: PSYCHOLOGY | Facility: CLINIC | Age: 42
End: 2022-03-02
Payer: COMMERCIAL

## 2022-03-02 DIAGNOSIS — F41.9 ANXIETY DISORDER, UNSPECIFIED TYPE: Primary | ICD-10-CM

## 2022-03-02 DIAGNOSIS — F32.A DEPRESSION, UNSPECIFIED DEPRESSION TYPE: ICD-10-CM

## 2022-03-02 PROCEDURE — 90832 PSYTX W PT 30 MINUTES: CPT | Mod: GT | Performed by: PSYCHOLOGIST

## 2022-03-02 NOTE — PROGRESS NOTES
[facility]                                            Progress Note    Patient Name: Idalia Hinojosa  Date: 3/2/2022         Service Type: Individual      Session Start Time: 1:15 pm Session End Time: 1:51 pm   Session Length:  36 minutes    Session #: 45    Attendees: Client     Service Modality:  Video Visit: - I.        Provider verified identity through the following two step process.  Patient provided:  Patient is known previously to provider    Telemedicine Visit: The patient's condition can be safely assessed and treated via synchronous audio and visual telemedicine encounter.      Reason for Telemedicine Visit: Services only offered telehealth    Originating Site (Patient Location): Patient's home    Distant Site (Provider Location): Provider Remote Setting- Home Office    Consent:  The patient/guardian has verbally consented to: the potential risks and benefits of telemedicine (telephone visit) versus in person care; bill my insurance or make self-payment for services provided; and responsibility for payment of non-covered services.     Patient would like the video invitation sent by:  My Chart    Mode of Communication:  Video Conference via [employer],     As the provider I attest to compliance with applicable laws and regulations related to telemedicine.         Treatment Plan Last Reviewed: Developed 1/06/2020, reviewed 4/23/2020, reviewed 7/8/2020, reviewed 10/08/2020, 1/27/2021, 4/27/21, 7/28/2021, 11/10/21, 02/09/2022  PHQ-9/DAVID-7: 2/9/2022    DATA  Interactive Complexity: No  Crisis: No        Progress Since Last Session (Related to Symptoms / Goals / Homework):   Symptoms: She reported this last week has been more difficult - she notices that symptoms of depression have worsened - low energy, low motivation, not wanting to go to work, not wanting to do the things she typically enjoys.           Homework: Achieved / completed to satisfaction.   Has been mindful of mood  "and symptoms, asking for what she needs, taking time for self-care and using strategies, pushing through when \"not feel like it\" in order to do things she knows will be helpful for her.          Episode of Care Goals: Satisfactory progress - ACTION (Actively working towards change); Intervened by reinforcing change plan / affirming steps taken.  Diagnostic assessment has been completed, treatment plan has been developed and patient has been making good progress on treatment goals.  The patient stated that her main goals for therapy would be to learn emotion regulation strategies (in particular, to help with when she is feeling upset/angry/frustrated/distressed).  The changes in her daily life due to COVID restrictions were a focus of our work together, and working through changes to routine as her daughter started  and she returned to work.  As she has been making progress, focus of treatment will shift to wellness planning (monitoring mood and symptoms, incorporating into routine what keeps her feeling well, review of triggers, how to manage set backs, etc.).  As she just learned her  has been diagnosed with cancer, treatment will also focus on providing her with support in managing the emotional distress and challenges related to this.       Current / Ongoing Stressors and Concerns:   Current:  Continued focus on managing mood, symptoms and stressors in the context of 's recent cancer diagnosis and treatment.  She reported that symptoms of depression have intensified over the past week and that it has been a hard week.  She notes that her daughter also had a tough week.  She and her daughter have a weekend trip planned to the cabin with her parents - she's really looking forward to this -      Ongoing:Managing daily routine which has contributed to some adjustment and parenting challenges, lack of effective emotion regulation strategies for managing daily frustrations and upsets and " "marital discord due to differences in parenting styles.       Treatment Objective(s) Addressed in This Session:     Learn and practice behavioral activation strategies (how to move forward with doing what will help you when you don't feel like doing it)  Be curious about your emotions - tune in to - what am I thinking?  What is going on around me?  What do I need?    Identify and practice at least two strategies that will help support your self-care during this challenging time  Learn and practice at least two new strategies for tolerating uncertainty  Practice mindfulness to the moment when your mind wanders to worrying about the unknown       Continue with/review/practice:    Learn and practice ways of self-soothing that don't involve food to increase choices/options when feeling stressed or overwhelmed  Practice mindful awareness when feelings urges to eat when feeling stressed - how am I feeling?  What am I thinking?  What is contributing?  What do I need?  Make mindful decision around what you believe will help.    Learn and practice 3-5 new emotion regulation strategies  Identify ways to bring more structure back into the day, including planning at least one thing a day that brings you lamont  Notice when predicting/anticipating that things won't work out and practice writing a positive script      Incorporate self-care strategies into daily practice   Focus on the big picture in relation to your health and well-being   Be mindful of negative and self-critical thoughts and practice finding kind, compassionate, balanced and flexible thinking  learn emotion regulation strategies to help when she is feeling upset/angry/frustrated/distressed  Identify when falling into \"cheery trap\" (expecting self to be responsible for other's emotions) and allow self to take a step back and look at what I do and don't have control over  identify warning signs and signals, practice inserting skills early on  Increase awareness of " "intensity of emotions and use rating scale to monitor response to using strategies (and to help determine need to try different strategies)        Intervention:   CBT: She shared an example of experiencing heightened symptoms and depressed mood - she shared that she was able to recognize she was feeling more depressed, communicate this with her , and made plans to get outside and walk in the sun and spend some time with her brother - all of which were very helpful for her.  Recognized the great successes on many levels with self-awareness of symptoms, assertiveness around what she needed, and planning/engaging in activities that helped her to feel better!  She wished to focus today on how to continue to \"push through\" to do things that will help her when she doesn't feel like doing these things because mood/energy/motivation is low.  We reviewed concept of behavioral activation and strategies - including concept that motivation often follows, not precedes, with depression (and many times even without depression!) the \"feeling\" doesn't come first - but engaging in the behavior first and the emotional change often follows.  We talked about what helps to support her with moving forward with doing things that help her, as well as what gets in the way.  She'll focus on - recognizing her depression symptoms and how they impact her, kind, compassionate and non-judgmental self-talk, reminding herself that it is important she takes care of herself, and that she often feels better after she starts (not \"waiting\" to feel like doing something that is helpful for her).       ASSESSMENT: Current Emotional / Mental Status (status of significant symptoms):   Risk status (Self / Other harm or suicidal ideation)   Patient denies current fears or concerns for personal safety.   Patient denies current or recent suicidal ideation or behaviors.    She agrees to use a safety plan should any safety concerns arise.  She also agrees " to reach our to her spouse or a family member for help if needed, and/or access crisis/emergency resources.        Patientdenies current or recent homicidal ideation or behaviors.   Patient denies current or recent self injurious behavior or ideation.   Patient denies other safety concerns.   Patient Patient reports there has been no change in risk factors since their last session.     PatientPatient reports there has been no change in protective factors since their last session.     Recommended that patient call 911 or go to the local ED should there be a change in any of these risk factors.  She has previously been informed on how to contact [facility] crisis/COPE for urgent/crisis concerns 24/7.  Provided patient with crisis resources and she agrees to use safety plan should any safety concerns arise.      Professionals or agencies I can contact during a crisis:    Suicide Prevention Lifeline: 4-936-295-TALK (1371)    Crisis Text Line Service (available 24 hours a day, 7 days a week): Text [place] to 700702    [facility]: [facility]: [phone]    Call 911 or go to my nearest emergency department.        Appearance:   Appropriate    Eye Contact:   Good    Psychomotor Behavior: Normal    Attitude:   Cooperative    Orientation:   All   Speech    Rate / Production: Normal     Volume:  Normal    Mood:    Sad    Affect:    Congruent with mood '   Thought Content:  Clear    Thought Form:  Coherent  Logical    Insight:    Good      Medication Review:   No changes to current psychiatric medication(s).      Medication Compliance:   Yes     Changes in Health Issues:  None reported         Chemical Use Review:   Substance Use: Chemical use reviewed, no active concerns identified      Tobacco Use: No current tobacco use.      Diagnosis:  Anxiety disorder unspecified  Depression, unspecified     Collateral Reports Completed:   Not Applicable,      PLAN: (Patient Tasks / Therapist Tasks /  "Other)    1) Idalia identified the following goals: Paying attention to how I am feeling, recognize when feeling down, when I notice something that will help, use compassionate, kind and non-judgmental self-talk that encourages me to keep moving and do things that are helpful for my mood and me, modulate exercise - keep in balanced zone and don't over-do it.       Be curious with your emotions - tune into what you are thinking, what you are noticing, what is happening around you, what do you need?     Practice mindfulness strategies to help with being present to the moment.  Ask for help.  Be mindful of expectations (it is okay that he will have days where he is not feeling as well) permission to take time by myself to recharge, be mindful of emotional boundaries.        2) If there is a crisis situation, remember you are not alone and that there are people who can help you twenty-four hours a day, seven days a week.  Call [facility]): [phone].      3) Follow-up visit is scheduled for 3/9/22 at 10:30 am.   If urgent or crisis concerns arise prior to next scheduled appointment, please reach out to crisis resources, call 911, or go to the emergency department.      Cristy Wilkinson PsyD, LP  Licensed Psychologist  3/2/2022              Previous skills and strategies to remind self of and to do as needed:    Practice \"STOP\" technique when you recognize yourself feeling angry   Be a  - what do you notice is happening when Kaylee feels upset?     Practice recognizing when feeling angry, and giving self permission to use calming and self-soothing strategies and take a break.   Look for the gray with thinking  slow down  take deep breaths  manage expectations of self and others.    Journal   Practice flexing \"flexiblity and creativity\" -   Continue the great work you are doing with your daily schedule.    Get outside every day.    Play your instrument.    Sing!    Continue with: \"It's " "just a thought\" - non-judgmental, observe, float down the river on a leaf, clouds in the gege, reframing unhelpful thoughts -   Keep working on being patient when things are tense around me.    Continue with observing/paying attention to warning signs and signals and give self persmission to slow down, especially during the morning routine with Kaylee.    Practice offerring Kaylee choices when appropriate.      Use \"my plan for success\" to help remind you of calming strategies to practice when feeling upset.    Practice decreasing overall vulnerability to emotion mind through getting adequate rest, nutrition, time for yourself, and physical activity.         Consider reading \"Fighting for your marriage\".      Technical Assistance for video visits:    Offer patient the website ([email or web address]) and/or phone number ([phone]) for video visit instructions/assistance if needed.                                             ____________________________________    Treatment Plan    Patient's Name: Idalia Hinojosa  YOB: 1980    Date of Creation: 1/6/2020  Date Treatment Plan Last Reviewed/Revised: 2/09/2022    DSM5 Diagnoses: 300.00 (F41.9) Unspecified Anxiety Disorder, depressive disorder, unspecified  Psychosocial / Contextual Factors: strain in marital relationship, parenting challenges, adjustment challenges, 's cancer diagnosis  PROMIS (reviewed every 90 days):     Anticipated number of session for this episode of care: additional 15-20  Anticipation frequency of session: Weekly until symptoms stabilize, then can decrease the frequency of sessions to likely bi-weekly or once every three weeks  Anticipated Duration of each session: 38-52 minutes  Treatment plan will be reviewed in 90 days or when goals have been changed.       Referral / Collaboration:  We discussed a referral to a couples/marital therapist.  The patient is thinking about this and has talked with her  about the " "referral, but they are unsure if they would like to follow through at this time.      MeasurableTreatment Goal(s) related to diagnosis / functional impairment(s)  Goal 1: Patient will learn emotion regulation strategies to help when she is feeling upset/angry/frustrated/distressed    I will know I've met my goal when I would yell less, I would feel calmer, and I would not push others.  I would be less physical when I'm angry (e.g. wouldn't slam doors or hit things)       Objective #A (Patient Action)    Patient will learn and practice at least 2 new emotion regulation strategies.  Status: Continued - Date(s):4/23/2020 (has begun to learn new emotion regulation strategies and is making good progress)   Update: 7/8/2020: \"I've learned new strategies but I'm bad at practicing them\" - will help patient incorporate regular practice and identify and problem-solve barriers.    Reviewed: 10/8/2020: Patient has been successfully practicing emotion regulation strategies, is working on identifying earlier on when to intervene and engage strategies, as well as practice strategies on a regular basis to help strength use of strategies.    Reviewed and in progress: 1/27/2021: \"Overall things are going a lot better.  Sometimes I fall into old habits\", but recognizing when she's feeling more vulnerable to her emotions and reminds herself to use different skills/intervene in a different way.     4/27/2021-\"Things have definitely been better\".  Recognizing when feeling vulnerable (e.g. hungry), recognizing when emotions are escalating and need to engage calming strategies\".  Ask for help from Colia when feeling upset.  Can be challenging when Kaylee escalates when I'm feeling angry.    7/29/21 - She felt like she has forgotten what helps her when she's feeling upset, we have spent time reviewing and refreshing these skills.    11/10/2021 - She feels like for the most part things have been going well.    2/9/2022 - Helping her to apply " "emotion regulation strategies in the context of a new and unexpected stressor -     Intervention(s)  Therapist will provide psychoeducation on emotion regulation module from DBT and will assign patient homework to help reinforce skill development.    Objective #B  Patient will identify factors that increase vulnerability to emotion mind and identify ways to decrease vulnerability to emotion mind.  Status: Continued - Date(s):4/23/2020 (has learned factors that make her more vulnerable to emotion mind, and has been working on addressing vulnerability related to feeling hungry, tired and rushed)   Update: 7/8/20: \"This one is harder for me.  I know I get angry when I'm hungry, and when I slow down I'm less inclined to get angry\".  Challenges - not always having food ready, not always planning in advance, (planing breakfast the night before)  Reviewed: 10/8/2020: Patient's awareness has increased of factors that increase her vulnerability to emotion mind, and she has been using this as a signal to tell her what she needs.    Reviewed and resolved/complete: 1/27/2021: \"I feel like we've done really good work with this and I've gotten what I need from this\".  4/27/2021  - I am doing better with this - I know I get more irritable when I'm hungry and I'm trying to do better with this.  Drinking more water.      7/29/21 - Overall she had been feeling like things were going better with this, though over the past several weeks has felt more challenged due to increase in stressors.    11/10/2021 - Recognizing when feeling tired and how this can contribute to increase in vulnerability and ways to intervene.  Recognizing when feeling irritabiel - ask self if feeling hungry and eat if needed.    2/9/2022 - Self-care is an important focus right now - to ensure she is attending to her basic self-care needs while helping to take care of her  and his health -       Intervention(s)  Therapist will provide information on factors " "that increase vulnerabiliyt to emotion mind.    Objective #C  Patient will identify warning signs and signals that emotions are escalating and will learn ways to skillfully intervene early on.  Status: New - Date: 01/06/2020 7/8/2020- in progress  Update: 10/08/2020: Patient has been making good progress on identifying signs and signals that her emotions are escalating.    Reviewed and in progress: 1/27/2021: \"I feel like we've done good work on this but this is one of the things I need to keep working on\".    7/29.21 - In progress - actively working on this -   11/10/2021 - Feels sometimes she is able to do this, other times it is harder  2/9/2022 - continued goal -       Intervention(s)  Therapist will help patient identify warning signs and signals, and will guide the patient in identifying skillful ways to intervene when exeriencing warning signs and signals.      Goal 2: Patient will learn new parenting strategies to help with managing parenting challenges.  Parent will work on improving relationship with her daughter and defining what she would like this relationship to look like.      I will know I've met my goal when I'm enjoying time with my daughter, teaching her new things, and not waiting for things to change      Objective #A (Patient Action)    Status: Continued - Date(s):4/23/2020     Patient will increase understanding of developmental norms for her daughter and ways to manage challenging behaviors.    Intervention(s)  Therapist will provide psychoeducation on developmental norms and parenting strategies.    Objective #B  Patient will spend time reflecting on her relationship with her daughter and defining what she would like this relaitonship to look like.    Status: Continued - Date(s):4/23/2020   Update: 7/8/2020 - \"I think some things are going better, she's more helpful with things.  Some things are more challenging with the pandemic and thinking of things to do with her\".    Update: " "10/08/2020: Patient is making progress on learning and practicing strategies to manage parenting challenges.    Reviewed: 1/27/2021: \"I feel like things have gotten better with this, we've figured out a good routine, I'm doing meditation, I'm enjoying my time with my daughter more than I used to\".   4/27/2021 - Challenges of parenting during a pandemic, working with occupational therapist on strategies to help daughter, 7/29/21 - She's been working on incorporating more structure into the daily routine, as well as managing her emotional reaction when feeling frustrated.  She's also worked on managing her mindset and expectations.  She's been experiencing some frustrations due to two of the OT's she's worked with has recently resigned, resulting in transitioning to several new providers in a short period of time.  I've encouraged she continue to work closely with her daughter's pediatrician to get connected to resources and supports to help with managing her daughter's difficulties.    11/10/2021 - Feels like they've had some really good interactions, though things have been more challenging at school and home (waiting for evaluations).  Daughter enjoys when she reads to her, they've had some really good interactions recently.    2/9/2022 - unable to fully assess progress on this goal today, will further assess progress on this goal and if any treatment needs remain -       Intervention(s)  Therapist will guide the patient in reflecting upon her relationship with her daughter and help her define ways to move towards what she vaues in her relationship with her daughter.    Objective #C  Patient will increase time spent on fun activity and play with her daughter.  Status: Continued - Date(s):4/23/2020 7/8/2020 -   10/08/2020 - will further assess this goal at next session   Reviewed: 1/27/2021 - also going really well, we play together all of the time now\", she finds herself enjoying their time together.   4/27/2021- " "she will spend some time reflecting upon this and we will discuss further next time.  They have several fun things planned for the future, as well as she has worked hard during the pandemic to plan structure and time together within COVID restrictions.  7/29/21 - She's seen some positive changes with being able to spend time with grandparents and at the cabin since being vaccinated, but also has experienced some challenges with planning fun activities out of worries things won't go well and feeling low energy/motivation to do these things - working to address both of these obstacles.    11/10/2021 - They've been able to do more fun activities together - coloring, Lego sets,    2/9/2022 - unable to fully assess progress on this goal today, will further assess progress on this goal and if any  treatment needs remain -       Intervention(s)  Therapist will guide the patient in identifying ways she can increase positive time with her daughter.  Therapist will also teach mindfulness strategies to help patient with being in the present moment when appropriate - .      Goal 3: Patiient will improve communication with her .      I will know I've met my goal when I feel less irritated with my  and communicate more openly with my .  I would like to be more assertive and less aggressive.   I would like to not avoid telling him things because I'm worried about his reaction or don't think he will react well.  I would like to be more present to the moment during times when this would be helpful.\"      Objective #A (Patient Action)    Status: Continued - Date(s):4/23/2020 7/8/2020 - suggested couples therapy to help with this.    Individual therapy focus - identify what makes it hard to be more honest and open   10/08/2020 - will further assess this goal at next session   Reviewed: 1/27/2021 - \"Things are better, I feel like we've worked really hard on how to respond when he's having a hard time and how to " "talk to him.  I'm doing better listening, being more empathetic, and not \".  4/27/2021 - She has been making progress on examining and updating unhelpful beliefs (e.g. feeling responsible for other's emotions, feeling like she needs to fix or change other's emotions) and has made good progress on identifying and asserting healthy boundaries, in addition to  her emotional response from her spouse's emotional response.  7/29/21 - will review at next session   11/10/2021 - Feeling more irritability recently.  Believes this area could use more attention.    2/9/2022 - continued goal -         Patient will learn and practice at least two new interpersonal effectiveness strategies.    Intervention(s)  Therapist will teach strategies from DBT module on interpersonal effectiveness, and will assign homework to help reinforce skill development.      Patient has reviewed and agreed to the above plan.      Cristy Wilkinson PsyD,   Licensed Psychologist    January 6, 2020, reviewed on 4/23/2020, reviewed 7/8/2020, reviewed 10/08/2020, reviewed 1/27/2021, reviewed 4/27/2021, 7/29/2021, 11/10/2021, 2/9/2022                                                                                                                              "

## 2022-03-09 ENCOUNTER — VIRTUAL VISIT (OUTPATIENT)
Dept: PSYCHOLOGY | Facility: CLINIC | Age: 42
End: 2022-03-09
Payer: COMMERCIAL

## 2022-03-09 DIAGNOSIS — F41.9 ANXIETY DISORDER, UNSPECIFIED TYPE: Primary | ICD-10-CM

## 2022-03-09 DIAGNOSIS — F32.A DEPRESSION, UNSPECIFIED DEPRESSION TYPE: ICD-10-CM

## 2022-03-09 PROCEDURE — 90832 PSYTX W PT 30 MINUTES: CPT | Mod: GT | Performed by: PSYCHOLOGIST

## 2022-03-09 NOTE — PROGRESS NOTES
"     [facility]                                            Progress Note    Patient Name: Idalia Hinojosa  Date: 3/9/2022         Service Type: Individual      Session Start Time: 10:32 am Session End Time: 11:01 am   Session Length:  29 minutes    Session #: 46    Attendees: Client     Service Modality:  Video Visit: - I.        Provider verified identity through the following two step process.  Patient provided:  Patient is known previously to provider    Telemedicine Visit: The patient's condition can be safely assessed and treated via synchronous audio and visual telemedicine encounter.      Reason for Telemedicine Visit: Services only offered telehealth    Originating Site (Patient Location): Patient's home    Distant Site (Provider Location): Provider Remote Setting- Home Office    Consent:  The patient/guardian has verbally consented to: the potential risks and benefits of telemedicine (telephone visit) versus in person care; bill my insurance or make self-payment for services provided; and responsibility for payment of non-covered services.     Patient would like the video invitation sent by:  My Chart    Mode of Communication:  Video Conference via [employer],     As the provider I attest to compliance with applicable laws and regulations related to telemedicine.         Treatment Plan Last Reviewed: Developed 1/06/2020, reviewed 4/23/2020, reviewed 7/8/2020, reviewed 10/08/2020, 1/27/2021, 4/27/21, 7/28/2021, 11/10/21, 02/09/2022  PHQ-9/DAVID-7: 2/9/2022    DATA  Interactive Complexity: No  Crisis: No        Progress Since Last Session (Related to Symptoms / Goals / Homework):   Symptoms: she reported that her mood is overall \"better\" -   She had a good weekend at the cabin with her daughter and parents.  Time away helped and she was able to do several things she really enjoys.  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         Homework: Achieved / " completed to satisfaction.  Successful with being aware of mood, identifying what may be helpful.  She did several things she really enjoyed while at the cabin, and found this to be very helpful for her and her overall mood.  She also was successful with modulating her exercise and finding more balance with this (not over-doing it).          Episode of Care Goals: Satisfactory progress - ACTION (Actively working towards change); Intervened by reinforcing change plan / affirming steps taken.  Diagnostic assessment has been completed, treatment plan has been developed and patient has been making good progress on treatment goals.  The patient stated that her main goals for therapy would be to learn emotion regulation strategies (in particular, to help with when she is feeling upset/angry/frustrated/distressed).  The changes in her daily life due to COVID restrictions were a focus of our work together, and working through changes to routine as her daughter started  and she returned to work.  As she has been making progress, focus of treatment will shift to wellness planning (monitoring mood and symptoms, incorporating into routine what keeps her feeling well, review of triggers, how to manage set backs, etc.).  As she just learned her  has been diagnosed with cancer, treatment will also focus on providing her with support in managing the emotional distress and challenges related to this.       Current / Ongoing Stressors and Concerns:   Current:  Continued focus on managing mood, symptoms and stressors in the context of 's recent cancer diagnosis and treatment.  She wished to focus today on how to manage anxiety/uncertainity in the face of unknowns, as well as how stay calm and be patient with her daughter.        Ongoing:Managing daily routine which has contributed to some adjustment and parenting challenges, lack of effective emotion regulation strategies for managing daily frustrations and  "upsets and marital discord due to differences in parenting styles.       Treatment Objective(s) Addressed in This Session:     Identify and practice at least 2-3 strategies to help with regulating emotion   Learn and practice behavioral activation strategies (how to move forward with doing what will help you when you don't feel like doing it)  Be curious about your emotions - tune in to - what am I thinking?  What is going on around me?  What do I need?    Identify and practice at least two strategies that will help support your self-care during this challenging time  Learn and practice at least two new strategies for tolerating uncertainty  Practice mindfulness to the moment when your mind wanders to worrying about the unknown       Continue with/review/practice:    Learn and practice ways of self-soothing that don't involve food to increase choices/options when feeling stressed or overwhelmed  Practice mindful awareness when feelings urges to eat when feeling stressed - how am I feeling?  What am I thinking?  What is contributing?  What do I need?  Make mindful decision around what you believe will help.    Learn and practice 3-5 new emotion regulation strategies  Identify ways to bring more structure back into the day, including planning at least one thing a day that brings you lamont  Notice when predicting/anticipating that things won't work out and practice writing a positive script      Incorporate self-care strategies into daily practice   Focus on the big picture in relation to your health and well-being   Be mindful of negative and self-critical thoughts and practice finding kind, compassionate, balanced and flexible thinking  learn emotion regulation strategies to help when she is feeling upset/angry/frustrated/distressed  Identify when falling into \"cheery trap\" (expecting self to be responsible for other's emotions) and allow self to take a step back and look at what I do and don't have control " over  identify warning signs and signals, practice inserting skills early on  Increase awareness of intensity of emotions and use rating scale to monitor response to using strategies (and to help determine need to try different strategies)        Intervention:   CBT: She shared that she would like to be able to stay calm and patient with her daughter.  We reviewed what might help her with this, ways to manage obstacles that may get in the way, and calming strategies.  We reviewed strategies including - taking deep, calming breaths to help calm her body and mind, paying attention to how she's feeling, what is contributing, and what she needs, taking breaks when needed, doing things she enjoys - lego sets, time with pets, and paying attention to her thoughts/unexpectaitons and encouraging herself to reframe unhelpful self-talk into more kind and helpful self-talk.  We reviewed concept of HALT (hungry, angry, lonely and tired) and ways to ask for help and access support.      ASSESSMENT: Current Emotional / Mental Status (status of significant symptoms):   Risk status (Self / Other harm or suicidal ideation)   Patient denies current fears or concerns for personal safety.   Patient denies current or recent suicidal ideation or behaviors.    She agrees to use a safety plan should any safety concerns arise.  She also agrees to reach our to her spouse or a family member for help if needed, and/or access crisis/emergency resources.        Patientdenies current or recent homicidal ideation or behaviors.   Patient denies current or recent self injurious behavior or ideation.   Patient denies other safety concerns.   Patient Patient reports there has been no change in risk factors since their last session.     PatientPatient reports there has been no change in protective factors since their last session.     Recommended that patient call 911 or go to the local ED should there be a change in any of these risk factors.  She has  previously been informed on how to contact [facility] crisis/COPE for urgent/crisis concerns 24/7.  Provided patient with crisis resources and she agrees to use safety plan should any safety concerns arise.      Professionals or agencies I can contact during a crisis:    Suicide Prevention Lifeline: 2-251-445-TALK (6662)    Crisis Text Line Service (available 24 hours a day, 7 days a week): Text [place] to 129083    Local Crisis Services: [facility]: [phone]    Call 911 or go to my nearest emergency department.        Appearance:   Appropriate    Eye Contact:   Good    Psychomotor Behavior: Normal    Attitude:   Cooperative    Orientation:   All   Speech    Rate / Production: Normal     Volume:  Normal    Mood:    She reports mood is improved - less anxious and depressed - getting a break helped her -    Affect:    Congruent with mood '   Thought Content:  Clear    Thought Form:  Coherent  Logical    Insight:    Good      Medication Review:   No changes to current psychiatric medication(s).      Medication Compliance:   Yes     Changes in Health Issues:  None reported         Chemical Use Review:   Substance Use: Chemical use reviewed, no active concerns identified      Tobacco Use: No current tobacco use.      Diagnosis:  Anxiety disorder unspecified  Depression, unspecified     Collateral Reports Completed:   Not Applicable,      PLAN: (Patient Tasks / Therapist Tasks / Other)    1) Idalia identified the following goals: Focus on staying positive during teacher strike, be flexible and creative, stay calm and patient, focus on what is within your control.         Be curious with your emotions - tune into what you are thinking, what you are noticing, what is happening around you, what do you need?     Practice mindfulness strategies to help with being present to the moment.  Ask for help.  Be mindful of expectations (it is okay that he will have days where he is not feeling as well)  "permission to take time by myself to recharge, be mindful of emotional boundaries.        2) If there is a crisis situation, remember you are not alone and that there are people who can help you twenty-four hours a day, seven days a week.  Call [facility] ([facility]): [phone].      3) Follow-up visit is scheduled for March 16th at 10:30 am.   If urgent or crisis concerns arise prior to next scheduled appointment, please reach out to crisis resources, call 911, or go to the emergency department.      Cristy Wilkinson PsyD,   Licensed Psychologist  3/9/2022          Previous skills and strategies to remind self of and to do as needed:    Practice \"STOP\" technique when you recognize yourself feeling angry   Be a  - what do you notice is happening when Kaylee feels upset?     Practice recognizing when feeling angry, and giving self permission to use calming and self-soothing strategies and take a break.   Look for the gray with thinking  slow down  take deep breaths  manage expectations of self and others.    Journal   Practice flexing \"flexiblity and creativity\" -   Continue the great work you are doing with your daily schedule.    Get outside every day.    Play your instrument.    Sing!    Continue with: \"It's just a thought\" - non-judgmental, observe, float down the river on a leaf, clouds in the gege, reframing unhelpful thoughts -   Keep working on being patient when things are tense around me.    Continue with observing/paying attention to warning signs and signals and give self persmission to slow down, especially during the morning routine with Kaylee.    Practice offerring Kaylee choices when appropriate.      Use \"my plan for success\" to help remind you of calming strategies to practice when feeling upset.    Practice decreasing overall vulnerability to emotion mind through getting adequate rest, nutrition, time for yourself, and physical activity.         Consider reading \"Fighting for " "your marriage\".      Technical Assistance for video visits:    Offer patient the website ([email or web address]) and/or phone number (586-110-0353) for video visit instructions/assistance if needed.                                             ____________________________________    Treatment Plan    Patient's Name: Idalia Hinojosa  YOB: 1980    Date of Creation: 1/6/2020  Date Treatment Plan Last Reviewed/Revised: 2/09/2022    DSM5 Diagnoses: 300.00 (F41.9) Unspecified Anxiety Disorder, depressive disorder, unspecified  Psychosocial / Contextual Factors: strain in marital relationship, parenting challenges, adjustment challenges, 's cancer diagnosis  PROMIS (reviewed every 90 days):     Anticipated number of session for this episode of care: additional 15-20  Anticipation frequency of session: Weekly until symptoms stabilize, then can decrease the frequency of sessions to likely bi-weekly or once every three weeks  Anticipated Duration of each session: 38-52 minutes  Treatment plan will be reviewed in 90 days or when goals have been changed.       Referral / Collaboration:  We discussed a referral to a couples/marital therapist.  The patient is thinking about this and has talked with her  about the referral, but they are unsure if they would like to follow through at this time.      MeasurableTreatment Goal(s) related to diagnosis / functional impairment(s)  Goal 1: Patient will learn emotion regulation strategies to help when she is feeling upset/angry/frustrated/distressed    I will know I've met my goal when I would yell less, I would feel calmer, and I would not push others.  I would be less physical when I'm angry (e.g. wouldn't slam doors or hit things)       Objective #A (Patient Action)    Patient will learn and practice at least 2 new emotion regulation strategies.  Status: Continued - Date(s):4/23/2020 (has begun to learn new emotion regulation strategies and is making " "good progress)   Update: 7/8/2020: \"I've learned new strategies but I'm bad at practicing them\" - will help patient incorporate regular practice and identify and problem-solve barriers.    Reviewed: 10/8/2020: Patient has been successfully practicing emotion regulation strategies, is working on identifying earlier on when to intervene and engage strategies, as well as practice strategies on a regular basis to help strength use of strategies.    Reviewed and in progress: 1/27/2021: \"Overall things are going a lot better.  Sometimes I fall into old habits\", but recognizing when she's feeling more vulnerable to her emotions and reminds herself to use different skills/intervene in a different way.     4/27/2021-\"Things have definitely been better\".  Recognizing when feeling vulnerable (e.g. hungry), recognizing when emotions are escalating and need to engage calming strategies\".  Ask for help from Colia when feeling upset.  Can be challenging when Kaylee escalates when I'm feeling angry.    7/29/21 - She felt like she has forgotten what helps her when she's feeling upset, we have spent time reviewing and refreshing these skills.    11/10/2021 - She feels like for the most part things have been going well.    2/9/2022 - Helping her to apply emotion regulation strategies in the context of a new and unexpected stressor -     Intervention(s)  Therapist will provide psychoeducation on emotion regulation module from DBT and will assign patient homework to help reinforce skill development.    Objective #B  Patient will identify factors that increase vulnerability to emotion mind and identify ways to decrease vulnerability to emotion mind.  Status: Continued - Date(s):4/23/2020 (has learned factors that make her more vulnerable to emotion mind, and has been working on addressing vulnerability related to feeling hungry, tired and rushed)   Update: 7/8/20: \"This one is harder for me.  I know I get angry when I'm hungry, and when I " "slow down I'm less inclined to get angry\".  Challenges - not always having food ready, not always planning in advance, (planing breakfast the night before)  Reviewed: 10/8/2020: Patient's awareness has increased of factors that increase her vulnerability to emotion mind, and she has been using this as a signal to tell her what she needs.    Reviewed and resolved/complete: 1/27/2021: \"I feel like we've done really good work with this and I've gotten what I need from this\".  4/27/2021  - I am doing better with this - I know I get more irritable when I'm hungry and I'm trying to do better with this.  Drinking more water.      7/29/21 - Overall she had been feeling like things were going better with this, though over the past several weeks has felt more challenged due to increase in stressors.    11/10/2021 - Recognizing when feeling tired and how this can contribute to increase in vulnerability and ways to intervene.  Recognizing when feeling irritabiel - ask self if feeling hungry and eat if needed.    2/9/2022 - Self-care is an important focus right now - to ensure she is attending to her basic self-care needs while helping to take care of her  and his health -       Intervention(s)  Therapist will provide information on factors that increase vulnerabiliyt to emotion mind.    Objective #C  Patient will identify warning signs and signals that emotions are escalating and will learn ways to skillfully intervene early on.  Status: New - Date: 01/06/2020 7/8/2020- in progress  Update: 10/08/2020: Patient has been making good progress on identifying signs and signals that her emotions are escalating.    Reviewed and in progress: 1/27/2021: \"I feel like we've done good work on this but this is one of the things I need to keep working on\".    7/29.21 - In progress - actively working on this -   11/10/2021 - Feels sometimes she is able to do this, other times it is harder  2/9/2022 - continued goal - " "      Intervention(s)  Therapist will help patient identify warning signs and signals, and will guide the patient in identifying skillful ways to intervene when exeriencing warning signs and signals.      Goal 2: Patient will learn new parenting strategies to help with managing parenting challenges.  Parent will work on improving relationship with her daughter and defining what she would like this relationship to look like.      I will know I've met my goal when I'm enjoying time with my daughter, teaching her new things, and not waiting for things to change      Objective #A (Patient Action)    Status: Continued - Date(s):4/23/2020     Patient will increase understanding of developmental norms for her daughter and ways to manage challenging behaviors.    Intervention(s)  Therapist will provide psychoeducation on developmental norms and parenting strategies.    Objective #B  Patient will spend time reflecting on her relationship with her daughter and defining what she would like this relaitonship to look like.    Status: Continued - Date(s):4/23/2020   Update: 7/8/2020 - \"I think some things are going better, she's more helpful with things.  Some things are more challenging with the pandemic and thinking of things to do with her\".    Update: 10/08/2020: Patient is making progress on learning and practicing strategies to manage parenting challenges.    Reviewed: 1/27/2021: \"I feel like things have gotten better with this, we've figured out a good routine, I'm doing meditation, I'm enjoying my time with my daughter more than I used to\".   4/27/2021 - Challenges of parenting during a pandemic, working with occupational therapist on strategies to help daughter, 7/29/21 - She's been working on incorporating more structure into the daily routine, as well as managing her emotional reaction when feeling frustrated.  She's also worked on managing her mindset and expectations.  She's been experiencing some frustrations due to " "two of the OT's she's worked with has recently resigned, resulting in transitioning to several new providers in a short period of time.  I've encouraged she continue to work closely with her daughter's pediatrician to get connected to resources and supports to help with managing her daughter's difficulties.    11/10/2021 - Feels like they've had some really good interactions, though things have been more challenging at school and home (waiting for evaluations).  Daughter enjoys when she reads to her, they've had some really good interactions recently.    2/9/2022 - unable to fully assess progress on this goal today, will further assess progress on this goal and if any treatment needs remain -       Intervention(s)  Therapist will guide the patient in reflecting upon her relationship with her daughter and help her define ways to move towards what she vaues in her relationship with her daughter.    Objective #C  Patient will increase time spent on fun activity and play with her daughter.  Status: Continued - Date(s):4/23/2020 7/8/2020 -   10/08/2020 - will further assess this goal at next session   Reviewed: 1/27/2021 - also going really well, we play together all of the time now\", she finds herself enjoying their time together.   4/27/2021- she will spend some time reflecting upon this and we will discuss further next time.  They have several fun things planned for the future, as well as she has worked hard during the pandemic to plan structure and time together within COVID restrictions.  7/29/21 - She's seen some positive changes with being able to spend time with grandparents and at the cabin since being vaccinated, but also has experienced some challenges with planning fun activities out of worries things won't go well and feeling low energy/motivation to do these things - working to address both of these obstacles.    11/10/2021 - They've been able to do more fun activities together - coloring, Lego sets, " "   2/9/2022 - unable to fully assess progress on this goal today, will further assess progress on this goal and if any  treatment needs remain -       Intervention(s)  Therapist will guide the patient in identifying ways she can increase positive time with her daughter.  Therapist will also teach mindfulness strategies to help patient with being in the present moment when appropriate - .      Goal 3: Patiient will improve communication with her .      I will know I've met my goal when I feel less irritated with my  and communicate more openly with my .  I would like to be more assertive and less aggressive.   I would like to not avoid telling him things because I'm worried about his reaction or don't think he will react well.  I would like to be more present to the moment during times when this would be helpful.\"      Objective #A (Patient Action)    Status: Continued - Date(s):4/23/2020 7/8/2020 - suggested couples therapy to help with this.    Individual therapy focus - identify what makes it hard to be more honest and open   10/08/2020 - will further assess this goal at next session   Reviewed: 1/27/2021 - \"Things are better, I feel like we've worked really hard on how to respond when he's having a hard time and how to talk to him.  I'm doing better listening, being more empathetic, and not \".  4/27/2021 - She has been making progress on examining and updating unhelpful beliefs (e.g. feeling responsible for other's emotions, feeling like she needs to fix or change other's emotions) and has made good progress on identifying and asserting healthy boundaries, in addition to  her emotional response from her spouse's emotional response.  7/29/21 - will review at next session   11/10/2021 - Feeling more irritability recently.  Believes this area could use more attention.    2/9/2022 - continued goal -         Patient will learn and practice at least two new interpersonal " effectiveness strategies.    Intervention(s)  Therapist will teach strategies from DBT module on interpersonal effectiveness, and will assign homework to help reinforce skill development.      Patient has reviewed and agreed to the above plan.      Cristy Wilkinson PsyD, LP  Licensed Psychologist    January 6, 2020, reviewed on 4/23/2020, reviewed 7/8/2020, reviewed 10/08/2020, reviewed 1/27/2021, reviewed 4/27/2021, 7/29/2021, 11/10/2021, 2/9/2022

## 2022-03-20 ENCOUNTER — HEALTH MAINTENANCE LETTER (OUTPATIENT)
Age: 42
End: 2022-03-20

## 2022-03-23 ENCOUNTER — VIRTUAL VISIT (OUTPATIENT)
Dept: PSYCHOLOGY | Facility: CLINIC | Age: 42
End: 2022-03-23
Payer: COMMERCIAL

## 2022-03-23 DIAGNOSIS — F41.9 ANXIETY DISORDER, UNSPECIFIED TYPE: Primary | ICD-10-CM

## 2022-03-23 PROCEDURE — 90834 PSYTX W PT 45 MINUTES: CPT | Mod: GT | Performed by: PSYCHOLOGIST

## 2022-03-23 ASSESSMENT — PATIENT HEALTH QUESTIONNAIRE - PHQ9
10. IF YOU CHECKED OFF ANY PROBLEMS, HOW DIFFICULT HAVE THESE PROBLEMS MADE IT FOR YOU TO DO YOUR WORK, TAKE CARE OF THINGS AT HOME, OR GET ALONG WITH OTHER PEOPLE: SOMEWHAT DIFFICULT
SUM OF ALL RESPONSES TO PHQ QUESTIONS 1-9: 7
SUM OF ALL RESPONSES TO PHQ QUESTIONS 1-9: 7

## 2022-03-23 NOTE — PROGRESS NOTES
[facility]                                            Progress Note    Patient Name: Idalia Hinojosa  Date: 3/23/2022         Service Type: Individual      Session Start Time: 10:35 am Session End Time: 11:27 am   Session Length:  52 minutes    Session #: 47    Attendees: Client     Service Modality:  Video Visit: - I.        Provider verified identity through the following two step process.  Patient provided:  Patient is known previously to provider    Telemedicine Visit: The patient's condition can be safely assessed and treated via synchronous audio and visual telemedicine encounter.      Reason for Telemedicine Visit: Services only offered telehealth    Originating Site (Patient Location): Patient's home    Distant Site (Provider Location): Provider Remote Setting- Home Office    Consent:  The patient/guardian has verbally consented to: the potential risks and benefits of telemedicine (telephone visit) versus in person care; bill my insurance or make self-payment for services provided; and responsibility for payment of non-covered services.     Patient would like the video invitation sent by:  My Chart    Mode of Communication:  Video Conference via AmNeedish,     As the provider I attest to compliance with applicable laws and regulations related to telemedicine.         Treatment Plan Last Reviewed: Developed 1/06/2020, reviewed 4/23/2020, reviewed 7/8/2020, reviewed 10/08/2020, 1/27/2021, 4/27/21, 7/28/2021, 11/10/21, 02/09/2022  PHQ-9/DAVID-7: 2/9/2022    DATA  Interactive Complexity: No  Crisis: No        Progress Since Last Session (Related to Symptoms / Goals / Homework):   Symptoms: she reported that symptoms have worsened - reported that she feels more irritable, feels stressed, and needs a break.  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         Answers for HPI/ROS submitted by the patient on 3/23/2022  If you checked off any problems, how difficult have these  problems made it for you to do your work, take care of things at home, or get along with other people?: Somewhat difficult  PHQ9 TOTAL SCORE: 7      Homework: Achieved / completed to satisfaction.         Episode of Care Goals: Satisfactory progress - ACTION (Actively working towards change); Intervened by reinforcing change plan / affirming steps taken.  Diagnostic assessment has been completed, treatment plan has been developed and patient has been making good progress on treatment goals.  The patient stated that her main goals for therapy would be to learn emotion regulation strategies (in particular, to help with when she is feeling upset/angry/frustrated/distressed).  The changes in her daily life due to COVID restrictions were a focus of our work together, and working through changes to routine as her daughter started  and she returned to work.  As she has been making progress, focus of treatment will shift to wellness planning (monitoring mood and symptoms, incorporating into routine what keeps her feeling well, review of triggers, how to manage set backs, etc.).  As she just learned her  has been diagnosed with cancer, treatment will also focus on providing her with support in managing the emotional distress and challenges related to this.       Current / Ongoing Stressors and Concerns:   Current:  Continued focus on managing mood, symptoms and stressors in the context of 's recent cancer diagnosis and treatment. Managing change in routine with teacher strike.       Ongoing:Managing daily routine which has contributed to some adjustment and parenting challenges, lack of effective emotion regulation strategies for managing daily frustrations and upsets and marital discord due to differences in parenting styles.       Treatment Objective(s) Addressed in This Session:   New today:  Identify how others can help support you and practice asking for help/what you need    Continue to  "review/practice/reinforce:    Identify and practice at least 2-3 strategies to help with regulating emotion   Learn and practice behavioral activation strategies (how to move forward with doing what will help you when you don't feel like doing it)  Be curious about your emotions - tune in to - what am I thinking?  What is going on around me?  What do I need?    Identify and practice at least two strategies that will help support your self-care during this challenging time  Learn and practice at least two new strategies for tolerating uncertainty  Practice mindfulness to the moment when your mind wanders to worrying about the unknown       Continue with/review/practice:    Learn and practice ways of self-soothing that don't involve food to increase choices/options when feeling stressed or overwhelmed  Practice mindful awareness when feelings urges to eat when feeling stressed - how am I feeling?  What am I thinking?  What is contributing?  What do I need?  Make mindful decision around what you believe will help.    Learn and practice 3-5 new emotion regulation strategies  Identify ways to bring more structure back into the day, including planning at least one thing a day that brings you lamont  Notice when predicting/anticipating that things won't work out and practice writing a positive script      Incorporate self-care strategies into daily practice   Focus on the big picture in relation to your health and well-being   Be mindful of negative and self-critical thoughts and practice finding kind, compassionate, balanced and flexible thinking  learn emotion regulation strategies to help when she is feeling upset/angry/frustrated/distressed  Identify when falling into \"cheery trap\" (expecting self to be responsible for other's emotions) and allow self to take a step back and look at what I do and don't have control over  identify warning signs and signals, practice inserting skills early on  Increase awareness of " intensity of emotions and use rating scale to monitor response to using strategies (and to help determine need to try different strategies)        Intervention:   CBT: She's looking forward to an upcoming outing this weekend where they are going camping/renting a camper cabin.  She reflected that with the challenges due to Kellee's diagnosis and treatment, and the teacher strike, she recognizes she needs a break and some time to herself to re-charge.  She's worrying about some upcoming time where her parents, who are typically able to help her, will be out of the country visiting her sister.  Validated her need for breaks, respite and time to re-charge, and helped her think about ways she can ask others for help with what she needs.  She identified several people who she thought would be likely to help - her brother and her sister-in law.  Encouraged her to recognize and challenge negative self-talk that makes it difficult for her to reach out to other and ask for help, and give herself permission to need and ask for help (and to recognize both as healthy components of self-care).  She'd also like to start writing again at night - she finds this is often helpful - encouraged she can bring any themes, patterns, questions, etc. Or anything that comes up for her in her writing to her therapy appointments, if it would be helpful.      We also had some discussion around how Kellee's parents and managing interactions that trigger irritability.      ASSESSMENT: Current Emotional / Mental Status (status of significant symptoms):   Risk status (Self / Other harm or suicidal ideation)   Patient denies current fears or concerns for personal safety.   Patient denies current or recent suicidal ideation or behaviors.    She agrees to use a safety plan should any safety concerns arise.  She also agrees to reach our to her spouse or a family member for help if needed, and/or access crisis/emergency resources.        Patientdenies  current or recent homicidal ideation or behaviors.   Patient denies current or recent self injurious behavior or ideation.   Patient denies other safety concerns.   Patient Patient reports there has been no change in risk factors since their last session.     PatientPatient reports there has been no change in protective factors since their last session.     Recommended that patient call 911 or go to the local ED should there be a change in any of these risk factors.  She has previously been informed on how to contact [facility] crisis/[facility] for urgent/crisis concerns 24/7.  Provided patient with crisis resources and she agrees to use safety plan should any safety concerns arise.      Professionals or agencies I can contact during a crisis:    Suicide Prevention Lifeline: 2-816-320-TALK (6581)    Crisis Text Line Service (available 24 hours a day, 7 days a week): Text [place] to 429377    [facility]: [facility]: [phone]    Call 911 or go to my nearest emergency department.        Appearance:   Appropriate    Eye Contact:   Good    Psychomotor Behavior: Normal    Attitude:   Cooperative    Orientation:   All   Speech    Rate / Production: Normal     Volume:  Normal    Mood:    She reports mood is more irritable   Affect:    Congruent with mood '   Thought Content:  Clear    Thought Form:  Coherent  Logical    Insight:    Good      Medication Review:   No changes to current psychiatric medication(s).      Medication Compliance:   Yes     Changes in Health Issues:  None reported         Chemical Use Review:   Substance Use: Chemical use reviewed, no active concerns identified.  She reported she hasn't been drinking alcohol at all as she reported she found it recently has been making her feel sad, unlike in the past where she could have a drink because she enjoyed it.  As a result, she decided it isn't helpful for her right now to drink.  Recognized the great choices she is making as  "it relates to her awareness of what helps and doesn't help her mood.       Tobacco Use: No current tobacco use.      Diagnosis:  Anxiety disorder unspecified  Depression, unspecified     Collateral Reports Completed:   Not Applicable,      PLAN: (Patient Tasks / Therapist Tasks / Other)    1) Idalia identified the following goals: Write before bed, pay attention to thoughts and feelings, separate my emotions from others (ok to feel differently, not reponsible for their emotions, remember they are different and separate - accept that people are different from me, I can accept without approving/liking it, plan for mini breaks and breaks, let others know I'm feeling burnt out and need some alone time to re-charge myself).      Focus on staying positive during teacher strike, be flexible and creative, stay calm and patient, focus on what is within your control.         Continue with:     Practice mindfulness strategies to help with being present to the moment.  Ask for help.  Be mindful of expectations (it is okay that he will have days where he is not feeling as well) permission to take time by myself to recharge, be mindful of emotional boundaries.        2) If there is a crisis situation, remember you are not alone and that there are people who can help you twenty-four hours a day, seven days a week.  Call [facility] ([facility]): [phone].      3) Follow-up visit is scheduled for March 30th at 10:30 am.   If urgent or crisis concerns arise prior to next scheduled appointment, please reach out to crisis resources, call 911, or go to the emergency department.      Cristy Wilkinson PsyD,   Licensed Psychologist  3/23/2022          Previous skills and strategies to remind self of and to do as needed:    Practice \"STOP\" technique when you recognize yourself feeling angry   Be a  - what do you notice is happening when Kaylee feels upset?     Practice recognizing when feeling angry, and giving " "self permission to use calming and self-soothing strategies and take a break.   Look for the gray with thinking  slow down  take deep breaths  manage expectations of self and others.    Journal   Practice flexing \"flexiblity and creativity\" -   Continue the great work you are doing with your daily schedule.    Get outside every day.    Play your instrument.    Sing!    Continue with: \"It's just a thought\" - non-judgmental, observe, float down the river on a leaf, clouds in the gege, reframing unhelpful thoughts -   Keep working on being patient when things are tense around me.    Continue with observing/paying attention to warning signs and signals and give self persmission to slow down, especially during the morning routine with Kaylee.    Practice offerring Kaylee choices when appropriate.      Use \"my plan for success\" to help remind you of calming strategies to practice when feeling upset.    Practice decreasing overall vulnerability to emotion mind through getting adequate rest, nutrition, time for yourself, and physical activity.         Consider reading \"Fighting for your marriage\".      Technical Assistance for video visits:    Offer patient the website ([email or web address]) and/or phone number ([phone]) for video visit instructions/assistance if needed.                                             ____________________________________    Treatment Plan    Patient's Name: Idalia Hinojosa  YOB: 1980    Date of Creation: 1/6/2020  Date Treatment Plan Last Reviewed/Revised: 2/09/2022    DSM5 Diagnoses: 300.00 (F41.9) Unspecified Anxiety Disorder, depressive disorder, unspecified  Psychosocial / Contextual Factors: strain in marital relationship, parenting challenges, adjustment challenges, 's cancer diagnosis  PROMIS (reviewed every 90 days):     Anticipated number of session for this episode of care: additional 15-20  Anticipation frequency of session: Weekly until symptoms " "stabilize, then can decrease the frequency of sessions to likely bi-weekly or once every three weeks  Anticipated Duration of each session: 38-52 minutes  Treatment plan will be reviewed in 90 days or when goals have been changed.       Referral / Collaboration:  We discussed a referral to a couples/marital therapist.  The patient is thinking about this and has talked with her  about the referral, but they are unsure if they would like to follow through at this time.      MeasurableTreatment Goal(s) related to diagnosis / functional impairment(s)  Goal 1: Patient will learn emotion regulation strategies to help when she is feeling upset/angry/frustrated/distressed    I will know I've met my goal when I would yell less, I would feel calmer, and I would not push others.  I would be less physical when I'm angry (e.g. wouldn't slam doors or hit things)       Objective #A (Patient Action)    Patient will learn and practice at least 2 new emotion regulation strategies.  Status: Continued - Date(s):4/23/2020 (has begun to learn new emotion regulation strategies and is making good progress)   Update: 7/8/2020: \"I've learned new strategies but I'm bad at practicing them\" - will help patient incorporate regular practice and identify and problem-solve barriers.    Reviewed: 10/8/2020: Patient has been successfully practicing emotion regulation strategies, is working on identifying earlier on when to intervene and engage strategies, as well as practice strategies on a regular basis to help strength use of strategies.    Reviewed and in progress: 1/27/2021: \"Overall things are going a lot better.  Sometimes I fall into old habits\", but recognizing when she's feeling more vulnerable to her emotions and reminds herself to use different skills/intervene in a different way.     4/27/2021-\"Things have definitely been better\".  Recognizing when feeling vulnerable (e.g. hungry), recognizing when emotions are escalating and need " "to engage calming strategies\".  Ask for help from Colia when feeling upset.  Can be challenging when Kaylee escalates when I'm feeling angry.    7/29/21 - She felt like she has forgotten what helps her when she's feeling upset, we have spent time reviewing and refreshing these skills.    11/10/2021 - She feels like for the most part things have been going well.    2/9/2022 - Helping her to apply emotion regulation strategies in the context of a new and unexpected stressor -     Intervention(s)  Therapist will provide psychoeducation on emotion regulation module from DBT and will assign patient homework to help reinforce skill development.    Objective #B  Patient will identify factors that increase vulnerability to emotion mind and identify ways to decrease vulnerability to emotion mind.  Status: Continued - Date(s):4/23/2020 (has learned factors that make her more vulnerable to emotion mind, and has been working on addressing vulnerability related to feeling hungry, tired and rushed)   Update: 7/8/20: \"This one is harder for me.  I know I get angry when I'm hungry, and when I slow down I'm less inclined to get angry\".  Challenges - not always having food ready, not always planning in advance, (planing breakfast the night before)  Reviewed: 10/8/2020: Patient's awareness has increased of factors that increase her vulnerability to emotion mind, and she has been using this as a signal to tell her what she needs.    Reviewed and resolved/complete: 1/27/2021: \"I feel like we've done really good work with this and I've gotten what I need from this\".  4/27/2021  - I am doing better with this - I know I get more irritable when I'm hungry and I'm trying to do better with this.  Drinking more water.      7/29/21 - Overall she had been feeling like things were going better with this, though over the past several weeks has felt more challenged due to increase in stressors.    11/10/2021 - Recognizing when feeling tired and how " "this can contribute to increase in vulnerability and ways to intervene.  Recognizing when feeling irritabiel - ask self if feeling hungry and eat if needed.    2/9/2022 - Self-care is an important focus right now - to ensure she is attending to her basic self-care needs while helping to take care of her  and his health -       Intervention(s)  Therapist will provide information on factors that increase vulnerabiliyt to emotion mind.    Objective #C  Patient will identify warning signs and signals that emotions are escalating and will learn ways to skillfully intervene early on.  Status: New - Date: 01/06/2020 7/8/2020- in progress  Update: 10/08/2020: Patient has been making good progress on identifying signs and signals that her emotions are escalating.    Reviewed and in progress: 1/27/2021: \"I feel like we've done good work on this but this is one of the things I need to keep working on\".    7/29.21 - In progress - actively working on this -   11/10/2021 - Feels sometimes she is able to do this, other times it is harder  2/9/2022 - continued goal -       Intervention(s)  Therapist will help patient identify warning signs and signals, and will guide the patient in identifying skillful ways to intervene when exeriencing warning signs and signals.      Goal 2: Patient will learn new parenting strategies to help with managing parenting challenges.  Parent will work on improving relationship with her daughter and defining what she would like this relationship to look like.      I will know I've met my goal when I'm enjoying time with my daughter, teaching her new things, and not waiting for things to change      Objective #A (Patient Action)    Status: Continued - Date(s):4/23/2020     Patient will increase understanding of developmental norms for her daughter and ways to manage challenging behaviors.    Intervention(s)  Therapist will provide psychoeducation on developmental norms and parenting " "strategies.    Objective #B  Patient will spend time reflecting on her relationship with her daughter and defining what she would like this relaitonship to look like.    Status: Continued - Date(s):4/23/2020   Update: 7/8/2020 - \"I think some things are going better, she's more helpful with things.  Some things are more challenging with the pandemic and thinking of things to do with her\".    Update: 10/08/2020: Patient is making progress on learning and practicing strategies to manage parenting challenges.    Reviewed: 1/27/2021: \"I feel like things have gotten better with this, we've figured out a good routine, I'm doing meditation, I'm enjoying my time with my daughter more than I used to\".   4/27/2021 - Challenges of parenting during a pandemic, working with occupational therapist on strategies to help daughter, 7/29/21 - She's been working on incorporating more structure into the daily routine, as well as managing her emotional reaction when feeling frustrated.  She's also worked on managing her mindset and expectations.  She's been experiencing some frustrations due to two of the OT's she's worked with has recently resigned, resulting in transitioning to several new providers in a short period of time.  I've encouraged she continue to work closely with her daughter's pediatrician to get connected to resources and supports to help with managing her daughter's difficulties.    11/10/2021 - Feels like they've had some really good interactions, though things have been more challenging at school and home (waiting for evaluations).  Daughter enjoys when she reads to her, they've had some really good interactions recently.    2/9/2022 - unable to fully assess progress on this goal today, will further assess progress on this goal and if any treatment needs remain -       Intervention(s)  Therapist will guide the patient in reflecting upon her relationship with her daughter and help her define ways to move towards what " "she vaues in her relationship with her daughter.    Objective #C  Patient will increase time spent on fun activity and play with her daughter.  Status: Continued - Date(s):4/23/2020 7/8/2020 -   10/08/2020 - will further assess this goal at next session   Reviewed: 1/27/2021 - also going really well, we play together all of the time now\", she finds herself enjoying their time together.   4/27/2021- she will spend some time reflecting upon this and we will discuss further next time.  They have several fun things planned for the future, as well as she has worked hard during the pandemic to plan structure and time together within COVID restrictions.  7/29/21 - She's seen some positive changes with being able to spend time with grandparents and at the cabin since being vaccinated, but also has experienced some challenges with planning fun activities out of worries things won't go well and feeling low energy/motivation to do these things - working to address both of these obstacles.    11/10/2021 - They've been able to do more fun activities together - coloring, Lego sets,    2/9/2022 - unable to fully assess progress on this goal today, will further assess progress on this goal and if any  treatment needs remain -       Intervention(s)  Therapist will guide the patient in identifying ways she can increase positive time with her daughter.  Therapist will also teach mindfulness strategies to help patient with being in the present moment when appropriate - .      Goal 3: Patiient will improve communication with her .      I will know I've met my goal when I feel less irritated with my  and communicate more openly with my .  I would like to be more assertive and less aggressive.   I would like to not avoid telling him things because I'm worried about his reaction or don't think he will react well.  I would like to be more present to the moment during times when this would be helpful.\"      Objective " "#A (Patient Action)    Status: Continued - Date(s):4/23/2020 7/8/2020 - suggested couples therapy to help with this.    Individual therapy focus - identify what makes it hard to be more honest and open   10/08/2020 - will further assess this goal at next session   Reviewed: 1/27/2021 - \"Things are better, I feel like we've worked really hard on how to respond when he's having a hard time and how to talk to him.  I'm doing better listening, being more empathetic, and not \".  4/27/2021 - She has been making progress on examining and updating unhelpful beliefs (e.g. feeling responsible for other's emotions, feeling like she needs to fix or change other's emotions) and has made good progress on identifying and asserting healthy boundaries, in addition to  her emotional response from her spouse's emotional response.  7/29/21 - will review at next session   11/10/2021 - Feeling more irritability recently.  Believes this area could use more attention.    2/9/2022 - continued goal -         Patient will learn and practice at least two new interpersonal effectiveness strategies.    Intervention(s)  Therapist will teach strategies from DBT module on interpersonal effectiveness, and will assign homework to help reinforce skill development.      Patient has reviewed and agreed to the above plan.      Cristy Wilkinson PsyD, LP  Licensed Psychologist    January 6, 2020, reviewed on 4/23/2020, reviewed 7/8/2020, reviewed 10/08/2020, reviewed 1/27/2021, reviewed 4/27/2021, 7/29/2021, 11/10/2021, 2/9/2022                                                                                                                                  "

## 2022-03-24 ASSESSMENT — PATIENT HEALTH QUESTIONNAIRE - PHQ9: SUM OF ALL RESPONSES TO PHQ QUESTIONS 1-9: 7

## 2022-03-30 ENCOUNTER — VIRTUAL VISIT (OUTPATIENT)
Dept: PSYCHOLOGY | Facility: CLINIC | Age: 42
End: 2022-03-30
Payer: COMMERCIAL

## 2022-03-30 DIAGNOSIS — F41.9 ANXIETY DISORDER, UNSPECIFIED TYPE: Primary | ICD-10-CM

## 2022-03-30 PROCEDURE — 90832 PSYTX W PT 30 MINUTES: CPT | Mod: GT | Performed by: PSYCHOLOGIST

## 2022-03-30 NOTE — PROGRESS NOTES
[facility]                                            Progress Note    Patient Name: Idalia Hinojosa  Date: 3/30/2022         Service Type: Individual      Session Start Time: 10:35 am Session End Time: 11:05 am   Session Length:  30 minutes    Session #: 48    Attendees: Client     Service Modality:  Video Visit: - I.        Provider verified identity through the following two step process.  Patient provided:  Patient is known previously to provider    Telemedicine Visit: The patient's condition can be safely assessed and treated via synchronous audio and visual telemedicine encounter.      Reason for Telemedicine Visit: Services only offered telehealth    Originating Site (Patient Location): Patient's home    Distant Site (Provider Location): Provider Remote Setting- Home Office    Consent:  The patient/guardian has verbally consented to: the potential risks and benefits of telemedicine (telephone visit) versus in person care; bill my insurance or make self-payment for services provided; and responsibility for payment of non-covered services.     Patient would like the video invitation sent by:  My Chart    Mode of Communication:  Video Conference via [employer],     As the provider I attest to compliance with applicable laws and regulations related to telemedicine.         Treatment Plan Last Reviewed: Developed 1/06/2020, reviewed 4/23/2020, reviewed 7/8/2020, reviewed 10/08/2020, 1/27/2021, 4/27/21, 7/28/2021, 11/10/21, 02/09/2022  PHQ-9/DAVID-7: 2/9/2022    DATA  Interactive Complexity: No  Crisis: No        Progress Since Last Session (Related to Symptoms / Goals / Homework):   Symptoms: symptoms have worsened - she feels more down, tired, worn-out and irritable.      Homework: Achieved / completed to satisfaction.  Has been writing before bed.  Said no to something she didn't want to do.  Was able to take mini breaks.          Episode of Care Goals: Satisfactory progress - ACTION  (Actively working towards change); Intervened by reinforcing change plan / affirming steps taken.  Diagnostic assessment has been completed, treatment plan has been developed and patient has been making good progress on treatment goals.  The patient stated that her main goals for therapy would be to learn emotion regulation strategies (in particular, to help with when she is feeling upset/angry/frustrated/distressed).  The changes in her daily life due to COVID restrictions were a focus of our work together, and working through changes to routine as her daughter started  and she returned to work.  As she has been making progress, focus of treatment will shift to wellness planning (monitoring mood and symptoms, incorporating into routine what keeps her feeling well, review of triggers, how to manage set backs, etc.).  As she just learned her  has been diagnosed with cancer, treatment will also focus on providing her with support in managing the emotional distress and challenges related to this.       Current / Ongoing Stressors and Concerns:   Current:  Continued focus on managing mood, symptoms and stressors in the context of 's recent cancer diagnosis and treatment.  Identification of ways to practice self-care and set realistic expectations for herself.       Ongoing:Managing daily routine which has contributed to some adjustment and parenting challenges, lack of effective emotion regulation strategies for managing daily frustrations and upsets and marital discord due to differences in parenting styles.       Treatment Objective(s) Addressed in This Session:   New today:  Identify ways to practice and incorporate self-care into routine.  Ask myself what I need.      Continue to review/practice/reinforce:  Identify how others can help support you and practice asking for help/what you need  Identify and practice at least 2-3 strategies to help with regulating emotion   Learn and practice  "behavioral activation strategies (how to move forward with doing what will help you when you don't feel like doing it)  Be curious about your emotions - tune in to - what am I thinking?  What is going on around me?  What do I need?    Identify and practice at least two strategies that will help support your self-care during this challenging time  Learn and practice at least two new strategies for tolerating uncertainty  Practice mindfulness to the moment when your mind wanders to worrying about the unknown       Continue with/review/practice:    Learn and practice ways of self-soothing that don't involve food to increase choices/options when feeling stressed or overwhelmed  Practice mindful awareness when feelings urges to eat when feeling stressed - how am I feeling?  What am I thinking?  What is contributing?  What do I need?  Make mindful decision around what you believe will help.    Learn and practice 3-5 new emotion regulation strategies  Identify ways to bring more structure back into the day, including planning at least one thing a day that brings you lamont  Notice when predicting/anticipating that things won't work out and practice writing a positive script      Incorporate self-care strategies into daily practice   Focus on the big picture in relation to your health and well-being   Be mindful of negative and self-critical thoughts and practice finding kind, compassionate, balanced and flexible thinking  learn emotion regulation strategies to help when she is feeling upset/angry/frustrated/distressed  Identify when falling into \"cheery trap\" (expecting self to be responsible for other's emotions) and allow self to take a step back and look at what I do and don't have control over  identify warning signs and signals, practice inserting skills early on  Increase awareness of intensity of emotions and use rating scale to monitor response to using strategies (and to help determine need to try different " "strategies)        Intervention:   CBT: The teacher strike has resolved and her daughter is back to school.  She's noticed herself continuing to feel feelings of anger and upset about the strike and the impact it had on her over the last few weeks.  We focused today on identifying ways she can continue to nurture her self-care and incorporate self-care strategies into her daily routine.  She noted that she was struggling with staying present in the session today - was thinking about and focusing on her Lego house and thinking \"I need to finish this\".  She identified feeling more anxious in the moment, and connected that when she feels more anxious due to uncertainties this can lead her to over-focus on specific things she can control - like finishing her Lego house - but that this ultimately takes the fun and lamont from it and doesn't relieve her anxiety.  She identified more constructive thoughts and behaviors - including awareness of anxiety, labeling and externalizing it, remind myself - \"I don't need to finish it right now\", focus on the lamont from the activity.  We looked at ways to respond to feeling more worn down and tired - she reported needing to slow down, not use her breaks solely for doing tasks and projects but also balancing with taking time for herself.  Spent time looking at what she can \"let go of now\" with emphasis on thinking about where things are \"right now\" in light of all that has changed over the past few weeks (things she can let go of \"now\" because of all of the changes in their daily routine with Kellee's diagnosis and treatment) so that she can find balance that allows her to take breaks and have time for herself too.       ASSESSMENT: Current Emotional / Mental Status (status of significant symptoms):   Risk status (Self / Other harm or suicidal ideation)   Patient denies current fears or concerns for personal safety.   Patient denies current or recent suicidal ideation or behaviors.    She " agrees to use a safety plan should any safety concerns arise.  She also agrees to reach our to her spouse or a family member for help if needed, and/or access crisis/emergency resources.        Patientdenies current or recent homicidal ideation or behaviors.   Patient denies current or recent self injurious behavior or ideation.   Patient denies other safety concerns.   Patient Patient reports there has been no change in risk factors since their last session.     PatientPatient reports there has been no change in protective factors since their last session.     Recommended that patient call 911 or go to the local ED should there be a change in any of these risk factors.  She has previously been informed on how to contact [facility] crisis/[facility] for urgent/crisis concerns 24/7.  Provided patient with crisis resources and she agrees to use safety plan should any safety concerns arise.      Professionals or agencies I can contact during a crisis:    Suicide Prevention Lifeline: 4-224-754-TALK (1923)    Crisis Text Line Service (available 24 hours a day, 7 days a week): Text [place] to 879619    Local Crisis Services: [facility]: [phone]    Call 911 or go to my nearest emergency department.        Appearance:   Appropriate    Eye Contact:   Good    Psychomotor Behavior: Normal    Attitude:   Cooperative    Orientation:   All   Speech    Rate / Production: Normal     Volume:  Normal    Mood:    She reports mood is more sad, tired, worn down, and irritable   Affect:    Congruent with mood '   Thought Content:  Clear    Thought Form:  Coherent  Logical    Insight:    Good      Medication Review:   No changes to current psychiatric medication(s).      Medication Compliance:   Yes     Changes in Health Issues:  None reported         Chemical Use Review:   Substance Use: Chemical use reviewed, no active concerns identified.  (no alcohol use)     Tobacco Use: No current tobacco use.   "    Diagnosis:  Anxiety disorder unspecified  Depression, unspecified     Collateral Reports Completed:   Not Applicable,      PLAN: (Patient Tasks / Therapist Tasks / Other)    1) Idalia identified the following goals: Remembering to slow down, remind myself I don't need to be in such a hurry, okay not to get everything done, ok to take breaks and time for myself.  Continue to ask for help.      Continue with:  Write before bed, pay attention to thoughts and feelings, separate my emotions from others (ok to feel differently, not reponsible for their emotions, remember they are different and separate - accept that people are different from me, I can accept without approving/liking it, plan for mini breaks and breaks, let others know I'm feeling burnt out and need some alone time to re-charge myself).       Practice mindfulness strategies to help with being present to the moment.  Ask for help.  Be mindful of expectations (it is okay that he will have days where he is not feeling as well) permission to take time by myself to recharge, be mindful of emotional boundaries.        2) If there is a crisis situation, remember you are not alone and that there are people who can help you twenty-four hours a day, seven days a week.  Call [facility] ([facility]): [phone].      3) Follow-up visit is scheduled for April 13th at 10:30 am.   If urgent or crisis concerns arise prior to next scheduled appointment, please reach out to crisis resources, call 911, or go to the emergency department.      Cristy Wilkinson PsyD, LP  Licensed Psychologist  3/30/2022            Previous skills and strategies to remind self of and to do as needed:    Practice \"STOP\" technique when you recognize yourself feeling angry   Be a  - what do you notice is happening when Kaylee feels upset?     Practice recognizing when feeling angry, and giving self permission to use calming and self-soothing strategies and take a break. " "  Look for the gray with thinking  slow down  take deep breaths  manage expectations of self and others.    Journal   Practice flexing \"flexiblity and creativity\" -   Continue the great work you are doing with your daily schedule.    Get outside every day.    Play your instrument.    Sing!    Continue with: \"It's just a thought\" - non-judgmental, observe, float down the river on a leaf, clouds in the gege, reframing unhelpful thoughts -   Keep working on being patient when things are tense around me.    Continue with observing/paying attention to warning signs and signals and give self persmission to slow down, especially during the morning routine with Kaylee.    Practice offerring Kaylee choices when appropriate.      Use \"my plan for success\" to help remind you of calming strategies to practice when feeling upset.    Practice decreasing overall vulnerability to emotion mind through getting adequate rest, nutrition, time for yourself, and physical activity.         Consider reading \"Fighting for your marriage\".      Technical Assistance for video visits:    Offer patient the website ([email or web address].fm/videovisit) and/or phone number ([phone]) for video visit instructions/assistance if needed.                                             ____________________________________    Treatment Plan    Patient's Name: Idalia Hinojosa  YOB: 1980    Date of Creation: 1/6/2020  Date Treatment Plan Last Reviewed/Revised: 2/09/2022    DSM5 Diagnoses: 300.00 (F41.9) Unspecified Anxiety Disorder, depressive disorder, unspecified  Psychosocial / Contextual Factors: strain in marital relationship, parenting challenges, adjustment challenges, 's cancer diagnosis  PROMIS (reviewed every 90 days):     Anticipated number of session for this episode of care: additional 15-20  Anticipation frequency of session: Weekly until symptoms stabilize, then can decrease the frequency of sessions to likely bi-weekly or once " "every three weeks  Anticipated Duration of each session: 38-52 minutes  Treatment plan will be reviewed in 90 days or when goals have been changed.       Referral / Collaboration:  We discussed a referral to a couples/marital therapist.  The patient is thinking about this and has talked with her  about the referral, but they are unsure if they would like to follow through at this time.      MeasurableTreatment Goal(s) related to diagnosis / functional impairment(s)  Goal 1: Patient will learn emotion regulation strategies to help when she is feeling upset/angry/frustrated/distressed    I will know I've met my goal when I would yell less, I would feel calmer, and I would not push others.  I would be less physical when I'm angry (e.g. wouldn't slam doors or hit things)       Objective #A (Patient Action)    Patient will learn and practice at least 2 new emotion regulation strategies.  Status: Continued - Date(s):4/23/2020 (has begun to learn new emotion regulation strategies and is making good progress)   Update: 7/8/2020: \"I've learned new strategies but I'm bad at practicing them\" - will help patient incorporate regular practice and identify and problem-solve barriers.    Reviewed: 10/8/2020: Patient has been successfully practicing emotion regulation strategies, is working on identifying earlier on when to intervene and engage strategies, as well as practice strategies on a regular basis to help strength use of strategies.    Reviewed and in progress: 1/27/2021: \"Overall things are going a lot better.  Sometimes I fall into old habits\", but recognizing when she's feeling more vulnerable to her emotions and reminds herself to use different skills/intervene in a different way.     4/27/2021-\"Things have definitely been better\".  Recognizing when feeling vulnerable (e.g. hungry), recognizing when emotions are escalating and need to engage calming strategies\".  Ask for help from Colia when feeling upset.  Can be " "challenging when Kaylee escalates when I'm feeling angry.    7/29/21 - She felt like she has forgotten what helps her when she's feeling upset, we have spent time reviewing and refreshing these skills.    11/10/2021 - She feels like for the most part things have been going well.    2/9/2022 - Helping her to apply emotion regulation strategies in the context of a new and unexpected stressor -     Intervention(s)  Therapist will provide psychoeducation on emotion regulation module from DBT and will assign patient homework to help reinforce skill development.    Objective #B  Patient will identify factors that increase vulnerability to emotion mind and identify ways to decrease vulnerability to emotion mind.  Status: Continued - Date(s):4/23/2020 (has learned factors that make her more vulnerable to emotion mind, and has been working on addressing vulnerability related to feeling hungry, tired and rushed)   Update: 7/8/20: \"This one is harder for me.  I know I get angry when I'm hungry, and when I slow down I'm less inclined to get angry\".  Challenges - not always having food ready, not always planning in advance, (planing breakfast the night before)  Reviewed: 10/8/2020: Patient's awareness has increased of factors that increase her vulnerability to emotion mind, and she has been using this as a signal to tell her what she needs.    Reviewed and resolved/complete: 1/27/2021: \"I feel like we've done really good work with this and I've gotten what I need from this\".  4/27/2021  - I am doing better with this - I know I get more irritable when I'm hungry and I'm trying to do better with this.  Drinking more water.      7/29/21 - Overall she had been feeling like things were going better with this, though over the past several weeks has felt more challenged due to increase in stressors.    11/10/2021 - Recognizing when feeling tired and how this can contribute to increase in vulnerability and ways to intervene.  Recognizing " "when feeling irritabiel - ask self if feeling hungry and eat if needed.    2/9/2022 - Self-care is an important focus right now - to ensure she is attending to her basic self-care needs while helping to take care of her  and his health -       Intervention(s)  Therapist will provide information on factors that increase vulnerabiliyt to emotion mind.    Objective #C  Patient will identify warning signs and signals that emotions are escalating and will learn ways to skillfully intervene early on.  Status: New - Date: 01/06/2020 7/8/2020- in progress  Update: 10/08/2020: Patient has been making good progress on identifying signs and signals that her emotions are escalating.    Reviewed and in progress: 1/27/2021: \"I feel like we've done good work on this but this is one of the things I need to keep working on\".    7/29.21 - In progress - actively working on this -   11/10/2021 - Feels sometimes she is able to do this, other times it is harder  2/9/2022 - continued goal -       Intervention(s)  Therapist will help patient identify warning signs and signals, and will guide the patient in identifying skillful ways to intervene when exeriencing warning signs and signals.      Goal 2: Patient will learn new parenting strategies to help with managing parenting challenges.  Parent will work on improving relationship with her daughter and defining what she would like this relationship to look like.      I will know I've met my goal when I'm enjoying time with my daughter, teaching her new things, and not waiting for things to change      Objective #A (Patient Action)    Status: Continued - Date(s):4/23/2020     Patient will increase understanding of developmental norms for her daughter and ways to manage challenging behaviors.    Intervention(s)  Therapist will provide psychoeducation on developmental norms and parenting strategies.    Objective #B  Patient will spend time reflecting on her relationship with her " "daughter and defining what she would like this relaitonship to look like.    Status: Continued - Date(s):4/23/2020   Update: 7/8/2020 - \"I think some things are going better, she's more helpful with things.  Some things are more challenging with the pandemic and thinking of things to do with her\".    Update: 10/08/2020: Patient is making progress on learning and practicing strategies to manage parenting challenges.    Reviewed: 1/27/2021: \"I feel like things have gotten better with this, we've figured out a good routine, I'm doing meditation, I'm enjoying my time with my daughter more than I used to\".   4/27/2021 - Challenges of parenting during a pandemic, working with occupational therapist on strategies to help daughter, 7/29/21 - She's been working on incorporating more structure into the daily routine, as well as managing her emotional reaction when feeling frustrated.  She's also worked on managing her mindset and expectations.  She's been experiencing some frustrations due to two of the OT's she's worked with has recently resigned, resulting in transitioning to several new providers in a short period of time.  I've encouraged she continue to work closely with her daughter's pediatrician to get connected to resources and supports to help with managing her daughter's difficulties.    11/10/2021 - Feels like they've had some really good interactions, though things have been more challenging at school and home (waiting for evaluations).  Daughter enjoys when she reads to her, they've had some really good interactions recently.    2/9/2022 - unable to fully assess progress on this goal today, will further assess progress on this goal and if any treatment needs remain -       Intervention(s)  Therapist will guide the patient in reflecting upon her relationship with her daughter and help her define ways to move towards what she vaues in her relationship with her daughter.    Objective #C  Patient will increase time " "spent on fun activity and play with her daughter.  Status: Continued - Date(s):4/23/2020 7/8/2020 -   10/08/2020 - will further assess this goal at next session   Reviewed: 1/27/2021 - also going really well, we play together all of the time now\", she finds herself enjoying their time together.   4/27/2021- she will spend some time reflecting upon this and we will discuss further next time.  They have several fun things planned for the future, as well as she has worked hard during the pandemic to plan structure and time together within COVID restrictions.  7/29/21 - She's seen some positive changes with being able to spend time with grandparents and at the cabin since being vaccinated, but also has experienced some challenges with planning fun activities out of worries things won't go well and feeling low energy/motivation to do these things - working to address both of these obstacles.    11/10/2021 - They've been able to do more fun activities together - coloring, Lego sets,    2/9/2022 - unable to fully assess progress on this goal today, will further assess progress on this goal and if any  treatment needs remain -       Intervention(s)  Therapist will guide the patient in identifying ways she can increase positive time with her daughter.  Therapist will also teach mindfulness strategies to help patient with being in the present moment when appropriate - .      Goal 3: Patiient will improve communication with her .      I will know I've met my goal when I feel less irritated with my  and communicate more openly with my .  I would like to be more assertive and less aggressive.   I would like to not avoid telling him things because I'm worried about his reaction or don't think he will react well.  I would like to be more present to the moment during times when this would be helpful.\"      Objective #A (Patient Action)    Status: Continued - Date(s):4/23/2020 7/8/2020 - suggested couples " "therapy to help with this.    Individual therapy focus - identify what makes it hard to be more honest and open   10/08/2020 - will further assess this goal at next session   Reviewed: 1/27/2021 - \"Things are better, I feel like we've worked really hard on how to respond when he's having a hard time and how to talk to him.  I'm doing better listening, being more empathetic, and not \".  4/27/2021 - She has been making progress on examining and updating unhelpful beliefs (e.g. feeling responsible for other's emotions, feeling like she needs to fix or change other's emotions) and has made good progress on identifying and asserting healthy boundaries, in addition to  her emotional response from her spouse's emotional response.  7/29/21 - will review at next session   11/10/2021 - Feeling more irritability recently.  Believes this area could use more attention.    2/9/2022 - continued goal -         Patient will learn and practice at least two new interpersonal effectiveness strategies.    Intervention(s)  Therapist will teach strategies from DBT module on interpersonal effectiveness, and will assign homework to help reinforce skill development.      Patient has reviewed and agreed to the above plan.      Cristy Wilkinson PsyD, LP  Licensed Psychologist    January 6, 2020, reviewed on 4/23/2020, reviewed 7/8/2020, reviewed 10/08/2020, reviewed 1/27/2021, reviewed 4/27/2021, 7/29/2021, 11/10/2021, 2/9/2022                                                                                                                                  "

## 2022-04-08 ENCOUNTER — OFFICE VISIT (OUTPATIENT)
Dept: OBGYN | Facility: CLINIC | Age: 42
End: 2022-04-08
Payer: COMMERCIAL

## 2022-04-08 VITALS
HEART RATE: 110 BPM | SYSTOLIC BLOOD PRESSURE: 101 MMHG | DIASTOLIC BLOOD PRESSURE: 66 MMHG | BODY MASS INDEX: 27.32 KG/M2 | OXYGEN SATURATION: 98 % | HEIGHT: 65 IN | WEIGHT: 164 LBS

## 2022-04-08 DIAGNOSIS — I78.1 NON-NEOPLASTIC NEVUS: ICD-10-CM

## 2022-04-08 DIAGNOSIS — Z13.29 SCREENING FOR THYROID DISORDER: ICD-10-CM

## 2022-04-08 DIAGNOSIS — Z13.0 SCREENING, ANEMIA, DEFICIENCY, IRON: ICD-10-CM

## 2022-04-08 DIAGNOSIS — Z01.419 ENCOUNTER FOR GYNECOLOGICAL EXAMINATION WITHOUT ABNORMAL FINDING: Primary | ICD-10-CM

## 2022-04-08 DIAGNOSIS — Z13.220 SCREENING FOR LIPID DISORDERS: ICD-10-CM

## 2022-04-08 DIAGNOSIS — F32.5 MAJOR DEPRESSIVE DISORDER WITH SINGLE EPISODE, IN FULL REMISSION (H): ICD-10-CM

## 2022-04-08 DIAGNOSIS — F32.81 PMDD (PREMENSTRUAL DYSPHORIC DISORDER): ICD-10-CM

## 2022-04-08 DIAGNOSIS — Z11.59 NEED FOR HEPATITIS C SCREENING TEST: ICD-10-CM

## 2022-04-08 DIAGNOSIS — Z13.1 SCREENING FOR DIABETES MELLITUS: ICD-10-CM

## 2022-04-08 PROCEDURE — 99396 PREV VISIT EST AGE 40-64: CPT | Performed by: OBSTETRICS & GYNECOLOGY

## 2022-04-08 RX ORDER — NORETHINDRONE ACETATE AND ETHINYL ESTRADIOL .02; 1 MG/1; MG/1
1 TABLET ORAL DAILY
Qty: 112 TABLET | Refills: 4 | Status: SHIPPED | OUTPATIENT
Start: 2022-04-08 | End: 2023-05-30

## 2022-04-08 ASSESSMENT — PATIENT HEALTH QUESTIONNAIRE - PHQ9
SUM OF ALL RESPONSES TO PHQ QUESTIONS 1-9: 7
5. POOR APPETITE OR OVEREATING: SEVERAL DAYS

## 2022-04-08 ASSESSMENT — ANXIETY QUESTIONNAIRES
2. NOT BEING ABLE TO STOP OR CONTROL WORRYING: NOT AT ALL
3. WORRYING TOO MUCH ABOUT DIFFERENT THINGS: NOT AT ALL
IF YOU CHECKED OFF ANY PROBLEMS ON THIS QUESTIONNAIRE, HOW DIFFICULT HAVE THESE PROBLEMS MADE IT FOR YOU TO DO YOUR WORK, TAKE CARE OF THINGS AT HOME, OR GET ALONG WITH OTHER PEOPLE: SOMEWHAT DIFFICULT
GAD7 TOTAL SCORE: 2
5. BEING SO RESTLESS THAT IT IS HARD TO SIT STILL: NOT AT ALL
1. FEELING NERVOUS, ANXIOUS, OR ON EDGE: NOT AT ALL
7. FEELING AFRAID AS IF SOMETHING AWFUL MIGHT HAPPEN: NOT AT ALL
6. BECOMING EASILY ANNOYED OR IRRITABLE: SEVERAL DAYS

## 2022-04-08 NOTE — PATIENT INSTRUCTIONS
Make lab only appointment fasting (nothing but water 10 hours prior to blood draw) at any [facility]

## 2022-04-08 NOTE — PROGRESS NOTES
Idalia is a 42 year old  female who presents for annual exam.     Menses are rare}.  No LMP recorded.. Using oral contraceptives and condoms for contraception.  She is not currently considering pregnancy.  Besides routine health maintenance,  she would like to discuss refill BC and zoloft.  Spouse with lymphoma and getting chemo every 2 weeks. Daughter (6)  is on spring break and was off 3 weeks. Thinks she has ADHD and need to get her tested. Working at [employer] part time.   DAVID=2, PHQ=7 still seeing her therapist and taking zoloft, needs refill   GYNECOLOGIC HISTORY:  Menarche:  Idalia is sexually active with 1male partner(s) and is currently in monogamous relationship.    History sexually transmitted infections:No STD history  STI testing offered?  Declined  DEBORA exposure: Unknown  History of abnormal Pap smear: NO - age 30-65 PAP every 5 years with negative HPV co-testing recommended  Family history of breast CA: No  Family history of uterine/ovarian CA: No    Family history of colon CA: No    HEALTH MAINTENANCE:  Cholesterol: (  Cholesterol   Date Value Ref Range Status   2018 193 <200 mg/dL Final   12/10/2013 177 0 - 200 mg/dL Final     Comment:     LDL Cholesterol is the primary guide to therapy.   The NCEP recommends further evaluation of: patients with cholesterol greater   than 200 mg/dL if additional risk factors are present, cholesterol greater   than   240 mg/dL, triglycerides greater than 150 mg/dL, or HDL less than 40 mg/dL.    History of abnormal lipids: No  Mammo: na . History of abnormal Mammo: Not applicable.  Regular Self Breast Exams: No  Calcium/Vitamin D intake: source:  Adequate? No  TSH: (No results found for: TSH )  Pap; (  Lab Results   Component Value Date    PAP NIL 2021    PAP ASC-US 2017    PAP NIL 12/10/2013    )    HISTORY:  OB History    Para Term  AB Living   1 1 0 1 0 1   SAB IAB Ectopic Multiple Live Births   0 0 0 0 1      # Outcome  Date GA Lbr Alfredito/2nd Weight Sex Delivery Anes PTL Lv   1  10/28/15 34w5d  2.509 kg (5 lb 8.5 oz) F CS-LTranv   OPHELIA      Birth Comments: arrest of descent, LOP, cat 2 tracing after PPROM      Name: Alma Delia      Apgar1: 5  Apgar5: 7     Past Medical History:   Diagnosis Date     ASCUS of cervix with negative high risk HPV 2017 ASCUS, Neg HPV     Seasonal affective disorder (H)     no meds     Past Surgical History:   Procedure Laterality Date      SECTION N/A 10/28/2015    Procedure:  SECTION;  Surgeon: Mónica Madrid MD;  Location: [facility]     DENTAL SURGERY      wisdom teeth     TONSILLECTOMY  age 10     Family History   Problem Relation Age of Onset     Thyroid Disease Paternal Grandmother      Other Cancer Paternal Grandmother      Heart Disease Maternal Grandmother      Social History     Socioeconomic History     Marital status:      Spouse name: None     Number of children: 1     Years of education: None     Highest education level: None   Occupational History     Employer: [employer]   Tobacco Use     Smoking status: Never Smoker     Smokeless tobacco: Never Used   Substance and Sexual Activity     Alcohol use: Yes     Alcohol/week: 0.0 standard drinks     Comment: 0-2 PER WK     Drug use: No     Sexual activity: Yes     Partners: Male     Birth control/protection: Pill, Condom   Other Topics Concern     Parent/sibling w/ CABG, MI or angioplasty before 65F 55M? No   Social History Narrative             Social Determinants of Health     Financial Resource Strain: Not on file   Food Insecurity: Not on file   Transportation Needs: Not on file   Physical Activity: Not on file   Stress: Not on file   Social Connections: Not on file   Intimate Partner Violence: Not on file   Housing Stability: Not on file       Current Outpatient Medications:      ipratropium (ATROVENT) 0.06 % nasal spray, Spray 2 sprays into both nostrils 4 times daily as needed for  "rhinitis, Disp: 15 mL, Rfl: 3     mometasone (NASONEX) 50 MCG/ACT nasal spray, Spray 2 sprays into both nostrils daily Needs appointment for additional refills, Disp: 51 g, Rfl: 0     montelukast (SINGULAIR) 10 MG tablet, Take 1 tablet (10 mg) by mouth At Bedtime Please schedule an appointment with Dr. Lewis., Disp: 90 tablet, Rfl: 0     norethindrone-ethinyl estradiol (MICROGESTIN 1/20) 1-20 MG-MCG tablet, Take 1 tablet by mouth daily Active tablets only for prevention of menses, Disp: 112 tablet, Rfl: 0     propranolol (INDERAL) 20 MG tablet, Take 1 tablet (20 mg) by mouth once as needed (anxiety) 30-60 minutes prior to event, Disp: 30 tablet, Rfl: 1     sertraline (ZOLOFT) 50 MG tablet, Take 1 tablet (50 mg) by mouth daily, Disp: 90 tablet, Rfl: 0   No Known Allergies    Past medical, surgical, social and family history were reviewed and updated in [facility].      EXAM:  /66   Pulse 110   Ht 1.651 m (5' 5\")   Wt 74.4 kg (164 lb)   SpO2 98%   BMI 27.29 kg/m     BMI: Body mass index is 27.29 kg/m .  Constitutional: healthy, alert and no distress  Head: Normocephalic.has small elevated mole on top of her head approximately 7 mm pale and unchanged.   Neck: Neck supple. Trachea midline. No adenopathy. Thyroid symmetric, normal size.   Cardiovascular: RRR.   Respiratory: Negative.   Breast: No nodularity, asymmetry or nipple discharge bilaterally.  Gastrointestinal: Abdomen soft, non-tender, non-distended. No masses, organomegaly.  : deferred  Musculoskeletal: extremities normal  Skin: no suspicious lesions or rashes  Psychiatric: Affect appropriate, cooperative,mentation appears normal.     COUNSELING:   Reviewed preventive health counseling, as reflected in patient instructions       Consider Hep C screening for all patients one time for ages 18-79 years   reports that she has never smoked. She has never used smokeless tobacco.    Body mass index is 27.29 kg/m .    FRAX Risk Assessment    ASSESSMENT:  42 " year old female with satisfactory annual exam  (Z01.419) Encounter for gynecological examination without abnormal finding  (primary encounter diagnosis)  Comment: OCP  Plan: refill was done. Given advanced care directives to fill out.    (F32.81) PMDD (premenstrual dysphoric disorder)  Comment: none with continuous OCP  Plan: norethindrone-ethinyl estradiol (MICROGESTIN         1/20) 1-20 MG-MCG tablet        Refill was done    (F32.5) Major depressive disorder with single episode, in full remission (H)  Comment: stable  Plan: sertraline (ZOLOFT) 50 MG tablet        Refill was done    (I78.1) Non-neoplastic nevus  Comment: on head  Plan: Adult Dermatology Referral        Refer for possible removal. Not changing    (Z13.0) Screening, anemia, deficiency, iron  Plan: CBC with platelets        RTC for lab    (Z13.220) Screening for lipid disorders  Plan: Lipid panel reflex to direct LDL Fasting        RTC fasting for lab    (Z13.1) Screening for diabetes mellitus  Plan: Comprehensive metabolic panel, Hemoglobin A1c        RTC fasting for lab    (Z13.29) Screening for thyroid disorder  Plan: TSH with free T4 reflex        RTC for lab.    (Z11.59) Need for hepatitis C screening test  Comment: per [facility]  Plan: Hepatitis C antibody        RTC for lab      Monique Han MD

## 2022-04-09 ASSESSMENT — ANXIETY QUESTIONNAIRES: GAD7 TOTAL SCORE: 2

## 2022-04-14 ENCOUNTER — VIRTUAL VISIT (OUTPATIENT)
Dept: PSYCHOLOGY | Facility: CLINIC | Age: 42
End: 2022-04-14
Payer: COMMERCIAL

## 2022-04-14 DIAGNOSIS — F32.A DEPRESSION, UNSPECIFIED DEPRESSION TYPE: ICD-10-CM

## 2022-04-14 DIAGNOSIS — F41.9 ANXIETY DISORDER, UNSPECIFIED TYPE: Primary | ICD-10-CM

## 2022-04-14 PROCEDURE — 90834 PSYTX W PT 45 MINUTES: CPT | Mod: GT | Performed by: PSYCHOLOGIST

## 2022-04-14 NOTE — PROGRESS NOTES
[facility]                                            Progress Note    Patient Name: Idalia Hinojosa  Date:4/14/2022         Service Type: Individual      Session Start Time: 2:09 pm Session End Time: 2:59 pm   Session Length:  50 minutes    Session #: 49    Attendees: Client     Service Modality: Telephone Visit: - I.  (started off with video visit, however, patient's video was not working and we had to switch to telephone.       Provider verified identity through the following two step process.  Patient provided:  Patient is known previously to provider    Telemedicine Visit: The patient's condition can be safely assessed and treated via synchronous audio and visual telemedicine encounter.      Reason for Telemedicine Visit: Services only offered telehealth    Originating Site (Patient Location): Patient's home    Distant Site (Provider Location): Provider Remote Setting- Home Office    Consent:  The patient/guardian has verbally consented to: the potential risks and benefits of telemedicine (telephone visit) versus in person care; bill my insurance or make self-payment for services provided; and responsibility for payment of non-covered services.     Patient would like the video invitation sent by:  My Chart    Mode of Communication:  Video Conference via [employer],     As the provider I attest to compliance with applicable laws and regulations related to telemedicine.         Treatment Plan Last Reviewed: Developed 1/06/2020, reviewed 4/23/2020, reviewed 7/8/2020, reviewed 10/08/2020, 1/27/2021, 4/27/21, 7/28/2021, 11/10/21, 02/09/2022  PHQ-9/DAVID-7: 2/9/2022    DATA  Interactive Complexity: No  Crisis: No        Progress Since Last Session (Related to Symptoms / Goals / Homework):   Symptoms: she reported that the past week has been difficult and challenging - she's felt more stressed, more down, doesn't feel like getting out of bed in the morning, doesn't feel like getting work  done.  Weekend was a little better when she and her  were able to do some things together.   Notices that she has been clenching her jaw and her teeth hurt more as a result.      Homework: Achieved / completed to satisfaction.  Has been doing a great job getting regular physical activity and feels strong from her work-outs.          Episode of Care Goals: Satisfactory progress - ACTION (Actively working towards change); Intervened by reinforcing change plan / affirming steps taken.  Diagnostic assessment has been completed, treatment plan has been developed and patient has been making good progress on treatment goals.  The patient stated that her main goals for therapy would be to learn emotion regulation strategies (in particular, to help with when she is feeling upset/angry/frustrated/distressed).  The changes in her daily life due to COVID restrictions were a focus of our work together, and working through changes to routine as her daughter started  and she returned to work.  As she has been making progress, focus of treatment will shift to wellness planning (monitoring mood and symptoms, incorporating into routine what keeps her feeling well, review of triggers, how to manage set backs, etc.).  As her  has been diagnosed with cancer, treatment will also focus on providing her with support in managing the emotional distress and challenges related to this.       Current / Ongoing Stressors and Concerns:   Current:  Managing increase in symptoms of depression and anxiety, managing stress in relation to 's cancer diagnosis.       Ongoing:Managing daily routine which has contributed to some adjustment and parenting challenges, lack of effective emotion regulation strategies for managing daily frustrations and upsets and marital discord due to differences in parenting styles.       Treatment Objective(s) Addressed in This Session:   New today: Practice calming strategies to help  "release tension in your body   Practice new script in the morning when feeling urge to not get out of bed.  What do you need to hear that helps you keep moving?   (\"Today is a new day\", focus on what you're grateful for, \"I will feel better\")      Continue to review/practice/reinforce:  Identify ways to practice and incorporate self-care into routine.  Ask myself what I need.    Identify how others can help support you and practice asking for help/what you need  Identify and practice at least 2-3 strategies to help with regulating emotion   Learn and practice behavioral activation strategies (how to move forward with doing what will help you when you don't feel like doing it)  Be curious about your emotions - tune in to - what am I thinking?  What is going on around me?  What do I need?    Identify and practice at least two strategies that will help support your self-care during this challenging time  Learn and practice at least two new strategies for tolerating uncertainty  Practice mindfulness to the moment when your mind wanders to worrying about the unknown       Intervention:   CBT: She's recognizing that she's clenching her jaw, carrying tension and along with this - feeling more irritable, stressed and overwhelmed.  We discussed some things she can do in the moment - and throughout the day - to help with releasing tension, giving her body the message that it is okay to release the tension - calming strategies and redirecting negative thinking.  She reflected on some of the frustration she feels with the increase in what she has to do in light of Colia's treatment - things that they both normally would do or manage between the two of them she now has to do much of on her own due to him feeling ill or needing to rest.  We talked about ways to \"let go\" of some things that she can, focus on what is most important and ask for help from others.      She's thinking about the weekend and how hard some of the days can " "be with Kaylee as she said Kaylee wants to play with her all day long and she finds when she doesn't get a break or some time to herself she struggles more.  We talked about ways to \"plan for success\" - setting up a schedule and routine in advance - that incorporates a balance - time to play together along with some independent time/choice time for Kaylee and her - giving her choices around activities she would like to do - planning ahead - making a visual plan where Kaylee can know what to expect.  We also talked about planning a fun activity for the Easter holiday, something she would enjoy doing with Kaylee.  She's made plans for her brother to help out on Saturday.      She processed what it is like to see Kellee struggle with not feeling well and how hard this is, as well as feeling like she can't do anything to help.  Validated how difficult and hard this must for her, while also encouraging her to recognize all that she is doing to help and support him, and to give herself credit for this.  Encouraged her continued focus on her self-care.  She notes that she's been working out, feels really good about this, and feels herself getting stronger.      ASSESSMENT: Current Emotional / Mental Status (status of significant symptoms):   Risk status (Self / Other harm or suicidal ideation)   Patient denies current fears or concerns for personal safety.   Patient denies current or recent suicidal ideation or behaviors.    She agrees to use a safety plan should any safety concerns arise.  She also agrees to reach our to her spouse or a family member for help if needed, and/or access crisis/emergency resources.        Patientdenies current or recent homicidal ideation or behaviors.   Patient denies current or recent self injurious behavior or ideation.   Patient denies other safety concerns.   Patient Patient reports there has been no change in risk factors since their last session.     PatientPatient reports there has been no change in " "protective factors since their last session.     Recommended that patient call 911 or go to the local ED should there be a change in any of these risk factors.  She has previously been informed on how to contact [facility] crisis/COPE for urgent/crisis concerns 24/7.  Provided patient with crisis resources and she agrees to use safety plan should any safety concerns arise.      Professionals or agencies I can contact during a crisis:    Suicide Prevention Lifeline: 4-224-477-TALK (3821)    Crisis Text Line Service (available 24 hours a day, 7 days a week): Text [place] to 917110    [facility] Crisis Services: [facility]: [phone]    Call 911 or go to my nearest emergency department.        Appearance:   Appropriate    Eye Contact:   Good    Psychomotor Behavior: Normal    Attitude:   Cooperative    Orientation:   All   Speech    Rate / Production: Normal     Volume:  Normal    Mood:    She reports mood is more stressed, down, tired, low energy, \"blah\", and irritable.    Affect:    Congruent with mood '   Thought Content:  Clear    Thought Form:  Coherent  Logical    Insight:    Good      Medication Review:   No changes to current psychiatric medication(s).  She had a recent appointment with Dr. Han for her annual physical.        Medication Compliance:   Yes     Changes in Health Issues:  None reported         Chemical Use Review:   Substance Use: Chemical use reviewed, no active concerns identified.  No alcohol use.       Tobacco Use: No current tobacco use.      Diagnosis:  Anxiety disorder unspecified  Depression, unspecified     Collateral Reports Completed:   Not Applicable,      PLAN: (Patient Tasks / Therapist Tasks / Other)    1) Idalia identified the following goals: Telling myself another narrative than I don't want to get out of bed - today is a new day - gratitude focus - I will feel better.  Make a pllan for success for the weekend/Sunday.  Remind myself - ok to take breaks and " "time for myself - okay not to get everything done, use my breaks to do things I need and enjoy  - balance what I need to do with what I would like to do.      Continue to ask for help.      Continue with:  Write before bed, pay attention to thoughts and feelings, separate my emotions from others (ok to feel differently, not reponsible for their emotions, remember they are different and separate - accept that people are different from me, I can accept without approving/liking it, plan for mini breaks and breaks, let others know I'm feeling burnt out and need some alone time to re-charge myself).       Practice mindfulness strategies to help with being present to the moment.  Ask for help.  Be mindful of expectations (it is okay that he will have days where he is not feeling as well) permission to take time by myself to recharge, be mindful of emotional boundaries.        2) If there is a crisis situation, remember you are not alone and that there are people who can help you twenty-four hours a day, seven days a week.  Call [facility] ([facility]): [phone].      3) Follow-up visit is scheduled for April 21st at 2:00 pm.  IIf urgent or crisis concerns arise prior to next scheduled appointment, please reach out to crisis resources, call 911, or go to the emergency department.      Cristy Wilkinson PsyD, LP  Licensed Psychologist  4/14/22            Previous skills and strategies to remind self of and to do as needed:    Practice \"STOP\" technique when you recognize yourself feeling angry   Be a  - what do you notice is happening when Kaylee feels upset?     Practice recognizing when feeling angry, and giving self permission to use calming and self-soothing strategies and take a break.   Look for the gray with thinking  slow down  take deep breaths  manage expectations of self and others.    Journal   Practice flexing \"flexiblity and creativity\" -   Continue the great work you are doing with your " "daily schedule.    Get outside every day.    Play your instrument.    Sing!    Continue with: \"It's just a thought\" - non-judgmental, observe, float down the river on a leaf, clouds in the gege, reframing unhelpful thoughts -   Keep working on being patient when things are tense around me.    Continue with observing/paying attention to warning signs and signals and give self persmission to slow down, especially during the morning routine with Kaylee.    Practice offerring Kaylee choices when appropriate.      Use \"my plan for success\" to help remind you of calming strategies to practice when feeling upset.    Practice decreasing overall vulnerability to emotion mind through getting adequate rest, nutrition, time for yourself, and physical activity.         Consider reading \"Fighting for your marriage\".      Technical Assistance for video visits:    Offer patient the website ([email or web address]/[email or web address]) and/or phone number (257-944-0355) for video visit instructions/assistance if needed.                                             ____________________________________    Treatment Plan    Patient's Name: Idalia Hinojosa  YOB: 1980    Date of Creation: 1/6/2020  Date Treatment Plan Last Reviewed/Revised: 2/09/2022    DSM5 Diagnoses: 300.00 (F41.9) Unspecified Anxiety Disorder, depressive disorder, unspecified  Psychosocial / Contextual Factors: strain in marital relationship, parenting challenges, adjustment challenges, 's cancer diagnosis  PROMIS (reviewed every 90 days):     Anticipated number of session for this episode of care: additional 15-20  Anticipation frequency of session: Weekly until symptoms stabilize, then can decrease the frequency of sessions to likely bi-weekly or once every three weeks  Anticipated Duration of each session: 38-52 minutes  Treatment plan will be reviewed in 90 days or when goals have been changed.       Referral / Collaboration:  We discussed a referral to a " "couples/marital therapist.  The patient is thinking about this and has talked with her  about the referral, but they are unsure if they would like to follow through at this time.      MeasurableTreatment Goal(s) related to diagnosis / functional impairment(s)  Goal 1: Patient will learn emotion regulation strategies to help when she is feeling upset/angry/frustrated/distressed    I will know I've met my goal when I would yell less, I would feel calmer, and I would not push others.  I would be less physical when I'm angry (e.g. wouldn't slam doors or hit things)       Objective #A (Patient Action)    Patient will learn and practice at least 2 new emotion regulation strategies.  Status: Continued - Date(s):4/23/2020 (has begun to learn new emotion regulation strategies and is making good progress)   Update: 7/8/2020: \"I've learned new strategies but I'm bad at practicing them\" - will help patient incorporate regular practice and identify and problem-solve barriers.    Reviewed: 10/8/2020: Patient has been successfully practicing emotion regulation strategies, is working on identifying earlier on when to intervene and engage strategies, as well as practice strategies on a regular basis to help strength use of strategies.    Reviewed and in progress: 1/27/2021: \"Overall things are going a lot better.  Sometimes I fall into old habits\", but recognizing when she's feeling more vulnerable to her emotions and reminds herself to use different skills/intervene in a different way.     4/27/2021-\"Things have definitely been better\".  Recognizing when feeling vulnerable (e.g. hungry), recognizing when emotions are escalating and need to engage calming strategies\".  Ask for help from Colia when feeling upset.  Can be challenging when Kaylee escalates when I'm feeling angry.    7/29/21 - She felt like she has forgotten what helps her when she's feeling upset, we have spent time reviewing and refreshing these skills.  " "  11/10/2021 - She feels like for the most part things have been going well.    2/9/2022 - Helping her to apply emotion regulation strategies in the context of a new and unexpected stressor -     Intervention(s)  Therapist will provide psychoeducation on emotion regulation module from DBT and will assign patient homework to help reinforce skill development.    Objective #B  Patient will identify factors that increase vulnerability to emotion mind and identify ways to decrease vulnerability to emotion mind.  Status: Continued - Date(s):4/23/2020 (has learned factors that make her more vulnerable to emotion mind, and has been working on addressing vulnerability related to feeling hungry, tired and rushed)   Update: 7/8/20: \"This one is harder for me.  I know I get angry when I'm hungry, and when I slow down I'm less inclined to get angry\".  Challenges - not always having food ready, not always planning in advance, (planing breakfast the night before)  Reviewed: 10/8/2020: Patient's awareness has increased of factors that increase her vulnerability to emotion mind, and she has been using this as a signal to tell her what she needs.    Reviewed and resolved/complete: 1/27/2021: \"I feel like we've done really good work with this and I've gotten what I need from this\".  4/27/2021  - I am doing better with this - I know I get more irritable when I'm hungry and I'm trying to do better with this.  Drinking more water.      7/29/21 - Overall she had been feeling like things were going better with this, though over the past several weeks has felt more challenged due to increase in stressors.    11/10/2021 - Recognizing when feeling tired and how this can contribute to increase in vulnerability and ways to intervene.  Recognizing when feeling irritabiel - ask self if feeling hungry and eat if needed.    2/9/2022 - Self-care is an important focus right now - to ensure she is attending to her basic self-care needs while helping " "to take care of her  and his health -       Intervention(s)  Therapist will provide information on factors that increase vulnerabiliyt to emotion mind.    Objective #C  Patient will identify warning signs and signals that emotions are escalating and will learn ways to skillfully intervene early on.  Status: New - Date: 01/06/2020 7/8/2020- in progress  Update: 10/08/2020: Patient has been making good progress on identifying signs and signals that her emotions are escalating.    Reviewed and in progress: 1/27/2021: \"I feel like we've done good work on this but this is one of the things I need to keep working on\".    7/29.21 - In progress - actively working on this -   11/10/2021 - Feels sometimes she is able to do this, other times it is harder  2/9/2022 - continued goal -       Intervention(s)  Therapist will help patient identify warning signs and signals, and will guide the patient in identifying skillful ways to intervene when exeriencing warning signs and signals.      Goal 2: Patient will learn new parenting strategies to help with managing parenting challenges.  Parent will work on improving relationship with her daughter and defining what she would like this relationship to look like.      I will know I've met my goal when I'm enjoying time with my daughter, teaching her new things, and not waiting for things to change      Objective #A (Patient Action)    Status: Continued - Date(s):4/23/2020     Patient will increase understanding of developmental norms for her daughter and ways to manage challenging behaviors.    Intervention(s)  Therapist will provide psychoeducation on developmental norms and parenting strategies.    Objective #B  Patient will spend time reflecting on her relationship with her daughter and defining what she would like this relaitonship to look like.    Status: Continued - Date(s):4/23/2020   Update: 7/8/2020 - \"I think some things are going better, she's more helpful with " "things.  Some things are more challenging with the pandemic and thinking of things to do with her\".    Update: 10/08/2020: Patient is making progress on learning and practicing strategies to manage parenting challenges.    Reviewed: 1/27/2021: \"I feel like things have gotten better with this, we've figured out a good routine, I'm doing meditation, I'm enjoying my time with my daughter more than I used to\".   4/27/2021 - Challenges of parenting during a pandemic, working with occupational therapist on strategies to help daughter, 7/29/21 - She's been working on incorporating more structure into the daily routine, as well as managing her emotional reaction when feeling frustrated.  She's also worked on managing her mindset and expectations.  She's been experiencing some frustrations due to two of the OT's she's worked with has recently resigned, resulting in transitioning to several new providers in a short period of time.  I've encouraged she continue to work closely with her daughter's pediatrician to get connected to resources and supports to help with managing her daughter's difficulties.    11/10/2021 - Feels like they've had some really good interactions, though things have been more challenging at school and home (waiting for evaluations).  Daughter enjoys when she reads to her, they've had some really good interactions recently.    2/9/2022 - unable to fully assess progress on this goal today, will further assess progress on this goal and if any treatment needs remain -       Intervention(s)  Therapist will guide the patient in reflecting upon her relationship with her daughter and help her define ways to move towards what she vaues in her relationship with her daughter.    Objective #C  Patient will increase time spent on fun activity and play with her daughter.  Status: Continued - Date(s):4/23/2020 7/8/2020 -   10/08/2020 - will further assess this goal at next session   Reviewed: 1/27/2021 - also going " "really well, we play together all of the time now\", she finds herself enjoying their time together.   4/27/2021- she will spend some time reflecting upon this and we will discuss further next time.  They have several fun things planned for the future, as well as she has worked hard during the pandemic to plan structure and time together within COVID restrictions.  7/29/21 - She's seen some positive changes with being able to spend time with grandparents and at the cabin since being vaccinated, but also has experienced some challenges with planning fun activities out of worries things won't go well and feeling low energy/motivation to do these things - working to address both of these obstacles.    11/10/2021 - They've been able to do more fun activities together - coloring, Lego sets,    2/9/2022 - unable to fully assess progress on this goal today, will further assess progress on this goal and if any  treatment needs remain -       Intervention(s)  Therapist will guide the patient in identifying ways she can increase positive time with her daughter.  Therapist will also teach mindfulness strategies to help patient with being in the present moment when appropriate - .      Goal 3: Patiient will improve communication with her .      I will know I've met my goal when I feel less irritated with my  and communicate more openly with my .  I would like to be more assertive and less aggressive.   I would like to not avoid telling him things because I'm worried about his reaction or don't think he will react well.  I would like to be more present to the moment during times when this would be helpful.\"      Objective #A (Patient Action)    Status: Continued - Date(s):4/23/2020 7/8/2020 - suggested couples therapy to help with this.    Individual therapy focus - identify what makes it hard to be more honest and open   10/08/2020 - will further assess this goal at next session   Reviewed: 1/27/2021 - " "\"Things are better, I feel like we've worked really hard on how to respond when he's having a hard time and how to talk to him.  I'm doing better listening, being more empathetic, and not \".  4/27/2021 - She has been making progress on examining and updating unhelpful beliefs (e.g. feeling responsible for other's emotions, feeling like she needs to fix or change other's emotions) and has made good progress on identifying and asserting healthy boundaries, in addition to  her emotional response from her spouse's emotional response.  7/29/21 - will review at next session   11/10/2021 - Feeling more irritability recently.  Believes this area could use more attention.    2/9/2022 - continued goal -         Patient will learn and practice at least two new interpersonal effectiveness strategies.    Intervention(s)  Therapist will teach strategies from DBT module on interpersonal effectiveness, and will assign homework to help reinforce skill development.      Patient has reviewed and agreed to the above plan.      Cristy Wilkinson PsyD,   Licensed Psychologist    January 6, 2020, reviewed on 4/23/2020, reviewed 7/8/2020, reviewed 10/08/2020, reviewed 1/27/2021, reviewed 4/27/2021, 7/29/2021, 11/10/2021, 2/9/2022                                                                                                                                  "

## 2022-04-19 DIAGNOSIS — J30.0 CHRONIC VASOMOTOR RHINITIS: ICD-10-CM

## 2022-04-19 DIAGNOSIS — J30.89 ALLERGIC RHINITIS DUE TO DUST MITE: ICD-10-CM

## 2022-04-19 RX ORDER — MONTELUKAST SODIUM 10 MG/1
10 TABLET ORAL AT BEDTIME
Qty: 30 TABLET | Refills: 0 | Status: SHIPPED | OUTPATIENT
Start: 2022-04-19 | End: 2022-05-16

## 2022-04-19 RX ORDER — MOMETASONE FUROATE MONOHYDRATE 50 UG/1
2 SPRAY, METERED NASAL DAILY
Qty: 17 G | Refills: 0 | Status: SHIPPED | OUTPATIENT
Start: 2022-04-19 | End: 2022-05-16

## 2022-04-19 NOTE — TELEPHONE ENCOUNTER
"Requested Prescriptions   Pending Prescriptions Disp Refills     montelukast (SINGULAIR) 10 MG tablet 90 tablet 0     Sig: Take 1 tablet (10 mg) by mouth At Bedtime Please schedule an appointment with Dr. Lewis.       Leukotriene Inhibitors Protocol Passed - 4/19/2022  9:27 AM        Passed - Patient is age 12 or older     If patient is under 16, ok to refill using age based dosing.           Passed - Recent (12 mo) or future (30 days) visit within the authorizing provider's specialty     Patient has had an office visit with the authorizing provider or a provider within the authorizing providers department within the previous 12 mos or has a future within next 30 days. See \"Patient Info\" tab in inbasket, or \"Choose Columns\" in Meds & Orders section of the refill encounter.              Passed - Medication is active on med list           mometasone (NASONEX) 50 MCG/ACT nasal spray 51 g 0     Sig: Spray 2 sprays into both nostrils daily Needs appointment for additional refills       Nasal Allergy Protocol Passed - 4/19/2022  9:27 AM        Passed - Patient is age 12 or older        Passed - Recent (12 mo) or future (30 days) visit within the authorizing provider's specialty     Patient has had an office visit with the authorizing provider or a provider within the authorizing providers department within the previous 12 mos or has a future within next 30 days. See \"Patient Info\" tab in inbasket, or \"Choose Columns\" in Meds & Orders section of the refill encounter.              Passed - Medication is active on med list           Routing refill request to provider for review/approval because:  Patient needs to be seen because it has been more than 1 year since last office visit.  Patient has office visit scheduled on 5/5/22.    KATHE Kay, RN      "

## 2022-04-21 ENCOUNTER — VIRTUAL VISIT (OUTPATIENT)
Dept: PSYCHOLOGY | Facility: CLINIC | Age: 42
End: 2022-04-21
Payer: COMMERCIAL

## 2022-04-21 DIAGNOSIS — F32.A DEPRESSION, UNSPECIFIED DEPRESSION TYPE: Primary | ICD-10-CM

## 2022-04-21 DIAGNOSIS — F41.9 ANXIETY DISORDER, UNSPECIFIED TYPE: ICD-10-CM

## 2022-04-21 PROCEDURE — 90834 PSYTX W PT 45 MINUTES: CPT | Mod: GT | Performed by: PSYCHOLOGIST

## 2022-04-21 NOTE — PROGRESS NOTES
[facility]                                            Progress Note    Patient Name: Idalia Hinojosa  Date: 4/21/2022         Service Type: Individual      Session Start Time: 2:05 pm Session End Time: 2:57 pm   Session Length:  52 minutes    Session #: 50    Attendees: Client     Service Modality: Video visit: - I.        Provider verified identity through the following two step process.  Patient provided:  Patient is known previously to provider    Telemedicine Visit: The patient's condition can be safely assessed and treated via synchronous audio and visual telemedicine encounter.      Reason for Telemedicine Visit: Services only offered telehealth    Originating Site (Patient Location): Patient's home    Distant Site (Provider Location): Provider Remote Setting- Home Office    Consent:  The patient/guardian has verbally consented to: the potential risks and benefits of telemedicine (telephone visit) versus in person care; bill my insurance or make self-payment for services provided; and responsibility for payment of non-covered services.     Patient would like the video invitation sent by:  My Chart    Mode of Communication:  Video Conference via [employer],     As the provider I attest to compliance with applicable laws and regulations related to telemedicine.         Treatment Plan Last Reviewed: Developed 1/06/2020, reviewed 4/23/2020, reviewed 7/8/2020, reviewed 10/08/2020, 1/27/2021, 4/27/21, 7/28/2021, 11/10/21, 02/09/2022  PHQ-9/DAVID-7: 2/9/2022    DATA  Interactive Complexity: No  Crisis: No        Progress Since Last Session (Related to Symptoms / Goals / Homework):   Symptoms: continues to feel low mood, low energy, difficulties getting out of bed in the morning (but able to do so), feels worn out and exhausted.       Homework: Achieved / completed to satisfaction.  Has been consistent with getting exercise and finds this to be really helpful for her.  Did a great job asking  "for what she needed several times - which resulted in her getting some extra help from Kellee's parents so she could have time to put together a puzzle - an activity she enjoys - also         Episode of Care Goals: Satisfactory progress - ACTION (Actively working towards change); Intervened by reinforcing change plan / affirming steps taken.  Diagnostic assessment has been completed, treatment plan has been developed and patient has been making good progress on treatment goals.  The patient stated that her main goals for therapy would be to learn emotion regulation strategies (in particular, to help with when she is feeling upset/angry/frustrated/distressed).  The changes in her daily life due to COVID restrictions were a focus of our work together, and working through changes to routine as her daughter started  and she returned to work.  As she has been making progress, focus of treatment will shift to wellness planning (monitoring mood and symptoms, incorporating into routine what keeps her feeling well, review of triggers, how to manage set backs, etc.).  As her  has been diagnosed with cancer, treatment will also focus on providing her with support in managing the emotional distress and challenges related to this.       Current / Ongoing Stressors and Concerns:   Current:  Managing increase in symptoms of depression and anxiety, managing stress in relation to 's cancer diagnosis.       Ongoing:Managing daily routine which has contributed to some adjustment and parenting challenges, lack of effective emotion regulation strategies for managing daily frustrations and upsets and marital discord due to differences in parenting styles.       Treatment Objective(s) Addressed in This Session:   New today: Learn and practice mindfulness strategies - \"observe, just notice\" and invite curiosity around what may be contributing and what may help    Practice calming strategies to help release tension " "in your body   Practice new script in the morning when feeling urge to not get out of bed.  What do you need to hear that helps you keep moving?   (\"Today is a new day\", focus on what you're grateful for, \"I will feel better\")      Continue to review/practice/reinforce:  Identify ways to practice and incorporate self-care into routine.  Ask myself what I need.    Identify how others can help support you and practice asking for help/what you need  Identify and practice at least 2-3 strategies to help with regulating emotion   Learn and practice behavioral activation strategies (how to move forward with doing what will help you when you don't feel like doing it)  Be curious about your emotions - tune in to - what am I thinking?  What is going on around me?  What do I need?    Identify and practice at least two strategies that will help support your self-care during this challenging time  Learn and practice at least two new strategies for tolerating uncertainty  Practice mindfulness to the moment when your mind wanders to worrying about the unknown       Intervention:   DBT: Taught mindfulness strategy of \"observe, just notice\" - and practicing a non-judgmental stance - emphasizing both the why and how of this strategy.  Encouraged her to be curious around what may be contributing to her mood and what she believes may be helpful.    She reflected upon feeling more worn out in light of two years plus of COVID related restrictions and Kellee's recent cancer diagnosis.  As she sees others able to return to more normalcy and pre-covid activities, they have to turn down invitations and continue to be more cautious while Kellee is receiving treatment.      She reflected upon what helps her to feel at her best - exercise, writing before bed, doing things outside, reading, music, legos and downtime.  Helped her problem-solve ways to routinely incorporate these things into her schedule, while looking at ways to problem-solve " obstacles and barriers that get in the way, and encouraged her to continue to focus on asking for what she needs.      ASSESSMENT: Current Emotional / Mental Status (status of significant symptoms):   Risk status (Self / Other harm or suicidal ideation)   Patient denies current fears or concerns for personal safety.   Patient denies current or recent suicidal ideation or behaviors.    She agrees to use a safety plan should any safety concerns arise.  She also agrees to reach our to her spouse or a family member for help if needed, and/or access crisis/emergency resources.        Patientdenies current or recent homicidal ideation or behaviors.   Patient denies current or recent self injurious behavior or ideation.   Patient denies other safety concerns.   Patient Patient reports there has been no change in risk factors since their last session.     PatientPatient reports there has been no change in protective factors since their last session.     Recommended that patient call 911 or go to the local ED should there be a change in any of these risk factors.  She has previously been informed on how to contact [facility] crisis/COPE for urgent/crisis concerns 24/7.  Provided patient with crisis resources and she agrees to use safety plan should any safety concerns arise.      Professionals or agencies I can contact during a crisis:    Suicide Prevention Lifeline: 1-187-518-TALK (6477)    Crisis Text Line Service (available 24 hours a day, 7 days a week): Text [place] to 571439    Local Crisis Services: [facility]: [phone]    Call 911 or go to my nearest emergency department.        Appearance:   Appropriate    Eye Contact:   Good    Psychomotor Behavior: Normal    Attitude:   Cooperative    Orientation:   All   Speech    Rate / Production: Normal     Volume:  Normal    Mood:    She reports mood is more depressed, down, low energy and feels more worn out.     Affect:    Congruent with mood  "'   Thought Content:  Clear    Thought Form:  Coherent  Logical    Insight:    Good      Medication Review:   No changes to current psychiatric medication(s).        Medication Compliance:   Yes     Changes in Health Issues:  None reported         Chemical Use Review:   Substance Use: Chemical use reviewed, no active concerns identified.  No alcohol use.       Tobacco Use: No current tobacco use.      Diagnosis:  Anxiety disorder unspecified  Depression, unspecified     Collateral Reports Completed:   Not Applicable,      PLAN: (Patient Tasks / Therapist Tasks / Other)    1) Idalia identified the following goals: Write before bed, tell myself something positive when I wake up in the morning, ask for what I need, practice mindfulness skills - observe - just notice - non-judgmental stance - invite curiosity -       2) If there is a crisis situation, remember you are not alone and that there are people who can help you twenty-four hours a day, seven days a week.  Call [facility] ([facility]): [phone].      3) Follow-up visit is scheduled for April 26th at 1:00 pm.  IIf urgent or crisis concerns arise prior to next scheduled appointment, please reach out to crisis resources, call 911, or go to the emergency department.      Cristy Wilkinson PsyD, LP  Licensed Psychologist  4/21/2022              Previous skills and strategies to remind self of and to do as needed:    Practice \"STOP\" technique when you recognize yourself feeling angry   Be a  - what do you notice is happening when Kaylee feels upset?     Practice recognizing when feeling angry, and giving self permission to use calming and self-soothing strategies and take a break.   Look for the gray with thinking  slow down  take deep breaths  manage expectations of self and others.    Journal   Practice flexing \"flexiblity and creativity\" -   Continue the great work you are doing with your daily schedule.    Get outside every day.    Play your " "instrument.    Sing!    Continue with: \"It's just a thought\" - non-judgmental, observe, float down the river on a leaf, clouds in the gege, reframing unhelpful thoughts -   Keep working on being patient when things are tense around me.    Continue with observing/paying attention to warning signs and signals and give self persmission to slow down, especially during the morning routine with Kaylee.    Practice offerring Kaylee choices when appropriate.      Use \"my plan for success\" to help remind you of calming strategies to practice when feeling upset.    Practice decreasing overall vulnerability to emotion mind through getting adequate rest, nutrition, time for yourself, and physical activity.         Consider reading \"Fighting for your marriage\".      Technical Assistance for video visits:    Offer patient the website ([email or web address]/[email or web address]) and/or phone number ([phone]) for video visit instructions/assistance if needed.                                             ____________________________________    Treatment Plan    Patient's Name: Idalia Hinojosa  YOB: 1980    Date of Creation: 1/6/2020  Date Treatment Plan Last Reviewed/Revised: 2/09/2022    DSM5 Diagnoses: 300.00 (F41.9) Unspecified Anxiety Disorder, depressive disorder, unspecified  Psychosocial / Contextual Factors: strain in marital relationship, parenting challenges, adjustment challenges, 's cancer diagnosis  PROMIS (reviewed every 90 days):     Anticipated number of session for this episode of care: additional 15-20  Anticipation frequency of session: Weekly until symptoms stabilize, then can decrease the frequency of sessions to likely bi-weekly or once every three weeks  Anticipated Duration of each session: 38-52 minutes  Treatment plan will be reviewed in 90 days or when goals have been changed.       Referral / Collaboration:  We discussed a referral to a couples/marital therapist.  The patient is thinking about this " "and has talked with her  about the referral, but they are unsure if they would like to follow through at this time.      MeasurableTreatment Goal(s) related to diagnosis / functional impairment(s)  Goal 1: Patient will learn emotion regulation strategies to help when she is feeling upset/angry/frustrated/distressed    I will know I've met my goal when I would yell less, I would feel calmer, and I would not push others.  I would be less physical when I'm angry (e.g. wouldn't slam doors or hit things)       Objective #A (Patient Action)    Patient will learn and practice at least 2 new emotion regulation strategies.  Status: Continued - Date(s):4/23/2020 (has begun to learn new emotion regulation strategies and is making good progress)   Update: 7/8/2020: \"I've learned new strategies but I'm bad at practicing them\" - will help patient incorporate regular practice and identify and problem-solve barriers.    Reviewed: 10/8/2020: Patient has been successfully practicing emotion regulation strategies, is working on identifying earlier on when to intervene and engage strategies, as well as practice strategies on a regular basis to help strength use of strategies.    Reviewed and in progress: 1/27/2021: \"Overall things are going a lot better.  Sometimes I fall into old habits\", but recognizing when she's feeling more vulnerable to her emotions and reminds herself to use different skills/intervene in a different way.     4/27/2021-\"Things have definitely been better\".  Recognizing when feeling vulnerable (e.g. hungry), recognizing when emotions are escalating and need to engage calming strategies\".  Ask for help from Colia when feeling upset.  Can be challenging when Kaylee escalates when I'm feeling angry.    7/29/21 - She felt like she has forgotten what helps her when she's feeling upset, we have spent time reviewing and refreshing these skills.    11/10/2021 - She feels like for the most part things have been going " "well.    2/9/2022 - Helping her to apply emotion regulation strategies in the context of a new and unexpected stressor -     Intervention(s)  Therapist will provide psychoeducation on emotion regulation module from DBT and will assign patient homework to help reinforce skill development.    Objective #B  Patient will identify factors that increase vulnerability to emotion mind and identify ways to decrease vulnerability to emotion mind.  Status: Continued - Date(s):4/23/2020 (has learned factors that make her more vulnerable to emotion mind, and has been working on addressing vulnerability related to feeling hungry, tired and rushed)   Update: 7/8/20: \"This one is harder for me.  I know I get angry when I'm hungry, and when I slow down I'm less inclined to get angry\".  Challenges - not always having food ready, not always planning in advance, (planing breakfast the night before)  Reviewed: 10/8/2020: Patient's awareness has increased of factors that increase her vulnerability to emotion mind, and she has been using this as a signal to tell her what she needs.    Reviewed and resolved/complete: 1/27/2021: \"I feel like we've done really good work with this and I've gotten what I need from this\".  4/27/2021  - I am doing better with this - I know I get more irritable when I'm hungry and I'm trying to do better with this.  Drinking more water.      7/29/21 - Overall she had been feeling like things were going better with this, though over the past several weeks has felt more challenged due to increase in stressors.    11/10/2021 - Recognizing when feeling tired and how this can contribute to increase in vulnerability and ways to intervene.  Recognizing when feeling irritabiel - ask self if feeling hungry and eat if needed.    2/9/2022 - Self-care is an important focus right now - to ensure she is attending to her basic self-care needs while helping to take care of her  and his health - " "      Intervention(s)  Therapist will provide information on factors that increase vulnerabiliyt to emotion mind.    Objective #C  Patient will identify warning signs and signals that emotions are escalating and will learn ways to skillfully intervene early on.  Status: New - Date: 01/06/2020 7/8/2020- in progress  Update: 10/08/2020: Patient has been making good progress on identifying signs and signals that her emotions are escalating.    Reviewed and in progress: 1/27/2021: \"I feel like we've done good work on this but this is one of the things I need to keep working on\".    7/29.21 - In progress - actively working on this -   11/10/2021 - Feels sometimes she is able to do this, other times it is harder  2/9/2022 - continued goal -       Intervention(s)  Therapist will help patient identify warning signs and signals, and will guide the patient in identifying skillful ways to intervene when exeriencing warning signs and signals.      Goal 2: Patient will learn new parenting strategies to help with managing parenting challenges.  Parent will work on improving relationship with her daughter and defining what she would like this relationship to look like.      I will know I've met my goal when I'm enjoying time with my daughter, teaching her new things, and not waiting for things to change      Objective #A (Patient Action)    Status: Continued - Date(s):4/23/2020     Patient will increase understanding of developmental norms for her daughter and ways to manage challenging behaviors.    Intervention(s)  Therapist will provide psychoeducation on developmental norms and parenting strategies.    Objective #B  Patient will spend time reflecting on her relationship with her daughter and defining what she would like this relaitonship to look like.    Status: Continued - Date(s):4/23/2020   Update: 7/8/2020 - \"I think some things are going better, she's more helpful with things.  Some things are more challenging with the " "pandemic and thinking of things to do with her\".    Update: 10/08/2020: Patient is making progress on learning and practicing strategies to manage parenting challenges.    Reviewed: 1/27/2021: \"I feel like things have gotten better with this, we've figured out a good routine, I'm doing meditation, I'm enjoying my time with my daughter more than I used to\".   4/27/2021 - Challenges of parenting during a pandemic, working with occupational therapist on strategies to help daughter, 7/29/21 - She's been working on incorporating more structure into the daily routine, as well as managing her emotional reaction when feeling frustrated.  She's also worked on managing her mindset and expectations.  She's been experiencing some frustrations due to two of the OT's she's worked with has recently resigned, resulting in transitioning to several new providers in a short period of time.  I've encouraged she continue to work closely with her daughter's pediatrician to get connected to resources and supports to help with managing her daughter's difficulties.    11/10/2021 - Feels like they've had some really good interactions, though things have been more challenging at school and home (waiting for evaluations).  Daughter enjoys when she reads to her, they've had some really good interactions recently.    2/9/2022 - unable to fully assess progress on this goal today, will further assess progress on this goal and if any treatment needs remain -       Intervention(s)  Therapist will guide the patient in reflecting upon her relationship with her daughter and help her define ways to move towards what she vaues in her relationship with her daughter.    Objective #C  Patient will increase time spent on fun activity and play with her daughter.  Status: Continued - Date(s):4/23/2020 7/8/2020 -   10/08/2020 - will further assess this goal at next session   Reviewed: 1/27/2021 - also going really well, we play together all of the time now\", " "she finds herself enjoying their time together.   4/27/2021- she will spend some time reflecting upon this and we will discuss further next time.  They have several fun things planned for the future, as well as she has worked hard during the pandemic to plan structure and time together within COVID restrictions.  7/29/21 - She's seen some positive changes with being able to spend time with grandparents and at the cabin since being vaccinated, but also has experienced some challenges with planning fun activities out of worries things won't go well and feeling low energy/motivation to do these things - working to address both of these obstacles.    11/10/2021 - They've been able to do more fun activities together - coloring, Lego sets,    2/9/2022 - unable to fully assess progress on this goal today, will further assess progress on this goal and if any  treatment needs remain -       Intervention(s)  Therapist will guide the patient in identifying ways she can increase positive time with her daughter.  Therapist will also teach mindfulness strategies to help patient with being in the present moment when appropriate - .      Goal 3: Patiient will improve communication with her .      I will know I've met my goal when I feel less irritated with my  and communicate more openly with my .  I would like to be more assertive and less aggressive.   I would like to not avoid telling him things because I'm worried about his reaction or don't think he will react well.  I would like to be more present to the moment during times when this would be helpful.\"      Objective #A (Patient Action)    Status: Continued - Date(s):4/23/2020 7/8/2020 - suggested couples therapy to help with this.    Individual therapy focus - identify what makes it hard to be more honest and open   10/08/2020 - will further assess this goal at next session   Reviewed: 1/27/2021 - \"Things are better, I feel like we've worked really " "hard on how to respond when he's having a hard time and how to talk to him.  I'm doing better listening, being more empathetic, and not \".  4/27/2021 - She has been making progress on examining and updating unhelpful beliefs (e.g. feeling responsible for other's emotions, feeling like she needs to fix or change other's emotions) and has made good progress on identifying and asserting healthy boundaries, in addition to  her emotional response from her spouse's emotional response.  7/29/21 - will review at next session   11/10/2021 - Feeling more irritability recently.  Believes this area could use more attention.    2/9/2022 - continued goal -         Patient will learn and practice at least two new interpersonal effectiveness strategies.    Intervention(s)  Therapist will teach strategies from DBT module on interpersonal effectiveness, and will assign homework to help reinforce skill development.      Patient has reviewed and agreed to the above plan.      Cristy Wilkinson PsyD, LP  Licensed Psychologist    January 6, 2020, reviewed on 4/23/2020, reviewed 7/8/2020, reviewed 10/08/2020, reviewed 1/27/2021, reviewed 4/27/2021, 7/29/2021, 11/10/2021, 2/9/2022                                                                                                                                  "

## 2022-04-26 ENCOUNTER — VIRTUAL VISIT (OUTPATIENT)
Dept: PSYCHOLOGY | Facility: CLINIC | Age: 42
End: 2022-04-26
Payer: COMMERCIAL

## 2022-04-26 DIAGNOSIS — F41.9 ANXIETY DISORDER, UNSPECIFIED TYPE: ICD-10-CM

## 2022-04-26 DIAGNOSIS — F32.A DEPRESSION, UNSPECIFIED DEPRESSION TYPE: Primary | ICD-10-CM

## 2022-04-26 PROCEDURE — 90834 PSYTX W PT 45 MINUTES: CPT | Mod: GT | Performed by: PSYCHOLOGIST

## 2022-04-26 NOTE — PROGRESS NOTES
[facility]                                            Progress Note    Patient Name: Idalia Hinojosa  Date: 4/26/2022         Service Type: Individual      Session Start Time: 1:05 pm Session End Time: 1:55 pm   Session Length:  50 minutes    Session #: 51    Attendees: Client     Service Modality: Video visit: - I.        Provider verified identity through the following two step process.  Patient provided:  Patient is known previously to provider    Telemedicine Visit: The patient's condition can be safely assessed and treated via synchronous audio and visual telemedicine encounter.      Reason for Telemedicine Visit: Services only offered telehealth    Originating Site (Patient Location): Patient's home    Distant Site (Provider Location): Provider Remote Setting- Home Office    Consent:  The patient/guardian has verbally consented to: the potential risks and benefits of telemedicine (telephone visit) versus in person care; bill my insurance or make self-payment for services provided; and responsibility for payment of non-covered services.     Patient would like the video invitation sent by:  My Chart    Mode of Communication:  Video Conference via [employer],     As the provider I attest to compliance with applicable laws and regulations related to telemedicine.         Treatment Plan Last Reviewed: Developed 1/06/2020, reviewed 4/23/2020, reviewed 7/8/2020, reviewed 10/08/2020, 1/27/2021, 4/27/21, 7/28/2021, 11/10/21, 02/09/2022  PHQ-9/DAVID-7: 2/9/2022    DATA  Interactive Complexity: No  Crisis: No        Progress Since Last Session (Related to Symptoms / Goals / Homework):   Symptoms: improved - reported mood has been feeling better, and she overall had a better week.        Homework: Achieved / completed to satisfaction. Continues to do well with getting regular physical activity, enjoyed a weekend getaway to a cabin, enjoyed time outside in the sun, and had some positive social  "interaction with others.  She also noted that the family had \"less time in the red zone\" which is what they refer to when they are feeling upset.  She also was successful with recognizing and noticing her emotions and reaction, and removing judgement from her reaction.          Episode of Care Goals: Satisfactory progress - ACTION (Actively working towards change); Intervened by reinforcing change plan / affirming steps taken.  Diagnostic assessment has been completed, treatment plan has been developed and patient has been making good progress on treatment goals.  The patient stated that her main goals for therapy would be to learn emotion regulation strategies (in particular, to help with when she is feeling upset/angry/frustrated/distressed).  The changes in her daily life due to COVID restrictions were a focus of our work together, and working through changes to routine as her daughter started  and she returned to work.  As she has been making progress, focus of treatment will shift to wellness planning (monitoring mood and symptoms, incorporating into routine what keeps her feeling well, review of triggers, how to manage set backs, etc.).  As her  has been diagnosed with cancer, treatment will also focus on providing her with support in managing the emotional distress and challenges related to this.       Current / Ongoing Stressors and Concerns:   Current:  Symptoms of depression and anxiety, managing stress in relation to 's cancer diagnosis.       Ongoing:Managing daily routine which has contributed to some adjustment and parenting challenges, lack of effective emotion regulation strategies for managing daily frustrations and upsets and marital discord due to differences in parenting styles.       Treatment Objective(s) Addressed in This Session:   New today:  Assess balance of \"wants to do to needs to do\"  Identify ways to increase positive social interaction with others   " "    Continue to review/practice/reinforce:  Learn and practice mindfulness strategies - \"observe, just notice\" and invite curiosity around what may be contributing and what may help  Practice calming strategies to help release tension in your body   Practice new script in the morning when feeling urge to not get out of bed.  What do you need to hear that helps you keep moving?   (\"Today is a new day\", focus on what you're grateful for, \"I will feel better\")  Identify ways to practice and incorporate self-care into routine.  Ask myself what I need.    Identify how others can help support you and practice asking for help/what you need  Identify and practice at least 2-3 strategies to help with regulating emotion   Learn and practice behavioral activation strategies (how to move forward with doing what will help you when you don't feel like doing it)  Be curious about your emotions - tune in to - what am I thinking?  What is going on around me?  What do I need?    Identify and practice at least two strategies that will help support your self-care during this challenging time  Learn and practice at least two new strategies for tolerating uncertainty  Practice mindfulness to the moment when your mind wanders to worrying about the unknown       Intervention:   DBT: Assessment of current balance of \"wants to shoulds\" and looking at ways to create healthy balance between completing the things she needs to along with incorporating time for things she would like to do.  She recognized and processed themes that have surfaced in her writing - feeling like she should be playing her instrument, even though she doesn't think now is the right time and that there are other things that would be more helpful for her to focus on.  She recognized polarized/all or nothing thinking contributing to this and leading to feeling guilt - was able to see shades of gray in-between that allowed for more balanced and flexible thinking in regards " to this -     She also talked about how helpful it was to have social interaction at the park the other day and how much this has been missing for her.  We talked about ways she can increase positive social interaction with others - she has two friends that she will reach out to as a goal -     ASSESSMENT: Current Emotional / Mental Status (status of significant symptoms):   Risk status (Self / Other harm or suicidal ideation)   Patient denies current fears or concerns for personal safety.   Patient denies current or recent suicidal ideation or behaviors.    She agrees to use a safety plan should any safety concerns arise.  She also agrees to reach our to her spouse or a family member for help if needed, and/or access crisis/emergency resources.        Patientdenies current or recent homicidal ideation or behaviors.   Patient denies current or recent self injurious behavior or ideation.   Patient denies other safety concerns.   Patient Patient reports there has been no change in risk factors since their last session.     PatientPatient reports there has been no change in protective factors since their last session.     Recommended that patient call 911 or go to the local ED should there be a change in any of these risk factors.  She has previously been informed on how to contact [facility] crisis/[facility] for urgent/crisis concerns 24/7.  Provided patient with crisis resources and she agrees to use safety plan should any safety concerns arise.      Professionals or agencies I can contact during a crisis:    Suicide Prevention Lifeline: 0-268-963-TALK (5267)    Crisis Text Line Service (available 24 hours a day, 7 days a week): Text [place] to 711673    Local Crisis Services: [facility]: [phone]    Call 911 or go to my nearest emergency department.        Appearance:   Appropriate    Eye Contact:   Good    Psychomotor Behavior: Normal    Attitude:   Cooperative  "   Orientation:   All   Speech    Rate / Production: Normal     Volume:  Normal    Mood:    She reports mood is improved, feels better, less depressed   Affect:    Congruent with mood '   Thought Content:  Clear    Thought Form:  Coherent  Logical    Insight:    Good      Medication Review:   No changes to current psychiatric medication(s).        Medication Compliance:   Yes     Changes in Health Issues:  None reported         Chemical Use Review:   Substance Use: Chemical use reviewed, no active concerns identified.  No alcohol use.       Tobacco Use: No current tobacco use.      Diagnosis:  Anxiety disorder unspecified  Depression, unspecified     Collateral Reports Completed:   Not Applicable,      PLAN: (Patient Tasks / Therapist Tasks / Other)    1) Idalia identified the following goals: Connect with two friends (Nidia, Emily) and schedule time to get together,     Continue with - Write before bed, tell myself something positive when I wake up in the morning, ask for what I need, practice mindfulness skills - observe - just notice - non-judgmental stance - invite curiosity -       2) If there is a crisis situation, remember you are not alone and that there are people who can help you twenty-four hours a day, seven days a week.  Call [facility] ([facility]): [phone].      3) Follow-up visit is scheduled for May 3rd at 3:00 pm.  IIf urgent or crisis concerns arise prior to next scheduled appointment, please reach out to crisis resources, call 911, or go to the emergency department.      Cristy Wilkinson PsyD,   Licensed Psychologist  4/26/2022                Previous skills and strategies to remind self of and to do as needed:    Practice \"STOP\" technique when you recognize yourself feeling angry   Be a  - what do you notice is happening when Kaylee feels upset?     Practice recognizing when feeling angry, and giving self permission to use calming and self-soothing strategies and take " "a break.   Look for the gray with thinking  slow down  take deep breaths  manage expectations of self and others.    Journal   Practice flexing \"flexiblity and creativity\" -   Continue the great work you are doing with your daily schedule.    Get outside every day.    Play your instrument.    Sing!    Continue with: \"It's just a thought\" - non-judgmental, observe, float down the river on a leaf, clouds in the gege, reframing unhelpful thoughts -   Keep working on being patient when things are tense around me.    Continue with observing/paying attention to warning signs and signals and give self persmission to slow down, especially during the morning routine with Kaylee.    Practice offerring Kaylee choices when appropriate.      Use \"my plan for success\" to help remind you of calming strategies to practice when feeling upset.    Practice decreasing overall vulnerability to emotion mind through getting adequate rest, nutrition, time for yourself, and physical activity.         Consider reading \"Fighting for your marriage\".      Technical Assistance for video visits:    Offer patient the website ([email or web address]) and/or phone number ([phone]) for video visit instructions/assistance if needed.                                             ____________________________________    Treatment Plan    Patient's Name: Idalia Hinojosa  YOB: 1980    Date of Creation: 1/6/2020  Date Treatment Plan Last Reviewed/Revised: 2/09/2022    DSM5 Diagnoses: 300.00 (F41.9) Unspecified Anxiety Disorder, depressive disorder, unspecified  Psychosocial / Contextual Factors: strain in marital relationship, parenting challenges, adjustment challenges, 's cancer diagnosis  PROMIS (reviewed every 90 days):     Anticipated number of session for this episode of care: additional 15-20  Anticipation frequency of session: Weekly until symptoms stabilize, then can decrease the frequency of sessions to likely bi-weekly or " "once every three weeks  Anticipated Duration of each session: 38-52 minutes  Treatment plan will be reviewed in 90 days or when goals have been changed.       Referral / Collaboration:  We discussed a referral to a couples/marital therapist.  The patient is thinking about this and has talked with her  about the referral, but they are unsure if they would like to follow through at this time.      MeasurableTreatment Goal(s) related to diagnosis / functional impairment(s)  Goal 1: Patient will learn emotion regulation strategies to help when she is feeling upset/angry/frustrated/distressed    I will know I've met my goal when I would yell less, I would feel calmer, and I would not push others.  I would be less physical when I'm angry (e.g. wouldn't slam doors or hit things)       Objective #A (Patient Action)    Patient will learn and practice at least 2 new emotion regulation strategies.  Status: Continued - Date(s):4/23/2020 (has begun to learn new emotion regulation strategies and is making good progress)   Update: 7/8/2020: \"I've learned new strategies but I'm bad at practicing them\" - will help patient incorporate regular practice and identify and problem-solve barriers.    Reviewed: 10/8/2020: Patient has been successfully practicing emotion regulation strategies, is working on identifying earlier on when to intervene and engage strategies, as well as practice strategies on a regular basis to help strength use of strategies.    Reviewed and in progress: 1/27/2021: \"Overall things are going a lot better.  Sometimes I fall into old habits\", but recognizing when she's feeling more vulnerable to her emotions and reminds herself to use different skills/intervene in a different way.     4/27/2021-\"Things have definitely been better\".  Recognizing when feeling vulnerable (e.g. hungry), recognizing when emotions are escalating and need to engage calming strategies\".  Ask for help from Colia when feeling upset.  " "Can be challenging when Kaylee escalates when I'm feeling angry.    7/29/21 - She felt like she has forgotten what helps her when she's feeling upset, we have spent time reviewing and refreshing these skills.    11/10/2021 - She feels like for the most part things have been going well.    2/9/2022 - Helping her to apply emotion regulation strategies in the context of a new and unexpected stressor -     Intervention(s)  Therapist will provide psychoeducation on emotion regulation module from DBT and will assign patient homework to help reinforce skill development.    Objective #B  Patient will identify factors that increase vulnerability to emotion mind and identify ways to decrease vulnerability to emotion mind.  Status: Continued - Date(s):4/23/2020 (has learned factors that make her more vulnerable to emotion mind, and has been working on addressing vulnerability related to feeling hungry, tired and rushed)   Update: 7/8/20: \"This one is harder for me.  I know I get angry when I'm hungry, and when I slow down I'm less inclined to get angry\".  Challenges - not always having food ready, not always planning in advance, (planing breakfast the night before)  Reviewed: 10/8/2020: Patient's awareness has increased of factors that increase her vulnerability to emotion mind, and she has been using this as a signal to tell her what she needs.    Reviewed and resolved/complete: 1/27/2021: \"I feel like we've done really good work with this and I've gotten what I need from this\".  4/27/2021  - I am doing better with this - I know I get more irritable when I'm hungry and I'm trying to do better with this.  Drinking more water.      7/29/21 - Overall she had been feeling like things were going better with this, though over the past several weeks has felt more challenged due to increase in stressors.    11/10/2021 - Recognizing when feeling tired and how this can contribute to increase in vulnerability and ways to intervene.  " "Recognizing when feeling irritabiel - ask self if feeling hungry and eat if needed.    2/9/2022 - Self-care is an important focus right now - to ensure she is attending to her basic self-care needs while helping to take care of her  and his health -       Intervention(s)  Therapist will provide information on factors that increase vulnerabiliyt to emotion mind.    Objective #C  Patient will identify warning signs and signals that emotions are escalating and will learn ways to skillfully intervene early on.  Status: New - Date: 01/06/2020 7/8/2020- in progress  Update: 10/08/2020: Patient has been making good progress on identifying signs and signals that her emotions are escalating.    Reviewed and in progress: 1/27/2021: \"I feel like we've done good work on this but this is one of the things I need to keep working on\".    7/29.21 - In progress - actively working on this -   11/10/2021 - Feels sometimes she is able to do this, other times it is harder  2/9/2022 - continued goal -       Intervention(s)  Therapist will help patient identify warning signs and signals, and will guide the patient in identifying skillful ways to intervene when exeriencing warning signs and signals.      Goal 2: Patient will learn new parenting strategies to help with managing parenting challenges.  Parent will work on improving relationship with her daughter and defining what she would like this relationship to look like.      I will know I've met my goal when I'm enjoying time with my daughter, teaching her new things, and not waiting for things to change      Objective #A (Patient Action)    Status: Continued - Date(s):4/23/2020     Patient will increase understanding of developmental norms for her daughter and ways to manage challenging behaviors.    Intervention(s)  Therapist will provide psychoeducation on developmental norms and parenting strategies.    Objective #B  Patient will spend time reflecting on her relationship " "with her daughter and defining what she would like this relaitonship to look like.    Status: Continued - Date(s):4/23/2020   Update: 7/8/2020 - \"I think some things are going better, she's more helpful with things.  Some things are more challenging with the pandemic and thinking of things to do with her\".    Update: 10/08/2020: Patient is making progress on learning and practicing strategies to manage parenting challenges.    Reviewed: 1/27/2021: \"I feel like things have gotten better with this, we've figured out a good routine, I'm doing meditation, I'm enjoying my time with my daughter more than I used to\".   4/27/2021 - Challenges of parenting during a pandemic, working with occupational therapist on strategies to help daughter, 7/29/21 - She's been working on incorporating more structure into the daily routine, as well as managing her emotional reaction when feeling frustrated.  She's also worked on managing her mindset and expectations.  She's been experiencing some frustrations due to two of the OT's she's worked with has recently resigned, resulting in transitioning to several new providers in a short period of time.  I've encouraged she continue to work closely with her daughter's pediatrician to get connected to resources and supports to help with managing her daughter's difficulties.    11/10/2021 - Feels like they've had some really good interactions, though things have been more challenging at school and home (waiting for evaluations).  Daughter enjoys when she reads to her, they've had some really good interactions recently.    2/9/2022 - unable to fully assess progress on this goal today, will further assess progress on this goal and if any treatment needs remain -       Intervention(s)  Therapist will guide the patient in reflecting upon her relationship with her daughter and help her define ways to move towards what she vaues in her relationship with her daughter.    Objective #C  Patient will " "increase time spent on fun activity and play with her daughter.  Status: Continued - Date(s):4/23/2020 7/8/2020 -   10/08/2020 - will further assess this goal at next session   Reviewed: 1/27/2021 - also going really well, we play together all of the time now\", she finds herself enjoying their time together.   4/27/2021- she will spend some time reflecting upon this and we will discuss further next time.  They have several fun things planned for the future, as well as she has worked hard during the pandemic to plan structure and time together within COVID restrictions.  7/29/21 - She's seen some positive changes with being able to spend time with grandparents and at the cabin since being vaccinated, but also has experienced some challenges with planning fun activities out of worries things won't go well and feeling low energy/motivation to do these things - working to address both of these obstacles.    11/10/2021 - They've been able to do more fun activities together - coloring, Lego sets,    2/9/2022 - unable to fully assess progress on this goal today, will further assess progress on this goal and if any  treatment needs remain -       Intervention(s)  Therapist will guide the patient in identifying ways she can increase positive time with her daughter.  Therapist will also teach mindfulness strategies to help patient with being in the present moment when appropriate - .      Goal 3: Patiient will improve communication with her .      I will know I've met my goal when I feel less irritated with my  and communicate more openly with my .  I would like to be more assertive and less aggressive.   I would like to not avoid telling him things because I'm worried about his reaction or don't think he will react well.  I would like to be more present to the moment during times when this would be helpful.\"      Objective #A (Patient Action)    Status: Continued - Date(s):4/23/2020 7/8/2020 - " "suggested couples therapy to help with this.    Individual therapy focus - identify what makes it hard to be more honest and open   10/08/2020 - will further assess this goal at next session   Reviewed: 1/27/2021 - \"Things are better, I feel like we've worked really hard on how to respond when he's having a hard time and how to talk to him.  I'm doing better listening, being more empathetic, and not \".  4/27/2021 - She has been making progress on examining and updating unhelpful beliefs (e.g. feeling responsible for other's emotions, feeling like she needs to fix or change other's emotions) and has made good progress on identifying and asserting healthy boundaries, in addition to  her emotional response from her spouse's emotional response.  7/29/21 - will review at next session   11/10/2021 - Feeling more irritability recently.  Believes this area could use more attention.    2/9/2022 - continued goal -         Patient will learn and practice at least two new interpersonal effectiveness strategies.    Intervention(s)  Therapist will teach strategies from DBT module on interpersonal effectiveness, and will assign homework to help reinforce skill development.      Patient has reviewed and agreed to the above plan.      Cristy Wilkinson PsyD, LP  Licensed Psychologist    January 6, 2020, reviewed on 4/23/2020, reviewed 7/8/2020, reviewed 10/08/2020, reviewed 1/27/2021, reviewed 4/27/2021, 7/29/2021, 11/10/2021, 2/9/2022                                                                                                                                  "

## 2022-05-03 ENCOUNTER — VIRTUAL VISIT (OUTPATIENT)
Dept: PSYCHOLOGY | Facility: CLINIC | Age: 42
End: 2022-05-03
Payer: COMMERCIAL

## 2022-05-03 DIAGNOSIS — F32.A DEPRESSION, UNSPECIFIED DEPRESSION TYPE: Primary | ICD-10-CM

## 2022-05-03 DIAGNOSIS — F41.9 ANXIETY DISORDER, UNSPECIFIED TYPE: ICD-10-CM

## 2022-05-03 PROCEDURE — 90834 PSYTX W PT 45 MINUTES: CPT | Mod: GT | Performed by: PSYCHOLOGIST

## 2022-05-03 NOTE — PROGRESS NOTES
[facility]                                            Progress Note    Patient Name: Idalia Hinojosa  Date: 5/3/2022         Service Type: Individual      Session Start Time: 3:10 pm  Session End Time: 3: 58 pm   Session Length:  50 minutes    Session #: 48 minutes    Attendees: Client     Service Modality: Video visit: - I.        Provider verified identity through the following two step process.  Patient provided:  Patient is known previously to provider    Telemedicine Visit: The patient's condition can be safely assessed and treated via synchronous audio and visual telemedicine encounter.      Reason for Telemedicine Visit: Services only offered telehealth    Originating Site (Patient Location): Patient's home    Distant Site (Provider Location): Provider Remote Setting- Home Office    Consent:  The patient/guardian has verbally consented to: the potential risks and benefits of telemedicine (telephone visit) versus in person care; bill my insurance or make self-payment for services provided; and responsibility for payment of non-covered services.     Patient would like the video invitation sent by:  My Chart    Mode of Communication:  Video Conference via [facility],     As the provider I attest to compliance with applicable laws and regulations related to telemedicine.         Treatment Plan Last Reviewed: 5/3/2022  PHQ-9/DAVID-7: 2/9/2022    DATA  Interactive Complexity: No  Crisis: No        Progress Since Last Session (Related to Symptoms / Goals / Homework):   Symptoms: remains improved - sunshine helps, feeling better, had several successes related to daughter that have also really helped (after year plus wait, her daughter's evaluation is now scheduled for next week, her daughter also was accepted into a summer school program which helps with structure and planning for the summer)        Homework: Achieved / completed to satisfaction. Was successful with goal of reaching out to  "one of her friends.  Set a goal for this following week to reach out to another friend and schedule a get together.  She noted that she's been successful with using positive self-talk in the morning when she's struggling with getting out of bed and this has been helpful (\"it's a new day, I can make the most out of it\").          Episode of Care Goals: Satisfactory progress - ACTION (Actively working towards change); Intervened by reinforcing change plan / affirming steps taken.  Diagnostic assessment has been completed, treatment plan has been developed and patient has been making good progress on treatment goals.  The patient stated that her main goals for therapy would be to learn emotion regulation strategies (in particular, to help with when she is feeling upset/angry/frustrated/distressed).  The changes in her daily life due to COVID restrictions were a focus of our work together, and working through changes to routine as her daughter started  and she returned to work.  As she has been making progress, focus of treatment will shift to wellness planning (monitoring mood and symptoms, incorporating into routine what keeps her feeling well, review of triggers, how to manage set backs, etc.).  As her  has been diagnosed with cancer, treatment will also focus on providing her with support in managing the emotional distress and challenges related to this.       Current / Ongoing Stressors and Concerns:   Current:  Symptoms of depression and anxiety, managing stress in relation to 's cancer diagnosis, managing parenting challenges related to daughter.       Ongoing:Managing daily routine which has contributed to some adjustment and parenting challenges, lack of effective emotion regulation strategies for managing daily frustrations and upsets and marital discord due to differences in parenting styles.       Treatment Objective(s) Addressed in This Session:   New today:  Comprehensive review " "of treatment plan, skills learned, progress made, and areas to continue to address and strengthen     Identify ways to increase positive social interaction with others and set SMART goals to help increase social interaction with others        Continue to review/practice/reinforce:  Learn and practice mindfulness strategies - \"observe, just notice\" and invite curiosity around what may be contributing and what may help  Practice calming strategies to help release tension in your body   Practice new script in the morning when feeling urge to not get out of bed.  What do you need to hear that helps you keep moving?   (\"Today is a new day\", focus on what you're grateful for, \"I will feel better\")  Identify ways to practice and incorporate self-care into routine.  Ask myself what I need.    Identify how others can help support you and practice asking for help/what you need  Identify and practice at least 2-3 strategies to help with regulating emotion   Learn and practice behavioral activation strategies (how to move forward with doing what will help you when you don't feel like doing it)  Be curious about your emotions - tune in to - what am I thinking?  What is going on around me?  What do I need?    Identify and practice at least two strategies that will help support your self-care during this challenging time  Learn and practice at least two new strategies for tolerating uncertainty  Practice mindfulness to the moment when your mind wanders to worrying about the unknown       Intervention:   Solution-focused: Comprehensive review of treatment plan during today's session, which was a great opportunity to recognize and celebrate progress made, skills learned and growth.  She also identified areas she wishes to continue to focus on and strengthen, and areas she believes needs improvement.  Please see treatment plan below for detailed review.      We also discussed ways to continue to increase positive social interaction " with others.  She had a great success over this past week, and set a SMART goal for reaching out to Nidia - she'll do so at the cabin this weekend, when she'll have her parents able to help her with playing with Kaylee.  She set goal to set up an activity/gathering with her and Nidia when the weather is nice.      Overall, the sunshine and warmer weather is helping her, as well as knowing her daughter's evaluation is now scheduled, which she believes will be helpful in better understanding her daughter and how to help.  Her daughter was also accepted into a summer school program, which helps with planning and structure for the summer.          ASSESSMENT: Current Emotional / Mental Status (status of significant symptoms):   Risk status (Self / Other harm or suicidal ideation)   Patient denies current fears or concerns for personal safety.   Patient denies current or recent suicidal ideation or behaviors.    She agrees to use a safety plan should any safety concerns arise.  She also agrees to reach our to her spouse or a family member for help if needed, and/or access crisis/emergency resources.        Patientdenies current or recent homicidal ideation or behaviors.   Patient denies current or recent self injurious behavior or ideation.   Patient denies other safety concerns.   Patient Patient reports there has been no change in risk factors since their last session.     PatientPatient reports there has been no change in protective factors since their last session.     Recommended that patient call 911 or go to the local ED should there be a change in any of these risk factors.  She has previously been informed on how to contact [facility] crisis/COPE for urgent/crisis concerns 24/7.  Provided patient with crisis resources and she agrees to use safety plan should any safety concerns arise.      Professionals or agencies I can contact during a crisis:    Suicide Prevention Lifeline: 0-863-179-TALK (2351)    Crisis  Text Line Service (available 24 hours a day, 7 days a week): Text [place] to 202799    Local Crisis Services: [facility]: [phone]    Call 911 or go to my nearest emergency department.        Appearance:   Appropriate    Eye Contact:   Good    Psychomotor Behavior: Normal    Attitude:   Cooperative    Orientation:   All   Speech    Rate / Production: Normal     Volume:  Normal    Mood:    She reports she continues to feel better, less depressed, sunshine and warmer weather are helping    Affect:    Congruent with mood '   Thought Content:  Clear    Thought Form:  Coherent  Logical    Insight:    Good      Medication Review:   No changes to current psychiatric medication(s).        Medication Compliance:   Yes     Changes in Health Issues:  None reported         Chemical Use Review:   Substance Use: Chemical use reviewed, no active concerns identified.  No alcohol use.       Tobacco Use: No current tobacco use.      Diagnosis:  Anxiety disorder unspecified  Depression, unspecified     Collateral Reports Completed:   Not Applicable,      PLAN: (Patient Tasks / Therapist Tasks / Other)    1) Idalia identified the following goals: Text Nidia over the weekend and plan a gathering, take some time tomorrow and over the weekend to relax and re-charge.  Don't schedule too many things, schedule what I want to do (shopping), over the weekend ask my parents for help with playing with Kaylee so I can work on a puzzle and read a book.      Continue with - Write before bed, tell myself something positive when I wake up in the morning, ask for what I need, practice mindfulness skills - observe - just notice - non-judgmental stance - invite curiosity -       2) If there is a crisis situation, remember you are not alone and that there are people who can help you twenty-four hours a day, seven days a week.  Call [facility] ([facility]): [phone].      3) Follow-up visit is scheduled for May 12th at  "2:00 pm .  IIf urgent or crisis concerns arise prior to next scheduled appointment, please reach out to crisis resources, call 911, or go to the emergency department.      Cristy Wilkinson PsyD,   Licensed Psychologist  5/3/2022          Previous skills and strategies to remind self of and to do as needed:    Practice \"STOP\" technique when you recognize yourself feeling angry   Be a  - what do you notice is happening when Kaylee feels upset?     Practice recognizing when feeling angry, and giving self permission to use calming and self-soothing strategies and take a break.   Look for the gray with thinking  slow down  take deep breaths  manage expectations of self and others.    Journal   Practice flexing \"flexiblity and creativity\" -   Continue the great work you are doing with your daily schedule.    Get outside every day.    Play your instrument.    Sing!    Continue with: \"It's just a thought\" - non-judgmental, observe, float down the river on a leaf, clouds in the gege, reframing unhelpful thoughts -   Keep working on being patient when things are tense around me.    Continue with observing/paying attention to warning signs and signals and give self persmission to slow down, especially during the morning routine with Kaylee.    Practice offerring Kaylee choices when appropriate.      Use \"my plan for success\" to help remind you of calming strategies to practice when feeling upset.    Practice decreasing overall vulnerability to emotion mind through getting adequate rest, nutrition, time for yourself, and physical activity.         Consider reading \"Fighting for your marriage\".      Technical Assistance for video visits:    Offer patient the website ([email or web address] [email or web address]/videovisit) and/or phone number ([phone]) for video visit instructions/assistance if needed.                                             ____________________________________    Treatment Plan    Patient's Name: Idalia Hinojosa  Date Of " "Birth: 1980    Date of Creation: 1/6/2020  Date Treatment Plan Last Reviewed/Revised: 2/09/2022    DSM5 Diagnoses: 300.00 (F41.9) Unspecified Anxiety Disorder, depressive disorder, unspecified  Psychosocial / Contextual Factors: strain in marital relationship, parenting challenges, adjustment challenges, 's cancer diagnosis  PROMIS (reviewed every 90 days):     Anticipated number of session for this episode of care: additional 15-20  Anticipation frequency of session: Weekly until symptoms stabilize, then can decrease the frequency of sessions to likely bi-weekly or once every three weeks  Anticipated Duration of each session: 38-52 minutes  Treatment plan will be reviewed in 90 days or when goals have been changed.       Referral / Collaboration:  We discussed a referral to a couples/marital therapist.  The patient is thinking about this and has talked with her  about the referral, but they are unsure if they would like to follow through at this time.      MeasurableTreatment Goal(s) related to diagnosis / functional impairment(s)  Goal 1: Patient will learn emotion regulation strategies to help when she is feeling upset/angry/frustrated/distressed    I will know I've met my goal when I would yell less, I would feel calmer, and I would not push others.  I would be less physical when I'm angry (e.g. wouldn't slam doors or hit things)       Objective #A (Patient Action)    Patient will learn and practice at least 2 new emotion regulation strategies.  Status: Continued - Date(s):4/23/2020 (has begun to learn new emotion regulation strategies and is making good progress)   Update: 7/8/2020: \"I've learned new strategies but I'm bad at practicing them\" - will help patient incorporate regular practice and identify and problem-solve barriers.    Reviewed: 10/8/2020: Patient has been successfully practicing emotion regulation strategies, is working on identifying earlier on when to intervene and engage " "strategies, as well as practice strategies on a regular basis to help strength use of strategies.    Reviewed and in progress: 1/27/2021: \"Overall things are going a lot better.  Sometimes I fall into old habits\", but recognizing when she's feeling more vulnerable to her emotions and reminds herself to use different skills/intervene in a different way.     4/27/2021-\"Things have definitely been better\".  Recognizing when feeling vulnerable (e.g. hungry), recognizing when emotions are escalating and need to engage calming strategies\".  Ask for help from Colia when feeling upset.  Can be challenging when Kaylee escalates when I'm feeling angry.    7/29/21 - She felt like she has forgotten what helps her when she's feeling upset, we have spent time reviewing and refreshing these skills.    11/10/2021 - She feels like for the most part things have been going well.    2/9/2022 - Helping her to apply emotion regulation strategies in the context of a new and unexpected stressor -   5/3/2022 - Update - Things have been better, I can tell when I feel overwhelmed or don't get breaks - that is when I start yelling or snapping, and overall things have been better.  I'm exercising more and that really helps with the stress.  I've been feeling more confident about myself because I've been exercising more.        Intervention(s)  Therapist will provide psychoeducation on emotion regulation module from [employer]BT and will assign patient homework to help reinforce skill development.    Objective #B  Patient will identify factors that increase vulnerability to emotion mind and identify ways to decrease vulnerability to emotion mind.  Status: Continued - Date(s):4/23/2020 (has learned factors that make her more vulnerable to emotion mind, and has been working on addressing vulnerability related to feeling hungry, tired and rushed)   Update: 7/8/20: \"This one is harder for me.  I know I get angry when I'm hungry, and when I slow down I'm less " "inclined to get angry\".  Challenges - not always having food ready, not always planning in advance, (planing breakfast the night before)  Reviewed: 10/8/2020: Patient's awareness has increased of factors that increase her vulnerability to emotion mind, and she has been using this as a signal to tell her what she needs.    Reviewed and resolved/complete: 1/27/2021: \"I feel like we've done really good work with this and I've gotten what I need from this\".  4/27/2021  - I am doing better with this - I know I get more irritable when I'm hungry and I'm trying to do better with this.  Drinking more water.      7/29/21 - Overall she had been feeling like things were going better with this, though over the past several weeks has felt more challenged due to increase in stressors.    11/10/2021 - Recognizing when feeling tired and how this can contribute to increase in vulnerability and ways to intervene.  Recognizing when feeling irritabiel - ask self if feeling hungry and eat if needed.    2/9/2022 - Self-care is an important focus right now - to ensure she is attending to her basic self-care needs while helping to take care of her  and his health -   5/3/2022 - I feel like I've mostly been doing better with eating and making sure not hungry.  Try to plan meals and snacks - sometimes this can be hard.          Intervention(s)  Therapist will provide information on factors that increase vulnerabiliyt to emotion mind.    Objective #C  Patient will identify warning signs and signals that emotions are escalating and will learn ways to skillfully intervene early on.  Status: New - Date: 01/06/2020 7/8/2020- in progress  Update: 10/08/2020: Patient has been making good progress on identifying signs and signals that her emotions are escalating.    Reviewed and in progress: 1/27/2021: \"I feel like we've done good work on this but this is one of the things I need to keep working on\".    7/29.21 - In progress - actively " "working on this -   11/10/2021 - Feels sometimes she is able to do this, other times it is harder  2/9/2022 - continued goal -   5/3/2022 - I do that sometimes - I do notice when I am getting more frustrated - that's been helping more - if I feel like I'm gettig more furstrated than I can take a deep breath or put my hands on my head -         Intervention(s)  Therapist will help patient identify warning signs and signals, and will guide the patient in identifying skillful ways to intervene when exeriencing warning signs and signals.      Goal 2: Patient will learn new parenting strategies to help with managing parenting challenges.  Parent will work on improving relationship with her daughter and defining what she would like this relationship to look like.      I will know I've met my goal when I'm enjoying time with my daughter, teaching her new things, and not waiting for things to change      Objective #A (Patient Action)    Status: Continued - Date(s):4/23/2020     Patient will increase understanding of developmental norms for her daughter and ways to manage challenging behaviors.    Intervention(s)  Therapist will provide psychoeducation on developmental norms and parenting strategies.    Objective #B  Patient will spend time reflecting on her relationship with her daughter and defining what she would like this relaitonship to look like.    Status: Continued - Date(s):4/23/2020   Update: 7/8/2020 - \"I think some things are going better, she's more helpful with things.  Some things are more challenging with the pandemic and thinking of things to do with her\".    Update: 10/08/2020: Patient is making progress on learning and practicing strategies to manage parenting challenges.    Reviewed: 1/27/2021: \"I feel like things have gotten better with this, we've figured out a good routine, I'm doing meditation, I'm enjoying my time with my daughter more than I used to\".   4/27/2021 - Challenges of parenting during a " "pandemic, working with occupational therapist on strategies to help daughter, 7/29/21 - She's been working on incorporating more structure into the daily routine, as well as managing her emotional reaction when feeling frustrated.  She's also worked on managing her mindset and expectations.  She's been experiencing some frustrations due to two of the OT's she's worked with has recently resigned, resulting in transitioning to several new providers in a short period of time.  I've encouraged she continue to work closely with her daughter's pediatrician to get connected to resources and supports to help with managing her daughter's difficulties.    11/10/2021 - Feels like they've had some really good interactions, though things have been more challenging at school and home (waiting for evaluations).  Daughter enjoys when she reads to her, they've had some really good interactions recently.    2/9/2022 - unable to fully assess progress on this goal today, will further assess progress on this goal and if any treatment needs remain -   5/3/2022- this is going well - sometimes it is hard when she wants me to play the same thing over and over with her, but overall it is better - having more fun as it gets older -         Intervention(s)  Therapist will guide the patient in reflecting upon her relationship with her daughter and help her define ways to move towards what she vaues in her relationship with her daughter.    Objective #C  Patient will increase time spent on fun activity and play with her daughter.  Status: Continued - Date(s):4/23/2020 7/8/2020 -   10/08/2020 - will further assess this goal at next session   Reviewed: 1/27/2021 - also going really well, we play together all of the time now\", she finds herself enjoying their time together.   4/27/2021- she will spend some time reflecting upon this and we will discuss further next time.  They have several fun things planned for the future, as well as she has " "worked hard during the pandemic to plan structure and time together within COVID restrictions.  7/29/21 - She's seen some positive changes with being able to spend time with grandparents and at the cabin since being vaccinated, but also has experienced some challenges with planning fun activities out of worries things won't go well and feeling low energy/motivation to do these things - working to address both of these obstacles.    11/10/2021 - They've been able to do more fun activities together - coloring, Lego sets,    2/9/2022 - unable to fully assess progress on this goal today, will further assess progress on this goal and if any  treatment needs remain -    5/3/2022 - really fun time doing legos, building, reading, drawing together        Intervention(s)  Therapist will guide the patient in identifying ways she can increase positive time with her daughter.  Therapist will also teach mindfulness strategies to help patient with being in the present moment when appropriate - .      Goal 3: Patiient will improve communication with her .      I will know I've met my goal when I feel less irritated with my  and communicate more openly with my .  I would like to be more assertive and less aggressive.   I would like to not avoid telling him things because I'm worried about his reaction or don't think he will react well.  I would like to be more present to the moment during times when this would be helpful.\"      Objective #A (Patient Action)    Status: Continued - Date(s):4/23/2020 7/8/2020 - suggested couples therapy to help with this.    Individual therapy focus - identify what makes it hard to be more honest and open   10/08/2020 - will further assess this goal at next session   Reviewed: 1/27/2021 - \"Things are better, I feel like we've worked really hard on how to respond when he's having a hard time and how to talk to him.  I'm doing better listening, being more empathetic, and not " "\".  4/27/2021 - She has been making progress on examining and updating unhelpful beliefs (e.g. feeling responsible for other's emotions, feeling like she needs to fix or change other's emotions) and has made good progress on identifying and asserting healthy boundaries, in addition to  her emotional response from her spouse's emotional response.  7/29/21 - will review at next session   11/10/2021 - Feeling more irritability recently.  Believes this area could use more attention.    2/9/2022 - continued goal -   5/3/2022- this one is hard to say because things are so different right now - he's not his normal self (due to cancer treatment) - I think I'm communicating things in a nice way        Patient will learn and practice at least two new interpersonal effectiveness strategies.    Intervention(s)  Therapist will teach strategies from DBT module on interpersonal effectiveness, and will assign homework to help reinforce skill development.      Patient has reviewed and agreed to the above plan.      Cristy Wilkinson PsyD, LP  Licensed Psychologist  January 6, 2020, reviewed on 4/23/2020, reviewed 7/8/2020, reviewed 10/08/2020, reviewed 1/27/2021, reviewed 4/27/2021, 7/29/2021, 11/10/2021, 2/9/2022, 5/3/2022                                                                                                                                  "

## 2022-05-08 ENCOUNTER — MYC MEDICAL ADVICE (OUTPATIENT)
Dept: OBGYN | Facility: CLINIC | Age: 42
End: 2022-05-08
Payer: COMMERCIAL

## 2022-05-15 ENCOUNTER — MYC MEDICAL ADVICE (OUTPATIENT)
Dept: ALLERGY | Facility: CLINIC | Age: 42
End: 2022-05-15
Payer: COMMERCIAL

## 2022-05-15 DIAGNOSIS — J30.89 ALLERGIC RHINITIS DUE TO DUST MITE: ICD-10-CM

## 2022-05-15 DIAGNOSIS — J30.0 CHRONIC VASOMOTOR RHINITIS: ICD-10-CM

## 2022-05-16 RX ORDER — MONTELUKAST SODIUM 10 MG/1
10 TABLET ORAL AT BEDTIME
Qty: 30 TABLET | Refills: 0 | Status: SHIPPED | OUTPATIENT
Start: 2022-05-16 | End: 2022-06-09

## 2022-05-16 RX ORDER — MOMETASONE FUROATE MONOHYDRATE 50 UG/1
2 SPRAY, METERED NASAL DAILY
Qty: 17 G | Refills: 0 | Status: SHIPPED | OUTPATIENT
Start: 2022-05-16 | End: 2022-06-09

## 2022-05-16 NOTE — TELEPHONE ENCOUNTER
Routing refill request to provider for review/approval because:  Patient needs to be seen because it has been more than 1 year since last office visit.  Patient has appointment scheduled for 6/21/22    SALAZAR KayN, RN

## 2022-05-17 ENCOUNTER — VIRTUAL VISIT (OUTPATIENT)
Dept: PSYCHOLOGY | Facility: CLINIC | Age: 42
End: 2022-05-17
Payer: COMMERCIAL

## 2022-05-17 DIAGNOSIS — F41.9 ANXIETY DISORDER, UNSPECIFIED TYPE: ICD-10-CM

## 2022-05-17 DIAGNOSIS — F32.A DEPRESSION, UNSPECIFIED DEPRESSION TYPE: Primary | ICD-10-CM

## 2022-05-17 PROCEDURE — 90834 PSYTX W PT 45 MINUTES: CPT | Mod: GT | Performed by: PSYCHOLOGIST

## 2022-05-17 ASSESSMENT — PATIENT HEALTH QUESTIONNAIRE - PHQ9
SUM OF ALL RESPONSES TO PHQ QUESTIONS 1-9: 7
10. IF YOU CHECKED OFF ANY PROBLEMS, HOW DIFFICULT HAVE THESE PROBLEMS MADE IT FOR YOU TO DO YOUR WORK, TAKE CARE OF THINGS AT HOME, OR GET ALONG WITH OTHER PEOPLE: SOMEWHAT DIFFICULT
SUM OF ALL RESPONSES TO PHQ QUESTIONS 1-9: 7

## 2022-05-17 NOTE — PATIENT INSTRUCTIONS
Remind myself that it is nice to be able to leave the house and talk to others, remind myself I will feel better, remind myself I can do this, go for a walk, eat ice cream, listen to fun music I enjoy (ABBA!), read to Kaylee, and ask parents for help with the nighttime routine.

## 2022-05-17 NOTE — PROGRESS NOTES
[facility]                                            Progress Note    Patient Name: Idalia Hinojosa  Date: 5/17/2022         Service Type: Individual      Session Start Time: 12:10 pm  Session End Time: 1:01 pm   Session Length:  51 minutes    Session #: 53    Attendees: Client     Service Modality: Video visit: - I.        Provider verified identity through the following two step process.  Patient provided:  Patient is known previously to provider    Telemedicine Visit: The patient's condition can be safely assessed and treated via synchronous audio and visual telemedicine encounter.      Reason for Telemedicine Visit: Services only offered telehealth    Originating Site (Patient Location): Patient's home    Distant Site (Provider Location): Provider Remote Setting- Home Office    Consent:  The patient/guardian has verbally consented to: the potential risks and benefits of telemedicine (telephone visit) versus in person care; bill my insurance or make self-payment for services provided; and responsibility for payment of non-covered services.     Patient would like the video invitation sent by:  My Chart    Mode of Communication:  Video Conference via [employer],     As the provider I attest to compliance with applicable laws and regulations related to telemedicine.         Treatment Plan Last Reviewed: 5/3/2022  PHQ-9/DAVID-7: 5/17/2022    DATA  Interactive Complexity: No  Crisis: No        Progress Since Last Session (Related to Symptoms / Goals / Homework):   Symptoms: reports depression symptoms worsened in the context of she, her , daughter and father all getting COVID.  She describes feeling more overwhelmed, worried, doesn't want to complete the tasks she needs to.   (PHQ of 7 today)     Homework: Achieved / completed to satisfaction. Unable to complete goals as anticipated because had COVID and needed to quarantine away from home.          Episode of Care Goals:  "Satisfactory progress - ACTION (Actively working towards change); Intervened by reinforcing change plan / affirming steps taken.  Diagnostic assessment has been completed, treatment plan has been developed and patient has been making good progress on treatment goals.  The patient stated that her main goals for therapy would be to learn emotion regulation strategies (in particular, to help with when she is feeling upset/angry/frustrated/distressed).  The changes in her daily life due to COVID restrictions were a focus of our work together, and working through changes to routine as her daughter started  and she returned to work.  As she has been making progress, focus of treatment will shift to wellness planning (monitoring mood and symptoms, incorporating into routine what keeps her feeling well, review of triggers, how to manage set backs, etc.).  As her  has been diagnosed with cancer, treatment will also focus on providing her with support in managing the emotional distress and challenges related to this.       Current / Ongoing Stressors and Concerns:   Current:  Increase in symptoms of depression and anxiety, managing stress and caregiver demands in relation to 's cancer diagnosis, managing parenting challenges related to daughter.       Ongoing:Managing daily routine which has contributed to some adjustment and parenting challenges, lack of effective emotion regulation strategies for managing daily frustrations and upsets and marital discord due to differences in parenting styles.       Treatment Objective(s) Addressed in This Session:   New today:    Identify and practice cognitive and behavioral strategies to help manage increase in symptoms of depression        Continue to review/practice/reinforce:  Learn and practice mindfulness strategies - \"observe, just notice\" and invite curiosity around what may be contributing and what may help  Practice calming strategies to help release " "tension in your body   Practice new script in the morning when feeling urge to not get out of bed.  What do you need to hear that helps you keep moving?   (\"Today is a new day\", focus on what you're grateful for, \"I will feel better\")  Identify ways to practice and incorporate self-care into routine.  Ask myself what I need.    Identify how others can help support you and practice asking for help/what you need  Identify and practice at least 2-3 strategies to help with regulating emotion   Learn and practice behavioral activation strategies (how to move forward with doing what will help you when you don't feel like doing it)  Be curious about your emotions - tune in to - what am I thinking?  What is going on around me?  What do I need?    Identify and practice at least two strategies that will help support your self-care during this challenging time  Learn and practice at least two new strategies for tolerating uncertainty  Practice mindfulness to the moment when your mind wanders to worrying about the unknown       Intervention:   CBT: Idalia wished to focus today on how she can manage the increase in symptoms of depression she experienced over the past few weeks.  It has been a tough few weeks, as she, her spouse, daughter and father all were diagnosed with COVID.  Yesterday was her first day out of quarantine where she can be out and about again.  She and her daughter have needed to quarantine away from home due to her 's cancer diagnosis, and will need to do so for 20 days instead of 10 due to his illness.  She shared feeling overwhelmed by the continued state of the pandemic and her 's cancer diagnosis.      She also shared how challenging it is to be a caregiver for her , in light of her sensitivity and attunement to other's feelings, and difficulties  their emotions from hers.  While we can continue to work on strategies to help strengthen her strategies in this regard, I also " "suggested she may benefit from caregiver specific resources, including a support group for those who have a loved one with cancer.  I let her know I will look into resources and send her this information.  We also talked about the concept of \"respite\" and normalized the need for this - the need for a break, a chance to rejuvenate.  I asked her to consider what this might look like for her - and ways she may be able to get some respite (e.g. asking her parents or family members for help,as they have been supportive and helpful for her in the past when she's needed help etc).      She also asked to spend time talking specifically about what can focus on in the day to day to help her with managing the increase in symptoms of depression.  We broke it down into looking at problematic thoughts and behaviors that present when she's feeling depressed, recognizing these as her depression, and inserting more helpful thoughts and actions.  She identified the following: Remind myself that it is nice to be able to leave the house and talk to others, remind myself I will feel better, remind myself I can do this, go for a walk, eat ice cream, listen to fun music I enjoy (ABBA!), read to Kaylee, and ask parents for help with the nighttime routine.        ASSESSMENT: Current Emotional / Mental Status (status of significant symptoms):   Risk status (Self / Other harm or suicidal ideation)   Patient denies current fears or concerns for personal safety.   Patient denies current or recent suicidal ideation or behaviors.    She continues to agree to use a safety plan should any safety concerns arise.  She also agrees to reach our to her spouse or a family member for help if needed, and/or access crisis/emergency resources.        Patientdenies current or recent homicidal ideation or behaviors.   Patient denies current or recent self injurious behavior or ideation.   Patient denies other safety concerns.   Patient Patient reports there has " been no change in risk factors since their last session.     PatientPatient reports there has been no change in protective factors since their last session.     Recommended that patient call 911 or go to the local ED should there be a change in any of these risk factors.  She has previously been informed on how to contact [facility] crisis/COPE for urgent/crisis concerns 24/7.  Provided patient with crisis resources and she agrees to use safety plan should any safety concerns arise.      Professionals or agencies I can contact during a crisis:    Suicide Prevention Lifeline: 4-809-281-OAMX (9322)    Crisis Text Line Service (available 24 hours a day, 7 days a week): Text [place] to 325068    [facility] Crisis Services: [facility]: [phone]    Call 911 or go to my nearest emergency department.        Appearance:   Appropriate    Eye Contact:   Good    Psychomotor Behavior: Normal    Attitude:   Cooperative    Orientation:   All   Speech    Rate / Production: Normal     Volume:  Normal    Mood:    Depressed   Affect:    Congruent with mood    Thought Content:  Clear    Thought Form:  Coherent  Logical    Insight:    Good      Medication Review:   No changes to current psychiatric medication(s).        Medication Compliance:   Yes     Changes in Health Issues:  None reported         Chemical Use Review:   Substance Use: Chemical use reviewed, no active concerns identified.  No alcohol use.       Tobacco Use: No current tobacco use.      Diagnosis:  Anxiety disorder unspecified  Depression, unspecified     Collateral Reports Completed:   Not Applicable,      PLAN: (Patient Tasks / Therapist Tasks / Other)    1) Idalia identified the following goals:  Remind myself that it is nice to be able to leave the house and talk to others, remind myself I will feel better, remind myself I can do this/I got this, go for a walk, eat ice cream, listen to fun music I enjoy (ABBA!), read to Kaylee, and ask parents for  "help with the nighttime routine.      Continue with - Write before bed, tell myself something positive when I wake up in the morning, ask for what I need, practice mindfulness skills - observe - just notice - non-judgmental stance - invite curiosity -       2) If there is a crisis situation, remember you are not alone and that there are people who can help you twenty-four hours a day, seven days a week.  Call [facility] ([facility]): [phone].      3) Follow-up visit is scheduled for May 26th at 12:00 pm .  IIf urgent or crisis concerns arise prior to next scheduled appointment, please reach out to crisis resources, call 911, or go to the emergency department.      Cristy Wilkinson PsyD, LP  Licensed Psychologist  5/17/2022      Previous skills and strategies to remind self of and to do as needed:    Practice \"STOP\" technique when you recognize yourself feeling angry   Be a  - what do you notice is happening when Kaylee feels upset?     Practice recognizing when feeling angry, and giving self permission to use calming and self-soothing strategies and take a break.   Look for the gray with thinking  slow down  take deep breaths  manage expectations of self and others.    Journal   Practice flexing \"flexiblity and creativity\" -   Continue the great work you are doing with your daily schedule.    Get outside every day.    Play your instrument.    Sing!    Continue with: \"It's just a thought\" - non-judgmental, observe, float down the river on a leaf, clouds in the gege, reframing unhelpful thoughts -   Keep working on being patient when things are tense around me.    Continue with observing/paying attention to warning signs and signals and give self persmission to slow down, especially during the morning routine with Kaylee.    Practice offerring Kaylee choices when appropriate.      Use \"my plan for success\" to help remind you of calming strategies to practice when feeling upset.    Practice decreasing " "overall vulnerability to emotion mind through getting adequate rest, nutrition, time for yourself, and physical activity.         Consider reading \"Fighting for your marriage\".      Technical Assistance for video visits:    Offer patient the website ([email or web address]) and/or phone number ([phone]) for video visit instructions/assistance if needed.                                             ____________________________________    Treatment Plan    Patient's Name: Idalia Hinojosa  YOB: 1980    Date of Creation: 1/6/2020  Date Treatment Plan Last Reviewed/Revised: 2/09/2022    DSM5 Diagnoses: 300.00 (F41.9) Unspecified Anxiety Disorder, depressive disorder, unspecified  Psychosocial / Contextual Factors: strain in marital relationship, parenting challenges, adjustment challenges, 's cancer diagnosis  PROMIS (reviewed every 90 days):     Anticipated number of session for this episode of care: additional 15-20  Anticipation frequency of session: Weekly until symptoms stabilize, then can decrease the frequency of sessions to likely bi-weekly or once every three weeks  Anticipated Duration of each session: 38-52 minutes  Treatment plan will be reviewed in 90 days or when goals have been changed.       Referral / Collaboration:  We discussed a referral to a couples/marital therapist.  The patient is thinking about this and has talked with her  about the referral, but they are unsure if they would like to follow through at this time.      MeasurableTreatment Goal(s) related to diagnosis / functional impairment(s)  Goal 1: Patient will learn emotion regulation strategies to help when she is feeling upset/angry/frustrated/distressed    I will know I've met my goal when I would yell less, I would feel calmer, and I would not push others.  I would be less physical when I'm angry (e.g. wouldn't slam doors or hit things)       Objective #A (Patient Action)    Patient will learn and " "practice at least 2 new emotion regulation strategies.  Status: Continued - Date(s):4/23/2020 (has begun to learn new emotion regulation strategies and is making good progress)   Update: 7/8/2020: \"I've learned new strategies but I'm bad at practicing them\" - will help patient incorporate regular practice and identify and problem-solve barriers.    Reviewed: 10/8/2020: Patient has been successfully practicing emotion regulation strategies, is working on identifying earlier on when to intervene and engage strategies, as well as practice strategies on a regular basis to help strength use of strategies.    Reviewed and in progress: 1/27/2021: \"Overall things are going a lot better.  Sometimes I fall into old habits\", but recognizing when she's feeling more vulnerable to her emotions and reminds herself to use different skills/intervene in a different way.     4/27/2021-\"Things have definitely been better\".  Recognizing when feeling vulnerable (e.g. hungry), recognizing when emotions are escalating and need to engage calming strategies\".  Ask for help from Colia when feeling upset.  Can be challenging when Kaylee escalates when I'm feeling angry.    7/29/21 - She felt like she has forgotten what helps her when she's feeling upset, we have spent time reviewing and refreshing these skills.    11/10/2021 - She feels like for the most part things have been going well.    2/9/2022 - Helping her to apply emotion regulation strategies in the context of a new and unexpected stressor -   5/3/2022 - Update - Things have been better, I can tell when I feel overwhelmed or don't get breaks - that is when I start yelling or snapping, and overall things have been better.  I'm exercising more and that really helps with the stress.  I've been feeling more confident about myself because I've been exercising more.        Intervention(s)  Therapist will provide psychoeducation on emotion regulation module from DBT and will assign patient " "homework to help reinforce skill development.    Objective #B  Patient will identify factors that increase vulnerability to emotion mind and identify ways to decrease vulnerability to emotion mind.  Status: Continued - Date(s):4/23/2020 (has learned factors that make her more vulnerable to emotion mind, and has been working on addressing vulnerability related to feeling hungry, tired and rushed)   Update: 7/8/20: \"This one is harder for me.  I know I get angry when I'm hungry, and when I slow down I'm less inclined to get angry\".  Challenges - not always having food ready, not always planning in advance, (planing breakfast the night before)  Reviewed: 10/8/2020: Patient's awareness has increased of factors that increase her vulnerability to emotion mind, and she has been using this as a signal to tell her what she needs.    Reviewed and resolved/complete: 1/27/2021: \"I feel like we've done really good work with this and I've gotten what I need from this\".  4/27/2021  - I am doing better with this - I know I get more irritable when I'm hungry and I'm trying to do better with this.  Drinking more water.      7/29/21 - Overall she had been feeling like things were going better with this, though over the past several weeks has felt more challenged due to increase in stressors.    11/10/2021 - Recognizing when feeling tired and how this can contribute to increase in vulnerability and ways to intervene.  Recognizing when feeling irritabiel - ask self if feeling hungry and eat if needed.    2/9/2022 - Self-care is an important focus right now - to ensure she is attending to her basic self-care needs while helping to take care of her  and his health -   5/3/2022 - I feel like I've mostly been doing better with eating and making sure not hungry.  Try to plan meals and snacks - sometimes this can be hard.          Intervention(s)  Therapist will provide information on factors that increase vulnerabiliyt to emotion " "mind.    Objective #C  Patient will identify warning signs and signals that emotions are escalating and will learn ways to skillfully intervene early on.  Status: New - Date: 01/06/2020 7/8/2020- in progress  Update: 10/08/2020: Patient has been making good progress on identifying signs and signals that her emotions are escalating.    Reviewed and in progress: 1/27/2021: \"I feel like we've done good work on this but this is one of the things I need to keep working on\".    7/29.21 - In progress - actively working on this -   11/10/2021 - Feels sometimes she is able to do this, other times it is harder  2/9/2022 - continued goal -   5/3/2022 - I do that sometimes - I do notice when I am getting more frustrated - that's been helping more - if I feel like I'm gettig more furstrated than I can take a deep breath or put my hands on my head -         Intervention(s)  Therapist will help patient identify warning signs and signals, and will guide the patient in identifying skillful ways to intervene when exeriencing warning signs and signals.      Goal 2: Patient will learn new parenting strategies to help with managing parenting challenges.  Parent will work on improving relationship with her daughter and defining what she would like this relationship to look like.      I will know I've met my goal when I'm enjoying time with my daughter, teaching her new things, and not waiting for things to change      Objective #A (Patient Action)    Status: Continued - Date(s):4/23/2020     Patient will increase understanding of developmental norms for her daughter and ways to manage challenging behaviors.    Intervention(s)  Therapist will provide psychoeducation on developmental norms and parenting strategies.    Objective #B  Patient will spend time reflecting on her relationship with her daughter and defining what she would like this relaitonship to look like.    Status: Continued - Date(s):4/23/2020   Update: 7/8/2020 - \"I think " "some things are going better, she's more helpful with things.  Some things are more challenging with the pandemic and thinking of things to do with her\".    Update: 10/08/2020: Patient is making progress on learning and practicing strategies to manage parenting challenges.    Reviewed: 1/27/2021: \"I feel like things have gotten better with this, we've figured out a good routine, I'm doing meditation, I'm enjoying my time with my daughter more than I used to\".   4/27/2021 - Challenges of parenting during a pandemic, working with occupational therapist on strategies to help daughter, 7/29/21 - She's been working on incorporating more structure into the daily routine, as well as managing her emotional reaction when feeling frustrated.  She's also worked on managing her mindset and expectations.  She's been experiencing some frustrations due to two of the OT's she's worked with has recently resigned, resulting in transitioning to several new providers in a short period of time.  I've encouraged she continue to work closely with her daughter's pediatrician to get connected to resources and supports to help with managing her daughter's difficulties.    11/10/2021 - Feels like they've had some really good interactions, though things have been more challenging at school and home (waiting for evaluations).  Daughter enjoys when she reads to her, they've had some really good interactions recently.    2/9/2022 - unable to fully assess progress on this goal today, will further assess progress on this goal and if any treatment needs remain -   5/3/2022- this is going well - sometimes it is hard when she wants me to play the same thing over and over with her, but overall it is better - having more fun as it gets older -         Intervention(s)  Therapist will guide the patient in reflecting upon her relationship with her daughter and help her define ways to move towards what she vaues in her relationship with her " "daughter.    Objective #C  Patient will increase time spent on fun activity and play with her daughter.  Status: Continued - Date(s):4/23/2020 7/8/2020 -   10/08/2020 - will further assess this goal at next session   Reviewed: 1/27/2021 - also going really well, we play together all of the time now\", she finds herself enjoying their time together.   4/27/2021- she will spend some time reflecting upon this and we will discuss further next time.  They have several fun things planned for the future, as well as she has worked hard during the pandemic to plan structure and time together within COVID restrictions.  7/29/21 - She's seen some positive changes with being able to spend time with grandparents and at the cabin since being vaccinated, but also has experienced some challenges with planning fun activities out of worries things won't go well and feeling low energy/motivation to do these things - working to address both of these obstacles.    11/10/2021 - They've been able to do more fun activities together - coloring, Lego sets,    2/9/2022 - unable to fully assess progress on this goal today, will further assess progress on this goal and if any  treatment needs remain -    5/3/2022 - really fun time doing legos, building, reading, drawing together        Intervention(s)  Therapist will guide the patient in identifying ways she can increase positive time with her daughter.  Therapist will also teach mindfulness strategies to help patient with being in the present moment when appropriate - .      Goal 3: Patiient will improve communication with her .      I will know I've met my goal when I feel less irritated with my  and communicate more openly with my .  I would like to be more assertive and less aggressive.   I would like to not avoid telling him things because I'm worried about his reaction or don't think he will react well.  I would like to be more present to the moment during times when " "this would be helpful.\"      Objective #A (Patient Action)    Status: Continued - Date(s):4/23/2020 7/8/2020 - suggested couples therapy to help with this.    Individual therapy focus - identify what makes it hard to be more honest and open   10/08/2020 - will further assess this goal at next session   Reviewed: 1/27/2021 - \"Things are better, I feel like we've worked really hard on how to respond when he's having a hard time and how to talk to him.  I'm doing better listening, being more empathetic, and not \".  4/27/2021 - She has been making progress on examining and updating unhelpful beliefs (e.g. feeling responsible for other's emotions, feeling like she needs to fix or change other's emotions) and has made good progress on identifying and asserting healthy boundaries, in addition to  her emotional response from her spouse's emotional response.  7/29/21 - will review at next session   11/10/2021 - Feeling more irritability recently.  Believes this area could use more attention.    2/9/2022 - continued goal -   5/3/2022- this one is hard to say because things are so different right now - he's not his normal self (due to cancer treatment) - I think I'm communicating things in a nice way        Patient will learn and practice at least two new interpersonal effectiveness strategies.    Intervention(s)  Therapist will teach strategies from DBT module on interpersonal effectiveness, and will assign homework to help reinforce skill development.      Patient has reviewed and agreed to the above plan.      Cristy Wilkinson PsyD,   Licensed Psychologist  January 6, 2020, reviewed on 4/23/2020, reviewed 7/8/2020, reviewed 10/08/2020, reviewed 1/27/2021, reviewed 4/27/2021, 7/29/2021, 11/10/2021, 2/9/2022, 5/3/2022                                                                                                                                  Answers for HPI/ROS submitted by the patient on " 5/17/2022  If you checked off any problems, how difficult have these problems made it for you to do your work, take care of things at home, or get along with other people?: Somewhat difficult  PHQ9 TOTAL SCORE: 7

## 2022-05-18 ASSESSMENT — PATIENT HEALTH QUESTIONNAIRE - PHQ9: SUM OF ALL RESPONSES TO PHQ QUESTIONS 1-9: 7

## 2022-05-22 ENCOUNTER — MYC MEDICAL ADVICE (OUTPATIENT)
Dept: ALLERGY | Facility: CLINIC | Age: 42
End: 2022-05-22
Payer: COMMERCIAL

## 2022-05-22 DIAGNOSIS — J30.0 CHRONIC VASOMOTOR RHINITIS: ICD-10-CM

## 2022-05-23 RX ORDER — IPRATROPIUM BROMIDE 42 UG/1
2 SPRAY, METERED NASAL 4 TIMES DAILY PRN
Qty: 15 ML | Refills: 0 | Status: SHIPPED | OUTPATIENT
Start: 2022-05-23 | End: 2022-06-21

## 2022-05-23 NOTE — TELEPHONE ENCOUNTER
"Requested Prescriptions   Pending Prescriptions Disp Refills     ipratropium (ATROVENT) 0.06 % nasal spray 15 mL 3     Sig: Spray 2 sprays into both nostrils 4 times daily as needed for rhinitis       Nasal Allergy Protocol Passed - 5/23/2022  7:36 AM        Passed - Patient is age 12 or older        Passed - Recent (12 mo) or future (30 days) visit within the authorizing provider's specialty     Patient has had an office visit with the authorizing provider or a provider within the authorizing providers department within the previous 12 mos or has a future within next 30 days. See \"Patient Info\" tab in inbasket, or \"Choose Columns\" in Meds & Orders section of the refill encounter.              Passed - Medication is active on med list             Routing refill request to provider for review/approval because:  Patient needs to be seen because it has been more than 1 year since last office visit. Patient has appointment scheduled for 6/23/22.     KATHE Bradley, RN        "

## 2022-05-26 ENCOUNTER — VIRTUAL VISIT (OUTPATIENT)
Dept: PSYCHOLOGY | Facility: CLINIC | Age: 42
End: 2022-05-26
Payer: COMMERCIAL

## 2022-05-26 DIAGNOSIS — F41.9 ANXIETY DISORDER, UNSPECIFIED TYPE: Primary | ICD-10-CM

## 2022-05-26 DIAGNOSIS — F32.A DEPRESSION, UNSPECIFIED DEPRESSION TYPE: ICD-10-CM

## 2022-05-26 PROCEDURE — 90834 PSYTX W PT 45 MINUTES: CPT | Mod: GT | Performed by: PSYCHOLOGIST

## 2022-05-26 NOTE — PROGRESS NOTES
[facility]                                            Progress Note    Patient Name: Idalia Hinojosa  Date: 5/26/2022           Service Type: Individual      Session Start Time: 12:06 pm  Session End Time: 12:58 pm   Session Length:  52 minutes    Session #: 55    Attendees: Client     Service Modality: Video visit: - I.        Provider verified identity through the following two step process.  Patient provided:  Patient is known previously to provider    Telemedicine Visit: The patient's condition can be safely assessed and treated via synchronous audio and visual telemedicine encounter.      Reason for Telemedicine Visit: Services only offered telehealth    Originating Site (Patient Location): Patient's home    Distant Site (Provider Location): Provider Remote Setting- Home Office    Consent:  The patient/guardian has verbally consented to: the potential risks and benefits of telemedicine (telephone visit) versus in person care; bill my insurance or make self-payment for services provided; and responsibility for payment of non-covered services.     Patient would like the video invitation sent by:  My Chart    Mode of Communication:  Video Conference via [email or web address],     As the provider I attest to compliance with applicable laws and regulations related to telemedicine.         Treatment Plan Last Reviewed: 5/3/2022  PHQ-9/DAVID-7: 5/17/2022    DATA  Interactive Complexity: No  Crisis: No        Progress Since Last Session (Related to Symptoms / Goals / Homework):   Symptoms: reports she is feeling better (less depressed and anxious) and she reports that overall things are going better.  She has been able to get into a routine while she and her daughter have been quarantined for 20 days at her parent's house, she's enjoyed having their help with her daughter, and she's also been able to do things she enjoys like puzzles.      Homework: Achieved / completed to satisfaction. Successful with  reading to Kaylee and enjoying this, she's been getting help from her parents, and finding fun things to do.  She also listened to ABBA and found this really helpful and fun for her!        Episode of Care Goals: Satisfactory progress - ACTION (Actively working towards change); Intervened by reinforcing change plan / affirming steps taken.  Diagnostic assessment has been completed, treatment plan has been developed and patient has been making good progress on treatment goals.  The patient stated that her main goals for therapy would be to learn emotion regulation strategies (in particular, to help with when she is feeling upset/angry/frustrated/distressed).  The changes in her daily life due to COVID restrictions were a focus of our work together, and working through changes to routine as her daughter started  and she returned to work.  As she has been making progress, focus of treatment will shift to wellness planning (monitoring mood and symptoms, incorporating into routine what keeps her feeling well, review of triggers, how to manage set backs, etc.).  As her  has been diagnosed with cancer, treatment will also focus on providing her with support in managing the emotional distress and challenges related to this.       Current / Ongoing Stressors and Concerns:   Current:  Anticipating return back home after the weekend, as quarantine period is up.  Has some worries about returning back home to some of the challenges and stressors and is preparing for how she will manage this.  Managing stress and caregiver demands in relation to 's cancer diagnosis, managing parenting challenges related to daughter.       Ongoing:Managing daily routine which has contributed to some adjustment and parenting challenges, lack of effective emotion regulation strategies for managing daily frustrations and upsets and marital discord due to differences in parenting styles.       Treatment Objective(s) Addressed in  "This Session:   New today:    Anticipate challenges/stresses related to returning back home and develop action plan to help manage worries and challenges       Continue to review/practice/reinforce:  Learn and practice mindfulness strategies - \"observe, just notice\" and invite curiosity around what may be contributing and what may help  Practice calming strategies to help release tension in your body   Practice new script in the morning when feeling urge to not get out of bed.  What do you need to hear that helps you keep moving?   (\"Today is a new day\", focus on what you're grateful for, \"I will feel better\")  Identify ways to practice and incorporate self-care into routine.  Ask myself what I need.    Identify how others can help support you and practice asking for help/what you need  Identify and practice at least 2-3 strategies to help with regulating emotion   Learn and practice behavioral activation strategies (how to move forward with doing what will help you when you don't feel like doing it)  Be curious about your emotions - tune in to - what am I thinking?  What is going on around me?  What do I need?    Identify and practice at least two strategies that will help support your self-care during this challenging time  Learn and practice at least two new strategies for tolerating uncertainty  Practice mindfulness to the moment when your mind wanders to worrying about the unknown       Intervention:   CBT:  Idalia shared her worries and concerns about going back home.  She said she's enjoyed the break at her parent's house, and as she prepares to return home she finds herself worrying about COVID, Colia's cancer, and managing her emotional reaction when he is feeling physically ill and more worried.      We talked about ways for her to continue to build in \"respite\" and self-care breaks and how she can focus on her self-care.  We talked about the things she is looking forward to with returning home - which " include gardening, her cats and the comforts of home.  We talked about ways for her to continue to separate her emotional reaction from Colia's and use the strategies she has learned to manage her worry.    I also shared information on support groups for those who have a loved one with cancer - she will consider if a resource like this might be helpful for her.        ASSESSMENT: Current Emotional / Mental Status (status of significant symptoms):   Risk status (Self / Other harm or suicidal ideation)   Patient denies current fears or concerns for personal safety.   Patient denies current or recent suicidal ideation or behaviors.    She continues to agree to use a safety plan should any safety concerns arise.  She also agrees to reach our to her spouse or a family member for help if needed, and/or access crisis/emergency resources.        Patientdenies current or recent homicidal ideation or behaviors.   Patient denies current or recent self injurious behavior or ideation.   Patient denies other safety concerns.   Patient Patient reports there has been no change in risk factors since their last session.     PatientPatient reports there has been no change in protective factors since their last session.     Recommended that patient call 911 or go to the local ED should there be a change in any of these risk factors.  She has previously been informed on how to contact [facility] crisis/[facility] for urgent/crisis concerns 24/7.  Provided patient with crisis resources and she agrees to use safety plan should any safety concerns arise.      Professionals or agencies I can contact during a crisis:    Suicide Prevention Lifeline: 5-325-019-TALK (4625)    Crisis Text Line Service (available 24 hours a day, 7 days a week): Text [place] to 365389    Local Crisis Services: [facility]: [phone]    Call 911 or go to my nearest emergency department.        Appearance:   Appropriate    Eye Contact:   Good  "   Psychomotor Behavior: Normal    Attitude:   Cooperative    Orientation:   All   Speech    Rate / Production: Normal     Volume:  Normal    Mood:    Reports feeling better, less depressed and anxious, though some worry about returning back home   Affect:    Congruent with mood    Thought Content:  Clear    Thought Form:  Coherent  Logical    Insight:    Good      Medication Review:   No changes to current psychiatric medication(s).        Medication Compliance:   Yes     Changes in Health Issues:  None reported         Chemical Use Review:   Substance Use: Chemical use reviewed, no active concerns identified.  No alcohol use.       Tobacco Use: No current tobacco use.      Diagnosis:  Anxiety disorder unspecified  Depression, unspecified     Collateral Reports Completed:   Not Applicable,      PLAN: (Patient Tasks / Therapist Tasks / Other)    1) Idalia identified the following goals:  Focus on the positives of going back home,  Do things every day that bring me lamont - Legos, gardening, music, Consider support group resources.       Continue with - Write before bed, tell myself something positive when I wake up in the morning, ask for what I need, practice mindfulness skills - observe - just notice - non-judgmental stance - invite curiosity -       2) If there is a crisis situation, remember you are not alone and that there are people who can help you twenty-four hours a day, seven days a week.  Call [facility] ([facility]): [phone].      3) Follow-up visit is scheduled for June 2nd at 12:30pm.  IIf urgent or crisis concerns arise prior to next scheduled appointment, please reach out to crisis resources, call 911, or go to the emergency department.      Cristy Wilkinson PsyD, LP  Licensed Psychologist  5/26/2022        Previous skills and strategies to remind self of and to do as needed:    Practice \"STOP\" technique when you recognize yourself feeling angry   Be a  - what do you notice " "is happening when Kaylee feels upset?     Practice recognizing when feeling angry, and giving self permission to use calming and self-soothing strategies and take a break.   Look for the gray with thinking  slow down  take deep breaths  manage expectations of self and others.    Journal   Practice flexing \"flexiblity and creativity\" -   Continue the great work you are doing with your daily schedule.    Get outside every day.    Play your instrument.    Sing!    Continue with: \"It's just a thought\" - non-judgmental, observe, float down the river on a leaf, clouds in the gege, reframing unhelpful thoughts -   Keep working on being patient when things are tense around me.    Continue with observing/paying attention to warning signs and signals and give self persmission to slow down, especially during the morning routine with Kaylee.    Practice offerring Kaylee choices when appropriate.      Use \"my plan for success\" to help remind you of calming strategies to practice when feeling upset.    Practice decreasing overall vulnerability to emotion mind through getting adequate rest, nutrition, time for yourself, and physical activity.         Consider reading \"Fighting for your marriage\".      Technical Assistance for video visits:    Offer patient the website ([email or web address]) and/or phone number ([phone]) for video visit instructions/assistance if needed.                                             ____________________________________    Treatment Plan    Patient's Name: Idalia Hinojosa  YOB: 1980    Date of Creation: 1/6/2020  Date Treatment Plan Last Reviewed/Revised: 2/09/2022    DSM5 Diagnoses: 300.00 (F41.9) Unspecified Anxiety Disorder, depressive disorder, unspecified  Psychosocial / Contextual Factors: strain in marital relationship, parenting challenges, adjustment challenges, 's cancer diagnosis  PROMIS (reviewed every 90 days):     Anticipated number of session for this episode of " "care: additional 15-20  Anticipation frequency of session: Weekly until symptoms stabilize, then can decrease the frequency of sessions to likely bi-weekly or once every three weeks  Anticipated Duration of each session: 38-52 minutes  Treatment plan will be reviewed in 90 days or when goals have been changed.       Referral / Collaboration:  We discussed a referral to a couples/marital therapist.  The patient is thinking about this and has talked with her  about the referral, but they are unsure if they would like to follow through at this time.      MeasurableTreatment Goal(s) related to diagnosis / functional impairment(s)  Goal 1: Patient will learn emotion regulation strategies to help when she is feeling upset/angry/frustrated/distressed    I will know I've met my goal when I would yell less, I would feel calmer, and I would not push others.  I would be less physical when I'm angry (e.g. wouldn't slam doors or hit things)       Objective #A (Patient Action)    Patient will learn and practice at least 2 new emotion regulation strategies.  Status: Continued - Date(s):4/23/2020 (has begun to learn new emotion regulation strategies and is making good progress)   Update: 7/8/2020: \"I've learned new strategies but I'm bad at practicing them\" - will help patient incorporate regular practice and identify and problem-solve barriers.    Reviewed: 10/8/2020: Patient has been successfully practicing emotion regulation strategies, is working on identifying earlier on when to intervene and engage strategies, as well as practice strategies on a regular basis to help strength use of strategies.    Reviewed and in progress: 1/27/2021: \"Overall things are going a lot better.  Sometimes I fall into old habits\", but recognizing when she's feeling more vulnerable to her emotions and reminds herself to use different skills/intervene in a different way.     4/27/2021-\"Things have definitely been better\".  Recognizing when " "feeling vulnerable (e.g. hungry), recognizing when emotions are escalating and need to engage calming strategies\".  Ask for help from Colia when feeling upset.  Can be challenging when Kaylee escalates when I'm feeling angry.    7/29/21 - She felt like she has forgotten what helps her when she's feeling upset, we have spent time reviewing and refreshing these skills.    11/10/2021 - She feels like for the most part things have been going well.    2/9/2022 - Helping her to apply emotion regulation strategies in the context of a new and unexpected stressor -   5/3/2022 - Update - Things have been better, I can tell when I feel overwhelmed or don't get breaks - that is when I start yelling or snapping, and overall things have been better.  I'm exercising more and that really helps with the stress.  I've been feeling more confident about myself because I've been exercising more.        Intervention(s)  Therapist will provide psychoeducation on emotion regulation module from DBT and will assign patient homework to help reinforce skill development.    Objective #B  Patient will identify factors that increase vulnerability to emotion mind and identify ways to decrease vulnerability to emotion mind.  Status: Continued - Date(s):4/23/2020 (has learned factors that make her more vulnerable to emotion mind, and has been working on addressing vulnerability related to feeling hungry, tired and rushed)   Update: 7/8/20: \"This one is harder for me.  I know I get angry when I'm hungry, and when I slow down I'm less inclined to get angry\".  Challenges - not always having food ready, not always planning in advance, (planing breakfast the night before)  Reviewed: 10/8/2020: Patient's awareness has increased of factors that increase her vulnerability to emotion mind, and she has been using this as a signal to tell her what she needs.    Reviewed and resolved/complete: 1/27/2021: \"I feel like we've done really good work with this and I've " "gotten what I need from this\".  4/27/2021  - I am doing better with this - I know I get more irritable when I'm hungry and I'm trying to do better with this.  Drinking more water.      7/29/21 - Overall she had been feeling like things were going better with this, though over the past several weeks has felt more challenged due to increase in stressors.    11/10/2021 - Recognizing when feeling tired and how this can contribute to increase in vulnerability and ways to intervene.  Recognizing when feeling irritabiel - ask self if feeling hungry and eat if needed.    2/9/2022 - Self-care is an important focus right now - to ensure she is attending to her basic self-care needs while helping to take care of her  and his health -   5/3/2022 - I feel like I've mostly been doing better with eating and making sure not hungry.  Try to plan meals and snacks - sometimes this can be hard.          Intervention(s)  Therapist will provide information on factors that increase vulnerabiliyt to emotion mind.    Objective #C  Patient will identify warning signs and signals that emotions are escalating and will learn ways to skillfully intervene early on.  Status: New - Date: 01/06/2020 7/8/2020- in progress  Update: 10/08/2020: Patient has been making good progress on identifying signs and signals that her emotions are escalating.    Reviewed and in progress: 1/27/2021: \"I feel like we've done good work on this but this is one of the things I need to keep working on\".    7/29.21 - In progress - actively working on this -   11/10/2021 - Feels sometimes she is able to do this, other times it is harder  2/9/2022 - continued goal -   5/3/2022 - I do that sometimes - I do notice when I am getting more frustrated - that's been helping more - if I feel like I'm gettig more furstrated than I can take a deep breath or put my hands on my head -         Intervention(s)  Therapist will help patient identify warning signs and signals, and " "will guide the patient in identifying skillful ways to intervene when exeriencing warning signs and signals.      Goal 2: Patient will learn new parenting strategies to help with managing parenting challenges.  Parent will work on improving relationship with her daughter and defining what she would like this relationship to look like.      I will know I've met my goal when I'm enjoying time with my daughter, teaching her new things, and not waiting for things to change      Objective #A (Patient Action)    Status: Continued - Date(s):4/23/2020     Patient will increase understanding of developmental norms for her daughter and ways to manage challenging behaviors.    Intervention(s)  Therapist will provide psychoeducation on developmental norms and parenting strategies.    Objective #B  Patient will spend time reflecting on her relationship with her daughter and defining what she would like this relaitonship to look like.    Status: Continued - Date(s):4/23/2020   Update: 7/8/2020 - \"I think some things are going better, she's more helpful with things.  Some things are more challenging with the pandemic and thinking of things to do with her\".    Update: 10/08/2020: Patient is making progress on learning and practicing strategies to manage parenting challenges.    Reviewed: 1/27/2021: \"I feel like things have gotten better with this, we've figured out a good routine, I'm doing meditation, I'm enjoying my time with my daughter more than I used to\".   4/27/2021 - Challenges of parenting during a pandemic, working with occupational therapist on strategies to help daughter, 7/29/21 - She's been working on incorporating more structure into the daily routine, as well as managing her emotional reaction when feeling frustrated.  She's also worked on managing her mindset and expectations.  She's been experiencing some frustrations due to two of the OT's she's worked with has recently resigned, resulting in transitioning to " "several new providers in a short period of time.  I've encouraged she continue to work closely with her daughter's pediatrician to get connected to resources and supports to help with managing her daughter's difficulties.    11/10/2021 - Feels like they've had some really good interactions, though things have been more challenging at school and home (waiting for evaluations).  Daughter enjoys when she reads to her, they've had some really good interactions recently.    2/9/2022 - unable to fully assess progress on this goal today, will further assess progress on this goal and if any treatment needs remain -   5/3/2022- this is going well - sometimes it is hard when she wants me to play the same thing over and over with her, but overall it is better - having more fun as it gets older -         Intervention(s)  Therapist will guide the patient in reflecting upon her relationship with her daughter and help her define ways to move towards what she vaues in her relationship with her daughter.    Objective #C  Patient will increase time spent on fun activity and play with her daughter.  Status: Continued - Date(s):4/23/2020 7/8/2020 -   10/08/2020 - will further assess this goal at next session   Reviewed: 1/27/2021 - also going really well, we play together all of the time now\", she finds herself enjoying their time together.   4/27/2021- she will spend some time reflecting upon this and we will discuss further next time.  They have several fun things planned for the future, as well as she has worked hard during the pandemic to plan structure and time together within COVID restrictions.  7/29/21 - She's seen some positive changes with being able to spend time with grandparents and at the cabin since being vaccinated, but also has experienced some challenges with planning fun activities out of worries things won't go well and feeling low energy/motivation to do these things - working to address both of these obstacles.  " "  11/10/2021 - They've been able to do more fun activities together - coloring, Lego sets,    2/9/2022 - unable to fully assess progress on this goal today, will further assess progress on this goal and if any  treatment needs remain -    5/3/2022 - really fun time doing legos, building, reading, drawing together        Intervention(s)  Therapist will guide the patient in identifying ways she can increase positive time with her daughter.  Therapist will also teach mindfulness strategies to help patient with being in the present moment when appropriate - .      Goal 3: Patiient will improve communication with her .      I will know I've met my goal when I feel less irritated with my  and communicate more openly with my .  I would like to be more assertive and less aggressive.   I would like to not avoid telling him things because I'm worried about his reaction or don't think he will react well.  I would like to be more present to the moment during times when this would be helpful.\"      Objective #A (Patient Action)    Status: Continued - Date(s):4/23/2020 7/8/2020 - suggested couples therapy to help with this.    Individual therapy focus - identify what makes it hard to be more honest and open   10/08/2020 - will further assess this goal at next session   Reviewed: 1/27/2021 - \"Things are better, I feel like we've worked really hard on how to respond when he's having a hard time and how to talk to him.  I'm doing better listening, being more empathetic, and not \".  4/27/2021 - She has been making progress on examining and updating unhelpful beliefs (e.g. feeling responsible for other's emotions, feeling like she needs to fix or change other's emotions) and has made good progress on identifying and asserting healthy boundaries, in addition to  her emotional response from her spouse's emotional response.  7/29/21 - will review at next session   11/10/2021 - Feeling more " irritability recently.  Believes this area could use more attention.    2/9/2022 - continued goal -   5/3/2022- this one is hard to say because things are so different right now - he's not his normal self (due to cancer treatment) - I think I'm communicating things in a nice way        Patient will learn and practice at least two new interpersonal effectiveness strategies.    Intervention(s)  Therapist will teach strategies from DBT module on interpersonal effectiveness, and will assign homework to help reinforce skill development.      Patient has reviewed and agreed to the above plan.      Cristy Wilkinson PsyD, LP  Licensed Psychologist  January 6, 2020, reviewed on 4/23/2020, reviewed 7/8/2020, reviewed 10/08/2020, reviewed 1/27/2021, reviewed 4/27/2021, 7/29/2021, 11/10/2021, 2/9/2022, 5/3/2022                                                                                                                                  Answers for HPI/ROS submitted by the patient on 5/17/2022  If you checked off any problems, how difficult have these problems made it for you to do your work, take care of things at home, or get along with other people?: Somewhat difficult  PHQ9 TOTAL SCORE: 7

## 2022-06-02 ENCOUNTER — VIRTUAL VISIT (OUTPATIENT)
Dept: PSYCHOLOGY | Facility: CLINIC | Age: 42
End: 2022-06-02
Payer: COMMERCIAL

## 2022-06-02 DIAGNOSIS — F41.9 ANXIETY DISORDER, UNSPECIFIED TYPE: Primary | ICD-10-CM

## 2022-06-02 DIAGNOSIS — F32.A DEPRESSION, UNSPECIFIED DEPRESSION TYPE: ICD-10-CM

## 2022-06-02 PROCEDURE — 90832 PSYTX W PT 30 MINUTES: CPT | Mod: GT | Performed by: PSYCHOLOGIST

## 2022-06-02 NOTE — PROGRESS NOTES
[facility]                                            Progress Note    Patient Name: Idalia Hinojosa  Date: 6/2/2022           Service Type: Individual      Session Start Time: 12:35 pm  Session End Time: 1:00 pm   Session Length:  25 minutes    Session #: 56    Attendees: Client     Service Modality: Video visit: - I.        Provider verified identity through the following two step process.  Patient provided:  Patient is known previously to provider    Telemedicine Visit: The patient's condition can be safely assessed and treated via synchronous audio and visual telemedicine encounter.      Reason for Telemedicine Visit: Services only offered telehealth    Originating Site (Patient Location): Patient's home    Distant Site (Provider Location): Provider Remote Setting- Home Office    Consent:  The patient/guardian has verbally consented to: the potential risks and benefits of telemedicine (telephone visit) versus in person care; bill my insurance or make self-payment for services provided; and responsibility for payment of non-covered services.     Patient would like the video invitation sent by:  My Chart    Mode of Communication:  Video Conference via [email or web address],     As the provider I attest to compliance with applicable laws and regulations related to telemedicine.         Treatment Plan Last Reviewed: 5/3/2022  PHQ-9/DAVID-7: 5/17/2022    DATA  Interactive Complexity: No  Crisis: No        Progress Since Last Session (Related to Symptoms / Goals / Homework):   Symptoms: reports her mood is improved - she reported it felt really good to go back home and she has been enjoying being able to garden.  The improvement in weather helps along with being able to get outside and enjoy the weather.      Homework: Achieved / completed to satisfaction. Successful- focused on the positives of being back home and has been able to do things that she enjoys!        Episode of Care Goals: Satisfactory  "progress - ACTION (Actively working towards change); Intervened by reinforcing change plan / affirming steps taken.  Diagnostic assessment has been completed, treatment plan has been developed and patient has been making good progress on treatment goals.  The patient stated that her main goals for therapy would be to learn emotion regulation strategies (in particular, to help with when she is feeling upset/angry/frustrated/distressed).  The changes in her daily life due to COVID restrictions were a focus of our work together, and working through changes to routine as her daughter started  and she returned to work.  As she has been making progress, focus of treatment will shift to wellness planning (monitoring mood and symptoms, incorporating into routine what keeps her feeling well, review of triggers, how to manage set backs, etc.).  As her  has been diagnosed with cancer, treatment will also focus on providing her with support in managing the emotional distress and challenges related to this.       Current / Ongoing Stressors and Concerns:   Current:  Doing well with adjusting to returning back home and establishing routines.  Engaging in daily activities that she enjoys.    Managing stress and caregiver demands in relation to 's cancer diagnosis, managing parenting challenges related to daughter.       Ongoing:Managing daily routine which has contributed to some adjustment and parenting challenges, lack of effective emotion regulation strategies for managing daily frustrations and upsets and marital discord due to differences in parenting styles.       Treatment Objective(s) Addressed in This Session:       Continue to use action plan to manage challenges/stresses and worries related to being back home     Continue to review/practice/reinforce:  Learn and practice mindfulness strategies - \"observe, just notice\" and invite curiosity around what may be contributing and what may " "help  Practice calming strategies to help release tension in your body   Practice new script in the morning when feeling urge to not get out of bed.  What do you need to hear that helps you keep moving?   (\"Today is a new day\", focus on what you're grateful for, \"I will feel better\")  Identify ways to practice and incorporate self-care into routine.  Ask myself what I need.    Identify how others can help support you and practice asking for help/what you need  Identify and practice at least 2-3 strategies to help with regulating emotion   Learn and practice behavioral activation strategies (how to move forward with doing what will help you when you don't feel like doing it)  Be curious about your emotions - tune in to - what am I thinking?  What is going on around me?  What do I need?    Identify and practice at least two strategies that will help support your self-care during this challenging time  Learn and practice at least two new strategies for tolerating uncertainty  Practice mindfulness to the moment when your mind wanders to worrying about the unknown       Intervention:   CBT:  Returning back home has gone better than she anticipated - we talked about what has been helping, as well as observed that some of the things she worried about didn't come to fruition.      Continue to reinforce her focus on self-care and respite, reaching out for help, and engaging in activities she enjoys.      Continued focus and support regarding  her emotional reactions from Colia's, and using constructive strategies to manage worry.    She has not thought about whether or not she'd like to participate in a support group.  Reviewed this as an option she has to connect with others going through similar situations, which in and of itself can be helpful, along with the benefit of shared learning/learning what is helpful for others going through a similar situation.        ASSESSMENT: Current Emotional / Mental Status " (status of significant symptoms):   Risk status (Self / Other harm or suicidal ideation)   Patient denies current fears or concerns for personal safety.   Patient denies current or recent suicidal ideation or behaviors.    She continues to agree to use a safety plan should any safety concerns arise.  She also agrees to reach our to her spouse or a family member for help if needed, and/or access crisis/emergency resources.        Patientdenies current or recent homicidal ideation or behaviors.   Patient denies current or recent self injurious behavior or ideation.   Patient denies other safety concerns.   Patient Patient reports there has been no change in risk factors since their last session.     PatientPatient reports there has been no change in protective factors since their last session.     Recommended that patient call 911 or go to the local ED should there be a change in any of these risk factors.  She has previously been informed on how to contact [facility] crisis/[facility] for urgent/crisis concerns 24/7.  Provided patient with crisis resources and she agrees to use safety plan should any safety concerns arise.      Professionals or agencies I can contact during a crisis:    Suicide Prevention Lifeline: 2-190-768-TALK (5624)    Crisis Text Line Service (available 24 hours a day, 7 days a week): Text [place] to 389629    Local Crisis Services: [facility]: [phone]    Call 911 or go to my nearest emergency department.        Appearance:   Appropriate    Eye Contact:   Good    Psychomotor Behavior: Normal    Attitude:   Cooperative    Orientation:   All   Speech    Rate / Production: Normal     Volume:  Normal    Mood:    Reports feeling better, returning back home went better than anticipated, feels less depressed and anxious,    Affect:    Congruent with mood    Thought Content:  Clear    Thought Form:  Coherent  Logical    Insight:    Good      Medication Review:   No changes to current  "psychiatric medication(s).        Medication Compliance:   Yes     Changes in Health Issues:  None reported         Chemical Use Review:   Substance Use: Chemical use reviewed, no active concerns identified.  No alcohol use.       Tobacco Use: No current tobacco use.      Diagnosis:  Anxiety disorder unspecified  Depression, unspecified     Collateral Reports Completed:   Not Applicable,      PLAN: (Patient Tasks / Therapist Tasks / Other)    1) Idalia identified the following goals:  Get outside as much as possible - practice patience with Kaylee, (slow down, give myself time so I don't have to rush, be mindful of thoughts, focus on present, it is okay if it takes a little while).  Keep remembering that I don't have to feel the same way as him and I don't have to react the same way as him.  I'm my own person.      Continue with - Write before bed, tell myself something positive when I wake up in the morning, ask for what I need, practice mindfulness skills - observe - just notice - non-judgmental stance - invite curiosity -       2) If there is a crisis situation, remember you are not alone and that there are people who can help you twenty-four hours a day, seven days a week.  Call [facility] ([facility]): [phone].      3) Follow-up visit is scheduled for 6/8 at 2:30 pm.  IIf urgent or crisis concerns arise prior to next scheduled appointment, please reach out to crisis resources, call 911, or go to the emergency department.      Cristy Wilkinson PsyD,   Licensed Psychologist  6/2/2022          Previous skills and strategies to remind self of and to do as needed:    Practice \"STOP\" technique when you recognize yourself feeling angry   Be a  - what do you notice is happening when Kaylee feels upset?     Practice recognizing when feeling angry, and giving self permission to use calming and self-soothing strategies and take a break.   Look for the gray with thinking  slow down  take deep " "breaths  manage expectations of self and others.    Journal   Practice flexing \"flexiblity and creativity\" -   Continue the great work you are doing with your daily schedule.    Get outside every day.    Play your instrument.    Sing!    Continue with: \"It's just a thought\" - non-judgmental, observe, float down the river on a leaf, clouds in the gege, reframing unhelpful thoughts -   Keep working on being patient when things are tense around me.    Continue with observing/paying attention to warning signs and signals and give self persmission to slow down, especially during the morning routine with Kaylee.    Practice offerring Kaylee choices when appropriate.      Use \"my plan for success\" to help remind you of calming strategies to practice when feeling upset.    Practice decreasing overall vulnerability to emotion mind through getting adequate rest, nutrition, time for yourself, and physical activity.         Consider reading \"Fighting for your marriage\".      Technical Assistance for video visits:    Offer patient the website ([email or web address]) and/or phone number ([phone]) for video visit instructions/assistance if needed.                                             ____________________________________    Treatment Plan    Patient's Name: Idalia Hinojosa  YOB: 1980    Date of Creation: 1/6/2020  Date Treatment Plan Last Reviewed/Revised: 2/09/2022    DSM5 Diagnoses: 300.00 (F41.9) Unspecified Anxiety Disorder, depressive disorder, unspecified  Psychosocial / Contextual Factors: strain in marital relationship, parenting challenges, adjustment challenges, 's cancer diagnosis  PROMIS (reviewed every 90 days):     Anticipated number of session for this episode of care: additional 15-20  Anticipation frequency of session: Weekly until symptoms stabilize, then can decrease the frequency of sessions to likely bi-weekly or once every three weeks  Anticipated Duration of each session: " "38-52 minutes  Treatment plan will be reviewed in 90 days or when goals have been changed.       Referral / Collaboration:  We discussed a referral to a couples/marital therapist.  The patient is thinking about this and has talked with her  about the referral, but they are unsure if they would like to follow through at this time.      MeasurableTreatment Goal(s) related to diagnosis / functional impairment(s)  Goal 1: Patient will learn emotion regulation strategies to help when she is feeling upset/angry/frustrated/distressed    I will know I've met my goal when I would yell less, I would feel calmer, and I would not push others.  I would be less physical when I'm angry (e.g. wouldn't slam doors or hit things)       Objective #A (Patient Action)    Patient will learn and practice at least 2 new emotion regulation strategies.  Status: Continued - Date(s):4/23/2020 (has begun to learn new emotion regulation strategies and is making good progress)   Update: 7/8/2020: \"I've learned new strategies but I'm bad at practicing them\" - will help patient incorporate regular practice and identify and problem-solve barriers.    Reviewed: 10/8/2020: Patient has been successfully practicing emotion regulation strategies, is working on identifying earlier on when to intervene and engage strategies, as well as practice strategies on a regular basis to help strength use of strategies.    Reviewed and in progress: 1/27/2021: \"Overall things are going a lot better.  Sometimes I fall into old habits\", but recognizing when she's feeling more vulnerable to her emotions and reminds herself to use different skills/intervene in a different way.     4/27/2021-\"Things have definitely been better\".  Recognizing when feeling vulnerable (e.g. hungry), recognizing when emotions are escalating and need to engage calming strategies\".  Ask for help from Colia when feeling upset.  Can be challenging when Kaylee escalates when I'm feeling angry.  " "  7/29/21 - She felt like she has forgotten what helps her when she's feeling upset, we have spent time reviewing and refreshing these skills.    11/10/2021 - She feels like for the most part things have been going well.    2/9/2022 - Helping her to apply emotion regulation strategies in the context of a new and unexpected stressor -   5/3/2022 - Update - Things have been better, I can tell when I feel overwhelmed or don't get breaks - that is when I start yelling or snapping, and overall things have been better.  I'm exercising more and that really helps with the stress.  I've been feeling more confident about myself because I've been exercising more.        Intervention(s)  Therapist will provide psychoeducation on emotion regulation module from DBT and will assign patient homework to help reinforce skill development.    Objective #B  Patient will identify factors that increase vulnerability to emotion mind and identify ways to decrease vulnerability to emotion mind.  Status: Continued - Date(s):4/23/2020 (has learned factors that make her more vulnerable to emotion mind, and has been working on addressing vulnerability related to feeling hungry, tired and rushed)   Update: 7/8/20: \"This one is harder for me.  I know I get angry when I'm hungry, and when I slow down I'm less inclined to get angry\".  Challenges - not always having food ready, not always planning in advance, (planing breakfast the night before)  Reviewed: 10/8/2020: Patient's awareness has increased of factors that increase her vulnerability to emotion mind, and she has been using this as a signal to tell her what she needs.    Reviewed and resolved/complete: 1/27/2021: \"I feel like we've done really good work with this and I've gotten what I need from this\".  4/27/2021  - I am doing better with this - I know I get more irritable when I'm hungry and I'm trying to do better with this.  Drinking more water.      7/29/21 - Overall she had been feeling " "like things were going better with this, though over the past several weeks has felt more challenged due to increase in stressors.    11/10/2021 - Recognizing when feeling tired and how this can contribute to increase in vulnerability and ways to intervene.  Recognizing when feeling irritabiel - ask self if feeling hungry and eat if needed.    2/9/2022 - Self-care is an important focus right now - to ensure she is attending to her basic self-care needs while helping to take care of her  and his health -   5/3/2022 - I feel like I've mostly been doing better with eating and making sure not hungry.  Try to plan meals and snacks - sometimes this can be hard.          Intervention(s)  Therapist will provide information on factors that increase vulnerabiliyt to emotion mind.    Objective #C  Patient will identify warning signs and signals that emotions are escalating and will learn ways to skillfully intervene early on.  Status: New - Date: 01/06/2020 7/8/2020- in progress  Update: 10/08/2020: Patient has been making good progress on identifying signs and signals that her emotions are escalating.    Reviewed and in progress: 1/27/2021: \"I feel like we've done good work on this but this is one of the things I need to keep working on\".    7/29.21 - In progress - actively working on this -   11/10/2021 - Feels sometimes she is able to do this, other times it is harder  2/9/2022 - continued goal -   5/3/2022 - I do that sometimes - I do notice when I am getting more frustrated - that's been helping more - if I feel like I'm gettig more furstrated than I can take a deep breath or put my hands on my head -         Intervention(s)  Therapist will help patient identify warning signs and signals, and will guide the patient in identifying skillful ways to intervene when exeriencing warning signs and signals.      Goal 2: Patient will learn new parenting strategies to help with managing parenting challenges.  Parent will " "work on improving relationship with her daughter and defining what she would like this relationship to look like.      I will know I've met my goal when I'm enjoying time with my daughter, teaching her new things, and not waiting for things to change      Objective #A (Patient Action)    Status: Continued - Date(s):4/23/2020     Patient will increase understanding of developmental norms for her daughter and ways to manage challenging behaviors.    Intervention(s)  Therapist will provide psychoeducation on developmental norms and parenting strategies.    Objective #B  Patient will spend time reflecting on her relationship with her daughter and defining what she would like this relaitonship to look like.    Status: Continued - Date(s):4/23/2020   Update: 7/8/2020 - \"I think some things are going better, she's more helpful with things.  Some things are more challenging with the pandemic and thinking of things to do with her\".    Update: 10/08/2020: Patient is making progress on learning and practicing strategies to manage parenting challenges.    Reviewed: 1/27/2021: \"I feel like things have gotten better with this, we've figured out a good routine, I'm doing meditation, I'm enjoying my time with my daughter more than I used to\".   4/27/2021 - Challenges of parenting during a pandemic, working with occupational therapist on strategies to help daughter, 7/29/21 - She's been working on incorporating more structure into the daily routine, as well as managing her emotional reaction when feeling frustrated.  She's also worked on managing her mindset and expectations.  She's been experiencing some frustrations due to two of the OT's she's worked with has recently resigned, resulting in transitioning to several new providers in a short period of time.  I've encouraged she continue to work closely with her daughter's pediatrician to get connected to resources and supports to help with managing her daughter's difficulties.  " "  11/10/2021 - Feels like they've had some really good interactions, though things have been more challenging at school and home (waiting for evaluations).  Daughter enjoys when she reads to her, they've had some really good interactions recently.    2/9/2022 - unable to fully assess progress on this goal today, will further assess progress on this goal and if any treatment needs remain -   5/3/2022- this is going well - sometimes it is hard when she wants me to play the same thing over and over with her, but overall it is better - having more fun as it gets older -         Intervention(s)  Therapist will guide the patient in reflecting upon her relationship with her daughter and help her define ways to move towards what she vaues in her relationship with her daughter.    Objective #C  Patient will increase time spent on fun activity and play with her daughter.  Status: Continued - Date(s):4/23/2020 7/8/2020 -   10/08/2020 - will further assess this goal at next session   Reviewed: 1/27/2021 - also going really well, we play together all of the time now\", she finds herself enjoying their time together.   4/27/2021- she will spend some time reflecting upon this and we will discuss further next time.  They have several fun things planned for the future, as well as she has worked hard during the pandemic to plan structure and time together within COVID restrictions.  7/29/21 - She's seen some positive changes with being able to spend time with grandparents and at the cabin since being vaccinated, but also has experienced some challenges with planning fun activities out of worries things won't go well and feeling low energy/motivation to do these things - working to address both of these obstacles.    11/10/2021 - They've been able to do more fun activities together - coloring, Lego linn,    2/9/2022 - unable to fully assess progress on this goal today, will further assess progress on this goal and if any  treatment " "needs remain -    5/3/2022 - really fun time doing legos, building, reading, drawing together        Intervention(s)  Therapist will guide the patient in identifying ways she can increase positive time with her daughter.  Therapist will also teach mindfulness strategies to help patient with being in the present moment when appropriate - .      Goal 3: Patiient will improve communication with her .      I will know I've met my goal when I feel less irritated with my  and communicate more openly with my .  I would like to be more assertive and less aggressive.   I would like to not avoid telling him things because I'm worried about his reaction or don't think he will react well.  I would like to be more present to the moment during times when this would be helpful.\"      Objective #A (Patient Action)    Status: Continued - Date(s):4/23/2020 7/8/2020 - suggested couples therapy to help with this.    Individual therapy focus - identify what makes it hard to be more honest and open   10/08/2020 - will further assess this goal at next session   Reviewed: 1/27/2021 - \"Things are better, I feel like we've worked really hard on how to respond when he's having a hard time and how to talk to him.  I'm doing better listening, being more empathetic, and not \".  4/27/2021 - She has been making progress on examining and updating unhelpful beliefs (e.g. feeling responsible for other's emotions, feeling like she needs to fix or change other's emotions) and has made good progress on identifying and asserting healthy boundaries, in addition to  her emotional response from her spouse's emotional response.  7/29/21 - will review at next session   11/10/2021 - Feeling more irritability recently.  Believes this area could use more attention.    2/9/2022 - continued goal -   5/3/2022- this one is hard to say because things are so different right now - he's not his normal self (due to cancer treatment) " - I think I'm communicating things in a nice way        Patient will learn and practice at least two new interpersonal effectiveness strategies.    Intervention(s)  Therapist will teach strategies from DBT module on interpersonal effectiveness, and will assign homework to help reinforce skill development.      Patient has reviewed and agreed to the above plan.      Cristy Wilkinson PsyD, LP  Licensed Psychologist  January 6, 2020, reviewed on 4/23/2020, reviewed 7/8/2020, reviewed 10/08/2020, reviewed 1/27/2021, reviewed 4/27/2021, 7/29/2021, 11/10/2021, 2/9/2022, 5/3/2022                                                                                                                                  Answers for HPI/ROS submitted by the patient on 5/17/2022  If you checked off any problems, how difficult have these problems made it for you to do your work, take care of things at home, or get along with other people?: Somewhat difficult  PHQ9 TOTAL SCORE: 7

## 2022-06-08 ENCOUNTER — VIRTUAL VISIT (OUTPATIENT)
Dept: PSYCHOLOGY | Facility: CLINIC | Age: 42
End: 2022-06-08
Payer: COMMERCIAL

## 2022-06-08 DIAGNOSIS — F41.9 ANXIETY DISORDER, UNSPECIFIED TYPE: Primary | ICD-10-CM

## 2022-06-08 DIAGNOSIS — F32.A DEPRESSION, UNSPECIFIED DEPRESSION TYPE: ICD-10-CM

## 2022-06-08 PROCEDURE — 90834 PSYTX W PT 45 MINUTES: CPT | Mod: GT | Performed by: PSYCHOLOGIST

## 2022-06-08 NOTE — PROGRESS NOTES
"     [facility]                                            Progress Note    Patient Name: Idalia Hinojosa  Date: 6/8/2022         Service Type: Individual      Session Start Time: 2:35  pm  Session End Time: 3:25 pm   Session Length:  50 minutes    Session #: 57    Attendees: Client     Service Modality: Video visit: - I.        Provider verified identity through the following two step process.  Patient provided:  Patient is known previously to provider    Telemedicine Visit: The patient's condition can be safely assessed and treated via synchronous audio and visual telemedicine encounter.      Reason for Telemedicine Visit: Services only offered telehealth    Originating Site (Patient Location): Patient's home    Distant Site (Provider Location): Provider Remote Setting- Home Office    Consent:  The patient/guardian has verbally consented to: the potential risks and benefits of telemedicine (telephone visit) versus in person care; bill my insurance or make self-payment for services provided; and responsibility for payment of non-covered services.     Patient would like the video invitation sent by:  My Chart    Mode of Communication:  Video Conference via [employer],     As the provider I attest to compliance with applicable laws and regulations related to telemedicine.         Treatment Plan Last Reviewed: 5/3/2022  PHQ-9/DAVID-7: 5/17/2022    DATA  Interactive Complexity: No  Crisis: No        Progress Since Last Session (Related to Symptoms / Goals / Homework):   Symptoms: reports her mood has mostly remained improved - some ups and downs.  She spent a lot of time outside over the weekend which she found really helpful.  She had moments where she found herself \"obsessing\" on details she couldn't control - like wanting a bigger house, but was able to re-direct to focusing on what she can control.      Homework: Achieved / completed to satisfaction. Successful- spent a lot of time outside, " "which she enjoyed, and was able to stay calm and patient during some challenging moments with Kaylee.  She was able to use reassuring self-talk that both validated her emotions while also emphasizing her resilience (\"I'm really tired and I can do this\").  She noted the positive impact it had on Kaylee when she was able to stay calm during challenging moments.  She was also successful maintaining good emotional boundaries (\"I can choose to react differently\")        Episode of Care Goals: Satisfactory progress - ACTION (Actively working towards change); Intervened by reinforcing change plan / affirming steps taken.  Diagnostic assessment has been completed, treatment plan has been developed and patient has been making good progress on treatment goals.  The patient stated that her main goals for therapy would be to learn emotion regulation strategies (in particular, to help with when she is feeling upset/angry/frustrated/distressed).  The changes in her daily life due to COVID restrictions were a focus of our work together, and working through changes to routine as her daughter started  and she returned to work.  As she has been making progress, focus of treatment will shift to wellness planning (monitoring mood and symptoms, incorporating into routine what keeps her feeling well, review of triggers, how to manage set backs, etc.).  As her  has been diagnosed with cancer, treatment will also focus on providing her with support in managing the emotional distress and challenges related to this.       Current / Ongoing Stressors and Concerns:   Current:  Managing symptoms of depression and anxiety.  Managing stress and caregiver demands in relation to 's cancer diagnosis, managing parenting challenges related to daughter.       Ongoing:Managing daily routine which has contributed to some adjustment and parenting challenges, lack of effective emotion regulation strategies for managing daily " "frustrations and upsets and marital discord due to differences in parenting styles.       Treatment Objective(s) Addressed in This Session:       Identify obstacles and barriers to engaging in self-care and problem-solve ways to address obstacles and barriers      Continue to review/practice/reinforce:  Learn and practice mindfulness strategies - \"observe, just notice\" and invite curiosity around what may be contributing and what may help  Practice calming strategies to help release tension in your body   Practice new script in the morning when feeling urge to not get out of bed.  What do you need to hear that helps you keep moving?   (\"Today is a new day\", focus on what you're grateful for, \"I will feel better\")  Identify ways to practice and incorporate self-care into routine.  Ask myself what I need.    Identify how others can help support you and practice asking for help/what you need  Identify and practice at least 2-3 strategies to help with regulating emotion   Learn and practice behavioral activation strategies (how to move forward with doing what will help you when you don't feel like doing it)  Be curious about your emotions - tune in to - what am I thinking?  What is going on around me?  What do I need?    Identify and practice at least two strategies that will help support your self-care during this challenging time  Learn and practice at least two new strategies for tolerating uncertainty  Practice mindfulness to the moment when your mind wanders to worrying about the unknown       Intervention:   CBT:  Idalia wanted to focus today on ensuring she's incorporating regular time to \"re-charge\".  She identified that daily would be ideal, every other day otherwise.  We talked about what she needs most to help her re-charge - returning to working out and having time to read would be most helpful for her.  We talked about when she envisions this fitting best in the daily routine, and what obstacles or " barriers might get in the way, which led to problem-solving around how to address these barriers.  We also talked about parenting and behavior management strategies to help manage bed-time routine challenges with her daughter.  Encouraged they continue to stick to a good routine and clear expectations throughout the summer months.      Continue to reinforce her reaching out for help, and engaging in activities she enjoys.      Continued focus and support regarding  her emotional reactions from Colia's, and using constructive strategies to manage worry.    She said she received and read the my chart message with support group ideas.        ASSESSMENT: Current Emotional / Mental Status (status of significant symptoms):   Risk status (Self / Other harm or suicidal ideation)   Patient denies current fears or concerns for personal safety.   Patient denies current or recent suicidal ideation or behaviors.    She continues to agree to use a safety plan should any safety concerns arise.  She also agrees to reach our to her spouse or a family member for help if needed, and/or access crisis/emergency resources.        Patientdenies current or recent homicidal ideation or behaviors.   Patient denies current or recent self injurious behavior or ideation.   Patient denies other safety concerns.   Patient Patient reports there has been no change in risk factors since their last session.     PatientPatient reports there has been no change in protective factors since their last session.     Recommended that patient call 911 or go to the local ED should there be a change in any of these risk factors.  She has previously been informed on how to contact [facility] crisis/[facility] for urgent/crisis concerns 24/7.  Provided patient with crisis resources and she agrees to use safety plan should any safety concerns arise.      Professionals or agencies I can contact during a crisis:    Suicide Prevention Lifeline:  4-085-051-TALK (4456)    Crisis Text Line Service (available 24 hours a day, 7 days a week): Text [place] to 717261    Local Crisis Services: [facility]: [phone]    Call 911 or go to my nearest emergency department.        Appearance:   Appropriate    Eye Contact:   Good    Psychomotor Behavior: Normal    Attitude:   Cooperative    Orientation:   All   Speech    Rate / Production: Normal     Volume:  Normal    Mood:    Reports mostly feeling better, some ups and downs with her mood, being outside and the good weather has helped    Affect:    Congruent with mood    Thought Content:  Clear    Thought Form:  Coherent  Logical    Insight:    Good      Medication Review:   No changes to current psychiatric medication(s).        Medication Compliance:   Yes     Changes in Health Issues:  None reported         Chemical Use Review:   Substance Use: Chemical use reviewed, no active concerns identified.  No alcohol use.       Tobacco Use: No current tobacco use.      Diagnosis:  Anxiety disorder unspecified  Depression, unspecified     Collateral Reports Completed:   Not Applicable,      PLAN: (Patient Tasks / Therapist Tasks / Other)    1) Idalia identified the following goals:  Take time daily to re-charge - work-out and downtime to read.      Continue with: Get outside as much as possible - practice patience with Kaylee, (slow down, give myself time so I don't have to rush, be mindful of thoughts, focus on present, it is okay if it takes a little while).  Keep remembering that I don't have to feel the same way as him and I don't have to react the same way as him.  I'm my own person.        2) If there is a crisis situation, remember you are not alone and that there are people who can help you twenty-four hours a day, seven days a week.  Call [facility] ([facility]): [phone].      3) Follow-up visit is scheduled for 6/15 at 2:00 pm.  If urgent or crisis concerns arise prior to next  "scheduled appointment, please reach out to crisis resources, call 911, or go to the emergency department.      Cristy Wilkinson PsyD,   Licensed Psychologist  6/8/2022            Previous skills and strategies to remind self of and to do as needed:    Practice \"STOP\" technique when you recognize yourself feeling angry   Be a  - what do you notice is happening when Kaylee feels upset?     Practice recognizing when feeling angry, and giving self permission to use calming and self-soothing strategies and take a break.   Look for the gray with thinking  slow down  take deep breaths  manage expectations of self and others.    Journal   Practice flexing \"flexiblity and creativity\" -   Continue the great work you are doing with your daily schedule.    Get outside every day.    Play your instrument.    Sing!    Continue with: \"It's just a thought\" - non-judgmental, observe, float down the river on a leaf, clouds in the gege, reframing unhelpful thoughts -   Keep working on being patient when things are tense around me.    Continue with observing/paying attention to warning signs and signals and give self persmission to slow down, especially during the morning routine with Kaylee.    Practice offerring Kaylee choices when appropriate.      Use \"my plan for success\" to help remind you of calming strategies to practice when feeling upset.    Practice decreasing overall vulnerability to emotion mind through getting adequate rest, nutrition, time for yourself, and physical activity.         Consider reading \"Fighting for your marriage\".      Technical Assistance for video visits:    Offer patient the website ([email or web address]) and/or phone number ([phone]) for video visit instructions/assistance if needed.                                             ____________________________________    Treatment Plan    Patient's Name: Idalia Hinojosa  YOB: 1980    Date of Creation: 1/6/2020  Date Treatment Plan " "Last Reviewed/Revised: 5/3/22    DSM5 Diagnoses: 300.00 (F41.9) Unspecified Anxiety Disorder, depressive disorder, unspecified  Psychosocial / Contextual Factors: strain in marital relationship, parenting challenges, adjustment challenges, 's cancer diagnosis  PROMIS (reviewed every 90 days):     Anticipated number of session for this episode of care: additional 15-20  Anticipation frequency of session: Weekly until symptoms stabilize, then can decrease the frequency of sessions to likely bi-weekly or once every three weeks  Anticipated Duration of each session: 38-52 minutes  Treatment plan will be reviewed in 90 days or when goals have been changed.       Referral / Collaboration:  We discussed a referral to a couples/marital therapist.  The patient is thinking about this and has talked with her  about the referral, but they are unsure if they would like to follow through at this time.      MeasurableTreatment Goal(s) related to diagnosis / functional impairment(s)  Goal 1: Patient will learn emotion regulation strategies to help when she is feeling upset/angry/frustrated/distressed    I will know I've met my goal when I would yell less, I would feel calmer, and I would not push others.  I would be less physical when I'm angry (e.g. wouldn't slam doors or hit things)       Objective #A (Patient Action)    Patient will learn and practice at least 2 new emotion regulation strategies.  Status: Continued - Date(s):4/23/2020 (has begun to learn new emotion regulation strategies and is making good progress)   Update: 7/8/2020: \"I've learned new strategies but I'm bad at practicing them\" - will help patient incorporate regular practice and identify and problem-solve barriers.    Reviewed: 10/8/2020: Patient has been successfully practicing emotion regulation strategies, is working on identifying earlier on when to intervene and engage strategies, as well as practice strategies on a regular basis to help " "strength use of strategies.    Reviewed and in progress: 1/27/2021: \"Overall things are going a lot better.  Sometimes I fall into old habits\", but recognizing when she's feeling more vulnerable to her emotions and reminds herself to use different skills/intervene in a different way.     4/27/2021-\"Things have definitely been better\".  Recognizing when feeling vulnerable (e.g. hungry), recognizing when emotions are escalating and need to engage calming strategies\".  Ask for help from Colia when feeling upset.  Can be challenging when Kaylee escalates when I'm feeling angry.    7/29/21 - She felt like she has forgotten what helps her when she's feeling upset, we have spent time reviewing and refreshing these skills.    11/10/2021 - She feels like for the most part things have been going well.    2/9/2022 - Helping her to apply emotion regulation strategies in the context of a new and unexpected stressor -   5/3/2022 - Update - Things have been better, I can tell when I feel overwhelmed or don't get breaks - that is when I start yelling or snapping, and overall things have been better.  I'm exercising more and that really helps with the stress.  I've been feeling more confident about myself because I've been exercising more.        Intervention(s)  Therapist will provide psychoeducation on emotion regulation module from [facility]BT and will assign patient homework to help reinforce skill development.    Objective #B  Patient will identify factors that increase vulnerability to emotion mind and identify ways to decrease vulnerability to emotion mind.  Status: Continued - Date(s):4/23/2020 (has learned factors that make her more vulnerable to emotion mind, and has been working on addressing vulnerability related to feeling hungry, tired and rushed)   Update: 7/8/20: \"This one is harder for me.  I know I get angry when I'm hungry, and when I slow down I'm less inclined to get angry\".  Challenges - not always having food ready, not " "always planning in advance, (planing breakfast the night before)  Reviewed: 10/8/2020: Patient's awareness has increased of factors that increase her vulnerability to emotion mind, and she has been using this as a signal to tell her what she needs.    Reviewed and resolved/complete: 1/27/2021: \"I feel like we've done really good work with this and I've gotten what I need from this\".  4/27/2021  - I am doing better with this - I know I get more irritable when I'm hungry and I'm trying to do better with this.  Drinking more water.      7/29/21 - Overall she had been feeling like things were going better with this, though over the past several weeks has felt more challenged due to increase in stressors.    11/10/2021 - Recognizing when feeling tired and how this can contribute to increase in vulnerability and ways to intervene.  Recognizing when feeling irritabiel - ask self if feeling hungry and eat if needed.    2/9/2022 - Self-care is an important focus right now - to ensure she is attending to her basic self-care needs while helping to take care of her  and his health -   5/3/2022 - I feel like I've mostly been doing better with eating and making sure not hungry.  Try to plan meals and snacks - sometimes this can be hard.          Intervention(s)  Therapist will provide information on factors that increase vulnerabiliyt to emotion mind.    Objective #C  Patient will identify warning signs and signals that emotions are escalating and will learn ways to skillfully intervene early on.  Status: New - Date: 01/06/2020 7/8/2020- in progress  Update: 10/08/2020: Patient has been making good progress on identifying signs and signals that her emotions are escalating.    Reviewed and in progress: 1/27/2021: \"I feel like we've done good work on this but this is one of the things I need to keep working on\".    7/29.21 - In progress - actively working on this -   11/10/2021 - Feels sometimes she is able to do this, " "other times it is harder  2/9/2022 - continued goal -   5/3/2022 - I do that sometimes - I do notice when I am getting more frustrated - that's been helping more - if I feel like I'm gettig more furstrated than I can take a deep breath or put my hands on my head -         Intervention(s)  Therapist will help patient identify warning signs and signals, and will guide the patient in identifying skillful ways to intervene when exeriencing warning signs and signals.      Goal 2: Patient will learn new parenting strategies to help with managing parenting challenges.  Parent will work on improving relationship with her daughter and defining what she would like this relationship to look like.      I will know I've met my goal when I'm enjoying time with my daughter, teaching her new things, and not waiting for things to change      Objective #A (Patient Action)    Status: Continued - Date(s):4/23/2020     Patient will increase understanding of developmental norms for her daughter and ways to manage challenging behaviors.    Intervention(s)  Therapist will provide psychoeducation on developmental norms and parenting strategies.    Objective #B  Patient will spend time reflecting on her relationship with her daughter and defining what she would like this relaitonship to look like.    Status: Continued - Date(s):4/23/2020   Update: 7/8/2020 - \"I think some things are going better, she's more helpful with things.  Some things are more challenging with the pandemic and thinking of things to do with her\".    Update: 10/08/2020: Patient is making progress on learning and practicing strategies to manage parenting challenges.    Reviewed: 1/27/2021: \"I feel like things have gotten better with this, we've figured out a good routine, I'm doing meditation, I'm enjoying my time with my daughter more than I used to\".   4/27/2021 - Challenges of parenting during a pandemic, working with occupational therapist on strategies to help " "daughter, 7/29/21 - She's been working on incorporating more structure into the daily routine, as well as managing her emotional reaction when feeling frustrated.  She's also worked on managing her mindset and expectations.  She's been experiencing some frustrations due to two of the OT's she's worked with has recently resigned, resulting in transitioning to several new providers in a short period of time.  I've encouraged she continue to work closely with her daughter's pediatrician to get connected to resources and supports to help with managing her daughter's difficulties.    11/10/2021 - Feels like they've had some really good interactions, though things have been more challenging at school and home (waiting for evaluations).  Daughter enjoys when she reads to her, they've had some really good interactions recently.    2/9/2022 - unable to fully assess progress on this goal today, will further assess progress on this goal and if any treatment needs remain -   5/3/2022- this is going well - sometimes it is hard when she wants me to play the same thing over and over with her, but overall it is better - having more fun as it gets older -         Intervention(s)  Therapist will guide the patient in reflecting upon her relationship with her daughter and help her define ways to move towards what she vaues in her relationship with her daughter.    Objective #C  Patient will increase time spent on fun activity and play with her daughter.  Status: Continued - Date(s):4/23/2020 7/8/2020 -   10/08/2020 - will further assess this goal at next session   Reviewed: 1/27/2021 - also going really well, we play together all of the time now\", she finds herself enjoying their time together.   4/27/2021- she will spend some time reflecting upon this and we will discuss further next time.  They have several fun things planned for the future, as well as she has worked hard during the pandemic to plan structure and time together " "within COVID restrictions.  7/29/21 - She's seen some positive changes with being able to spend time with grandparents and at the cabin since being vaccinated, but also has experienced some challenges with planning fun activities out of worries things won't go well and feeling low energy/motivation to do these things - working to address both of these obstacles.    11/10/2021 - They've been able to do more fun activities together - coloring, Lego sets,    2/9/2022 - unable to fully assess progress on this goal today, will further assess progress on this goal and if any  treatment needs remain -    5/3/2022 - really fun time doing legos, building, reading, drawing together        Intervention(s)  Therapist will guide the patient in identifying ways she can increase positive time with her daughter.  Therapist will also teach mindfulness strategies to help patient with being in the present moment when appropriate - .      Goal 3: Patiient will improve communication with her .      I will know I've met my goal when I feel less irritated with my  and communicate more openly with my .  I would like to be more assertive and less aggressive.   I would like to not avoid telling him things because I'm worried about his reaction or don't think he will react well.  I would like to be more present to the moment during times when this would be helpful.\"      Objective #A (Patient Action)    Status: Continued - Date(s):4/23/2020 7/8/2020 - suggested couples therapy to help with this.    Individual therapy focus - identify what makes it hard to be more honest and open   10/08/2020 - will further assess this goal at next session   Reviewed: 1/27/2021 - \"Things are better, I feel like we've worked really hard on how to respond when he's having a hard time and how to talk to him.  I'm doing better listening, being more empathetic, and not \".  4/27/2021 - She has been making progress on examining and " updating unhelpful beliefs (e.g. feeling responsible for other's emotions, feeling like she needs to fix or change other's emotions) and has made good progress on identifying and asserting healthy boundaries, in addition to  her emotional response from her spouse's emotional response.  7/29/21 - will review at next session   11/10/2021 - Feeling more irritability recently.  Believes this area could use more attention.    2/9/2022 - continued goal -   5/3/2022- this one is hard to say because things are so different right now - he's not his normal self (due to cancer treatment) - I think I'm communicating things in a nice way        Patient will learn and practice at least two new interpersonal effectiveness strategies.    Intervention(s)  Therapist will teach strategies from DBT module on interpersonal effectiveness, and will assign homework to help reinforce skill development.      Patient has reviewed and agreed to the above plan.      Cristy Wilkinson PsyD,   Licensed Psychologist  January 6, 2020, reviewed on 4/23/2020, reviewed 7/8/2020, reviewed 10/08/2020, reviewed 1/27/2021, reviewed 4/27/2021, 7/29/2021, 11/10/2021, 2/9/2022, 5/3/2022

## 2022-06-09 DIAGNOSIS — J30.89 ALLERGIC RHINITIS DUE TO DUST MITE: ICD-10-CM

## 2022-06-09 DIAGNOSIS — J30.0 CHRONIC VASOMOTOR RHINITIS: ICD-10-CM

## 2022-06-09 RX ORDER — MOMETASONE FUROATE MONOHYDRATE 50 UG/1
2 SPRAY, METERED NASAL DAILY
Qty: 17 G | Refills: 0 | Status: SHIPPED | OUTPATIENT
Start: 2022-06-09 | End: 2022-06-21

## 2022-06-09 RX ORDER — MONTELUKAST SODIUM 10 MG/1
10 TABLET ORAL AT BEDTIME
Qty: 30 TABLET | Refills: 0 | Status: SHIPPED | OUTPATIENT
Start: 2022-06-09 | End: 2022-06-21

## 2022-06-09 NOTE — TELEPHONE ENCOUNTER
Pending Prescriptions:                       Disp   Refills    montelukast (SINGULAIR) 10 MG tablet      30 tab*0            Sig: Take 1 tablet (10 mg) by mouth At Bedtime Please           schedule an appointment with Dr. Lewis.    INDU refilled medication per [facility] Refill Protocol.       Meri WHITLOCK RN, [facility] 06/09/22 11:49 AM

## 2022-06-09 NOTE — TELEPHONE ENCOUNTER
Pending Prescriptions:                       Disp   Refills    mometasone (NASONEX) 50 MCG/ACT nasal spr*17 g   0            Sig: Spray 2 sprays into both nostrils daily Needs           appointment for additional refills    RN refilled medication per [facility] Refill Protocol.       Meri WHITLOCK RN, [facility] 06/09/22 11:48 AM

## 2022-06-15 ENCOUNTER — VIRTUAL VISIT (OUTPATIENT)
Dept: PSYCHOLOGY | Facility: CLINIC | Age: 42
End: 2022-06-15
Payer: COMMERCIAL

## 2022-06-15 DIAGNOSIS — F41.9 ANXIETY DISORDER, UNSPECIFIED TYPE: Primary | ICD-10-CM

## 2022-06-15 DIAGNOSIS — F32.A DEPRESSION, UNSPECIFIED DEPRESSION TYPE: ICD-10-CM

## 2022-06-15 PROCEDURE — 90834 PSYTX W PT 45 MINUTES: CPT | Mod: GT | Performed by: PSYCHOLOGIST

## 2022-06-15 NOTE — PROGRESS NOTES
"     [facility]                                            Progress Note    Patient Name: Idalia Hinojosa  Date: 6/15/2022         Service Type: Individual      Session Start Time: 2:06  pm  Session End Time: 2: 48 pm   Session Length:  42 minutes    Session #: 58    Attendees: Client     Service Modality: Video visit: - I.        Provider verified identity through the following two step process.  Patient provided:  Patient is known previously to provider    Telemedicine Visit: The patient's condition can be safely assessed and treated via synchronous audio and visual telemedicine encounter.      Reason for Telemedicine Visit: Services only offered telehealth    Originating Site (Patient Location): Patient's home    Distant Site (Provider Location): Provider Remote Setting- Home Office    Consent:  The patient/guardian has verbally consented to: the potential risks and benefits of telemedicine (telephone visit) versus in person care; bill my insurance or make self-payment for services provided; and responsibility for payment of non-covered services.     Patient would like the video invitation sent by:  My Chart    Mode of Communication:  Video Conference via [employer],     As the provider I attest to compliance with applicable laws and regulations related to telemedicine.         Treatment Plan Last Reviewed: 5/3/2022  PHQ-9/DAVID-7: 5/17/2022    DATA  Interactive Complexity: No  Crisis: No        Progress Since Last Session (Related to Symptoms / Goals / Homework):   Symptoms: reports her mood has been \"mostly pretty good\", feels tired today which she attributes to the rainy weather.      Homework: Achieved / completed to satisfaction. Successful- had several opportunities where she was able to practice staying calm when Kaylee was having a melt down and her  was \"cranky\". She's also been successful with her goals related to taking time to re-charge - she's been working out (including one " "\"bonus day\" where she didn't think she'd have time to work out but was able to), has been enjoying time reading, and played her oboe four times!           Episode of Care Goals: Satisfactory progress - ACTION (Actively working towards change); Intervened by reinforcing change plan / affirming steps taken.  Diagnostic assessment has been completed, treatment plan has been developed and patient has been making good progress on treatment goals.  The patient stated that her main goals for therapy would be to learn emotion regulation strategies (in particular, to help with when she is feeling upset/angry/frustrated/distressed).  The changes in her daily life due to COVID restrictions were a focus of our work together, and working through changes to routine as her daughter started  and she returned to work.  As she has been making progress, focus of treatment will shift to wellness planning (monitoring mood and symptoms, incorporating into routine what keeps her feeling well, review of triggers, how to manage set backs, etc.).  As her  has been diagnosed with cancer, treatment will also focus on providing her with support in managing the emotional distress and challenges related to this.       Current / Ongoing Stressors and Concerns:   Current:  Noticed that she spends a lot of time thinking about what others have and she doesn't, and that this makes her feel bad.  Managing symptoms of depression and anxiety, caregiver stress related to 's cancer diagnosis, and parenting challenges with daughter.  Her daughter has a neuropsych evaluation tomorrow, they have been waiting for this for quite some time and she's hopeful this will provide them with helpful info.       Ongoing:Managing daily routine which has contributed to some adjustment and parenting challenges, lack of effective emotion regulation strategies for managing daily frustrations and upsets and marital discord due to differences in " "parenting styles.       Treatment Objective(s) Addressed in This Session:     Increase mindful awareness of thoughts, impact on your emotions, and recognize opportunities to redirect thoughts.  Practice re-directing thoughts to a more helpful perspective.    Practice balanced thinking to counteract polarized/all or nothing thinking.    Practice miranda and self-compassion       Continue to review/practice/reinforce:  Identify obstacles and barriers to engaging in self-care and problem-solve ways to address obstacles and barriers  Learn and practice mindfulness strategies - \"observe, just notice\" and invite curiosity around what may be contributing and what may help  Practice calming strategies to help release tension in your body   Practice new script in the morning when feeling urge to not get out of bed.  What do you need to hear that helps you keep moving?   (\"Today is a new day\", focus on what you're grateful for, \"I will feel better\")  Identify ways to practice and incorporate self-care into routine.  Ask myself what I need.    Identify how others can help support you and practice asking for help/what you need  Identify and practice at least 2-3 strategies to help with regulating emotion   Learn and practice behavioral activation strategies (how to move forward with doing what will help you when you don't feel like doing it)  Be curious about your emotions - tune in to - what am I thinking?  What is going on around me?  What do I need?    Identify and practice at least two strategies that will help support your self-care during this challenging time  Learn and practice at least two new strategies for tolerating uncertainty  Practice mindfulness to the moment when your mind wanders to worrying about the unknown       Intervention:   CBT:  She noticed that she spends a lot of time thinking about what others might have that she doesn't, and that this leads her to feel jealous and bad.  Recognized the skill in her " "being aware of her thoughts and the impact it had on her emotions, which opens up opportunities to intervene differently.  She also recognized that she was able to successfully redirect to focusing on something she could do and enjoy - which made her feel a lot better.  Looked at ways to redirect her thinking - focus on \"the big picture - not just a small slice of the pie\", focus on what she's grateful for, focus on what she has, focus on what she can control.  She came up with an analogy - (\"taking my brain out of the jealous house\") - which she likened to how helpful it was for her to daughter to take her out of the house and bring her outside and into the garden when she was having a tough time the other day.  Recognized her ability to move from \"automatic thoughts\" to to interrupting and redirecting thought and behavior patterns that aren't helpful for her.      We also processed the successes she had in staying calm when her daughter was having a hard time.  She is finding good success with recognizing her ability to separate her emotions from others, choose a different response, choose a helpful reaction, and how in turn it really helps her daughter when she can stay calm.      She had a tough interaction with her spouse and had some frustration that this situation didn't go as well as some of the other incidents had.  Encouraged her to embrace the learning from this, find balanced thinking and the \"gray\" rather than all or nothing/polarized thinking, practice self-compassion and give herself some grade, and emphasized her continued efforts to try out skills in challenging situations, learn what works and what doesn't, and to recognize that some times certain skills will work better than others -and that no one is perfect!      ASSESSMENT: Current Emotional / Mental Status (status of significant symptoms):   Risk status (Self / Other harm or suicidal ideation)   Patient denies current fears or concerns for " personal safety.   Patient denies current or recent suicidal ideation or behaviors.    She continues to agree to use a safety plan should any safety concerns arise.  She also agrees to reach our to her spouse or a family member for help if needed, and/or access crisis/emergency resources.        Patientdenies current or recent homicidal ideation or behaviors.   Patient denies current or recent self injurious behavior or ideation.   Patient denies other safety concerns.   Patient Patient reports there has been no change in risk factors since their last session.     PatientPatient reports there has been no change in protective factors since their last session.     Recommended that patient call 911 or go to the local ED should there be a change in any of these risk factors.  She has previously been informed on how to contact [facility] crisis/COPE for urgent/crisis concerns 24/7.  Provided patient with crisis resources and she agrees to use safety plan should any safety concerns arise.      Professionals or agencies I can contact during a crisis:    Suicide Prevention Lifeline: 8-875-821-TALK (5096)    Crisis Text Line Service (available 24 hours a day, 7 days a week): Text [place] to 530134    Local Crisis Services: [facility]: [phone]    Call 911 or go to my nearest emergency department.        Appearance:   Appropriate    Eye Contact:   Good    Psychomotor Behavior: Normal    Attitude:   Cooperative    Orientation:   All   Speech    Rate / Production: Normal     Volume:  Normal    Mood:    Mood is overall improved, though feels tired today   Affect:    Congruent with mood    Thought Content:  Clear    Thought Form:  Coherent  Logical    Insight:    Good      Medication Review:   No changes to current psychiatric medication(s).        Medication Compliance:   Yes     Changes in Health Issues:  None reported         Chemical Use Review:   Substance Use: Chemical use reviewed, no active  "concerns identified.  She's mostly been abstaining from alcohol, noted that she had two occasions where she had one drink on the patio outside, enjoyed it, and it didn't negatively affect her mood as it has in times in the past.     Tobacco Use: No current tobacco use.      Diagnosis:  Anxiety disorder unspecified  Depression, unspecified     Collateral Reports Completed:   Not Applicable,      PLAN: (Patient Tasks / Therapist Tasks / Other)    1) Idalia identified the following goals:  Keep noticing if I'm feeling jealous of others, be aware of my thoughts, practice redirecting to what I'm grateful for, what I can control, what I can do.  Keep practicing skills to stay calm and separate my feelings from others when others around me are escalating (you can choose to stay at the train station while they get on the train)        Continue with: Take time daily to re-charge - work-out and downtime to read.  Get outside as much as possible - practice patience with Kaylee, (slow down, give myself time so I don't have to rush, be mindful of thoughts, focus on present, it is okay if it takes a little while).        2) If there is a crisis situation, remember you are not alone and that there are people who can help you twenty-four hours a day, seven days a week.  Call [facility] ([facility]): [phone].      3) Follow-up visit is scheduled for June 30th at 12:00 pm.  If urgent or crisis concerns arise prior to next scheduled appointment, please reach out to crisis resources, call 911, or go to the emergency department.      Cristy Wilkinson PsyD,   Licensed Psychologist  6/15/2022    Previous skills and strategies to remind self of and to do as needed:    Practice \"STOP\" technique when you recognize yourself feeling angry   Be a  - what do you notice is happening when Kaylee feels upset?     Practice recognizing when feeling angry, and giving self permission to use calming and self-soothing strategies " "and take a break.   Look for the gray with thinking  slow down  take deep breaths  manage expectations of self and others.    Journal   Practice flexing \"flexiblity and creativity\" -   Continue the great work you are doing with your daily schedule.    Get outside every day.    Play your instrument.    Sing!    Continue with: \"It's just a thought\" - non-judgmental, observe, float down the river on a leaf, clouds in the gege, reframing unhelpful thoughts -   Keep working on being patient when things are tense around me.    Continue with observing/paying attention to warning signs and signals and give self persmission to slow down, especially during the morning routine with Kaylee.    Practice offerring Kaylee choices when appropriate.      Use \"my plan for success\" to help remind you of calming strategies to practice when feeling upset.    Practice decreasing overall vulnerability to emotion mind through getting adequate rest, nutrition, time for yourself, and physical activity.         Consider reading \"Fighting for your marriage\".      Technical Assistance for video visits:    Offer patient the website ([email or web address]) and/or phone number ([phone]) for video visit instructions/assistance if needed.                                             ____________________________________    Treatment Plan    Patient's Name: Idalia Hinojosa  YOB: 1980    Date of Creation: 1/6/2020  Date Treatment Plan Last Reviewed/Revised: 5/3/22    DSM5 Diagnoses: 300.00 (F41.9) Unspecified Anxiety Disorder, depressive disorder, unspecified  Psychosocial / Contextual Factors: strain in marital relationship, parenting challenges, adjustment challenges, 's cancer diagnosis  PROMIS (reviewed every 90 days):     Anticipated number of session for this episode of care: additional 15-20  Anticipation frequency of session: Weekly until symptoms stabilize, then can decrease the frequency of sessions to likely " "bi-weekly or once every three weeks  Anticipated Duration of each session: 38-52 minutes  Treatment plan will be reviewed in 90 days or when goals have been changed.       Referral / Collaboration:  We discussed a referral to a couples/marital therapist.  The patient is thinking about this and has talked with her  about the referral, but they are unsure if they would like to follow through at this time.      MeasurableTreatment Goal(s) related to diagnosis / functional impairment(s)  Goal 1: Patient will learn emotion regulation strategies to help when she is feeling upset/angry/frustrated/distressed    I will know I've met my goal when I would yell less, I would feel calmer, and I would not push others.  I would be less physical when I'm angry (e.g. wouldn't slam doors or hit things)       Objective #A (Patient Action)    Patient will learn and practice at least 2 new emotion regulation strategies.  Status: Continued - Date(s):4/23/2020 (has begun to learn new emotion regulation strategies and is making good progress)   Update: 7/8/2020: \"I've learned new strategies but I'm bad at practicing them\" - will help patient incorporate regular practice and identify and problem-solve barriers.    Reviewed: 10/8/2020: Patient has been successfully practicing emotion regulation strategies, is working on identifying earlier on when to intervene and engage strategies, as well as practice strategies on a regular basis to help strength use of strategies.    Reviewed and in progress: 1/27/2021: \"Overall things are going a lot better.  Sometimes I fall into old habits\", but recognizing when she's feeling more vulnerable to her emotions and reminds herself to use different skills/intervene in a different way.     4/27/2021-\"Things have definitely been better\".  Recognizing when feeling vulnerable (e.g. hungry), recognizing when emotions are escalating and need to engage calming strategies\".  Ask for help from Colia when " "feeling upset.  Can be challenging when Kaylee escalates when I'm feeling angry.    7/29/21 - She felt like she has forgotten what helps her when she's feeling upset, we have spent time reviewing and refreshing these skills.    11/10/2021 - She feels like for the most part things have been going well.    2/9/2022 - Helping her to apply emotion regulation strategies in the context of a new and unexpected stressor -   5/3/2022 - Update - Things have been better, I can tell when I feel overwhelmed or don't get breaks - that is when I start yelling or snapping, and overall things have been better.  I'm exercising more and that really helps with the stress.  I've been feeling more confident about myself because I've been exercising more.        Intervention(s)  Therapist will provide psychoeducation on emotion regulation module from DBT and will assign patient homework to help reinforce skill development.    Objective #B  Patient will identify factors that increase vulnerability to emotion mind and identify ways to decrease vulnerability to emotion mind.  Status: Continued - Date(s):4/23/2020 (has learned factors that make her more vulnerable to emotion mind, and has been working on addressing vulnerability related to feeling hungry, tired and rushed)   Update: 7/8/20: \"This one is harder for me.  I know I get angry when I'm hungry, and when I slow down I'm less inclined to get angry\".  Challenges - not always having food ready, not always planning in advance, (planing breakfast the night before)  Reviewed: 10/8/2020: Patient's awareness has increased of factors that increase her vulnerability to emotion mind, and she has been using this as a signal to tell her what she needs.    Reviewed and resolved/complete: 1/27/2021: \"I feel like we've done really good work with this and I've gotten what I need from this\".  4/27/2021  - I am doing better with this - I know I get more irritable when I'm hungry and I'm trying to do " "better with this.  Drinking more water.      7/29/21 - Overall she had been feeling like things were going better with this, though over the past several weeks has felt more challenged due to increase in stressors.    11/10/2021 - Recognizing when feeling tired and how this can contribute to increase in vulnerability and ways to intervene.  Recognizing when feeling irritabiel - ask self if feeling hungry and eat if needed.    2/9/2022 - Self-care is an important focus right now - to ensure she is attending to her basic self-care needs while helping to take care of her  and his health -   5/3/2022 - I feel like I've mostly been doing better with eating and making sure not hungry.  Try to plan meals and snacks - sometimes this can be hard.          Intervention(s)  Therapist will provide information on factors that increase vulnerabiliyt to emotion mind.    Objective #C  Patient will identify warning signs and signals that emotions are escalating and will learn ways to skillfully intervene early on.  Status: New - Date: 01/06/2020 7/8/2020- in progress  Update: 10/08/2020: Patient has been making good progress on identifying signs and signals that her emotions are escalating.    Reviewed and in progress: 1/27/2021: \"I feel like we've done good work on this but this is one of the things I need to keep working on\".    7/29.21 - In progress - actively working on this -   11/10/2021 - Feels sometimes she is able to do this, other times it is harder  2/9/2022 - continued goal -   5/3/2022 - I do that sometimes - I do notice when I am getting more frustrated - that's been helping more - if I feel like I'm gettig more furstrated than I can take a deep breath or put my hands on my head -         Intervention(s)  Therapist will help patient identify warning signs and signals, and will guide the patient in identifying skillful ways to intervene when exeriencing warning signs and signals.      Goal 2: Patient will learn " "new parenting strategies to help with managing parenting challenges.  Parent will work on improving relationship with her daughter and defining what she would like this relationship to look like.      I will know I've met my goal when I'm enjoying time with my daughter, teaching her new things, and not waiting for things to change      Objective #A (Patient Action)    Status: Continued - Date(s):4/23/2020     Patient will increase understanding of developmental norms for her daughter and ways to manage challenging behaviors.    Intervention(s)  Therapist will provide psychoeducation on developmental norms and parenting strategies.    Objective #B  Patient will spend time reflecting on her relationship with her daughter and defining what she would like this relaitonship to look like.    Status: Continued - Date(s):4/23/2020   Update: 7/8/2020 - \"I think some things are going better, she's more helpful with things.  Some things are more challenging with the pandemic and thinking of things to do with her\".    Update: 10/08/2020: Patient is making progress on learning and practicing strategies to manage parenting challenges.    Reviewed: 1/27/2021: \"I feel like things have gotten better with this, we've figured out a good routine, I'm doing meditation, I'm enjoying my time with my daughter more than I used to\".   4/27/2021 - Challenges of parenting during a pandemic, working with occupational therapist on strategies to help daughter, 7/29/21 - She's been working on incorporating more structure into the daily routine, as well as managing her emotional reaction when feeling frustrated.  She's also worked on managing her mindset and expectations.  She's been experiencing some frustrations due to two of the OT's she's worked with has recently resigned, resulting in transitioning to several new providers in a short period of time.  I've encouraged she continue to work closely with her daughter's pediatrician to get connected " "to resources and supports to help with managing her daughter's difficulties.    11/10/2021 - Feels like they've had some really good interactions, though things have been more challenging at school and home (waiting for evaluations).  Daughter enjoys when she reads to her, they've had some really good interactions recently.    2/9/2022 - unable to fully assess progress on this goal today, will further assess progress on this goal and if any treatment needs remain -   5/3/2022- this is going well - sometimes it is hard when she wants me to play the same thing over and over with her, but overall it is better - having more fun as it gets older -         Intervention(s)  Therapist will guide the patient in reflecting upon her relationship with her daughter and help her define ways to move towards what she vaues in her relationship with her daughter.    Objective #C  Patient will increase time spent on fun activity and play with her daughter.  Status: Continued - Date(s):4/23/2020 7/8/2020 -   10/08/2020 - will further assess this goal at next session   Reviewed: 1/27/2021 - also going really well, we play together all of the time now\", she finds herself enjoying their time together.   4/27/2021- she will spend some time reflecting upon this and we will discuss further next time.  They have several fun things planned for the future, as well as she has worked hard during the pandemic to plan structure and time together within COVID restrictions.  7/29/21 - She's seen some positive changes with being able to spend time with grandparents and at the cabin since being vaccinated, but also has experienced some challenges with planning fun activities out of worries things won't go well and feeling low energy/motivation to do these things - working to address both of these obstacles.    11/10/2021 - They've been able to do more fun activities together - coloring, Robson esteves,    2/9/2022 - unable to fully assess progress on this " "goal today, will further assess progress on this goal and if any  treatment needs remain -    5/3/2022 - really fun time doing legos, building, reading, drawing together        Intervention(s)  Therapist will guide the patient in identifying ways she can increase positive time with her daughter.  Therapist will also teach mindfulness strategies to help patient with being in the present moment when appropriate - .      Goal 3: Patiient will improve communication with her .      I will know I've met my goal when I feel less irritated with my  and communicate more openly with my .  I would like to be more assertive and less aggressive.   I would like to not avoid telling him things because I'm worried about his reaction or don't think he will react well.  I would like to be more present to the moment during times when this would be helpful.\"      Objective #A (Patient Action)    Status: Continued - Date(s):4/23/2020 7/8/2020 - suggested couples therapy to help with this.    Individual therapy focus - identify what makes it hard to be more honest and open   10/08/2020 - will further assess this goal at next session   Reviewed: 1/27/2021 - \"Things are better, I feel like we've worked really hard on how to respond when he's having a hard time and how to talk to him.  I'm doing better listening, being more empathetic, and not \".  4/27/2021 - She has been making progress on examining and updating unhelpful beliefs (e.g. feeling responsible for other's emotions, feeling like she needs to fix or change other's emotions) and has made good progress on identifying and asserting healthy boundaries, in addition to  her emotional response from her spouse's emotional response.  7/29/21 - will review at next session   11/10/2021 - Feeling more irritability recently.  Believes this area could use more attention.    2/9/2022 - continued goal -   5/3/2022- this one is hard to say because things are " so different right now - he's not his normal self (due to cancer treatment) - I think I'm communicating things in a nice way        Patient will learn and practice at least two new interpersonal effectiveness strategies.    Intervention(s)  Therapist will teach strategies from DBT module on interpersonal effectiveness, and will assign homework to help reinforce skill development.      Patient has reviewed and agreed to the above plan.      Cristy Wilkinson PsyD, LP  Licensed Psychologist  January 6, 2020, reviewed on 4/23/2020, reviewed 7/8/2020, reviewed 10/08/2020, reviewed 1/27/2021, reviewed 4/27/2021, 7/29/2021, 11/10/2021, 2/9/2022, 5/3/2022

## 2022-06-21 ENCOUNTER — OFFICE VISIT (OUTPATIENT)
Dept: ALLERGY | Facility: CLINIC | Age: 42
End: 2022-06-21
Payer: COMMERCIAL

## 2022-06-21 VITALS
SYSTOLIC BLOOD PRESSURE: 109 MMHG | HEART RATE: 65 BPM | OXYGEN SATURATION: 98 % | WEIGHT: 171.8 LBS | BODY MASS INDEX: 28.59 KG/M2 | DIASTOLIC BLOOD PRESSURE: 70 MMHG

## 2022-06-21 DIAGNOSIS — J30.89 ALLERGIC RHINITIS DUE TO DUST MITE: ICD-10-CM

## 2022-06-21 DIAGNOSIS — J30.0 CHRONIC VASOMOTOR RHINITIS: ICD-10-CM

## 2022-06-21 PROCEDURE — 99213 OFFICE O/P EST LOW 20 MIN: CPT | Performed by: ALLERGY & IMMUNOLOGY

## 2022-06-21 RX ORDER — MOMETASONE FUROATE MONOHYDRATE 50 UG/1
2 SPRAY, METERED NASAL DAILY
Qty: 51 G | Refills: 3 | Status: SHIPPED | OUTPATIENT
Start: 2022-06-21 | End: 2023-08-10

## 2022-06-21 RX ORDER — IPRATROPIUM BROMIDE 42 UG/1
2 SPRAY, METERED NASAL 4 TIMES DAILY PRN
Qty: 15 ML | Refills: 5 | Status: SHIPPED | OUTPATIENT
Start: 2022-06-21 | End: 2023-08-10

## 2022-06-21 RX ORDER — MONTELUKAST SODIUM 10 MG/1
10 TABLET ORAL AT BEDTIME
Qty: 90 TABLET | Refills: 3 | Status: SHIPPED | OUTPATIENT
Start: 2022-06-21 | End: 2023-07-06

## 2022-06-21 ASSESSMENT — ENCOUNTER SYMPTOMS
SINUS PRESSURE: 0
CHILLS: 0
SHORTNESS OF BREATH: 0
JOINT SWELLING: 0
CHEST TIGHTNESS: 0
RHINORRHEA: 0
ACTIVITY CHANGE: 0
NAUSEA: 0
FEVER: 0
EYE REDNESS: 0
EYE ITCHING: 0
FACIAL SWELLING: 0
ADENOPATHY: 0
VOMITING: 0
ARTHRALGIAS: 0
WHEEZING: 0
MYALGIAS: 0
COUGH: 0
DIARRHEA: 0
EYE DISCHARGE: 0
HEADACHES: 0

## 2022-06-21 NOTE — PROGRESS NOTES
Idalia Hinojosa was seen in the Allergy Clinic at [facility].      Idalia Hinojosa is a 42 year old Not  or  female who is seen today for a follow-up visit. She reports that she has been doing well. She continues to take montelukast nightly and uses mometasone nasal spray every morning. Additionally she uses the ipratropium nasal spray when needed. She does not use the ipratropium daily as it tends to make her nose dry. She has not had any sinus issues or infections in the past year.      Past Medical History:   Diagnosis Date     ASCUS of cervix with negative high risk HPV 04/07/2017 04/07/17 ASCUS, Neg HPV     Seasonal affective disorder (H)     no meds     Family History   Problem Relation Age of Onset     Thyroid Disease Paternal Grandmother      Other Cancer Paternal Grandmother      Heart Disease Maternal Grandmother      Social History     Tobacco Use     Smoking status: Never Smoker     Smokeless tobacco: Never Used   Substance Use Topics     Alcohol use: Yes     Alcohol/week: 0.0 standard drinks     Comment: 0-2 PER [facility]     Drug use: No     Social History     Social History Narrative               Past medical, family, and social history were reviewed.    Review of Systems   Constitutional: Negative for activity change, chills and fever.   HENT: Negative for congestion, dental problem, ear pain, facial swelling, nosebleeds, postnasal drip, rhinorrhea, sinus pressure and sneezing.    Eyes: Negative for discharge, redness and itching.   Respiratory: Negative for cough, chest tightness, shortness of breath and wheezing.    Cardiovascular: Negative for chest pain.   Gastrointestinal: Negative for diarrhea, nausea and vomiting.   Musculoskeletal: Negative for arthralgias, joint swelling and myalgias.   Skin: Negative for rash.   Neurological: Negative for headaches.   Hematological: Negative for adenopathy.   Psychiatric/Behavioral: Negative for behavioral problems and  self-injury.         Current Outpatient Medications:      ipratropium (ATROVENT) 0.06 % nasal spray, Spray 2 sprays into both nostrils 4 times daily as needed for rhinitis, Disp: 15 mL, Rfl: 0     mometasone (NASONEX) 50 MCG/ACT nasal spray, Spray 2 sprays into both nostrils daily Needs appointment for additional refills, Disp: 17 g, Rfl: 0     montelukast (SINGULAIR) 10 MG tablet, Take 1 tablet (10 mg) by mouth At Bedtime Please schedule an appointment with Dr. Lewis., Disp: 30 tablet, Rfl: 0     norethindrone-ethinyl estradiol (MICROGESTIN 1/20) 1-20 MG-MCG tablet, Take 1 tablet by mouth daily Active tablets only for prevention of menses, Disp: 112 tablet, Rfl: 4     propranolol (INDERAL) 20 MG tablet, Take 1 tablet (20 mg) by mouth once as needed (anxiety) 30-60 minutes prior to event, Disp: 30 tablet, Rfl: 1     sertraline (ZOLOFT) 50 MG tablet, Take 1 tablet (50 mg) by mouth daily, Disp: 90 tablet, Rfl: 4  No Known Allergies    EXAM:   /70 (BP Location: Right arm, Patient Position: Sitting, Cuff Size: Adult Regular)   Pulse 65   Wt 77.9 kg (171 lb 12.8 oz)   SpO2 98%   BMI 28.59 kg/m    Physical Exam  Vitals and nursing note reviewed.   Constitutional:       Appearance: Normal appearance.   HENT:      Head: Normocephalic and atraumatic.      Right Ear: External ear normal.      Left Ear: External ear normal.      Nose: No mucosal edema or rhinorrhea.      Right Turbinates: Not enlarged, swollen or pale.      Left Turbinates: Not enlarged, swollen or pale.      Mouth/Throat:      Mouth: Mucous membranes are moist. No oral lesions.      Pharynx: Oropharynx is clear. Uvula midline. No posterior oropharyngeal erythema.   Eyes:      General: Lids are normal.      Extraocular Movements: Extraocular movements intact.      Conjunctiva/sclera: Conjunctivae normal.   Neck:      Comments: No asymmetry, masses, or scars  Cardiovascular:      Rate and Rhythm: Normal rate and regular rhythm.      Heart sounds:  Normal heart sounds, S1 normal and S2 normal.   Pulmonary:      Effort: Pulmonary effort is normal. No respiratory distress.      Breath sounds: Normal breath sounds and air entry.   Musculoskeletal:      Comments: No musculoskeletal defects appreciated   Skin:     Findings: No lesion or rash.   Neurological:      General: No focal deficit present.      Mental Status: She is alert.   Psychiatric:         Mood and Affect: Mood and affect normal.       WORKUP:  None    ASSESSMENT/PLAN:  Idalia Hinojosa is a 42 year old female here for a follow-up visit.    1. Allergic rhinitis due to dust mite - Well controlled with current medications.    - mometasone (NASONEX) 50 MCG/ACT nasal spray; Spray 2 sprays into both nostrils daily  Dispense: 51 g; Refill: 3  - montelukast (SINGULAIR) 10 MG tablet; Take 1 tablet (10 mg) by mouth At Bedtime  Dispense: 90 tablet; Refill: 3    2. Chronic vasomotor rhinitis    - mometasone (NASONEX) 50 MCG/ACT nasal spray; Spray 2 sprays into both nostrils daily  Dispense: 51 g; Refill: 3  - ipratropium (ATROVENT) 0.06 % nasal spray; Spray 2 sprays into both nostrils 4 times daily as needed for rhinitis  Dispense: 15 mL; Refill: 5      Follow-up in 1 year, sooner if needed      Thank you for allowing me to participate in the care of Idalia Hinojosa.      Gerber Lewis MD, FAAAAI  Allergy/Immunology  [facility] - [facility]      Chart documentation done in part with Dragon Voice Recognition Software. Although reviewed after completion, some word and grammatical errors may remain.

## 2022-06-21 NOTE — LETTER
6/21/2022         RE: Idalia Hinojosa  [address]  [place] [facility] [address]        Dear Colleague,    Thank you for referring your patient, Idalia Hinojosa, to the [facility]. Please see a copy of my visit note below.    Idalia Hinojosa was seen in the Allergy Clinic at [facility].      Idalia Hinojosa is a 42 year old Not  or  female who is seen today for a follow-up visit. She reports that she has been doing well. She continues to take montelukast nightly and uses mometasone nasal spray every morning. Additionally she uses the ipratropium nasal spray when needed. She does not use the ipratropium daily as it tends to make her nose dry. She has not had any sinus issues or infections in the past year.      Past Medical History:   Diagnosis Date     ASCUS of cervix with negative high risk HPV 04/07/2017 04/07/17 ASCUS, Neg HPV     Seasonal affective disorder (H)     no meds     Family History   Problem Relation Age of Onset     Thyroid Disease Paternal Grandmother      Other Cancer Paternal Grandmother      Heart Disease Maternal Grandmother      Social History     Tobacco Use     Smoking status: Never Smoker     Smokeless tobacco: Never Used   Substance Use Topics     Alcohol use: Yes     Alcohol/week: 0.0 standard drinks     Comment: 0-2 PER [facility]     Drug use: No     Social History     Social History Narrative               Past medical, family, and social history were reviewed.    Review of Systems   Constitutional: Negative for activity change, chills and fever.   HENT: Negative for congestion, dental problem, ear pain, facial swelling, nosebleeds, postnasal drip, rhinorrhea, sinus pressure and sneezing.    Eyes: Negative for discharge, redness and itching.   Respiratory: Negative for cough, chest tightness, shortness of breath and wheezing.    Cardiovascular: Negative for chest pain.   Gastrointestinal: Negative for diarrhea, nausea and  vomiting.   Musculoskeletal: Negative for arthralgias, joint swelling and myalgias.   Skin: Negative for rash.   Neurological: Negative for headaches.   Hematological: Negative for adenopathy.   Psychiatric/Behavioral: Negative for behavioral problems and self-injury.         Current Outpatient Medications:      ipratropium (ATROVENT) 0.06 % nasal spray, Spray 2 sprays into both nostrils 4 times daily as needed for rhinitis, Disp: 15 mL, Rfl: 0     mometasone (NASONEX) 50 MCG/ACT nasal spray, Spray 2 sprays into both nostrils daily Needs appointment for additional refills, Disp: 17 g, Rfl: 0     montelukast (SINGULAIR) 10 MG tablet, Take 1 tablet (10 mg) by mouth At Bedtime Please schedule an appointment with Dr. Lewis., Disp: 30 tablet, Rfl: 0     norethindrone-ethinyl estradiol (MICROGESTIN 1/20) 1-20 MG-MCG tablet, Take 1 tablet by mouth daily Active tablets only for prevention of menses, Disp: 112 tablet, Rfl: 4     propranolol (INDERAL) 20 MG tablet, Take 1 tablet (20 mg) by mouth once as needed (anxiety) 30-60 minutes prior to event, Disp: 30 tablet, Rfl: 1     sertraline (ZOLOFT) 50 MG tablet, Take 1 tablet (50 mg) by mouth daily, Disp: 90 tablet, Rfl: 4  No Known Allergies    EXAM:   /70 (BP Location: Right arm, Patient Position: Sitting, Cuff Size: Adult Regular)   Pulse 65   Wt 77.9 kg (171 lb 12.8 oz)   SpO2 98%   BMI 28.59 kg/m    Physical Exam  Vitals and nursing note reviewed.   Constitutional:       Appearance: Normal appearance.   HENT:      Head: Normocephalic and atraumatic.      Right Ear: External ear normal.      Left Ear: External ear normal.      Nose: No mucosal edema or rhinorrhea.      Right Turbinates: Not enlarged, swollen or pale.      Left Turbinates: Not enlarged, swollen or pale.      Mouth/Throat:      Mouth: Mucous membranes are moist. No oral lesions.      Pharynx: Oropharynx is clear. Uvula midline. No posterior oropharyngeal erythema.   Eyes:      General: Lids are  normal.      Extraocular Movements: Extraocular movements intact.      Conjunctiva/sclera: Conjunctivae normal.   Neck:      Comments: No asymmetry, masses, or scars  Cardiovascular:      Rate and Rhythm: Normal rate and regular rhythm.      Heart sounds: Normal heart sounds, S1 normal and S2 normal.   Pulmonary:      Effort: Pulmonary effort is normal. No respiratory distress.      Breath sounds: Normal breath sounds and air entry.   Musculoskeletal:      Comments: No musculoskeletal defects appreciated   Skin:     Findings: No lesion or rash.   Neurological:      General: No focal deficit present.      Mental Status: She is alert.   Psychiatric:         Mood and Affect: Mood and affect normal.       WORKUP:  None    ASSESSMENT/PLAN:  Idalia Hinojosa is a 42 year old female here for a follow-up visit.    1. Allergic rhinitis due to dust mite - Well controlled with current medications.    - mometasone (NASONEX) 50 MCG/ACT nasal spray; Spray 2 sprays into both nostrils daily  Dispense: 51 g; Refill: 3  - montelukast (SINGULAIR) 10 MG tablet; Take 1 tablet (10 mg) by mouth At Bedtime  Dispense: 90 tablet; Refill: 3    2. Chronic vasomotor rhinitis    - mometasone (NASONEX) 50 MCG/ACT nasal spray; Spray 2 sprays into both nostrils daily  Dispense: 51 g; Refill: 3  - ipratropium (ATROVENT) 0.06 % nasal spray; Spray 2 sprays into both nostrils 4 times daily as needed for rhinitis  Dispense: 15 mL; Refill: 5      Follow-up in 1 year, sooner if needed      Thank you for allowing me to participate in the care of Idalia Hinojosa.      Gerber Lewis MD, FAAAAI  Allergy/Immunology  [facility]      Chart documentation done in part with Dragon Voice Recognition Software. Although reviewed after completion, some word and grammatical errors may remain.      Again, thank you for allowing me to participate in the care of your patient.         Sincerely,        Gerber Lewis MD

## 2022-06-30 ENCOUNTER — VIRTUAL VISIT (OUTPATIENT)
Dept: PSYCHOLOGY | Facility: CLINIC | Age: 42
End: 2022-06-30
Payer: COMMERCIAL

## 2022-06-30 DIAGNOSIS — F32.A DEPRESSION, UNSPECIFIED DEPRESSION TYPE: ICD-10-CM

## 2022-06-30 DIAGNOSIS — F41.9 ANXIETY DISORDER, UNSPECIFIED TYPE: Primary | ICD-10-CM

## 2022-06-30 PROCEDURE — 90832 PSYTX W PT 30 MINUTES: CPT | Mod: GT | Performed by: PSYCHOLOGIST

## 2022-06-30 ASSESSMENT — PATIENT HEALTH QUESTIONNAIRE - PHQ9
SUM OF ALL RESPONSES TO PHQ QUESTIONS 1-9: 2
SUM OF ALL RESPONSES TO PHQ QUESTIONS 1-9: 2
10. IF YOU CHECKED OFF ANY PROBLEMS, HOW DIFFICULT HAVE THESE PROBLEMS MADE IT FOR YOU TO DO YOUR WORK, TAKE CARE OF THINGS AT HOME, OR GET ALONG WITH OTHER PEOPLE: SOMEWHAT DIFFICULT

## 2022-06-30 NOTE — PROGRESS NOTES
"     [facility]                                            Progress Note    Patient Name: Idalia Hinojosa  Date: 6/30/2022         Service Type: Individual      Session Start Time: 12:07 pm  Session End Time: 12:43 pm   Session Length:  36 minutes    Session #: 59    Attendees: Client     Service Modality: Video visit: - I.        Provider verified identity through the following two step process.  Patient provided:  Patient is known previously to provider    Telemedicine Visit: The patient's condition can be safely assessed and treated via synchronous audio and visual telemedicine encounter.      Reason for Telemedicine Visit: Services only offered telehealth    Originating Site (Patient Location): Patient's home    Distant Site (Provider Location): Provider Remote Setting- Home Office    Consent:  The patient/guardian has verbally consented to: the potential risks and benefits of telemedicine (telephone visit) versus in person care; bill my insurance or make self-payment for services provided; and responsibility for payment of non-covered services.     Patient would like the video invitation sent by:  My Chart    Mode of Communication:  Video Conference via [employer],     As the provider I attest to compliance with applicable laws and regulations related to telemedicine.         Treatment Plan Last Reviewed: 5/3/2022  PHQ-9/DAVID-7: 6/30/2022      DATA  Interactive Complexity: No  Crisis: No        Progress Since Last Session (Related to Symptoms / Goals / Homework):   Symptoms: reports her mood has been \"pretty good\" - had a few days where she felt more irritable and tired, emotionally exhausted     Homework: Achieved / completed to satisfaction. Successful- has been more aware of how she's feeling emotionally and physically and using this to help guide her with what she needs        Episode of Care Goals: Satisfactory progress - ACTION (Actively working towards change); Intervened by " reinforcing change plan / affirming steps taken.  Diagnostic assessment has been completed, treatment plan has been developed and patient has been making good progress on treatment goals.  The patient stated that her main goals for therapy would be to learn emotion regulation strategies (in particular, to help with when she is feeling upset/angry/frustrated/distressed).  The changes in her daily life due to COVID restrictions were a focus of our work together, and working through changes to routine as her daughter started  and she returned to work.  As she has been making progress, focus of treatment will shift to wellness planning (monitoring mood and symptoms, incorporating into routine what keeps her feeling well, review of triggers, how to manage set backs, etc.).  As her  has been diagnosed with cancer, treatment will also focus on providing her with support in managing the emotional distress and challenges related to this.       Current / Ongoing Stressors and Concerns:   Current:  Daughter completed her evaluation and they've received the results, along with treatment recommendations and ideas of next steps to take.  This has been a relief for her and helpful.  She had an episode of experiencing a very strong emotion and was able to skillfully manage it by finding the facts, taking a break, getting a snack, listening to music she enjoyed.  Overall she's been doing well with paying attention to her feelings and physical sensations, identifying what she needs, and using constructive coping.  They are planing a vacation to celebrate her  finishing chemo - they'll be going to [facility] and [place] the end of the summer.  She is looking forward to this!       Ongoing:Managing daily routine which has contributed to some adjustment and parenting challenges, lack of effective emotion regulation strategies for managing daily frustrations and upsets and marital discord due to differences in  "parenting styles.       Treatment Objective(s) Addressed in This Session:     Continue to practice awareness of your emotions and physical sensations, to help tune in to what you need  Identify opportunities to access support and help from others  Mindful awareness of thoughts, impact on your emotions, and recognize opportunities to redirect thoughts.  Practice re-directing thoughts to a more helpful perspective.    Practice balanced thinking to counteract polarized/all or nothing thinking.          Continue to review/practice/reinforce:  Identify obstacles and barriers to engaging in self-care and problem-solve ways to address obstacles and barriers  Learn and practice mindfulness strategies - \"observe, just notice\" and invite curiosity around what may be contributing and what may help  Practice calming strategies to help release tension in your body   Practice new script in the morning when feeling urge to not get out of bed.  What do you need to hear that helps you keep moving?   (\"Today is a new day\", focus on what you're grateful for, \"I will feel better\")  Identify ways to practice and incorporate self-care into routine.  Ask myself what I need.    Identify how others can help support you and practice asking for help/what you need  Identify and practice at least 2-3 strategies to help with regulating emotion   Learn and practice behavioral activation strategies (how to move forward with doing what will help you when you don't feel like doing it)  Be curious about your emotions - tune in to - what am I thinking?  What is going on around me?  What do I need?    Identify and practice at least two strategies that will help support your self-care during this challenging time  Learn and practice at least two new strategies for tolerating uncertainty  Practice mindfulness to the moment when your mind wanders to worrying about the unknown       Intervention:   CBT:  Reviewed successes from these past two weeks, and " "how she managed challenging and difficult situations skillfully, to reinforce continued skill development and strengthening of coping strategies.        ASSESSMENT: Current Emotional / Mental Status (status of significant symptoms):   Risk status (Self / Other harm or suicidal ideation)   Patient denies current fears or concerns for personal safety.   Patient denies current or recent suicidal ideation or behaviors.    She continues to agree to use a safety plan should any safety concerns arise.  She also agrees to reach our to her spouse or a family member for help if needed, and/or access crisis/emergency resources.        Patientdenies current or recent homicidal ideation or behaviors.   Patient denies current or recent self injurious behavior or ideation.   Patient denies other safety concerns.   Patient Patient reports there has been no change in risk factors since their last session.     PatientPatient reports there has been no change in protective factors since their last session.     Recommended that patient call 911 or go to the local ED should there be a change in any of these risk factors.  She has previously been informed on how to contact [facility] crisis/COPE for urgent/crisis concerns 24/7.  Provided patient with crisis resources and she agrees to use safety plan should any safety concerns arise.      Professionals or agencies I can contact during a crisis:    Suicide Prevention Lifeline: 1-666-986-TALK (7163)    Crisis Text Line Service (available 24 hours a day, 7 days a week): Text [place] to 557120    Local Crisis Services: [facility]: [phone]    Call 911 or go to my nearest emergency department.        Appearance:   Appropriate    Eye Contact:   Good    Psychomotor Behavior: Normal    Attitude:   Cooperative    Orientation:   All   Speech    Rate / Production: Normal     Volume:  Normal    Mood:    Reports mood is \"pretty good\" - had a few days of feeling more irritable " "and tired, but overall reports mood has been \"pretty good\"   Affect:    Congruent with mood    Thought Content:  Clear    Thought Form:  Coherent  Logical    Insight:    Good      Medication Review:   No changes to current psychiatric medication(s).        Medication Compliance:   Yes     Changes in Health Issues:  None reported         Chemical Use Review:   Substance Use: Chemical use reviewed, no active concerns identified.  She continues with mostly abstaining from alcohol, notes that on occasion she enjoys a drink and it doesn't negatively affect her mood like it has in the past.       Tobacco Use: No current tobacco use.      Diagnosis:  Anxiety disorder unspecified  Depression, unspecified     Collateral Reports Completed:Answers for HPI/ROS submitted by the patient on 6/30/2022  If you checked off any problems, how difficult have these problems made it for you to do your work, take care of things at home, or get along with other people?: Somewhat difficult  PHQ9 TOTAL SCORE: 2       Not Applicable,      PLAN: (Patient Tasks / Therapist Tasks / Other)    1) Idalia identified the following goals:  ASk for help from family this weekend to help out with Kaylee when Donald is not feeling well.      Keep noticing if I'm feeling jealous of others, be aware of my thoughts, practice redirecting to what I'm grateful for, what I can control, what I can do.  Keep practicing skills to stay calm and separate my feelings from others when others around me are escalating (you can choose to stay at the train station while they get on the train)      Continue with: Take time daily to re-charge - work-out and downtime to read.  Get outside as much as possible - practice patience with Kaylee, (slow down, give myself time so I don't have to rush, be mindful of thoughts, focus on present, it is okay if it takes a little while).        2) If there is a crisis situation, remember you are not alone and that there are people who can help you " "twenty-four hours a day, seven days a week.  Call [facility] ([facility]): [phone].      3) Follow-up visit is scheduled forJuly 7th at 12:00 pm.  If urgent or crisis concerns arise prior to next scheduled appointment, please reach out to crisis resources, call 911, or go to the emergency department.      Cristy Wilkinson PsyD,   Licensed Psychologist  6/30/2022      Previous skills and strategies to remind self of and to do as needed:    Practice \"STOP\" technique when you recognize yourself feeling angry   Be a  - what do you notice is happening when Kaylee feels upset?     Practice recognizing when feeling angry, and giving self permission to use calming and self-soothing strategies and take a break.   Look for the gray with thinking  slow down  take deep breaths  manage expectations of self and others.    Journal   Practice flexing \"flexiblity and creativity\" -   Continue the great work you are doing with your daily schedule.    Get outside every day.    Play your instrument.    Sing!    Continue with: \"It's just a thought\" - non-judgmental, observe, float down the river on a leaf, clouds in the gege, reframing unhelpful thoughts -   Keep working on being patient when things are tense around me.    Continue with observing/paying attention to warning signs and signals and give self persmission to slow down, especially during the morning routine with Kaylee.    Practice offerring Kaylee choices when appropriate.      Use \"my plan for success\" to help remind you of calming strategies to practice when feeling upset.    Practice decreasing overall vulnerability to emotion mind through getting adequate rest, nutrition, time for yourself, and physical activity.         Consider reading \"Fighting for your marriage\".      Technical Assistance for video visits:    Offer patient the website ([email or web address]) and/or phone number ([phone]) for video visit instructions/assistance if " "needed.                                             ____________________________________    Treatment Plan    Patient's Name: Idalia Hinojosa  YOB: 1980    Date of Creation: 1/6/2020  Date Treatment Plan Last Reviewed/Revised: 5/3/22    DSM5 Diagnoses: 300.00 (F41.9) Unspecified Anxiety Disorder, depressive disorder, unspecified  Psychosocial / Contextual Factors: strain in marital relationship, parenting challenges, adjustment challenges, 's cancer diagnosis  PROMIS (reviewed every 90 days):     Anticipated number of session for this episode of care: additional 15-20  Anticipation frequency of session: Weekly until symptoms stabilize, then can decrease the frequency of sessions to likely bi-weekly or once every three weeks  Anticipated Duration of each session: 38-52 minutes  Treatment plan will be reviewed in 90 days or when goals have been changed.       Referral / Collaboration:  We discussed a referral to a couples/marital therapist.  The patient is thinking about this and has talked with her  about the referral, but they are unsure if they would like to follow through at this time.      MeasurableTreatment Goal(s) related to diagnosis / functional impairment(s)  Goal 1: Patient will learn emotion regulation strategies to help when she is feeling upset/angry/frustrated/distressed    I will know I've met my goal when I would yell less, I would feel calmer, and I would not push others.  I would be less physical when I'm angry (e.g. wouldn't slam doors or hit things)       Objective #A (Patient Action)    Patient will learn and practice at least 2 new emotion regulation strategies.  Status: Continued - Date(s):4/23/2020 (has begun to learn new emotion regulation strategies and is making good progress)   Update: 7/8/2020: \"I've learned new strategies but I'm bad at practicing them\" - will help patient incorporate regular practice and identify and problem-solve barriers.    Reviewed: " "10/8/2020: Patient has been successfully practicing emotion regulation strategies, is working on identifying earlier on when to intervene and engage strategies, as well as practice strategies on a regular basis to help strength use of strategies.    Reviewed and in progress: 1/27/2021: \"Overall things are going a lot better.  Sometimes I fall into old habits\", but recognizing when she's feeling more vulnerable to her emotions and reminds herself to use different skills/intervene in a different way.     4/27/2021-\"Things have definitely been better\".  Recognizing when feeling vulnerable (e.g. hungry), recognizing when emotions are escalating and need to engage calming strategies\".  Ask for help from Colia when feeling upset.  Can be challenging when Kaylee escalates when I'm feeling angry.    7/29/21 - She felt like she has forgotten what helps her when she's feeling upset, we have spent time reviewing and refreshing these skills.    11/10/2021 - She feels like for the most part things have been going well.    2/9/2022 - Helping her to apply emotion regulation strategies in the context of a new and unexpected stressor -   5/3/2022 - Update - Things have been better, I can tell when I feel overwhelmed or don't get breaks - that is when I start yelling or snapping, and overall things have been better.  I'm exercising more and that really helps with the stress.  I've been feeling more confident about myself because I've been exercising more.        Intervention(s)  Therapist will provide psychoeducation on emotion regulation module from DBT and will assign patient homework to help reinforce skill development.    Objective #B  Patient will identify factors that increase vulnerability to emotion mind and identify ways to decrease vulnerability to emotion mind.  Status: Continued - Date(s):4/23/2020 (has learned factors that make her more vulnerable to emotion mind, and has been working on addressing vulnerability related to " "feeling hungry, tired and rushed)   Update: 7/8/20: \"This one is harder for me.  I know I get angry when I'm hungry, and when I slow down I'm less inclined to get angry\".  Challenges - not always having food ready, not always planning in advance, (planing breakfast the night before)  Reviewed: 10/8/2020: Patient's awareness has increased of factors that increase her vulnerability to emotion mind, and she has been using this as a signal to tell her what she needs.    Reviewed and resolved/complete: 1/27/2021: \"I feel like we've done really good work with this and I've gotten what I need from this\".  4/27/2021  - I am doing better with this - I know I get more irritable when I'm hungry and I'm trying to do better with this.  Drinking more water.      7/29/21 - Overall she had been feeling like things were going better with this, though over the past several weeks has felt more challenged due to increase in stressors.    11/10/2021 - Recognizing when feeling tired and how this can contribute to increase in vulnerability and ways to intervene.  Recognizing when feeling irritabiel - ask self if feeling hungry and eat if needed.    2/9/2022 - Self-care is an important focus right now - to ensure she is attending to her basic self-care needs while helping to take care of her  and his health -   5/3/2022 - I feel like I've mostly been doing better with eating and making sure not hungry.  Try to plan meals and snacks - sometimes this can be hard.          Intervention(s)  Therapist will provide information on factors that increase vulnerabiliyt to emotion mind.    Objective #C  Patient will identify warning signs and signals that emotions are escalating and will learn ways to skillfully intervene early on.  Status: New - Date: 01/06/2020 7/8/2020- in progress  Update: 10/08/2020: Patient has been making good progress on identifying signs and signals that her emotions are escalating.    Reviewed and in progress: " "1/27/2021: \"I feel like we've done good work on this but this is one of the things I need to keep working on\".    7/29.21 - In progress - actively working on this -   11/10/2021 - Feels sometimes she is able to do this, other times it is harder  2/9/2022 - continued goal -   5/3/2022 - I do that sometimes - I do notice when I am getting more frustrated - that's been helping more - if I feel like I'm gettig more furstrated than I can take a deep breath or put my hands on my head -         Intervention(s)  Therapist will help patient identify warning signs and signals, and will guide the patient in identifying skillful ways to intervene when exeriencing warning signs and signals.      Goal 2: Patient will learn new parenting strategies to help with managing parenting challenges.  Parent will work on improving relationship with her daughter and defining what she would like this relationship to look like.      I will know I've met my goal when I'm enjoying time with my daughter, teaching her new things, and not waiting for things to change      Objective #A (Patient Action)    Status: Continued - Date(s):4/23/2020     Patient will increase understanding of developmental norms for her daughter and ways to manage challenging behaviors.    Intervention(s)  Therapist will provide psychoeducation on developmental norms and parenting strategies.    Objective #B  Patient will spend time reflecting on her relationship with her daughter and defining what she would like this relaitonship to look like.    Status: Continued - Date(s):4/23/2020   Update: 7/8/2020 - \"I think some things are going better, she's more helpful with things.  Some things are more challenging with the pandemic and thinking of things to do with her\".    Update: 10/08/2020: Patient is making progress on learning and practicing strategies to manage parenting challenges.    Reviewed: 1/27/2021: \"I feel like things have gotten better with this, we've figured out " "a good routine, I'm doing meditation, I'm enjoying my time with my daughter more than I used to\".   4/27/2021 - Challenges of parenting during a pandemic, working with occupational therapist on strategies to help daughter, 7/29/21 - She's been working on incorporating more structure into the daily routine, as well as managing her emotional reaction when feeling frustrated.  She's also worked on managing her mindset and expectations.  She's been experiencing some frustrations due to two of the OT's she's worked with has recently resigned, resulting in transitioning to several new providers in a short period of time.  I've encouraged she continue to work closely with her daughter's pediatrician to get connected to resources and supports to help with managing her daughter's difficulties.    11/10/2021 - Feels like they've had some really good interactions, though things have been more challenging at school and home (waiting for evaluations).  Daughter enjoys when she reads to her, they've had some really good interactions recently.    2/9/2022 - unable to fully assess progress on this goal today, will further assess progress on this goal and if any treatment needs remain -   5/3/2022- this is going well - sometimes it is hard when she wants me to play the same thing over and over with her, but overall it is better - having more fun as it gets older -         Intervention(s)  Therapist will guide the patient in reflecting upon her relationship with her daughter and help her define ways to move towards what she vaues in her relationship with her daughter.    Objective #C  Patient will increase time spent on fun activity and play with her daughter.  Status: Continued - Date(s):4/23/2020 7/8/2020 -   10/08/2020 - will further assess this goal at next session   Reviewed: 1/27/2021 - also going really well, we play together all of the time now\", she finds herself enjoying their time together.   4/27/2021- she will spend " "some time reflecting upon this and we will discuss further next time.  They have several fun things planned for the future, as well as she has worked hard during the pandemic to plan structure and time together within COVID restrictions.  7/29/21 - She's seen some positive changes with being able to spend time with grandparents and at the cabin since being vaccinated, but also has experienced some challenges with planning fun activities out of worries things won't go well and feeling low energy/motivation to do these things - working to address both of these obstacles.    11/10/2021 - They've been able to do more fun activities together - coloring, Lego sets,    2/9/2022 - unable to fully assess progress on this goal today, will further assess progress on this goal and if any  treatment needs remain -    5/3/2022 - really fun time doing legos, building, reading, drawing together        Intervention(s)  Therapist will guide the patient in identifying ways she can increase positive time with her daughter.  Therapist will also teach mindfulness strategies to help patient with being in the present moment when appropriate - .      Goal 3: Patiient will improve communication with her .      I will know I've met my goal when I feel less irritated with my  and communicate more openly with my .  I would like to be more assertive and less aggressive.   I would like to not avoid telling him things because I'm worried about his reaction or don't think he will react well.  I would like to be more present to the moment during times when this would be helpful.\"      Objective #A (Patient Action)    Status: Continued - Date(s):4/23/2020 7/8/2020 - suggested couples therapy to help with this.    Individual therapy focus - identify what makes it hard to be more honest and open   10/08/2020 - will further assess this goal at next session   Reviewed: 1/27/2021 - \"Things are better, I feel like we've worked really " "hard on how to respond when he's having a hard time and how to talk to him.  I'm doing better listening, being more empathetic, and not \".  4/27/2021 - She has been making progress on examining and updating unhelpful beliefs (e.g. feeling responsible for other's emotions, feeling like she needs to fix or change other's emotions) and has made good progress on identifying and asserting healthy boundaries, in addition to  her emotional response from her spouse's emotional response.  7/29/21 - will review at next session   11/10/2021 - Feeling more irritability recently.  Believes this area could use more attention.    2/9/2022 - continued goal -   5/3/2022- this one is hard to say because things are so different right now - he's not his normal self (due to cancer treatment) - I think I'm communicating things in a nice way        Patient will learn and practice at least two new interpersonal effectiveness strategies.    Intervention(s)  Therapist will teach strategies from DBT module on interpersonal effectiveness, and will assign homework to help reinforce skill development.      Patient has reviewed and agreed to the above plan.      Cristy Wilkinson PsyD, LP  Licensed Psychologist  January 6, 2020, reviewed on 4/23/2020, reviewed 7/8/2020, reviewed 10/08/2020, reviewed 1/27/2021, reviewed 4/27/2021, 7/29/2021, 11/10/2021, 2/9/2022, 5/3/2022                                                                                                                                  "

## 2022-07-07 ENCOUNTER — VIRTUAL VISIT (OUTPATIENT)
Dept: PSYCHOLOGY | Facility: CLINIC | Age: 42
End: 2022-07-07
Payer: COMMERCIAL

## 2022-07-07 DIAGNOSIS — F41.9 ANXIETY DISORDER, UNSPECIFIED TYPE: ICD-10-CM

## 2022-07-07 DIAGNOSIS — F32.A DEPRESSION, UNSPECIFIED DEPRESSION TYPE: Primary | ICD-10-CM

## 2022-07-07 PROCEDURE — 90832 PSYTX W PT 30 MINUTES: CPT | Mod: GT | Performed by: PSYCHOLOGIST

## 2022-07-07 NOTE — PROGRESS NOTES
"     [facility]                                            Progress Note    Patient Name: Idalia Hinojosa  Date: 6/30/2022         Service Type: Individual      Session Start Time: 12:05 pm  Session End Time: 12:35 pm   Session Length:  30 minutes    Session #: 60    Attendees: Client     Service Modality: Video visit: - I.        Provider verified identity through the following two step process.  Patient provided:  Patient is known previously to provider    Telemedicine Visit: The patient's condition can be safely assessed and treated via synchronous audio and visual telemedicine encounter.      Reason for Telemedicine Visit: Services only offered telehealth    Originating Site (Patient Location): Patient's home    Distant Site (Provider Location): Provider Remote Setting- Home Office    Consent:  The patient/guardian has verbally consented to: the potential risks and benefits of telemedicine (telephone visit) versus in person care; bill my insurance or make self-payment for services provided; and responsibility for payment of non-covered services.     Patient would like the video invitation sent by:  My Chart    Mode of Communication:  Video Conference via [employer],     As the provider I attest to compliance with applicable laws and regulations related to telemedicine.         Treatment Plan Last Reviewed: 5/3/2022  PHQ-9/DAVID-7: 6/30/2022      DATA  Interactive Complexity: No  Crisis: No        Progress Since Last Session (Related to Symptoms / Goals / Homework):   Symptoms: reports this week has been feeling more stressful due to changes in the routine with her daughter starting a new summer school program, she's had to get up earlier, has felt more tired and stressed due to this.  However, reports she has been coping with this as best as she can, and outside of this mood has felt \"pretty good\".      Homework: Achieved / completed to satisfaction. Successful- was able to get help with " Kaylee and take time to re-charge.  She also noticed several times feelings of jealousy, was mindful of what doesn't help when she's feeling this way and was able to re-direct thoughts and energy to what she's grateful for, what she has, and found this to be very helpful and didn't linger in feelings and thoughts of jealousy.          Episode of Care Goals: Satisfactory progress - ACTION (Actively working towards change); Intervened by reinforcing change plan / affirming steps taken.  Diagnostic assessment has been completed, treatment plan has been developed and patient has been making good progress on treatment goals.  The patient stated that her main goals for therapy would be to learn emotion regulation strategies (in particular, to help with when she is feeling upset/angry/frustrated/distressed).  The changes in her daily life due to COVID restrictions were a focus of our work together, and working through changes to routine as her daughter started  and she returned to work.  As she has been making progress, focus of treatment will shift to wellness planning (monitoring mood and symptoms, incorporating into routine what keeps her feeling well, review of triggers, how to manage set backs, etc.).  As her  has been diagnosed with cancer, treatment will also focus on providing her with support in managing the emotional distress and challenges related to this.       Current / Ongoing Stressors and Concerns:   Current:  Her daughter started a new summer school program this week which has introduced changes to the routines and  adjustment for her daughter and she to the changes.  She has already noticed her daughter started to settle into these new changes and routines.       Ongoing:Managing daily routine which has contributed to some adjustment and parenting challenges, lack of effective emotion regulation strategies for managing daily frustrations and upsets and marital discord due to differences in  "parenting styles.       Treatment Objective(s) Addressed in This Session:     Identify ways to practice and incorporate self-care into routine.      Continue to practice awareness of your emotions and physical sensations, to help tune in to what you need  Identify opportunities to access support and help from others  Mindful awareness of thoughts, impact on your emotions, and recognize opportunities to redirect thoughts.  Practice re-directing thoughts to a more helpful perspective.    Practice balanced thinking to counteract polarized/all or nothing thinking.          Continue to review/practice/reinforce:  Identify obstacles and barriers to engaging in self-care and problem-solve ways to address obstacles and barriers  Learn and practice mindfulness strategies - \"observe, just notice\" and invite curiosity around what may be contributing and what may help  Practice calming strategies to help release tension in your body   Practice new script in the morning when feeling urge to not get out of bed.  What do you need to hear that helps you keep moving?   (\"Today is a new day\", focus on what you're grateful for, \"I will feel better\")  Identify ways to practice and incorporate self-care into routine.  Ask myself what I need.    Identify how others can help support you and practice asking for help/what you need  Identify and practice at least 2-3 strategies to help with regulating emotion   Learn and practice behavioral activation strategies (how to move forward with doing what will help you when you don't feel like doing it)  Be curious about your emotions - tune in to - what am I thinking?  What is going on around me?  What do I need?    Identify and practice at least two strategies that will help support your self-care during this challenging time  Learn and practice at least two new strategies for tolerating uncertainty  Practice mindfulness to the moment when your mind wanders to worrying about the unknown  "      Intervention:   CBT:  She recognized the impact that the change in routines and increase in stress has had on her this week, and identified that it would be beneficial for her to ensure she is taking time this weekend to re-charge.  They will be going to her parents cabin.  Guided her in identifying what she believes would be helpful to incorporate into her self-care this weekend, barriers or obstacles that may interfere, and a plan to help her be successful with this.  She identified several positive activities that she enjoys and help her to re-charge, including swimming, puzzles, legos and reading.        ASSESSMENT: Current Emotional / Mental Status (status of significant symptoms):   Risk status (Self / Other harm or suicidal ideation)   Patient denies current fears or concerns for personal safety.   Patient denies current or recent suicidal ideation or behaviors.    She continues to agree to use a safety plan should any safety concerns arise.  She also agrees to reach our to her spouse or a family member for help if needed, and/or access crisis/emergency resources.        Patientdenies current or recent homicidal ideation or behaviors.   Patient denies current or recent self injurious behavior or ideation.   Patient denies other safety concerns.   Patient Patient reports there has been no change in risk factors since their last session.     PatientPatient reports there has been no change in protective factors since their last session.     Recommended that patient call 911 or go to the local ED should there be a change in any of these risk factors.  She has previously been informed on how to contact [facility] crisis/COPE for urgent/crisis concerns 24/7.  Provided patient with crisis resources and she agrees to use safety plan should any safety concerns arise.      Professionals or agencies I can contact during a crisis:    Suicide Prevention Lifeline: 1-049-817-LOXR (5412)    Crisis Text Line Service  (available 24 hours a day, 7 days a week): Text [place] to 409181    Local Crisis Services: [facility]: [phone]    Call 911 or go to my nearest emergency department.        Appearance:   Appropriate    Eye Contact:   Good    Psychomotor Behavior: Normal    Attitude:   Cooperative    Orientation:   All   Speech    Rate / Production: Normal     Volume:  Normal    Mood:    Reports this past week she has felt more stressed and tired due to changes in routine    Affect:    Congruent with mood    Thought Content:  Clear    Thought Form:  Coherent  Logical    Insight:    Good      Medication Review:   No changes to current psychiatric medication(s).        Medication Compliance:   Yes     Changes in Health Issues:  None reported         Chemical Use Review:   Substance Use: Chemical use reviewed, no active concerns identified.  She continues with mostly abstaining from alcohol with an occasional drink that she reports does not have a negative impact on her mood.       Tobacco Use: No current tobacco use.      Diagnosis:  Anxiety disorder unspecified  Depression, unspecified     Collateral Reports Completed:Answers for HPI/ROS submitted by the patient on 6/30/2022  If you checked off any problems, how difficult have these problems made it for you to do your work, take care of things at home, or get along with other people?: Somewhat difficult  PHQ9 TOTAL SCORE: 2       Not Applicable,      PLAN: (Patient Tasks / Therapist Tasks / Other)    1) Idalia identified the following goals: Make sure to take time at the cabin this weekend to re-charge - do the things that help you re-charge - reading, swimming, legos and puzzles.  Give self permission to make time for the things you need!      Keep noticing if I'm feeling jealous of others, be aware of my thoughts, practice redirecting to what I'm grateful for, what I can control, what I can do.  Keep practicing skills to stay calm and separate my feelings from  "others when others around me are escalating (you can choose to stay at the train station while they get on the train)      Continue with: Take time daily to re-charge - Get outside as much as possible - practice patience with Kaylee, (slow down, give myself time so I don't have to rush, be mindful of thoughts, focus on present, it is okay if it takes a little while).        2) If there is a crisis situation, remember you are not alone and that there are people who can help you twenty-four hours a day, seven days a week.  Call [facility] ([facility]): [phone].      3) Follow-up visit is scheduled forJuly 13th at 11:00pm.  If urgent or crisis concerns arise prior to next scheduled appointment, please reach out to crisis resources, call 911, or go to the emergency department.      Cristy Wilkinson PsyD, LP  Licensed Psychologist  7/7/2022        Previous skills and strategies to remind self of and to do as needed:    Practice \"STOP\" technique when you recognize yourself feeling angry   Be a  - what do you notice is happening when Kaylee feels upset?     Practice recognizing when feeling angry, and giving self permission to use calming and self-soothing strategies and take a break.   Look for the gray with thinking  slow down  take deep breaths  manage expectations of self and others.    Journal   Practice flexing \"flexiblity and creativity\" -   Continue the great work you are doing with your daily schedule.    Get outside every day.    Play your instrument.    Sing!    Continue with: \"It's just a thought\" - non-judgmental, observe, float down the river on a leaf, clouds in the gege, reframing unhelpful thoughts -   Keep working on being patient when things are tense around me.    Continue with observing/paying attention to warning signs and signals and give self persmission to slow down, especially during the morning routine with Kaylee.    Practice offerring Kaylee choices when appropriate.      Use \"my " "plan for success\" to help remind you of calming strategies to practice when feeling upset.    Practice decreasing overall vulnerability to emotion mind through getting adequate rest, nutrition, time for yourself, and physical activity.         Consider reading \"Fighting for your marriage\".      Technical Assistance for video visits:    Offer patient the website ([email or web address]) and/or phone number ([phone]) for video visit instructions/assistance if needed.                                             ____________________________________    Treatment Plan    Patient's Name: Idalia Hinojosa  YOB: 1980    Date of Creation: 1/6/2020  Date Treatment Plan Last Reviewed/Revised: 5/3/22    DSM5 Diagnoses: 300.00 (F41.9) Unspecified Anxiety Disorder, depressive disorder, unspecified  Psychosocial / Contextual Factors: strain in marital relationship, parenting challenges, adjustment challenges, 's cancer diagnosis  PROMIS (reviewed every 90 days):     Anticipated number of session for this episode of care: additional 15-20  Anticipation frequency of session: Weekly until symptoms stabilize, then can decrease the frequency of sessions to likely bi-weekly or once every three weeks  Anticipated Duration of each session: 38-52 minutes  Treatment plan will be reviewed in 90 days or when goals have been changed.       Referral / Collaboration:  We discussed a referral to a couples/marital therapist.  The patient is thinking about this and has talked with her  about the referral, but they are unsure if they would like to follow through at this time.      MeasurableTreatment Goal(s) related to diagnosis / functional impairment(s)  Goal 1: Patient will learn emotion regulation strategies to help when she is feeling upset/angry/frustrated/distressed    I will know I've met my goal when I would yell less, I would feel calmer, and I would not push others.  I would be less physical when I'm " "angry (e.g. wouldn't slam doors or hit things)       Objective #A (Patient Action)    Patient will learn and practice at least 2 new emotion regulation strategies.  Status: Continued - Date(s):4/23/2020 (has begun to learn new emotion regulation strategies and is making good progress)   Update: 7/8/2020: \"I've learned new strategies but I'm bad at practicing them\" - will help patient incorporate regular practice and identify and problem-solve barriers.    Reviewed: 10/8/2020: Patient has been successfully practicing emotion regulation strategies, is working on identifying earlier on when to intervene and engage strategies, as well as practice strategies on a regular basis to help strength use of strategies.    Reviewed and in progress: 1/27/2021: \"Overall things are going a lot better.  Sometimes I fall into old habits\", but recognizing when she's feeling more vulnerable to her emotions and reminds herself to use different skills/intervene in a different way.     4/27/2021-\"Things have definitely been better\".  Recognizing when feeling vulnerable (e.g. hungry), recognizing when emotions are escalating and need to engage calming strategies\".  Ask for help from Colia when feeling upset.  Can be challenging when Kaylee escalates when I'm feeling angry.    7/29/21 - She felt like she has forgotten what helps her when she's feeling upset, we have spent time reviewing and refreshing these skills.    11/10/2021 - She feels like for the most part things have been going well.    2/9/2022 - Helping her to apply emotion regulation strategies in the context of a new and unexpected stressor -   5/3/2022 - Update - Things have been better, I can tell when I feel overwhelmed or don't get breaks - that is when I start yelling or snapping, and overall things have been better.  I'm exercising more and that really helps with the stress.  I've been feeling more confident about myself because I've been exercising more.  " "      Intervention(s)  Therapist will provide psychoeducation on emotion regulation module from [employer]BT and will assign patient homework to help reinforce skill development.    Objective #B  Patient will identify factors that increase vulnerability to emotion mind and identify ways to decrease vulnerability to emotion mind.  Status: Continued - Date(s):4/23/2020 (has learned factors that make her more vulnerable to emotion mind, and has been working on addressing vulnerability related to feeling hungry, tired and rushed)   Update: 7/8/20: \"This one is harder for me.  I know I get angry when I'm hungry, and when I slow down I'm less inclined to get angry\".  Challenges - not always having food ready, not always planning in advance, (planing breakfast the night before)  Reviewed: 10/8/2020: Patient's awareness has increased of factors that increase her vulnerability to emotion mind, and she has been using this as a signal to tell her what she needs.    Reviewed and resolved/complete: 1/27/2021: \"I feel like we've done really good work with this and I've gotten what I need from this\".  4/27/2021  - I am doing better with this - I know I get more irritable when I'm hungry and I'm trying to do better with this.  Drinking more water.      7/29/21 - Overall she had been feeling like things were going better with this, though over the past several weeks has felt more challenged due to increase in stressors.    11/10/2021 - Recognizing when feeling tired and how this can contribute to increase in vulnerability and ways to intervene.  Recognizing when feeling irritabiel - ask self if feeling hungry and eat if needed.    2/9/2022 - Self-care is an important focus right now - to ensure she is attending to her basic self-care needs while helping to take care of her  and his health -   5/3/2022 - I feel like I've mostly been doing better with eating and making sure not hungry.  Try to plan meals and snacks - sometimes this can " "be hard.          Intervention(s)  Therapist will provide information on factors that increase vulnerabiliyt to emotion mind.    Objective #C  Patient will identify warning signs and signals that emotions are escalating and will learn ways to skillfully intervene early on.  Status: New - Date: 01/06/2020 7/8/2020- in progress  Update: 10/08/2020: Patient has been making good progress on identifying signs and signals that her emotions are escalating.    Reviewed and in progress: 1/27/2021: \"I feel like we've done good work on this but this is one of the things I need to keep working on\".    7/29.21 - In progress - actively working on this -   11/10/2021 - Feels sometimes she is able to do this, other times it is harder  2/9/2022 - continued goal -   5/3/2022 - I do that sometimes - I do notice when I am getting more frustrated - that's been helping more - if I feel like I'm gettig more furstrated than I can take a deep breath or put my hands on my head -         Intervention(s)  Therapist will help patient identify warning signs and signals, and will guide the patient in identifying skillful ways to intervene when exeriencing warning signs and signals.      Goal 2: Patient will learn new parenting strategies to help with managing parenting challenges.  Parent will work on improving relationship with her daughter and defining what she would like this relationship to look like.      I will know I've met my goal when I'm enjoying time with my daughter, teaching her new things, and not waiting for things to change      Objective #A (Patient Action)    Status: Continued - Date(s):4/23/2020     Patient will increase understanding of developmental norms for her daughter and ways to manage challenging behaviors.    Intervention(s)  Therapist will provide psychoeducation on developmental norms and parenting strategies.    Objective #B  Patient will spend time reflecting on her relationship with her daughter and defining " "what she would like this relaitonship to look like.    Status: Continued - Date(s):4/23/2020   Update: 7/8/2020 - \"I think some things are going better, she's more helpful with things.  Some things are more challenging with the pandemic and thinking of things to do with her\".    Update: 10/08/2020: Patient is making progress on learning and practicing strategies to manage parenting challenges.    Reviewed: 1/27/2021: \"I feel like things have gotten better with this, we've figured out a good routine, I'm doing meditation, I'm enjoying my time with my daughter more than I used to\".   4/27/2021 - Challenges of parenting during a pandemic, working with occupational therapist on strategies to help daughter, 7/29/21 - She's been working on incorporating more structure into the daily routine, as well as managing her emotional reaction when feeling frustrated.  She's also worked on managing her mindset and expectations.  She's been experiencing some frustrations due to two of the OT's she's worked with has recently resigned, resulting in transitioning to several new providers in a short period of time.  I've encouraged she continue to work closely with her daughter's pediatrician to get connected to resources and supports to help with managing her daughter's difficulties.    11/10/2021 - Feels like they've had some really good interactions, though things have been more challenging at school and home (waiting for evaluations).  Daughter enjoys when she reads to her, they've had some really good interactions recently.    2/9/2022 - unable to fully assess progress on this goal today, will further assess progress on this goal and if any treatment needs remain -   5/3/2022- this is going well - sometimes it is hard when she wants me to play the same thing over and over with her, but overall it is better - having more fun as it gets older -         Intervention(s)  Therapist will guide the patient in reflecting upon her " "relationship with her daughter and help her define ways to move towards what she vaues in her relationship with her daughter.    Objective #C  Patient will increase time spent on fun activity and play with her daughter.  Status: Continued - Date(s):4/23/2020 7/8/2020 -   10/08/2020 - will further assess this goal at next session   Reviewed: 1/27/2021 - also going really well, we play together all of the time now\", she finds herself enjoying their time together.   4/27/2021- she will spend some time reflecting upon this and we will discuss further next time.  They have several fun things planned for the future, as well as she has worked hard during the pandemic to plan structure and time together within COVID restrictions.  7/29/21 - She's seen some positive changes with being able to spend time with grandparents and at the cabin since being vaccinated, but also has experienced some challenges with planning fun activities out of worries things won't go well and feeling low energy/motivation to do these things - working to address both of these obstacles.    11/10/2021 - They've been able to do more fun activities together - coloring, Lego sets,    2/9/2022 - unable to fully assess progress on this goal today, will further assess progress on this goal and if any  treatment needs remain -    5/3/2022 - really fun time doing legos, building, reading, drawing together        Intervention(s)  Therapist will guide the patient in identifying ways she can increase positive time with her daughter.  Therapist will also teach mindfulness strategies to help patient with being in the present moment when appropriate - .      Goal 3: Patiient will improve communication with her .      I will know I've met my goal when I feel less irritated with my  and communicate more openly with my .  I would like to be more assertive and less aggressive.   I would like to not avoid telling him things because I'm worried " "about his reaction or don't think he will react well.  I would like to be more present to the moment during times when this would be helpful.\"      Objective #A (Patient Action)    Status: Continued - Date(s):4/23/2020 7/8/2020 - suggested couples therapy to help with this.    Individual therapy focus - identify what makes it hard to be more honest and open   10/08/2020 - will further assess this goal at next session   Reviewed: 1/27/2021 - \"Things are better, I feel like we've worked really hard on how to respond when he's having a hard time and how to talk to him.  I'm doing better listening, being more empathetic, and not \".  4/27/2021 - She has been making progress on examining and updating unhelpful beliefs (e.g. feeling responsible for other's emotions, feeling like she needs to fix or change other's emotions) and has made good progress on identifying and asserting healthy boundaries, in addition to  her emotional response from her spouse's emotional response.  7/29/21 - will review at next session   11/10/2021 - Feeling more irritability recently.  Believes this area could use more attention.    2/9/2022 - continued goal -   5/3/2022- this one is hard to say because things are so different right now - he's not his normal self (due to cancer treatment) - I think I'm communicating things in a nice way        Patient will learn and practice at least two new interpersonal effectiveness strategies.    Intervention(s)  Therapist will teach strategies from DBT module on interpersonal effectiveness, and will assign homework to help reinforce skill development.      Patient has reviewed and agreed to the above plan.      Cristy Wilkinson PsyD, LP  Licensed Psychologist  January 6, 2020, reviewed on 4/23/2020, reviewed 7/8/2020, reviewed 10/08/2020, reviewed 1/27/2021, reviewed 4/27/2021, 7/29/2021, 11/10/2021, 2/9/2022, 5/3/2022                                                                        " "                                                                 [facility]                                            Progress Note    Patient Name: Idalia Hinojosa  Date: 6/15/2022         Service Type: Individual      Session Start Time: 2:06  pm  Session End Time: 2: 48 pm   Session Length:  42 minutes    Session #: 58    Attendees: Client     Service Modality: Video visit: - I.        Provider verified identity through the following two step process.  Patient provided:  Patient is known previously to provider    Telemedicine Visit: The patient's condition can be safely assessed and treated via synchronous audio and visual telemedicine encounter.      Reason for Telemedicine Visit: Services only offered telehealth    Originating Site (Patient Location): Patient's home    Distant Site (Provider Location): Provider Remote Setting- Home Office    Consent:  The patient/guardian has verbally consented to: the potential risks and benefits of telemedicine (telephone visit) versus in person care; bill my insurance or make self-payment for services provided; and responsibility for payment of non-covered services.     Patient would like the video invitation sent by:  My Chart    Mode of Communication:  Video Conference via [facility],     As the provider I attest to compliance with applicable laws and regulations related to telemedicine.         Treatment Plan Last Reviewed: 5/3/2022  PHQ-9/DAVID-7: 5/17/2022    DATA  Interactive Complexity: No  Crisis: No        Progress Since Last Session (Related to Symptoms / Goals / Homework):   Symptoms: reports her mood has been \"mostly pretty good\", feels tired today which she attributes to the rainy weather.      Homework: Achieved / completed to satisfaction. Successful- had several opportunities where she was able to practice staying calm when Kaylee was having a melt down and her  was \"cranky\". She's also been successful with her goals related to " "taking time to re-charge - she's been working out (including one \"bonus day\" where she didn't think she'd have time to work out but was able to), has been enjoying time reading, and played her oboe four times!           Episode of Care Goals: Satisfactory progress - ACTION (Actively working towards change); Intervened by reinforcing change plan / affirming steps taken.  Diagnostic assessment has been completed, treatment plan has been developed and patient has been making good progress on treatment goals.  The patient stated that her main goals for therapy would be to learn emotion regulation strategies (in particular, to help with when she is feeling upset/angry/frustrated/distressed).  The changes in her daily life due to COVID restrictions were a focus of our work together, and working through changes to routine as her daughter started  and she returned to work.  As she has been making progress, focus of treatment will shift to wellness planning (monitoring mood and symptoms, incorporating into routine what keeps her feeling well, review of triggers, how to manage set backs, etc.).  As her  has been diagnosed with cancer, treatment will also focus on providing her with support in managing the emotional distress and challenges related to this.       Current / Ongoing Stressors and Concerns:   Current:  Noticed that she spends a lot of time thinking about what others have and she doesn't, and that this makes her feel bad.  Managing symptoms of depression and anxiety, caregiver stress related to 's cancer diagnosis, and parenting challenges with daughter.  Her daughter has a neuropsych evaluation tomorrow, they have been waiting for this for quite some time and she's hopeful this will provide them with helpful info.       Ongoing:Managing daily routine which has contributed to some adjustment and parenting challenges, lack of effective emotion regulation strategies for managing daily " "frustrations and upsets and marital discord due to differences in parenting styles.       Treatment Objective(s) Addressed in This Session:     Increase mindful awareness of thoughts, impact on your emotions, and recognize opportunities to redirect thoughts.  Practice re-directing thoughts to a more helpful perspective.    Practice balanced thinking to counteract polarized/all or nothing thinking.    Practice miranda and self-compassion       Continue to review/practice/reinforce:  Identify obstacles and barriers to engaging in self-care and problem-solve ways to address obstacles and barriers  Learn and practice mindfulness strategies - \"observe, just notice\" and invite curiosity around what may be contributing and what may help  Practice calming strategies to help release tension in your body   Practice new script in the morning when feeling urge to not get out of bed.  What do you need to hear that helps you keep moving?   (\"Today is a new day\", focus on what you're grateful for, \"I will feel better\")  Identify ways to practice and incorporate self-care into routine.  Ask myself what I need.    Identify how others can help support you and practice asking for help/what you need  Identify and practice at least 2-3 strategies to help with regulating emotion   Learn and practice behavioral activation strategies (how to move forward with doing what will help you when you don't feel like doing it)  Be curious about your emotions - tune in to - what am I thinking?  What is going on around me?  What do I need?    Identify and practice at least two strategies that will help support your self-care during this challenging time  Learn and practice at least two new strategies for tolerating uncertainty  Practice mindfulness to the moment when your mind wanders to worrying about the unknown       Intervention:   CBT:  She noticed that she spends a lot of time thinking about what others might have that she doesn't, and that this " "leads her to feel jealous and bad.  Recognized the skill in her being aware of her thoughts and the impact it had on her emotions, which opens up opportunities to intervene differently.  She also recognized that she was able to successfully redirect to focusing on something she could do and enjoy - which made her feel a lot better.  Looked at ways to redirect her thinking - focus on \"the big picture - not just a small slice of the pie\", focus on what she's grateful for, focus on what she has, focus on what she can control.  She came up with an analogy - (\"taking my brain out of the jealous house\") - which she likened to how helpful it was for her to daughter to take her out of the house and bring her outside and into the garden when she was having a tough time the other day.  Recognized her ability to move from \"automatic thoughts\" to to interrupting and redirecting thought and behavior patterns that aren't helpful for her.      We also processed the successes she had in staying calm when her daughter was having a hard time.  She is finding good success with recognizing her ability to separate her emotions from others, choose a different response, choose a helpful reaction, and how in turn it really helps her daughter when she can stay calm.      She had a tough interaction with her spouse and had some frustration that this situation didn't go as well as some of the other incidents had.  Encouraged her to embrace the learning from this, find balanced thinking and the \"gray\" rather than all or nothing/polarized thinking, practice self-compassion and give herself some grade, and emphasized her continued efforts to try out skills in challenging situations, learn what works and what doesn't, and to recognize that some times certain skills will work better than others -and that no one is perfect!      ASSESSMENT: Current Emotional / Mental Status (status of significant symptoms):   Risk status (Self / Other harm or " suicidal ideation)   Patient denies current fears or concerns for personal safety.   Patient denies current or recent suicidal ideation or behaviors.    She continues to agree to use a safety plan should any safety concerns arise.  She also agrees to reach our to her spouse or a family member for help if needed, and/or access crisis/emergency resources.        Patientdenies current or recent homicidal ideation or behaviors.   Patient denies current or recent self injurious behavior or ideation.   Patient denies other safety concerns.   Patient Patient reports there has been no change in risk factors since their last session.     PatientPatient reports there has been no change in protective factors since their last session.     Recommended that patient call 911 or go to the local ED should there be a change in any of these risk factors.  She has previously been informed on how to contact [facility] crisis/COPE for urgent/crisis concerns 24/7.  Provided patient with crisis resources and she agrees to use safety plan should any safety concerns arise.      Professionals or agencies I can contact during a crisis:    Suicide Prevention Lifeline: 6-389-014-TALK (2666)    Crisis Text Line Service (available 24 hours a day, 7 days a week): Text [place] to 561658    Local Crisis Services: [facility]: [phone]    Call 911 or go to my nearest emergency department.        Appearance:   Appropriate    Eye Contact:   Good    Psychomotor Behavior: Normal    Attitude:   Cooperative    Orientation:   All   Speech    Rate / Production: Normal     Volume:  Normal    Mood:    Mood is overall improved, though feels tired today   Affect:    Congruent with mood    Thought Content:  Clear    Thought Form:  Coherent  Logical    Insight:    Good      Medication Review:   No changes to current psychiatric medication(s).        Medication Compliance:   Yes     Changes in Health Issues:  None reported         Chemical  "Use Review:   Substance Use: Chemical use reviewed, no active concerns identified.  She's mostly been abstaining from alcohol, noted that she had two occasions where she had one drink on the patio outside, enjoyed it, and it didn't negatively affect her mood as it has in times in the past.     Tobacco Use: No current tobacco use.      Diagnosis:  Anxiety disorder unspecified  Depression, unspecified     Collateral Reports Completed:   Not Applicable,      PLAN: (Patient Tasks / Therapist Tasks / Other)    1) Idalia identified the following goals:  Keep noticing if I'm feeling jealous of others, be aware of my thoughts, practice redirecting to what I'm grateful for, what I can control, what I can do.  Keep practicing skills to stay calm and separate my feelings from others when others around me are escalating (you can choose to stay at the train station while they get on the train)        Continue with: Take time daily to re-charge - work-out and downtime to read.  Get outside as much as possible - practice patience with Kaylee, (slow down, give myself time so I don't have to rush, be mindful of thoughts, focus on present, it is okay if it takes a little while).        2) If there is a crisis situation, remember you are not alone and that there are people who can help you twenty-four hours a day, seven days a week.  Call [facility] ([facility]): [phone].      3) Follow-up visit is scheduled for June 30th at 12:00 pm.  If urgent or crisis concerns arise prior to next scheduled appointment, please reach out to crisis resources, call 911, or go to the emergency department.      Cristy Wilkinson PsyD,   Licensed Psychologist  6/15/2022    Previous skills and strategies to remind self of and to do as needed:    Practice \"STOP\" technique when you recognize yourself feeling angry   Be a  - what do you notice is happening when Kaylee feels upset?     Practice recognizing when feeling angry, and giving " "self permission to use calming and self-soothing strategies and take a break.   Look for the gray with thinking  slow down  take deep breaths  manage expectations of self and others.    Journal   Practice flexing \"flexiblity and creativity\" -   Continue the great work you are doing with your daily schedule.    Get outside every day.    Play your instrument.    Sing!    Continue with: \"It's just a thought\" - non-judgmental, observe, float down the river on a leaf, clouds in the gege, reframing unhelpful thoughts -   Keep working on being patient when things are tense around me.    Continue with observing/paying attention to warning signs and signals and give self persmission to slow down, especially during the morning routine with Kaylee.    Practice offerring Kaylee choices when appropriate.      Use \"my plan for success\" to help remind you of calming strategies to practice when feeling upset.    Practice decreasing overall vulnerability to emotion mind through getting adequate rest, nutrition, time for yourself, and physical activity.         Consider reading \"Fighting for your marriage\".      Technical Assistance for video visits:    Offer patient the website ([email or web address]) and/or phone number (439-612-1191) for video visit instructions/assistance if needed.                                             ____________________________________    Treatment Plan    Patient's Name: Idalia Hinojosa  YOB: 1980    Date of Creation: 1/6/2020  Date Treatment Plan Last Reviewed/Revised: 5/3/22    DSM5 Diagnoses: 300.00 (F41.9) Unspecified Anxiety Disorder, depressive disorder, unspecified  Psychosocial / Contextual Factors: strain in marital relationship, parenting challenges, adjustment challenges, 's cancer diagnosis  PROMIS (reviewed every 90 days):     Anticipated number of session for this episode of care: additional 15-20  Anticipation frequency of session: Weekly until symptoms stabilize, " "then can decrease the frequency of sessions to likely bi-weekly or once every three weeks  Anticipated Duration of each session: 38-52 minutes  Treatment plan will be reviewed in 90 days or when goals have been changed.       Referral / Collaboration:  We discussed a referral to a couples/marital therapist.  The patient is thinking about this and has talked with her  about the referral, but they are unsure if they would like to follow through at this time.      MeasurableTreatment Goal(s) related to diagnosis / functional impairment(s)  Goal 1: Patient will learn emotion regulation strategies to help when she is feeling upset/angry/frustrated/distressed    I will know I've met my goal when I would yell less, I would feel calmer, and I would not push others.  I would be less physical when I'm angry (e.g. wouldn't slam doors or hit things)       Objective #A (Patient Action)    Patient will learn and practice at least 2 new emotion regulation strategies.  Status: Continued - Date(s):4/23/2020 (has begun to learn new emotion regulation strategies and is making good progress)   Update: 7/8/2020: \"I've learned new strategies but I'm bad at practicing them\" - will help patient incorporate regular practice and identify and problem-solve barriers.    Reviewed: 10/8/2020: Patient has been successfully practicing emotion regulation strategies, is working on identifying earlier on when to intervene and engage strategies, as well as practice strategies on a regular basis to help strength use of strategies.    Reviewed and in progress: 1/27/2021: \"Overall things are going a lot better.  Sometimes I fall into old habits\", but recognizing when she's feeling more vulnerable to her emotions and reminds herself to use different skills/intervene in a different way.     4/27/2021-\"Things have definitely been better\".  Recognizing when feeling vulnerable (e.g. hungry), recognizing when emotions are escalating and need to engage " "calming strategies\".  Ask for help from Colia when feeling upset.  Can be challenging when Kaylee escalates when I'm feeling angry.    7/29/21 - She felt like she has forgotten what helps her when she's feeling upset, we have spent time reviewing and refreshing these skills.    11/10/2021 - She feels like for the most part things have been going well.    2/9/2022 - Helping her to apply emotion regulation strategies in the context of a new and unexpected stressor -   5/3/2022 - Update - Things have been better, I can tell when I feel overwhelmed or don't get breaks - that is when I start yelling or snapping, and overall things have been better.  I'm exercising more and that really helps with the stress.  I've been feeling more confident about myself because I've been exercising more.        Intervention(s)  Therapist will provide psychoeducation on emotion regulation module from DBT and will assign patient homework to help reinforce skill development.    Objective #B  Patient will identify factors that increase vulnerability to emotion mind and identify ways to decrease vulnerability to emotion mind.  Status: Continued - Date(s):4/23/2020 (has learned factors that make her more vulnerable to emotion mind, and has been working on addressing vulnerability related to feeling hungry, tired and rushed)   Update: 7/8/20: \"This one is harder for me.  I know I get angry when I'm hungry, and when I slow down I'm less inclined to get angry\".  Challenges - not always having food ready, not always planning in advance, (planing breakfast the night before)  Reviewed: 10/8/2020: Patient's awareness has increased of factors that increase her vulnerability to emotion mind, and she has been using this as a signal to tell her what she needs.    Reviewed and resolved/complete: 1/27/2021: \"I feel like we've done really good work with this and I've gotten what I need from this\".  4/27/2021  - I am doing better with this - I know I get more " "irritable when I'm hungry and I'm trying to do better with this.  Drinking more water.      7/29/21 - Overall she had been feeling like things were going better with this, though over the past several weeks has felt more challenged due to increase in stressors.    11/10/2021 - Recognizing when feeling tired and how this can contribute to increase in vulnerability and ways to intervene.  Recognizing when feeling irritabiel - ask self if feeling hungry and eat if needed.    2/9/2022 - Self-care is an important focus right now - to ensure she is attending to her basic self-care needs while helping to take care of her  and his health -   5/3/2022 - I feel like I've mostly been doing better with eating and making sure not hungry.  Try to plan meals and snacks - sometimes this can be hard.          Intervention(s)  Therapist will provide information on factors that increase vulnerabiliyt to emotion mind.    Objective #C  Patient will identify warning signs and signals that emotions are escalating and will learn ways to skillfully intervene early on.  Status: New - Date: 01/06/2020 7/8/2020- in progress  Update: 10/08/2020: Patient has been making good progress on identifying signs and signals that her emotions are escalating.    Reviewed and in progress: 1/27/2021: \"I feel like we've done good work on this but this is one of the things I need to keep working on\".    7/29.21 - In progress - actively working on this -   11/10/2021 - Feels sometimes she is able to do this, other times it is harder  2/9/2022 - continued goal -   5/3/2022 - I do that sometimes - I do notice when I am getting more frustrated - that's been helping more - if I feel like I'm gettig more furstrated than I can take a deep breath or put my hands on my head -         Intervention(s)  Therapist will help patient identify warning signs and signals, and will guide the patient in identifying skillful ways to intervene when exeriencing warning " "signs and signals.      Goal 2: Patient will learn new parenting strategies to help with managing parenting challenges.  Parent will work on improving relationship with her daughter and defining what she would like this relationship to look like.      I will know I've met my goal when I'm enjoying time with my daughter, teaching her new things, and not waiting for things to change      Objective #A (Patient Action)    Status: Continued - Date(s):4/23/2020     Patient will increase understanding of developmental norms for her daughter and ways to manage challenging behaviors.    Intervention(s)  Therapist will provide psychoeducation on developmental norms and parenting strategies.    Objective #B  Patient will spend time reflecting on her relationship with her daughter and defining what she would like this relaitonship to look like.    Status: Continued - Date(s):4/23/2020   Update: 7/8/2020 - \"I think some things are going better, she's more helpful with things.  Some things are more challenging with the pandemic and thinking of things to do with her\".    Update: 10/08/2020: Patient is making progress on learning and practicing strategies to manage parenting challenges.    Reviewed: 1/27/2021: \"I feel like things have gotten better with this, we've figured out a good routine, I'm doing meditation, I'm enjoying my time with my daughter more than I used to\".   4/27/2021 - Challenges of parenting during a pandemic, working with occupational therapist on strategies to help daughter, 7/29/21 - She's been working on incorporating more structure into the daily routine, as well as managing her emotional reaction when feeling frustrated.  She's also worked on managing her mindset and expectations.  She's been experiencing some frustrations due to two of the OT's she's worked with has recently resigned, resulting in transitioning to several new providers in a short period of time.  I've encouraged she continue to work " "closely with her daughter's pediatrician to get connected to resources and supports to help with managing her daughter's difficulties.    11/10/2021 - Feels like they've had some really good interactions, though things have been more challenging at school and home (waiting for evaluations).  Daughter enjoys when she reads to her, they've had some really good interactions recently.    2/9/2022 - unable to fully assess progress on this goal today, will further assess progress on this goal and if any treatment needs remain -   5/3/2022- this is going well - sometimes it is hard when she wants me to play the same thing over and over with her, but overall it is better - having more fun as it gets older -         Intervention(s)  Therapist will guide the patient in reflecting upon her relationship with her daughter and help her define ways to move towards what she vaues in her relationship with her daughter.    Objective #C  Patient will increase time spent on fun activity and play with her daughter.  Status: Continued - Date(s):4/23/2020 7/8/2020 -   10/08/2020 - will further assess this goal at next session   Reviewed: 1/27/2021 - also going really well, we play together all of the time now\", she finds herself enjoying their time together.   4/27/2021- she will spend some time reflecting upon this and we will discuss further next time.  They have several fun things planned for the future, as well as she has worked hard during the pandemic to plan structure and time together within COVID restrictions.  7/29/21 - She's seen some positive changes with being able to spend time with grandparents and at the cabin since being vaccinated, but also has experienced some challenges with planning fun activities out of worries things won't go well and feeling low energy/motivation to do these things - working to address both of these obstacles.    11/10/2021 - They've been able to do more fun activities together - coloring, Lego " "sets,    2/9/2022 - unable to fully assess progress on this goal today, will further assess progress on this goal and if any  treatment needs remain -    5/3/2022 - really fun time doing legos, building, reading, drawing together        Intervention(s)  Therapist will guide the patient in identifying ways she can increase positive time with her daughter.  Therapist will also teach mindfulness strategies to help patient with being in the present moment when appropriate - .      Goal 3: Patiient will improve communication with her .      I will know I've met my goal when I feel less irritated with my  and communicate more openly with my .  I would like to be more assertive and less aggressive.   I would like to not avoid telling him things because I'm worried about his reaction or don't think he will react well.  I would like to be more present to the moment during times when this would be helpful.\"      Objective #A (Patient Action)    Status: Continued - Date(s):4/23/2020 7/8/2020 - suggested couples therapy to help with this.    Individual therapy focus - identify what makes it hard to be more honest and open   10/08/2020 - will further assess this goal at next session   Reviewed: 1/27/2021 - \"Things are better, I feel like we've worked really hard on how to respond when he's having a hard time and how to talk to him.  I'm doing better listening, being more empathetic, and not \".  4/27/2021 - She has been making progress on examining and updating unhelpful beliefs (e.g. feeling responsible for other's emotions, feeling like she needs to fix or change other's emotions) and has made good progress on identifying and asserting healthy boundaries, in addition to  her emotional response from her spouse's emotional response.  7/29/21 - will review at next session   11/10/2021 - Feeling more irritability recently.  Believes this area could use more attention.    2/9/2022 - continued " goal -   5/3/2022- this one is hard to say because things are so different right now - he's not his normal self (due to cancer treatment) - I think I'm communicating things in a nice way        Patient will learn and practice at least two new interpersonal effectiveness strategies.    Intervention(s)  Therapist will teach strategies from DBT module on interpersonal effectiveness, and will assign homework to help reinforce skill development.      Patient has reviewed and agreed to the above plan.      Cristy Wilkinson PsyD,   Licensed Psychologist  January 6, 2020, reviewed on 4/23/2020, reviewed 7/8/2020, reviewed 10/08/2020, reviewed 1/27/2021, reviewed 4/27/2021, 7/29/2021, 11/10/2021, 2/9/2022, 5/3/2022                                                                                                                          Answers for HPI/ROS submitted by the patient on 6/30/2022  If you checked off any problems, how difficult have these problems made it for you to do your work, take care of things at home, or get along with other people?: Somewhat difficult  PHQ9 TOTAL SCORE: 2         [facility]                                            Progress Note    Patient Name: Idalia Hinojosa  Date: 6/15/2022         Service Type: Individual      Session Start Time: 2:06  pm  Session End Time: 2: 48 pm   Session Length:  42 minutes    Session #: 58    Attendees: Client     Service Modality: Video visit: - I.        Provider verified identity through the following two step process.  Patient provided:  Patient is known previously to provider    Telemedicine Visit: The patient's condition can be safely assessed and treated via synchronous audio and visual telemedicine encounter.      Reason for Telemedicine Visit: Services only offered telehealth    Originating Site (Patient Location): Patient's home    Distant Site (Provider Location): Provider Remote Setting- Home Office    Consent:  The  "patient/guardian has verbally consented to: the potential risks and benefits of telemedicine (telephone visit) versus in person care; bill my insurance or make self-payment for services provided; and responsibility for payment of non-covered services.     Patient would like the video invitation sent by:  My Chart    Mode of Communication:  Video Conference via [employer],     As the provider I attest to compliance with applicable laws and regulations related to telemedicine.         Treatment Plan Last Reviewed: 5/3/2022  PHQ-9/DAVID-7: 5/17/2022    DATA  Interactive Complexity: No  Crisis: No        Progress Since Last Session (Related to Symptoms / Goals / Homework):   Symptoms: reports her mood has been \"mostly pretty good\", feels tired today which she attributes to the rainy weather.      Homework: Achieved / completed to satisfaction. Successful- had several opportunities where she was able to practice staying calm when Kaylee was having a melt down and her  was \"cranky\". She's also been successful with her goals related to taking time to re-charge - she's been working out (including one \"bonus day\" where she didn't think she'd have time to work out but was able to), has been enjoying time reading, and played her oboe four times!           Episode of Care Goals: Satisfactory progress - ACTION (Actively working towards change); Intervened by reinforcing change plan / affirming steps taken.  Diagnostic assessment has been completed, treatment plan has been developed and patient has been making good progress on treatment goals.  The patient stated that her main goals for therapy would be to learn emotion regulation strategies (in particular, to help with when she is feeling upset/angry/frustrated/distressed).  The changes in her daily life due to COVID restrictions were a focus of our work together, and working through changes to routine as her daughter started  and she returned to work.  As she has been " "making progress, focus of treatment will shift to wellness planning (monitoring mood and symptoms, incorporating into routine what keeps her feeling well, review of triggers, how to manage set backs, etc.).  As her  has been diagnosed with cancer, treatment will also focus on providing her with support in managing the emotional distress and challenges related to this.       Current / Ongoing Stressors and Concerns:   Current:  Noticed that she spends a lot of time thinking about what others have and she doesn't, and that this makes her feel bad.  Managing symptoms of depression and anxiety, caregiver stress related to 's cancer diagnosis, and parenting challenges with daughter.  Her daughter has a neuropsych evaluation tomorrow, they have been waiting for this for quite some time and she's hopeful this will provide them with helpful info.       Ongoing:Managing daily routine which has contributed to some adjustment and parenting challenges, lack of effective emotion regulation strategies for managing daily frustrations and upsets and marital discord due to differences in parenting styles.       Treatment Objective(s) Addressed in This Session:     Increase mindful awareness of thoughts, impact on your emotions, and recognize opportunities to redirect thoughts.  Practice re-directing thoughts to a more helpful perspective.    Practice balanced thinking to counteract polarized/all or nothing thinking.    Practice miranda and self-compassion       Continue to review/practice/reinforce:  Identify obstacles and barriers to engaging in self-care and problem-solve ways to address obstacles and barriers  Learn and practice mindfulness strategies - \"observe, just notice\" and invite curiosity around what may be contributing and what may help  Practice calming strategies to help release tension in your body   Practice new script in the morning when feeling urge to not get out of bed.  What do you need to hear that " "helps you keep moving?   (\"Today is a new day\", focus on what you're grateful for, \"I will feel better\")  Identify ways to practice and incorporate self-care into routine.  Ask myself what I need.    Identify how others can help support you and practice asking for help/what you need  Identify and practice at least 2-3 strategies to help with regulating emotion   Learn and practice behavioral activation strategies (how to move forward with doing what will help you when you don't feel like doing it)  Be curious about your emotions - tune in to - what am I thinking?  What is going on around me?  What do I need?    Identify and practice at least two strategies that will help support your self-care during this challenging time  Learn and practice at least two new strategies for tolerating uncertainty  Practice mindfulness to the moment when your mind wanders to worrying about the unknown       Intervention:   CBT:  She noticed that she spends a lot of time thinking about what others might have that she doesn't, and that this leads her to feel jealous and bad.  Recognized the skill in her being aware of her thoughts and the impact it had on her emotions, which opens up opportunities to intervene differently.  She also recognized that she was able to successfully redirect to focusing on something she could do and enjoy - which made her feel a lot better.  Looked at ways to redirect her thinking - focus on \"the big picture - not just a small slice of the pie\", focus on what she's grateful for, focus on what she has, focus on what she can control.  She came up with an analogy - (\"taking my brain out of the jealous house\") - which she likened to how helpful it was for her to daughter to take her out of the house and bring her outside and into the garden when she was having a tough time the other day.  Recognized her ability to move from \"automatic thoughts\" to to interrupting and redirecting thought and behavior patterns that " "aren't helpful for her.      We also processed the successes she had in staying calm when her daughter was having a hard time.  She is finding good success with recognizing her ability to separate her emotions from others, choose a different response, choose a helpful reaction, and how in turn it really helps her daughter when she can stay calm.      She had a tough interaction with her spouse and had some frustration that this situation didn't go as well as some of the other incidents had.  Encouraged her to embrace the learning from this, find balanced thinking and the \"gray\" rather than all or nothing/polarized thinking, practice self-compassion and give herself some grade, and emphasized her continued efforts to try out skills in challenging situations, learn what works and what doesn't, and to recognize that some times certain skills will work better than others -and that no one is perfect!      ASSESSMENT: Current Emotional / Mental Status (status of significant symptoms):   Risk status (Self / Other harm or suicidal ideation)   Patient denies current fears or concerns for personal safety.   Patient denies current or recent suicidal ideation or behaviors.    She continues to agree to use a safety plan should any safety concerns arise.  She also agrees to reach our to her spouse or a family member for help if needed, and/or access crisis/emergency resources.        Patientdenies current or recent homicidal ideation or behaviors.   Patient denies current or recent self injurious behavior or ideation.   Patient denies other safety concerns.   Patient Patient reports there has been no change in risk factors since their last session.     PatientPatient reports there has been no change in protective factors since their last session.     Recommended that patient call 911 or go to the local ED should there be a change in any of these risk factors.  She has previously been informed on how to contact [facility] " crisis/COPE for urgent/crisis concerns 24/7.  Provided patient with crisis resources and she agrees to use safety plan should any safety concerns arise.      Professionals or agencies I can contact during a crisis:    Suicide Prevention Lifeline: 9-023-918-JBQB (0683)    Crisis Text Line Service (available 24 hours a day, 7 days a week): Text [place] to 801154    Local Crisis Services: [facility]: [phone]    Call 911 or go to my nearest emergency department.        Appearance:   Appropriate    Eye Contact:   Good    Psychomotor Behavior: Normal    Attitude:   Cooperative    Orientation:   All   Speech    Rate / Production: Normal     Volume:  Normal    Mood:    Mood is overall improved, though feels tired today   Affect:    Congruent with mood    Thought Content:  Clear    Thought Form:  Coherent  Logical    Insight:    Good      Medication Review:   No changes to current psychiatric medication(s).        Medication Compliance:   Yes     Changes in Health Issues:  None reported         Chemical Use Review:   Substance Use: Chemical use reviewed, no active concerns identified.  She's mostly been abstaining from alcohol, noted that she had two occasions where she had one drink on the patio outside, enjoyed it, and it didn't negatively affect her mood as it has in times in the past.     Tobacco Use: No current tobacco use.      Diagnosis:  Anxiety disorder unspecified  Depression, unspecified     Collateral Reports Completed:   Not Applicable,      PLAN: (Patient Tasks / Therapist Tasks / Other)    1) Idalia identified the following goals:  Keep noticing if I'm feeling jealous of others, be aware of my thoughts, practice redirecting to what I'm grateful for, what I can control, what I can do.  Keep practicing skills to stay calm and separate my feelings from others when others around me are escalating (you can choose to stay at the train station while they get on the train)        Continue with:  "Take time daily to re-charge - work-out and downtime to read.  Get outside as much as possible - practice patience with Kaylee, (slow down, give myself time so I don't have to rush, be mindful of thoughts, focus on present, it is okay if it takes a little while).        2) If there is a crisis situation, remember you are not alone and that there are people who can help you twenty-four hours a day, seven days a week.  Call [facility] ([facility]): [phone].      3) Follow-up visit is scheduled for June 30th at 12:00 pm.  If urgent or crisis concerns arise prior to next scheduled appointment, please reach out to crisis resources, call 911, or go to the emergency department.      Cristy Wilkinson PsyD,   Licensed Psychologist  6/15/2022    Previous skills and strategies to remind self of and to do as needed:    Practice \"STOP\" technique when you recognize yourself feeling angry   Be a  - what do you notice is happening when Kaylee feels upset?     Practice recognizing when feeling angry, and giving self permission to use calming and self-soothing strategies and take a break.   Look for the gray with thinking  slow down  take deep breaths  manage expectations of self and others.    Journal   Practice flexing \"flexiblity and creativity\" -   Continue the great work you are doing with your daily schedule.    Get outside every day.    Play your instrument.    Sing!    Continue with: \"It's just a thought\" - non-judgmental, observe, float down the river on a leaf, clouds in the gege, reframing unhelpful thoughts -   Keep working on being patient when things are tense around me.    Continue with observing/paying attention to warning signs and signals and give self persmission to slow down, especially during the morning routine with Kaylee.    Practice offerring Kaylee choices when appropriate.      Use \"my plan for success\" to help remind you of calming strategies to practice when feeling upset.    Practice " "decreasing overall vulnerability to emotion mind through getting adequate rest, nutrition, time for yourself, and physical activity.         Consider reading \"Fighting for your marriage\".      Technical Assistance for video visits:    Offer patient the website ([email or web address]) and/or phone number (074-110-9050) for video visit instructions/assistance if needed.                                             ____________________________________    Treatment Plan    Patient's Name: Idalia Hinojosa  YOB: 1980    Date of Creation: 1/6/2020  Date Treatment Plan Last Reviewed/Revised: 5/3/22    DSM5 Diagnoses: 300.00 (F41.9) Unspecified Anxiety Disorder, depressive disorder, unspecified  Psychosocial / Contextual Factors: strain in marital relationship, parenting challenges, adjustment challenges, 's cancer diagnosis  PROMIS (reviewed every 90 days):     Anticipated number of session for this episode of care: additional 15-20  Anticipation frequency of session: Weekly until symptoms stabilize, then can decrease the frequency of sessions to likely bi-weekly or once every three weeks  Anticipated Duration of each session: 38-52 minutes  Treatment plan will be reviewed in 90 days or when goals have been changed.       Referral / Collaboration:  We discussed a referral to a couples/marital therapist.  The patient is thinking about this and has talked with her  about the referral, but they are unsure if they would like to follow through at this time.      MeasurableTreatment Goal(s) related to diagnosis / functional impairment(s)  Goal 1: Patient will learn emotion regulation strategies to help when she is feeling upset/angry/frustrated/distressed    I will know I've met my goal when I would yell less, I would feel calmer, and I would not push others.  I would be less physical when I'm angry (e.g. wouldn't slam doors or hit things)       Objective #A (Patient Action)    Patient will " "learn and practice at least 2 new emotion regulation strategies.  Status: Continued - Date(s):4/23/2020 (has begun to learn new emotion regulation strategies and is making good progress)   Update: 7/8/2020: \"I've learned new strategies but I'm bad at practicing them\" - will help patient incorporate regular practice and identify and problem-solve barriers.    Reviewed: 10/8/2020: Patient has been successfully practicing emotion regulation strategies, is working on identifying earlier on when to intervene and engage strategies, as well as practice strategies on a regular basis to help strength use of strategies.    Reviewed and in progress: 1/27/2021: \"Overall things are going a lot better.  Sometimes I fall into old habits\", but recognizing when she's feeling more vulnerable to her emotions and reminds herself to use different skills/intervene in a different way.     4/27/2021-\"Things have definitely been better\".  Recognizing when feeling vulnerable (e.g. hungry), recognizing when emotions are escalating and need to engage calming strategies\".  Ask for help from Colia when feeling upset.  Can be challenging when Kaylee escalates when I'm feeling angry.    7/29/21 - She felt like she has forgotten what helps her when she's feeling upset, we have spent time reviewing and refreshing these skills.    11/10/2021 - She feels like for the most part things have been going well.    2/9/2022 - Helping her to apply emotion regulation strategies in the context of a new and unexpected stressor -   5/3/2022 - Update - Things have been better, I can tell when I feel overwhelmed or don't get breaks - that is when I start yelling or snapping, and overall things have been better.  I'm exercising more and that really helps with the stress.  I've been feeling more confident about myself because I've been exercising more.        Intervention(s)  Therapist will provide psychoeducation on emotion regulation module from DBT and will assign " "patient homework to help reinforce skill development.    Objective #B  Patient will identify factors that increase vulnerability to emotion mind and identify ways to decrease vulnerability to emotion mind.  Status: Continued - Date(s):4/23/2020 (has learned factors that make her more vulnerable to emotion mind, and has been working on addressing vulnerability related to feeling hungry, tired and rushed)   Update: 7/8/20: \"This one is harder for me.  I know I get angry when I'm hungry, and when I slow down I'm less inclined to get angry\".  Challenges - not always having food ready, not always planning in advance, (planing breakfast the night before)  Reviewed: 10/8/2020: Patient's awareness has increased of factors that increase her vulnerability to emotion mind, and she has been using this as a signal to tell her what she needs.    Reviewed and resolved/complete: 1/27/2021: \"I feel like we've done really good work with this and I've gotten what I need from this\".  4/27/2021  - I am doing better with this - I know I get more irritable when I'm hungry and I'm trying to do better with this.  Drinking more water.      7/29/21 - Overall she had been feeling like things were going better with this, though over the past several weeks has felt more challenged due to increase in stressors.    11/10/2021 - Recognizing when feeling tired and how this can contribute to increase in vulnerability and ways to intervene.  Recognizing when feeling irritabiel - ask self if feeling hungry and eat if needed.    2/9/2022 - Self-care is an important focus right now - to ensure she is attending to her basic self-care needs while helping to take care of her  and his health -   5/3/2022 - I feel like I've mostly been doing better with eating and making sure not hungry.  Try to plan meals and snacks - sometimes this can be hard.          Intervention(s)  Therapist will provide information on factors that increase vulnerabiliyt to " "emotion mind.    Objective #C  Patient will identify warning signs and signals that emotions are escalating and will learn ways to skillfully intervene early on.  Status: New - Date: 01/06/2020 7/8/2020- in progress  Update: 10/08/2020: Patient has been making good progress on identifying signs and signals that her emotions are escalating.    Reviewed and in progress: 1/27/2021: \"I feel like we've done good work on this but this is one of the things I need to keep working on\".    7/29.21 - In progress - actively working on this -   11/10/2021 - Feels sometimes she is able to do this, other times it is harder  2/9/2022 - continued goal -   5/3/2022 - I do that sometimes - I do notice when I am getting more frustrated - that's been helping more - if I feel like I'm gettig more furstrated than I can take a deep breath or put my hands on my head -         Intervention(s)  Therapist will help patient identify warning signs and signals, and will guide the patient in identifying skillful ways to intervene when exeriencing warning signs and signals.      Goal 2: Patient will learn new parenting strategies to help with managing parenting challenges.  Parent will work on improving relationship with her daughter and defining what she would like this relationship to look like.      I will know I've met my goal when I'm enjoying time with my daughter, teaching her new things, and not waiting for things to change      Objective #A (Patient Action)    Status: Continued - Date(s):4/23/2020     Patient will increase understanding of developmental norms for her daughter and ways to manage challenging behaviors.    Intervention(s)  Therapist will provide psychoeducation on developmental norms and parenting strategies.    Objective #B  Patient will spend time reflecting on her relationship with her daughter and defining what she would like this relaitonship to look like.    Status: Continued - Date(s):4/23/2020   Update: 7/8/2020 - " "\"I think some things are going better, she's more helpful with things.  Some things are more challenging with the pandemic and thinking of things to do with her\".    Update: 10/08/2020: Patient is making progress on learning and practicing strategies to manage parenting challenges.    Reviewed: 1/27/2021: \"I feel like things have gotten better with this, we've figured out a good routine, I'm doing meditation, I'm enjoying my time with my daughter more than I used to\".   4/27/2021 - Challenges of parenting during a pandemic, working with occupational therapist on strategies to help daughter, 7/29/21 - She's been working on incorporating more structure into the daily routine, as well as managing her emotional reaction when feeling frustrated.  She's also worked on managing her mindset and expectations.  She's been experiencing some frustrations due to two of the OT's she's worked with has recently resigned, resulting in transitioning to several new providers in a short period of time.  I've encouraged she continue to work closely with her daughter's pediatrician to get connected to resources and supports to help with managing her daughter's difficulties.    11/10/2021 - Feels like they've had some really good interactions, though things have been more challenging at school and home (waiting for evaluations).  Daughter enjoys when she reads to her, they've had some really good interactions recently.    2/9/2022 - unable to fully assess progress on this goal today, will further assess progress on this goal and if any treatment needs remain -   5/3/2022- this is going well - sometimes it is hard when she wants me to play the same thing over and over with her, but overall it is better - having more fun as it gets older -         Intervention(s)  Therapist will guide the patient in reflecting upon her relationship with her daughter and help her define ways to move towards what she vaues in her relationship with her " "daughter.    Objective #C  Patient will increase time spent on fun activity and play with her daughter.  Status: Continued - Date(s):4/23/2020 7/8/2020 -   10/08/2020 - will further assess this goal at next session   Reviewed: 1/27/2021 - also going really well, we play together all of the time now\", she finds herself enjoying their time together.   4/27/2021- she will spend some time reflecting upon this and we will discuss further next time.  They have several fun things planned for the future, as well as she has worked hard during the pandemic to plan structure and time together within COVID restrictions.  7/29/21 - She's seen some positive changes with being able to spend time with grandparents and at the cabin since being vaccinated, but also has experienced some challenges with planning fun activities out of worries things won't go well and feeling low energy/motivation to do these things - working to address both of these obstacles.    11/10/2021 - They've been able to do more fun activities together - coloring, Lego sets,    2/9/2022 - unable to fully assess progress on this goal today, will further assess progress on this goal and if any  treatment needs remain -    5/3/2022 - really fun time doing legos, building, reading, drawing together        Intervention(s)  Therapist will guide the patient in identifying ways she can increase positive time with her daughter.  Therapist will also teach mindfulness strategies to help patient with being in the present moment when appropriate - .      Goal 3: Patiient will improve communication with her .      I will know I've met my goal when I feel less irritated with my  and communicate more openly with my .  I would like to be more assertive and less aggressive.   I would like to not avoid telling him things because I'm worried about his reaction or don't think he will react well.  I would like to be more present to the moment during times when " "this would be helpful.\"      Objective #A (Patient Action)    Status: Continued - Date(s):4/23/2020 7/8/2020 - suggested couples therapy to help with this.    Individual therapy focus - identify what makes it hard to be more honest and open   10/08/2020 - will further assess this goal at next session   Reviewed: 1/27/2021 - \"Things are better, I feel like we've worked really hard on how to respond when he's having a hard time and how to talk to him.  I'm doing better listening, being more empathetic, and not \".  4/27/2021 - She has been making progress on examining and updating unhelpful beliefs (e.g. feeling responsible for other's emotions, feeling like she needs to fix or change other's emotions) and has made good progress on identifying and asserting healthy boundaries, in addition to  her emotional response from her spouse's emotional response.  7/29/21 - will review at next session   11/10/2021 - Feeling more irritability recently.  Believes this area could use more attention.    2/9/2022 - continued goal -   5/3/2022- this one is hard to say because things are so different right now - he's not his normal self (due to cancer treatment) - I think I'm communicating things in a nice way        Patient will learn and practice at least two new interpersonal effectiveness strategies.    Intervention(s)  Therapist will teach strategies from DBT module on interpersonal effectiveness, and will assign homework to help reinforce skill development.      Patient has reviewed and agreed to the above plan.      Cristy Wilkinson PsyD,   Licensed Psychologist  January 6, 2020, reviewed on 4/23/2020, reviewed 7/8/2020, reviewed 10/08/2020, reviewed 1/27/2021, reviewed 4/27/2021, 7/29/2021, 11/10/2021, 2/9/2022, 5/3/2022                                                                                                                                                      "

## 2022-07-13 ENCOUNTER — VIRTUAL VISIT (OUTPATIENT)
Dept: PSYCHOLOGY | Facility: CLINIC | Age: 42
End: 2022-07-13
Payer: COMMERCIAL

## 2022-07-13 DIAGNOSIS — F41.9 ANXIETY DISORDER, UNSPECIFIED TYPE: Primary | ICD-10-CM

## 2022-07-13 DIAGNOSIS — F32.A DEPRESSION, UNSPECIFIED DEPRESSION TYPE: ICD-10-CM

## 2022-07-13 PROCEDURE — 90832 PSYTX W PT 30 MINUTES: CPT | Mod: GT | Performed by: PSYCHOLOGIST

## 2022-07-13 NOTE — PROGRESS NOTES
"     [facility]                                            Progress Note    Patient Name: Idalia Hinojosa  Date: 7/13/2022         Service Type: Individual      Session Start Time: 11:06 am  Session End Time: 11:29 am   Session Length:  23 minutes    Session #: 61    Attendees: Client     Service Modality: Video visit: - I.        Provider verified identity through the following two step process.  Patient provided:  Patient is known previously to provider    Telemedicine Visit: The patient's condition can be safely assessed and treated via synchronous audio and visual telemedicine encounter.      Reason for Telemedicine Visit: Services only offered telehealth    Originating Site (Patient Location): Patient's home    Distant Site (Provider Location): Provider Remote Setting- Home Office    Consent:  The patient/guardian has verbally consented to: the potential risks and benefits of telemedicine (telephone visit) versus in person care; bill my insurance or make self-payment for services provided; and responsibility for payment of non-covered services.     Patient would like the video invitation sent by:  My Chart    Mode of Communication:  Video Conference via [employer],     As the provider I attest to compliance with applicable laws and regulations related to telemedicine.         Treatment Plan Last Reviewed: 5/3/2022  PHQ-9/DAVID-7: 6/30/2022      DATA  Interactive Complexity: No  Crisis: No        Progress Since Last Session (Related to Symptoms / Goals / Homework):   Symptoms:  She reported that her mood has been improved (less stressed, less anxious and depressed) and she had a \"pretty good\" week.       Homework: Achieved / completed to satisfaction. She was able to take time at the cabin to re-charge and this was very helpful for her.          Episode of Care Goals: Satisfactory progress - ACTION (Actively working towards change); Intervened by reinforcing change plan / affirming steps " taken.  Diagnostic assessment has been completed, treatment plan has been developed and patient has been making good progress on treatment goals.  The patient stated that her main goals for therapy would be to learn emotion regulation strategies (in particular, to help with when she is feeling upset/angry/frustrated/distressed).  The changes in her daily life due to COVID restrictions were a focus of our work together, and working through changes to routine as her daughter started  and she returned to work.  As she has been making progress, focus of treatment will shift to wellness planning (monitoring mood and symptoms, incorporating into routine what keeps her feeling well, review of triggers, how to manage set backs, etc.).  As her  has been diagnosed with cancer, treatment will also focus on providing her with support in managing the emotional distress and challenges related to this.       Current / Ongoing Stressors and Concerns:   Current: Adjustment to new routines during the summer - this has been going better this week and her daughter is now able to take the bus to her summer school programming, which has been very helpful.  Her  continues with his chemo treatment.       Ongoing:Managing daily routine which has contributed to some adjustment and parenting challenges, lack of effective emotion regulation strategies for managing daily frustrations and upsets and marital discord due to differences in parenting styles.       Treatment Objective(s) Addressed in This Session:     Emotion regulation strategies - learn and practice 1-2 emotion regulation strategies  Identify ways to practice and incorporate self-care into routine.    Identify and enforce healthy emotional boundaries (it is ok to feel different than my spouse does, it is okay for him to have his feelings and to not feel like I have to fix or change how he is feeling)    Continue to review/practice/reinforce:  Continue to  "practice awareness of your emotions and physical sensations, to help tune in to what you need  Identify opportunities to access support and help from others  Mindful awareness of thoughts, impact on your emotions, and recognize opportunities to redirect thoughts.  Practice re-directing thoughts to a more helpful perspective.    Practice balanced thinking to counteract polarized/all or nothing thinking.         Intervention:   DBT: Emotion regulation strategies - she shared examples from the past week where she felt jealousy - and identified what made it worse (keeping it inside, not acknowledge, not sharing or talking about it) and what helped (recognizing it, talking it out loud, sharing it with others).  We talked about the strategy of \"name it to tame it\" - moving from auto-/automatic responding to mindful awareness of feelings and opportunity to direct response/coping in a helpful direction.  Recognized the great job she did learning from her experiences over the past week, and using this to guide her.      She did a great job practicing self-care at the cabin and found this helpful to \"re-charge\".      She shared that she would like to continue to work on  her emotional response from her 's.  We talked about ways to identify and enforce healthy emotional boundaries (helpful thoughts and reminders - I don't have to feel the same as him, I don't have to feel the same as him in order to have empathy for him, I don't have to \"fix or change\" other people's feelings, I am a separate person and it is okay to react and feel differently)      ASSESSMENT: Current Emotional / Mental Status (status of significant symptoms):   Risk status (Self / Other harm or suicidal ideation)   Patient denies current fears or concerns for personal safety.   Patient denies current or recent suicidal ideation or behaviors.    She continues to agree to use a safety plan should any safety concerns arise.  She also " agrees to reach our to her spouse or a family member for help if needed, and/or access crisis/emergency resources.        Patientdenies current or recent homicidal ideation or behaviors.   Patient denies current or recent self injurious behavior or ideation.   Patient denies other safety concerns.   Patient Patient reports there has been no change in risk factors since their last session.     PatientPatient reports there has been no change in protective factors since their last session.     Recommended that patient call 911 or go to the local ED should there be a change in any of these risk factors.  She has previously been informed on how to contact [facility] crisis/[facility] for urgent/crisis concerns 24/7.  Provided patient with crisis resources and she agrees to use safety plan should any safety concerns arise.      Professionals or agencies I can contact during a crisis:    Suicide Prevention Lifeline: 0-690-923-TALK (5032)    Crisis Text Line Service (available 24 hours a day, 7 days a week): Text [place] to 806434    [facility] Crisis Services: [facility]: [phone]    Call 911 or go to my nearest emergency department.        Appearance:   Appropriate    Eye Contact:   Good    Psychomotor Behavior: Normal    Attitude:   Cooperative    Orientation:   All   Speech    Rate / Production: Normal     Volume:  Normal    Mood:    Reports this past week she has felt less stressed and anxious,  mood has been improved   Affect:    Congruent with mood    Thought Content:  Clear    Thought Form:  Coherent  Logical    Insight:    Good      Medication Review:   No changes to current psychiatric medication(s).        Medication Compliance:   Yes     Changes in Health Issues:  None reported         Chemical Use Review:   Substance Use: Chemical use reviewed, no changes or active concerns identified.      Tobacco Use: No current tobacco use.      Diagnosis:  Anxiety disorder unspecified  Depression, unspecified  "            PLAN: (Patient Tasks / Therapist Tasks / Other)    1) Idalia identified the following goals: Keep my mood separate from Colibecky's -remember - I am my own person, I can feel differently, I don't have to feel the same way as him to have empathy for him.  Take advantage of having two days to myself - (read recommendations from Kaylee's assessment, time to myself to read, play outside, ride bike, go for a run, listen to my body and mind and what it is telling me it needs!)      2) If there is a crisis situation, remember you are not alone and that there are people who can help you twenty-four hours a day, seven days a week.  Call [facility] ([facility] Crisis [facility]): [phone].      3) Follow-up visit is scheduled forJuly 21st at 11:00 am.  If urgent or crisis concerns arise prior to next scheduled appointment, please reach out to crisis resources, call 911, or go to the emergency department.      Cristy Wilkinson PsyD,   Licensed Psychologist  7/13/2022          Previous skills and strategies to remind self of and to do as needed:    Practice \"STOP\" technique when you recognize yourself feeling angry   Be a  - what do you notice is happening when Kaylee feels upset?     Practice recognizing when feeling angry, and giving self permission to use calming and self-soothing strategies and take a break.   Look for the gray with thinking  slow down  take deep breaths  manage expectations of self and others.    Journal   Practice flexing \"flexiblity and creativity\" -   Continue the great work you are doing with your daily schedule.    Get outside every day.    Play your instrument.    Sing!    Continue with: \"It's just a thought\" - non-judgmental, observe, float down the river on a leaf, clouds in the gege, reframing unhelpful thoughts -   Keep working on being patient when things are tense around me.    Continue with observing/paying attention to warning signs and signals and give self persmission to slow " "down, especially during the morning routine with Kaylee.    Practice offerring Kaylee choices when appropriate.      Use \"my plan for success\" to help remind you of calming strategies to practice when feeling upset.    Practice decreasing overall vulnerability to emotion mind through getting adequate rest, nutrition, time for yourself, and physical activity.         Consider reading \"Fighting for your marriage\".      Technical Assistance for video visits:    Offer patient the website ([email or web address] plugin) and/or phone number ([phone]) for video visit instructions/assistance if needed.                                             ____________________________________    Treatment Plan    Patient's Name: Idalia Hinojosa  YOB: 1980    Date of Creation: 1/6/2020  Date Treatment Plan Last Reviewed/Revised: 5/3/22    DSM5 Diagnoses: 300.00 (F41.9) Unspecified Anxiety Disorder, depressive disorder, unspecified  Psychosocial / Contextual Factors: strain in marital relationship, parenting challenges, adjustment challenges, 's cancer diagnosis  PROMIS (reviewed every 90 days):     Anticipated number of session for this episode of care: additional 15-20  Anticipation frequency of session: Weekly until symptoms stabilize, then can decrease the frequency of sessions to likely bi-weekly or once every three weeks  Anticipated Duration of each session: 38-52 minutes  Treatment plan will be reviewed in 90 days or when goals have been changed.       Referral / Collaboration:  We discussed a referral to a couples/marital therapist.  The patient is thinking about this and has talked with her  about the referral, but they are unsure if they would like to follow through at this time.      MeasurableTreatment Goal(s) related to diagnosis / functional impairment(s)  Goal 1: Patient will learn emotion regulation strategies to help when she is feeling upset/angry/frustrated/distressed    I will know I've " "met my goal when I would yell less, I would feel calmer, and I would not push others.  I would be less physical when I'm angry (e.g. wouldn't slam doors or hit things)       Objective #A (Patient Action)    Patient will learn and practice at least 2 new emotion regulation strategies.  Status: Continued - Date(s):4/23/2020 (has begun to learn new emotion regulation strategies and is making good progress)   Update: 7/8/2020: \"I've learned new strategies but I'm bad at practicing them\" - will help patient incorporate regular practice and identify and problem-solve barriers.    Reviewed: 10/8/2020: Patient has been successfully practicing emotion regulation strategies, is working on identifying earlier on when to intervene and engage strategies, as well as practice strategies on a regular basis to help strength use of strategies.    Reviewed and in progress: 1/27/2021: \"Overall things are going a lot better.  Sometimes I fall into old habits\", but recognizing when she's feeling more vulnerable to her emotions and reminds herself to use different skills/intervene in a different way.     4/27/2021-\"Things have definitely been better\".  Recognizing when feeling vulnerable (e.g. hungry), recognizing when emotions are escalating and need to engage calming strategies\".  Ask for help from Colia when feeling upset.  Can be challenging when Kaylee escalates when I'm feeling angry.    7/29/21 - She felt like she has forgotten what helps her when she's feeling upset, we have spent time reviewing and refreshing these skills.    11/10/2021 - She feels like for the most part things have been going well.    2/9/2022 - Helping her to apply emotion regulation strategies in the context of a new and unexpected stressor -   5/3/2022 - Update - Things have been better, I can tell when I feel overwhelmed or don't get breaks - that is when I start yelling or snapping, and overall things have been better.  I'm exercising more and that really " "helps with the stress.  I've been feeling more confident about myself because I've been exercising more.        Intervention(s)  Therapist will provide psychoeducation on emotion regulation module from DBT and will assign patient homework to help reinforce skill development.    Objective #B  Patient will identify factors that increase vulnerability to emotion mind and identify ways to decrease vulnerability to emotion mind.  Status: Continued - Date(s):4/23/2020 (has learned factors that make her more vulnerable to emotion mind, and has been working on addressing vulnerability related to feeling hungry, tired and rushed)   Update: 7/8/20: \"This one is harder for me.  I know I get angry when I'm hungry, and when I slow down I'm less inclined to get angry\".  Challenges - not always having food ready, not always planning in advance, (planing breakfast the night before)  Reviewed: 10/8/2020: Patient's awareness has increased of factors that increase her vulnerability to emotion mind, and she has been using this as a signal to tell her what she needs.    Reviewed and resolved/complete: 1/27/2021: \"I feel like we've done really good work with this and I've gotten what I need from this\".  4/27/2021  - I am doing better with this - I know I get more irritable when I'm hungry and I'm trying to do better with this.  Drinking more water.      7/29/21 - Overall she had been feeling like things were going better with this, though over the past several weeks has felt more challenged due to increase in stressors.    11/10/2021 - Recognizing when feeling tired and how this can contribute to increase in vulnerability and ways to intervene.  Recognizing when feeling irritabiel - ask self if feeling hungry and eat if needed.    2/9/2022 - Self-care is an important focus right now - to ensure she is attending to her basic self-care needs while helping to take care of her  and his health -   5/3/2022 - I feel like I've mostly " "been doing better with eating and making sure not hungry.  Try to plan meals and snacks - sometimes this can be hard.          Intervention(s)  Therapist will provide information on factors that increase vulnerabiliyt to emotion mind.    Objective #C  Patient will identify warning signs and signals that emotions are escalating and will learn ways to skillfully intervene early on.  Status: New - Date: 01/06/2020 7/8/2020- in progress  Update: 10/08/2020: Patient has been making good progress on identifying signs and signals that her emotions are escalating.    Reviewed and in progress: 1/27/2021: \"I feel like we've done good work on this but this is one of the things I need to keep working on\".    7/29.21 - In progress - actively working on this -   11/10/2021 - Feels sometimes she is able to do this, other times it is harder  2/9/2022 - continued goal -   5/3/2022 - I do that sometimes - I do notice when I am getting more frustrated - that's been helping more - if I feel like I'm gettig more furstrated than I can take a deep breath or put my hands on my head -         Intervention(s)  Therapist will help patient identify warning signs and signals, and will guide the patient in identifying skillful ways to intervene when exeriencing warning signs and signals.      Goal 2: Patient will learn new parenting strategies to help with managing parenting challenges.  Parent will work on improving relationship with her daughter and defining what she would like this relationship to look like.      I will know I've met my goal when I'm enjoying time with my daughter, teaching her new things, and not waiting for things to change      Objective #A (Patient Action)    Status: Continued - Date(s):4/23/2020     Patient will increase understanding of developmental norms for her daughter and ways to manage challenging behaviors.    Intervention(s)  Therapist will provide psychoeducation on developmental norms and parenting " "strategies.    Objective #B  Patient will spend time reflecting on her relationship with her daughter and defining what she would like this relaitonship to look like.    Status: Continued - Date(s):4/23/2020   Update: 7/8/2020 - \"I think some things are going better, she's more helpful with things.  Some things are more challenging with the pandemic and thinking of things to do with her\".    Update: 10/08/2020: Patient is making progress on learning and practicing strategies to manage parenting challenges.    Reviewed: 1/27/2021: \"I feel like things have gotten better with this, we've figured out a good routine, I'm doing meditation, I'm enjoying my time with my daughter more than I used to\".   4/27/2021 - Challenges of parenting during a pandemic, working with occupational therapist on strategies to help daughter, 7/29/21 - She's been working on incorporating more structure into the daily routine, as well as managing her emotional reaction when feeling frustrated.  She's also worked on managing her mindset and expectations.  She's been experiencing some frustrations due to two of the OT's she's worked with has recently resigned, resulting in transitioning to several new providers in a short period of time.  I've encouraged she continue to work closely with her daughter's pediatrician to get connected to resources and supports to help with managing her daughter's difficulties.    11/10/2021 - Feels like they've had some really good interactions, though things have been more challenging at school and home (waiting for evaluations).  Daughter enjoys when she reads to her, they've had some really good interactions recently.    2/9/2022 - unable to fully assess progress on this goal today, will further assess progress on this goal and if any treatment needs remain -   5/3/2022- this is going well - sometimes it is hard when she wants me to play the same thing over and over with her, but overall it is better - having " "more fun as it gets older -         Intervention(s)  Therapist will guide the patient in reflecting upon her relationship with her daughter and help her define ways to move towards what she vaues in her relationship with her daughter.    Objective #C  Patient will increase time spent on fun activity and play with her daughter.  Status: Continued - Date(s):4/23/2020 7/8/2020 -   10/08/2020 - will further assess this goal at next session   Reviewed: 1/27/2021 - also going really well, we play together all of the time now\", she finds herself enjoying their time together.   4/27/2021- she will spend some time reflecting upon this and we will discuss further next time.  They have several fun things planned for the future, as well as she has worked hard during the pandemic to plan structure and time together within COVID restrictions.  7/29/21 - She's seen some positive changes with being able to spend time with grandparents and at the cabin since being vaccinated, but also has experienced some challenges with planning fun activities out of worries things won't go well and feeling low energy/motivation to do these things - working to address both of these obstacles.    11/10/2021 - They've been able to do more fun activities together - coloring, Lego sets,    2/9/2022 - unable to fully assess progress on this goal today, will further assess progress on this goal and if any  treatment needs remain -    5/3/2022 - really fun time doing legos, building, reading, drawing together        Intervention(s)  Therapist will guide the patient in identifying ways she can increase positive time with her daughter.  Therapist will also teach mindfulness strategies to help patient with being in the present moment when appropriate - .      Goal 3: Patiient will improve communication with her .      I will know I've met my goal when I feel less irritated with my  and communicate more openly with my .  I would like to " "be more assertive and less aggressive.   I would like to not avoid telling him things because I'm worried about his reaction or don't think he will react well.  I would like to be more present to the moment during times when this would be helpful.\"      Objective #A (Patient Action)    Status: Continued - Date(s):4/23/2020 7/8/2020 - suggested couples therapy to help with this.    Individual therapy focus - identify what makes it hard to be more honest and open   10/08/2020 - will further assess this goal at next session   Reviewed: 1/27/2021 - \"Things are better, I feel like we've worked really hard on how to respond when he's having a hard time and how to talk to him.  I'm doing better listening, being more empathetic, and not \".  4/27/2021 - She has been making progress on examining and updating unhelpful beliefs (e.g. feeling responsible for other's emotions, feeling like she needs to fix or change other's emotions) and has made good progress on identifying and asserting healthy boundaries, in addition to  her emotional response from her spouse's emotional response.  7/29/21 - will review at next session   11/10/2021 - Feeling more irritability recently.  Believes this area could use more attention.    2/9/2022 - continued goal -   5/3/2022- this one is hard to say because things are so different right now - he's not his normal self (due to cancer treatment) - I think I'm communicating things in a nice way        Patient will learn and practice at least two new interpersonal effectiveness strategies.    Intervention(s)  Therapist will teach strategies from DBT module on interpersonal effectiveness, and will assign homework to help reinforce skill development.      Patient has reviewed and agreed to the above plan.      Cristy Wilkinson PsyD, LP  Licensed Psychologist  January 6, 2020, reviewed on 4/23/2020, reviewed 7/8/2020, reviewed 10/08/2020, reviewed 1/27/2021, reviewed 4/27/2021, " "7/29/2021, 11/10/2021, 2/9/2022, 5/3/2022                                                                                                                                         [facility]                                            Progress Note    Patient Name: Idalia Hinojosa  Date: 6/15/2022         Service Type: Individual      Session Start Time: 2:06  pm  Session End Time: 2: 48 pm   Session Length:  42 minutes    Session #: 58    Attendees: Client     Service Modality: Video visit: - I.        Provider verified identity through the following two step process.  Patient provided:  Patient is known previously to provider    Telemedicine Visit: The patient's condition can be safely assessed and treated via synchronous audio and visual telemedicine encounter.      Reason for Telemedicine Visit: Services only offered telehealth    Originating Site (Patient Location): Patient's home    Distant Site (Provider Location): Provider Remote Setting- Home Office    Consent:  The patient/guardian has verbally consented to: the potential risks and benefits of telemedicine (telephone visit) versus in person care; bill my insurance or make self-payment for services provided; and responsibility for payment of non-covered services.     Patient would like the video invitation sent by:  My Chart    Mode of Communication:  Video Conference via [employer],     As the provider I attest to compliance with applicable laws and regulations related to telemedicine.         Treatment Plan Last Reviewed: 5/3/2022  PHQ-9/DAVID-7: 5/17/2022    DATA  Interactive Complexity: No  Crisis: No        Progress Since Last Session (Related to Symptoms / Goals / Homework):   Symptoms: reports her mood has been \"mostly pretty good\", feels tired today which she attributes to the rainy weather.      Homework: Achieved / completed to satisfaction. Successful- had several opportunities where she was able to practice staying calm when " "Kaylee was having a melt down and her  was \"cranky\". She's also been successful with her goals related to taking time to re-charge - she's been working out (including one \"bonus day\" where she didn't think she'd have time to work out but was able to), has been enjoying time reading, and played her oboe four times!           Episode of Care Goals: Satisfactory progress - ACTION (Actively working towards change); Intervened by reinforcing change plan / affirming steps taken.  Diagnostic assessment has been completed, treatment plan has been developed and patient has been making good progress on treatment goals.  The patient stated that her main goals for therapy would be to learn emotion regulation strategies (in particular, to help with when she is feeling upset/angry/frustrated/distressed).  The changes in her daily life due to COVID restrictions were a focus of our work together, and working through changes to routine as her daughter started  and she returned to work.  As she has been making progress, focus of treatment will shift to wellness planning (monitoring mood and symptoms, incorporating into routine what keeps her feeling well, review of triggers, how to manage set backs, etc.).  As her  has been diagnosed with cancer, treatment will also focus on providing her with support in managing the emotional distress and challenges related to this.       Current / Ongoing Stressors and Concerns:   Current:  Noticed that she spends a lot of time thinking about what others have and she doesn't, and that this makes her feel bad.  Managing symptoms of depression and anxiety, caregiver stress related to 's cancer diagnosis, and parenting challenges with daughter.  Her daughter has a neuropsych evaluation tomorrow, they have been waiting for this for quite some time and she's hopeful this will provide them with helpful info.       Ongoing:Managing daily routine which has contributed to some " "adjustment and parenting challenges, lack of effective emotion regulation strategies for managing daily frustrations and upsets and marital discord due to differences in parenting styles.       Treatment Objective(s) Addressed in This Session:     Increase mindful awareness of thoughts, impact on your emotions, and recognize opportunities to redirect thoughts.  Practice re-directing thoughts to a more helpful perspective.    Practice balanced thinking to counteract polarized/all or nothing thinking.    Practice miranda and self-compassion       Continue to review/practice/reinforce:  Identify obstacles and barriers to engaging in self-care and problem-solve ways to address obstacles and barriers  Learn and practice mindfulness strategies - \"observe, just notice\" and invite curiosity around what may be contributing and what may help  Practice calming strategies to help release tension in your body   Practice new script in the morning when feeling urge to not get out of bed.  What do you need to hear that helps you keep moving?   (\"Today is a new day\", focus on what you're grateful for, \"I will feel better\")  Identify ways to practice and incorporate self-care into routine.  Ask myself what I need.    Identify how others can help support you and practice asking for help/what you need  Identify and practice at least 2-3 strategies to help with regulating emotion   Learn and practice behavioral activation strategies (how to move forward with doing what will help you when you don't feel like doing it)  Be curious about your emotions - tune in to - what am I thinking?  What is going on around me?  What do I need?    Identify and practice at least two strategies that will help support your self-care during this challenging time  Learn and practice at least two new strategies for tolerating uncertainty  Practice mindfulness to the moment when your mind wanders to worrying about the unknown       Intervention:   CBT:  She " "noticed that she spends a lot of time thinking about what others might have that she doesn't, and that this leads her to feel jealous and bad.  Recognized the skill in her being aware of her thoughts and the impact it had on her emotions, which opens up opportunities to intervene differently.  She also recognized that she was able to successfully redirect to focusing on something she could do and enjoy - which made her feel a lot better.  Looked at ways to redirect her thinking - focus on \"the big picture - not just a small slice of the pie\", focus on what she's grateful for, focus on what she has, focus on what she can control.  She came up with an analogy - (\"taking my brain out of the jealous house\") - which she likened to how helpful it was for her to daughter to take her out of the house and bring her outside and into the garden when she was having a tough time the other day.  Recognized her ability to move from \"automatic thoughts\" to to interrupting and redirecting thought and behavior patterns that aren't helpful for her.      We also processed the successes she had in staying calm when her daughter was having a hard time.  She is finding good success with recognizing her ability to separate her emotions from others, choose a different response, choose a helpful reaction, and how in turn it really helps her daughter when she can stay calm.      She had a tough interaction with her spouse and had some frustration that this situation didn't go as well as some of the other incidents had.  Encouraged her to embrace the learning from this, find balanced thinking and the \"gray\" rather than all or nothing/polarized thinking, practice self-compassion and give herself some grade, and emphasized her continued efforts to try out skills in challenging situations, learn what works and what doesn't, and to recognize that some times certain skills will work better than others -and that no one is perfect!      ASSESSMENT: " Current Emotional / Mental Status (status of significant symptoms):   Risk status (Self / Other harm or suicidal ideation)   Patient denies current fears or concerns for personal safety.   Patient denies current or recent suicidal ideation or behaviors.    She continues to agree to use a safety plan should any safety concerns arise.  She also agrees to reach our to her spouse or a family member for help if needed, and/or access crisis/emergency resources.        Patientdenies current or recent homicidal ideation or behaviors.   Patient denies current or recent self injurious behavior or ideation.   Patient denies other safety concerns.   Patient Patient reports there has been no change in risk factors since their last session.     PatientPatient reports there has been no change in protective factors since their last session.     Recommended that patient call 911 or go to the local ED should there be a change in any of these risk factors.  She has previously been informed on how to contact [facility] crisis/COPE for urgent/crisis concerns 24/7.  Provided patient with crisis resources and she agrees to use safety plan should any safety concerns arise.      Professionals or agencies I can contact during a crisis:    Suicide Prevention Lifeline: 6-536-531-TALK (5364)    Crisis Text Line Service (available 24 hours a day, 7 days a week): Text [place] to 775593    Local Crisis Services: [facility]: [phone]    Call 911 or go to my nearest emergency department.        Appearance:   Appropriate    Eye Contact:   Good    Psychomotor Behavior: Normal    Attitude:   Cooperative    Orientation:   All   Speech    Rate / Production: Normal     Volume:  Normal    Mood:    Mood is overall improved, though feels tired today   Affect:    Congruent with mood    Thought Content:  Clear    Thought Form:  Coherent  Logical    Insight:    Good      Medication Review:   No changes to current psychiatric  "medication(s).        Medication Compliance:   Yes     Changes in Health Issues:  None reported         Chemical Use Review:   Substance Use: Chemical use reviewed, no active concerns identified.  She's mostly been abstaining from alcohol, noted that she had two occasions where she had one drink on the patio outside, enjoyed it, and it didn't negatively affect her mood as it has in times in the past.     Tobacco Use: No current tobacco use.      Diagnosis:  Anxiety disorder unspecified  Depression, unspecified     Collateral Reports Completed:   Not Applicable,      PLAN: (Patient Tasks / Therapist Tasks / Other)    1) Idalia identified the following goals:  Keep noticing if I'm feeling jealous of others, be aware of my thoughts, practice redirecting to what I'm grateful for, what I can control, what I can do.  Keep practicing skills to stay calm and separate my feelings from others when others around me are escalating (you can choose to stay at the train station while they get on the train)        Continue with: Take time daily to re-charge - work-out and downtime to read.  Get outside as much as possible - practice patience with Kaylee, (slow down, give myself time so I don't have to rush, be mindful of thoughts, focus on present, it is okay if it takes a little while).        2) If there is a crisis situation, remember you are not alone and that there are people who can help you twenty-four hours a day, seven days a week.  Call [facility] ([facility]): [phone].      3) Follow-up visit is scheduled for June 30th at 12:00 pm.  If urgent or crisis concerns arise prior to next scheduled appointment, please reach out to crisis resources, call 911, or go to the emergency department.      Cristy Wilkinson PsyD,   Licensed Psychologist  6/15/2022    Previous skills and strategies to remind self of and to do as needed:    Practice \"STOP\" technique when you recognize yourself feeling angry   Be a " " - what do you notice is happening when Kaylee feels upset?     Practice recognizing when feeling angry, and giving self permission to use calming and self-soothing strategies and take a break.   Look for the gray with thinking  slow down  take deep breaths  manage expectations of self and others.    Journal   Practice flexing \"flexiblity and creativity\" -   Continue the great work you are doing with your daily schedule.    Get outside every day.    Play your instrument.    Sing!    Continue with: \"It's just a thought\" - non-judgmental, observe, float down the river on a leaf, clouds in the gege, reframing unhelpful thoughts -   Keep working on being patient when things are tense around me.    Continue with observing/paying attention to warning signs and signals and give self persmission to slow down, especially during the morning routine with Kaylee.    Practice offerring Kaylee choices when appropriate.      Use \"my plan for success\" to help remind you of calming strategies to practice when feeling upset.    Practice decreasing overall vulnerability to emotion mind through getting adequate rest, nutrition, time for yourself, and physical activity.         Consider reading \"Fighting for your marriage\".      Technical Assistance for video visits:    Offer patient the website ([email or web address]) and/or phone number (688-388-9817) for video visit instructions/assistance if needed.                                             ____________________________________    Treatment Plan    Patient's Name: Idalia Hinojosa  YOB: 1980    Date of Creation: 1/6/2020  Date Treatment Plan Last Reviewed/Revised: 5/3/22    DSM5 Diagnoses: 300.00 (F41.9) Unspecified Anxiety Disorder, depressive disorder, unspecified  Psychosocial / Contextual Factors: strain in marital relationship, parenting challenges, adjustment challenges, 's cancer diagnosis  PROMIS (reviewed every 90 days):     Anticipated number of " "session for this episode of care: additional 15-20  Anticipation frequency of session: Weekly until symptoms stabilize, then can decrease the frequency of sessions to likely bi-weekly or once every three weeks  Anticipated Duration of each session: 38-52 minutes  Treatment plan will be reviewed in 90 days or when goals have been changed.       Referral / Collaboration:  We discussed a referral to a couples/marital therapist.  The patient is thinking about this and has talked with her  about the referral, but they are unsure if they would like to follow through at this time.      MeasurableTreatment Goal(s) related to diagnosis / functional impairment(s)  Goal 1: Patient will learn emotion regulation strategies to help when she is feeling upset/angry/frustrated/distressed    I will know I've met my goal when I would yell less, I would feel calmer, and I would not push others.  I would be less physical when I'm angry (e.g. wouldn't slam doors or hit things)       Objective #A (Patient Action)    Patient will learn and practice at least 2 new emotion regulation strategies.  Status: Continued - Date(s):4/23/2020 (has begun to learn new emotion regulation strategies and is making good progress)   Update: 7/8/2020: \"I've learned new strategies but I'm bad at practicing them\" - will help patient incorporate regular practice and identify and problem-solve barriers.    Reviewed: 10/8/2020: Patient has been successfully practicing emotion regulation strategies, is working on identifying earlier on when to intervene and engage strategies, as well as practice strategies on a regular basis to help strength use of strategies.    Reviewed and in progress: 1/27/2021: \"Overall things are going a lot better.  Sometimes I fall into old habits\", but recognizing when she's feeling more vulnerable to her emotions and reminds herself to use different skills/intervene in a different way.     4/27/2021-\"Things have definitely been " "better\".  Recognizing when feeling vulnerable (e.g. hungry), recognizing when emotions are escalating and need to engage calming strategies\".  Ask for help from Colia when feeling upset.  Can be challenging when Kaylee escalates when I'm feeling angry.    7/29/21 - She felt like she has forgotten what helps her when she's feeling upset, we have spent time reviewing and refreshing these skills.    11/10/2021 - She feels like for the most part things have been going well.    2/9/2022 - Helping her to apply emotion regulation strategies in the context of a new and unexpected stressor -   5/3/2022 - Update - Things have been better, I can tell when I feel overwhelmed or don't get breaks - that is when I start yelling or snapping, and overall things have been better.  I'm exercising more and that really helps with the stress.  I've been feeling more confident about myself because I've been exercising more.        Intervention(s)  Therapist will provide psychoeducation on emotion regulation module from [facility]BT and will assign patient homework to help reinforce skill development.    Objective #B  Patient will identify factors that increase vulnerability to emotion mind and identify ways to decrease vulnerability to emotion mind.  Status: Continued - Date(s):4/23/2020 (has learned factors that make her more vulnerable to emotion mind, and has been working on addressing vulnerability related to feeling hungry, tired and rushed)   Update: 7/8/20: \"This one is harder for me.  I know I get angry when I'm hungry, and when I slow down I'm less inclined to get angry\".  Challenges - not always having food ready, not always planning in advance, (planing breakfast the night before)  Reviewed: 10/8/2020: Patient's awareness has increased of factors that increase her vulnerability to emotion mind, and she has been using this as a signal to tell her what she needs.    Reviewed and resolved/complete: 1/27/2021: \"I feel like we've done really " "good work with this and I've gotten what I need from this\".  4/27/2021  - I am doing better with this - I know I get more irritable when I'm hungry and I'm trying to do better with this.  Drinking more water.      7/29/21 - Overall she had been feeling like things were going better with this, though over the past several weeks has felt more challenged due to increase in stressors.    11/10/2021 - Recognizing when feeling tired and how this can contribute to increase in vulnerability and ways to intervene.  Recognizing when feeling irritabiel - ask self if feeling hungry and eat if needed.    2/9/2022 - Self-care is an important focus right now - to ensure she is attending to her basic self-care needs while helping to take care of her  and his health -   5/3/2022 - I feel like I've mostly been doing better with eating and making sure not hungry.  Try to plan meals and snacks - sometimes this can be hard.          Intervention(s)  Therapist will provide information on factors that increase vulnerabiliyt to emotion mind.    Objective #C  Patient will identify warning signs and signals that emotions are escalating and will learn ways to skillfully intervene early on.  Status: New - Date: 01/06/2020 7/8/2020- in progress  Update: 10/08/2020: Patient has been making good progress on identifying signs and signals that her emotions are escalating.    Reviewed and in progress: 1/27/2021: \"I feel like we've done good work on this but this is one of the things I need to keep working on\".    7/29.21 - In progress - actively working on this -   11/10/2021 - Feels sometimes she is able to do this, other times it is harder  2/9/2022 - continued goal -   5/3/2022 - I do that sometimes - I do notice when I am getting more frustrated - that's been helping more - if I feel like I'm gettig more furstrated than I can take a deep breath or put my hands on my head -         Intervention(s)  Therapist will help patient identify " "warning signs and signals, and will guide the patient in identifying skillful ways to intervene when exeriencing warning signs and signals.      Goal 2: Patient will learn new parenting strategies to help with managing parenting challenges.  Parent will work on improving relationship with her daughter and defining what she would like this relationship to look like.      I will know I've met my goal when I'm enjoying time with my daughter, teaching her new things, and not waiting for things to change      Objective #A (Patient Action)    Status: Continued - Date(s):4/23/2020     Patient will increase understanding of developmental norms for her daughter and ways to manage challenging behaviors.    Intervention(s)  Therapist will provide psychoeducation on developmental norms and parenting strategies.    Objective #B  Patient will spend time reflecting on her relationship with her daughter and defining what she would like this relaitonship to look like.    Status: Continued - Date(s):4/23/2020   Update: 7/8/2020 - \"I think some things are going better, she's more helpful with things.  Some things are more challenging with the pandemic and thinking of things to do with her\".    Update: 10/08/2020: Patient is making progress on learning and practicing strategies to manage parenting challenges.    Reviewed: 1/27/2021: \"I feel like things have gotten better with this, we've figured out a good routine, I'm doing meditation, I'm enjoying my time with my daughter more than I used to\".   4/27/2021 - Challenges of parenting during a pandemic, working with occupational therapist on strategies to help daughter, 7/29/21 - She's been working on incorporating more structure into the daily routine, as well as managing her emotional reaction when feeling frustrated.  She's also worked on managing her mindset and expectations.  She's been experiencing some frustrations due to two of the OT's she's worked with has recently resigned, " "resulting in transitioning to several new providers in a short period of time.  I've encouraged she continue to work closely with her daughter's pediatrician to get connected to resources and supports to help with managing her daughter's difficulties.    11/10/2021 - Feels like they've had some really good interactions, though things have been more challenging at school and home (waiting for evaluations).  Daughter enjoys when she reads to her, they've had some really good interactions recently.    2/9/2022 - unable to fully assess progress on this goal today, will further assess progress on this goal and if any treatment needs remain -   5/3/2022- this is going well - sometimes it is hard when she wants me to play the same thing over and over with her, but overall it is better - having more fun as it gets older -         Intervention(s)  Therapist will guide the patient in reflecting upon her relationship with her daughter and help her define ways to move towards what she vaues in her relationship with her daughter.    Objective #C  Patient will increase time spent on fun activity and play with her daughter.  Status: Continued - Date(s):4/23/2020 7/8/2020 -   10/08/2020 - will further assess this goal at next session   Reviewed: 1/27/2021 - also going really well, we play together all of the time now\", she finds herself enjoying their time together.   4/27/2021- she will spend some time reflecting upon this and we will discuss further next time.  They have several fun things planned for the future, as well as she has worked hard during the pandemic to plan structure and time together within COVID restrictions.  7/29/21 - She's seen some positive changes with being able to spend time with grandparents and at the cabin since being vaccinated, but also has experienced some challenges with planning fun activities out of worries things won't go well and feeling low energy/motivation to do these things - working to " "address both of these obstacles.    11/10/2021 - They've been able to do more fun activities together - coloring, Lego sets,    2/9/2022 - unable to fully assess progress on this goal today, will further assess progress on this goal and if any  treatment needs remain -    5/3/2022 - really fun time doing legos, building, reading, drawing together        Intervention(s)  Therapist will guide the patient in identifying ways she can increase positive time with her daughter.  Therapist will also teach mindfulness strategies to help patient with being in the present moment when appropriate - .      Goal 3: Patiient will improve communication with her .      I will know I've met my goal when I feel less irritated with my  and communicate more openly with my .  I would like to be more assertive and less aggressive.   I would like to not avoid telling him things because I'm worried about his reaction or don't think he will react well.  I would like to be more present to the moment during times when this would be helpful.\"      Objective #A (Patient Action)    Status: Continued - Date(s):4/23/2020 7/8/2020 - suggested couples therapy to help with this.    Individual therapy focus - identify what makes it hard to be more honest and open   10/08/2020 - will further assess this goal at next session   Reviewed: 1/27/2021 - \"Things are better, I feel like we've worked really hard on how to respond when he's having a hard time and how to talk to him.  I'm doing better listening, being more empathetic, and not \".  4/27/2021 - She has been making progress on examining and updating unhelpful beliefs (e.g. feeling responsible for other's emotions, feeling like she needs to fix or change other's emotions) and has made good progress on identifying and asserting healthy boundaries, in addition to  her emotional response from her spouse's emotional response.  7/29/21 - will review at next session "   11/10/2021 - Feeling more irritability recently.  Believes this area could use more attention.    2/9/2022 - continued goal -   5/3/2022- this one is hard to say because things are so different right now - he's not his normal self (due to cancer treatment) - I think I'm communicating things in a nice way        Patient will learn and practice at least two new interpersonal effectiveness strategies.    Intervention(s)  Therapist will teach strategies from DBT module on interpersonal effectiveness, and will assign homework to help reinforce skill development.      Patient has reviewed and agreed to the above plan.      Cristy Wilkinson PsyD, LP  Licensed Psychologist  January 6, 2020, reviewed on 4/23/2020, reviewed 7/8/2020, reviewed 10/08/2020, reviewed 1/27/2021, reviewed 4/27/2021, 7/29/2021, 11/10/2021, 2/9/2022, 5/3/2022                                                                                                                          Answers for HPI/ROS submitted by the patient on 6/30/2022  If you checked off any problems, how difficult have these problems made it for you to do your work, take care of things at home, or get along with other people?: Somewhat difficult  PHQ9 TOTAL SCORE: 2

## 2022-07-25 DIAGNOSIS — J30.0 CHRONIC VASOMOTOR RHINITIS: ICD-10-CM

## 2022-07-25 RX ORDER — IPRATROPIUM BROMIDE 42 UG/1
2 SPRAY, METERED NASAL 4 TIMES DAILY PRN
Qty: 15 ML | Refills: 5 | OUTPATIENT
Start: 2022-07-25

## 2022-07-25 NOTE — TELEPHONE ENCOUNTER
Refused Prescriptions:                       Disp   Refills    ipratropium (ATROVENT) 0.06 % nasal spray  15 mL  5        Sig: Spray 2 sprays into both nostrils 4 times daily as           needed for rhinitis  Refused By: AGNES KELLY  Reason for Refusal: Should already have refills on file  Reason for Refusal Comment: See rx sent on 6/21/22 with 5 refills    Agnes Kelly RN

## 2022-07-28 ENCOUNTER — VIRTUAL VISIT (OUTPATIENT)
Dept: PSYCHOLOGY | Facility: CLINIC | Age: 42
End: 2022-07-28
Payer: COMMERCIAL

## 2022-07-28 DIAGNOSIS — F41.9 ANXIETY DISORDER, UNSPECIFIED TYPE: Primary | ICD-10-CM

## 2022-07-28 DIAGNOSIS — F32.A DEPRESSION, UNSPECIFIED DEPRESSION TYPE: ICD-10-CM

## 2022-07-28 PROCEDURE — 90834 PSYTX W PT 45 MINUTES: CPT | Mod: GT | Performed by: PSYCHOLOGIST

## 2022-07-28 NOTE — PROGRESS NOTES
[facility]                                            Progress Note    Patient Name: Idalia Hinojosa  Date: 2022           Service Type: Individual      Session Start Time: 12:03  pm  Session End Time: 12:54 pm   Session Length:  51 minutes    Session #: 62    Attendees: Client     Service Modality: Video visit: - I, shifted to telephone visit near end of the session due to technology difficulties.        Provider verified identity through the following two step process.  Patient provided:  Patient is known previously to provider    Telemedicine Visit: The patient's condition can be safely assessed and treated via synchronous audio and visual telemedicine encounter.      Reason for Telemedicine Visit: Services only offered telehealth    Originating Site (Patient Location): Patient's home    Distant Site (Provider Location): Provider Remote Setting- Home Office    Consent:  The patient/guardian has verbally consented to: the potential risks and benefits of telemedicine (telephone visit) versus in person care; bill my insurance or make self-payment for services provided; and responsibility for payment of non-covered services.     Patient would like the video invitation sent by:  My Chart    Mode of Communication:  Video Conference via [facility],     As the provider I attest to compliance with applicable laws and regulations related to telemedicine.         Treatment Plan Last Reviewed: 5/3/2022  PHQ-9/DAVID-7: 2022      DATA  Interactive Complexity: No  Crisis: No        Progress Since Last Session (Related to Symptoms / Goals / Homework):   Symptoms:  She reported that it has been a challenging two weeks.  Things have been busy at work.  Her daughter's has had more challenging behaviors.  Her sister-in-law had a baby and this triggered some difficulties feelings from her own experience of childbirth and having a .      Homework: Achieved / completed to satisfaction.          Episode of Care Goals: Satisfactory progress - ACTION (Actively working towards change); Intervened by reinforcing change plan / affirming steps taken.  Diagnostic assessment has been completed, treatment plan has been developed and patient has been making good progress on treatment goals.  The patient stated that her main goals for therapy would be to learn emotion regulation strategies (in particular, to help with when she is feeling upset/angry/frustrated/distressed).  The changes in her daily life due to COVID restrictions were a focus of our work together, and working through changes to routine as her daughter started  and she returned to work.  As she has been making progress, focus of treatment will shift to wellness planning (monitoring mood and symptoms, incorporating into routine what keeps her feeling well, review of triggers, how to manage set backs, etc.).  As her  has been diagnosed with cancer, treatment will also focus on providing her with support in managing the emotional distress and challenges related to this.       Current / Ongoing Stressors and Concerns:   Current: They learned her  will have to have additional chemo treatments, which has been stressful.  They have to postpone a trip they planned for the end of the summer to celebrate his completion of chemo.  Work has been more busy, and daughter has had more challenging behaviors at home.        Ongoing:Managing daily routine which has contributed to some adjustment and parenting challenges, lack of effective emotion regulation strategies for managing daily frustrations and upsets and marital discord due to differences in parenting styles.       Treatment Objective(s) Addressed in This Session:     Learn and practice mindfulness strategies - redirect back to present moment, focus on the facts   Emotion regulation strategies - learn and practice 1-2 emotion regulation strategies  Identify ways to practice and  "incorporate self-care into routine.    Identify and enforce healthy emotional boundaries (it is ok to feel different than my spouse does, it is okay for him to have his feelings and to not feel like I have to fix or change how he is feeling)      Continue to review/practice/reinforce:  Continue to practice awareness of your emotions and physical sensations, to help tune in to what you need  Identify opportunities to access support and help from others  Mindful awareness of thoughts, impact on your emotions, and recognize opportunities to redirect thoughts.  Practice re-directing thoughts to a more helpful perspective.    Practice balanced thinking to counteract polarized/all or nothing thinking.         Intervention:   DBT: Mindfulness strategies - we talked about ways to practice mindfulness to help her redirect thoughts and feelings back to the present moment, and how to \"focus on the facts\" to help when worrying about the future, uncertainity.      She also processed feelings she's had related to her sister-in law delivering, given how difficult her own experience was.  We also spent time reflecting upon and remembering her strength, resiliency, and the support she received, to help her remember the positives gained and her resiliency in getting through a difficult situation.      Helped her think through an upcoming trip to the cabin and ways to help structure time for daughter, and proactively review expectations related to managing upsets and frustrations, as well as continue to embrace the \"learning\".        ASSESSMENT: Current Emotional / Mental Status (status of significant symptoms):   Risk status (Self / Other harm or suicidal ideation)   Patient denies current fears or concerns for personal safety.   Patient denies current or recent suicidal ideation or behaviors.    She continues to agree to use a safety plan should any safety concerns arise.  She also agrees to reach our to her spouse or a family " member for help if needed, and/or access crisis/emergency resources.        Patientdenies current or recent homicidal ideation or behaviors.   Patient denies current or recent self injurious behavior or ideation.   Patient denies other safety concerns.   Patient Patient reports there has been no change in risk factors since their last session.     PatientPatient reports there has been no change in protective factors since their last session.     Recommended that patient call 911 or go to the local ED should there be a change in any of these risk factors.  She has previously been informed on how to contact [facility] crisis/COPE for urgent/crisis concerns 24/7.  Provided patient with crisis resources and she agrees to use safety plan should any safety concerns arise.      Professionals or agencies I can contact during a crisis:    Suicide Prevention Lifeline: 2-763-217-DNKB (5812)    Crisis Text Line Service (available 24 hours a day, 7 days a week): Text [place] to 762509    [facility] Crisis Services: [facility] Crisis Services: [phone]    Call 911 or go to my nearest emergency department.        Appearance:   Appropriate    Eye Contact:   Good    Psychomotor Behavior: Normal    Attitude:   Cooperative    Orientation:   All   Speech    Rate / Production: Normal     Volume:  Normal    Mood:    Reports this past week she has felt less stressed and anxious,  mood has been improved   Affect:    Congruent with mood    Thought Content:  Clear    Thought Form:  Coherent  Logical    Insight:    Good      Medication Review:   No changes to current psychiatric medication(s).        Medication Compliance:   Yes     Changes in Health Issues:  None reported         Chemical Use Review:   Substance Use: Chemical use reviewed, no changes or active concerns identified.      Tobacco Use: No current tobacco use.      Diagnosis:  Anxiety disorder unspecified  Depression, unspecified             PLAN: (Patient Tasks / Therapist  "Tasks / Other)    1) Idalia identified the following goals:  Idalia identified the following goals: Practice compassionate, non-judgemental stance towards yourself.  Give myself persmission to keep good emotional boundaries -  REmembering everyone can have their own personal ex[perience - even if I didn't like my experience, it is ok, I didn't do anything to cause it,       2) If there is a crisis situation, remember you are not alone and that there are people who can help you twenty-four hours a day, seven days a week.  Call [facility] ([facility] Crisis Services): [phone].      3) Follow-up visit is scheduled for August 4th at 12:00.  If urgent or crisis concerns arise prior to next scheduled appointment, please reach out to crisis resources, call 911, or go to the emergency department.      Cristy Wilkinson PsyD,   Licensed Psychologist  7/28/22          Previous skills and strategies to remind self of and to do as needed:    Practice \"STOP\" technique when you recognize yourself feeling angry   Be a  - what do you notice is happening when Kaylee feels upset?     Practice recognizing when feeling angry, and giving self permission to use calming and self-soothing strategies and take a break.   Look for the gray with thinking  slow down  take deep breaths  manage expectations of self and others.    Journal   Practice flexing \"flexiblity and creativity\" -   Continue the great work you are doing with your daily schedule.    Get outside every day.    Play your instrument.    Sing!    Continue with: \"It's just a thought\" - non-judgmental, observe, float down the river on a leaf, clouds in the gege, reframing unhelpful thoughts -   Keep working on being patient when things are tense around me.    Continue with observing/paying attention to warning signs and signals and give self persmission to slow down, especially during the morning routine with Kaylee.    Practice offerring Kaylee choices when appropriate.    " "  Use \"my plan for success\" to help remind you of calming strategies to practice when feeling upset.    Practice decreasing overall vulnerability to emotion mind through getting adequate rest, nutrition, time for yourself, and physical activity.         Consider reading \"Fighting for your marriage\".      Technical Assistance for video visits:    Offer patient the website ([email or web address]) and/or phone number ([phone]) for video visit instructions/assistance if needed.                                             ____________________________________    Treatment Plan    Patient's Name: Idalia Hinojosa  YOB: 1980    Date of Creation: 1/6/2020  Date Treatment Plan Last Reviewed/Revised: 5/3/22    DSM5 Diagnoses: 300.00 (F41.9) Unspecified Anxiety Disorder, depressive disorder, unspecified  Psychosocial / Contextual Factors: strain in marital relationship, parenting challenges, adjustment challenges, 's cancer diagnosis  PROMIS (reviewed every 90 days):     Anticipated number of session for this episode of care: additional 15-20  Anticipation frequency of session: Weekly until symptoms stabilize, then can decrease the frequency of sessions to likely bi-weekly or once every three weeks  Anticipated Duration of each session: 38-52 minutes  Treatment plan will be reviewed in 90 days or when goals have been changed.       Referral / Collaboration:  We discussed a referral to a couples/marital therapist.  The patient is thinking about this and has talked with her  about the referral, but they are unsure if they would like to follow through at this time.      MeasurableTreatment Goal(s) related to diagnosis / functional impairment(s)  Goal 1: Patient will learn emotion regulation strategies to help when she is feeling upset/angry/frustrated/distressed    I will know I've met my goal when I would yell less, I would feel calmer, and I would not push others.  I would be less physical " "when I'm angry (e.g. wouldn't slam doors or hit things)       Objective #A (Patient Action)    Patient will learn and practice at least 2 new emotion regulation strategies.  Status: Continued - Date(s):4/23/2020 (has begun to learn new emotion regulation strategies and is making good progress)   Update: 7/8/2020: \"I've learned new strategies but I'm bad at practicing them\" - will help patient incorporate regular practice and identify and problem-solve barriers.    Reviewed: 10/8/2020: Patient has been successfully practicing emotion regulation strategies, is working on identifying earlier on when to intervene and engage strategies, as well as practice strategies on a regular basis to help strength use of strategies.    Reviewed and in progress: 1/27/2021: \"Overall things are going a lot better.  Sometimes I fall into old habits\", but recognizing when she's feeling more vulnerable to her emotions and reminds herself to use different skills/intervene in a different way.     4/27/2021-\"Things have definitely been better\".  Recognizing when feeling vulnerable (e.g. hungry), recognizing when emotions are escalating and need to engage calming strategies\".  Ask for help from Colia when feeling upset.  Can be challenging when Kaylee escalates when I'm feeling angry.    7/29/21 - She felt like she has forgotten what helps her when she's feeling upset, we have spent time reviewing and refreshing these skills.    11/10/2021 - She feels like for the most part things have been going well.    2/9/2022 - Helping her to apply emotion regulation strategies in the context of a new and unexpected stressor -   5/3/2022 - Update - Things have been better, I can tell when I feel overwhelmed or don't get breaks - that is when I start yelling or snapping, and overall things have been better.  I'm exercising more and that really helps with the stress.  I've been feeling more confident about myself because I've been exercising more.  " "      Intervention(s)  Therapist will provide psychoeducation on emotion regulation module from DBT and will assign patient homework to help reinforce skill development.    Objective #B  Patient will identify factors that increase vulnerability to emotion mind and identify ways to decrease vulnerability to emotion mind.  Status: Continued - Date(s):4/23/2020 (has learned factors that make her more vulnerable to emotion mind, and has been working on addressing vulnerability related to feeling hungry, tired and rushed)   Update: 7/8/20: \"This one is harder for me.  I know I get angry when I'm hungry, and when I slow down I'm less inclined to get angry\".  Challenges - not always having food ready, not always planning in advance, (planing breakfast the night before)  Reviewed: 10/8/2020: Patient's awareness has increased of factors that increase her vulnerability to emotion mind, and she has been using this as a signal to tell her what she needs.    Reviewed and resolved/complete: 1/27/2021: \"I feel like we've done really good work with this and I've gotten what I need from this\".  4/27/2021  - I am doing better with this - I know I get more irritable when I'm hungry and I'm trying to do better with this.  Drinking more water.      7/29/21 - Overall she had been feeling like things were going better with this, though over the past several weeks has felt more challenged due to increase in stressors.    11/10/2021 - Recognizing when feeling tired and how this can contribute to increase in vulnerability and ways to intervene.  Recognizing when feeling irritabiel - ask self if feeling hungry and eat if needed.    2/9/2022 - Self-care is an important focus right now - to ensure she is attending to her basic self-care needs while helping to take care of her  and his health -   5/3/2022 - I feel like I've mostly been doing better with eating and making sure not hungry.  Try to plan meals and snacks - sometimes this can " "be hard.          Intervention(s)  Therapist will provide information on factors that increase vulnerabiliyt to emotion mind.    Objective #C  Patient will identify warning signs and signals that emotions are escalating and will learn ways to skillfully intervene early on.  Status: New - Date: 01/06/2020 7/8/2020- in progress  Update: 10/08/2020: Patient has been making good progress on identifying signs and signals that her emotions are escalating.    Reviewed and in progress: 1/27/2021: \"I feel like we've done good work on this but this is one of the things I need to keep working on\".    7/29.21 - In progress - actively working on this -   11/10/2021 - Feels sometimes she is able to do this, other times it is harder  2/9/2022 - continued goal -   5/3/2022 - I do that sometimes - I do notice when I am getting more frustrated - that's been helping more - if I feel like I'm gettig more furstrated than I can take a deep breath or put my hands on my head -         Intervention(s)  Therapist will help patient identify warning signs and signals, and will guide the patient in identifying skillful ways to intervene when exeriencing warning signs and signals.      Goal 2: Patient will learn new parenting strategies to help with managing parenting challenges.  Parent will work on improving relationship with her daughter and defining what she would like this relationship to look like.      I will know I've met my goal when I'm enjoying time with my daughter, teaching her new things, and not waiting for things to change      Objective #A (Patient Action)    Status: Continued - Date(s):4/23/2020     Patient will increase understanding of developmental norms for her daughter and ways to manage challenging behaviors.    Intervention(s)  Therapist will provide psychoeducation on developmental norms and parenting strategies.    Objective #B  Patient will spend time reflecting on her relationship with her daughter and defining " "what she would like this relaitonship to look like.    Status: Continued - Date(s):4/23/2020   Update: 7/8/2020 - \"I think some things are going better, she's more helpful with things.  Some things are more challenging with the pandemic and thinking of things to do with her\".    Update: 10/08/2020: Patient is making progress on learning and practicing strategies to manage parenting challenges.    Reviewed: 1/27/2021: \"I feel like things have gotten better with this, we've figured out a good routine, I'm doing meditation, I'm enjoying my time with my daughter more than I used to\".   4/27/2021 - Challenges of parenting during a pandemic, working with occupational therapist on strategies to help daughter, 7/29/21 - She's been working on incorporating more structure into the daily routine, as well as managing her emotional reaction when feeling frustrated.  She's also worked on managing her mindset and expectations.  She's been experiencing some frustrations due to two of the OT's she's worked with has recently resigned, resulting in transitioning to several new providers in a short period of time.  I've encouraged she continue to work closely with her daughter's pediatrician to get connected to resources and supports to help with managing her daughter's difficulties.    11/10/2021 - Feels like they've had some really good interactions, though things have been more challenging at school and home (waiting for evaluations).  Daughter enjoys when she reads to her, they've had some really good interactions recently.    2/9/2022 - unable to fully assess progress on this goal today, will further assess progress on this goal and if any treatment needs remain -   5/3/2022- this is going well - sometimes it is hard when she wants me to play the same thing over and over with her, but overall it is better - having more fun as it gets older -         Intervention(s)  Therapist will guide the patient in reflecting upon her " "relationship with her daughter and help her define ways to move towards what she vaues in her relationship with her daughter.    Objective #C  Patient will increase time spent on fun activity and play with her daughter.  Status: Continued - Date(s):4/23/2020 7/8/2020 -   10/08/2020 - will further assess this goal at next session   Reviewed: 1/27/2021 - also going really well, we play together all of the time now\", she finds herself enjoying their time together.   4/27/2021- she will spend some time reflecting upon this and we will discuss further next time.  They have several fun things planned for the future, as well as she has worked hard during the pandemic to plan structure and time together within COVID restrictions.  7/29/21 - She's seen some positive changes with being able to spend time with grandparents and at the cabin since being vaccinated, but also has experienced some challenges with planning fun activities out of worries things won't go well and feeling low energy/motivation to do these things - working to address both of these obstacles.    11/10/2021 - They've been able to do more fun activities together - coloring, Lego sets,    2/9/2022 - unable to fully assess progress on this goal today, will further assess progress on this goal and if any  treatment needs remain -    5/3/2022 - really fun time doing legos, building, reading, drawing together        Intervention(s)  Therapist will guide the patient in identifying ways she can increase positive time with her daughter.  Therapist will also teach mindfulness strategies to help patient with being in the present moment when appropriate - .      Goal 3: Patiient will improve communication with her .      I will know I've met my goal when I feel less irritated with my  and communicate more openly with my .  I would like to be more assertive and less aggressive.   I would like to not avoid telling him things because I'm worried " "about his reaction or don't think he will react well.  I would like to be more present to the moment during times when this would be helpful.\"      Objective #A (Patient Action)    Status: Continued - Date(s):4/23/2020 7/8/2020 - suggested couples therapy to help with this.    Individual therapy focus - identify what makes it hard to be more honest and open   10/08/2020 - will further assess this goal at next session   Reviewed: 1/27/2021 - \"Things are better, I feel like we've worked really hard on how to respond when he's having a hard time and how to talk to him.  I'm doing better listening, being more empathetic, and not \".  4/27/2021 - She has been making progress on examining and updating unhelpful beliefs (e.g. feeling responsible for other's emotions, feeling like she needs to fix or change other's emotions) and has made good progress on identifying and asserting healthy boundaries, in addition to  her emotional response from her spouse's emotional response.  7/29/21 - will review at next session   11/10/2021 - Feeling more irritability recently.  Believes this area could use more attention.    2/9/2022 - continued goal -   5/3/2022- this one is hard to say because things are so different right now - he's not his normal self (due to cancer treatment) - I think I'm communicating things in a nice way        Patient will learn and practice at least two new interpersonal effectiveness strategies.    Intervention(s)  Therapist will teach strategies from DBT module on interpersonal effectiveness, and will assign homework to help reinforce skill development.      Patient has reviewed and agreed to the above plan.      Cristy Wilkinson PsyD, LP  Licensed Psychologist  January 6, 2020, reviewed on 4/23/2020, reviewed 7/8/2020, reviewed 10/08/2020, reviewed 1/27/2021, reviewed 4/27/2021, 7/29/2021, 11/10/2021, 2/9/2022, 5/3/2022                                                                        "                                                                                                          Answers for HPI/ROS submitted by the patient on 6/30/2022

## 2022-08-04 ENCOUNTER — VIRTUAL VISIT (OUTPATIENT)
Dept: PSYCHOLOGY | Facility: CLINIC | Age: 42
End: 2022-08-04
Payer: COMMERCIAL

## 2022-08-04 DIAGNOSIS — F32.A DEPRESSION, UNSPECIFIED DEPRESSION TYPE: ICD-10-CM

## 2022-08-04 DIAGNOSIS — F41.9 ANXIETY DISORDER, UNSPECIFIED TYPE: Primary | ICD-10-CM

## 2022-08-04 PROCEDURE — 90834 PSYTX W PT 45 MINUTES: CPT | Mod: GT | Performed by: PSYCHOLOGIST

## 2022-08-04 ASSESSMENT — PATIENT HEALTH QUESTIONNAIRE - PHQ9
SUM OF ALL RESPONSES TO PHQ QUESTIONS 1-9: 5
SUM OF ALL RESPONSES TO PHQ QUESTIONS 1-9: 5
10. IF YOU CHECKED OFF ANY PROBLEMS, HOW DIFFICULT HAVE THESE PROBLEMS MADE IT FOR YOU TO DO YOUR WORK, TAKE CARE OF THINGS AT HOME, OR GET ALONG WITH OTHER PEOPLE: VERY DIFFICULT

## 2022-08-04 NOTE — PROGRESS NOTES
"           [facility]                                            Progress Note    Patient Name: Idalia Hinojosa  Date: 8/4/2022           Service Type: Individual      Session Start Time: 12:08  pm  Session End Time: 1:00 pm   Session Length:  52 minutes    Session #: 63    Attendees: Client     Service Modality: Video visit: -       Provider verified identity through the following two step process.  Patient provided:  Patient is known previously to provider    Telemedicine Visit: The patient's condition can be safely assessed and treated via synchronous audio and visual telemedicine encounter.      Reason for Telemedicine Visit: Services only offered telehealth    Originating Site (Patient Location): Patient's home    Distant Site (Provider Location): Provider Remote Setting- Home Office    Consent:  The patient/guardian has verbally consented to: the potential risks and benefits of telemedicine (telephone visit) versus in person care; bill my insurance or make self-payment for services provided; and responsibility for payment of non-covered services.     Patient would like the video invitation sent by:  My Chart    Mode of Communication:  Video Conference via [employer],     As the provider I attest to compliance with applicable laws and regulations related to telemedicine.         Treatment Plan Last Reviewed: 5/3/2022  PHQ-9/DAVID-7: 08/04/22      DATA  Interactive Complexity: No  Crisis: No        Progress Since Last Session (Related to Symptoms / Goals / Homework):   Symptoms:  She reported that she has been having a harder time over the last several weeks, feeling more frustrated, sad and irritable related to \"so many things that feel like they will never end\"- citing COVID and her spouse's cancer treatment.        Homework: Achieved / completed to satisfaction.  Has been successful with being active and going on bike rides - which has been really helpful, as well as enjoying music and " "Amando.  Her daughter had a sleepover at her parents house, which provided her with some time to herself.          Episode of Care Goals: Satisfactory progress - ACTION (Actively working towards change); Intervened by reinforcing change plan / affirming steps taken.  Diagnostic assessment has been completed, treatment plan has been developed and patient has been making good progress on treatment goals.  The patient stated that her main goals for therapy would be to learn emotion regulation strategies (in particular, to help with when she is feeling upset/angry/frustrated/distressed).  The changes in her daily life due to COVID restrictions were a focus of our work together, and working through changes to routine as her daughter started  and she returned to work.  As she has been making progress, focus of treatment will shift to wellness planning (monitoring mood and symptoms, incorporating into routine what keeps her feeling well, review of triggers, how to manage set backs, etc.).  As her  has been diagnosed with cancer, treatment will also focus on providing her with support in managing the emotional distress and challenges related to this.       Current / Ongoing Stressors and Concerns:   Current: Stress from learning her  will have to have additional chemo treatments.      Ongoing:Managing daily routine which has contributed to some adjustment and parenting challenges, lack of effective emotion regulation strategies for managing daily frustrations and upsets and marital discord due to differences in parenting styles.       Treatment Objective(s) Addressed in This Session:     Practice identifying - \"this is where I'm at right now and this is what I need\"  Reflection on what has helped in the past with being resilient during difficult times, and what do you believe will help you now with practicing resiliency during this difficult time?          Continue to " review/practice/reinforce:  Learn and practice mindfulness strategies - redirect back to present moment, focus on the facts   Emotion regulation strategies - learn and practice 1-2 emotion regulation strategies  Identify ways to practice and incorporate self-care into routine.    Identify and enforce healthy emotional boundaries (it is ok to feel different than my spouse does, it is okay for him to have his feelings and to not feel like I have to fix or change how he is feeling)  Continue to practice awareness of your emotions and physical sensations, to help tune in to what you need  Identify opportunities to access support and help from others  Mindful awareness of thoughts, impact on your emotions, and recognize opportunities to redirect thoughts.  Practice re-directing thoughts to a more helpful perspective.    Practice balanced thinking to counteract polarized/all or nothing thinking.         Intervention:   Solution-Focused: She reflected upon the difficulties she is experiencing with the unexpected news regarding her spouse's cancer treatment.  Likened the journey to running a race and thinking about what kind of rest stops she needs along the way, and what she needs from the rest stops, to help her to re-fuel and keep going, focusing on what does she need to hear?   What does she need to do?  What does she need to think?  She also reflected upon difficult times in the past where she's been resilient and what helped her, what she learned about herself, what she thinks would be helpful to connect with now.  She identified the following: talk to spouse to get his input regarding her taking time off from work, talk to parents about their ability to help provide childcare, listen to music she enjoys that lifts her up, exercise, go biking just to bike and without needing a specific destination, move body, practice a flexible mindset, remind yourself you're a fighter, let go of expectations that I need a specific  date of when things will be better, choose hope.        ASSESSMENT: Current Emotional / Mental Status (status of significant symptoms):   Risk status (Self / Other harm or suicidal ideation)   Patient denies current fears or concerns for personal safety.   Patient denies current or recent suicidal ideation or behaviors.    She continues to agree to use a safety plan should any safety concerns arise.  She also agrees to reach our to her spouse or a family member for help if needed, and/or access crisis/emergency resources.        Patientdenies current or recent homicidal ideation or behaviors.   Patient denies current or recent self injurious behavior or ideation.   Patient denies other safety concerns.   Patient Patient reports there has been no change in risk factors since their last session.     PatientPatient reports there has been no change in protective factors since their last session.     Recommended that patient call 911 or go to the local ED should there be a change in any of these risk factors.  She has previously been informed on how to contact [facility] crisis/COPE for urgent/crisis concerns 24/7.  Provided patient with crisis resources and she agrees to use safety plan should any safety concerns arise.      Professionals or agencies I can contact during a crisis:    Suicide Prevention Lifeline: 7-222-856-TALK (7452)    Crisis Text Line Service (available 24 hours a day, 7 days a week): Text [place] to 137480    Local Crisis Services: [facility]: [phone]    Call 911 or go to my nearest emergency department.        Appearance:   Appropriate    Eye Contact:   Good    Psychomotor Behavior: Normal    Attitude:   Cooperative    Orientation:   All   Speech    Rate / Production: Normal     Volume:  Normal    Mood:    Reports this past week has been hard, feels more stressed, anxious and sad   Affect:    Congruent with mood    Thought Content:  Clear    Thought Form:  Coherent  Logical  "   Insight:    Good      Medication Review:   No changes to current psychiatric medication(s).        Medication Compliance:   Yes     Changes in Health Issues:  None reported         Chemical Use Review:   Substance Use: Chemical use reviewed, no changes or active concerns identified.      Tobacco Use: No current tobacco use.      Diagnosis:  Anxiety disorder unspecified  Depression, unspecified             PLAN: (Patient Tasks / Therapist Tasks / Other)    1) Idalia identified the following goals: listen to music that you enjoy and that lifts you up, exercise, go biking just to bike and without needing a specific destination, move body, practice a flexible mindset, remind yourself you're a fighter, let go of expectations that I need a specific date of when things will be better, choose hope      2) If there is a crisis situation, remember you are not alone and that there are people who can help you twenty-four hours a day, seven days a week.  Call [facility] ([facility]): [phone].      3) Follow-up visit is scheduled for August 11th at 12:00 pm.  If urgent or crisis concerns arise prior to next scheduled appointment, please reach out to crisis resources, call 911, or go to the emergency department.      Cristy Wilkinson PsyD, LP  Licensed Psychologist  8/4/22          Previous skills and strategies to remind self of and to do as needed:    Practice \"STOP\" technique when you recognize yourself feeling angry   Be a  - what do you notice is happening when Kaylee feels upset?     Practice recognizing when feeling angry, and giving self permission to use calming and self-soothing strategies and take a break.   Look for the gray with thinking  slow down  take deep breaths  manage expectations of self and others.    Journal   Practice flexing \"flexiblity and creativity\" -   Continue the great work you are doing with your daily schedule.    Get outside every day.    Play your instrument.    Sing!  " "  Continue with: \"It's just a thought\" - non-judgmental, observe, float down the river on a leaf, clouds in the gege, reframing unhelpful thoughts -   Keep working on being patient when things are tense around me.    Continue with observing/paying attention to warning signs and signals and give self persmission to slow down, especially during the morning routine with Kaylee.    Practice offerring Kaylee choices when appropriate.      Use \"my plan for success\" to help remind you of calming strategies to practice when feeling upset.    Practice decreasing overall vulnerability to emotion mind through getting adequate rest, nutrition, time for yourself, and physical activity.         Consider reading \"Fighting for your marriage\".      Technical Assistance for video visits:    Offer patient the website ([email or web address]) and/or phone number (610-742-3754) for video visit instructions/assistance if needed.                                             ____________________________________    Treatment Plan    Patient's Name: Idalia Hinojosa  YOB: 1980    Date of Creation: 1/6/2020  Date Treatment Plan Last Reviewed/Revised: 8/4/22  DSM5 Diagnoses: 300.00 (F41.9) Unspecified Anxiety Disorder, depressive disorder, unspecified  Psychosocial / Contextual Factors: strain in marital relationship, parenting challenges, adjustment challenges, 's cancer diagnosis  PROMIS (reviewed every 90 days):     Anticipated number of session for this episode of care: additional 15-20  Anticipation frequency of session: Weekly until symptoms stabilize, then can decrease the frequency of sessions to likely bi-weekly or once every three weeks  Anticipated Duration of each session: 38-52 minutes  Treatment plan will be reviewed in 90 days or when goals have been changed.       Referral / Collaboration:  We discussed a referral to a couples/marital therapist.  The patient is thinking about this and has talked with her "  about the referral, but they are unsure if they would like to follow through at this time.      MeasurableTreatment Goal(s) related to diagnosis / functional impairment(s)  Goal 1: Patient will learn emotion regulation strategies to help when she is feeling upset/angry/frustrated/distressed    I will know I've met my goal when I would yell less, I would feel calmer, and I would not push others.  I would be less physical when I'm angry (e.g. wouldn't slam doors or hit things)       Objective #A (Patient Action)    Patient will learn and practice at least 2 new emotion regulation strategies.  Status: Continued - Date(s):    8/4/22- Regularly practicing emotion regulation strategies to help manage increase in emotional distress related to spouse's cancer diagnosis      Intervention(s)  Therapist will provide psychoeducation on emotion regulation module from DBT and will assign patient homework to help reinforce skill development.    Objective #B  Patient will identify factors that increase vulnerability to emotion mind and identify ways to decrease vulnerability to emotion mind.  Status: Continued - Date(s):8/4/22 - routinely incorporating mindfulness and self-awareness strategies to increase attention and focus to factors that increase vulnerability to emotion mind        Intervention(s)  Therapist will provide information on factors that increase vulnerabiliyt to emotion mind.    Objective #C  Patient will identify warning signs and signals that emotions are escalating and will learn ways to skillfully intervene early on.  Status: New - Date: 8/4/22 - in progress -         Intervention(s)  Therapist will help patient identify warning signs and signals, and will guide the patient in identifying skillful ways to intervene when exeriencing warning signs and signals.      Goal 2: Patient will learn new parenting strategies to help with managing parenting challenges.  Parent will work on improving relationship  "with her daughter and defining what she would like this relationship to look like.      I will know I've met my goal when I'm enjoying time with my daughter, teaching her new things, and not waiting for things to change      Objective #A (Patient Action)    Status: Continued - Date(s):8/4/22    Patient will increase understanding of developmental norms for her daughter and ways to manage challenging behaviors.    Intervention(s)  Therapist will provide psychoeducation on developmental norms and parenting strategies.    Objective #B  Patient will spend time reflecting on her relationship with her daughter and defining what she would like this relaitonship to look like.    Status: Continued - Date(s):8/4/22 - Making good progress -         Intervention(s)  Therapist will guide the patient in reflecting upon her relationship with her daughter and help her define ways to move towards what she vaues in her relationship with her daughter.    Objective #C  Patient will increase time spent on fun activity and play with her daughter.  Status: Continued - Date(s):8/4/22- has been enjoying time with daughter and devoting regular time to fun activities and play together        Intervention(s)  Therapist will guide the patient in identifying ways she can increase positive time with her daughter.  Therapist will also teach mindfulness strategies to help patient with being in the present moment when appropriate - .      Goal 3: Patiient will improve communication with her .      I will know I've met my goal when I feel less irritated with my  and communicate more openly with my .  I would like to be more assertive and less aggressive.   I would like to not avoid telling him things because I'm worried about his reaction or don't think he will react well.  I would like to be more present to the moment during times when this would be helpful.\"      Objective #A (Patient Action)    Status: Continued - " Date(s):8/4/22 - continues to work on this goal         Patient will learn and practice at least two new interpersonal effectiveness strategies.    Intervention(s)  Therapist will teach strategies from DBT module on interpersonal effectiveness, and will assign homework to help reinforce skill development.      Patient has reviewed and agreed to the above plan.      Cristy Wilkinson PsyD,   Licensed Psychologist  January 6, 2020, reviewed on 4/23/2020, reviewed 7/8/2020, reviewed 10/08/2020, reviewed 1/27/2021, reviewed 4/27/2021, 7/29/2021, 11/10/2021, 2/9/2022, 5/3/2022, 8/4/22                                                                                                                                         Answers for HPI/ROS submitted by the patient on 8/4/2022  If you checked off any problems, how difficult have these problems made it for you to do your work, take care of things at home, or get along with other people?: Very difficult  PHQ9 TOTAL SCORE: 5                                            Answers for HPI/ROS submitted by the patient on 6/30/2022

## 2022-08-08 ENCOUNTER — MYC MEDICAL ADVICE (OUTPATIENT)
Dept: OBGYN | Facility: CLINIC | Age: 42
End: 2022-08-08

## 2022-08-08 DIAGNOSIS — F32.5 MAJOR DEPRESSIVE DISORDER WITH SINGLE EPISODE, IN FULL REMISSION (H): ICD-10-CM

## 2022-08-08 NOTE — TELEPHONE ENCOUNTER
Patient reporting increased stress and irritability due to current family illness. On sertraline 50mg daily, seeing psychologist regularly. Wondering if increase in sertraline is recommended. RN cued up 100mg daily Rx.   Parris Ortiz RN

## 2022-08-11 ENCOUNTER — VIRTUAL VISIT (OUTPATIENT)
Dept: PSYCHOLOGY | Facility: CLINIC | Age: 42
End: 2022-08-11
Payer: COMMERCIAL

## 2022-08-11 DIAGNOSIS — F41.9 ANXIETY DISORDER, UNSPECIFIED TYPE: Primary | ICD-10-CM

## 2022-08-11 DIAGNOSIS — F32.A DEPRESSION, UNSPECIFIED DEPRESSION TYPE: ICD-10-CM

## 2022-08-11 PROCEDURE — 90834 PSYTX W PT 45 MINUTES: CPT | Mod: GT | Performed by: PSYCHOLOGIST

## 2022-08-11 NOTE — PROGRESS NOTES
[facility]                                            Progress Note    Patient Name: Idalia Hinojosa  Date: 8/11/2022           Service Type: Individual      Session Start Time: 12:08  pm  Session End Time: 1:00 pm   Session Length:  52 minutes    Session #: 64    Attendees: Client     Service Modality: Video visit: -       Provider verified identity through the following two step process.  Patient provided:  Patient is known previously to provider    Telemedicine Visit: The patient's condition can be safely assessed and treated via synchronous audio and visual telemedicine encounter.      Reason for Telemedicine Visit: Services only offered telehealth    Originating Site (Patient Location): Patient's home    Distant Site (Provider Location): Provider Remote Setting- Home Office    Consent:  The patient/guardian has verbally consented to: the potential risks and benefits of telemedicine (telephone visit) versus in person care; bill my insurance or make self-payment for services provided; and responsibility for payment of non-covered services.     Patient would like the video invitation sent by:  My Chart    Mode of Communication:  Video Conference via [employer],     As the provider I attest to compliance with applicable laws and regulations related to telemedicine.         Treatment Plan Last Reviewed: 8/4/22  PHQ-9/DAVID-7: 8/4/22      DATA  Interactive Complexity: No  Crisis: No        Progress Since Last Session (Related to Symptoms / Goals / Homework):   Symptoms:  She reported that symptoms of anxiety and stress continue to remain high, and her spouse's level of anxiety and worry regarding his cancer diagnosis has been high, which has been hard for her.         Homework: Achieved / completed to satisfaction.  Has been listening to music she enjoys, biking, and exercising - all which have been helpful.          Episode of Care Goals: Satisfactory progress - ACTION (Actively  working towards change); Intervened by reinforcing change plan / affirming steps taken.  Diagnostic assessment has been completed, treatment plan has been developed and patient has been making good progress on treatment goals.  The patient stated that her main goals for therapy would be to learn emotion regulation strategies (in particular, to help with when she is feeling upset/angry/frustrated/distressed).  The changes in her daily life due to COVID restrictions were a focus of our work together, and working through changes to routine as her daughter started  and she returned to work.  As she has been making progress, focus of treatment will shift to wellness planning (monitoring mood and symptoms, incorporating into routine what keeps her feeling well, review of triggers, how to manage set backs, etc.).  As her  has been diagnosed with cancer, treatment will also focus on providing her with support in managing the emotional distress and challenges related to this.       Current / Ongoing Stressors and Concerns:   Current: Stress from learning her  will have to have additional chemo treatments and heightened anxiety related to this.  She also reviewed that she has reached out to her PCP to inquire about a med adjustment, as she recalls during her last physical her PCP had indicated there was opportunity to increase her anti-depressant dosage if needed.  She noted that she has taken a higher dosage in the past when she was going through a difficult time and found the higher dosage to be helpful.  I let her know I will also continue to reach out to the PCP covering for Dr. Han to help coordinate care.         Ongoing:Managing daily routine which has contributed to some adjustment and parenting challenges, lack of effective emotion regulation strategies for managing daily frustrations and upsets and marital discord due to differences in parenting styles.       Treatment Objective(s)  "Addressed in This Session:     Identify when engaging in cognitive distortion of \"magical thinking\" (convincing myself if something happens it will make everything else better) and practice strategies to reframe \"magical thinking\"  Identify and enforce healthy emotional boundaries (it is ok to feel different than my spouse does, it is okay for him to have his feelings and to not feel like I have to fix or change how he is feeling)      Continue to review/practice/reinforce:  Continue to practice awareness of your emotions and physical sensations, to help tune in to what you need  Identify opportunities to access support and help from others  Mindful awareness of thoughts, impact on your emotions, and recognize opportunities to redirect thoughts.  Practice re-directing thoughts to a more helpful perspective.    Practice balanced thinking to counteract polarized/all or nothing thinking.    Learn and practice mindfulness strategies - redirect back to present moment, focus on the facts   Emotion regulation strategies - learn and practice 1-2 emotion regulation strategies  Identify ways to practice and incorporate self-care into routine.         Intervention:   CBT - She shared thinking patterns she has noticed over the past week - including predicting the outcome and 'magical thinking\" - (hyperfocusing on certain things - like moving into a bigger house, etc. and connecting it to the belief that everything would be better if X could happen...)  Identified ways to recognize, label and reframe cognitive distortions.      Solution-focused - She also processed how hard Kellee's level of anxiety has been, and in particular, things that he feels anxious about that she doesn't.  She will work on accepting his feelings and giving him permission to be where he is at, reminding herself she doesn't have to fix or change his feelings, she can offer help and support, it is okay for her to have a different reaction/feeling/thinking " than he does, and to identify what she needs to help herself.        ASSESSMENT: Current Emotional / Mental Status (status of significant symptoms):   Risk status (Self / Other harm or suicidal ideation)   Patient denies current fears or concerns for personal safety.   Patient denies current or recent suicidal ideation or behaviors.    She continues to agree to use a safety plan should any safety concerns arise.  She also agrees to reach our to her spouse or a family member for help if needed, and/or access crisis/emergency resources.        Patientdenies current or recent homicidal ideation or behaviors.   Patient denies current or recent self injurious behavior or ideation.   Patient denies other safety concerns.   Patient Patient reports there has been no change in risk factors since their last session.     PatientPatient reports there has been no change in protective factors since their last session.     Recommended that patient call 911 or go to the local ED should there be a change in any of these risk factors.  She has previously been informed on how to contact [facility] crisis/[facility] for urgent/crisis concerns 24/7.  Provided patient with crisis resources and she agrees to use safety plan should any safety concerns arise.      Professionals or agencies I can contact during a crisis:    Suicide Prevention Lifeline: 0-514-193-TALK (7155)    Crisis Text Line Service (available 24 hours a day, 7 days a week): Text [place] to 378774    Local Crisis Services: [facility]: [phone]    Call 911 or go to my nearest emergency department.        Appearance:   Appropriate    Eye Contact:   Good    Psychomotor Behavior: Normal    Attitude:   Cooperative    Orientation:   All   Speech    Rate / Production: Normal     Volume:  Normal    Mood:    Reports this past week she has felt more anxious and stressed   Affect:    Congruent with mood    Thought Content:  Clear    Thought Form:  Coherent  Logical  "   Insight:    Good      Medication Review:   No changes to current psychiatric medication(s).  She has reached out to her doctor to inquire about opportunity to increase dosage of anti-depressant to help with increase in symptoms.        Medication Compliance:   Yes     Changes in Health Issues:  None reported         Chemical Use Review:   Substance Use: Chemical use reviewed, no changes or active concerns identified.      Tobacco Use: No current tobacco use.      Diagnosis:  Anxiety disorder unspecified  Depression, unspecified             PLAN: (Patient Tasks / Therapist Tasks / Other)    1) Idalia identified the following goals: Focus on keeping my emotions separate from Colia, accept that he can have different feelings and reactions than I do and I don't have to \"fix\" it, try to notice when I'm having \"obsessive magical thoughts\" and trying to re-focus on productive things I can do to help myself.      2) If there is a crisis situation, remember you are not alone and that there are people who can help you twenty-four hours a day, seven days a week.  Call [facility] ([facility]): [phone].      3) Follow-up visit is scheduled for August 17th..  If urgent or crisis concerns arise prior to next scheduled appointment, please reach out to crisis resources, call 911, or go to the emergency department.      4) I have reached out to Parris, the nurse Idalia has been corresponding with via my chart messages, and offered that I am happy to connect with the PCP covering for Dr. Han to provide any information or input I can regarding Idalia's care.  She reported she has passed this onto the covering doctor.      Cristy Wilkinson PsyD,   Licensed Psychologist  8/11/2022        Previous skills and strategies to remind self of and to do as needed:    Practice \"STOP\" technique when you recognize yourself feeling angry   Be a  - what do you notice is happening when Kaylee feels upset?   " "  Practice recognizing when feeling angry, and giving self permission to use calming and self-soothing strategies and take a break.   Look for the gray with thinking  slow down  take deep breaths  manage expectations of self and others.    Journal   Practice flexing \"flexiblity and creativity\" -   Continue the great work you are doing with your daily schedule.    Get outside every day.    Play your instrument.    Sing!    Continue with: \"It's just a thought\" - non-judgmental, observe, float down the river on a leaf, clouds in the gege, reframing unhelpful thoughts -   Keep working on being patient when things are tense around me.    Continue with observing/paying attention to warning signs and signals and give self persmission to slow down, especially during the morning routine with Kaylee.    Practice offerring Kaylee choices when appropriate.      Use \"my plan for success\" to help remind you of calming strategies to practice when feeling upset.    Practice decreasing overall vulnerability to emotion mind through getting adequate rest, nutrition, time for yourself, and physical activity.         Consider reading \"Fighting for your marriage\".      Technical Assistance for video visits:    Offer patient the website ([email or web address].org/videovisit) and/or phone number ([phone]) for video visit instructions/assistance if needed.                                             ____________________________________    Treatment Plan     Patient's Name: Idalia Hinojosa                        YOB: 1980     Date of Creation: 1/6/2020  Date Treatment Plan Last Reviewed/Revised: 8/4/22     DSM5 Diagnoses: 300.00 (F41.9) Unspecified Anxiety Disorder, depressive disorder, unspecified  Psychosocial / Contextual Factors: strain in marital relationship, parenting challenges, adjustment challenges, 's cancer diagnosis  PROMIS (reviewed every 90 days):      Anticipated number of session for this episode of care: " additional 15-20  Anticipation frequency of session: Weekly until symptoms stabilize, then can decrease the frequency of sessions to likely bi-weekly or once every three weeks  Anticipated Duration of each session: 38-52 minutes  Treatment plan will be reviewed in 90 days or when goals have been changed.         Referral / Collaboration:  We discussed a referral to a couples/marital therapist.  The patient is thinking about this and has talked with her  about the referral, but they are unsure if they would like to follow through at this time.       MeasurableTreatment Goal(s) related to diagnosis / functional impairment(s)  Goal 1: Patient will learn emotion regulation strategies to help when she is feeling upset/angry/frustrated/distressed    I will know I've met my goal when I would yell less, I would feel calmer, and I would not push others.  I would be less physical when I'm angry (e.g. wouldn't slam doors or hit things)        Objective #A (Patient Action)                          Patient will learn and practice at least 2 new emotion regulation strategies.  Status: Continued - Date(s):    8/4/22- Regularly practicing emotion regulation strategies to help manage increase in emotional distress related to spouse's cancer diagnosis        Intervention(s)  Therapist will provide psychoeducation on emotion regulation module from DBT and will assign patient homework to help reinforce skill development.     Objective #B  Patient will identify factors that increase vulnerability to emotion mind and identify ways to decrease vulnerability to emotion mind.  Status: Continued - Date(s):8/4/22 - routinely incorporating mindfulness and self-awareness strategies to increase attention and focus to factors that increase vulnerability to emotion mind           Intervention(s)  Therapist will provide information on factors that increase vulnerabiliyt to emotion mind.     Objective #C  Patient will identify warning signs  and signals that emotions are escalating and will learn ways to skillfully intervene early on.  Status: New - Date: 8/4/22 - in progress -            Intervention(s)  Therapist will help patient identify warning signs and signals, and will guide the patient in identifying skillful ways to intervene when exeriencing warning signs and signals.        Goal 2: Patient will learn new parenting strategies to help with managing parenting challenges.  Parent will work on improving relationship with her daughter and defining what she would like this relationship to look like.      I will know I've met my goal when I'm enjoying time with my daughter, teaching her new things, and not waiting for things to change       Objective #A (Patient Action)                          Status: Continued - Date(s):8/4/22     Patient will increase understanding of developmental norms for her daughter and ways to manage challenging behaviors.     Intervention(s)  Therapist will provide psychoeducation on developmental norms and parenting strategies.     Objective #B  Patient will spend time reflecting on her relationship with her daughter and defining what she would like this relaitonship to look like.                  Status: Continued - Date(s):8/4/22 - Making good progress -            Intervention(s)  Therapist will guide the patient in reflecting upon her relationship with her daughter and help her define ways to move towards what she vaues in her relationship with her daughter.     Objective #C  Patient will increase time spent on fun activity and play with her daughter.  Status: Continued - Date(s):8/4/22- has been enjoying time with daughter and devoting regular time to fun activities and play together           Intervention(s)  Therapist will guide the patient in identifying ways she can increase positive time with her daughter.  Therapist will also teach mindfulness strategies to help patient with being in the present moment when  "appropriate - .        Goal 3: Patiient will improve communication with her .      I will know I've met my goal when I feel less irritated with my  and communicate more openly with my .  I would like to be more assertive and less aggressive.   I would like to not avoid telling him things because I'm worried about his reaction or don't think he will react well.  I would like to be more present to the moment during times when this would be helpful.\"       Objective #A (Patient Action)                          Status: Continued - Date(s):8/4/22 - continues to work on this goal            Patient will learn and practice at least two new interpersonal effectiveness strategies.     Intervention(s)  Therapist will teach strategies from DBT module on interpersonal effectiveness, and will assign homework to help reinforce skill development.        Patient has reviewed and agreed to the above plan.        Cristy Wilkinson PsyD,   Licensed Psychologist  January 6, 2020, reviewed on 4/23/2020, reviewed 7/8/2020, reviewed 10/08/2020, reviewed 1/27/2021, reviewed 4/27/2021, 7/29/2021, 11/10/2021, 2/9/2022, 5/3/2022, 8/4/22           "

## 2022-08-17 ENCOUNTER — VIRTUAL VISIT (OUTPATIENT)
Dept: PSYCHOLOGY | Facility: CLINIC | Age: 42
End: 2022-08-17
Payer: COMMERCIAL

## 2022-08-17 DIAGNOSIS — F41.9 ANXIETY DISORDER, UNSPECIFIED TYPE: Primary | ICD-10-CM

## 2022-08-17 DIAGNOSIS — F32.A DEPRESSION, UNSPECIFIED DEPRESSION TYPE: ICD-10-CM

## 2022-08-17 PROCEDURE — 90834 PSYTX W PT 45 MINUTES: CPT | Mod: GT | Performed by: PSYCHOLOGIST

## 2022-08-17 NOTE — PROGRESS NOTES
[facility]                                            Progress Note    Patient Name: Idalia Hinojosa  Date: 8/17/22           Service Type: Individual      Session Start Time: 12:06  pm  Session End Time: 12:58 pm   Session Length:  52 minutes    Session #: 65    Attendees: Client     Service Modality: Video visit: -       Provider verified identity through the following two step process.  Patient provided:  Patient is known previously to provider    Telemedicine Visit: The patient's condition can be safely assessed and treated via synchronous audio and visual telemedicine encounter.      Reason for Telemedicine Visit: Services only offered telehealth    Originating Site (Patient Location): Patient's home    Distant Site (Provider Location): Provider Remote Setting- Home Office    Consent:  The patient/guardian has verbally consented to: the potential risks and benefits of telemedicine (telephone visit) versus in person care; bill my insurance or make self-payment for services provided; and responsibility for payment of non-covered services.     Patient would like the video invitation sent by:  My Chart    Mode of Communication:  Video Conference via [place],     As the provider I attest to compliance with applicable laws and regulations related to telemedicine.         Treatment Plan Last Reviewed: 8/4/22  PHQ-9/DAVID-7: 7/28/2022      DATA  Interactive Complexity: No  Crisis: No        Progress Since Last Session (Related to Symptoms / Goals / Homework):   Symptoms:  She reported she is feeling better, she believes the increase in her anti-depressant dosage has been very helpful - she notices that her thinking is less negative and she is coping better - situations that would have previously sent her into more of a downward spiral she is able to cope with better now -     Homework: Achieved / completed to satisfaction.   She reported she's been exercising more, has gone running,  went to the cabin last weekend and really enjoyed her time there, and is going back this upcoming weekend and is really looking forward to that!        Episode of Care Goals: Satisfactory progress - ACTION (Actively working towards change); Intervened by reinforcing change plan / affirming steps taken.  Diagnostic assessment has been completed, treatment plan has been developed and patient has been making good progress on treatment goals.  The patient stated that her main goals for therapy would be to learn emotion regulation strategies (in particular, to help with when she is feeling upset/angry/frustrated/distressed).  The changes in her daily life due to COVID restrictions were a focus of our work together, and working through changes to routine as her daughter started  and she returned to work.  As she has been making progress, focus of treatment will shift to wellness planning (monitoring mood and symptoms, incorporating into routine what keeps her feeling well, review of triggers, how to manage set backs, etc.).  As her  has been diagnosed with cancer, treatment will also focus on providing her with support in managing the emotional distress and challenges related to this.       Current / Ongoing Stressors and Concerns:   Current: She's been managing an increase in stress from learning her  will have to have additional chemo treatments.  Main challenges have been related to an increase in behavioral challenges from her daughter (who is also receiving services and under the care of a treatment team) and seeing her  struggling both physically from the treatments as well as emotionally (increase in anxiety in particular).     Ongoing:Managing daily routine which has contributed to some adjustment and parenting challenges, lack of effective emotion regulation strategies for managing daily frustrations and upsets and marital discord due to differences in parenting styles.   "     Treatment Objective(s) Addressed in This Session:     Anticipate the challenges you believe may trigger symptoms and rehearse how you will respond (what coping strategies will help you?  What can you do that would make you feel like you managed the situation well?  What would you think?  What would you need?  How would you ask others to help to?)      Continue to review/practice/reinforce:  Continue to practice awareness of your emotions and physical sensations, to help tune in to what you need  Identify opportunities to access support and help from others  Mindful awareness of thoughts, impact on your emotions, and recognize opportunities to redirect thoughts.  Practice re-directing thoughts to a more helpful perspective.    Practice balanced thinking to counteract polarized/all or nothing thinking.    Learn and practice mindfulness strategies - redirect back to present moment, focus on the facts   Emotion regulation strategies - learn and practice 1-2 emotion regulation strategies  Identify ways to practice and incorporate self-care into routine.    Identify and enforce healthy emotional boundaries (it is ok to feel different than my spouse does, it is okay for him to have his feelings and to not feel like I have to fix or change how he is feeling)       Intervention:   CBT and solution-focused - Idalia identified a few situations that have been challenging for her and that she anticipates will trigger symptoms if they occur (when her daughter has challenging behaviors and when her  is experiencing strong emotions, especially anxiety) and we focused today on doing a \"dress rehearsal\" for how she would like to respond in these situations which would help her to feel like she is managing these situations in healthy and constructive ways.  She will focus on having realistic expectations of the situations, of herself, and others.  Take deep breaths.  Label what I see (\"he is feeling X\"), focus on the " facts and remove judgement (practice acceptance of what is).  Reassuring self-talk - I can choose to stay calm and listen.  If he's asking for advice, I can offer it if I have it.  Otherwise, it is not my job to fix or change how others are feeling.  Be curious, ask questions.  Remember that they are a separate person - they can feel differently from me, and I can feel differently from them.  With daughter - Remind myself - I can stay calm when she's upset.  Take deep breaths.  Realistic expectations.  Don't take it personally.  She shows what she is struggling with and what she needs help with through her behaviors.  Remind myself we are getting help from her team - it will take time to try new strategies and consistency.  Change is a process.  Be creative.  Distractions help her, identify ways to help re-direct and re-focus her.       ASSESSMENT: Current Emotional / Mental Status (status of significant symptoms):   Risk status (Self / Other harm or suicidal ideation)   Patient denies current fears or concerns for personal safety.   Patient denies current or recent suicidal ideation or behaviors.    She continues to agree to use a safety plan should any safety concerns arise.  She also agrees to reach our to her spouse or a family member for help if needed, and/or access crisis/emergency resources.        Patientdenies current or recent homicidal ideation or behaviors.   Patient denies current or recent self injurious behavior or ideation.   Patient denies other safety concerns.   Patient Patient reports there has been no change in risk factors since their last session.     PatientPatient reports there has been no change in protective factors since their last session.     Recommended that patient call 911 or go to the local ED should there be a change in any of these risk factors.  She has previously been informed on how to contact [facility] crisis/[facility] for urgent/crisis concerns 24/7.  Provided patient with  "crisis resources and she agrees to use safety plan should any safety concerns arise.      Professionals or agencies I can contact during a crisis:    Suicide Prevention Lifeline: 2-823-723-RFVV (8120)    Crisis Text Line Service (available 24 hours a day, 7 days a week): Text [place] to 620969    [facility]: [facility]: [phone]    Call 911 or go to my nearest emergency department.        Appearance:   Appropriate    Eye Contact:   Good    Psychomotor Behavior: Normal    Attitude:   Cooperative    Orientation:   All   Speech    Rate / Production: Normal     Volume:  Normal    Mood:    Reports mood is improved, less negative thinking and coping better.     Affect:    Congruent with mood    Thought Content:  Clear    Thought Form:  Coherent  Logical    Insight:    Good      Medication Review:   Her PCP recently increased her anti-depressant dosage.  She believes this has been very helpful - she already notices a difference - feels better, less negative thinking, able to cope better with what is going on around her.          Medication Compliance:   Yes     Changes in Health Issues:  None reported         Chemical Use Review:   Substance Use: Chemical use reviewed, no changes or active concerns identified.      Tobacco Use: No current tobacco use.      Diagnosis:  Anxiety disorder unspecified  Depression, unspecified             PLAN: (Patient Tasks / Therapist Tasks / Other)    1) Idalia identified the following goals: Focus on Realistic expectations and remind self of \"dress rehearsal\"  - practice/rehearse how you will manage challenging situations.       2) If there is a crisis situation, remember you are not alone and that there are people who can help you twenty-four hours a day, seven days a week.  Call [facility] ([facility]): [phone].      3) Follow-up visit is scheduled for August 25th.  If urgent or crisis concerns arise prior to next scheduled appointment, " "please reach out to crisis resources, call 911, or go to the emergency department.      Cristy Wilkinson PsyD,   Licensed Psychologist  8/17/2022          Previous skills and strategies to remind self of and to do as needed:    Practice \"STOP\" technique when you recognize yourself feeling angry   Be a  - what do you notice is happening when Kaylee feels upset?     Practice recognizing when feeling angry, and giving self permission to use calming and self-soothing strategies and take a break.   Look for the gray with thinking  slow down  take deep breaths  manage expectations of self and others.    Journal   Practice flexing \"flexiblity and creativity\" -   Continue the great work you are doing with your daily schedule.    Get outside every day.    Play your instrument.    Sing!    Continue with: \"It's just a thought\" - non-judgmental, observe, float down the river on a leaf, clouds in the gege, reframing unhelpful thoughts -   Keep working on being patient when things are tense around me.    Continue with observing/paying attention to warning signs and signals and give self persmission to slow down, especially during the morning routine with Kaylee.    Practice offerring Kaylee choices when appropriate.      Use \"my plan for success\" to help remind you of calming strategies to practice when feeling upset.    Practice decreasing overall vulnerability to emotion mind through getting adequate rest, nutrition, time for yourself, and physical activity.         Consider reading \"Fighting for your marriage\".      Technical Assistance for video visits:    Offer patient the website ([email or web address]) and/or phone number ([phone]) for video visit instructions/assistance if needed.                                             ____________________________________    Treatment Plan     Patient's Name: Idalia Hinojosa                        YOB: 1980     Date of Creation: 1/6/2020  Date Treatment Plan " Last Reviewed/Revised: 8/4/22     DSM5 Diagnoses: 300.00 (F41.9) Unspecified Anxiety Disorder, depressive disorder, unspecified  Psychosocial / Contextual Factors: strain in marital relationship, parenting challenges, adjustment challenges, 's cancer diagnosis  PROMIS (reviewed every 90 days):      Anticipated number of session for this episode of care: additional 15-20  Anticipation frequency of session: Weekly until symptoms stabilize, then can decrease the frequency of sessions to likely bi-weekly or once every three weeks  Anticipated Duration of each session: 38-52 minutes  Treatment plan will be reviewed in 90 days or when goals have been changed.         Referral / Collaboration:  We discussed a referral to a couples/marital therapist.  The patient is thinking about this and has talked with her  about the referral, but they are unsure if they would like to follow through at this time.       MeasurableTreatment Goal(s) related to diagnosis / functional impairment(s)  Goal 1: Patient will learn emotion regulation strategies to help when she is feeling upset/angry/frustrated/distressed    I will know I've met my goal when I would yell less, I would feel calmer, and I would not push others.  I would be less physical when I'm angry (e.g. wouldn't slam doors or hit things)        Objective #A (Patient Action)                          Patient will learn and practice at least 2 new emotion regulation strategies.  Status: Continued - Date(s):    8/4/22- Regularly practicing emotion regulation strategies to help manage increase in emotional distress related to spouse's cancer diagnosis        Intervention(s)  Therapist will provide psychoeducation on emotion regulation module from DBT and will assign patient homework to help reinforce skill development.     Objective #B  Patient will identify factors that increase vulnerability to emotion mind and identify ways to decrease vulnerability to emotion  mind.  Status: Continued - Date(s):8/4/22 - routinely incorporating mindfulness and self-awareness strategies to increase attention and focus to factors that increase vulnerability to emotion mind           Intervention(s)  Therapist will provide information on factors that increase vulnerabiliyt to emotion mind.     Objective #C  Patient will identify warning signs and signals that emotions are escalating and will learn ways to skillfully intervene early on.  Status: New - Date: 8/4/22 - in progress -            Intervention(s)  Therapist will help patient identify warning signs and signals, and will guide the patient in identifying skillful ways to intervene when exeriencing warning signs and signals.        Goal 2: Patient will learn new parenting strategies to help with managing parenting challenges.  Parent will work on improving relationship with her daughter and defining what she would like this relationship to look like.      I will know I've met my goal when I'm enjoying time with my daughter, teaching her new things, and not waiting for things to change       Objective #A (Patient Action)                          Status: Continued - Date(s):8/4/22     Patient will increase understanding of developmental norms for her daughter and ways to manage challenging behaviors.     Intervention(s)  Therapist will provide psychoeducation on developmental norms and parenting strategies.     Objective #B  Patient will spend time reflecting on her relationship with her daughter and defining what she would like this relaitonship to look like.                  Status: Continued - Date(s):8/4/22 - Making good progress -            Intervention(s)  Therapist will guide the patient in reflecting upon her relationship with her daughter and help her define ways to move towards what she vaues in her relationship with her daughter.     Objective #C  Patient will increase time spent on fun activity and play with her  "daughter.  Status: Continued - Date(s):8/4/22- has been enjoying time with daughter and devoting regular time to fun activities and play together           Intervention(s)  Therapist will guide the patient in identifying ways she can increase positive time with her daughter.  Therapist will also teach mindfulness strategies to help patient with being in the present moment when appropriate - .        Goal 3: Patiient will improve communication with her .      I will know I've met my goal when I feel less irritated with my  and communicate more openly with my .  I would like to be more assertive and less aggressive.   I would like to not avoid telling him things because I'm worried about his reaction or don't think he will react well.  I would like to be more present to the moment during times when this would be helpful.\"       Objective #A (Patient Action)                          Status: Continued - Date(s):8/4/22 - continues to work on this goal            Patient will learn and practice at least two new interpersonal effectiveness strategies.     Intervention(s)  Therapist will teach strategies from DBT module on interpersonal effectiveness, and will assign homework to help reinforce skill development.        Patient has reviewed and agreed to the above plan.        Cristy Wilkinson PsyD,   Licensed Psychologist  January 6, 2020, reviewed on 4/23/2020, reviewed 7/8/2020, reviewed 10/08/2020, reviewed 1/27/2021, reviewed 4/27/2021, 7/29/2021, 11/10/2021, 2/9/2022, 5/3/2022, 8/4/22                    "

## 2022-08-25 ENCOUNTER — VIRTUAL VISIT (OUTPATIENT)
Dept: PSYCHOLOGY | Facility: CLINIC | Age: 42
End: 2022-08-25
Payer: COMMERCIAL

## 2022-08-25 DIAGNOSIS — F41.9 ANXIETY DISORDER, UNSPECIFIED TYPE: Primary | ICD-10-CM

## 2022-08-25 DIAGNOSIS — F32.A DEPRESSION, UNSPECIFIED DEPRESSION TYPE: ICD-10-CM

## 2022-08-25 PROCEDURE — 90832 PSYTX W PT 30 MINUTES: CPT | Mod: GT | Performed by: PSYCHOLOGIST

## 2022-08-25 NOTE — PROGRESS NOTES
"           [facility]                                            Progress Note    Patient Name: Idalia Hinojosa  Date: 8/25/2022         Service Type: Individual      Session Start Time: 2:07 pm  Session End Time: 2:37 pm   Session Length:  30 minutes    Session #: 66    Attendees: Client     Service Modality: Video visit: -       Provider verified identity through the following two step process.  Patient provided:  Patient is known previously to provider    Telemedicine Visit: The patient's condition can be safely assessed and treated via synchronous audio and visual telemedicine encounter.      Reason for Telemedicine Visit: Services only offered telehealth    Originating Site (Patient Location): Patient's home    Distant Site (Provider Location): Provider Remote Setting- Home Office    Consent:  The patient/guardian has verbally consented to: the potential risks and benefits of telemedicine (telephone visit) versus in person care; bill my insurance or make self-payment for services provided; and responsibility for payment of non-covered services.     Patient would like the video invitation sent by:  My Chart    Mode of Communication:  Video Conference via [employer],     As the provider I attest to compliance with applicable laws and regulations related to telemedicine.         Treatment Plan Last Reviewed: 08/04/22  PHQ-9/DAVID-7: 08/04/22      DATA  Interactive Complexity: No  Crisis: No        Progress Since Last Session (Related to Symptoms / Goals / Homework):   Symptoms:  She reported that she's doing \"really well\"  - believes that medication increase has really been helpful, notes thinking is less negative and able to cope better with difficult situations and use coping skills.        Homework: Achieved / completed to satisfaction.  [place] [employer] [place], put together puzzles, and her parents are watching her daughter for three " "days so she was able to spend time with Kellee when he wasn't feeling well post treatment.          Episode of Care Goals: Satisfactory progress - ACTION (Actively working towards change); Intervened by reinforcing change plan / affirming steps taken.  Diagnostic assessment has been completed, treatment plan has been developed and patient has been making good progress on treatment goals.  The patient stated that her main goals for therapy would be to learn emotion regulation strategies (in particular, to help with when she is feeling upset/angry/frustrated/distressed).  The changes in her daily life due to COVID restrictions were a focus of our work together, and working through changes to routine as her daughter started  and she returned to work.  As she has been making progress, focus of treatment will shift to wellness planning (monitoring mood and symptoms, incorporating into routine what keeps her feeling well, review of triggers, how to manage set backs, etc.).  As her  has been diagnosed with cancer, treatment will also focus on providing her with support in managing the emotional distress and challenges related to this.       Current / Ongoing Stressors and Concerns:   Current:  Recent increase in stress and symptoms due to learning her spouse needs to have additional cancer treatment, since her anti-depressant medication dosage has been increased she reports feeling better and \"feels like I can handle things better\".  They are preparing for her daughter to start school - feeling good about this as they have her IEP in place, have arranged for her to take the special education bus, and they know the special .       Ongoing:Managing daily routine which has contributed to some adjustment and parenting challenges, lack of effective emotion regulation strategies for managing daily frustrations and upsets and marital discord due to differences in parenting styles.       Treatment " Objective(s) Addressed in This Session:     Identify what you believe has been helping you to feel better to help with building daily routine and strategies that support your well-being  Emotion regulation strategies - learn and practice 1-2 emotion regulation strategies       Continue to review/practice/reinforce:  Identify ways to practice and incorporate self-care into routine.   Learn and practice mindfulness strategies - redirect back to present moment, focus on the facts   Identify and enforce healthy emotional boundaries (it is ok to feel different than my spouse does, it is okay for him to have his feelings and to not feel like I have to fix or change how he is feeling)  Continue to practice awareness of your emotions and physical sensations, to help tune in to what you need  Identify opportunities to access support and help from others  Mindful awareness of thoughts, impact on your emotions, and recognize opportunities to redirect thoughts.  Practice re-directing thoughts to a more helpful perspective.    Practice balanced thinking to counteract polarized/all or nothing thinking.         Intervention:   Solution-Focused - We spent time reviewing what she believes has helped her to feel better over the last few weeks, with the goal of incorporating helpful self-care and emotion management strategies into her routine.  She noted that taking her medications consistently, getting time for herself, physical exercise, and time to do things she enjoys is all very helpful for her.  Looked at ways she can continue to incorporate this into her daily routine over the next week, emphasizing problem-solving of any potential barriers or obstacles that she anticipates may make it difficult to follow-through.  Reflected on the wonderful progress Idalia has made to take care of herself and practice great self-care strategies -       ASSESSMENT: Current Emotional / Mental Status (status of significant symptoms):   Risk  "status (Self / Other harm or suicidal ideation)   Patient denies current fears or concerns for personal safety.   Patient denies current or recent suicidal ideation or behaviors.    She continues to agree to use a safety plan should any safety concerns arise.  She also agrees to reach our to her spouse or a family member for help if needed, and/or access crisis/emergency resources.        Patientdenies current or recent homicidal ideation or behaviors.   Patient denies current or recent self injurious behavior or ideation.   Patient denies other safety concerns.   Patient Patient reports there has been no change in risk factors since their last session.     PatientPatient reports there has been no change in protective factors since their last session.     Recommended that patient call 911 or go to the local ED should there be a change in any of these risk factors.  She has previously been informed on how to contact [facility] crisis/COPE for urgent/crisis concerns 24/7.  Provided patient with crisis resources and she agrees to use safety plan should any safety concerns arise.      Professionals or agencies I can contact during a crisis:    Suicide Prevention Lifeline: 9-082-350-TALK (8102)    Crisis Text Line Service (available 24 hours a day, 7 days a week): Text [place] to 915668    Local Crisis Services: [facility]: [phone]    Call 911 or go to my nearest emergency department.        Appearance:   Appropriate    Eye Contact:   Good    Psychomotor Behavior: Normal    Attitude:   Cooperative    Orientation:   All   Speech    Rate / Production: Normal     Volume:  Normal    Mood:    Reports that she feels \"really well\" and \"I feel like I can handle things better\"   Affect:    Bright   Thought Content:  Clear    Thought Form:  Coherent  Logical    Insight:    Good      Medication Review:   No changes (recent increase to anti-depressant dosage, which she believes has been very helpful)  Less " "negative thinking       Medication Compliance:   Yes (suggested pill box may be helpful)     Changes in Health Issues:  None reported         Chemical Use Review:   Substance Use: Chemical use reviewed, no changes or active concerns identified.      Tobacco Use: No current tobacco use.      Diagnosis:  Anxiety disorder unspecified  Depression, unspecified             PLAN: (Patient Tasks / Therapist Tasks / Other)    1) Idalia identified the following goals: Continue to practice good self-care on a daily basis - make time over the next week to ride my bike, go swimming (possibly), go running, read, and take downtime on the weekend when Kaylee is watching a movie.    2) If there is a crisis situation, remember you are not alone and that there are people who can help you twenty-four hours a day, seven days a week.  Call [facility] ([facility]): [phone].      3) Follow-up visit is scheduled for 8/31/22 at 12:00 pm.  If urgent or crisis concerns arise prior to next scheduled appointment, please reach out to crisis resources, call 911, or go to the emergency department.      Cristy Wilkinson PsyD, LP  Licensed Psychologist  8/25/2022                Previous skills and strategies to remind self of and to do as needed:    Practice \"STOP\" technique when you recognize yourself feeling angry   Be a  - what do you notice is happening when Kaylee feels upset?     Practice recognizing when feeling angry, and giving self permission to use calming and self-soothing strategies and take a break.   Look for the gray with thinking  slow down  take deep breaths  manage expectations of self and others.    Journal   Practice flexing \"flexiblity and creativity\" -   Continue the great work you are doing with your daily schedule.    Get outside every day.    Play your instrument.    Sing!    Continue with: \"It's just a thought\" - non-judgmental, observe, float down the river on a leaf, clouds in the gege, reframing " "unhelpful thoughts -   Keep working on being patient when things are tense around me.    Continue with observing/paying attention to warning signs and signals and give self persmission to slow down, especially during the morning routine with Kaylee.    Practice offerring Kaylee choices when appropriate.      Use \"my plan for success\" to help remind you of calming strategies to practice when feeling upset.    Practice decreasing overall vulnerability to emotion mind through getting adequate rest, nutrition, time for yourself, and physical activity.         Consider reading \"Fighting for your marriage\".      Technical Assistance for video visits:    Offer patient the website ([email or web address]) and/or phone number (935-911-4043) for video visit instructions/assistance if needed.                                             ____________________________________    Treatment Plan     Patient's Name: Idalia Hinojosa                        YOB: 1980     Date of Creation: 1/6/2020  Date Treatment Plan Last Reviewed/Revised: 8/4/22     DSM5 Diagnoses: 300.00 (F41.9) Unspecified Anxiety Disorder, depressive disorder, unspecified  Psychosocial / Contextual Factors: strain in marital relationship, parenting challenges, adjustment challenges, 's cancer diagnosis  PROMIS (reviewed every 90 days):      Anticipated number of session for this episode of care: additional 15-20  Anticipation frequency of session: Weekly until symptoms stabilize, then can decrease the frequency of sessions to likely bi-weekly or once every three weeks  Anticipated Duration of each session: 38-52 minutes  Treatment plan will be reviewed in 90 days or when goals have been changed.         Referral / Collaboration:  We discussed a referral to a couples/marital therapist.  The patient is thinking about this and has talked with her  about the referral, but they are unsure if they would like to follow through at this time.  "      MeasurableTreatment Goal(s) related to diagnosis / functional impairment(s)  Goal 1: Patient will learn emotion regulation strategies to help when she is feeling upset/angry/frustrated/distressed    I will know I've met my goal when I would yell less, I would feel calmer, and I would not push others.  I would be less physical when I'm angry (e.g. wouldn't slam doors or hit things)        Objective #A (Patient Action)                          Patient will learn and practice at least 2 new emotion regulation strategies.  Status: Continued - Date(s):    8/4/22- Regularly practicing emotion regulation strategies to help manage increase in emotional distress related to spouse's cancer diagnosis        Intervention(s)  Therapist will provide psychoeducation on emotion regulation module from DBT and will assign patient homework to help reinforce skill development.     Objective #B  Patient will identify factors that increase vulnerability to emotion mind and identify ways to decrease vulnerability to emotion mind.  Status: Continued - Date(s):8/4/22 - routinely incorporating mindfulness and self-awareness strategies to increase attention and focus to factors that increase vulnerability to emotion mind           Intervention(s)  Therapist will provide information on factors that increase vulnerabiliyt to emotion mind.     Objective #C  Patient will identify warning signs and signals that emotions are escalating and will learn ways to skillfully intervene early on.  Status: New - Date: 8/4/22 - in progress -            Intervention(s)  Therapist will help patient identify warning signs and signals, and will guide the patient in identifying skillful ways to intervene when exeriencing warning signs and signals.        Goal 2: Patient will learn new parenting strategies to help with managing parenting challenges.  Parent will work on improving relationship with her daughter and defining what she would like this  "relationship to look like.      I will know I've met my goal when I'm enjoying time with my daughter, teaching her new things, and not waiting for things to change       Objective #A (Patient Action)                          Status: Continued - Date(s):8/4/22     Patient will increase understanding of developmental norms for her daughter and ways to manage challenging behaviors.     Intervention(s)  Therapist will provide psychoeducation on developmental norms and parenting strategies.     Objective #B  Patient will spend time reflecting on her relationship with her daughter and defining what she would like this relaitonship to look like.                  Status: Continued - Date(s):8/4/22 - Making good progress -            Intervention(s)  Therapist will guide the patient in reflecting upon her relationship with her daughter and help her define ways to move towards what she vaues in her relationship with her daughter.     Objective #C  Patient will increase time spent on fun activity and play with her daughter.  Status: Continued - Date(s):8/4/22- has been enjoying time with daughter and devoting regular time to fun activities and play together           Intervention(s)  Therapist will guide the patient in identifying ways she can increase positive time with her daughter.  Therapist will also teach mindfulness strategies to help patient with being in the present moment when appropriate - .        Goal 3: Patiient will improve communication with her .      I will know I've met my goal when I feel less irritated with my  and communicate more openly with my .  I would like to be more assertive and less aggressive.   I would like to not avoid telling him things because I'm worried about his reaction or don't think he will react well.  I would like to be more present to the moment during times when this would be helpful.\"       Objective #A (Patient Action)                          Status: " Continued - Date(s):8/4/22 - continues to work on this goal            Patient will learn and practice at least two new interpersonal effectiveness strategies.     Intervention(s)  Therapist will teach strategies from DBT module on interpersonal effectiveness, and will assign homework to help reinforce skill development.        Patient has reviewed and agreed to the above plan.        Cristy Wilkinson PsyD, LP  Licensed Psychologist  January 6, 2020, reviewed on 4/23/2020, reviewed 7/8/2020, reviewed 10/08/2020, reviewed 1/27/2021, reviewed 4/27/2021, 7/29/2021, 11/10/2021, 2/9/2022, 5/3/2022, 8/4/22

## 2022-08-31 ENCOUNTER — VIRTUAL VISIT (OUTPATIENT)
Dept: PSYCHOLOGY | Facility: CLINIC | Age: 42
End: 2022-08-31
Payer: COMMERCIAL

## 2022-08-31 DIAGNOSIS — F32.A DEPRESSION, UNSPECIFIED DEPRESSION TYPE: ICD-10-CM

## 2022-08-31 DIAGNOSIS — F41.9 ANXIETY DISORDER, UNSPECIFIED TYPE: Primary | ICD-10-CM

## 2022-08-31 PROCEDURE — 90834 PSYTX W PT 45 MINUTES: CPT | Mod: GT | Performed by: PSYCHOLOGIST

## 2022-08-31 NOTE — PROGRESS NOTES
"           [facility]                                            Progress Note    Patient Name: Idalia Hinojosa  Date: 8/31/2022           Service Type: Individual      Session Start Time: 12:06 pm Session End Time: 12:48 pm pm   Session Length:  42 minutes    Session #: 67    Attendees: Client     Service Modality: Video visit: -       Provider verified identity through the following two step process.  Patient provided:  Patient is known previously to provider    Telemedicine Visit: The patient's condition can be safely assessed and treated via synchronous audio and visual telemedicine encounter.      Reason for Telemedicine Visit: Services only offered telehealth    Originating Site (Patient Location): Patient's home    Distant Site (Provider Location): Provider Remote Setting- Home Office    Consent:  The patient/guardian has verbally consented to: the potential risks and benefits of telemedicine (telephone visit) versus in person care; bill my insurance or make self-payment for services provided; and responsibility for payment of non-covered services.     Patient would like the video invitation sent by:  My Chart    Mode of Communication:  Video Conference via [employer],     As the provider I attest to compliance with applicable laws and regulations related to telemedicine.         Treatment Plan Last Reviewed: 08/04/22  PHQ-9/DAVID-7: 08/04/22      DATA  Interactive Complexity: No  Crisis: No        Progress Since Last Session (Related to Symptoms / Goals / Homework):   Symptoms:  She reported that symptoms continue to remain improved - less anxious and depressed and feels better able to cope with challenges and stressors she is experiencing.   She reports the weekend was \"rough\" in terms of her  not feeling well physically from his treatment, and her daughter had some challenging behaviors, however, she was able to actively engage constructive coping strategies.        Homework: " Achieved / completed to satisfaction.  Has been enjoying biking to work and cool summer mornings, went out on a date with her  and enjoyed this, and used healthy coping strategies to manage difficult situations.          Episode of Care Goals: Satisfactory progress - ACTION (Actively working towards change); Intervened by reinforcing change plan / affirming steps taken.  Diagnostic assessment has been completed, treatment plan has been developed and patient has been making good progress on treatment goals.  The patient stated that her main goals for therapy would be to learn emotion regulation strategies (in particular, to help with when she is feeling upset/angry/frustrated/distressed).  The changes in her daily life due to COVID restrictions were a focus of our work together, and working through changes to routine as her daughter started  and she returned to work.  As she has been making progress, focus of treatment will shift to wellness planning (monitoring mood and symptoms, incorporating into routine what keeps her feeling well, review of triggers, how to manage set backs, etc.).  As her  has been diagnosed with cancer, treatment will also focus on providing her with support in managing the emotional distress and challenges related to this.       Current / Ongoing Stressors and Concerns:   Current:  She reports the weekend was difficult; her  felt physically ill for about five days, and her daughter returned from time at her parents cabin and struggled with the transition.  She was able to identify the difficulties and solutions to address - which included changing up the environment, going outside, getting physical activity, and getting help from her parents.       Ongoing:Managing daily routine which has contributed to some adjustment and parenting challenges, lack of effective emotion regulation strategies for managing daily frustrations and upsets and marital discord due to  "differences in parenting styles.       Treatment Objective(s) Addressed in This Session:     Identify how to practice and encourage having a flexible mindset when things don't go as anticipated  Learn strategy of \"radical acceptance\", benefits of strategy, and how to apply strategy       Continue to review/practice/reinforce:  Identify ways to practice and incorporate self-care into routine.   Learn and practice mindfulness strategies - redirect back to present moment, focus on the facts   Identify and enforce healthy emotional boundaries (it is ok to feel different than my spouse does, it is okay for him to have his feelings and to not feel like I have to fix or change how he is feeling)  Continue to practice awareness of your emotions and physical sensations, to help tune in to what you need  Identify opportunities to access support and help from others  Mindful awareness of thoughts, impact on your emotions, and recognize opportunities to redirect thoughts.  Practice re-directing thoughts to a more helpful perspective.    Practice balanced thinking to counteract polarized/all or nothing thinking.         Intervention:   CBT and DBT: Idalia identified wanting to work on strengthening her coping strategies for managing when things do not go as expected, and in particular responding with a flexible mindset.  We talked about being able to acknowledge and validate the emotions that can come along with things not going as anticipated, while also being able to practice \"radical acceptance\" of what is and the ability to choose a flexible mindset (and acknowledging that thoughts may not automatically go there - so the ability to pay attention to and recognize thinking, identify when cognitive distortions are present, and embrace the opportunity to choose a more flexible way of viewing the situation).      She also processed an upcoming change to working 5 days a week instead of 4, she reported she feels positive about " this for multiple reasons - she enjoys work, it is a busy time of the year at work so she feels productive and busy when at work, and this will help bring in extra income (which will help when her  goes on medical leave for his treatments.  She identified the need to assess how she does with the increase in her work schedule and evaluate what she may need in terms of her self-care.        ASSESSMENT: Current Emotional / Mental Status (status of significant symptoms):   Risk status (Self / Other harm or suicidal ideation)   Patient denies current fears or concerns for personal safety.   Patient denies current or recent suicidal ideation or behaviors.    She continues to agree to use a safety plan should any safety concerns arise.  She also agrees to reach our to her spouse or a family member for help if needed, and/or access crisis/emergency resources.        Patientdenies current or recent homicidal ideation or behaviors.   Patient denies current or recent self injurious behavior or ideation.   Patient denies other safety concerns.   Patient Patient reports there has been no change in risk factors since their last session.     PatientPatient reports there has been no change in protective factors since their last session.     Recommended that patient call 911 or go to the local ED should there be a change in any of these risk factors.  She has previously been informed on how to contact [facility] crisis/COPE for urgent/crisis concerns 24/7.  Provided patient with crisis resources and she agrees to use safety plan should any safety concerns arise.      Professionals or agencies I can contact during a crisis:    Suicide Prevention Lifeline: 7-359-428-TALK (3440)    Crisis Text Line Service (available 24 hours a day, 7 days a week): Text [place] to 164228    Local Crisis Services: [facility]: [phone]    Call 911 or go to my nearest emergency department.        Appearance:   Appropriate  "   Eye Contact:   Good    Psychomotor Behavior: Normal    Attitude:   Cooperative    Orientation:   All   Speech    Rate / Production: Normal     Volume:  Normal    Mood:    Reports that she continues to feel \"better\" and that she is better able to manage stressors and difficulties that arise.     Affect:    Congruent with mood   Thought Content:  Clear    Thought Form:  Coherent  Logical    Insight:    Good      Medication Review:   No changes      Medication Compliance:   Yes      Changes in Health Issues:  None reported         Chemical Use Review:   Substance Use: Chemical use reviewed, no changes or active concerns identified.      Tobacco Use: No current tobacco use.      Diagnosis:  Anxiety disorder unspecified  Depression, unspecified             PLAN: (Patient Tasks / Therapist Tasks / Other)    1) Idalia identified the following goals: Practice flexible mindset and radical acceptance when things don't go as anticipated.    2) If there is a crisis situation, remember you are not alone and that there are people who can help you twenty-four hours a day, seven days a week.  Call [facility] ([facility]): [phone].      3) Follow-up visit is scheduled for September 7th at 12:00 pm.  If urgent or crisis concerns arise prior to next scheduled appointment, please reach out to crisis resources, call 911, or go to the emergency department.      Cristy Wilkinson PsyD, LP  Licensed Psychologist  8/31/2022                  Previous skills and strategies to remind self of and to do as needed:    Practice \"STOP\" technique when you recognize yourself feeling angry   Be a  - what do you notice is happening when Kaylee feels upset?     Practice recognizing when feeling angry, and giving self permission to use calming and self-soothing strategies and take a break.   Look for the gray with thinking  slow down  take deep breaths  manage expectations of self and others.    Journal   Practice flexing " "\"flexiblity and creativity\" -   Continue the great work you are doing with your daily schedule.    Get outside every day.    Play your instrument.    Sing!    Continue with: \"It's just a thought\" - non-judgmental, observe, float down the river on a leaf, clouds in the gege, reframing unhelpful thoughts -   Keep working on being patient when things are tense around me.    Continue with observing/paying attention to warning signs and signals and give self persmission to slow down, especially during the morning routine with Kaylee.    Practice offerring Kaylee choices when appropriate.      Use \"my plan for success\" to help remind you of calming strategies to practice when feeling upset.    Practice decreasing overall vulnerability to emotion mind through getting adequate rest, nutrition, time for yourself, and physical activity.         Consider reading \"Fighting for your marriage\".      Technical Assistance for video visits:    Offer patient the website ([email or web address]) and/or phone number (162-175-6656) for video visit instructions/assistance if needed.                                             ____________________________________    Treatment Plan     Patient's Name: Idalia Hinojosa                        YOB: 1980     Date of Creation: 1/6/2020  Date Treatment Plan Last Reviewed/Revised: 8/4/22     DSM5 Diagnoses: 300.00 (F41.9) Unspecified Anxiety Disorder, depressive disorder, unspecified  Psychosocial / Contextual Factors: strain in marital relationship, parenting challenges, adjustment challenges, 's cancer diagnosis  PROMIS (reviewed every 90 days):      Anticipated number of session for this episode of care: additional 15-20  Anticipation frequency of session: Weekly until symptoms stabilize, then can decrease the frequency of sessions to likely bi-weekly or once every three weeks  Anticipated Duration of each session: 38-52 minutes  Treatment plan will be reviewed in 90 days " or when goals have been changed.         Referral / Collaboration:  We discussed a referral to a couples/marital therapist.  The patient is thinking about this and has talked with her  about the referral, but they are unsure if they would like to follow through at this time.       MeasurableTreatment Goal(s) related to diagnosis / functional impairment(s)  Goal 1: Patient will learn emotion regulation strategies to help when she is feeling upset/angry/frustrated/distressed    I will know I've met my goal when I would yell less, I would feel calmer, and I would not push others.  I would be less physical when I'm angry (e.g. wouldn't slam doors or hit things)        Objective #A (Patient Action)                          Patient will learn and practice at least 2 new emotion regulation strategies.  Status: Continued - Date(s):    8/4/22- Regularly practicing emotion regulation strategies to help manage increase in emotional distress related to spouse's cancer diagnosis        Intervention(s)  Therapist will provide psychoeducation on emotion regulation module from DBT and will assign patient homework to help reinforce skill development.     Objective #B  Patient will identify factors that increase vulnerability to emotion mind and identify ways to decrease vulnerability to emotion mind.  Status: Continued - Date(s):8/4/22 - routinely incorporating mindfulness and self-awareness strategies to increase attention and focus to factors that increase vulnerability to emotion mind           Intervention(s)  Therapist will provide information on factors that increase vulnerabiliyt to emotion mind.     Objective #C  Patient will identify warning signs and signals that emotions are escalating and will learn ways to skillfully intervene early on.  Status: New - Date: 8/4/22 - in progress -            Intervention(s)  Therapist will help patient identify warning signs and signals, and will guide the patient in identifying  skillful ways to intervene when exeriencing warning signs and signals.        Goal 2: Patient will learn new parenting strategies to help with managing parenting challenges.  Parent will work on improving relationship with her daughter and defining what she would like this relationship to look like.      I will know I've met my goal when I'm enjoying time with my daughter, teaching her new things, and not waiting for things to change       Objective #A (Patient Action)                          Status: Continued - Date(s):8/4/22     Patient will increase understanding of developmental norms for her daughter and ways to manage challenging behaviors.     Intervention(s)  Therapist will provide psychoeducation on developmental norms and parenting strategies.     Objective #B  Patient will spend time reflecting on her relationship with her daughter and defining what she would like this relaitonship to look like.                  Status: Continued - Date(s):8/4/22 - Making good progress -            Intervention(s)  Therapist will guide the patient in reflecting upon her relationship with her daughter and help her define ways to move towards what she vaues in her relationship with her daughter.     Objective #C  Patient will increase time spent on fun activity and play with her daughter.  Status: Continued - Date(s):8/4/22- has been enjoying time with daughter and devoting regular time to fun activities and play together           Intervention(s)  Therapist will guide the patient in identifying ways she can increase positive time with her daughter.  Therapist will also teach mindfulness strategies to help patient with being in the present moment when appropriate - .        Goal 3: Patiient will improve communication with her .      I will know I've met my goal when I feel less irritated with my  and communicate more openly with my .  I would like to be more assertive and less aggressive.   I would  "like to not avoid telling him things because I'm worried about his reaction or don't think he will react well.  I would like to be more present to the moment during times when this would be helpful.\"       Objective #A (Patient Action)                          Status: Continued - Date(s):8/4/22 - continues to work on this goal            Patient will learn and practice at least two new interpersonal effectiveness strategies.     Intervention(s)  Therapist will teach strategies from DBT module on interpersonal effectiveness, and will assign homework to help reinforce skill development.        Patient has reviewed and agreed to the above plan.        Cristy Wilkinson PsyD, LP  Licensed Psychologist  January 6, 2020, reviewed on 4/23/2020, reviewed 7/8/2020, reviewed 10/08/2020, reviewed 1/27/2021, reviewed 4/27/2021, 7/29/2021, 11/10/2021, 2/9/2022, 5/3/2022, 8/4/22                                                                                                                                                                                                                                                       "

## 2022-09-07 ENCOUNTER — VIRTUAL VISIT (OUTPATIENT)
Dept: PSYCHOLOGY | Facility: CLINIC | Age: 42
End: 2022-09-07
Payer: COMMERCIAL

## 2022-09-07 DIAGNOSIS — F41.9 ANXIETY DISORDER, UNSPECIFIED TYPE: Primary | ICD-10-CM

## 2022-09-07 DIAGNOSIS — F32.A DEPRESSION, UNSPECIFIED DEPRESSION TYPE: ICD-10-CM

## 2022-09-07 PROCEDURE — 90832 PSYTX W PT 30 MINUTES: CPT | Mod: GT | Performed by: PSYCHOLOGIST

## 2022-09-07 ASSESSMENT — PATIENT HEALTH QUESTIONNAIRE - PHQ9
SUM OF ALL RESPONSES TO PHQ QUESTIONS 1-9: 1
10. IF YOU CHECKED OFF ANY PROBLEMS, HOW DIFFICULT HAVE THESE PROBLEMS MADE IT FOR YOU TO DO YOUR WORK, TAKE CARE OF THINGS AT HOME, OR GET ALONG WITH OTHER PEOPLE: NOT DIFFICULT AT ALL
SUM OF ALL RESPONSES TO PHQ QUESTIONS 1-9: 1

## 2022-09-07 NOTE — PROGRESS NOTES
"           [facility]                                            Progress Note    Patient Name: Idalia Hinojosa  Date: 9/7/2022             Service Type: Individual      Session Start Time: 12:05 pm Session End Time: 12: 38 pm  Session Length:  33 minutes    Session #: 68    Attendees: Client     Service Modality: Video visit: -       Provider verified identity through the following two step process.  Patient provided:  Patient is known previously to provider    Telemedicine Visit: The patient's condition can be safely assessed and treated via synchronous audio and visual telemedicine encounter.      Reason for Telemedicine Visit: Services only offered telehealth    Originating Site (Patient Location): Patient's home    Distant Site (Provider Location): Provider Remote Setting- Home Office    Consent:  The patient/guardian has verbally consented to: the potential risks and benefits of telemedicine (telephone visit) versus in person care; bill my insurance or make self-payment for services provided; and responsibility for payment of non-covered services.     Patient would like the video invitation sent by:  My Chart    Mode of Communication:  Video Conference via [employer]     As the provider I attest to compliance with applicable laws and regulations related to telemedicine.         Treatment Plan Last Reviewed: 08/04/22  PHQ-9/DAVID-7: 08/04/22      DATA  Interactive Complexity: No  Crisis: No        Progress Since Last Session (Related to Symptoms / Goals / Homework):   Symptoms:  She reported that she has been doing \"pretty well\" - she believes the recent increase in her anti-depressant dosage has been very helpful - feels more stable and able to manage stressors.  Has been proactive with thinking through how to manage difficult situations, and has been able to successfully use active coping strategies.      Homework: Achieved / completed to satisfaction. Flexible mindset - has been " "successful with being mindful of this and practicing a flexible mindset         Episode of Care Goals: Satisfactory progress - ACTION (Actively working towards change); Intervened by reinforcing change plan / affirming steps taken.  Diagnostic assessment has been completed, treatment plan has been developed and patient has been making good progress on treatment goals.  The patient stated that her main goals for therapy would be to learn emotion regulation strategies (in particular, to help with when she is feeling upset/angry/frustrated/distressed).  The changes in her daily life due to COVID restrictions were a focus of our work together, and working through changes to routine as her daughter started  and she returned to work.  As she has been making progress, focus of treatment will shift to wellness planning (monitoring mood and symptoms, incorporating into routine what keeps her feeling well, review of triggers, how to manage set backs, etc.).  As her  has been diagnosed with cancer, treatment will also focus on providing her with support in managing the emotional distress and challenges related to this.       Current / Ongoing Stressors and Concerns:   Current:  Her daughter has started school - she was very excited to start school and things have been going well with the transition back to school.  They have met with her 's treatment team and have additional info about expected course for his upcoming treatment/procedure and recovery process.  She has learned he will need a caregiver present 24X7 for 30 days post his procedure.  She acknowledges this information is still \"sinking in\" and she has been moving into proactive mode to plan for this.       Ongoing:Managing daily routine which has contributed to some adjustment and parenting challenges, lack of effective emotion regulation strategies for managing daily frustrations and upsets and marital discord due to differences in " "parenting styles.       Treatment Objective(s) Addressed in This Session:     Identify how to practice and encourage having a flexible mindset when things don't go as anticipated  Learn strategy of \"radical acceptance\", benefits of strategy, and how to apply strategy       Continue to review/practice/reinforce:  Identify ways to practice and incorporate self-care into routine.   Learn and practice mindfulness strategies - redirect back to present moment, focus on the facts   Identify and enforce healthy emotional boundaries (it is ok to feel different than my spouse does, it is okay for him to have his feelings and to not feel like I have to fix or change how he is feeling)  Continue to practice awareness of your emotions and physical sensations, to help tune in to what you need  Identify opportunities to access support and help from others  Mindful awareness of thoughts, impact on your emotions, and recognize opportunities to redirect thoughts.  Practice re-directing thoughts to a more helpful perspective.    Practice balanced thinking to counteract polarized/all or nothing thinking.         Intervention:   CBT: She processed recent info learned about her 's upcoming procedure/treatment and anticipated recovery.  She believes that while this info is still largely \"sinking in\", that she has been able to do as well as possible with tolerating uncertainty, focusing on what she can, and taking care of herself and her family.  She's proactively thinking through how to manage the next phase of his treatment, particularly in terms of learning that he will need caregiver support 24X7.  She said they will be engaging the support of family and friends.  She said she feels grateful that with her medications and coping, she overall feels more \"stable\" and able to manage.      ASSESSMENT: Current Emotional / Mental Status (status of significant symptoms):   Risk status (Self / Other harm or suicidal ideation)   Patient " "denies current fears or concerns for personal safety.   Patient denies current or recent suicidal ideation or behaviors.    She continues to agree to use a safety plan should any safety concerns arise.  She also agrees to reach our to her spouse or a family member for help if needed, and/or access crisis/emergency resources.        Patientdenies current or recent homicidal ideation or behaviors.   Patient denies current or recent self injurious behavior or ideation.   Patient denies other safety concerns.   Patient Patient reports there has been no change in risk factors since their last session.     PatientPatient reports there has been no change in protective factors since their last session.     Recommended that patient call 911 or go to the local ED should there be a change in any of these risk factors.  She has previously been informed on how to contact [facility] crisis/COPE for urgent/crisis concerns 24/7.  Provided patient with crisis resources and she agrees to use safety plan should any safety concerns arise.      Professionals or agencies I can contact during a crisis:    Suicide Prevention Lifeline: 8-399-868-TALK (4724)    Crisis Text Line Service (available 24 hours a day, 7 days a week): Text [place] to 792541    Local Crisis Services: [facility]: [phone]    Call 911 or go to my nearest emergency department.        Appearance:   Appropriate    Eye Contact:   Good    Psychomotor Behavior: Normal    Attitude:   Cooperative    Orientation:   All   Speech    Rate / Production: Normal     Volume:  Normal    Mood:    Reports that mood feels more stable and overall has been doing \"pretty well\"   Affect:    Congruent with mood   Thought Content:  Clear    Thought Form:  Coherent  Logical    Insight:    Good      Medication Review:   No changes      Medication Compliance:   Yes      Changes in Health Issues:  None reported         Chemical Use Review:   Substance Use: Chemical use " "reviewed, no changes or active concerns identified.      Tobacco Use: No current tobacco use.      Diagnosis:  Anxiety disorder unspecified  Depression, unspecified             PLAN: (Patient Tasks / Therapist Tasks / Other)    1) Idalia identified the following goals: Practice flexible mindset with Be - in particular during times that Kaylee might be more stubborn, including tranisiton time and getting her to school.  Talk with Kellee about schedule and Friday's, allow self to be flexible with identifing \"what works\" best with schedule - be curious and open to learning from your experience.      Continue with: Practice flexible mindset and radical acceptance when things don't go as anticipated.    2) If there is a crisis situation, remember you are not alone and that there are people who can help you twenty-four hours a day, seven days a week.  Call [facility] ([facility]): [phone].      3) Follow-up visit is scheduled for September 14th at 12:30 pm.  If urgent or crisis concerns arise prior to next scheduled appointment, please reach out to crisis resources, call 911, or go to the emergency department.      Cristy Wilkinson PsyD,   Licensed Psychologist  9/7/2022              Previous skills and strategies to remind self of and to do as needed:    Practice \"STOP\" technique when you recognize yourself feeling angry   Be a  - what do you notice is happening when Kaylee feels upset?     Practice recognizing when feeling angry, and giving self permission to use calming and self-soothing strategies and take a break.   Look for the gray with thinking  slow down  take deep breaths  manage expectations of self and others.    Journal   Practice flexing \"flexiblity and creativity\" -   Continue the great work you are doing with your daily schedule.    Get outside every day.    Play your instrument.    Sing!    Continue with: \"It's just a thought\" - non-judgmental, observe, float down the river on a " "leaf, clouds in the gege, reframing unhelpful thoughts -   Keep working on being patient when things are tense around me.    Continue with observing/paying attention to warning signs and signals and give self persmission to slow down, especially during the morning routine with Kaylee.    Practice offerring Kaylee choices when appropriate.      Use \"my plan for success\" to help remind you of calming strategies to practice when feeling upset.    Practice decreasing overall vulnerability to emotion mind through getting adequate rest, nutrition, time for yourself, and physical activity.         Consider reading \"Fighting for your marriage\".      Technical Assistance for video visits:    Offer patient the website ([email or web address]) and/or phone number (594-605-8322) for video visit instructions/assistance if needed.                                             ____________________________________    Treatment Plan     Patient's Name: Idalia Hinojosa                        YOB: 1980     Date of Creation: 1/6/2020  Date Treatment Plan Last Reviewed/Revised: 8/4/22     DSM5 Diagnoses: 300.00 (F41.9) Unspecified Anxiety Disorder, depressive disorder, unspecified  Psychosocial / Contextual Factors: strain in marital relationship, parenting challenges, adjustment challenges, 's cancer diagnosis  PROMIS (reviewed every 90 days):      Anticipated number of session for this episode of care: additional 15-20  Anticipation frequency of session: Weekly until symptoms stabilize, then can decrease the frequency of sessions to likely bi-weekly or once every three weeks  Anticipated Duration of each session: 38-52 minutes  Treatment plan will be reviewed in 90 days or when goals have been changed.         Referral / Collaboration:  We discussed a referral to a couples/marital therapist.  The patient is thinking about this and has talked with her  about the referral, but they are unsure if they would like " to follow through at this time.       MeasurableTreatment Goal(s) related to diagnosis / functional impairment(s)  Goal 1: Patient will learn emotion regulation strategies to help when she is feeling upset/angry/frustrated/distressed    I will know I've met my goal when I would yell less, I would feel calmer, and I would not push others.  I would be less physical when I'm angry (e.g. wouldn't slam doors or hit things)        Objective #A (Patient Action)                          Patient will learn and practice at least 2 new emotion regulation strategies.  Status: Continued - Date(s):    8/4/22- Regularly practicing emotion regulation strategies to help manage increase in emotional distress related to spouse's cancer diagnosis        Intervention(s)  Therapist will provide psychoeducation on emotion regulation module from DBT and will assign patient homework to help reinforce skill development.     Objective #B  Patient will identify factors that increase vulnerability to emotion mind and identify ways to decrease vulnerability to emotion mind.  Status: Continued - Date(s):8/4/22 - routinely incorporating mindfulness and self-awareness strategies to increase attention and focus to factors that increase vulnerability to emotion mind           Intervention(s)  Therapist will provide information on factors that increase vulnerabiliyt to emotion mind.     Objective #C  Patient will identify warning signs and signals that emotions are escalating and will learn ways to skillfully intervene early on.  Status: New - Date: 8/4/22 - in progress -            Intervention(s)  Therapist will help patient identify warning signs and signals, and will guide the patient in identifying skillful ways to intervene when exeriencing warning signs and signals.        Goal 2: Patient will learn new parenting strategies to help with managing parenting challenges.  Parent will work on improving relationship with her daughter and defining  "what she would like this relationship to look like.      I will know I've met my goal when I'm enjoying time with my daughter, teaching her new things, and not waiting for things to change       Objective #A (Patient Action)                          Status: Continued - Date(s):8/4/22     Patient will increase understanding of developmental norms for her daughter and ways to manage challenging behaviors.     Intervention(s)  Therapist will provide psychoeducation on developmental norms and parenting strategies.     Objective #B  Patient will spend time reflecting on her relationship with her daughter and defining what she would like this relaitonship to look like.                  Status: Continued - Date(s):8/4/22 - Making good progress -            Intervention(s)  Therapist will guide the patient in reflecting upon her relationship with her daughter and help her define ways to move towards what she vaues in her relationship with her daughter.     Objective #C  Patient will increase time spent on fun activity and play with her daughter.  Status: Continued - Date(s):8/4/22- has been enjoying time with daughter and devoting regular time to fun activities and play together           Intervention(s)  Therapist will guide the patient in identifying ways she can increase positive time with her daughter.  Therapist will also teach mindfulness strategies to help patient with being in the present moment when appropriate - .        Goal 3: Patiient will improve communication with her .      I will know I've met my goal when I feel less irritated with my  and communicate more openly with my .  I would like to be more assertive and less aggressive.   I would like to not avoid telling him things because I'm worried about his reaction or don't think he will react well.  I would like to be more present to the moment during times when this would be helpful.\"       Objective #A (Patient Action)                  "         Status: Continued - Date(s):8/4/22 - continues to work on this goal            Patient will learn and practice at least two new interpersonal effectiveness strategies.     Intervention(s)  Therapist will teach strategies from DBT module on interpersonal effectiveness, and will assign homework to help reinforce skill development.        Patient has reviewed and agreed to the above plan.        Cristy Wilkinson PsyD,   Licensed Psychologist  January 6, 2020, reviewed on 4/23/2020, reviewed 7/8/2020, reviewed 10/08/2020, reviewed 1/27/2021, reviewed 4/27/2021, 7/29/2021, 11/10/2021, 2/9/2022, 5/3/2022, 8/4/22                                                                                                                                                                                                                                                                [facility]                                            Progress Note    Patient Name: Idalia Hinojosa  Date: 8/31/2022           Service Type: Individual      Session Start Time: 12:06 pm Session End Time: 12:48 pm pm   Session Length:  42 minutes    Session #: 67    Attendees: Client     Service Modality: Video visit: -       Provider verified identity through the following two step process.  Patient provided:  Patient is known previously to provider    Telemedicine Visit: The patient's condition can be safely assessed and treated via synchronous audio and visual telemedicine encounter.      Reason for Telemedicine Visit: Services only offered telehealth    Originating Site (Patient Location): Patient's home    Distant Site (Provider Location): Provider Remote Setting- Home Office    Consent:  The patient/guardian has verbally consented to: the potential risks and benefits of telemedicine (telephone visit) versus in person care; bill my insurance or make self-payment for services provided; and responsibility for payment of  "non-covered services.     Patient would like the video invitation sent by:  My Chart    Mode of Communication:  Video Conference via [employer],     As the provider I attest to compliance with applicable laws and regulations related to telemedicine.         Treatment Plan Last Reviewed: 08/04/22  PHQ-9/DAVID-7: 08/04/22      DATA  Interactive Complexity: No  Crisis: No        Progress Since Last Session (Related to Symptoms / Goals / Homework):   Symptoms:  She reported that symptoms continue to remain improved - less anxious and depressed and feels better able to cope with challenges and stressors she is experiencing.   She reports the weekend was \"rough\" in terms of her  not feeling well physically from his treatment, and her daughter had some challenging behaviors, however, she was able to actively engage constructive coping strategies.        Homework: Achieved / completed to satisfaction.  Has been enjoying biking to work and cool summer mornings, went out on a date with her  and enjoyed this, and used healthy coping strategies to manage difficult situations.          Episode of Care Goals: Satisfactory progress - ACTION (Actively working towards change); Intervened by reinforcing change plan / affirming steps taken.  Diagnostic assessment has been completed, treatment plan has been developed and patient has been making good progress on treatment goals.  The patient stated that her main goals for therapy would be to learn emotion regulation strategies (in particular, to help with when she is feeling upset/angry/frustrated/distressed).  The changes in her daily life due to COVID restrictions were a focus of our work together, and working through changes to routine as her daughter started  and she returned to work.  As she has been making progress, focus of treatment will shift to wellness planning (monitoring mood and symptoms, incorporating into routine what keeps her feeling well, review of " "triggers, how to manage set backs, etc.).  As her  has been diagnosed with cancer, treatment will also focus on providing her with support in managing the emotional distress and challenges related to this.       Current / Ongoing Stressors and Concerns:   Current:  She reports the weekend was difficult; her  felt physically ill for about five days, and her daughter returned from time at her parents cabin and struggled with the transition.  She was able to identify the difficulties and solutions to address - which included changing up the environment, going outside, getting physical activity, and getting help from her parents.       Ongoing:Managing daily routine which has contributed to some adjustment and parenting challenges, lack of effective emotion regulation strategies for managing daily frustrations and upsets and marital discord due to differences in parenting styles.       Treatment Objective(s) Addressed in This Session:     Identify how to practice and encourage having a flexible mindset when things don't go as anticipated  Learn strategy of \"radical acceptance\", benefits of strategy, and how to apply strategy       Continue to review/practice/reinforce:  Identify ways to practice and incorporate self-care into routine.   Learn and practice mindfulness strategies - redirect back to present moment, focus on the facts   Identify and enforce healthy emotional boundaries (it is ok to feel different than my spouse does, it is okay for him to have his feelings and to not feel like I have to fix or change how he is feeling)  Continue to practice awareness of your emotions and physical sensations, to help tune in to what you need  Identify opportunities to access support and help from others  Mindful awareness of thoughts, impact on your emotions, and recognize opportunities to redirect thoughts.  Practice re-directing thoughts to a more helpful perspective.    Practice balanced thinking to " "counteract polarized/all or nothing thinking.         Intervention:   CBT and DBT: Idalia identified wanting to work on strengthening her coping strategies for managing when things do not go as expected, and in particular responding with a flexible mindset.  We talked about being able to acknowledge and validate the emotions that can come along with things not going as anticipated, while also being able to practice \"radical acceptance\" of what is and the ability to choose a flexible mindset (and acknowledging that thoughts may not automatically go there - so the ability to pay attention to and recognize thinking, identify when cognitive distortions are present, and embrace the opportunity to choose a more flexible way of viewing the situation).      She also processed an upcoming change to working 5 days a week instead of 4, she reported she feels positive about this for multiple reasons - she enjoys work, it is a busy time of the year at work so she feels productive and busy when at work, and this will help bring in extra income (which will help when her  goes on medical leave for his treatments.  She identified the need to assess how she does with the increase in her work schedule and evaluate what she may need in terms of her self-care.        ASSESSMENT: Current Emotional / Mental Status (status of significant symptoms):   Risk status (Self / Other harm or suicidal ideation)   Patient denies current fears or concerns for personal safety.   Patient denies current or recent suicidal ideation or behaviors.    She continues to agree to use a safety plan should any safety concerns arise.  She also agrees to reach our to her spouse or a family member for help if needed, and/or access crisis/emergency resources.        Patientdenies current or recent homicidal ideation or behaviors.   Patient denies current or recent self injurious behavior or ideation.   Patient denies other safety concerns.   Patient Patient " "reports there has been no change in risk factors since their last session.     PatientPatient reports there has been no change in protective factors since their last session.     Recommended that patient call 911 or go to the local ED should there be a change in any of these risk factors.  She has previously been informed on how to contact [facility] crisis/COPE for urgent/crisis concerns 24/7.  Provided patient with crisis resources and she agrees to use safety plan should any safety concerns arise.      Professionals or agencies I can contact during a crisis:    Suicide Prevention Lifeline: 1-697-681-HOQC (1945)    Crisis Text Line Service (available 24 hours a day, 7 days a week): Text [place] to [phone]    Local Crisis Services: [facility]: [phone]    Call 911 or go to my nearest emergency department.        Appearance:   Appropriate    Eye Contact:   Good    Psychomotor Behavior: Normal    Attitude:   Cooperative    Orientation:   All   Speech    Rate / Production: Normal     Volume:  Normal    Mood:    Reports that she continues to feel \"better\" and that she is better able to manage stressors and difficulties that arise.     Affect:    Congruent with mood   Thought Content:  Clear    Thought Form:  Coherent  Logical    Insight:    Good      Medication Review:   No changes      Medication Compliance:   Yes      Changes in Health Issues:  None reported         Chemical Use Review:   Substance Use: Chemical use reviewed, no changes or active concerns identified.      Tobacco Use: No current tobacco use.      Diagnosis:  Anxiety disorder unspecified  Depression, unspecified             PLAN: (Patient Tasks / Therapist Tasks / Other)    1) Idalia identified the following goals: Practice flexible mindset and radical acceptance when things don't go as anticipated.    2) If there is a crisis situation, remember you are not alone and that there are people who can help you twenty-four hours a " "day, seven days a week.  Call [facility] ([facility]): [phone].      3) Follow-up visit is scheduled for September 7th at 12:00 pm.  If urgent or crisis concerns arise prior to next scheduled appointment, please reach out to crisis resources, call 911, or go to the emergency department.      Cristy Wilkinson PsyD,   Licensed Psychologist  8/31/2022                  Previous skills and strategies to remind self of and to do as needed:    Practice \"STOP\" technique when you recognize yourself feeling angry   Be a  - what do you notice is happening when Kaylee feels upset?     Practice recognizing when feeling angry, and giving self permission to use calming and self-soothing strategies and take a break.   Look for the gray with thinking  slow down  take deep breaths  manage expectations of self and others.    Journal   Practice flexing \"flexiblity and creativity\" -   Continue the great work you are doing with your daily schedule.    Get outside every day.    Play your instrument.    Sing!    Continue with: \"It's just a thought\" - non-judgmental, observe, float down the river on a leaf, clouds in the gege, reframing unhelpful thoughts -   Keep working on being patient when things are tense around me.    Continue with observing/paying attention to warning signs and signals and give self persmission to slow down, especially during the morning routine with Kaylee.    Practice offerring Kaylee choices when appropriate.      Use \"my plan for success\" to help remind you of calming strategies to practice when feeling upset.    Practice decreasing overall vulnerability to emotion mind through getting adequate rest, nutrition, time for yourself, and physical activity.         Consider reading \"Fighting for your marriage\".      Technical Assistance for video visits:    Offer patient the website ([email or web address]) and/or phone number ([phone]) for video visit instructions/assistance if needed.     "                                         ____________________________________    Treatment Plan     Patient's Name: Idalia Hinojosa                        YOB: 1980     Date of Creation: 1/6/2020  Date Treatment Plan Last Reviewed/Revised: 8/4/22     DSM5 Diagnoses: 300.00 (F41.9) Unspecified Anxiety Disorder, depressive disorder, unspecified  Psychosocial / Contextual Factors: strain in marital relationship, parenting challenges, adjustment challenges, 's cancer diagnosis  PROMIS (reviewed every 90 days):      Anticipated number of session for this episode of care: additional 15-20  Anticipation frequency of session: Weekly until symptoms stabilize, then can decrease the frequency of sessions to likely bi-weekly or once every three weeks  Anticipated Duration of each session: 38-52 minutes  Treatment plan will be reviewed in 90 days or when goals have been changed.         Referral / Collaboration:  We discussed a referral to a couples/marital therapist.  The patient is thinking about this and has talked with her  about the referral, but they are unsure if they would like to follow through at this time.       MeasurableTreatment Goal(s) related to diagnosis / functional impairment(s)  Goal 1: Patient will learn emotion regulation strategies to help when she is feeling upset/angry/frustrated/distressed    I will know I've met my goal when I would yell less, I would feel calmer, and I would not push others.  I would be less physical when I'm angry (e.g. wouldn't slam doors or hit things)        Objective #A (Patient Action)                          Patient will learn and practice at least 2 new emotion regulation strategies.  Status: Continued - Date(s):    8/4/22- Regularly practicing emotion regulation strategies to help manage increase in emotional distress related to spouse's cancer diagnosis        Intervention(s)  Therapist will provide psychoeducation on emotion regulation module  from DBT and will assign patient homework to help reinforce skill development.     Objective #B  Patient will identify factors that increase vulnerability to emotion mind and identify ways to decrease vulnerability to emotion mind.  Status: Continued - Date(s):8/4/22 - routinely incorporating mindfulness and self-awareness strategies to increase attention and focus to factors that increase vulnerability to emotion mind           Intervention(s)  Therapist will provide information on factors that increase vulnerabiliyt to emotion mind.     Objective #C  Patient will identify warning signs and signals that emotions are escalating and will learn ways to skillfully intervene early on.  Status: New - Date: 8/4/22 - in progress -            Intervention(s)  Therapist will help patient identify warning signs and signals, and will guide the patient in identifying skillful ways to intervene when exeriencing warning signs and signals.        Goal 2: Patient will learn new parenting strategies to help with managing parenting challenges.  Parent will work on improving relationship with her daughter and defining what she would like this relationship to look like.      I will know I've met my goal when I'm enjoying time with my daughter, teaching her new things, and not waiting for things to change       Objective #A (Patient Action)                          Status: Continued - Date(s):8/4/22     Patient will increase understanding of developmental norms for her daughter and ways to manage challenging behaviors.     Intervention(s)  Therapist will provide psychoeducation on developmental norms and parenting strategies.     Objective #B  Patient will spend time reflecting on her relationship with her daughter and defining what she would like this relaitonship to look like.                  Status: Continued - Date(s):8/4/22 - Making good progress -            Intervention(s)  Therapist will guide the patient in reflecting upon her  "relationship with her daughter and help her define ways to move towards what she vaues in her relationship with her daughter.     Objective #C  Patient will increase time spent on fun activity and play with her daughter.  Status: Continued - Date(s):8/4/22- has been enjoying time with daughter and devoting regular time to fun activities and play together           Intervention(s)  Therapist will guide the patient in identifying ways she can increase positive time with her daughter.  Therapist will also teach mindfulness strategies to help patient with being in the present moment when appropriate - .        Goal 3: Patiient will improve communication with her .      I will know I've met my goal when I feel less irritated with my  and communicate more openly with my .  I would like to be more assertive and less aggressive.   I would like to not avoid telling him things because I'm worried about his reaction or don't think he will react well.  I would like to be more present to the moment during times when this would be helpful.\"       Objective #A (Patient Action)                          Status: Continued - Date(s):8/4/22 - continues to work on this goal            Patient will learn and practice at least two new interpersonal effectiveness strategies.     Intervention(s)  Therapist will teach strategies from DBT module on interpersonal effectiveness, and will assign homework to help reinforce skill development.        Patient has reviewed and agreed to the above plan.        Cristy Wilkinson PsyD, LP  Licensed Psychologist  January 6, 2020, reviewed on 4/23/2020, reviewed 7/8/2020, reviewed 10/08/2020, reviewed 1/27/2021, reviewed 4/27/2021, 7/29/2021, 11/10/2021, 2/9/2022, 5/3/2022, 8/4/22                                                                                                                                                                                                               "                                         Answers for HPI/ROS submitted by the patient on 9/7/2022  If you checked off any problems, how difficult have these problems made it for you to do your work, take care of things at home, or get along with other people?: Not difficult at all  PHQ9 TOTAL SCORE: 1

## 2022-09-08 DIAGNOSIS — F32.5 MAJOR DEPRESSIVE DISORDER WITH SINGLE EPISODE, IN FULL REMISSION (H): ICD-10-CM

## 2022-09-10 ENCOUNTER — HEALTH MAINTENANCE LETTER (OUTPATIENT)
Age: 42
End: 2022-09-10

## 2022-09-15 ENCOUNTER — VIRTUAL VISIT (OUTPATIENT)
Dept: PSYCHOLOGY | Facility: CLINIC | Age: 42
End: 2022-09-15
Payer: COMMERCIAL

## 2022-09-15 DIAGNOSIS — F32.A DEPRESSION, UNSPECIFIED DEPRESSION TYPE: ICD-10-CM

## 2022-09-15 DIAGNOSIS — F41.9 ANXIETY DISORDER, UNSPECIFIED TYPE: Primary | ICD-10-CM

## 2022-09-15 PROCEDURE — 90834 PSYTX W PT 45 MINUTES: CPT | Mod: GT | Performed by: PSYCHOLOGIST

## 2022-09-15 NOTE — PROGRESS NOTES
[facility]                                            Progress Note    Patient Name: Idalia Hinojosa  Date: 9/15/2022         Service Type: Individual      Session Start Time: 12:06 pm Session End Time: 12: 54 pm  Session Length:  48 minutes    Session #: 69    Attendees: Client     Service Modality: Video visit: -       Provider verified identity through the following two step process.  Patient provided:  Patient is known previously to provider    Telemedicine Visit: The patient's condition can be safely assessed and treated via synchronous audio and visual telemedicine encounter.      Reason for Telemedicine Visit: Services only offered telehealth    Originating Site (Patient Location): Patient's home    Distant Site (Provider Location): Provider Remote Setting- Home Office    Consent:  The patient/guardian has verbally consented to: the potential risks and benefits of telemedicine (telephone visit) versus in person care; bill my insurance or make self-payment for services provided; and responsibility for payment of non-covered services.     Patient would like the video invitation sent by:  My Chart    Mode of Communication:  Video Conference via [employer],     As the provider I attest to compliance with applicable laws and regulations related to telemedicine.         Treatment Plan Last Reviewed: 08/04/22  PHQ-9/DAVID-7: 08/04/22      DATA  Interactive Complexity: No  Crisis: No        Progress Since Last Session (Related to Symptoms / Goals / Homework):   Symptoms:  She reported that it has been a stressful week - she's been mindful of practicing a flexible mindset and has found this to be helpful.      Homework: Achieved / completed to satisfaction. Was able to practice having a flexible mindset when things don't go as anticipated with Evanylbecky's treatment - she found this to help.          Episode of Care Goals: Satisfactory progress - ACTION (Actively working towards change);  "Intervened by reinforcing change plan / affirming steps taken.  Diagnostic assessment has been completed, treatment plan has been developed and patient has been making good progress on treatment goals.  The patient stated that her main goals for therapy would be to learn emotion regulation strategies (in particular, to help with when she is feeling upset/angry/frustrated/distressed).  The changes in her daily life due to COVID restrictions were a focus of our work together, and working through changes to routine as her daughter started school and she returned to work.  As she has been making progress, focus of treatment will shift to wellness planning (monitoring mood and symptoms, incorporating into routine what keeps her feeling well, review of triggers, how to manage set backs, etc.).  As her  has been diagnosed with cancer, treatment will also focus on providing her with support in managing the emotional distress and challenges related to this.       Current / Ongoing Stressors and Concerns:   Current: Hunter had some unexpected reaction to his treatment, this has been stressful, fortunately her parents were able to help out with Kaylee so she could help focus on this.  They had a busy weekend last weekend, and she notes feeling more tired with everything that has been going on.       Ongoing:Managing daily routine which has contributed to some adjustment and parenting challenges, lack of effective emotion regulation strategies for managing daily frustrations and upsets and marital discord due to differences in parenting styles.       Treatment Objective(s) Addressed in This Session:     Identify and enforce healthy emotional boundaries (define your role as spouse, not \"therapist\" or \"fixer\")  Identify factors to consider when having crucial conversations     Identify how to practice and encourage having a flexible mindset when things don't go as anticipated  Learn strategy of \"radical acceptance\", benefits " "of strategy, and how to apply strategy     Continue to review/practice/reinforce:  Identify ways to practice and incorporate self-care into routine.   Learn and practice mindfulness strategies - redirect back to present moment, focus on the facts   Identify and enforce healthy emotional boundaries (it is ok to feel different than my spouse does, it is okay for him to have his feelings and to not feel like I have to fix or change how he is feeling)  Continue to practice awareness of your emotions and physical sensations, to help tune in to what you need  Identify opportunities to access support and help from others  Mindful awareness of thoughts, impact on your emotions, and recognize opportunities to redirect thoughts.  Practice re-directing thoughts to a more helpful perspective.    Practice balanced thinking to counteract polarized/all or nothing thinking.         Intervention:   CBT: She shared that she would like to encourage Hunter to get more support for how his cancer has impacted him emotionally.  She notes that when she has approached the conversation in the past, it has often been when she is feeling more frustrated and he is feeling more physically sick and negative.  Suggested factoring timing into the conversation - identifying constructive times to have the conversation (not waiting until frustrations mount, or he is not in a good place to have the conversation).  Identified ways she can communicate supportively what her concerns are and what her hopes are for him.  Suggested she may also ask in upcoming appointments with the treatment team to talk about benefits of getting emotional support during the treatment process and to connect with resources made available through his treatment team.  She said they were also given a book with additional info and resources.  We also talked about defining her role as spouse (not expecting herself to be \"therapist\" or \"fixer\") and defining healthy emotional " boundaries.      Proactively planned for the upcoming weekend - identifying ways she can incorporate self-care.  She noted how helpful it is for her when she gets time to read at night - help her to re-charge and also helps her to wind down.  She'd also like to make time to run, she loves running during the fall weather.      ASSESSMENT: Current Emotional / Mental Status (status of significant symptoms):   Risk status (Self / Other harm or suicidal ideation)   Patient denies current fears or concerns for personal safety.   Patient denies current or recent suicidal ideation or behaviors.    She continues to agree to use a safety plan should any safety concerns arise.  She also agrees to reach our to her spouse or a family member for help if needed, and/or access crisis/emergency resources.        Patientdenies current or recent homicidal ideation or behaviors.   Patient denies current or recent self injurious behavior or ideation.   Patient denies other safety concerns.   Patient Patient reports there has been no change in risk factors since their last session.     PatientPatient reports there has been no change in protective factors since their last session.     Recommended that patient call 911 or go to the local ED should there be a change in any of these risk factors.  She has previously been informed on how to contact [facility] crisis/COPE for urgent/crisis concerns 24/7.  Provided patient with crisis resources and she agrees to use safety plan should any safety concerns arise.      Professionals or agencies I can contact during a crisis:    Suicide Prevention Lifeline: 2-310-884-TALK (3611)    Crisis Text Line Service (available 24 hours a day, 7 days a week): Text [place] to 672857    Local Crisis Services: [facility] Crisis Services: [phone]    Call 911 or go to my nearest emergency department.        Appearance:   Appropriate    Eye Contact:   Good    Psychomotor Behavior: Normal  "   Attitude:   Cooperative    Orientation:   All   Speech    Rate / Production: Normal     Volume:  Normal    Mood:    Reports that this past week has been stressful, mood has been \"pretty good\" in spite of, has been practicing a flexible mindset    Affect:    Congruent with mood   Thought Content:  Clear    Thought Form:  Coherent  Logical    Insight:    Good      Medication Review:   No changes      Medication Compliance:   Yes      Changes in Health Issues:  None reported         Chemical Use Review:   Substance Use: Chemical use reviewed, no changes or active concerns identified.      Tobacco Use: No current tobacco use.      Diagnosis:  Anxiety disorder unspecified  Depression, unspecified             PLAN: (Patient Tasks / Therapist Tasks / Other)    1) Idalia identified the following goals: Take time each day to re-charge (read before bed, helps both with re-charging and winding down), look into cancer mental health support information, continue to think about how to talk to Hunter about your concerns, and timing of the conversation.      Continue with: Practice flexible mindset and radical acceptance when things don't go as anticipated.    2) If there is a crisis situation, remember you are not alone and that there are people who can help you twenty-four hours a day, seven days a week.  Call [facility] ([facility]): [phone].      3) Follow-up visit is scheduled for September 21st at 12:00 pm.  If urgent or crisis concerns arise prior to next scheduled appointment, please reach out to crisis resources, call 911, or go to the emergency department.      Cristy Wilkinson PsyD,   Licensed Psychologist  9/15/2022                Previous skills and strategies to remind self of and to do as needed:    Practice \"STOP\" technique when you recognize yourself feeling angry   Be a  - what do you notice is happening when Kaylee feels upset?     Practice recognizing when feeling angry, and giving " "self permission to use calming and self-soothing strategies and take a break.   Look for the gray with thinking  slow down  take deep breaths  manage expectations of self and others.    Journal   Practice flexing \"flexiblity and creativity\" -   Continue the great work you are doing with your daily schedule.    Get outside every day.    Play your instrument.    Sing!    Continue with: \"It's just a thought\" - non-judgmental, observe, float down the river on a leaf, clouds in the gege, reframing unhelpful thoughts -   Keep working on being patient when things are tense around me.    Continue with observing/paying attention to warning signs and signals and give self persmission to slow down, especially during the morning routine with Kaylee.    Practice offerring Kaylee choices when appropriate.      Use \"my plan for success\" to help remind you of calming strategies to practice when feeling upset.    Practice decreasing overall vulnerability to emotion mind through getting adequate rest, nutrition, time for yourself, and physical activity.         Consider reading \"Fighting for your marriage\".      Technical Assistance for video visits:    Offer patient the website ([email or web address]) and/or phone number ([phone]) for video visit instructions/assistance if needed.                                             ____________________________________    Treatment Plan     Patient's Name: Idalia Hinojosa                        YOB: 1980     Date of Creation: 1/6/2020  Date Treatment Plan Last Reviewed/Revised: 8/4/22     DSM5 Diagnoses: 300.00 (F41.9) Unspecified Anxiety Disorder, depressive disorder, unspecified  Psychosocial / Contextual Factors: strain in marital relationship, parenting challenges, adjustment challenges, 's cancer diagnosis  PROMIS (reviewed every 90 days):      Anticipated number of session for this episode of care: additional 15-20  Anticipation frequency of session: Weekly " until symptoms stabilize, then can decrease the frequency of sessions to likely bi-weekly or once every three weeks  Anticipated Duration of each session: 38-52 minutes  Treatment plan will be reviewed in 90 days or when goals have been changed.         Referral / Collaboration:  We discussed a referral to a couples/marital therapist.  The patient is thinking about this and has talked with her  about the referral, but they are unsure if they would like to follow through at this time.       MeasurableTreatment Goal(s) related to diagnosis / functional impairment(s)  Goal 1: Patient will learn emotion regulation strategies to help when she is feeling upset/angry/frustrated/distressed    I will know I've met my goal when I would yell less, I would feel calmer, and I would not push others.  I would be less physical when I'm angry (e.g. wouldn't slam doors or hit things)        Objective #A (Patient Action)                          Patient will learn and practice at least 2 new emotion regulation strategies.  Status: Continued - Date(s):    8/4/22- Regularly practicing emotion regulation strategies to help manage increase in emotional distress related to spouse's cancer diagnosis        Intervention(s)  Therapist will provide psychoeducation on emotion regulation module from DBT and will assign patient homework to help reinforce skill development.     Objective #B  Patient will identify factors that increase vulnerability to emotion mind and identify ways to decrease vulnerability to emotion mind.  Status: Continued - Date(s):8/4/22 - routinely incorporating mindfulness and self-awareness strategies to increase attention and focus to factors that increase vulnerability to emotion mind           Intervention(s)  Therapist will provide information on factors that increase vulnerabiliyt to emotion mind.     Objective #C  Patient will identify warning signs and signals that emotions are escalating and will learn ways  to skillfully intervene early on.  Status: New - Date: 8/4/22 - in progress -            Intervention(s)  Therapist will help patient identify warning signs and signals, and will guide the patient in identifying skillful ways to intervene when exeriencing warning signs and signals.        Goal 2: Patient will learn new parenting strategies to help with managing parenting challenges.  Parent will work on improving relationship with her daughter and defining what she would like this relationship to look like.      I will know I've met my goal when I'm enjoying time with my daughter, teaching her new things, and not waiting for things to change       Objective #A (Patient Action)                          Status: Continued - Date(s):8/4/22     Patient will increase understanding of developmental norms for her daughter and ways to manage challenging behaviors.     Intervention(s)  Therapist will provide psychoeducation on developmental norms and parenting strategies.     Objective #B  Patient will spend time reflecting on her relationship with her daughter and defining what she would like this relaitonship to look like.                  Status: Continued - Date(s):8/4/22 - Making good progress -            Intervention(s)  Therapist will guide the patient in reflecting upon her relationship with her daughter and help her define ways to move towards what she vaues in her relationship with her daughter.     Objective #C  Patient will increase time spent on fun activity and play with her daughter.  Status: Continued - Date(s):8/4/22- has been enjoying time with daughter and devoting regular time to fun activities and play together           Intervention(s)  Therapist will guide the patient in identifying ways she can increase positive time with her daughter.  Therapist will also teach mindfulness strategies to help patient with being in the present moment when appropriate - .        Goal 3: Patiient will improve  "communication with her .      I will know I've met my goal when I feel less irritated with my  and communicate more openly with my .  I would like to be more assertive and less aggressive.   I would like to not avoid telling him things because I'm worried about his reaction or don't think he will react well.  I would like to be more present to the moment during times when this would be helpful.\"       Objective #A (Patient Action)                          Status: Continued - Date(s):8/4/22 - continues to work on this goal            Patient will learn and practice at least two new interpersonal effectiveness strategies.     Intervention(s)  Therapist will teach strategies from DBT module on interpersonal effectiveness, and will assign homework to help reinforce skill development.        Patient has reviewed and agreed to the above plan.        Cristy Wilkinson PsyD,   Licensed Psychologist  January 6, 2020, reviewed on 4/23/2020, reviewed 7/8/2020, reviewed 10/08/2020, reviewed 1/27/2021, reviewed 4/27/2021, 7/29/2021, 11/10/2021, 2/9/2022, 5/3/2022, 8/4/22                                                                                                                                                                                                                                                                [facility]                                            Progress Note    Patient Name: Idalia Hinojosa  Date: 8/31/2022           Service Type: Individual      Session Start Time: 12:06 pm Session End Time: 12:48 pm pm   Session Length:  42 minutes    Session #: 67    Attendees: Client     Service Modality: Video visit: -       Provider verified identity through the following two step process.  Patient provided:  Patient is known previously to provider    Telemedicine Visit: The patient's condition can be safely assessed and treated via synchronous audio and " "visual telemedicine encounter.      Reason for Telemedicine Visit: Services only offered telehealth    Originating Site (Patient Location): Patient's home    Distant Site (Provider Location): Provider Remote Setting- Home Office    Consent:  The patient/guardian has verbally consented to: the potential risks and benefits of telemedicine (telephone visit) versus in person care; bill my insurance or make self-payment for services provided; and responsibility for payment of non-covered services.     Patient would like the video invitation sent by:  My Chart    Mode of Communication:  Video Conference via [employer],     As the provider I attest to compliance with applicable laws and regulations related to telemedicine.         Treatment Plan Last Reviewed: 08/04/22  PHQ-9/DAVID-7: 08/04/22      DATA  Interactive Complexity: No  Crisis: No        Progress Since Last Session (Related to Symptoms / Goals / Homework):   Symptoms:  She reported that symptoms continue to remain improved - less anxious and depressed and feels better able to cope with challenges and stressors she is experiencing.   She reports the weekend was \"rough\" in terms of her  not feeling well physically from his treatment, and her daughter had some challenging behaviors, however, she was able to actively engage constructive coping strategies.        Homework: Achieved / completed to satisfaction.  Has been enjoying biking to work and cool summer mornings, went out on a date with her  and enjoyed this, and used healthy coping strategies to manage difficult situations.          Episode of Care Goals: Satisfactory progress - ACTION (Actively working towards change); Intervened by reinforcing change plan / affirming steps taken.  Diagnostic assessment has been completed, treatment plan has been developed and patient has been making good progress on treatment goals.  The patient stated that her main goals for therapy would be to learn emotion " "regulation strategies (in particular, to help with when she is feeling upset/angry/frustrated/distressed).  The changes in her daily life due to COVID restrictions were a focus of our work together, and working through changes to routine as her daughter started  and she returned to work.  As she has been making progress, focus of treatment will shift to wellness planning (monitoring mood and symptoms, incorporating into routine what keeps her feeling well, review of triggers, how to manage set backs, etc.).  As her  has been diagnosed with cancer, treatment will also focus on providing her with support in managing the emotional distress and challenges related to this.       Current / Ongoing Stressors and Concerns:   Current:  She reports the weekend was difficult; her  felt physically ill for about five days, and her daughter returned from time at her parents cabin and struggled with the transition.  She was able to identify the difficulties and solutions to address - which included changing up the environment, going outside, getting physical activity, and getting help from her parents.       Ongoing:Managing daily routine which has contributed to some adjustment and parenting challenges, lack of effective emotion regulation strategies for managing daily frustrations and upsets and marital discord due to differences in parenting styles.       Treatment Objective(s) Addressed in This Session:     Identify how to practice and encourage having a flexible mindset when things don't go as anticipated  Learn strategy of \"radical acceptance\", benefits of strategy, and how to apply strategy       Continue to review/practice/reinforce:  Identify ways to practice and incorporate self-care into routine.   Learn and practice mindfulness strategies - redirect back to present moment, focus on the facts   Identify and enforce healthy emotional boundaries (it is ok to feel different than my spouse does, it " "is okay for him to have his feelings and to not feel like I have to fix or change how he is feeling)  Continue to practice awareness of your emotions and physical sensations, to help tune in to what you need  Identify opportunities to access support and help from others  Mindful awareness of thoughts, impact on your emotions, and recognize opportunities to redirect thoughts.  Practice re-directing thoughts to a more helpful perspective.    Practice balanced thinking to counteract polarized/all or nothing thinking.         Intervention:   CBT and DBT: Idalia identified wanting to work on strengthening her coping strategies for managing when things do not go as expected, and in particular responding with a flexible mindset.  We talked about being able to acknowledge and validate the emotions that can come along with things not going as anticipated, while also being able to practice \"radical acceptance\" of what is and the ability to choose a flexible mindset (and acknowledging that thoughts may not automatically go there - so the ability to pay attention to and recognize thinking, identify when cognitive distortions are present, and embrace the opportunity to choose a more flexible way of viewing the situation).      She also processed an upcoming change to working 5 days a week instead of 4, she reported she feels positive about this for multiple reasons - she enjoys work, it is a busy time of the year at work so she feels productive and busy when at work, and this will help bring in extra income (which will help when her  goes on medical leave for his treatments.  She identified the need to assess how she does with the increase in her work schedule and evaluate what she may need in terms of her self-care.        ASSESSMENT: Current Emotional / Mental Status (status of significant symptoms):   Risk status (Self / Other harm or suicidal ideation)   Patient denies current fears or concerns for personal " "safety.   Patient denies current or recent suicidal ideation or behaviors.    She continues to agree to use a safety plan should any safety concerns arise.  She also agrees to reach our to her spouse or a family member for help if needed, and/or access crisis/emergency resources.        Patientdenies current or recent homicidal ideation or behaviors.   Patient denies current or recent self injurious behavior or ideation.   Patient denies other safety concerns.   Patient Patient reports there has been no change in risk factors since their last session.     PatientPatient reports there has been no change in protective factors since their last session.     Recommended that patient call 911 or go to the local ED should there be a change in any of these risk factors.  She has previously been informed on how to contact [facility] crisis/COPE for urgent/crisis concerns 24/7.  Provided patient with crisis resources and she agrees to use safety plan should any safety concerns arise.      Professionals or agencies I can contact during a crisis:    Suicide Prevention Lifeline: 4-653-070-TALK (9534)    Crisis Text Line Service (available 24 hours a day, 7 days a week): Text [place] to 812853    Local Crisis Services: [facility] Crisis [facility]: [phone]    Call 911 or go to my nearest emergency department.        Appearance:   Appropriate    Eye Contact:   Good    Psychomotor Behavior: Normal    Attitude:   Cooperative    Orientation:   All   Speech    Rate / Production: Normal     Volume:  Normal    Mood:    Reports that she continues to feel \"better\" and that she is better able to manage stressors and difficulties that arise.     Affect:    Congruent with mood   Thought Content:  Clear    Thought Form:  Coherent  Logical    Insight:    Good      Medication Review:   No changes      Medication Compliance:   Yes      Changes in Health Issues:  None reported         Chemical Use Review:   Substance Use: Chemical use " "reviewed, no changes or active concerns identified.      Tobacco Use: No current tobacco use.      Diagnosis:  Anxiety disorder unspecified  Depression, unspecified             PLAN: (Patient Tasks / Therapist Tasks / Other)    1) Idalia identified the following goals: Practice flexible mindset and radical acceptance when things don't go as anticipated.    2) If there is a crisis situation, remember you are not alone and that there are people who can help you twenty-four hours a day, seven days a week.  Call [facility] ([facility]): [phone].      3) Follow-up visit is scheduled for September 7th at 12:00 pm.  If urgent or crisis concerns arise prior to next scheduled appointment, please reach out to crisis resources, call 911, or go to the emergency department.      Cristy Wilkinson PsyD,   Licensed Psychologist  8/31/2022                  Previous skills and strategies to remind self of and to do as needed:    Practice \"STOP\" technique when you recognize yourself feeling angry   Be a  - what do you notice is happening when Kaylee feels upset?     Practice recognizing when feeling angry, and giving self permission to use calming and self-soothing strategies and take a break.   Look for the gray with thinking  slow down  take deep breaths  manage expectations of self and others.    Journal   Practice flexing \"flexiblity and creativity\" -   Continue the great work you are doing with your daily schedule.    Get outside every day.    Play your instrument.    Sing!    Continue with: \"It's just a thought\" - non-judgmental, observe, float down the river on a leaf, clouds in the gege, reframing unhelpful thoughts -   Keep working on being patient when things are tense around me.    Continue with observing/paying attention to warning signs and signals and give self persmission to slow down, especially during the morning routine with Kaylee.    Practice offerring Kaylee choices when appropriate.      Use " "\"my plan for success\" to help remind you of calming strategies to practice when feeling upset.    Practice decreasing overall vulnerability to emotion mind through getting adequate rest, nutrition, time for yourself, and physical activity.         Consider reading \"Fighting for your marriage\".      Technical Assistance for video visits:    Offer patient the website ([email or web address]) and/or phone number ([phone]) for video visit instructions/assistance if needed.                                             ____________________________________    Treatment Plan     Patient's Name: Idalia Hinojosa                        YOB: 1980     Date of Creation: 1/6/2020  Date Treatment Plan Last Reviewed/Revised: 8/4/22     DSM5 Diagnoses: 300.00 (F41.9) Unspecified Anxiety Disorder, depressive disorder, unspecified  Psychosocial / Contextual Factors: strain in marital relationship, parenting challenges, adjustment challenges, 's cancer diagnosis  PROMIS (reviewed every 90 days):      Anticipated number of session for this episode of care: additional 15-20  Anticipation frequency of session: Weekly until symptoms stabilize, then can decrease the frequency of sessions to likely bi-weekly or once every three weeks  Anticipated Duration of each session: 38-52 minutes  Treatment plan will be reviewed in 90 days or when goals have been changed.         Referral / Collaboration:  We discussed a referral to a couples/marital therapist.  The patient is thinking about this and has talked with her  about the referral, but they are unsure if they would like to follow through at this time.       MeasurableTreatment Goal(s) related to diagnosis / functional impairment(s)  Goal 1: Patient will learn emotion regulation strategies to help when she is feeling upset/angry/frustrated/distressed    I will know I've met my goal when I would yell less, I would feel calmer, and I would not push others.  I " would be less physical when I'm angry (e.g. wouldn't slam doors or hit things)        Objective #A (Patient Action)                          Patient will learn and practice at least 2 new emotion regulation strategies.  Status: Continued - Date(s):    8/4/22- Regularly practicing emotion regulation strategies to help manage increase in emotional distress related to spouse's cancer diagnosis        Intervention(s)  Therapist will provide psychoeducation on emotion regulation module from DBT and will assign patient homework to help reinforce skill development.     Objective #B  Patient will identify factors that increase vulnerability to emotion mind and identify ways to decrease vulnerability to emotion mind.  Status: Continued - Date(s):8/4/22 - routinely incorporating mindfulness and self-awareness strategies to increase attention and focus to factors that increase vulnerability to emotion mind           Intervention(s)  Therapist will provide information on factors that increase vulnerabiliyt to emotion mind.     Objective #C  Patient will identify warning signs and signals that emotions are escalating and will learn ways to skillfully intervene early on.  Status: New - Date: 8/4/22 - in progress -            Intervention(s)  Therapist will help patient identify warning signs and signals, and will guide the patient in identifying skillful ways to intervene when exeriencing warning signs and signals.        Goal 2: Patient will learn new parenting strategies to help with managing parenting challenges.  Parent will work on improving relationship with her daughter and defining what she would like this relationship to look like.      I will know I've met my goal when I'm enjoying time with my daughter, teaching her new things, and not waiting for things to change       Objective #A (Patient Action)                          Status: Continued - Date(s):8/4/22     Patient will increase understanding of developmental  "norms for her daughter and ways to manage challenging behaviors.     Intervention(s)  Therapist will provide psychoeducation on developmental norms and parenting strategies.     Objective #B  Patient will spend time reflecting on her relationship with her daughter and defining what she would like this relaitonship to look like.                  Status: Continued - Date(s):8/4/22 - Making good progress -            Intervention(s)  Therapist will guide the patient in reflecting upon her relationship with her daughter and help her define ways to move towards what she vaues in her relationship with her daughter.     Objective #C  Patient will increase time spent on fun activity and play with her daughter.  Status: Continued - Date(s):8/4/22- has been enjoying time with daughter and devoting regular time to fun activities and play together           Intervention(s)  Therapist will guide the patient in identifying ways she can increase positive time with her daughter.  Therapist will also teach mindfulness strategies to help patient with being in the present moment when appropriate - .        Goal 3: Patiient will improve communication with her .      I will know I've met my goal when I feel less irritated with my  and communicate more openly with my .  I would like to be more assertive and less aggressive.   I would like to not avoid telling him things because I'm worried about his reaction or don't think he will react well.  I would like to be more present to the moment during times when this would be helpful.\"       Objective #A (Patient Action)                          Status: Continued - Date(s):8/4/22 - continues to work on this goal            Patient will learn and practice at least two new interpersonal effectiveness strategies.     Intervention(s)  Therapist will teach strategies from DBT module on interpersonal effectiveness, and will assign homework to help reinforce skill " development.        Patient has reviewed and agreed to the above plan.        Cristy Wilkinson PsyD, LP  Licensed Psychologist  January 6, 2020, reviewed on 4/23/2020, reviewed 7/8/2020, reviewed 10/08/2020, reviewed 1/27/2021, reviewed 4/27/2021, 7/29/2021, 11/10/2021, 2/9/2022, 5/3/2022, 8/4/22                                                                                                                                                                                                                                                       Answers for HPI/ROS submitted by the patient on 9/7/2022  If you checked off any problems, how difficult have these problems made it for you to do your work, take care of things at home, or get along with other people?: Not difficult at all  PHQ9 TOTAL SCORE: 1

## 2022-09-21 ENCOUNTER — VIRTUAL VISIT (OUTPATIENT)
Dept: PSYCHOLOGY | Facility: CLINIC | Age: 42
End: 2022-09-21
Payer: COMMERCIAL

## 2022-09-21 DIAGNOSIS — F41.9 ANXIETY DISORDER, UNSPECIFIED TYPE: Primary | ICD-10-CM

## 2022-09-21 DIAGNOSIS — F32.A DEPRESSION, UNSPECIFIED DEPRESSION TYPE: ICD-10-CM

## 2022-09-21 PROCEDURE — 90832 PSYTX W PT 30 MINUTES: CPT | Mod: GT | Performed by: PSYCHOLOGIST

## 2022-09-21 NOTE — PROGRESS NOTES
[facility]                                            Progress Note    Patient Name: Idalia Hinojosa  Date: 9/21/2022         Service Type: Individual      Session Start Time: 12:10 pm Session End Time: 12: 36 pm  Session Length:  26 minutes    Session #: 70    Attendees: Client     Service Modality: Video visit: -       Provider verified identity through the following two step process.  Patient provided:  Patient is known previously to provider    Telemedicine Visit: The patient's condition can be safely assessed and treated via synchronous audio and visual telemedicine encounter.      Reason for Telemedicine Visit: Services only offered telehealth    Originating Site (Patient Location): Patient's home    Distant Site (Provider Location): Provider Remote Setting- Home Office    Consent:  The patient/guardian has verbally consented to: the potential risks and benefits of telemedicine (telephone visit) versus in person care; bill my insurance or make self-payment for services provided; and responsibility for payment of non-covered services.     Patient would like the video invitation sent by:  My Chart    Mode of Communication:  Video Conference via [employer],     As the provider I attest to compliance with applicable laws and regulations related to telemedicine.         Treatment Plan Last Reviewed: 08/04/22  PHQ-9/DAVID-7: 08/04/22      DATA  Interactive Complexity: No  Crisis: No        Progress Since Last Session (Related to Symptoms / Goals / Homework):   Symptoms:  Symptoms remain stable - weekend didn't go as anticipated, however, she was able to bring a flexible and resilient mindset, and ask for what she needed - which helped her to manage the unanticipated.      Homework: Achieved / completed to satisfaction. Successful with practicing a flexible mindset, wasn't able to talk with Kellee due to unexpected change in plans which resulted in him going to the cabin for the  weekend.          Episode of Care Goals: Satisfactory progress - ACTION (Actively working towards change); Intervened by reinforcing change plan / affirming steps taken.  Diagnostic assessment has been completed, treatment plan has been developed and patient has been making good progress on treatment goals.  The patient stated that her main goals for therapy would be to learn emotion regulation strategies (in particular, to help with when she is feeling upset/angry/frustrated/distressed).  The changes in her daily life due to COVID restrictions were a focus of our work together, and working through changes to routine as her daughter started school and she returned to work.  As she has been making progress, focus of treatment will shift to wellness planning (monitoring mood and symptoms, incorporating into routine what keeps her feeling well, review of triggers, how to manage set backs, etc.).  As her  has been diagnosed with cancer, treatment will also focus on providing her with support in managing the emotional distress and challenges related to this.       Current / Ongoing Stressors and Concerns:   Current: She met with Kaylee's therapist this morning for an hour and feels good about having a positive start; Kaylee will meet with her every other week and she'll meet with the therapist every other week to learn how she can help and support Kaylee at home.  Kaylee has been sick with a cold so Hunter went to the cabin so he wouldn't be exposed to her cold.  She was proud of herself for being flexible and rolling with the unanticipated changes.  She was successful with asking her mom for help, and as a result her mom was able to bring some things to the house for her and Kaylee to do together which was very helpful for her -      Ongoing:Managing daily routine which has contributed to some adjustment and parenting challenges, lack of effective emotion regulation strategies for managing daily frustrations and upsets and  "marital discord due to differences in parenting styles.       Treatment Objective(s) Addressed in This Session:     Practice checking in with how I'm feeling on a regular basis, remind self to slow down when rushing, take deep breaths     Identify how to practice and encourage having a flexible mindset when things don't go as anticipated  Learn strategy of \"radical acceptance\", benefits of strategy, and how to apply strategy     Continue to review/practice/reinforce:  Identify and enforce healthy emotional boundaries (define your role as spouse, not \"therapist\" or \"fixer\")  Identify factors to consider when having crucial conversations   Identify ways to practice and incorporate self-care into routine.   Learn and practice mindfulness strategies - redirect back to present moment, focus on the facts   Identify and enforce healthy emotional boundaries (it is ok to feel different than my spouse does, it is okay for him to have his feelings and to not feel like I have to fix or change how he is feeling)  Continue to practice awareness of your emotions and physical sensations, to help tune in to what you need  Identify opportunities to access support and help from others  Mindful awareness of thoughts, impact on your emotions, and recognize opportunities to redirect thoughts.  Practice re-directing thoughts to a more helpful perspective.    Practice balanced thinking to counteract polarized/all or nothing thinking.         Intervention:   CBT: She talked today about how she moves at \"Tee speed\"  - which is the name they've given to a family habit of rushing around and trying to get done as much as possible.  She noted that she's been doing this at work, and it has been contributing to making mistakes, as well as a general sense of feeling exhausted and tired.  She identified some of the cognitions and unrealistic expectations she's placed on herself that contribute to her feeling the need to rush, and was able to " "identify ways she can reframe these cognitions and re-shape to more realistic expectations.  We also talked about creating opportunities throughout the day to pause, check-in with how she is feeling, take deep breaths, and remind herself/give herself permission to slow down when rushing.  We also talked about identifying the times where \"Tee speed\" may be helpful and make sense, and differentiating this from other times where it is doesn't help and she needs another speed.  Used the analogy of running a race (starting out too quick and running out of steam to finish the race, versus pacing self) to help with identifying pacing that helps her sustain throughout the day.    ASSESSMENT: Current Emotional / Mental Status (status of significant symptoms):   Risk status (Self / Other harm or suicidal ideation)   Patient denies current fears or concerns for personal safety.   Patient denies current or recent suicidal ideation or behaviors.    She continues to agree to use a safety plan should any safety concerns arise.  She also agrees to reach our to her spouse or a family member for help if needed, and/or access crisis/emergency resources.        Patientdenies current or recent homicidal ideation or behaviors.   Patient denies current or recent self injurious behavior or ideation.   Patient denies other safety concerns.   Patient Patient reports there has been no change in risk factors since their last session.     PatientPatient reports there has been no change in protective factors since their last session.     Recommended that patient call 911 or go to the local ED should there be a change in any of these risk factors.  She has previously been informed on how to contact [facility] crisis/[facility] for urgent/crisis concerns 24/7.  Provided patient with crisis resources and she agrees to use safety plan should any safety concerns arise.      Professionals or agencies I can contact during a crisis:    Suicide " "Prevention Lifeline: 0-300-288-TALK (9785)    Crisis Text Line Service (available 24 hours a day, 7 days a week): Text [place] to 565477    Local Crisis Services: [facility]: [phone]    Call 911 or go to my nearest emergency department.        Appearance:   Appropriate    Eye Contact:   Good    Psychomotor Behavior: Normal    Attitude:   Cooperative    Orientation:   All   Speech    Rate / Production: Normal     Volume:  Normal    Mood:    Reports that mood has been pretty good - has been able to manage when things did not go as anticipated   Affect:    Congruent with mood   Thought Content:  Clear    Thought Form:  Coherent  Logical    Insight:    Good      Medication Review:   No changes      Medication Compliance:   Yes      Changes in Health Issues:  None reported         Chemical Use Review:   Substance Use: Chemical use reviewed, no changes or active concerns identified.      Tobacco Use: No current tobacco use.      Diagnosis:  Anxiety disorder unspecified  Depression, unspecified             PLAN: (Patient Tasks / Therapist Tasks / Other)    1) Idalia identified the following goals:Complete regular check-ins - how am I feeling?   Reminder/give myself persmission to slow down.    Continue with: Practice flexible mindset and radical acceptance when things don't go as anticipated.    2) If there is a crisis situation, remember you are not alone and that there are people who can help you twenty-four hours a day, seven days a week.  Call [facility] ([facility]): [phone].      3) Follow-up visit is scheduled for September 29th at 12:00 pm.  If urgent or crisis concerns arise prior to next scheduled appointment, please reach out to crisis resources, call 911, or go to the emergency department.      Cristy Wilkinson PsyD,   Licensed Psychologist  9/21/2022                  Previous skills and strategies to remind self of and to do as needed:    Practice \"STOP\" technique when " "you recognize yourself feeling angry   Be a  - what do you notice is happening when Kaylee feels upset?     Practice recognizing when feeling angry, and giving self permission to use calming and self-soothing strategies and take a break.   Look for the gray with thinking  slow down  take deep breaths  manage expectations of self and others.    Journal   Practice flexing \"flexiblity and creativity\" -   Continue the great work you are doing with your daily schedule.    Get outside every day.    Play your instrument.    Sing!    Continue with: \"It's just a thought\" - non-judgmental, observe, float down the river on a leaf, clouds in the gege, reframing unhelpful thoughts -   Keep working on being patient when things are tense around me.    Continue with observing/paying attention to warning signs and signals and give self persmission to slow down, especially during the morning routine with Kaylee.    Practice offerring Kaylee choices when appropriate.      Use \"my plan for success\" to help remind you of calming strategies to practice when feeling upset.    Practice decreasing overall vulnerability to emotion mind through getting adequate rest, nutrition, time for yourself, and physical activity.         Consider reading \"Fighting for your marriage\".      Technical Assistance for video visits:    Offer patient the website ([email or web address]) and/or phone number (188-526-3537) for video visit instructions/assistance if needed.                                             ____________________________________    Treatment Plan     Patient's Name: Idalia Hinojosa                        YOB: 1980     Date of Creation: 1/6/2020  Date Treatment Plan Last Reviewed/Revised: 8/4/22     DSM5 Diagnoses: 300.00 (F41.9) Unspecified Anxiety Disorder, depressive disorder, unspecified  Psychosocial / Contextual Factors: strain in marital relationship, parenting challenges, adjustment challenges, 's cancer " diagnosis  PROMIS (reviewed every 90 days):      Anticipated number of session for this episode of care: additional 15-20  Anticipation frequency of session: Weekly until symptoms stabilize, then can decrease the frequency of sessions to likely bi-weekly or once every three weeks  Anticipated Duration of each session: 38-52 minutes  Treatment plan will be reviewed in 90 days or when goals have been changed.         Referral / Collaboration:  We discussed a referral to a couples/marital therapist.  The patient is thinking about this and has talked with her  about the referral, but they are unsure if they would like to follow through at this time.       MeasurableTreatment Goal(s) related to diagnosis / functional impairment(s)  Goal 1: Patient will learn emotion regulation strategies to help when she is feeling upset/angry/frustrated/distressed    I will know I've met my goal when I would yell less, I would feel calmer, and I would not push others.  I would be less physical when I'm angry (e.g. wouldn't slam doors or hit things)        Objective #A (Patient Action)                          Patient will learn and practice at least 2 new emotion regulation strategies.  Status: Continued - Date(s):    8/4/22- Regularly practicing emotion regulation strategies to help manage increase in emotional distress related to spouse's cancer diagnosis        Intervention(s)  Therapist will provide psychoeducation on emotion regulation module from DBT and will assign patient homework to help reinforce skill development.     Objective #B  Patient will identify factors that increase vulnerability to emotion mind and identify ways to decrease vulnerability to emotion mind.  Status: Continued - Date(s):8/4/22 - routinely incorporating mindfulness and self-awareness strategies to increase attention and focus to factors that increase vulnerability to emotion mind           Intervention(s)  Therapist will provide information on  factors that increase vulnerabiliyt to emotion mind.     Objective #C  Patient will identify warning signs and signals that emotions are escalating and will learn ways to skillfully intervene early on.  Status: New - Date: 8/4/22 - in progress -            Intervention(s)  Therapist will help patient identify warning signs and signals, and will guide the patient in identifying skillful ways to intervene when exeriencing warning signs and signals.        Goal 2: Patient will learn new parenting strategies to help with managing parenting challenges.  Parent will work on improving relationship with her daughter and defining what she would like this relationship to look like.      I will know I've met my goal when I'm enjoying time with my daughter, teaching her new things, and not waiting for things to change       Objective #A (Patient Action)                          Status: Continued - Date(s):8/4/22     Patient will increase understanding of developmental norms for her daughter and ways to manage challenging behaviors.     Intervention(s)  Therapist will provide psychoeducation on developmental norms and parenting strategies.     Objective #B  Patient will spend time reflecting on her relationship with her daughter and defining what she would like this relaitonship to look like.                  Status: Continued - Date(s):8/4/22 - Making good progress -            Intervention(s)  Therapist will guide the patient in reflecting upon her relationship with her daughter and help her define ways to move towards what she vaues in her relationship with her daughter.     Objective #C  Patient will increase time spent on fun activity and play with her daughter.  Status: Continued - Date(s):8/4/22- has been enjoying time with daughter and devoting regular time to fun activities and play together           Intervention(s)  Therapist will guide the patient in identifying ways she can increase positive time with her daughter.   "Therapist will also teach mindfulness strategies to help patient with being in the present moment when appropriate - .        Goal 3: Patiient will improve communication with her .      I will know I've met my goal when I feel less irritated with my  and communicate more openly with my .  I would like to be more assertive and less aggressive.   I would like to not avoid telling him things because I'm worried about his reaction or don't think he will react well.  I would like to be more present to the moment during times when this would be helpful.\"       Objective #A (Patient Action)                          Status: Continued - Date(s):8/4/22 - continues to work on this goal            Patient will learn and practice at least two new interpersonal effectiveness strategies.     Intervention(s)  Therapist will teach strategies from DBT module on interpersonal effectiveness, and will assign homework to help reinforce skill development.        Patient has reviewed and agreed to the above plan.        Cristy Wilkinson PsyD,   Licensed Psychologist  January 6, 2020, reviewed on 4/23/2020, reviewed 7/8/2020, reviewed 10/08/2020, reviewed 1/27/2021, reviewed 4/27/2021, 7/29/2021, 11/10/2021, 2/9/2022, 5/3/2022, 8/4/22                                                                                                                                                                                                                                                                [facility]                                            Progress Note    Patient Name: Idalia Hinojosa  Date: 8/31/2022           Service Type: Individual      Session Start Time: 12:06 pm Session End Time: 12:48 pm pm   Session Length:  42 minutes    Session #: 67    Attendees: Client     Service Modality: Video visit: -       Provider verified identity through the following two step process.  Patient " "provided:  Patient is known previously to provider    Telemedicine Visit: The patient's condition can be safely assessed and treated via synchronous audio and visual telemedicine encounter.      Reason for Telemedicine Visit: Services only offered telehealth    Originating Site (Patient Location): Patient's home    Distant Site (Provider Location): Provider Remote Setting- Home Office    Consent:  The patient/guardian has verbally consented to: the potential risks and benefits of telemedicine (telephone visit) versus in person care; bill my insurance or make self-payment for services provided; and responsibility for payment of non-covered services.     Patient would like the video invitation sent by:  My Chart    Mode of Communication:  Video Conference via [employer],     As the provider I attest to compliance with applicable laws and regulations related to telemedicine.         Treatment Plan Last Reviewed: 08/04/22  PHQ-9/DAVID-7: 08/04/22      DATA  Interactive Complexity: No  Crisis: No        Progress Since Last Session (Related to Symptoms / Goals / Homework):   Symptoms:  She reported that symptoms continue to remain improved - less anxious and depressed and feels better able to cope with challenges and stressors she is experiencing.   She reports the weekend was \"rough\" in terms of her  not feeling well physically from his treatment, and her daughter had some challenging behaviors, however, she was able to actively engage constructive coping strategies.        Homework: Achieved / completed to satisfaction.  Has been enjoying biking to work and cool summer mornings, went out on a date with her  and enjoyed this, and used healthy coping strategies to manage difficult situations.          Episode of Care Goals: Satisfactory progress - ACTION (Actively working towards change); Intervened by reinforcing change plan / affirming steps taken.  Diagnostic assessment has been completed, treatment plan has " "been developed and patient has been making good progress on treatment goals.  The patient stated that her main goals for therapy would be to learn emotion regulation strategies (in particular, to help with when she is feeling upset/angry/frustrated/distressed).  The changes in her daily life due to COVID restrictions were a focus of our work together, and working through changes to routine as her daughter started  and she returned to work.  As she has been making progress, focus of treatment will shift to wellness planning (monitoring mood and symptoms, incorporating into routine what keeps her feeling well, review of triggers, how to manage set backs, etc.).  As her  has been diagnosed with cancer, treatment will also focus on providing her with support in managing the emotional distress and challenges related to this.       Current / Ongoing Stressors and Concerns:   Current:  She reports the weekend was difficult; her  felt physically ill for about five days, and her daughter returned from time at her parents cabin and struggled with the transition.  She was able to identify the difficulties and solutions to address - which included changing up the environment, going outside, getting physical activity, and getting help from her parents.       Ongoing:Managing daily routine which has contributed to some adjustment and parenting challenges, lack of effective emotion regulation strategies for managing daily frustrations and upsets and marital discord due to differences in parenting styles.       Treatment Objective(s) Addressed in This Session:     Identify how to practice and encourage having a flexible mindset when things don't go as anticipated  Learn strategy of \"radical acceptance\", benefits of strategy, and how to apply strategy       Continue to review/practice/reinforce:  Identify ways to practice and incorporate self-care into routine.   Learn and practice mindfulness strategies - " "redirect back to present moment, focus on the facts   Identify and enforce healthy emotional boundaries (it is ok to feel different than my spouse does, it is okay for him to have his feelings and to not feel like I have to fix or change how he is feeling)  Continue to practice awareness of your emotions and physical sensations, to help tune in to what you need  Identify opportunities to access support and help from others  Mindful awareness of thoughts, impact on your emotions, and recognize opportunities to redirect thoughts.  Practice re-directing thoughts to a more helpful perspective.    Practice balanced thinking to counteract polarized/all or nothing thinking.         Intervention:   CBT and DBT: Idalia identified wanting to work on strengthening her coping strategies for managing when things do not go as expected, and in particular responding with a flexible mindset.  We talked about being able to acknowledge and validate the emotions that can come along with things not going as anticipated, while also being able to practice \"radical acceptance\" of what is and the ability to choose a flexible mindset (and acknowledging that thoughts may not automatically go there - so the ability to pay attention to and recognize thinking, identify when cognitive distortions are present, and embrace the opportunity to choose a more flexible way of viewing the situation).      She also processed an upcoming change to working 5 days a week instead of 4, she reported she feels positive about this for multiple reasons - she enjoys work, it is a busy time of the year at work so she feels productive and busy when at work, and this will help bring in extra income (which will help when her  goes on medical leave for his treatments.  She identified the need to assess how she does with the increase in her work schedule and evaluate what she may need in terms of her self-care.        ASSESSMENT: Current Emotional / Mental " "Status (status of significant symptoms):   Risk status (Self / Other harm or suicidal ideation)   Patient denies current fears or concerns for personal safety.   Patient denies current or recent suicidal ideation or behaviors.    She continues to agree to use a safety plan should any safety concerns arise.  She also agrees to reach our to her spouse or a family member for help if needed, and/or access crisis/emergency resources.        Patientdenies current or recent homicidal ideation or behaviors.   Patient denies current or recent self injurious behavior or ideation.   Patient denies other safety concerns.   Patient Patient reports there has been no change in risk factors since their last session.     PatientPatient reports there has been no change in protective factors since their last session.     Recommended that patient call 911 or go to the local ED should there be a change in any of these risk factors.  She has previously been informed on how to contact [facility] crisis/COPE for urgent/crisis concerns 24/7.  Provided patient with crisis resources and she agrees to use safety plan should any safety concerns arise.      Professionals or agencies I can contact during a crisis:    Suicide Prevention Lifeline: 0-210-234-TALK (8297)    Crisis Text Line Service (available 24 hours a day, 7 days a week): Text [place] to 180771    Local Crisis Services: [facility]: [phone]    Call 911 or go to my nearest emergency department.        Appearance:   Appropriate    Eye Contact:   Good    Psychomotor Behavior: Normal    Attitude:   Cooperative    Orientation:   All   Speech    Rate / Production: Normal     Volume:  Normal    Mood:    Reports that she continues to feel \"better\" and that she is better able to manage stressors and difficulties that arise.     Affect:    Congruent with mood   Thought Content:  Clear    Thought Form:  Coherent  Logical    Insight:    Good      Medication " "Review:   No changes      Medication Compliance:   Yes      Changes in Health Issues:  None reported         Chemical Use Review:   Substance Use: Chemical use reviewed, no changes or active concerns identified.      Tobacco Use: No current tobacco use.      Diagnosis:  Anxiety disorder unspecified  Depression, unspecified             PLAN: (Patient Tasks / Therapist Tasks / Other)    1) Idalia identified the following goals: Practice flexible mindset and radical acceptance when things don't go as anticipated.    2) If there is a crisis situation, remember you are not alone and that there are people who can help you twenty-four hours a day, seven days a week.  Call [facility] ([facility]): [phone].      3) Follow-up visit is scheduled for September 7th at 12:00 pm.  If urgent or crisis concerns arise prior to next scheduled appointment, please reach out to crisis resources, call 911, or go to the emergency department.      Cristy Wilkinson PsyD,   Licensed Psychologist  8/31/2022                  Previous skills and strategies to remind self of and to do as needed:    Practice \"STOP\" technique when you recognize yourself feeling angry   Be a  - what do you notice is happening when Kaylee feels upset?     Practice recognizing when feeling angry, and giving self permission to use calming and self-soothing strategies and take a break.   Look for the gray with thinking  slow down  take deep breaths  manage expectations of self and others.    Journal   Practice flexing \"flexiblity and creativity\" -   Continue the great work you are doing with your daily schedule.    Get outside every day.    Play your instrument.    Sing!    Continue with: \"It's just a thought\" - non-judgmental, observe, float down the river on a leaf, clouds in the gege, reframing unhelpful thoughts -   Keep working on being patient when things are tense around me.    Continue with observing/paying attention to warning signs " "and signals and give self persmission to slow down, especially during the morning routine with Kaylee.    Practice offerring Kaylee choices when appropriate.      Use \"my plan for success\" to help remind you of calming strategies to practice when feeling upset.    Practice decreasing overall vulnerability to emotion mind through getting adequate rest, nutrition, time for yourself, and physical activity.         Consider reading \"Fighting for your marriage\".      Technical Assistance for video visits:    Offer patient the website ([email or web address]) and/or phone number ([phone]) for video visit instructions/assistance if needed.                                             ____________________________________    Treatment Plan     Patient's Name: Idalia Hinojosa                        YOB: 1980     Date of Creation: 1/6/2020  Date Treatment Plan Last Reviewed/Revised: 8/4/22     DSM5 Diagnoses: 300.00 (F41.9) Unspecified Anxiety Disorder, depressive disorder, unspecified  Psychosocial / Contextual Factors: strain in marital relationship, parenting challenges, adjustment challenges, 's cancer diagnosis  PROMIS (reviewed every 90 days):      Anticipated number of session for this episode of care: additional 15-20  Anticipation frequency of session: Weekly until symptoms stabilize, then can decrease the frequency of sessions to likely bi-weekly or once every three weeks  Anticipated Duration of each session: 38-52 minutes  Treatment plan will be reviewed in 90 days or when goals have been changed.         Referral / Collaboration:  We discussed a referral to a couples/marital therapist.  The patient is thinking about this and has talked with her  about the referral, but they are unsure if they would like to follow through at this time.       MeasurableTreatment Goal(s) related to diagnosis / functional impairment(s)  Goal 1: Patient will learn emotion regulation strategies to help " when she is feeling upset/angry/frustrated/distressed    I will know I've met my goal when I would yell less, I would feel calmer, and I would not push others.  I would be less physical when I'm angry (e.g. wouldn't slam doors or hit things)        Objective #A (Patient Action)                          Patient will learn and practice at least 2 new emotion regulation strategies.  Status: Continued - Date(s):    8/4/22- Regularly practicing emotion regulation strategies to help manage increase in emotional distress related to spouse's cancer diagnosis        Intervention(s)  Therapist will provide psychoeducation on emotion regulation module from DBT and will assign patient homework to help reinforce skill development.     Objective #B  Patient will identify factors that increase vulnerability to emotion mind and identify ways to decrease vulnerability to emotion mind.  Status: Continued - Date(s):8/4/22 - routinely incorporating mindfulness and self-awareness strategies to increase attention and focus to factors that increase vulnerability to emotion mind           Intervention(s)  Therapist will provide information on factors that increase vulnerabiliyt to emotion mind.     Objective #C  Patient will identify warning signs and signals that emotions are escalating and will learn ways to skillfully intervene early on.  Status: New - Date: 8/4/22 - in progress -            Intervention(s)  Therapist will help patient identify warning signs and signals, and will guide the patient in identifying skillful ways to intervene when exeriencing warning signs and signals.        Goal 2: Patient will learn new parenting strategies to help with managing parenting challenges.  Parent will work on improving relationship with her daughter and defining what she would like this relationship to look like.      I will know I've met my goal when I'm enjoying time with my daughter, teaching her new things, and not waiting for things to  "change       Objective #A (Patient Action)                          Status: Continued - Date(s):8/4/22     Patient will increase understanding of developmental norms for her daughter and ways to manage challenging behaviors.     Intervention(s)  Therapist will provide psychoeducation on developmental norms and parenting strategies.     Objective #B  Patient will spend time reflecting on her relationship with her daughter and defining what she would like this relaitonship to look like.                  Status: Continued - Date(s):8/4/22 - Making good progress -            Intervention(s)  Therapist will guide the patient in reflecting upon her relationship with her daughter and help her define ways to move towards what she vaues in her relationship with her daughter.     Objective #C  Patient will increase time spent on fun activity and play with her daughter.  Status: Continued - Date(s):8/4/22- has been enjoying time with daughter and devoting regular time to fun activities and play together           Intervention(s)  Therapist will guide the patient in identifying ways she can increase positive time with her daughter.  Therapist will also teach mindfulness strategies to help patient with being in the present moment when appropriate - .        Goal 3: Patiient will improve communication with her .      I will know I've met my goal when I feel less irritated with my  and communicate more openly with my .  I would like to be more assertive and less aggressive.   I would like to not avoid telling him things because I'm worried about his reaction or don't think he will react well.  I would like to be more present to the moment during times when this would be helpful.\"       Objective #A (Patient Action)                          Status: Continued - Date(s):8/4/22 - continues to work on this goal            Patient will learn and practice at least two new interpersonal effectiveness " strategies.     Intervention(s)  Therapist will teach strategies from DBT module on interpersonal effectiveness, and will assign homework to help reinforce skill development.        Patient has reviewed and agreed to the above plan.        Cristy Wilkinson PsyD, LP  Licensed Psychologist  January 6, 2020, reviewed on 4/23/2020, reviewed 7/8/2020, reviewed 10/08/2020, reviewed 1/27/2021, reviewed 4/27/2021, 7/29/2021, 11/10/2021, 2/9/2022, 5/3/2022, 8/4/22                                                                                                                                                                                                                                                       Answers for HPI/ROS submitted by the patient on 9/7/2022  If you checked off any problems, how difficult have these problems made it for you to do your work, take care of things at home, or get along with other people?: Not difficult at all  PHQ9 TOTAL SCORE: 1

## 2022-09-29 ENCOUNTER — VIRTUAL VISIT (OUTPATIENT)
Dept: PSYCHOLOGY | Facility: CLINIC | Age: 42
End: 2022-09-29
Payer: COMMERCIAL

## 2022-09-29 DIAGNOSIS — F41.9 ANXIETY DISORDER, UNSPECIFIED TYPE: Primary | ICD-10-CM

## 2022-09-29 DIAGNOSIS — F32.A DEPRESSION, UNSPECIFIED DEPRESSION TYPE: ICD-10-CM

## 2022-09-29 PROCEDURE — 90832 PSYTX W PT 30 MINUTES: CPT | Mod: GT | Performed by: PSYCHOLOGIST

## 2022-09-29 NOTE — PROGRESS NOTES
"           [facility]                                            Progress Note    Patient Name: Idalia Hinojosa  Date: 9/29/2022         Service Type: Individual      Session Start Time: 12:10 pm Session End Time: 12: 46 pm  Session Length:  36 minutes    Session #: 71    Attendees: Client     Service Modality: Video visit: -       Provider verified identity through the following two step process.  Patient provided:  Patient is known previously to provider    Telemedicine Visit: The patient's condition can be safely assessed and treated via synchronous audio and visual telemedicine encounter.      Reason for Telemedicine Visit: Services only offered telehealth    Originating Site (Patient Location): Patient's home    Distant Site (Provider Location): Provider Remote Setting- Home Office    Consent:  The patient/guardian has verbally consented to: the potential risks and benefits of telemedicine (telephone visit) versus in person care; bill my insurance or make self-payment for services provided; and responsibility for payment of non-covered services.     Patient would like the video invitation sent by:  My Chart    Mode of Communication:  Video Conference via [employer],     As the provider I attest to compliance with applicable laws and regulations related to telemedicine.         Treatment Plan Last Reviewed: 08/04/22  PHQ-9/DAVID-7: 08/04/22      DATA  Interactive Complexity: No  Crisis: No        Progress Since Last Session (Related to Symptoms / Goals / Homework):   Symptoms:  She reported she has been doing \"pretty well\" and mood is improved.      Homework: Achieved / completed to satisfaction.          Episode of Care Goals: Satisfactory progress - ACTION (Actively working towards change); Intervened by reinforcing change plan / affirming steps taken.  Diagnostic assessment has been completed, treatment plan has been developed and patient has been making good progress on treatment goals. " " The patient stated that her main goals for therapy would be to learn emotion regulation strategies (in particular, to help with when she is feeling upset/angry/frustrated/distressed).  The changes in her daily life due to COVID restrictions were a focus of our work together, and working through changes to routine as her daughter started school and she returned to work.  As she has been making progress, focus of treatment will shift to wellness planning (monitoring mood and symptoms, incorporating into routine what keeps her feeling well, review of triggers, how to manage set backs, etc.).  As her  has been diagnosed with cancer, treatment will also focus on providing her with support in managing the emotional distress and challenges related to this.       Current / Ongoing Stressors and Concerns:   Current: Her daughter Kaylee had her first appointment with her new therapist and it went well.  She shared that her daughter came home and has been drawing pictures like she neri in her therapy session - as a way to talk about how people are feeling and what they can do to help how they are feeling.  She was really pleased that her daughter initiated this on her own at home after doing this in her therapy appointment.  She has been working more hours at work and notes this has been adding some additional stress due to pressures she is putting on herself.       Ongoing:Managing daily routine which has contributed to some adjustment and parenting challenges, lack of effective emotion regulation strategies for managing daily frustrations and upsets and marital discord due to differences in parenting styles.       Treatment Objective(s) Addressed in This Session:     Identify what may be contributing to increase in stress at work - identify strategies to help decrease/manage stress     Identify how to practice and encourage having a flexible mindset when things don't go as anticipated  Learn strategy of \"radical " "acceptance\", benefits of strategy, and how to apply strategy     Continue to review/practice/reinforce:    Practice checking in on a regular basis, remind self to slow down when rushing, take deep breaths   Identify and enforce healthy emotional boundaries (define your role as spouse, not \"therapist\" or \"fixer\")  Identify factors to consider when having crucial conversations   Identify ways to practice and incorporate self-care into routine.   Learn and practice mindfulness strategies - redirect back to present moment, focus on the facts   Identify and enforce healthy emotional boundaries (it is ok to feel different than my spouse does, it is okay for him to have his feelings and to not feel like I have to fix or change how he is feeling)  Continue to practice awareness of your emotions and physical sensations, to help tune in to what you need  Identify opportunities to access support and help from others  Mindful awareness of thoughts, impact on your emotions, and recognize opportunities to redirect thoughts.  Practice re-directing thoughts to a more helpful perspective.    Practice balanced thinking to counteract polarized/all or nothing thinking.       Intervention:   CBTand solution-focused: Idalia noted that she has been working more at work and this has been contributing to feeling more stressed.  She talked to her  about it, as she \"feels\" like she should be working more hours, and he encouraged that she recognize how much she is doing when she is not at work to take care of her daughter, him and the house.  She further reflected that she is putting pressure and expectation on herself to work more hours to feel \"successful\" and productive, which she equates to earning more money.  She also reflected that when she is at work she puts high expectations on herself - wants to be \"perfect at everything\".  We further explored feelings of self-worth, what her definition of \"success\" is, and what she thinks " she is learning from her experience of working more and how she is feeling.  She reflected that she would like to be able to be realistic about how much she can work outside of the home in light of everything that is going on in her life right now and recognize the value in all she is doing as a parent and a spouse outside of her job.          ASSESSMENT: Current Emotional / Mental Status (status of significant symptoms):   Risk status (Self / Other harm or suicidal ideation)   Patient denies current fears or concerns for personal safety.   Patient denies current or recent suicidal ideation or behaviors.    She continues to agree to use a safety plan should any safety concerns arise.  She also agrees to reach our to her spouse or a family member for help if needed, and/or access crisis/emergency resources.        Patientdenies current or recent homicidal ideation or behaviors.   Patient denies current or recent self injurious behavior or ideation.   Patient denies other safety concerns.   Patient reports there has been no change in risk factors since their last session.     Patient reports there has been no change in protective factors since their last session.     Recommended that patient call 911 or go to the local ED should there be a change in any of these risk factors.  She has previously been informed on how to contact [facility] crisis/[facility] for urgent/crisis concerns 24/7.  Provided patient with crisis resources and she agrees to use safety plan should any safety concerns arise.      Professionals or agencies I can contact during a crisis:    Suicide Prevention Lifeline: 8-873-168-TALK (9609)    Crisis Text Line Service (available 24 hours a day, 7 days a week): Text [place] to 432532    Local Crisis Services: [facility]: [phone]    Call 911 or go to my nearest emergency department.        Appearance:   Appropriate    Eye Contact:   Good    Psychomotor Behavior: Normal  "   Attitude:   Cooperative    Orientation:   All   Speech    Rate / Production: Normal     Volume:  Normal    Mood:    Reports has been feeling more stressed due to working more hours, putting pressure on herself to be \"perfect\"    Affect:    Congruent with mood   Thought Content:  Clear    Thought Form:  Coherent  Logical    Insight:    Good      Medication Review:   No changes      Medication Compliance:   Yes      Changes in Health Issues:  None reported         Chemical Use Review:   Substance Use: Chemical use reviewed, no changes or active concerns identified.      Tobacco Use: No current tobacco use.      Diagnosis:  Anxiety disorder unspecified  Depression, unspecified             PLAN: (Patient Tasks / Therapist Tasks / Other)    1) Idalia identified the following goals: Spend time reflecting upon what you think would be realistic expectations for work.       Take time each day to re-charge (read before bed, helps both with re-charging and winding down), look into cancer mental health support information, continue to think about how to talk to Chea about your concerns, and timing of the conversation.      Continue with: Practice flexible mindset and radical acceptance when things don't go as anticipated.    2) If there is a crisis situation, remember you are not alone and that there are people who can help you twenty-four hours a day, seven days a week.  Call [facility] ([facility]): [phone].      3) Follow-up visit is scheduled for October 6th at 12:00 pm.  If urgent or crisis concerns arise prior to next scheduled appointment, please reach out to crisis resources, call 911, or go to the emergency department.      Cristy Wilkinson PsyD,   Licensed Psychologist  9/29/2022                  Previous skills and strategies to remind self of and to do as needed:    Practice \"STOP\" technique when you recognize yourself feeling angry   Be a  - what do you notice is happening when Kaylee " "feels upset?     Practice recognizing when feeling angry, and giving self permission to use calming and self-soothing strategies and take a break.   Look for the gray with thinking  slow down  take deep breaths  manage expectations of self and others.    Journal   Practice flexing \"flexiblity and creativity\" -   Continue the great work you are doing with your daily schedule.    Get outside every day.    Play your instrument.    Sing!    Continue with: \"It's just a thought\" - non-judgmental, observe, float down the river on a leaf, clouds in the gege, reframing unhelpful thoughts -   Keep working on being patient when things are tense around me.    Continue with observing/paying attention to warning signs and signals and give self persmission to slow down, especially during the morning routine with Kaylee.    Practice offerring Kaylee choices when appropriate.      Use \"my plan for success\" to help remind you of calming strategies to practice when feeling upset.    Practice decreasing overall vulnerability to emotion mind through getting adequate rest, nutrition, time for yourself, and physical activity.         Consider reading \"Fighting for your marriage\".      Technical Assistance for video visits:    Offer patient the website ([email or web address]) and/or phone number ([phone]) for video visit instructions/assistance if needed.                                             ____________________________________    Treatment Plan     Patient's Name: Idalia Hinojosa                        YOB: 1980     Date of Creation: 1/6/2020  Date Treatment Plan Last Reviewed/Revised: 8/4/22     DSM5 Diagnoses: 300.00 (F41.9) Unspecified Anxiety Disorder, depressive disorder, unspecified  Psychosocial / Contextual Factors: strain in marital relationship, parenting challenges, adjustment challenges, 's cancer diagnosis  PROMIS (reviewed every 90 days):      Anticipated number of session for this episode " of care: additional 15-20  Anticipation frequency of session: Weekly until symptoms stabilize, then can decrease the frequency of sessions to likely bi-weekly or once every three weeks  Anticipated Duration of each session: 38-52 minutes  Treatment plan will be reviewed in 90 days or when goals have been changed.         Referral / Collaboration:  We discussed a referral to a couples/marital therapist.  The patient is thinking about this and has talked with her  about the referral, but they are unsure if they would like to follow through at this time.       MeasurableTreatment Goal(s) related to diagnosis / functional impairment(s)  Goal 1: Patient will learn emotion regulation strategies to help when she is feeling upset/angry/frustrated/distressed    I will know I've met my goal when I would yell less, I would feel calmer, and I would not push others.  I would be less physical when I'm angry (e.g. wouldn't slam doors or hit things)        Objective #A (Patient Action)                          Patient will learn and practice at least 2 new emotion regulation strategies.  Status: Continued - Date(s):    8/4/22- Regularly practicing emotion regulation strategies to help manage increase in emotional distress related to spouse's cancer diagnosis        Intervention(s)  Therapist will provide psychoeducation on emotion regulation module from DBT and will assign patient homework to help reinforce skill development.     Objective #B  Patient will identify factors that increase vulnerability to emotion mind and identify ways to decrease vulnerability to emotion mind.  Status: Continued - Date(s):8/4/22 - routinely incorporating mindfulness and self-awareness strategies to increase attention and focus to factors that increase vulnerability to emotion mind           Intervention(s)  Therapist will provide information on factors that increase vulnerabiliyt to emotion mind.     Objective #C  Patient will identify  warning signs and signals that emotions are escalating and will learn ways to skillfully intervene early on.  Status: New - Date: 8/4/22 - in progress -            Intervention(s)  Therapist will help patient identify warning signs and signals, and will guide the patient in identifying skillful ways to intervene when exeriencing warning signs and signals.        Goal 2: Patient will learn new parenting strategies to help with managing parenting challenges.  Parent will work on improving relationship with her daughter and defining what she would like this relationship to look like.      I will know I've met my goal when I'm enjoying time with my daughter, teaching her new things, and not waiting for things to change       Objective #A (Patient Action)                          Status: Continued - Date(s):8/4/22     Patient will increase understanding of developmental norms for her daughter and ways to manage challenging behaviors.     Intervention(s)  Therapist will provide psychoeducation on developmental norms and parenting strategies.     Objective #B  Patient will spend time reflecting on her relationship with her daughter and defining what she would like this relaitonship to look like.                  Status: Continued - Date(s):8/4/22 - Making good progress -            Intervention(s)  Therapist will guide the patient in reflecting upon her relationship with her daughter and help her define ways to move towards what she vaues in her relationship with her daughter.     Objective #C  Patient will increase time spent on fun activity and play with her daughter.  Status: Continued - Date(s):8/4/22- has been enjoying time with daughter and devoting regular time to fun activities and play together           Intervention(s)  Therapist will guide the patient in identifying ways she can increase positive time with her daughter.  Therapist will also teach mindfulness strategies to help patient with being in the present  "moment when appropriate - .        Goal 3: Patiient will improve communication with her .      I will know I've met my goal when I feel less irritated with my  and communicate more openly with my .  I would like to be more assertive and less aggressive.   I would like to not avoid telling him things because I'm worried about his reaction or don't think he will react well.  I would like to be more present to the moment during times when this would be helpful.\"       Objective #A (Patient Action)                          Status: Continued - Date(s):8/4/22 - continues to work on this goal            Patient will learn and practice at least two new interpersonal effectiveness strategies.     Intervention(s)  Therapist will teach strategies from DBT module on interpersonal effectiveness, and will assign homework to help reinforce skill development.        Patient has reviewed and agreed to the above plan.        Cristy Wilkinson PsyD,   Licensed Psychologist  January 6, 2020, reviewed on 4/23/2020, reviewed 7/8/2020, reviewed 10/08/2020, reviewed 1/27/2021, reviewed 4/27/2021, 7/29/2021, 11/10/2021, 2/9/2022, 5/3/2022, 8/4/22                                                                                                                                                                                                                                                                [facility]                                            Progress Note    Patient Name: Idalia Hinojosa  Date: 8/31/2022           Service Type: Individual      Session Start Time: 12:06 pm Session End Time: 12:48 pm pm   Session Length:  42 minutes    Session #: 67    Attendees: Client     Service Modality: Video visit: -       Provider verified identity through the following two step process.  Patient provided:  Patient is known previously to provider    Telemedicine Visit: The patient's condition " "can be safely assessed and treated via synchronous audio and visual telemedicine encounter.      Reason for Telemedicine Visit: Services only offered telehealth    Originating Site (Patient Location): Patient's home    Distant Site (Provider Location): Provider Remote Setting- Home Office    Consent:  The patient/guardian has verbally consented to: the potential risks and benefits of telemedicine (telephone visit) versus in person care; bill my insurance or make self-payment for services provided; and responsibility for payment of non-covered services.     Patient would like the video invitation sent by:  My Chart    Mode of Communication:  Video Conference via [employer],     As the provider I attest to compliance with applicable laws and regulations related to telemedicine.         Treatment Plan Last Reviewed: 08/04/22  PHQ-9/DAVID-7: 08/04/22      DATA  Interactive Complexity: No  Crisis: No        Progress Since Last Session (Related to Symptoms / Goals / Homework):   Symptoms:  She reported that symptoms continue to remain improved - less anxious and depressed and feels better able to cope with challenges and stressors she is experiencing.   She reports the weekend was \"rough\" in terms of her  not feeling well physically from his treatment, and her daughter had some challenging behaviors, however, she was able to actively engage constructive coping strategies.        Homework: Achieved / completed to satisfaction.  Has been enjoying biking to work and cool summer mornings, went out on a date with her  and enjoyed this, and used healthy coping strategies to manage difficult situations.          Episode of Care Goals: Satisfactory progress - ACTION (Actively working towards change); Intervened by reinforcing change plan / affirming steps taken.  Diagnostic assessment has been completed, treatment plan has been developed and patient has been making good progress on treatment goals.  The patient stated " "that her main goals for therapy would be to learn emotion regulation strategies (in particular, to help with when she is feeling upset/angry/frustrated/distressed).  The changes in her daily life due to COVID restrictions were a focus of our work together, and working through changes to routine as her daughter started  and she returned to work.  As she has been making progress, focus of treatment will shift to wellness planning (monitoring mood and symptoms, incorporating into routine what keeps her feeling well, review of triggers, how to manage set backs, etc.).  As her  has been diagnosed with cancer, treatment will also focus on providing her with support in managing the emotional distress and challenges related to this.       Current / Ongoing Stressors and Concerns:   Current:  She reports the weekend was difficult; her  felt physically ill for about five days, and her daughter returned from time at her parents cabin and struggled with the transition.  She was able to identify the difficulties and solutions to address - which included changing up the environment, going outside, getting physical activity, and getting help from her parents.       Ongoing:Managing daily routine which has contributed to some adjustment and parenting challenges, lack of effective emotion regulation strategies for managing daily frustrations and upsets and marital discord due to differences in parenting styles.       Treatment Objective(s) Addressed in This Session:     Identify how to practice and encourage having a flexible mindset when things don't go as anticipated  Learn strategy of \"radical acceptance\", benefits of strategy, and how to apply strategy       Continue to review/practice/reinforce:  Identify ways to practice and incorporate self-care into routine.   Learn and practice mindfulness strategies - redirect back to present moment, focus on the facts   Identify and enforce healthy emotional " "boundaries (it is ok to feel different than my spouse does, it is okay for him to have his feelings and to not feel like I have to fix or change how he is feeling)  Continue to practice awareness of your emotions and physical sensations, to help tune in to what you need  Identify opportunities to access support and help from others  Mindful awareness of thoughts, impact on your emotions, and recognize opportunities to redirect thoughts.  Practice re-directing thoughts to a more helpful perspective.    Practice balanced thinking to counteract polarized/all or nothing thinking.         Intervention:   CBT and DBT: Idalia identified wanting to work on strengthening her coping strategies for managing when things do not go as expected, and in particular responding with a flexible mindset.  We talked about being able to acknowledge and validate the emotions that can come along with things not going as anticipated, while also being able to practice \"radical acceptance\" of what is and the ability to choose a flexible mindset (and acknowledging that thoughts may not automatically go there - so the ability to pay attention to and recognize thinking, identify when cognitive distortions are present, and embrace the opportunity to choose a more flexible way of viewing the situation).      She also processed an upcoming change to working 5 days a week instead of 4, she reported she feels positive about this for multiple reasons - she enjoys work, it is a busy time of the year at work so she feels productive and busy when at work, and this will help bring in extra income (which will help when her  goes on medical leave for his treatments.  She identified the need to assess how she does with the increase in her work schedule and evaluate what she may need in terms of her self-care.        ASSESSMENT: Current Emotional / Mental Status (status of significant symptoms):   Risk status (Self / Other harm or suicidal " "ideation)   Patient denies current fears or concerns for personal safety.   Patient denies current or recent suicidal ideation or behaviors.    She continues to agree to use a safety plan should any safety concerns arise.  She also agrees to reach our to her spouse or a family member for help if needed, and/or access crisis/emergency resources.        Patientdenies current or recent homicidal ideation or behaviors.   Patient denies current or recent self injurious behavior or ideation.   Patient denies other safety concerns.   Patient Patient reports there has been no change in risk factors since their last session.     PatientPatient reports there has been no change in protective factors since their last session.     Recommended that patient call 911 or go to the local ED should there be a change in any of these risk factors.  She has previously been informed on how to contact [facility] crisis/[facility] for urgent/crisis concerns 24/7.  Provided patient with crisis resources and she agrees to use safety plan should any safety concerns arise.      Professionals or agencies I can contact during a crisis:    Suicide Prevention Lifeline: 6-619-853-TALK (6321)    Crisis Text Line Service (available 24 hours a day, 7 days a week): Text [place] to 701946    Local Crisis Services: [facility]: [phone]    Call 911 or go to my nearest emergency department.        Appearance:   Appropriate    Eye Contact:   Good    Psychomotor Behavior: Normal    Attitude:   Cooperative    Orientation:   All   Speech    Rate / Production: Normal     Volume:  Normal    Mood:    Reports that she continues to feel \"better\" and that she is better able to manage stressors and difficulties that arise.     Affect:    Congruent with mood   Thought Content:  Clear    Thought Form:  Coherent  Logical    Insight:    Good      Medication Review:   No changes      Medication Compliance:   Yes      Changes in Health Issues:  None " "reported         Chemical Use Review:   Substance Use: Chemical use reviewed, no changes or active concerns identified.      Tobacco Use: No current tobacco use.      Diagnosis:  Anxiety disorder unspecified  Depression, unspecified             PLAN: (Patient Tasks / Therapist Tasks / Other)    1) Idalia identified the following goals: Thinking about how many hours are realistic to work, anything above would be \"bonus\" but \"I don't have to do it\".      Practice flexible mindset and radical acceptance when things don't go as anticipated.    2) If there is a crisis situation, remember you are not alone and that there are people who can help you twenty-four hours a day, seven days a week.  Call [facility] ([facility]): [phone].      3) Follow-up visit is scheduled for September 7th at 12:00 pm.  If urgent or crisis concerns arise prior to next scheduled appointment, please reach out to crisis resources, call 911, or go to the emergency department.      Cristy Wilkinson PsyD,   Licensed Psychologist  9/29/2022                    Previous skills and strategies to remind self of and to do as needed:    Practice \"STOP\" technique when you recognize yourself feeling angry   Be a  - what do you notice is happening when Kaylee feels upset?     Practice recognizing when feeling angry, and giving self permission to use calming and self-soothing strategies and take a break.   Look for the gray with thinking  slow down  take deep breaths  manage expectations of self and others.    Journal   Practice flexing \"flexiblity and creativity\" -   Continue the great work you are doing with your daily schedule.    Get outside every day.    Play your instrument.    Sing!    Continue with: \"It's just a thought\" - non-judgmental, observe, float down the river on a leaf, clouds in the gege, reframing unhelpful thoughts -   Keep working on being patient when things are tense around me.    Continue with observing/paying " "attention to warning signs and signals and give self persmission to slow down, especially during the morning routine with Kaylee.    Practice offerring Kaylee choices when appropriate.      Use \"my plan for success\" to help remind you of calming strategies to practice when feeling upset.    Practice decreasing overall vulnerability to emotion mind through getting adequate rest, nutrition, time for yourself, and physical activity.         Consider reading \"Fighting for your marriage\".      Technical Assistance for video visits:    Offer patient the website ([email or web address]) and/or phone number ([phone]) for video visit instructions/assistance if needed.                                             ____________________________________    Treatment Plan     Patient's Name: Idalia Hinojosa                        YOB: 1980     Date of Creation: 1/6/2020  Date Treatment Plan Last Reviewed/Revised: 8/4/22     DSM5 Diagnoses: 300.00 (F41.9) Unspecified Anxiety Disorder, depressive disorder, unspecified  Psychosocial / Contextual Factors: strain in marital relationship, parenting challenges, adjustment challenges, 's cancer diagnosis  PROMIS (reviewed every 90 days):      Anticipated number of session for this episode of care: additional 15-20  Anticipation frequency of session: Weekly until symptoms stabilize, then can decrease the frequency of sessions to likely bi-weekly or once every three weeks  Anticipated Duration of each session: 38-52 minutes  Treatment plan will be reviewed in 90 days or when goals have been changed.         Referral / Collaboration:  We discussed a referral to a couples/marital therapist.  The patient is thinking about this and has talked with her  about the referral, but they are unsure if they would like to follow through at this time.       MeasurableTreatment Goal(s) related to diagnosis / functional impairment(s)  Goal 1: Patient will learn emotion " regulation strategies to help when she is feeling upset/angry/frustrated/distressed    I will know I've met my goal when I would yell less, I would feel calmer, and I would not push others.  I would be less physical when I'm angry (e.g. wouldn't slam doors or hit things)        Objective #A (Patient Action)                          Patient will learn and practice at least 2 new emotion regulation strategies.  Status: Continued - Date(s):    8/4/22- Regularly practicing emotion regulation strategies to help manage increase in emotional distress related to spouse's cancer diagnosis        Intervention(s)  Therapist will provide psychoeducation on emotion regulation module from DBT and will assign patient homework to help reinforce skill development.     Objective #B  Patient will identify factors that increase vulnerability to emotion mind and identify ways to decrease vulnerability to emotion mind.  Status: Continued - Date(s):8/4/22 - routinely incorporating mindfulness and self-awareness strategies to increase attention and focus to factors that increase vulnerability to emotion mind           Intervention(s)  Therapist will provide information on factors that increase vulnerabiliyt to emotion mind.     Objective #C  Patient will identify warning signs and signals that emotions are escalating and will learn ways to skillfully intervene early on.  Status: New - Date: 8/4/22 - in progress -            Intervention(s)  Therapist will help patient identify warning signs and signals, and will guide the patient in identifying skillful ways to intervene when exeriencing warning signs and signals.        Goal 2: Patient will learn new parenting strategies to help with managing parenting challenges.  Parent will work on improving relationship with her daughter and defining what she would like this relationship to look like.      I will know I've met my goal when I'm enjoying time with my daughter, teaching her new things,  "and not waiting for things to change       Objective #A (Patient Action)                          Status: Continued - Date(s):8/4/22     Patient will increase understanding of developmental norms for her daughter and ways to manage challenging behaviors.     Intervention(s)  Therapist will provide psychoeducation on developmental norms and parenting strategies.     Objective #B  Patient will spend time reflecting on her relationship with her daughter and defining what she would like this relaitonship to look like.                  Status: Continued - Date(s):8/4/22 - Making good progress -            Intervention(s)  Therapist will guide the patient in reflecting upon her relationship with her daughter and help her define ways to move towards what she vaues in her relationship with her daughter.     Objective #C  Patient will increase time spent on fun activity and play with her daughter.  Status: Continued - Date(s):8/4/22- has been enjoying time with daughter and devoting regular time to fun activities and play together           Intervention(s)  Therapist will guide the patient in identifying ways she can increase positive time with her daughter.  Therapist will also teach mindfulness strategies to help patient with being in the present moment when appropriate - .        Goal 3: Patiient will improve communication with her .      I will know I've met my goal when I feel less irritated with my  and communicate more openly with my .  I would like to be more assertive and less aggressive.   I would like to not avoid telling him things because I'm worried about his reaction or don't think he will react well.  I would like to be more present to the moment during times when this would be helpful.\"       Objective #A (Patient Action)                          Status: Continued - Date(s):8/4/22 - continues to work on this goal            Patient will learn and practice at least two new interpersonal " effectiveness strategies.     Intervention(s)  Therapist will teach strategies from DBT module on interpersonal effectiveness, and will assign homework to help reinforce skill development.        Patient has reviewed and agreed to the above plan.        Cristy Wilkinson PsyD, LP  Licensed Psychologist  January 6, 2020, reviewed on 4/23/2020, reviewed 7/8/2020, reviewed 10/08/2020, reviewed 1/27/2021, reviewed 4/27/2021, 7/29/2021, 11/10/2021, 2/9/2022, 5/3/2022, 8/4/22                                                                                                                                                                                                                                                       Answers for HPI/ROS submitted by the patient on 9/7/2022  If you checked off any problems, how difficult have these problems made it for you to do your work, take care of things at home, or get along with other people?: Not difficult at all  PHQ9 TOTAL SCORE: 1

## 2022-10-06 ENCOUNTER — VIRTUAL VISIT (OUTPATIENT)
Dept: PSYCHOLOGY | Facility: CLINIC | Age: 42
End: 2022-10-06
Payer: COMMERCIAL

## 2022-10-06 DIAGNOSIS — F32.A DEPRESSION, UNSPECIFIED DEPRESSION TYPE: ICD-10-CM

## 2022-10-06 DIAGNOSIS — F41.9 ANXIETY DISORDER, UNSPECIFIED TYPE: Primary | ICD-10-CM

## 2022-10-06 PROCEDURE — 90832 PSYTX W PT 30 MINUTES: CPT | Mod: GT | Performed by: PSYCHOLOGIST

## 2022-10-10 ENCOUNTER — OFFICE VISIT (OUTPATIENT)
Dept: URGENT CARE | Facility: URGENT CARE | Age: 42
End: 2022-10-10
Payer: COMMERCIAL

## 2022-10-10 ENCOUNTER — NURSE TRIAGE (OUTPATIENT)
Dept: NURSING | Facility: CLINIC | Age: 42
End: 2022-10-10

## 2022-10-10 ENCOUNTER — OFFICE VISIT (OUTPATIENT)
Dept: PEDIATRICS | Facility: CLINIC | Age: 42
End: 2022-10-10
Attending: PHYSICIAN ASSISTANT
Payer: COMMERCIAL

## 2022-10-10 ENCOUNTER — E-VISIT (OUTPATIENT)
Dept: URGENT CARE | Facility: CLINIC | Age: 42
End: 2022-10-10
Payer: COMMERCIAL

## 2022-10-10 ENCOUNTER — HOSPITAL ENCOUNTER (OUTPATIENT)
Dept: ULTRASOUND IMAGING | Facility: CLINIC | Age: 42
Discharge: HOME | End: 2022-10-10
Attending: PHYSICIAN ASSISTANT
Payer: COMMERCIAL

## 2022-10-10 ENCOUNTER — HOSPITAL ENCOUNTER (OUTPATIENT)
Dept: CT IMAGING | Facility: CLINIC | Age: 42
Discharge: HOME | End: 2022-10-10
Attending: PHYSICIAN ASSISTANT
Payer: COMMERCIAL

## 2022-10-10 VITALS
SYSTOLIC BLOOD PRESSURE: 108 MMHG | BODY MASS INDEX: 27.82 KG/M2 | TEMPERATURE: 98.3 F | HEART RATE: 63 BPM | RESPIRATION RATE: 16 BRPM | DIASTOLIC BLOOD PRESSURE: 70 MMHG | WEIGHT: 167.2 LBS | OXYGEN SATURATION: 97 %

## 2022-10-10 VITALS — SYSTOLIC BLOOD PRESSURE: 110 MMHG | HEART RATE: 61 BPM | DIASTOLIC BLOOD PRESSURE: 73 MMHG | TEMPERATURE: 98.5 F

## 2022-10-10 DIAGNOSIS — R10.2 PELVIC PAIN IN FEMALE: ICD-10-CM

## 2022-10-10 DIAGNOSIS — R30.0 DIFFICULT OR PAINFUL URINATION: Primary | ICD-10-CM

## 2022-10-10 DIAGNOSIS — N89.8 VAGINAL ITCHING: ICD-10-CM

## 2022-10-10 DIAGNOSIS — K59.00 CONSTIPATION, UNSPECIFIED CONSTIPATION TYPE: Primary | ICD-10-CM

## 2022-10-10 DIAGNOSIS — R35.0 URINARY FREQUENCY: Primary | ICD-10-CM

## 2022-10-10 LAB
ALBUMIN SERPL BCG-MCNC: 3.9 G/DL (ref 3.5–5.2)
ALBUMIN UR-MCNC: NEGATIVE MG/DL
ALP SERPL-CCNC: 70 U/L (ref 35–104)
ALT SERPL W P-5'-P-CCNC: 21 U/L (ref 10–35)
ANION GAP SERPL CALCULATED.3IONS-SCNC: 10 MMOL/L (ref 7–15)
APPEARANCE UR: CLEAR
AST SERPL W P-5'-P-CCNC: 34 U/L (ref 10–35)
BASOPHILS # BLD AUTO: 0 10E3/UL (ref 0–0.2)
BASOPHILS NFR BLD AUTO: 1 %
BILIRUB SERPL-MCNC: 0.4 MG/DL
BILIRUB UR QL STRIP: NEGATIVE
BUN SERPL-MCNC: 5.2 MG/DL (ref 6–20)
CALCIUM SERPL-MCNC: 9.1 MG/DL (ref 8.6–10)
CHLORIDE SERPL-SCNC: 101 MMOL/L (ref 98–107)
CLUE CELLS: ABNORMAL
CLUE CELLS: NORMAL
COLOR UR AUTO: YELLOW
CREAT SERPL-MCNC: 0.71 MG/DL (ref 0.51–0.95)
CRP SERPL-MCNC: 4.89 MG/L
DEPRECATED HCO3 PLAS-SCNC: 26 MMOL/L (ref 22–29)
EOSINOPHIL # BLD AUTO: 0 10E3/UL (ref 0–0.7)
EOSINOPHIL NFR BLD AUTO: 1 %
ERYTHROCYTE [DISTWIDTH] IN BLOOD BY AUTOMATED COUNT: 11.9 % (ref 10–15)
ERYTHROCYTE [SEDIMENTATION RATE] IN BLOOD BY WESTERGREN METHOD: 10 MM/HR (ref 0–20)
GFR SERPL CREATININE-BSD FRML MDRD: >90 ML/MIN/1.73M2
GLUCOSE SERPL-MCNC: 90 MG/DL (ref 70–99)
GLUCOSE UR STRIP-MCNC: NEGATIVE MG/DL
HCG UR QL: NEGATIVE
HCT VFR BLD AUTO: 43 % (ref 35–47)
HGB BLD-MCNC: 14 G/DL (ref 11.7–15.7)
HGB UR QL STRIP: NEGATIVE
IMM GRANULOCYTES # BLD: 0 10E3/UL
IMM GRANULOCYTES NFR BLD: 0 %
KETONES UR STRIP-MCNC: NEGATIVE MG/DL
LEUKOCYTE ESTERASE UR QL STRIP: NEGATIVE
LYMPHOCYTES # BLD AUTO: 1.6 10E3/UL (ref 0.8–5.3)
LYMPHOCYTES NFR BLD AUTO: 37 %
MCH RBC QN AUTO: 31.5 PG (ref 26.5–33)
MCHC RBC AUTO-ENTMCNC: 32.6 G/DL (ref 31.5–36.5)
MCV RBC AUTO: 97 FL (ref 78–100)
MONOCYTES # BLD AUTO: 0.3 10E3/UL (ref 0–1.3)
MONOCYTES NFR BLD AUTO: 7 %
NEUTROPHILS # BLD AUTO: 2.3 10E3/UL (ref 1.6–8.3)
NEUTROPHILS NFR BLD AUTO: 54 %
NITRATE UR QL: NEGATIVE
NRBC # BLD AUTO: 0 10E3/UL
NRBC BLD AUTO-RTO: 0 /100
PH UR STRIP: 6.5 [PH] (ref 5–7)
PLATELET # BLD AUTO: 285 10E3/UL (ref 150–450)
POTASSIUM SERPL-SCNC: 3.9 MMOL/L (ref 3.4–5.3)
PROT SERPL-MCNC: 6.9 G/DL (ref 6.4–8.3)
RBC # BLD AUTO: 4.44 10E6/UL (ref 3.8–5.2)
SODIUM SERPL-SCNC: 137 MMOL/L (ref 136–145)
SP GR UR STRIP: <=1.005 (ref 1–1.03)
TRICHOMONAS, WET PREP: ABNORMAL
TRICHOMONAS, WET PREP: NORMAL
UROBILINOGEN UR STRIP-ACNC: 0.2 E.U./DL
WBC # BLD AUTO: 4.3 10E3/UL (ref 4–11)
WBC'S/HIGH POWER FIELD, WET PREP: ABNORMAL
WBC'S/HIGH POWER FIELD, WET PREP: NORMAL
YEAST, WET PREP: ABNORMAL
YEAST, WET PREP: NORMAL

## 2022-10-10 PROCEDURE — 250N000009 HC RX 250: Performed by: PHYSICIAN ASSISTANT

## 2022-10-10 PROCEDURE — 76830 TRANSVAGINAL US NON-OB: CPT

## 2022-10-10 PROCEDURE — 81025 URINE PREGNANCY TEST: CPT | Performed by: PHYSICIAN ASSISTANT

## 2022-10-10 PROCEDURE — 85652 RBC SED RATE AUTOMATED: CPT | Performed by: PHYSICIAN ASSISTANT

## 2022-10-10 PROCEDURE — 99207 PR NON-BILLABLE SERV PER CHARTING: CPT | Performed by: FAMILY MEDICINE

## 2022-10-10 PROCEDURE — 87210 SMEAR WET MOUNT SALINE/INK: CPT | Performed by: PHYSICIAN ASSISTANT

## 2022-10-10 PROCEDURE — 87086 URINE CULTURE/COLONY COUNT: CPT | Performed by: PHYSICIAN ASSISTANT

## 2022-10-10 PROCEDURE — 85025 COMPLETE CBC W/AUTO DIFF WBC: CPT | Performed by: PHYSICIAN ASSISTANT

## 2022-10-10 PROCEDURE — 86140 C-REACTIVE PROTEIN: CPT | Performed by: PHYSICIAN ASSISTANT

## 2022-10-10 PROCEDURE — 36415 COLL VENOUS BLD VENIPUNCTURE: CPT | Performed by: PHYSICIAN ASSISTANT

## 2022-10-10 PROCEDURE — 99207 REFERRAL TO ACUTE AND DIAGNOSTIC SERVICES: CPT | Performed by: PHYSICIAN ASSISTANT

## 2022-10-10 PROCEDURE — 81003 URINALYSIS AUTO W/O SCOPE: CPT | Performed by: PHYSICIAN ASSISTANT

## 2022-10-10 PROCEDURE — 250N000011 HC RX IP 250 OP 636: Performed by: PHYSICIAN ASSISTANT

## 2022-10-10 PROCEDURE — 99205 OFFICE O/P NEW HI 60 MIN: CPT | Performed by: PHYSICIAN ASSISTANT

## 2022-10-10 PROCEDURE — 80053 COMPREHEN METABOLIC PANEL: CPT | Performed by: PHYSICIAN ASSISTANT

## 2022-10-10 PROCEDURE — 74177 CT ABD & PELVIS W/CONTRAST: CPT

## 2022-10-10 RX ORDER — IOPAMIDOL 755 MG/ML
500 INJECTION, SOLUTION INTRAVASCULAR ONCE
Status: COMPLETED | OUTPATIENT
Start: 2022-10-10 | End: 2022-10-10

## 2022-10-10 RX ADMIN — IOPAMIDOL 76 ML: 755 INJECTION, SOLUTION INTRAVENOUS at 15:01

## 2022-10-10 RX ADMIN — SODIUM CHLORIDE 61 ML: 9 INJECTION, SOLUTION INTRAVENOUS at 15:02

## 2022-10-10 ASSESSMENT — PAIN SCALES - GENERAL: PAINLEVEL: MODERATE PAIN (4)

## 2022-10-10 NOTE — TELEPHONE ENCOUNTER
Pt calling with urinary symptoms.    Pt unsure of date of onset of symptoms. Possibly a few days ago. States she has never had a urinary tract infection before.    Symptoms include:  Pressure in lower pelvic area  Increased urinary frequency  Vaginal itching and burning sensation    Denies any increased vaginal discharge, flank pain or fever.    Protocol recommends pt be seen in office today. Advise pt that she can make an e-visit on [employer] with the first available provider. Information provided on how to make an e-visit. Questions answered and other care advice reviewed. Pt verbalized understanding.    Nidia Momin RN, BSN  [facility]   Triage Nurse Advisor      COVID 19 Nurse Triage Plan/Patient Instructions    Please be aware that novel coronavirus (COVID-19) may be circulating in the community. If you develop symptoms such as fever, cough, or SOB or if you have concerns about the presence of another infection including coronavirus (COVID-19), please contact your health care provider or visit [email or web address].     Disposition/Instructions    Virtual Visit with provider recommended. Reference Visit Selection Guide.    Thank you for taking steps to prevent the spread of this virus.  o Limit your contact with others.  o Wear a simple mask to cover your cough.  o Wash your hands well and often.    Resources    [facility]: About COVID-19: [email or web address]    CDC: What to Do If You're Sick: www.cdc.gov/coronavirus/2019-ncov/about/steps-when-sick.html    CDC: Ending Home Isolation: www.cdc.gov/coronavirus/2019-ncov/hcp/disposition-in-home-patients.html     CDC: Caring for Someone: www.cdc.gov/coronavirus/2019-ncov/if-you-are-sick/care-for-someone.html     [facility]: Interim Guidance for Hospital Discharge to Home: www.health.[facility].[place]/diseases/coronavirus/hcp/hospdischarge.pdf    [facility] clinical trials (COVID-19 research studies):  clinicalaffairs.[facility] [place]/[facility]-clinical-trials     Below are the COVID-19 hotlines at the [place] Department of Health ([facility]). Interpreters are available.   o For health questions: Call 964-186-7082 or 1-961.892.6371 (7 a.m. to 7 p.m.)  o For questions about schools and childcare: Call 796-976-8167 or 1-444.480.6858 (7 a.m. to 7 p.m.)                       Reason for Disposition    Urinating more frequently than usual (i.e., frequency)    Additional Information    Negative: Followed a female genital area injury (e.g., vagina, vulva)    Negative: Followed a male genital area injury (penis, scrotum)    Negative: Vaginal discharge    Negative: Pus (white, yellow) or bloody discharge from end of penis    Negative: Pain or burning with passing urine (urination) and pregnant    Negative: Pain or burning with passing urine (urination) and female    Negative: Pain or burning with passing urine (urination) and male    Negative: Pain or itching in the vulvar area    Negative: Pain in scrotum is main symptom    Negative: Blood in the urine is main symptom    Negative: Symptoms arising from use of a urinary catheter (e.g., coude, Santizo)    Negative: Unable to urinate (or only a few drops) > 4 hours and bladder feels very full (e.g., palpable bladder or strong urge to urinate)    Negative: Decreased urination and drinking very little and dehydration suspected (e.g., dark urine, no urine > 12 hours, very dry mouth, very lightheaded)    Negative: Patient sounds very sick or weak to the triager    Negative: Fever > 100.4 F  (38.0 C)    Negative: Can't control passage of urine (i.e., urinary incontinence) and new-onset (< 2 weeks) or worsening    Negative: Side (flank) or lower back pain present    Protocols used: URINARY SYMPTOMS-A-OH

## 2022-10-10 NOTE — PATIENT INSTRUCTIONS
DAILY FIBER THERAPY MIX:     1/2 cup Konsyl - important to get this brand! (psyllium fiber)  , 1 cup of oat bran, 1 cup of wheat bran and 1 cup of flax seed meal - ground - = mix it together in a container or zip-loc bag and take 1 teaspoon in 1 cup of warm water daily,  follow with 1 -8 oz of water.       For some reason the Konsyl brand of psyllium fiber doesn't tend to give people as much gas/bloating as other brands do.  The warm liquid is to help soften the brans , so one doesn't feel like they are drinking saw dust in water.

## 2022-10-10 NOTE — PROGRESS NOTES
"  Assessment & Plan     Pelvic pain in female    Urine HCG pending  Due to pelvic pressure and pain patient sent to the [facility] center for pelvic ultrasound and exam    - HCG Qual, Urine ([id number])  - Referral to Acute and Diagnostic Services (Day of diagnostic / First order acute); Future    Urinary frequency     UA normal  Urine Culture pending  No signs of UTI  - UA reflex to Microscopic and Culture  - Urine Culture    Vaginal itching    Wet prep neg  - Wet preparation     BMI:   Estimated body mass index is 28.59 kg/m  as calculated from the following:    Height as of 4/8/22: 1.651 m (5' 5\").    Weight as of 6/21/22: 77.9 kg (171 lb 12.8 oz).           No follow-ups on file.    Primo Keen, [facility], PA-C  [facility] Friedman is a 42 year old, presenting for the following health issues:  Urgent Care (Present for irritation when urinating bladder pressure, urinary frequency, vaginal burning and itching for a few days. )      HPI   Review of Systems   Constitutional, HEENT, cardiovascular, pulmonary, gi and gu systems are negative, except as otherwise noted.      Objective    /73 (BP Location: Left arm, Patient Position: Sitting, Cuff Size: Adult Regular)   Pulse 61   Temp 98.5  F (36.9  C) (Tympanic)   There is no height or weight on file to calculate BMI.  Physical Exam   GENERAL: healthy, alert and no distress  EYES: Eyes grossly normal to inspection, PERRL and conjunctivae and sclerae normal  HENT: ear canals and TM's normal, nose and mouth without ulcers or lesions  NECK: no adenopathy, no asymmetry, masses, or scars and thyroid normal to palpation  RESP: lungs clear to auscultation - no rales, rhonchi or wheezes  CV: regular rate and rhythm, normal S1 S2, no S3 or S4, no murmur, click or rub, no peripheral edema and peripheral pulses strong  ABDOMEN: tenderness suprapubic   (female): Normal outer and inner labia area.    MS: no gross musculoskeletal defects noted, " no edema  SKIN: no suspicious lesions or rashes  NEURO: Normal strength and tone, mentation intact and speech normal  PSYCH: mentation appears normal, affect normal/bright      Results for orders placed or performed in visit on 10/10/22   UA reflex to Microscopic and Culture     Status: Normal    Specimen: Urine, Midstream   Result Value Ref Range    Color Urine Yellow Colorless, Straw, Light Yellow, Yellow    Appearance Urine Clear Clear    Glucose Urine Negative Negative mg/dL    Bilirubin Urine Negative Negative    Ketones Urine Negative Negative mg/dL    Specific Gravity Urine <=1.005 1.003 - 1.035    Blood Urine Negative Negative    pH Urine 6.5 5.0 - 7.0    Protein Albumin Urine Negative Negative mg/dL    Urobilinogen Urine 0.2 0.2, 1.0 E.U./dL    Nitrite Urine Negative Negative    Leukocyte Esterase Urine Negative Negative    Narrative    Microscopic not indicated   Wet preparation     Status: Abnormal    Specimen: Vagina; Swab   Result Value Ref Range    Trichomonas Absent Absent    Yeast Absent Absent    Clue Cells Absent Absent    WBCs/high power field 3+ (A) None

## 2022-10-10 NOTE — RESULT ENCOUNTER NOTE
Results discussed directly with patient while patient was present. Any further details documented in the note.   Cristy Wilkerson PA-C

## 2022-10-10 NOTE — PATIENT INSTRUCTIONS
Dear Idalia Hinojosa,    We are sorry you are not feeling well. Based on the responses you provided, it is recommended that you be seen in-person in urgent care so we can better evaluate your symptoms. Please click here to find the nearest urgent care location to you.   You will not be charged for this Visit. Thank you for trusting us with your care.    Jennifer Gerber MD

## 2022-10-10 NOTE — PROGRESS NOTES
"  Assessment & Plan     Constipation, unspecified constipation type  Pelvic pain in female  Stat CT scan and transvaginal ultrasound reassuring.  Repeat wet prep unremarkable.  Urine culture pending.  Due to the bowel changes that have occurred since her change in sertraline dosage suspect that constipation is the etiology due to normal transvaginal ultrasound and CT abdomen/pelvis.  DAILY FIBER THERAPY MIX:     1/2 cup Konsyl (psyllium fiber) , 1 cup of oat bran, 1 cup of wheat bran and 1 cup of flax seed meal - ground - = mix it together in a container or zip-loc bag and take 1 teaspoon in 1 cup of warm water daily,  follow with 1 -8 oz of water.       For some reason the Konsyl brand of psyllium fiber doesn't tend to give people as much gas/bloating as other brands do.  The warm liquid is to help soften the brans , so one doesn't feel like they are drinking saw dust in water.     - Referral to Acute and Diagnostic Services (Day of diagnostic / First order acute)  - US Pelvic Complete with Transvaginal  - CBC with platelets differential  - CRP inflammation  - Erythrocyte sedimentation rate auto  - CT Abdomen Pelvis w Contrast  - sodium chloride (PF) 0.9% PF flush 3 mL  - Comprehensive metabolic panel    45 minutes spent on the date of the encounter doing chart review, history and exam, documentation and further activities per the note       BMI:   Estimated body mass index is 27.82 kg/m  as calculated from the following:    Height as of 4/8/22: 1.651 m (5' 5\").    Weight as of this encounter: 75.8 kg (167 lb 3.2 oz).   Weight management plan: Patient was referred to their PCP to discuss a diet and exercise plan.      Return in about 1 week (around 10/17/2022) for evaluation with PCP if not improving/worsening.    Crsity Wilkerson PA-C  [facility]    Fermin Friedman is a 42 year old, presenting for the following health issues:  Vaginal Itching and Pelvic Pain      HPI "       Genitourinary - Female  Onset/Duration: Pelvic irritation over past 3 weeks, pelvic pain/pressure over past 3 days  Description:   Painful urination (Dysuria): YES- intermittently           Frequency: YES- drank a lot of water today in preparation of urinary analysis  Blood in urine (Hematuria): No  Delay in urine (Hesitency): YES- intermittenly  Intensity: moderate  Progression of Symptoms:  worsening  Accompanying Signs & Symptoms:  Fever/chills: No  Flank pain: No  Nausea and vomiting: No  Vaginal symptoms: none and itching  Abdominal/Pelvic Pain: YES- pressure along  scar. Intermittent worsening sharp pains overnight  History:   History of frequent UTI s: No  History of kidney stones: No  Sexually Active: YES  Possibility of pregnancy: No  Precipitating or alleviating factors: Activity can make pressure worsr  Therapies tried and outcome:  Fluid intake, tylenol, heating pad    Increase in sertraline approx 2 months ago, some decreased BMI frequency, volume and increased hardness.  Denies nausea/vomiting.  Patient has had  x1 otherwise no abdominal surgeries.  Denies vaginal discharge/odor.  Denies any new sexual partners or concerns for sexual transmitted infections.  Wet prep was collected earlier today without a speculum exam and was normal.      Review of Systems   Constitutional, HEENT, cardiovascular, pulmonary, GI, , musculoskeletal, neuro, skin, endocrine and psych systems are negative, except as otherwise noted.      Objective    /70 (BP Location: Right arm, Patient Position: Sitting, Cuff Size: Adult Regular)   Pulse 63   Temp 98.3  F (36.8  C)   Resp 16   Wt 75.8 kg (167 lb 3.2 oz)   SpO2 97%   BMI 27.82 kg/m    Body mass index is 27.82 kg/m .  Physical Exam   GENERAL: healthy, alert and no distress  EYES: Eyes grossly normal to inspection  RESP: lungs clear to auscultation - no rales, rhonchi or wheezes  CV: regular rate and rhythm, normal S1 S2, no S3 or S4, no  murmur, click or rub, no peripheral edema and peripheral pulses strong  ABDOMEN: soft, tenderness with guarding to the suprapubic region, left lower quadrant, and right lower quadrant (right greater than left), no hepatosplenomegaly, no masses and bowel sounds normal   (female): normal female external genitalia, normal urethral meatus, vaginal mucosa pink, moist, well rugated, and normal cervix/adnexa/uterus without masses or discharge, bimanual exam reveals tenderness of the uterus, and bilateral adnexa, no cervical motion tenderness  MS: no gross musculoskeletal defects noted, no edema  SKIN: no suspicious lesions or rashes  NEURO: Normal strength and tone, mentation intact and speech normal  PSYCH: mentation appears normal, affect normal/bright    Results for orders placed or performed in visit on 10/10/22 (from the past 24 hour(s))   Wet prep - Clinic Collect    Specimen: Vagina; Swab   Result Value Ref Range    Trichomonas Absent Absent    Yeast Absent Absent    Clue Cells Absent Absent    WBCs/high power field None None   CBC with platelets differential    Narrative    The following orders were created for panel order CBC with platelets differential.  Procedure                               Abnormality         Status                     ---------                               -----------         ------                     CBC with platelets and d...[[id number]]                      Final result                 Please view results for these tests on the individual orders.   CRP inflammation   Result Value Ref Range    CRP Inflammation 4.89 <5.00 mg/L   Erythrocyte sedimentation rate auto   Result Value Ref Range    Erythrocyte Sedimentation Rate 10 0 - 20 mm/hr   CBC with platelets and differential   Result Value Ref Range    WBC Count 4.3 4.0 - 11.0 10e3/uL    RBC Count 4.44 3.80 - 5.20 10e6/uL    Hemoglobin 14.0 11.7 - 15.7 g/dL    Hematocrit 43.0 35.0 - 47.0 %    MCV 97 78 - 100 fL    MCH 31.5 26.5 - 33.0  pg    MCHC 32.6 31.5 - 36.5 g/dL    RDW 11.9 10.0 - 15.0 %    Platelet Count 285 150 - 450 10e3/uL    % Neutrophils 54 %    % Lymphocytes 37 %    % Monocytes 7 %    % Eosinophils 1 %    % Basophils 1 %    % Immature Granulocytes 0 %    NRBCs per 100 WBC 0 <1 /100    Absolute Neutrophils 2.3 1.6 - 8.3 10e3/uL    Absolute Lymphocytes 1.6 0.8 - 5.3 10e3/uL    Absolute Monocytes 0.3 0.0 - 1.3 10e3/uL    Absolute Eosinophils 0.0 0.0 - 0.7 10e3/uL    Absolute Basophils 0.0 0.0 - 0.2 10e3/uL    Absolute Immature Granulocytes 0.0 <=0.4 10e3/uL    Absolute NRBCs 0.0 10e3/uL   Comprehensive metabolic panel   Result Value Ref Range    Sodium 137 136 - 145 mmol/L    Potassium 3.9 3.4 - 5.3 mmol/L    Chloride 101 98 - 107 mmol/L    Carbon Dioxide (CO2) 26 22 - 29 mmol/L    Anion Gap 10 7 - 15 mmol/L    Urea Nitrogen 5.2 (L) 6.0 - 20.0 mg/dL    Creatinine 0.71 0.51 - 0.95 mg/dL    Calcium 9.1 8.6 - 10.0 mg/dL    Glucose 90 70 - 99 mg/dL    Alkaline Phosphatase 70 35 - 104 U/L    AST 34 10 - 35 U/L    ALT 21 10 - 35 U/L    Protein Total 6.9 6.4 - 8.3 g/dL    Albumin 3.9 3.5 - 5.2 g/dL    Bilirubin Total 0.4 <=1.2 mg/dL    GFR Estimate >90 >60 mL/min/1.73m2     Recent Results (from the past 744 hour(s))   US Pelvic Complete with Transvaginal    Narrative    ULTRASOUND PELVIS WITH TRANSVAGINAL IMAGING  10/10/2022 1:43 PM     HISTORY: Pelvic pain in female.    COMPARISON: None.    FINDINGS:  Endovaginal sonography was added to the transabdominal  scans.    No fibroids are evident. The uterus is 7.3 x 5.1 x 5.4 cm. Endometrial  stripe measures 5 mm and is normal for patient's age and menstrual  status. The right ovary is not seen due to overlying bowel gas. The  left ovary is normal.  No left adnexal masses are present. Trace free  pelvic fluid is present.      Impression    IMPRESSION:   1. Trace pelvic free fluid which is nonspecific and likely  physiologic.  2. Otherwise no acute process demonstrated.    JUAN CARLOS MAY MD          SYSTEM ID:  [id number]   CT Abdomen Pelvis w Contrast    Narrative    CT ABDOMEN AND PELVIS WITH CONTRAST  10/10/2022 3:06 PM    HISTORY: Significant lower abdominal pain, right greater than left -  normal pelvic US (right ovary not visible). Pelvic pain in female.    TECHNIQUE: CT scan obtained of the abdomen, and pelvis with IV  contrast. 76mL [id number] IV injected. Radiation dose for this scan  was reduced using automated exposure control, adjustment of the mA  and/or kV according to patient size, or iterative reconstruction  technique.    COMPARISON: Pelvic ultrasound on 10/10/2022    FINDINGS:    Lower chest: Lung bases are clear.    Abdomen/pelvis:    Hepatobiliary: No suspicious focal hepatic lesion. The gallbladder is  unremarkable.    Pancreas: No main pancreatic ductal dilatation or definite solid  pancreatic mass.    Spleen: No splenomegaly. Small splenules along the splenic hilum.    Adrenal glands: No adrenal nodules.    Kidneys: No radiodense kidney/ureteral stones or hydronephrosis in the  kidney.    Bowel: No abnormally dilated bowel loops. The appendix is visualized  and appears normal.    Peritoneum: Trace amount of free fluid in the pelvis, indeterminate,  could be physiological.    Pelvic organs: Retroverted uterus.    Vascular: Unremarkable.    Lymph nodes: No significant abdominopelvic lymphadenopathy.    Bones and soft tissue: No suspicious osseous lesion.      Impression    IMPRESSION:   1. No CT findings to explain patient's symptoms of lower abdominal  pain.  2. Retroverted uterus.  3. Trace amount of free fluid in the pelvis, indeterminate, could be  reactive.    CHRIS ROSALES MD         SYSTEM ID:  [id number]

## 2022-10-12 LAB — BACTERIA UR CULT: NORMAL

## 2022-10-13 ENCOUNTER — VIRTUAL VISIT (OUTPATIENT)
Dept: PSYCHOLOGY | Facility: CLINIC | Age: 42
End: 2022-10-13
Payer: COMMERCIAL

## 2022-10-13 DIAGNOSIS — F32.A DEPRESSION, UNSPECIFIED DEPRESSION TYPE: ICD-10-CM

## 2022-10-13 DIAGNOSIS — F41.9 ANXIETY DISORDER, UNSPECIFIED TYPE: Primary | ICD-10-CM

## 2022-10-13 PROCEDURE — 90832 PSYTX W PT 30 MINUTES: CPT | Mod: GT | Performed by: PSYCHOLOGIST

## 2022-10-13 ASSESSMENT — PATIENT HEALTH QUESTIONNAIRE - PHQ9
SUM OF ALL RESPONSES TO PHQ QUESTIONS 1-9: 3
SUM OF ALL RESPONSES TO PHQ QUESTIONS 1-9: 3
10. IF YOU CHECKED OFF ANY PROBLEMS, HOW DIFFICULT HAVE THESE PROBLEMS MADE IT FOR YOU TO DO YOUR WORK, TAKE CARE OF THINGS AT HOME, OR GET ALONG WITH OTHER PEOPLE: SOMEWHAT DIFFICULT

## 2022-10-13 NOTE — PROGRESS NOTES
"           [facility]                                            Progress Note    Patient Name: Idalia Hinojosa  Date: 10/13/2022         Service Type: Individual      Session Start Time: 12:12 pm Session End Time: 12:42 pm  Session Length:  30 minutes    Session #: 72    Attendees: Client     Service Modality: Video visit: -       Provider verified identity through the following two step process.  Patient provided:  Patient is known previously to provider    Telemedicine Visit: The patient's condition can be safely assessed and treated via synchronous audio and visual telemedicine encounter.      Reason for Telemedicine Visit: Services only offered telehealth    Originating Site (Patient Location): Patient's home    Distant Site (Provider Location): Provider Remote Setting- Home Office    Consent:  The patient/guardian has verbally consented to: the potential risks and benefits of telemedicine (telephone visit) versus in person care; bill my insurance or make self-payment for services provided; and responsibility for payment of non-covered services.     Patient would like the video invitation sent by:  My Chart    Mode of Communication:  Video Conference via [employer],     As the provider I attest to compliance with applicable laws and regulations related to telemedicine.         Treatment Plan Last Reviewed: 08/04/22  PHQ-9/DAVID-7: 10/13/2022      DATA  Interactive Complexity: No  Crisis: No        Progress Since Last Session (Related to Symptoms / Goals / Homework):   Symptoms:  Symptoms of anxiety have increased.  They have received more information about Colia's treatment, and this has increased thoughts of \"what ifs\".        Homework: Achieved / completed to satisfaction.  Was successful with emailing the  her questions.          Episode of Care Goals: Satisfactory progress - ACTION (Actively working towards change); Intervened by reinforcing change plan / affirming steps " "taken.  Diagnostic assessment has been completed, treatment plan has been developed and patient has been making good progress on treatment goals.  The patient stated that her main goals for therapy would be to learn emotion regulation strategies (in particular, to help with when she is feeling upset/angry/frustrated/distressed).  The changes in her daily life due to COVID restrictions were a focus of our work together, and working through changes to routine as her daughter started school and she returned to work.  As she has been making progress, focus of treatment will shift to wellness planning (monitoring mood and symptoms, incorporating into routine what keeps her feeling well, review of triggers, how to manage set backs, etc.).  As her  has been diagnosed with cancer, treatment will also focus on providing her with support in managing the emotional distress and challenges related to this.       Current / Ongoing Stressors and Concerns:   Current:  is going through cancer treatment.  Daughter has been diagnosed with ADHD and they are connecting with individual and family support.  Symptoms of anxiety and depression.  Caregiver and parenting stress related to demands of 's illness and daughter's behavioral challenges.       Ongoing:Managing daily routine which has contributed to some adjustment and parenting challenges, lack of effective emotion regulation strategies for managing daily frustrations and upsets and marital discord due to differences in parenting styles.       Treatment Objective(s) Addressed in This Session:     Identify strategies to help with caregiver stress  Identify and practice strategies to help with managing increasing in symptoms of anxiety -   Identify and practice strategies to help manage feeling overwhelmed     Identify how to practice and encourage having a flexible mindset when things don't go as anticipated  Learn strategy of \"radical acceptance\", benefits of " "strategy, and how to apply strategy     Continue to review/practice/reinforce:    Practice checking in on a regular basis, remind self to slow down when rushing, take deep breaths   Identify and enforce healthy emotional boundaries (define your role as spouse, not \"therapist\" or \"fixer\")  Identify factors to consider when having crucial conversations   Identify ways to practice and incorporate self-care into routine.   Learn and practice mindfulness strategies - redirect back to present moment, focus on the facts   Identify and enforce healthy emotional boundaries (it is ok to feel different than my spouse does, it is okay for him to have his feelings and to not feel like I have to fix or change how he is feeling)  Continue to practice awareness of your emotions and physical sensations, to help tune in to what you need  Identify opportunities to access support and help from others  Mindful awareness of thoughts, impact on your emotions, and recognize opportunities to redirect thoughts.  Practice re-directing thoughts to a more helpful perspective.    Practice balanced thinking to counteract polarized/all or nothing thinking.       Intervention:   CBTand solution-focused: Idalia processed increase in symptoms of anxiety and worry in light of learning more about her spouses's upcoming treatment and what this will entail.  She will practice \"checking out the facts\" when possible, as they continue to learn about the treatment and what to expect.  She identified feeling overwhelmed by thinking about all she needs to do prior to his treatment to prepare.  She will work on writing down a list of the things she believes she needs to do to help with prioritizing what needs to get done first.  She recognized a tendency in herself to feel like she needs to get it all done right away, and will practice recognizing and redirecting this thought (I have time, I don't need to get it all done right away).  She'll work on " "prioritizing, identifying things she can ask for help with, and identifying things she can \"let go\" of.      ASSESSMENT: Current Emotional / Mental Status (status of significant symptoms):   Risk status (Self / Other harm or suicidal ideation)   Patient denies current fears or concerns for personal safety.   Patient denies current or recent suicidal ideation or behaviors.    She continues to agree to use a safety plan should any safety concerns arise.  She also agrees to reach our to her spouse or a family member for help if needed, and/or access crisis/emergency resources.        Patientdenies current or recent homicidal ideation or behaviors.   Patient denies current or recent self injurious behavior or ideation.   Patient denies other safety concerns.   Patient reports there has been no change in risk factors since their last session.     Patient reports there has been no change in protective factors since their last session.     Recommended that patient call 911 or go to the local ED should there be a change in any of these risk factors.  She has previously been informed on how to contact [facility] crisis/[facility] for urgent/crisis concerns 24/7.  Provided patient with crisis resources and she agrees to use safety plan should any safety concerns arise.      Professionals or agencies I can contact during a crisis:    Suicide Prevention Lifeline: 5-943-952-TALK (0091)    Crisis Text Line Service (available 24 hours a day, 7 days a week): Text [place] to 377937    Local Crisis Services: [facility]: [phone]    Call 911 or go to my nearest emergency department.        Appearance:   Appropriate    Eye Contact:   Good    Psychomotor Behavior: Normal    Attitude:   Cooperative    Orientation:   All   Speech    Rate / Production: Normal     Volume:  Normal    Mood:    Anxious, increase in worry thoughts and \"what ifs\"    Affect:    Congruent with mood   Thought Content:  Clear    Thought " "Form:  Coherent  Logical    Insight:    Good      Medication Review:   No changes      Medication Compliance:   Yes      Changes in Health Issues:  Went to urgent care for stomach pain, is starting to feel some relief         Chemical Use Review:   Substance Use: Chemical use reviewed, no changes or active concerns identified.      Tobacco Use: No current tobacco use.      Diagnosis:  Anxiety disorder unspecified  Depression, unspecified             PLAN: (Patient Tasks / Therapist Tasks / Other)    1) Idalia identified the following goals: Write down things I need to do and worries I have, prioritize, take a relaxing bath tonight.      Continue great work you have been doing to be mindful of expectations you have for yourself, and creating realistic and healthy expectations.  Continue to write down any questions or info you need from Kellee's care team.      Continue with: Practice flexible mindset and radical acceptance when things don't go as anticipated.    2) If there is a crisis situation, remember you are not alone and that there are people who can help you twenty-four hours a day, seven days a week.  Call [facility] ([facility]): [phone].      3) Follow-up visit is scheduled for 10/18 at 12:30 pm.  If urgent or crisis concerns arise prior to next scheduled appointment, please reach out to crisis resources, call 911, or go to the emergency department.      Cristy Wilkinson PsyD, LP  Licensed Psychologist  10/13/2022                    Previous skills and strategies to remind self of and to do as needed:    Practice \"STOP\" technique when you recognize yourself feeling angry   Be a  - what do you notice is happening when Kaylee feels upset?     Practice recognizing when feeling angry, and giving self permission to use calming and self-soothing strategies and take a break.   Look for the gray with thinking  slow down  take deep breaths  manage expectations of self and others.    Journal " "  Practice flexing \"flexiblity and creativity\" -   Continue the great work you are doing with your daily schedule.    Get outside every day.    Play your instrument.    Sing!    Continue with: \"It's just a thought\" - non-judgmental, observe, float down the river on a leaf, clouds in the gege, reframing unhelpful thoughts -   Keep working on being patient when things are tense around me.    Continue with observing/paying attention to warning signs and signals and give self persmission to slow down, especially during the morning routine with Kaylee.    Practice offerring Kaylee choices when appropriate.      Use \"my plan for success\" to help remind you of calming strategies to practice when feeling upset.    Practice decreasing overall vulnerability to emotion mind through getting adequate rest, nutrition, time for yourself, and physical activity.         Consider reading \"Fighting for your marriage\".      Technical Assistance for video visits:    Offer patient the website ([email or web address]) and/or phone number (659-612-6913) for video visit instructions/assistance if needed.                                             ____________________________________    Treatment Plan     Patient's Name: Idalia Hinojosa                        YOB: 1980     Date of Creation: 1/6/2020  Date Treatment Plan Last Reviewed/Revised: 8/4/22     DSM5 Diagnoses: 300.00 (F41.9) Unspecified Anxiety Disorder, depressive disorder, unspecified  Psychosocial / Contextual Factors: strain in marital relationship, parenting challenges, adjustment challenges, 's cancer diagnosis  PROMIS (reviewed every 90 days):      Anticipated number of session for this episode of care: additional 15-20  Anticipation frequency of session: Weekly until symptoms stabilize, then can decrease the frequency of sessions to likely bi-weekly or once every three weeks  Anticipated Duration of each session: 38-52 minutes  Treatment plan will be " reviewed in 90 days or when goals have been changed.         Referral / Collaboration:  We discussed a referral to a couples/marital therapist.  The patient is thinking about this and has talked with her  about the referral, but they are unsure if they would like to follow through at this time.       MeasurableTreatment Goal(s) related to diagnosis / functional impairment(s)  Goal 1: Patient will learn emotion regulation strategies to help when she is feeling upset/angry/frustrated/distressed    I will know I've met my goal when I would yell less, I would feel calmer, and I would not push others.  I would be less physical when I'm angry (e.g. wouldn't slam doors or hit things)        Objective #A (Patient Action)                          Patient will learn and practice at least 2 new emotion regulation strategies.  Status: Continued - Date(s):    8/4/22- Regularly practicing emotion regulation strategies to help manage increase in emotional distress related to spouse's cancer diagnosis        Intervention(s)  Therapist will provide psychoeducation on emotion regulation module from DBT and will assign patient homework to help reinforce skill development.     Objective #B  Patient will identify factors that increase vulnerability to emotion mind and identify ways to decrease vulnerability to emotion mind.  Status: Continued - Date(s):8/4/22 - routinely incorporating mindfulness and self-awareness strategies to increase attention and focus to factors that increase vulnerability to emotion mind           Intervention(s)  Therapist will provide information on factors that increase vulnerabiliyt to emotion mind.     Objective #C  Patient will identify warning signs and signals that emotions are escalating and will learn ways to skillfully intervene early on.  Status: New - Date: 8/4/22 - in progress -            Intervention(s)  Therapist will help patient identify warning signs and signals, and will guide the  patient in identifying skillful ways to intervene when exeriencing warning signs and signals.        Goal 2: Patient will learn new parenting strategies to help with managing parenting challenges.  Parent will work on improving relationship with her daughter and defining what she would like this relationship to look like.      I will know I've met my goal when I'm enjoying time with my daughter, teaching her new things, and not waiting for things to change       Objective #A (Patient Action)                          Status: Continued - Date(s):8/4/22     Patient will increase understanding of developmental norms for her daughter and ways to manage challenging behaviors.     Intervention(s)  Therapist will provide psychoeducation on developmental norms and parenting strategies.     Objective #B  Patient will spend time reflecting on her relationship with her daughter and defining what she would like this relaitonship to look like.                  Status: Continued - Date(s):8/4/22 - Making good progress -            Intervention(s)  Therapist will guide the patient in reflecting upon her relationship with her daughter and help her define ways to move towards what she vaues in her relationship with her daughter.     Objective #C  Patient will increase time spent on fun activity and play with her daughter.  Status: Continued - Date(s):8/4/22- has been enjoying time with daughter and devoting regular time to fun activities and play together           Intervention(s)  Therapist will guide the patient in identifying ways she can increase positive time with her daughter.  Therapist will also teach mindfulness strategies to help patient with being in the present moment when appropriate - .        Goal 3: Patiient will improve communication with her .      I will know I've met my goal when I feel less irritated with my  and communicate more openly with my .  I would like to be more assertive and less  "aggressive.   I would like to not avoid telling him things because I'm worried about his reaction or don't think he will react well.  I would like to be more present to the moment during times when this would be helpful.\"       Objective #A (Patient Action)                          Status: Continued - Date(s):8/4/22 - continues to work on this goal            Patient will learn and practice at least two new interpersonal effectiveness strategies.     Intervention(s)  Therapist will teach strategies from DBT module on interpersonal effectiveness, and will assign homework to help reinforce skill development.        Patient has reviewed and agreed to the above plan.        Cristy Wilkinson PsyD,   Licensed Psychologist  January 6, 2020, reviewed on 4/23/2020, reviewed 7/8/2020, reviewed 10/08/2020, reviewed 1/27/2021, reviewed 4/27/2021, 7/29/2021, 11/10/2021, 2/9/2022, 5/3/2022, 8/4/22                                                                                                                                                                                                                                                                [facility]                                            Progress Note    Patient Name: Idalia Hinojosa  Date: 8/31/2022           Service Type: Individual      Session Start Time: 12:06 pm Session End Time: 12:48 pm pm   Session Length:  42 minutes    Session #: 67    Attendees: Client     Service Modality: Video visit: -       Provider verified identity through the following two step process.  Patient provided:  Patient is known previously to provider    Telemedicine Visit: The patient's condition can be safely assessed and treated via synchronous audio and visual telemedicine encounter.      Reason for Telemedicine Visit: Services only offered telehealth    Originating Site (Patient Location): Patient's home    Distant Site (Provider Location): Provider " "Remote Setting- Home Office    Consent:  The patient/guardian has verbally consented to: the potential risks and benefits of telemedicine (telephone visit) versus in person care; bill my insurance or make self-payment for services provided; and responsibility for payment of non-covered services.     Patient would like the video invitation sent by:  My Chart    Mode of Communication:  Video Conference via [employer],     As the provider I attest to compliance with applicable laws and regulations related to telemedicine.         Treatment Plan Last Reviewed: 08/04/22  PHQ-9/DAVID-7: 08/04/22      DATA  Interactive Complexity: No  Crisis: No        Progress Since Last Session (Related to Symptoms / Goals / Homework):   Symptoms:  She reported that symptoms continue to remain improved - less anxious and depressed and feels better able to cope with challenges and stressors she is experiencing.   She reports the weekend was \"rough\" in terms of her  not feeling well physically from his treatment, and her daughter had some challenging behaviors, however, she was able to actively engage constructive coping strategies.        Homework: Achieved / completed to satisfaction.  Has been enjoying biking to work and cool summer mornings, went out on a date with her  and enjoyed this, and used healthy coping strategies to manage difficult situations.          Episode of Care Goals: Satisfactory progress - ACTION (Actively working towards change); Intervened by reinforcing change plan / affirming steps taken.  Diagnostic assessment has been completed, treatment plan has been developed and patient has been making good progress on treatment goals.  The patient stated that her main goals for therapy would be to learn emotion regulation strategies (in particular, to help with when she is feeling upset/angry/frustrated/distressed).  The changes in her daily life due to COVID restrictions were a focus of our work together, and " "working through changes to routine as her daughter started  and she returned to work.  As she has been making progress, focus of treatment will shift to wellness planning (monitoring mood and symptoms, incorporating into routine what keeps her feeling well, review of triggers, how to manage set backs, etc.).  As her  has been diagnosed with cancer, treatment will also focus on providing her with support in managing the emotional distress and challenges related to this.       Current / Ongoing Stressors and Concerns:   Current:  She reports the weekend was difficult; her  felt physically ill for about five days, and her daughter returned from time at her parents cabin and struggled with the transition.  She was able to identify the difficulties and solutions to address - which included changing up the environment, going outside, getting physical activity, and getting help from her parents.       Ongoing:Managing daily routine which has contributed to some adjustment and parenting challenges, lack of effective emotion regulation strategies for managing daily frustrations and upsets and marital discord due to differences in parenting styles.       Treatment Objective(s) Addressed in This Session:     Identify how to practice and encourage having a flexible mindset when things don't go as anticipated  Learn strategy of \"radical acceptance\", benefits of strategy, and how to apply strategy       Continue to review/practice/reinforce:  Identify ways to practice and incorporate self-care into routine.   Learn and practice mindfulness strategies - redirect back to present moment, focus on the facts   Identify and enforce healthy emotional boundaries (it is ok to feel different than my spouse does, it is okay for him to have his feelings and to not feel like I have to fix or change how he is feeling)  Continue to practice awareness of your emotions and physical sensations, to help tune in to what you " "need  Identify opportunities to access support and help from others  Mindful awareness of thoughts, impact on your emotions, and recognize opportunities to redirect thoughts.  Practice re-directing thoughts to a more helpful perspective.    Practice balanced thinking to counteract polarized/all or nothing thinking.         Intervention:   CBT and DBT: Idalia identified wanting to work on strengthening her coping strategies for managing when things do not go as expected, and in particular responding with a flexible mindset.  We talked about being able to acknowledge and validate the emotions that can come along with things not going as anticipated, while also being able to practice \"radical acceptance\" of what is and the ability to choose a flexible mindset (and acknowledging that thoughts may not automatically go there - so the ability to pay attention to and recognize thinking, identify when cognitive distortions are present, and embrace the opportunity to choose a more flexible way of viewing the situation).      She also processed an upcoming change to working 5 days a week instead of 4, she reported she feels positive about this for multiple reasons - she enjoys work, it is a busy time of the year at work so she feels productive and busy when at work, and this will help bring in extra income (which will help when her  goes on medical leave for his treatments.  She identified the need to assess how she does with the increase in her work schedule and evaluate what she may need in terms of her self-care.        ASSESSMENT: Current Emotional / Mental Status (status of significant symptoms):   Risk status (Self / Other harm or suicidal ideation)   Patient denies current fears or concerns for personal safety.   Patient denies current or recent suicidal ideation or behaviors.    She continues to agree to use a safety plan should any safety concerns arise.  She also agrees to reach our to her spouse or a family " "member for help if needed, and/or access crisis/emergency resources.        Patientdenies current or recent homicidal ideation or behaviors.   Patient denies current or recent self injurious behavior or ideation.   Patient denies other safety concerns.   Patient Patient reports there has been no change in risk factors since their last session.     PatientPatient reports there has been no change in protective factors since their last session.     Recommended that patient call 911 or go to the local ED should there be a change in any of these risk factors.  She has previously been informed on how to contact [facility] crisis/COPE for urgent/crisis concerns 24/7.  Provided patient with crisis resources and she agrees to use safety plan should any safety concerns arise.      Professionals or agencies I can contact during a crisis:    Suicide Prevention Lifeline: 5-535-389-TALK (5371)    Crisis Text Line Service (available 24 hours a day, 7 days a week): Text [place] to 128460    [facility]: [facility]: [phone]    Call 911 or go to my nearest emergency department.        Appearance:   Appropriate    Eye Contact:   Good    Psychomotor Behavior: Normal    Attitude:   Cooperative    Orientation:   All   Speech    Rate / Production: Normal     Volume:  Normal    Mood:    Reports that she continues to feel \"better\" and that she is better able to manage stressors and difficulties that arise.     Affect:    Congruent with mood   Thought Content:  Clear    Thought Form:  Coherent  Logical    Insight:    Good      Medication Review:   No changes      Medication Compliance:   Yes      Changes in Health Issues:  None reported         Chemical Use Review:   Substance Use: Chemical use reviewed, no changes or active concerns identified.      Tobacco Use: No current tobacco use.      Diagnosis:  Anxiety disorder unspecified  Depression, unspecified             PLAN: (Patient Tasks / Therapist Tasks " "/ Other)    1) Idalia identified the following goals: Thinking about how many hours are realistic to work, anything above would be \"bonus\" but \"I don't have to do it\".      Practice flexible mindset and radical acceptance when things don't go as anticipated.    2) If there is a crisis situation, remember you are not alone and that there are people who can help you twenty-four hours a day, seven days a week.  Call [facility] ([facility]): [phone].      3) Follow-up visit is scheduled for September 7th at 12:00 pm.  If urgent or crisis concerns arise prior to next scheduled appointment, please reach out to crisis resources, call 911, or go to the emergency department.      Cristy Wilkinson PsyD,   Licensed Psychologist  9/29/2022                    Previous skills and strategies to remind self of and to do as needed:    Practice \"STOP\" technique when you recognize yourself feeling angry   Be a  - what do you notice is happening when Kaylee feels upset?     Practice recognizing when feeling angry, and giving self permission to use calming and self-soothing strategies and take a break.   Look for the gray with thinking  slow down  take deep breaths  manage expectations of self and others.    Journal   Practice flexing \"flexiblity and creativity\" -   Continue the great work you are doing with your daily schedule.    Get outside every day.    Play your instrument.    Sing!    Continue with: \"It's just a thought\" - non-judgmental, observe, float down the river on a leaf, clouds in the gege, reframing unhelpful thoughts -   Keep working on being patient when things are tense around me.    Continue with observing/paying attention to warning signs and signals and give self persmission to slow down, especially during the morning routine with Kaylee.    Practice offerring Kaylee choices when appropriate.      Use \"my plan for success\" to help remind you of calming strategies to practice when feeling upset.  " "  Practice decreasing overall vulnerability to emotion mind through getting adequate rest, nutrition, time for yourself, and physical activity.         Consider reading \"Fighting for your marriage\".      Technical Assistance for video visits:    Offer patient the website ([email or web address]) and/or phone number ([phone]) for video visit instructions/assistance if needed.                                             ____________________________________    Treatment Plan     Patient's Name: Idalia Hinojosa                        YOB: 1980     Date of Creation: 1/6/2020  Date Treatment Plan Last Reviewed/Revised: 8/4/22     DSM5 Diagnoses: 300.00 (F41.9) Unspecified Anxiety Disorder, depressive disorder, unspecified  Psychosocial / Contextual Factors: strain in marital relationship, parenting challenges, adjustment challenges, 's cancer diagnosis  PROMIS (reviewed every 90 days):      Anticipated number of session for this episode of care: additional 15-20  Anticipation frequency of session: Weekly until symptoms stabilize, then can decrease the frequency of sessions to likely bi-weekly or once every three weeks  Anticipated Duration of each session: 38-52 minutes  Treatment plan will be reviewed in 90 days or when goals have been changed.         Referral / Collaboration:  We discussed a referral to a couples/marital therapist.  The patient is thinking about this and has talked with her  about the referral, but they are unsure if they would like to follow through at this time.       MeasurableTreatment Goal(s) related to diagnosis / functional impairment(s)  Goal 1: Patient will learn emotion regulation strategies to help when she is feeling upset/angry/frustrated/distressed    I will know I've met my goal when I would yell less, I would feel calmer, and I would not push others.  I would be less physical when I'm angry (e.g. wouldn't slam doors or hit things)        Objective " #A (Patient Action)                          Patient will learn and practice at least 2 new emotion regulation strategies.  Status: Continued - Date(s):    8/4/22- Regularly practicing emotion regulation strategies to help manage increase in emotional distress related to spouse's cancer diagnosis        Intervention(s)  Therapist will provide psychoeducation on emotion regulation module from DBT and will assign patient homework to help reinforce skill development.     Objective #B  Patient will identify factors that increase vulnerability to emotion mind and identify ways to decrease vulnerability to emotion mind.  Status: Continued - Date(s):8/4/22 - routinely incorporating mindfulness and self-awareness strategies to increase attention and focus to factors that increase vulnerability to emotion mind           Intervention(s)  Therapist will provide information on factors that increase vulnerabiliyt to emotion mind.     Objective #C  Patient will identify warning signs and signals that emotions are escalating and will learn ways to skillfully intervene early on.  Status: New - Date: 8/4/22 - in progress -            Intervention(s)  Therapist will help patient identify warning signs and signals, and will guide the patient in identifying skillful ways to intervene when exeriencing warning signs and signals.        Goal 2: Patient will learn new parenting strategies to help with managing parenting challenges.  Parent will work on improving relationship with her daughter and defining what she would like this relationship to look like.      I will know I've met my goal when I'm enjoying time with my daughter, teaching her new things, and not waiting for things to change       Objective #A (Patient Action)                          Status: Continued - Date(s):8/4/22     Patient will increase understanding of developmental norms for her daughter and ways to manage challenging behaviors.     Intervention(s)  Therapist will  "provide psychoeducation on developmental norms and parenting strategies.     Objective #B  Patient will spend time reflecting on her relationship with her daughter and defining what she would like this relaitonship to look like.                  Status: Continued - Date(s):8/4/22 - Making good progress -            Intervention(s)  Therapist will guide the patient in reflecting upon her relationship with her daughter and help her define ways to move towards what she vaues in her relationship with her daughter.     Objective #C  Patient will increase time spent on fun activity and play with her daughter.  Status: Continued - Date(s):8/4/22- has been enjoying time with daughter and devoting regular time to fun activities and play together           Intervention(s)  Therapist will guide the patient in identifying ways she can increase positive time with her daughter.  Therapist will also teach mindfulness strategies to help patient with being in the present moment when appropriate - .        Goal 3: Patiient will improve communication with her .      I will know I've met my goal when I feel less irritated with my  and communicate more openly with my .  I would like to be more assertive and less aggressive.   I would like to not avoid telling him things because I'm worried about his reaction or don't think he will react well.  I would like to be more present to the moment during times when this would be helpful.\"       Objective #A (Patient Action)                          Status: Continued - Date(s):8/4/22 - continues to work on this goal            Patient will learn and practice at least two new interpersonal effectiveness strategies.     Intervention(s)  Therapist will teach strategies from DBT module on interpersonal effectiveness, and will assign homework to help reinforce skill development.        Patient has reviewed and agreed to the above plan.        Cristy Wilkinson PsyD,   Licensed " Psychologist  January 6, 2020, reviewed on 4/23/2020, reviewed 7/8/2020, reviewed 10/08/2020, reviewed 1/27/2021, reviewed 4/27/2021, 7/29/2021, 11/10/2021, 2/9/2022, 5/3/2022, 8/4/22                                                                                                                                                                                                                                                       Answers for HPI/ROS submitted by the patient on 9/7/2022  If you checked off any problems, how difficult have these problems made it for you to do your work, take care of things at home, or get along with other people?: Not difficult at all  PHQ9 TOTAL SCORE: 1    Answers for HPI/ROS submitted by the patient on 10/13/2022  If you checked off any problems, how difficult have these problems made it for you to do your work, take care of things at home, or get along with other people?: Somewhat difficult  PHQ9 TOTAL SCORE: 3

## 2022-10-18 ENCOUNTER — VIRTUAL VISIT (OUTPATIENT)
Dept: PSYCHOLOGY | Facility: CLINIC | Age: 42
End: 2022-10-18
Payer: COMMERCIAL

## 2022-10-18 DIAGNOSIS — F41.9 ANXIETY DISORDER, UNSPECIFIED TYPE: Primary | ICD-10-CM

## 2022-10-18 DIAGNOSIS — F32.A DEPRESSION, UNSPECIFIED DEPRESSION TYPE: ICD-10-CM

## 2022-10-18 PROCEDURE — 90834 PSYTX W PT 45 MINUTES: CPT | Mod: GT | Performed by: PSYCHOLOGIST

## 2022-10-18 NOTE — PROGRESS NOTES
[facility]                                            Progress Note    Patient Name: Idalia Hinojosa  Date: 10/18/2022           Service Type: Individual      Session Start Time: 12:39  pm Session End Time: 1:29 pm  Session Length:  50 minutes    Session #: 73    Attendees: Client     Service Modality: Video visit: -       Provider verified identity through the following two step process.  Patient provided:  Patient is known previously to provider    Telemedicine Visit: The patient's condition can be safely assessed and treated via synchronous audio and visual telemedicine encounter.      Reason for Telemedicine Visit: Services only offered telehealth    Originating Site (Patient Location): Patient's home    Distant Site (Provider Location): Provider Remote Setting- Home Office    Consent:  The patient/guardian has verbally consented to: the potential risks and benefits of telemedicine (telephone visit) versus in person care; bill my insurance or make self-payment for services provided; and responsibility for payment of non-covered services.     Patient would like the video invitation sent by:  My Chart    Mode of Communication:  Video Conference via [employer],     As the provider I attest to compliance with applicable laws and regulations related to telemedicine.         Treatment Plan Last Reviewed: 08/04/22  PHQ-9/DAVID-7: 08/04/22      DATA  Interactive Complexity: No  Crisis: No        Progress Since Last Session (Related to Symptoms / Goals / Homework):   Symptoms:   Symptoms of anxiety remain heightened, increase in symptoms of depression.  Feels impacted by the colder weather and darkness.  Stress related to 's cancer diagnosis and treatment, and increase in daughter's challenging behaviors over the weekend.      Homework: Achieved / completed to satisfaction.  Successful with making a list and beginning to prioritize.  Also took a relaxing bath and found this very  "helpful!          Episode of Care Goals: Satisfactory progress - ACTION (Actively working towards change); Intervened by reinforcing change plan / affirming steps taken.  Diagnostic assessment has been completed, treatment plan has been developed and patient has been making good progress on treatment goals.  The patient stated that her main goals for therapy would be to learn emotion regulation strategies (in particular, to help with when she is feeling upset/angry/frustrated/distressed).  The changes in her daily life due to COVID restrictions were a focus of our work together, and working through changes to routine as her daughter started school and she returned to work.  As she has been making progress, focus of treatment will shift to wellness planning (monitoring mood and symptoms, incorporating into routine what keeps her feeling well, review of triggers, how to manage set backs, etc.).  As her  has been diagnosed with cancer, treatment will also focus on providing her with support in managing the emotional distress and challenges related to this.       Current / Ongoing Stressors and Concerns:   Current: Increase in symptoms of depression, anxiety and stress.   is going through cancer treatment.  Daughter has been diagnosed with ADHD and they are connecting with individual and family support.     Ongoing:Managing daily routine which has contributed to some adjustment and parenting challenges, lack of effective emotion regulation strategies for managing daily frustrations and upsets and marital discord due to differences in parenting styles.       Treatment Objective(s) Addressed in This Session:     Identify strategies to help with caregiver stress  Identify compassionate self-talk and response when memories of post-partum depression and loss surface    Identify how to practice and encourage having a flexible mindset when things don't go as anticipated  Learn strategy of \"radical acceptance\", " "benefits of strategy, and how to apply strategy     Continue to review/practice/reinforce:    Practice checking in on a regular basis, remind self to slow down when rushing, take deep breaths   Identify and enforce healthy emotional boundaries (define your role as spouse, not \"therapist\" or \"fixer\")  Identify factors to consider when having crucial conversations   Identify ways to practice and incorporate self-care into routine.   Learn and practice mindfulness strategies - redirect back to present moment, focus on the facts   Identify and enforce healthy emotional boundaries (it is ok to feel different than my spouse does, it is okay for him to have his feelings and to not feel like I have to fix or change how he is feeling)  Continue to practice awareness of your emotions and physical sensations, to help tune in to what you need  Identify opportunities to access support and help from others  Mindful awareness of thoughts, impact on your emotions, and recognize opportunities to redirect thoughts.  Practice re-directing thoughts to a more helpful perspective.    Practice balanced thinking to counteract polarized/all or nothing thinking.       Intervention:   CBTand solution-focused: She reported that her daughter had an increase in challenging behaviors over the weekend.  She plans to bring this information to their therapy appointment tomorrow to get further support and help with this.  She reflected how the increase in cold and dark is impacting her, and how this is typically difficult for her.  This allowed for discussion and planning around how she might structure and plan for what will help with this.  We spoke more specifically about what she believes she needs right now to help her - she identified that incorporating exercise more regularly and having time to read in the evenings.  She set a goal for getting outside and running at least once in the beautiful fall weather, before her next session.  She also " identified that finding time to read, which she really enjoys, has been hard, as after her daughter goes to bed her spouse typically wants to spend time together.  Encouraged she also articulate to him what she needs in the evenings for her own self-care, to see if they can find some compromises and agreements that allow them both to get what is helpful for them.  She also shared that her sister just had a baby, which has triggered some of her experiences from having a baby and post-partum depression.  We looked at ways she can respond with compassionate self-talk - what did she need to hear at the time?  What does she need to hear now?  (You made it - you worked hard - you are strong, you did your best,.)    ASSESSMENT: Current Emotional / Mental Status (status of significant symptoms):   Risk status (Self / Other harm or suicidal ideation)   Patient denies current fears or concerns for personal safety.   Patient denies current or recent suicidal ideation or behaviors.    She continues to agree to use a safety plan should any safety concerns arise.  She also agrees to reach our to her spouse or a family member for help if needed, and/or access crisis/emergency resources.        Patientdenies current or recent homicidal ideation or behaviors.   Patient denies current or recent self injurious behavior or ideation.   Patient denies other safety concerns.   Patient reports there has been no change in risk factors since their last session.     Patient reports there has been no change in protective factors since their last session.     Recommended that patient call 911 or go to the local ED should there be a change in any of these risk factors.  She has previously been informed on how to contact [facility] crisis/[facility] for urgent/crisis concerns 24/7.  Provided patient with crisis resources and she agrees to use safety plan should any safety concerns arise.      Professionals or agencies I can contact during a  crisis:    Suicide Prevention Lifeline: 2-860-204-TALK (7672)    Crisis Text Line Service (available 24 hours a day, 7 days a week): Text [place] to 589572    [facility] Crisis Services: [facility]: [phone]    Call 911 or go to my nearest emergency department.        Appearance:   Appropriate    Eye Contact:   Good    Psychomotor Behavior: Normal    Attitude:   Cooperative    Orientation:   All   Speech    Rate / Production: Normal     Volume:  Normal    Mood:    Increase in sadness and worry   Affect:    Congruent with mood   Thought Content:  Clear    Thought Form:  Coherent  Logical    Insight:    Good      Medication Review:   No changes      Medication Compliance:   Yes      Changes in Health Issues:  Things are going better with health, working on dietary changes     Chemical Use Review:   Substance Use: Chemical use reviewed, no changes or active concerns identified.      Tobacco Use: No current tobacco use.      Diagnosis:  Anxiety disorder unspecified  Depression, unspecified             PLAN: (Patient Tasks / Therapist Tasks / Other)    1) Idalia identified the following goals: relaxing,  - reading, exercise, - not always running around and doing work but giving self time to relax    Continue great work you have been doing to be mindful of expectations you have for yourself, and creating realistic and healthy expectations.      Continue with: Practice flexible mindset and radical acceptance when things don't go as anticipated.    2) If there is a crisis situation, remember you are not alone and that there are people who can help you twenty-four hours a day, seven days a week.  Call [facility] ([facility]): [phone].      3) Follow-up visit is scheduled for 10/19 at 12:00 pm.  If urgent or crisis concerns arise prior to next scheduled appointment, please reach out to crisis resources, call 911, or go to the emergency department.      Cristy Wilkinson PsyD, LP  Licensed  "Psychologist  10/18/2022                      Previous skills and strategies to remind self of and to do as needed:    Practice \"STOP\" technique when you recognize yourself feeling angry   Be a  - what do you notice is happening when Kaylee feels upset?     Practice recognizing when feeling angry, and giving self permission to use calming and self-soothing strategies and take a break.   Look for the gray with thinking  slow down  take deep breaths  manage expectations of self and others.    Journal   Practice flexing \"flexiblity and creativity\" -   Continue the great work you are doing with your daily schedule.    Get outside every day.    Play your instrument.    Sing!    Continue with: \"It's just a thought\" - non-judgmental, observe, float down the river on a leaf, clouds in the gege, reframing unhelpful thoughts -   Keep working on being patient when things are tense around me.    Continue with observing/paying attention to warning signs and signals and give self persmission to slow down, especially during the morning routine with Kayele.    Practice offerring Kaylee choices when appropriate.      Use \"my plan for success\" to help remind you of calming strategies to practice when feeling upset.    Practice decreasing overall vulnerability to emotion mind through getting adequate rest, nutrition, time for yourself, and physical activity.         Consider reading \"Fighting for your marriage\".      Technical Assistance for video visits:    Offer patient the website ([email or web address]) and/or phone number ([phone]) for video visit instructions/assistance if needed.                                             ____________________________________    Treatment Plan     Patient's Name: Idalia Hinojosa                        YOB: 1980     Date of Creation: 1/6/2020  Date Treatment Plan Last Reviewed/Revised: 8/4/22     DSM5 Diagnoses: 300.00 (F41.9) Unspecified Anxiety Disorder, depressive " disorder, unspecified  Psychosocial / Contextual Factors: strain in marital relationship, parenting challenges, adjustment challenges, 's cancer diagnosis  PROMIS (reviewed every 90 days):      Anticipated number of session for this episode of care: additional 15-20  Anticipation frequency of session: Weekly until symptoms stabilize, then can decrease the frequency of sessions to likely bi-weekly or once every three weeks  Anticipated Duration of each session: 38-52 minutes  Treatment plan will be reviewed in 90 days or when goals have been changed.         Referral / Collaboration:  We discussed a referral to a couples/marital therapist.  The patient is thinking about this and has talked with her  about the referral, but they are unsure if they would like to follow through at this time.       MeasurableTreatment Goal(s) related to diagnosis / functional impairment(s)  Goal 1: Patient will learn emotion regulation strategies to help when she is feeling upset/angry/frustrated/distressed    I will know I've met my goal when I would yell less, I would feel calmer, and I would not push others.  I would be less physical when I'm angry (e.g. wouldn't slam doors or hit things)        Objective #A (Patient Action)                          Patient will learn and practice at least 2 new emotion regulation strategies.  Status: Continued - Date(s):    8/4/22- Regularly practicing emotion regulation strategies to help manage increase in emotional distress related to spouse's cancer diagnosis        Intervention(s)  Therapist will provide psychoeducation on emotion regulation module from DBT and will assign patient homework to help reinforce skill development.     Objective #B  Patient will identify factors that increase vulnerability to emotion mind and identify ways to decrease vulnerability to emotion mind.  Status: Continued - Date(s):8/4/22 - routinely incorporating mindfulness and self-awareness strategies to  increase attention and focus to factors that increase vulnerability to emotion mind           Intervention(s)  Therapist will provide information on factors that increase vulnerabiliyt to emotion mind.     Objective #C  Patient will identify warning signs and signals that emotions are escalating and will learn ways to skillfully intervene early on.  Status: New - Date: 8/4/22 - in progress -            Intervention(s)  Therapist will help patient identify warning signs and signals, and will guide the patient in identifying skillful ways to intervene when exeriencing warning signs and signals.        Goal 2: Patient will learn new parenting strategies to help with managing parenting challenges.  Parent will work on improving relationship with her daughter and defining what she would like this relationship to look like.      I will know I've met my goal when I'm enjoying time with my daughter, teaching her new things, and not waiting for things to change       Objective #A (Patient Action)                          Status: Continued - Date(s):8/4/22     Patient will increase understanding of developmental norms for her daughter and ways to manage challenging behaviors.     Intervention(s)  Therapist will provide psychoeducation on developmental norms and parenting strategies.     Objective #B  Patient will spend time reflecting on her relationship with her daughter and defining what she would like this relaitonship to look like.                  Status: Continued - Date(s):8/4/22 - Making good progress -            Intervention(s)  Therapist will guide the patient in reflecting upon her relationship with her daughter and help her define ways to move towards what she vaues in her relationship with her daughter.     Objective #C  Patient will increase time spent on fun activity and play with her daughter.  Status: Continued - Date(s):8/4/22- has been enjoying time with daughter and devoting regular time to fun activities  "and play together           Intervention(s)  Therapist will guide the patient in identifying ways she can increase positive time with her daughter.  Therapist will also teach mindfulness strategies to help patient with being in the present moment when appropriate - .        Goal 3: Patiient will improve communication with her .      I will know I've met my goal when I feel less irritated with my  and communicate more openly with my .  I would like to be more assertive and less aggressive.   I would like to not avoid telling him things because I'm worried about his reaction or don't think he will react well.  I would like to be more present to the moment during times when this would be helpful.\"       Objective #A (Patient Action)                          Status: Continued - Date(s):8/4/22 - continues to work on this goal            Patient will learn and practice at least two new interpersonal effectiveness strategies.     Intervention(s)  Therapist will teach strategies from DBT module on interpersonal effectiveness, and will assign homework to help reinforce skill development.        Patient has reviewed and agreed to the above plan.        Cristy Wilkinson PsyD,   Licensed Psychologist  January 6, 2020, reviewed on 4/23/2020, reviewed 7/8/2020, reviewed 10/08/2020, reviewed 1/27/2021, reviewed 4/27/2021, 7/29/2021, 11/10/2021, 2/9/2022, 5/3/2022, 8/4/22                                                                                                                                                                                                                                                                [facility]                                            Progress Note    Patient Name: Idalia Hinojosa  Date: 8/31/2022           Service Type: Individual      Session Start Time: 12:06 pm Session End Time: 12:48 pm pm   Session Length:  42 minutes    Session " "#: 67    Attendees: Client     Service Modality: Video visit: -       Provider verified identity through the following two step process.  Patient provided:  Patient is known previously to provider    Telemedicine Visit: The patient's condition can be safely assessed and treated via synchronous audio and visual telemedicine encounter.      Reason for Telemedicine Visit: Services only offered telehealth    Originating Site (Patient Location): Patient's home    Distant Site (Provider Location): Provider Remote Setting- Home Office    Consent:  The patient/guardian has verbally consented to: the potential risks and benefits of telemedicine (telephone visit) versus in person care; bill my insurance or make self-payment for services provided; and responsibility for payment of non-covered services.     Patient would like the video invitation sent by:  My Chart    Mode of Communication:  Video Conference via [employer],     As the provider I attest to compliance with applicable laws and regulations related to telemedicine.         Treatment Plan Last Reviewed: 08/04/22  PHQ-9/DAVID-7: 08/04/22      DATA  Interactive Complexity: No  Crisis: No        Progress Since Last Session (Related to Symptoms / Goals / Homework):   Symptoms:  She reported that symptoms continue to remain improved - less anxious and depressed and feels better able to cope with challenges and stressors she is experiencing.   She reports the weekend was \"rough\" in terms of her  not feeling well physically from his treatment, and her daughter had some challenging behaviors, however, she was able to actively engage constructive coping strategies.        Homework: Achieved / completed to satisfaction.  Has been enjoying biking to work and cool summer mornings, went out on a date with her  and enjoyed this, and used healthy coping strategies to manage difficult situations.          Episode of Care Goals: Satisfactory progress - ACTION (Actively " "working towards change); Intervened by reinforcing change plan / affirming steps taken.  Diagnostic assessment has been completed, treatment plan has been developed and patient has been making good progress on treatment goals.  The patient stated that her main goals for therapy would be to learn emotion regulation strategies (in particular, to help with when she is feeling upset/angry/frustrated/distressed).  The changes in her daily life due to COVID restrictions were a focus of our work together, and working through changes to routine as her daughter started  and she returned to work.  As she has been making progress, focus of treatment will shift to wellness planning (monitoring mood and symptoms, incorporating into routine what keeps her feeling well, review of triggers, how to manage set backs, etc.).  As her  has been diagnosed with cancer, treatment will also focus on providing her with support in managing the emotional distress and challenges related to this.       Current / Ongoing Stressors and Concerns:   Current:  She reports the weekend was difficult; her  felt physically ill for about five days, and her daughter returned from time at her parents cabin and struggled with the transition.  She was able to identify the difficulties and solutions to address - which included changing up the environment, going outside, getting physical activity, and getting help from her parents.       Ongoing:Managing daily routine which has contributed to some adjustment and parenting challenges, lack of effective emotion regulation strategies for managing daily frustrations and upsets and marital discord due to differences in parenting styles.       Treatment Objective(s) Addressed in This Session:     Identify how to practice and encourage having a flexible mindset when things don't go as anticipated  Learn strategy of \"radical acceptance\", benefits of strategy, and how to apply strategy   " "    Continue to review/practice/reinforce:  Identify ways to practice and incorporate self-care into routine.   Learn and practice mindfulness strategies - redirect back to present moment, focus on the facts   Identify and enforce healthy emotional boundaries (it is ok to feel different than my spouse does, it is okay for him to have his feelings and to not feel like I have to fix or change how he is feeling)  Continue to practice awareness of your emotions and physical sensations, to help tune in to what you need  Identify opportunities to access support and help from others  Mindful awareness of thoughts, impact on your emotions, and recognize opportunities to redirect thoughts.  Practice re-directing thoughts to a more helpful perspective.    Practice balanced thinking to counteract polarized/all or nothing thinking.         Intervention:   CBT and DBT: Idalia identified wanting to work on strengthening her coping strategies for managing when things do not go as expected, and in particular responding with a flexible mindset.  We talked about being able to acknowledge and validate the emotions that can come along with things not going as anticipated, while also being able to practice \"radical acceptance\" of what is and the ability to choose a flexible mindset (and acknowledging that thoughts may not automatically go there - so the ability to pay attention to and recognize thinking, identify when cognitive distortions are present, and embrace the opportunity to choose a more flexible way of viewing the situation).      She also processed an upcoming change to working 5 days a week instead of 4, she reported she feels positive about this for multiple reasons - she enjoys work, it is a busy time of the year at work so she feels productive and busy when at work, and this will help bring in extra income (which will help when her  goes on medical leave for his treatments.  She identified the need to assess how " "she does with the increase in her work schedule and evaluate what she may need in terms of her self-care.        ASSESSMENT: Current Emotional / Mental Status (status of significant symptoms):   Risk status (Self / Other harm or suicidal ideation)   Patient denies current fears or concerns for personal safety.   Patient denies current or recent suicidal ideation or behaviors.    She continues to agree to use a safety plan should any safety concerns arise.  She also agrees to reach our to her spouse or a family member for help if needed, and/or access crisis/emergency resources.        Patientdenies current or recent homicidal ideation or behaviors.   Patient denies current or recent self injurious behavior or ideation.   Patient denies other safety concerns.   Patient Patient reports there has been no change in risk factors since their last session.     PatientPatient reports there has been no change in protective factors since their last session.     Recommended that patient call 911 or go to the local ED should there be a change in any of these risk factors.  She has previously been informed on how to contact [facility] crisis/COPE for urgent/crisis concerns 24/7.  Provided patient with crisis resources and she agrees to use safety plan should any safety concerns arise.      Professionals or agencies I can contact during a crisis:    Suicide Prevention Lifeline: 7-566-946-TALK (4197)    Crisis Text Line Service (available 24 hours a day, 7 days a week): Text [place] to 987840    Local Crisis Services: [facility]: [phone]    Call 911 or go to my nearest emergency department.        Appearance:   Appropriate    Eye Contact:   Good    Psychomotor Behavior: Normal    Attitude:   Cooperative    Orientation:   All   Speech    Rate / Production: Normal     Volume:  Normal    Mood:    Reports that she continues to feel \"better\" and that she is better able to manage stressors and difficulties that " "arise.     Affect:    Congruent with mood   Thought Content:  Clear    Thought Form:  Coherent  Logical    Insight:    Good      Medication Review:   No changes      Medication Compliance:   Yes      Changes in Health Issues:  None reported         Chemical Use Review:   Substance Use: Chemical use reviewed, no changes or active concerns identified.      Tobacco Use: No current tobacco use.      Diagnosis:  Anxiety disorder unspecified  Depression, unspecified             PLAN: (Patient Tasks / Therapist Tasks / Other)    1) Idalia identified the following goals: Thinking about how many hours are realistic to work, anything above would be \"bonus\" but \"I don't have to do it\".      Practice flexible mindset and radical acceptance when things don't go as anticipated.    2) If there is a crisis situation, remember you are not alone and that there are people who can help you twenty-four hours a day, seven days a week.  Call [facility] ([facility]): [phone].      3) Follow-up visit is scheduled for September 7th at 12:00 pm.  If urgent or crisis concerns arise prior to next scheduled appointment, please reach out to crisis resources, call 911, or go to the emergency department.      Cristy Wilkinson PsyD,   Licensed Psychologist  9/29/2022                    Previous skills and strategies to remind self of and to do as needed:    Practice \"STOP\" technique when you recognize yourself feeling angry   Be a  - what do you notice is happening when Kaylee feels upset?     Practice recognizing when feeling angry, and giving self permission to use calming and self-soothing strategies and take a break.   Look for the gray with thinking  slow down  take deep breaths  manage expectations of self and others.    Journal   Practice flexing \"flexiblity and creativity\" -   Continue the great work you are doing with your daily schedule.    Get outside every day.    Play your instrument.    Sing!    Continue " "with: \"It's just a thought\" - non-judgmental, observe, float down the river on a leaf, clouds in the gege, reframing unhelpful thoughts -   Keep working on being patient when things are tense around me.    Continue with observing/paying attention to warning signs and signals and give self persmission to slow down, especially during the morning routine with Kaylee.    Practice offerring Kaylee choices when appropriate.      Use \"my plan for success\" to help remind you of calming strategies to practice when feeling upset.    Practice decreasing overall vulnerability to emotion mind through getting adequate rest, nutrition, time for yourself, and physical activity.         Consider reading \"Fighting for your marriage\".      Technical Assistance for video visits:    Offer patient the website ([email or web address]) and/or phone number ([phone]) for video visit instructions/assistance if needed.                                             ____________________________________    Treatment Plan     Patient's Name: Idalia Hinojosa                        YOB: 1980     Date of Creation: 1/6/2020  Date Treatment Plan Last Reviewed/Revised: 8/4/22     DSM5 Diagnoses: 300.00 (F41.9) Unspecified Anxiety Disorder, depressive disorder, unspecified  Psychosocial / Contextual Factors: strain in marital relationship, parenting challenges, adjustment challenges, 's cancer diagnosis  PROMIS (reviewed every 90 days):      Anticipated number of session for this episode of care: additional 15-20  Anticipation frequency of session: Weekly until symptoms stabilize, then can decrease the frequency of sessions to likely bi-weekly or once every three weeks  Anticipated Duration of each session: 38-52 minutes  Treatment plan will be reviewed in 90 days or when goals have been changed.         Referral / Collaboration:  We discussed a referral to a couples/marital therapist.  The patient is thinking about this and has " talked with her  about the referral, but they are unsure if they would like to follow through at this time.       MeasurableTreatment Goal(s) related to diagnosis / functional impairment(s)  Goal 1: Patient will learn emotion regulation strategies to help when she is feeling upset/angry/frustrated/distressed    I will know I've met my goal when I would yell less, I would feel calmer, and I would not push others.  I would be less physical when I'm angry (e.g. wouldn't slam doors or hit things)        Objective #A (Patient Action)                          Patient will learn and practice at least 2 new emotion regulation strategies.  Status: Continued - Date(s):    8/4/22- Regularly practicing emotion regulation strategies to help manage increase in emotional distress related to spouse's cancer diagnosis        Intervention(s)  Therapist will provide psychoeducation on emotion regulation module from DBT and will assign patient homework to help reinforce skill development.     Objective #B  Patient will identify factors that increase vulnerability to emotion mind and identify ways to decrease vulnerability to emotion mind.  Status: Continued - Date(s):8/4/22 - routinely incorporating mindfulness and self-awareness strategies to increase attention and focus to factors that increase vulnerability to emotion mind           Intervention(s)  Therapist will provide information on factors that increase vulnerabiliyt to emotion mind.     Objective #C  Patient will identify warning signs and signals that emotions are escalating and will learn ways to skillfully intervene early on.  Status: New - Date: 8/4/22 - in progress -            Intervention(s)  Therapist will help patient identify warning signs and signals, and will guide the patient in identifying skillful ways to intervene when exeriencing warning signs and signals.        Goal 2: Patient will learn new parenting strategies to help with managing parenting  challenges.  Parent will work on improving relationship with her daughter and defining what she would like this relationship to look like.      I will know I've met my goal when I'm enjoying time with my daughter, teaching her new things, and not waiting for things to change       Objective #A (Patient Action)                          Status: Continued - Date(s):8/4/22     Patient will increase understanding of developmental norms for her daughter and ways to manage challenging behaviors.     Intervention(s)  Therapist will provide psychoeducation on developmental norms and parenting strategies.     Objective #B  Patient will spend time reflecting on her relationship with her daughter and defining what she would like this relaitonship to look like.                  Status: Continued - Date(s):8/4/22 - Making good progress -            Intervention(s)  Therapist will guide the patient in reflecting upon her relationship with her daughter and help her define ways to move towards what she vaues in her relationship with her daughter.     Objective #C  Patient will increase time spent on fun activity and play with her daughter.  Status: Continued - Date(s):8/4/22- has been enjoying time with daughter and devoting regular time to fun activities and play together           Intervention(s)  Therapist will guide the patient in identifying ways she can increase positive time with her daughter.  Therapist will also teach mindfulness strategies to help patient with being in the present moment when appropriate - .        Goal 3: Patiient will improve communication with her .      I will know I've met my goal when I feel less irritated with my  and communicate more openly with my .  I would like to be more assertive and less aggressive.   I would like to not avoid telling him things because I'm worried about his reaction or don't think he will react well.  I would like to be more present to the moment during  "times when this would be helpful.\"       Objective #A (Patient Action)                          Status: Continued - Date(s):8/4/22 - continues to work on this goal            Patient will learn and practice at least two new interpersonal effectiveness strategies.     Intervention(s)  Therapist will teach strategies from DBT module on interpersonal effectiveness, and will assign homework to help reinforce skill development.        Patient has reviewed and agreed to the above plan.        Cristy Wilkinson PsyD,   Licensed Psychologist  January 6, 2020, reviewed on 4/23/2020, reviewed 7/8/2020, reviewed 10/08/2020, reviewed 1/27/2021, reviewed 4/27/2021, 7/29/2021, 11/10/2021, 2/9/2022, 5/3/2022, 8/4/22                                                                                                                                                                                                                                                       Answers for HPI/ROS submitted by the patient on 9/7/2022  If you checked off any problems, how difficult have these problems made it for you to do your work, take care of things at home, or get along with other people?: Not difficult at all  PHQ9 TOTAL SCORE: 1    Answers for HPI/ROS submitted by the patient on 10/13/2022  If you checked off any problems, how difficult have these problems made it for you to do your work, take care of things at home, or get along with other people?: Somewhat difficult  PHQ9 TOTAL SCORE: 3      "

## 2022-10-24 ENCOUNTER — LAB (OUTPATIENT)
Dept: LAB | Facility: CLINIC | Age: 42
End: 2022-10-24
Payer: COMMERCIAL

## 2022-10-24 DIAGNOSIS — Z13.0 SCREENING, ANEMIA, DEFICIENCY, IRON: ICD-10-CM

## 2022-10-24 DIAGNOSIS — Z13.1 SCREENING FOR DIABETES MELLITUS: ICD-10-CM

## 2022-10-24 DIAGNOSIS — Z11.59 NEED FOR HEPATITIS C SCREENING TEST: ICD-10-CM

## 2022-10-24 DIAGNOSIS — Z13.220 SCREENING FOR LIPID DISORDERS: ICD-10-CM

## 2022-10-24 DIAGNOSIS — Z13.29 SCREENING FOR THYROID DISORDER: ICD-10-CM

## 2022-10-24 LAB
ALBUMIN SERPL-MCNC: 3.1 G/DL (ref 3.4–5)
ALP SERPL-CCNC: 58 U/L (ref 40–150)
ALT SERPL W P-5'-P-CCNC: 21 U/L (ref 0–50)
ANION GAP SERPL CALCULATED.3IONS-SCNC: 7 MMOL/L (ref 3–14)
AST SERPL W P-5'-P-CCNC: 24 U/L (ref 0–45)
BILIRUB SERPL-MCNC: 0.4 MG/DL (ref 0.2–1.3)
BUN SERPL-MCNC: 6 MG/DL (ref 7–30)
CALCIUM SERPL-MCNC: 8.2 MG/DL (ref 8.5–10.1)
CHLORIDE BLD-SCNC: 109 MMOL/L (ref 94–109)
CHOLEST SERPL-MCNC: 187 MG/DL
CO2 SERPL-SCNC: 23 MMOL/L (ref 20–32)
CREAT SERPL-MCNC: 0.69 MG/DL (ref 0.52–1.04)
ERYTHROCYTE [DISTWIDTH] IN BLOOD BY AUTOMATED COUNT: 11.6 % (ref 10–15)
FASTING STATUS PATIENT QL REPORTED: YES
GFR SERPL CREATININE-BSD FRML MDRD: >90 ML/MIN/1.73M2
GLUCOSE BLD-MCNC: 88 MG/DL (ref 70–99)
HBA1C MFR BLD: 5.2 % (ref 0–5.6)
HCT VFR BLD AUTO: 37.8 % (ref 35–47)
HDLC SERPL-MCNC: 56 MG/DL
HGB BLD-MCNC: 13 G/DL (ref 11.7–15.7)
LDLC SERPL CALC-MCNC: 115 MG/DL
MCH RBC QN AUTO: 31.9 PG (ref 26.5–33)
MCHC RBC AUTO-ENTMCNC: 34.4 G/DL (ref 31.5–36.5)
MCV RBC AUTO: 93 FL (ref 78–100)
NONHDLC SERPL-MCNC: 131 MG/DL
PLATELET # BLD AUTO: 266 10E3/UL (ref 150–450)
POTASSIUM BLD-SCNC: 4.2 MMOL/L (ref 3.4–5.3)
PROT SERPL-MCNC: 6.3 G/DL (ref 6.8–8.8)
RBC # BLD AUTO: 4.08 10E6/UL (ref 3.8–5.2)
SODIUM SERPL-SCNC: 139 MMOL/L (ref 133–144)
TRIGL SERPL-MCNC: 81 MG/DL
TSH SERPL DL<=0.005 MIU/L-ACNC: 2.43 MU/L (ref 0.4–4)
WBC # BLD AUTO: 4.9 10E3/UL (ref 4–11)

## 2022-10-24 PROCEDURE — 80061 LIPID PANEL: CPT

## 2022-10-24 PROCEDURE — 80053 COMPREHEN METABOLIC PANEL: CPT

## 2022-10-24 PROCEDURE — 36415 COLL VENOUS BLD VENIPUNCTURE: CPT

## 2022-10-24 PROCEDURE — 86803 HEPATITIS C AB TEST: CPT

## 2022-10-24 PROCEDURE — 85027 COMPLETE CBC AUTOMATED: CPT

## 2022-10-24 PROCEDURE — 83036 HEMOGLOBIN GLYCOSYLATED A1C: CPT

## 2022-10-24 PROCEDURE — 84443 ASSAY THYROID STIM HORMONE: CPT

## 2022-10-25 LAB — HCV AB SERPL QL IA: NONREACTIVE

## 2022-10-27 ENCOUNTER — VIRTUAL VISIT (OUTPATIENT)
Dept: PSYCHOLOGY | Facility: CLINIC | Age: 42
End: 2022-10-27
Payer: COMMERCIAL

## 2022-10-27 DIAGNOSIS — F32.A DEPRESSION, UNSPECIFIED DEPRESSION TYPE: ICD-10-CM

## 2022-10-27 DIAGNOSIS — F41.9 ANXIETY DISORDER, UNSPECIFIED TYPE: Primary | ICD-10-CM

## 2022-10-27 PROCEDURE — 90834 PSYTX W PT 45 MINUTES: CPT | Mod: GT | Performed by: PSYCHOLOGIST

## 2022-10-27 NOTE — PROGRESS NOTES
[facility]                                            Progress Note    Patient Name: Idalia Hinojosa  Date: 10/27/2022         Service Type: Individual      Session Start Time: 12:09  pm Session End Time: 12:58 pm  Session Length:  49 minutes    Session #: 75    Attendees: Client     Service Modality: Video visit: -       Provider verified identity through the following two step process.  Patient provided:  Patient is known previously to provider    Telemedicine Visit: The patient's condition can be safely assessed and treated via synchronous audio and visual telemedicine encounter.      Reason for Telemedicine Visit: Services only offered telehealth    Originating Site (Patient Location): Patient's home    Distant Site (Provider Location): Provider Remote Setting- Home Office    Consent:  The patient/guardian has verbally consented to: the potential risks and benefits of telemedicine (telephone visit) versus in person care; bill my insurance or make self-payment for services provided; and responsibility for payment of non-covered services.     Patient would like the video invitation sent by:  My Chart    Mode of Communication:  Video Conference via [place],     As the provider I attest to compliance with applicable laws and regulations related to telemedicine.         Treatment Plan Last Reviewed: 08/04/22  PHQ-9/DAVID-7: 08/04/22      DATA  Interactive Complexity: No  Crisis: No        Progress Since Last Session (Related to Symptoms / Goals / Homework):   Symptoms:   Symptoms of anxiety remain heightened.       Homework: Achieved / completed to satisfaction.  Successful with exercising and running more - finds this helpful as it clears her mind.          Episode of Care Goals: Satisfactory progress - ACTION (Actively working towards change); Intervened by reinforcing change plan / affirming steps taken.  Diagnostic assessment has been completed, treatment plan has been  "developed and patient has been making good progress on treatment goals.  The patient stated that her main goals for therapy would be to learn emotion regulation strategies (in particular, to help with when she is feeling upset/angry/frustrated/distressed).  The changes in her daily life due to COVID restrictions were a focus of our work together, and working through changes to routine as her daughter started school and she returned to work.  As she has been making progress, focus of treatment will shift to wellness planning (monitoring mood and symptoms, incorporating into routine what keeps her feeling well, review of triggers, how to manage set backs, etc.).  As her  has been diagnosed with cancer, treatment will also focus on providing her with support in managing the emotional distress and challenges related to this.       Current / Ongoing Stressors and Concerns:   Current: Increase in symptoms of depression, anxiety and stress.  Her daughter's birthday is coming up and she is looking forward to that!  Also looking forward to [facility].   is going through cancer treatment and will have appointments beginning next week for his next phase of treatment.  Daughter has been diagnosed with ADHD and they are connecting with individual and family support.       Ongoing:Managing daily routine which has contributed to some adjustment and parenting challenges, lack of effective emotion regulation strategies for managing daily frustrations and upsets and marital discord due to differences in parenting styles.       Treatment Objective(s) Addressed in This Session:     Identify strategies to help with caregiver stress  Plan fun activities and develop plan to incorporate into daily routine    Identify how to practice and encourage having a flexible mindset when things don't go as anticipated  Learn strategy of \"radical acceptance\", benefits of strategy, and how to apply strategy     Continue to " "review/practice/reinforce:    Practice checking in on a regular basis, remind self to slow down when rushing, take deep breaths   Identify and enforce healthy emotional boundaries (define your role as spouse, not \"therapist\" or \"fixer\")  Identify factors to consider when having crucial conversations   Identify ways to practice and incorporate self-care into routine.   Learn and practice mindfulness strategies - redirect back to present moment, focus on the facts   Identify and enforce healthy emotional boundaries (it is ok to feel different than my spouse does, it is okay for him to have his feelings and to not feel like I have to fix or change how he is feeling)  Continue to practice awareness of your emotions and physical sensations, to help tune in to what you need  Identify opportunities to access support and help from others  Mindful awareness of thoughts, impact on your emotions, and recognize opportunities to redirect thoughts.  Practice re-directing thoughts to a more helpful perspective.    Practice balanced thinking to counteract polarized/all or nothing thinking.       Intervention:   CBTand solution-focused: She noted feeling anxious about Kellee's upcoming treatment, both about how this will go, and her role as caregiver.  We focused on identifying what she needs and how she can focus on her self-care.  She recognizes how helpful exercise and running is for her; she's learning how to incorporate this into her new routine and routine that has changed.  She also recognizes that embracing a flexible mindset is very helpful and important part of the process - we talked about ways she can continue to bring a balanced and flexible mindset.  Planing for fun things during the day was also discussed - she has purchased some new lego sets and looks forward to these - as well as reading.     She shared a difficult evening she had the other night when Kellee was sharing difficulties he was having.  She noted the " "impact that his negative thinking had on her, and led her initially to thoughts of \"what's the point\" and had images of hitting/pounding her legs (as she had done in the past when feeling overwhelmed).  She denied developing any intention or plan to harm herself.  She recognized the thoughts and feelings as a warning sign, and was able to let him know she was having a hard time and needed to take a break and get some distraction, and then sleep.  She reported that this was helpful, the thoughts and images quickly passed, and she hasn't had any difficulties since.  She further problem-solved today how to continue to set healthy emotional boundaries - encouraging him to seek help from professionals in regards to how he is doing with his emotional challenges, and being clear around what she can and can't do.  She said they had some follow-up conversation that she thought went well.  We reviewed what to do if she were to develop any suicidal thoughts, including continuing great work she did to manage this distressing situation by recognizing she was feeling overwhelmed, taking a break, and using distractions.  Reviewed when and how to access crisis/emergency resources.     ASSESSMENT: Current Emotional / Mental Status (status of significant symptoms):   Risk status (Self / Other harm or suicidal ideation)   Patient denies current fears or concerns for personal safety.   Patient denies current or recent suicidal ideation or behaviors.  She did endorse brief passive thoughts of \"what's the point\" (of life) when feeling overwhelmed in the past week, no suicidal intention, plan, or actions, and she reported the thoughts quickly passed when she used distractions and took a break.    She continues to agree to use a safety plan should any safety concerns arise.  She also agrees to reach our to her spouse or a family member for help if needed, and/or access crisis/emergency resources.        Patientdenies current or recent " homicidal ideation or behaviors.   Patient denies current or recent self injurious behavior or ideation.   Patient denies other safety concerns.   Patient reports there has been no change in risk factors since their last session.     Patient reports there has been no change in protective factors since their last session.     Recommended that patient call 911 or go to the local ED should there be a change in any of these risk factors.  She has previously been informed on how to contact [facility] crisis/COPE for urgent/crisis concerns 24/7.  Provided patient with crisis resources and she agrees to use safety plan should any safety concerns arise.      Professionals or agencies I can contact during a crisis:    Suicide Prevention Lifeline: 7-759-914-TALK (9580)    Crisis Text Line Service (available 24 hours a day, 7 days a week): Text [place] to 585067    Local Crisis Services: [facility]: [phone]    Call 911 or go to my nearest emergency department.        Appearance:   Appropriate    Eye Contact:   Good    Psychomotor Behavior: Normal    Attitude:   Cooperative    Orientation:   All   Speech    Rate / Production: Normal     Volume:  Normal    Mood:    Anxiety    Affect:    Congruent with mood   Thought Content:  Clear    Thought Form:  Coherent  Logical    Insight:    Good      Medication Review:   No changes      Medication Compliance:   Yes      Changes in Health Issues:   No new concerns     Chemical Use Review:   Substance Use: Chemical use reviewed, no changes or active concerns identified.      Tobacco Use: No current tobacco use.      Diagnosis:  Anxiety disorder unspecified  Depression, unspecified             PLAN: (Patient Tasks / Therapist Tasks / Other)    1) Idalia identified the following goals:  Have some things handy for when times get stressful, remind myself I don't have to engage with negative thoughts, plan activities and time for fun, practice flexible mindset.    2)  "If there is a crisis situation, remember you are not alone and that there are people who can help you twenty-four hours a day, seven days a week.  Call [facility] ([facility]): [phone].      3) Follow-up visit is scheduled for 11/10/22 at 12:00.  If urgent or crisis concerns arise prior to next scheduled appointment, please reach out to crisis resources, call 911, or go to the emergency department.      Cristy Wilkinson PsyD,   Licensed Psychologist  10/27/2022                        Previous skills and strategies to remind self of and to do as needed:    Practice \"STOP\" technique when you recognize yourself feeling angry   Be a  - what do you notice is happening when Kaylee feels upset?     Practice recognizing when feeling angry, and giving self permission to use calming and self-soothing strategies and take a break.   Look for the gray with thinking  slow down  take deep breaths  manage expectations of self and others.    Journal   Practice flexing \"flexiblity and creativity\" -   Continue the great work you are doing with your daily schedule.    Get outside every day.    Play your instrument.    Sing!    Continue with: \"It's just a thought\" - non-judgmental, observe, float down the river on a leaf, clouds in the gege, reframing unhelpful thoughts -   Keep working on being patient when things are tense around me.    Continue with observing/paying attention to warning signs and signals and give self persmission to slow down, especially during the morning routine with Kaylee.    Practice offerring Kaylee choices when appropriate.      Use \"my plan for success\" to help remind you of calming strategies to practice when feeling upset.    Practice decreasing overall vulnerability to emotion mind through getting adequate rest, nutrition, time for yourself, and physical activity.         Consider reading \"Fighting for your marriage\".      Technical Assistance for video visits:    Offer patient the " website ([email or web address]) and/or phone number (529-346-5167) for video visit instructions/assistance if needed.                                             ____________________________________    Treatment Plan     Patient's Name: Idalia Hinojosa                        YOB: 1980     Date of Creation: 1/6/2020  Date Treatment Plan Last Reviewed/Revised: 8/4/22     DSM5 Diagnoses: 300.00 (F41.9) Unspecified Anxiety Disorder, depressive disorder, unspecified  Psychosocial / Contextual Factors: strain in marital relationship, parenting challenges, adjustment challenges, 's cancer diagnosis  PROMIS (reviewed every 90 days):      Anticipated number of session for this episode of care: additional 15-20  Anticipation frequency of session: Weekly until symptoms stabilize, then can decrease the frequency of sessions to likely bi-weekly or once every three weeks  Anticipated Duration of each session: 38-52 minutes  Treatment plan will be reviewed in 90 days or when goals have been changed.         Referral / Collaboration:  We discussed a referral to a couples/marital therapist.  The patient is thinking about this and has talked with her  about the referral, but they are unsure if they would like to follow through at this time.       MeasurableTreatment Goal(s) related to diagnosis / functional impairment(s)  Goal 1: Patient will learn emotion regulation strategies to help when she is feeling upset/angry/frustrated/distressed    I will know I've met my goal when I would yell less, I would feel calmer, and I would not push others.  I would be less physical when I'm angry (e.g. wouldn't slam doors or hit things)        Objective #A (Patient Action)                          Patient will learn and practice at least 2 new emotion regulation strategies.  Status: Continued - Date(s):    8/4/22- Regularly practicing emotion regulation strategies to help manage increase in emotional distress  related to spouse's cancer diagnosis        Intervention(s)  Therapist will provide psychoeducation on emotion regulation module from DBT and will assign patient homework to help reinforce skill development.     Objective #B  Patient will identify factors that increase vulnerability to emotion mind and identify ways to decrease vulnerability to emotion mind.  Status: Continued - Date(s):8/4/22 - routinely incorporating mindfulness and self-awareness strategies to increase attention and focus to factors that increase vulnerability to emotion mind           Intervention(s)  Therapist will provide information on factors that increase vulnerabiliyt to emotion mind.     Objective #C  Patient will identify warning signs and signals that emotions are escalating and will learn ways to skillfully intervene early on.  Status: New - Date: 8/4/22 - in progress -            Intervention(s)  Therapist will help patient identify warning signs and signals, and will guide the patient in identifying skillful ways to intervene when exeriencing warning signs and signals.        Goal 2: Patient will learn new parenting strategies to help with managing parenting challenges.  Parent will work on improving relationship with her daughter and defining what she would like this relationship to look like.      I will know I've met my goal when I'm enjoying time with my daughter, teaching her new things, and not waiting for things to change       Objective #A (Patient Action)                          Status: Continued - Date(s):8/4/22     Patient will increase understanding of developmental norms for her daughter and ways to manage challenging behaviors.     Intervention(s)  Therapist will provide psychoeducation on developmental norms and parenting strategies.     Objective #B  Patient will spend time reflecting on her relationship with her daughter and defining what she would like this relaitonship to look like.                  Status:  "Continued - Date(s):8/4/22 - Making good progress -            Intervention(s)  Therapist will guide the patient in reflecting upon her relationship with her daughter and help her define ways to move towards what she vaues in her relationship with her daughter.     Objective #C  Patient will increase time spent on fun activity and play with her daughter.  Status: Continued - Date(s):8/4/22- has been enjoying time with daughter and devoting regular time to fun activities and play together           Intervention(s)  Therapist will guide the patient in identifying ways she can increase positive time with her daughter.  Therapist will also teach mindfulness strategies to help patient with being in the present moment when appropriate - .        Goal 3: Patiient will improve communication with her .      I will know I've met my goal when I feel less irritated with my  and communicate more openly with my .  I would like to be more assertive and less aggressive.   I would like to not avoid telling him things because I'm worried about his reaction or don't think he will react well.  I would like to be more present to the moment during times when this would be helpful.\"       Objective #A (Patient Action)                          Status: Continued - Date(s):8/4/22 - continues to work on this goal            Patient will learn and practice at least two new interpersonal effectiveness strategies.     Intervention(s)  Therapist will teach strategies from DBT module on interpersonal effectiveness, and will assign homework to help reinforce skill development.        Patient has reviewed and agreed to the above plan.        Cristy Wilkinson PsyD,   Licensed Psychologist  January 6, 2020, reviewed on 4/23/2020, reviewed 7/8/2020, reviewed 10/08/2020, reviewed 1/27/2021, reviewed 4/27/2021, 7/29/2021, 11/10/2021, 2/9/2022, 5/3/2022, 8/4/22                                                                       "                                                                                                                                                                             Not difficult at all  PHQ9 TOTAL SCORE: 1    Answers for HPI/ROS submitted by the patient on 10/13/2022  If you checked off any problems, how difficult have these problems made it for you to do your work, take care of things at home, or get along with other people?: Somewhat difficult  PHQ9 TOTAL SCORE: 3

## 2022-11-10 ENCOUNTER — VIRTUAL VISIT (OUTPATIENT)
Dept: PSYCHOLOGY | Facility: CLINIC | Age: 42
End: 2022-11-10
Payer: COMMERCIAL

## 2022-11-10 DIAGNOSIS — F41.9 ANXIETY DISORDER, UNSPECIFIED TYPE: ICD-10-CM

## 2022-11-10 DIAGNOSIS — F33.1 MODERATE EPISODE OF RECURRENT MAJOR DEPRESSIVE DISORDER (H): Primary | ICD-10-CM

## 2022-11-10 PROCEDURE — 90834 PSYTX W PT 45 MINUTES: CPT | Mod: GT | Performed by: PSYCHOLOGIST

## 2022-11-10 NOTE — PROGRESS NOTES
[facility]                                            Progress Note    Patient Name: Idalia Hinojosa  Date: 11/10/2022         Service Type: Individual      Session Start Time: 12:05 pm Session End Time: 12:55 pm  Session Length:  50 minutes    Session #: 76    Attendees: Client     Service Modality: Video visit: -       Provider verified identity through the following two step process.  Patient provided:  Patient is known previously to provider    Telemedicine Visit: The patient's condition can be safely assessed and treated via synchronous audio and visual telemedicine encounter.      Reason for Telemedicine Visit: Services only offered telehealth    Originating Site (Patient Location): Patient's home    Distant Site (Provider Location): Provider Remote Setting- Home Office    Consent:  The patient/guardian has verbally consented to: the potential risks and benefits of telemedicine (telephone visit) versus in person care; bill my insurance or make self-payment for services provided; and responsibility for payment of non-covered services.     Patient would like the video invitation sent by:  My Chart    Mode of Communication:  Video Conference via [employer],     As the provider I attest to compliance with applicable laws and regulations related to telemedicine.         Treatment Plan Last Reviewed: 11/10/22  PHQ-9/DAVID-7: 08/04/22      DATA  Interactive Complexity: No  Crisis: No        Progress Since Last Session (Related to Symptoms / Goals / Homework):   Symptoms:   Increase in symptoms of anxiety and feeling overwhelmed by all the things she needs to do before spouse's procedure.       Homework: Achieved / completed to satisfaction.  She's been staying calm during challenging moments with her daughter, exercising, practicing flexible mindset.           Episode of Care Goals: Satisfactory progress - ACTION (Actively working towards change); Intervened by reinforcing change plan  / affirming steps taken.  Diagnostic assessment has been completed, treatment plan has been developed and patient has been making good progress on treatment goals.  The patient stated that her main goals for therapy would be to learn emotion regulation strategies (in particular, to help with when she is feeling upset/angry/frustrated/distressed).  The changes in her daily life due to COVID restrictions were a focus of our work together, and working through changes to routine as her daughter started school and she returned to work.  As she has been making progress, focus of treatment will shift to wellness planning (monitoring mood and symptoms, incorporating into routine what keeps her feeling well, review of triggers, how to manage set backs, etc.).  As her  has been diagnosed with cancer, treatment will also focus on providing her with support in managing the emotional distress and challenges related to this.       Current / Ongoing Stressors and Concerns:   Current: Feeling overwhelmed by 's upcoming medical procedure, and all she would like to accomplish prior.  Experiencing stress due to being primary caregiver.  Daughter is experiencing more behavioral challenges at school and home.  Wishes she would have more down-time.        Ongoing:Managing daily routine which has contributed to some adjustment and parenting challenges, lack of effective emotion regulation strategies for managing daily frustrations and upsets and marital discord due to differences in parenting styles.       Treatment Objective(s) Addressed in This Session:     Learn and practice self-soothe strategies  Identify strategies to help with caregiver stress  Identify opportunities to access support and help from others    Continue to review/practice/reinforce:    Plan fun activities and develop plan to incorporate into daily routine  Identify how to practice and encourage having a flexible mindset when things don't go as  "anticipated  Learn strategy of \"radical acceptance\", benefits of strategy, and how to apply strategy      Intervention:   DBT: Introduced emotion regulation strategy of self-soothe.  Identified ways she can practice and incorporate this strategy.  She was able to identify different ways she could practice self-soothe by engaging the five senses, and will work on creating \"mini-breaks\" throughout the day at work to help her with regularly using self-soothe strategies.       She's feeling overwhelmed by all she would like to accomplish before Colia's treatment.  She will work on creating a list, prioritizing what needs to happen, looking at things she may be able to ask others for help with, and looking at things she may be able to let go of.  Processed care-giver related stress related to feeling like she is responsible for a lot right now, and is having to manage a lot of the household, parenting, and caregiving tasks on her own.  Looked at ways she would like to be more supported and ways she may be able to ask for and get this support from friends and family.      ASSESSMENT: Current Emotional / Mental Status (status of significant symptoms):   Risk status (Self / Other harm or suicidal ideation)   Patient denies current fears or concerns for personal safety.   Patient denies current or recent suicidal ideation or behaviors.    She continues to agree to use a safety plan should any safety concerns arise.  She also agrees to reach our to her spouse or a family member for help if needed, and/or access crisis/emergency resources.        Patientdenies current or recent homicidal ideation or behaviors.   Patient denies current or recent self injurious behavior or ideation.   Patient denies other safety concerns.   Patient reports there has been no change in risk factors since their last session.     Patient reports there has been no change in protective factors since their last session.     Recommended that patient " "call 911 or go to the local ED should there be a change in any of these risk factors.  She has previously been informed on how to contact [facility] crisis/COPE for urgent/crisis concerns 24/7.  Provided patient with crisis resources and she agrees to use safety plan should any safety concerns arise.      Professionals or agencies I can contact during a crisis:     ? Suicide Prevention Lifeline: call or text 988  ? Crisis Text Line: text \"[place]\" to 849334    Local Crisis Services: [facility]: [phone]    Call 911 or go to my nearest emergency department.       Appearance:   Appropriate    Eye Contact:   Good    Psychomotor Behavior: Normal    Attitude:   Cooperative    Orientation:   All   Speech    Rate / Production: Normal     Volume:  Normal    Mood:    Anxious    Affect:    Congruent with mood   Thought Content:  Clear    Thought Form:  Coherent  Logical    Insight:    Good      Medication Review:   Is working with Dr. Han regarding side-effects, they are trialing her decreasing the dose to see if this helps with the side-effects.  She will monitor her mood and be in touch with her her doctor if there are any questions or concerns related to this change.        Medication Compliance:   Yes      Changes in Health Issues:   No new concerns     Chemical Use Review:   Substance Use: Chemical use reviewed, no changes or active concerns identified.      Tobacco Use: No current tobacco use.      Diagnosis:  Anxiety disorder unspecified  Major depression, moderate, recurrent episodes,         PLAN: (Patient Tasks / Therapist Tasks / Other)    1) Idalia identified the following goals: Create \"mini-moments\" throughout the day to practice self-soothing and take a mini-breaks, self-soothe.  remind self to slow down and take deep breaths.       Have some things handy for when times get stressful, remind myself I don't have to engage with negative thoughts, plan activities and time for fun, " "practice flexible mindset.    2) If there is a crisis situation, remember you are not alone and that there are people who can help you twenty-four hours a day, seven days a week.        Professionals or agencies I can contact during a crisis:     ? Suicide Prevention Lifeline: call or text 610  ? Crisis Text Line: text \"[place]\" to 541549    Local Crisis Services: [facility]: [phone]    Call 911 or go to my nearest emergency department.        3) Follow-up visit is scheduled for 11/17/2022 at 12:00 pm.  If urgent or crisis concerns arise prior to next scheduled appointment, please reach out to crisis resources, call 911, or go to the emergency department.      Cristy Wilkinson PsyD, LP  Licensed Psychologist  11/10/2022                          Previous skills and strategies to remind self of and to do as needed:    Practice \"STOP\" technique when you recognize yourself feeling angry   Be a  - what do you notice is happening when Kaylee feels upset?     Practice recognizing when feeling angry, and giving self permission to use calming and self-soothing strategies and take a break.   Look for the gray with thinking  slow down  take deep breaths  manage expectations of self and others.    Journal   Practice flexing \"flexiblity and creativity\" -   Continue the great work you are doing with your daily schedule.    Get outside every day.    Play your instrument.    Sing!    Continue with: \"It's just a thought\" - non-judgmental, observe, float down the river on a leaf, clouds in the ggee, reframing unhelpful thoughts -   Keep working on being patient when things are tense around me.    Continue with observing/paying attention to warning signs and signals and give self persmission to slow down, especially during the morning routine with Kaylee.    Practice offerring Kaylee choices when appropriate.      Use \"my plan for success\" to help remind you of calming strategies to practice when feeling upset.  " "  Practice decreasing overall vulnerability to emotion mind through getting adequate rest, nutrition, time for yourself, and physical activity.         Consider reading \"Fighting for your marriage\".      Technical Assistance for video visits:    Offer patient the website ([email or web address]/videovisit) and/or phone number ([phone]) for video visit instructions/assistance if needed.                                             ____________________________________    Treatment Plan     Patient's Name: Idalia Hinojosa                        YOB: 1980     Date of Creation: 1/6/2020  Date Treatment Plan Last Reviewed/Revised: 8/4/22     DSM5 Diagnoses: 300.00 (F41.9) Unspecified Anxiety Disorder, major depression, moderate, recurrent episode  Psychosocial / Contextual Factors: strain in marital relationship, parenting challenges, adjustment challenges, 's cancer diagnosis  PROMIS (reviewed every 90 days):      Anticipated number of session for this episode of care: additional 15-20  Anticipation frequency of session: Weekly until symptoms stabilize, then can decrease the frequency of sessions to likely bi-weekly or once every three weeks  Anticipated Duration of each session: 38-52 minutes  Treatment plan will be reviewed in 90 days or when goals have been changed.         Referral / Collaboration:  We discussed a referral to a couples/marital therapist.  The patient is thinking about this and has talked with her  about the referral, but they are unsure if they would like to follow through at this time.       MeasurableTreatment Goal(s) related to diagnosis / functional impairment(s)  Goal 1: Patient will learn emotion regulation strategies to help when she is feeling upset/angry/frustrated/distressed    I will know I've met my goal when I would yell less, I would feel calmer, and I would not push others.  I would be less physical when I'm angry (e.g. wouldn't slam doors or hit things)   "      Objective #A (Patient Action)                          Patient will learn and practice at least 2 new emotion regulation strategies.  Status: Continued - Date(s):    11/10/22- Regularly practicing emotion regulation strategies to help manage increase in emotional distress related to spouse's cancer diagnosis.  She has been successful with using strategies to help her stay calm when her daughter's experiencing heightened emotion and/or challenging behaviors.          Intervention(s)  Therapist will provide psychoeducation on emotion regulation module from DBT and will assign patient homework to help reinforce skill development.     Objective #B  Patient will identify factors that increase vulnerability to emotion mind and identify ways to decrease vulnerability to emotion mind.  Status: Continued - Date(s):11/10/22 - routinely incorporating mindfulness and self-awareness strategies to increase attention and focus to factors that increase vulnerability to emotion mind           Intervention(s)  Therapist will provide information on factors that increase vulnerabiliyt to emotion mind.     Objective #C  Patient will identify warning signs and signals that emotions are escalating and will learn ways to skillfully intervene early on.  Status: New - Date: 11/10/22 - in progress - has demonstrated good initial progress in being able to identify warning signs and intervene skillfully -            Intervention(s)  Therapist will help patient identify warning signs and signals, and will guide the patient in identifying skillful ways to intervene when exeriencing warning signs and signals.        Goal 2: Patient will learn new parenting strategies to help with managing parenting challenges.  Parent will work on improving relationship with her daughter and defining what she would like this relationship to look like.      I will know I've met my goal when I'm enjoying time with my daughter, teaching her new things, and not  "waiting for things to change       Objective #A (Patient Action)                          Status: Continued - Date(s):11/10/22     Patient will increase understanding of developmental norms for her daughter and ways to manage challenging behaviors.     Intervention(s)  Therapist will provide psychoeducation on developmental norms and parenting strategies.     Objective #B  Patient will spend time reflecting on her relationship with her daughter and defining what she would like this relaitonship to look like.                  Status: Continued - Date(s):11/10/22 - Making good progress -            Intervention(s)  Therapist will guide the patient in reflecting upon her relationship with her daughter and help her define ways to move towards what she vaues in her relationship with her daughter.     Objective #C  Patient will increase time spent on fun activity and play with her daughter.  Status: Continued - Date(s):11/10/22- has been enjoying time with daughter and devoting regular time to fun activities and play together           Intervention(s)  Therapist will guide the patient in identifying ways she can increase positive time with her daughter.  Therapist will also teach mindfulness strategies to help patient with being in the present moment when appropriate - .        Goal 3: Patiient will improve communication with her .      I will know I've met my goal when I feel less irritated with my  and communicate more openly with my .  I would like to be more assertive and less aggressive.   I would like to not avoid telling him things because I'm worried about his reaction or don't think he will react well.  I would like to be more present to the moment during times when this would be helpful.\"       Objective #A (Patient Action)                          Status: Continued - Date(s):11/10/22 - continues to work on this goal, though emphasis has shift to managing caregiver stress related to " 's cancer diagnosis and treatment.              Patient will learn and practice at least two new interpersonal effectiveness strategies.     Intervention(s)  Therapist will teach strategies from DBT module on interpersonal effectiveness, and will assign homework to help reinforce skill development.        Patient has reviewed and agreed to the above plan.        Cristy Wilkinson PsyD,   Licensed Psychologist  Reviewed on  11/10/22                                                                                                                                                                                                                                                   Not difficult at all  PHQ9 TOTAL SCORE: 1    Answers for HPI/ROS submitted by the patient on 10/13/2022  If you checked off any problems, how difficult have these problems made it for you to do your work, take care of things at home, or get along with other people?: Somewhat difficult  PHQ9 TOTAL SCORE: 3

## 2022-11-16 ENCOUNTER — VIRTUAL VISIT (OUTPATIENT)
Dept: PSYCHOLOGY | Facility: CLINIC | Age: 42
End: 2022-11-16
Payer: COMMERCIAL

## 2022-11-16 DIAGNOSIS — F33.1 MODERATE EPISODE OF RECURRENT MAJOR DEPRESSIVE DISORDER (H): Primary | ICD-10-CM

## 2022-11-16 DIAGNOSIS — F41.9 ANXIETY DISORDER, UNSPECIFIED TYPE: ICD-10-CM

## 2022-11-16 PROCEDURE — 90834 PSYTX W PT 45 MINUTES: CPT | Mod: GT | Performed by: PSYCHOLOGIST

## 2022-11-16 ASSESSMENT — PATIENT HEALTH QUESTIONNAIRE - PHQ9
SUM OF ALL RESPONSES TO PHQ QUESTIONS 1-9: 9
SUM OF ALL RESPONSES TO PHQ QUESTIONS 1-9: 9
10. IF YOU CHECKED OFF ANY PROBLEMS, HOW DIFFICULT HAVE THESE PROBLEMS MADE IT FOR YOU TO DO YOUR WORK, TAKE CARE OF THINGS AT HOME, OR GET ALONG WITH OTHER PEOPLE: VERY DIFFICULT

## 2022-11-16 NOTE — PROGRESS NOTES
"           [facility]                                            Progress Note    Patient Name: Idalia Hinojosa  Date: 11/16/2022         Service Type: Individual      Session Start Time: 12:38  pm Session End Time: 1:28  pm  Session Length:  50 minutes    Session #: 77    Attendees: Client     Service Modality: Video visit: -       Provider verified identity through the following two step process.  Patient provided:  Patient is known previously to provider    Telemedicine Visit: The patient's condition can be safely assessed and treated via synchronous audio and visual telemedicine encounter.      Reason for Telemedicine Visit: Services only offered telehealth    Originating Site (Patient Location): Patient's home    Distant Site (Provider Location): Provider Remote Setting- Home Office    Consent:  The patient/guardian has verbally consented to: the potential risks and benefits of telemedicine (telephone visit) versus in person care; bill my insurance or make self-payment for services provided; and responsibility for payment of non-covered services.     Patient would like the video invitation sent by:  My Chart    Mode of Communication:  Video Conference via [employer],     As the provider I attest to compliance with applicable laws and regulations related to telemedicine.         Treatment Plan Last Reviewed: 11/10/2022  PHQ-9/DAVID-7: 11/16/2022      DATA  Interactive Complexity: No  Crisis: No        Progress Since Last Session (Related to Symptoms / Goals / Homework):   Symptoms:   She reports anxiety remains heightened and she feels \"on edge\".   Experiencing heightened stressors and demands related to 's cancer treatment and primary caregiver and caretaker status.      Homework: Achieved / completed to satisfaction. Was able to cut back on work hours to help with stress.   Has been taking mini-breaks and this has been helpful.          Episode of Care Goals: Satisfactory progress " "- ACTION (Actively working towards change); Intervened by reinforcing change plan / affirming steps taken.  The patient stated that her main goals for therapy would be to learn emotion regulation strategies (in particular, to help with when she is feeling upset/angry/frustrated/distressed).  The changes in her daily life due to COVID restrictions were a focus of our initial work together, and working through changes to routine as her daughter started school and she returned to work.  As she has been making progress, focus of treatment will shift to wellness planning (monitoring mood and symptoms, incorporating into routine what keeps her feeling well, review of triggers, how to manage set backs, etc.).  As her  has been diagnosed with cancer, treatment will also focus on providing her with support in managing the emotional distress and challenges related to this.       Current / Ongoing Stressors and Concerns:   Current: Increase in symptoms of depression, anxiety and stress.   is going through cancer treatment.  Daughter has been diagnosed with ADHD and they are connecting with individual and family support.       Ongoing:Managing daily routine which has contributed to some adjustment and parenting challenges, lack of effective emotion regulation strategies for managing daily frustrations and upsets and marital discord due to differences in parenting styles.       Treatment Objective(s) Addressed in This Session:     Identify strategies to help with caregiver stress  - in particular, how to help with increasing emotional support   Plan fun activities and develop plan to incorporate into daily routine    Identify how to practice and encourage having a flexible mindset when things don't go as anticipated  Learn strategy of \"radical acceptance\", benefits of strategy, and how to apply strategy     Continue to review/practice/reinforce:    Practice checking in on a regular basis, remind self to slow down " "when rushing, take deep breaths   Identify and enforce healthy emotional boundaries (define your role as spouse, not \"therapist\" or \"fixer\")  Identify factors to consider when having crucial conversations   Identify ways to practice and incorporate self-care into routine.   Learn and practice mindfulness strategies - redirect back to present moment, focus on the facts   Identify and enforce healthy emotional boundaries (it is ok to feel different than my spouse does, it is okay for him to have his feelings and to not feel like I have to fix or change how he is feeling)  Continue to practice awareness of your emotions and physical sensations, to help tune in to what you need  Identify opportunities to access support and help from others  Mindful awareness of thoughts, impact on your emotions, and recognize opportunities to redirect thoughts.  Practice re-directing thoughts to a more helpful perspective.    Practice balanced thinking to counteract polarized/all or nothing thinking.       Intervention:   CBTand solution-focused: She reported that she's been feeling more on edge and is feeling stressed by all the responsibilities she feels accountable for due to caregiver stress and demands.  She noted in particular feeling the need for more emotional support - which she defined as having family members check-in with her, ask her how she is doing and what she needs.  Developed a plan for how she can ask for what she needs and see if family members are able to help with this.  She also processed feelings of frustration about her parents going out of the country to visit her sister; they are returning and she is grateful to have them near-by again as they provide both emotional support as well as assistance with childcare and other help that she needs.      We continue to focus on identifying what she needs in terms of managing her self-care.   Exercising, music, and embracing a flexible mindset has been very helpful for " "her and she's been mindful of incorporating these elements into her routines.       ASSESSMENT: Current Emotional / Mental Status (status of significant symptoms):   Risk status (Self / Other harm or suicidal ideation)   Patient denies current fears or concerns for personal safety.   Patient denies current or recent suicidal ideation or behaviors.   She continues to agree to use a safety plan should any safety concerns arise.  She also agrees to reach our to her spouse or a family member for help if needed, and/or access crisis/emergency resources.        Patientdenies current or recent homicidal ideation or behaviors.   Patient denies current or recent self injurious behavior or ideation.   Patient denies other safety concerns.   Patient reports there has been no change in risk factors since their last session.     Patient reports there has been no change in protective factors since their last session.     Recommended that patient call 911 or go to the local ED should there be a change in any of these risk factors.  She has previously been informed on how to contact [facility] crisis/[facility] for urgent/crisis concerns 24/7.  Provided patient with crisis resources and she agrees to use safety plan should any safety concerns arise.      Professionals or agencies I can contact during a crisis:  ? Suicide Prevention Lifeline: call or text 828  ? Crisis Text Line: text \"[place]\" to 985923    Local Crisis Services: [facility]: [phone]    Call 911 or go to my nearest emergency department.          Appearance:   Appropriate    Eye Contact:   Good    Psychomotor Behavior: Normal    Attitude:   Cooperative    Orientation:   All   Speech    Rate / Production: Normal     Volume:  Normal    Mood:    Anxious, \"on edge\"   Affect:    Congruent with mood   Thought Content:  Clear    Thought Form:  Coherent  Logical    Insight:    Good      Medication Review:   She reported that since decreasing the dose of " "her anti-depressant, she notices that the bothersome side-effects have decreased, but that she believes this has contributed to more difficulties with her mood.   She agreed to reach out to Dr. Han to check-in about how she has been doing since decreasing the medications and ask for Dr. Han's opinion regarding options.        Medication Compliance:   Yes      Changes in Health Issues:   No new concerns     Chemical Use Review:   Substance Use: Chemical use reviewed, no changes or active concerns identified.      Tobacco Use: No current tobacco use.      Diagnosis:  Anxiety disorder unspecified  Major depression, recurrent episode, moderate            PLAN: (Patient Tasks / Therapist Tasks / Other)    1) Idalia identified the following goals: Continue with goal to take mini-moments throughout the day, mini-breaks, self-soothe, remind self to slow down and take deep breaths.       Have some things handy for when times get stressful, remind myself I don't have to engage with negative thoughts, plan activities and time for fun, practice flexible mindset.    2) If there is a crisis situation, remember you are not alone and that there are people who can help you twenty-four hours a day, seven days a week.  Call [facility] ([facility] [place] [facility]): [phone].      3) Follow-up visit is scheduled for 11/22 at 1:00 pm.  If urgent or crisis concerns arise prior to next scheduled appointment, please reach out to crisis resources, call 911, or go to the emergency department.      Cristy Wilkinson PsyD,   Licensed Psychologist  11/16/2022                          Previous skills and strategies to remind self of and to do as needed:    Practice \"STOP\" technique when you recognize yourself feeling angry   Be a  - what do you notice is happening when Kaylee feels upset?     Practice recognizing when feeling angry, and giving self permission to use calming and self-soothing strategies and take a break.   Look " "for the gray with thinking  slow down  take deep breaths  manage expectations of self and others.    Journal   Practice flexing \"flexiblity and creativity\" -   Continue the great work you are doing with your daily schedule.    Get outside every day.    Play your instrument.    Sing!    Continue with: \"It's just a thought\" - non-judgmental, observe, float down the river on a leaf, clouds in the gege, reframing unhelpful thoughts -   Keep working on being patient when things are tense around me.    Continue with observing/paying attention to warning signs and signals and give self persmission to slow down, especially during the morning routine with Kaylee.    Practice offerring Kaylee choices when appropriate.      Use \"my plan for success\" to help remind you of calming strategies to practice when feeling upset.    Practice decreasing overall vulnerability to emotion mind through getting adequate rest, nutrition, time for yourself, and physical activity.         Consider reading \"Fighting for your marriage\".      Technical Assistance for video visits:    Offer patient the website ([email or web address]) and/or phone number ([phone]) for video visit instructions/assistance if needed.                                             ____________________________________    Treatment Plan     Patient's Name: Idalia Hinojosa                        YOB: 1980     Date of Creation: 1/6/2020  Date Treatment Plan Last Reviewed/Revised: 8/4/22     DSM5 Diagnoses: 300.00 (F41.9) Unspecified Anxiety Disorder, major depression, moderate, recurrent episode  Psychosocial / Contextual Factors: strain in marital relationship, parenting challenges, adjustment challenges, 's cancer diagnosis  PROMIS (reviewed every 90 days):      Anticipated number of session for this episode of care: additional 15-20  Anticipation frequency of session: Weekly until symptoms stabilize, then can decrease the frequency of sessions to " likely bi-weekly or once every three weeks  Anticipated Duration of each session: 38-52 minutes  Treatment plan will be reviewed in 90 days or when goals have been changed.         Referral / Collaboration:  We discussed a referral to a couples/marital therapist.  The patient is thinking about this and has talked with her  about the referral, but they are unsure if they would like to follow through at this time.       MeasurableTreatment Goal(s) related to diagnosis / functional impairment(s)  Goal 1: Patient will learn emotion regulation strategies to help when she is feeling upset/angry/frustrated/distressed    I will know I've met my goal when I would yell less, I would feel calmer, and I would not push others.  I would be less physical when I'm angry (e.g. wouldn't slam doors or hit things)        Objective #A (Patient Action)                          Patient will learn and practice at least 2 new emotion regulation strategies.  Status: Continued - Date(s):    11/10/22- Regularly practicing emotion regulation strategies to help manage increase in emotional distress related to spouse's cancer diagnosis.  She has been successful with using strategies to help her stay calm when her daughter's experiencing heightened emotion and/or challenging behaviors.          Intervention(s)  Therapist will provide psychoeducation on emotion regulation module from DBT and will assign patient homework to help reinforce skill development.     Objective #B  Patient will identify factors that increase vulnerability to emotion mind and identify ways to decrease vulnerability to emotion mind.  Status: Continued - Date(s):11/10/22 - routinely incorporating mindfulness and self-awareness strategies to increase attention and focus to factors that increase vulnerability to emotion mind           Intervention(s)  Therapist will provide information on factors that increase vulnerabiliyt to emotion mind.     Objective #C  Patient will  identify warning signs and signals that emotions are escalating and will learn ways to skillfully intervene early on.  Status: New - Date: 11/10/22 - in progress - has demonstrated good initial progress in being able to identify warning signs and intervene skillfully -            Intervention(s)  Therapist will help patient identify warning signs and signals, and will guide the patient in identifying skillful ways to intervene when exeriencing warning signs and signals.        Goal 2: Patient will learn new parenting strategies to help with managing parenting challenges.  Parent will work on improving relationship with her daughter and defining what she would like this relationship to look like.      I will know I've met my goal when I'm enjoying time with my daughter, teaching her new things, and not waiting for things to change       Objective #A (Patient Action)                          Status: Continued - Date(s):11/10/22     Patient will increase understanding of developmental norms for her daughter and ways to manage challenging behaviors.     Intervention(s)  Therapist will provide psychoeducation on developmental norms and parenting strategies.     Objective #B  Patient will spend time reflecting on her relationship with her daughter and defining what she would like this relaitonship to look like.                  Status: Continued - Date(s):11/10/22 - Making good progress -            Intervention(s)  Therapist will guide the patient in reflecting upon her relationship with her daughter and help her define ways to move towards what she vaues in her relationship with her daughter.     Objective #C  Patient will increase time spent on fun activity and play with her daughter.  Status: Continued - Date(s):11/10/22- has been enjoying time with daughter and devoting regular time to fun activities and play together           Intervention(s)  Therapist will guide the patient in identifying ways she can increase  "positive time with her daughter.  Therapist will also teach mindfulness strategies to help patient with being in the present moment when appropriate - .        Goal 3: Patiient will improve communication with her .      I will know I've met my goal when I feel less irritated with my  and communicate more openly with my .  I would like to be more assertive and less aggressive.   I would like to not avoid telling him things because I'm worried about his reaction or don't think he will react well.  I would like to be more present to the moment during times when this would be helpful.\"       Objective #A (Patient Action)                          Status: Continued - Date(s):11/10/22 - continues to work on this goal, though emphasis has shift to managing caregiver stress related to 's cancer diagnosis and treatment.              Patient will learn and practice at least two new interpersonal effectiveness strategies.     Intervention(s)  Therapist will teach strategies from DBT module on interpersonal effectiveness, and will assign homework to help reinforce skill development.        Patient has reviewed and agreed to the above plan.        Cristy Wilkinson PsyD,   Licensed Psychologist  Reviewed on  11/10/22                                                                                                                                                                                                                                                                          "

## 2022-11-22 ENCOUNTER — VIRTUAL VISIT (OUTPATIENT)
Dept: PSYCHOLOGY | Facility: CLINIC | Age: 42
End: 2022-11-22
Payer: COMMERCIAL

## 2022-11-22 DIAGNOSIS — F33.1 MODERATE EPISODE OF RECURRENT MAJOR DEPRESSIVE DISORDER (H): Primary | ICD-10-CM

## 2022-11-22 DIAGNOSIS — F41.9 ANXIETY DISORDER, UNSPECIFIED TYPE: ICD-10-CM

## 2022-11-22 PROCEDURE — 90832 PSYTX W PT 30 MINUTES: CPT | Mod: GT | Performed by: PSYCHOLOGIST

## 2022-11-22 NOTE — PROGRESS NOTES
"           [facility]                                            Progress Note    Patient Name: Idalia Hinojosa  Date: 11/22/2022         Service Type: Individual      Session Start Time: 1:06  pm Session End Time: 1:41  pm  Session Length:  35 minutes    Session #: 78    Attendees: Client     Service Modality: Video visit: -       Provider verified identity through the following two step process.  Patient provided:  Patient is known previously to provider    Telemedicine Visit: The patient's condition can be safely assessed and treated via synchronous audio and visual telemedicine encounter.      Reason for Telemedicine Visit: Services only offered telehealth    Originating Site (Patient Location): Patient's home    Distant Site (Provider Location): Provider Remote Setting- Home Office    Consent:  The patient/guardian has verbally consented to: the potential risks and benefits of telemedicine (telephone visit) versus in person care; bill my insurance or make self-payment for services provided; and responsibility for payment of non-covered services.     Patient would like the video invitation sent by:  My Chart    Mode of Communication:  Video Conference via [employer],     As the provider I attest to compliance with applicable laws and regulations related to telemedicine.         Treatment Plan Last Reviewed: 11/10/2022  PHQ-9/DAVID-7: 11/16/2022      DATA  Interactive Complexity: No  Crisis: No        Progress Since Last Session (Related to Symptoms / Goals / Homework):   Symptoms:   She reports mood is \"better\" then it was last week - which she describes as feeling less depressed.  She reports that symptoms of anxiety and worry remain heightened in the context of her  being admitted to the hospital today to begin his next phase of cancer treatment.         Homework: Achieved / completed to satisfaction. Was able to ask for, and receive help, from support system, and this has been " very helpful.  Has been exercising, which she also finds very helpful for her mood.            Episode of Care Goals: Satisfactory progress - ACTION (Actively working towards change); Intervened by reinforcing change plan / affirming steps taken.  The patient stated that her main goals for therapy would be to learn emotion regulation strategies (in particular, to help with when she is feeling upset/angry/frustrated/distressed).  The changes in her daily life due to COVID restrictions were a focus of our initial work together, and working through changes to routine as her daughter started school and she returned to work.  As she has been making progress, focus of treatment will shift to wellness planning (monitoring mood and symptoms, incorporating into routine what keeps her feeling well, review of triggers, how to manage set backs, etc.).  As her  has been diagnosed with cancer, treatment will also focus on providing her with support in managing the emotional distress and challenges related to this.       Current / Ongoing Stressors and Concerns:   Current: Has been experiencing symptoms of anxiety and depression.   is going through cancer treatment and was just admitted to the hospital today for his next round of cancer treatment.  After he comes back home, he will need caregiver assistance for 30 days.  She has worked with her dad, who is helping to put together of plan of who will help with her , and who will help with her daughter, when she needs this help.  Daughter has been diagnosed with ADHD and they are connecting with individual and family support.       Ongoing:Managing daily routine which has contributed to some adjustment and parenting challenges, lack of effective emotion regulation strategies for managing daily frustrations and upsets and marital discord due to differences in parenting styles.       Treatment Objective(s) Addressed in This Session:     Let go of unjustified guilt  "  Remind yourself, and reaffirm - that focusing on your own self-care is important!  (let go of unjustified guilt related to taking care of yourself)    Identify strategies to help with caregiver stress  - in particular, how to help with increasing emotional support   Plan fun activities and develop plan to incorporate into daily routine         Continue to review/practice/reinforce:    Identify how to practice and encourage having a flexible mindset when things don't go as anticipated  Learn strategy of \"radical acceptance\", benefits of strategy, and how to apply strategy  Practice checking in on a regular basis, remind self to slow down when rushing, take deep breaths   Identify and enforce healthy emotional boundaries (define your role as spouse, not \"therapist\" or \"fixer\")  Identify factors to consider when having crucial conversations   Identify ways to practice and incorporate self-care into routine.   Learn and practice mindfulness strategies - redirect back to present moment, focus on the facts   Identify and enforce healthy emotional boundaries (it is ok to feel different than my spouse does, it is okay for him to have his feelings and to not feel like I have to fix or change how he is feeling)  Continue to practice awareness of your emotions and physical sensations, to help tune in to what you need  Identify opportunities to access support and help from others  Mindful awareness of thoughts, impact on your emotions, and recognize opportunities to redirect thoughts.  Practice re-directing thoughts to a more helpful perspective.    Practice balanced thinking to counteract polarized/all or nothing thinking.       Intervention:   CBTand solution-focused: She recognized feelings of guilt she experiences related to allowing others to help and taking care of herself.  Provided psychoeducation on concept of justified versus unjustified guilt, and encouraged her to recognize and let go of unjustified guilt.  She " "reflected that she tends to feel guilty when taking care of herself and feels like she \"should\" be doing something else.  Encouraged her to label and recognize this as unjustified guilt - and remind herself of the importance of taking care of herself so that she can be there for others who need her (e.g. spouse and daughter).        We continue to review and focus on identifying what she needs in terms of managing her self-care.   Exercising, music, asking for help from others, accepting help from others, and embracing a flexible mindset has been very helpful for her and she's been mindful of incorporating these elements into her routines.       ASSESSMENT: Current Emotional / Mental Status (status of significant symptoms):   Risk status (Self / Other harm or suicidal ideation)   Patient denies current fears or concerns for personal safety.   Patient denies current or recent suicidal ideation or behaviors.   She continues to agree to use a safety plan should any safety concerns arise.  She also agrees to reach our to her spouse or a family member for help if needed, and/or access crisis/emergency resources.        Patientdenies current or recent homicidal ideation or behaviors.   Patient denies current or recent self injurious behavior or ideation.   Patient denies other safety concerns.   Patient reports there has been no change in risk factors since their last session.     Patient reports there has been no change in protective factors since their last session.     Recommended that patient call 911 or go to the local ED should there be a change in any of these risk factors.  She has previously been informed on how to contact [facility] crisis/COPE for urgent/crisis concerns 24/7.  Provided patient with crisis resources and she agrees to use safety plan should any safety concerns arise.      Professionals or agencies I can contact during a crisis:  ? Suicide Prevention Lifeline: call or text 087  ? Crisis Text " "Line: text \"[place]\" to 994590    Local Crisis Services: [facility]: [phone]    Call 911 or go to my nearest emergency department.          Appearance:   Appropriate    Eye Contact:   Good    Psychomotor Behavior: Normal    Attitude:   Cooperative    Orientation:   All   Speech    Rate / Production: Normal     Volume:  Normal    Mood:    Less depressed, anxiety remains heightened   Affect:    Congruent with mood   Thought Content:  Clear    Thought Form:  Coherent  Logical    Insight:    Good      Medication Review:   She reported that she checked-in with Dr. Han regarding her anti-depressant and elected to return to her previous dose, which she believes had been helping her to more effectively manage her mood.  She'll continue to monitor and see how things go, as Dr. Han had provided her with an option of adding another medication to help with the side-effects.  Thus far, she believes that returning to the previous dose has been helpful for her mood.        Medication Compliance:   Yes      Changes in Health Issues:   Dry throat/sore throat - no other concerns with health     Chemical Use Review:   Substance Use: Chemical use reviewed, no changes or active concerns identified.      Tobacco Use: No current tobacco use.      Diagnosis:  Anxiety disorder unspecified  Major depression, recurrent episode, moderate            PLAN: (Patient Tasks / Therapist Tasks / Other)    1) Idalia identified the following goals: letting go when others are taking care of Kaylee, letting go when others are taking care of Colia, embrace rest, take time for myself every day, let go of unjustified guilt and allow self to not feel guilty and like I should be doing something else when I'm taking care of myself, reminder and mantra - it is good to take care of me,     Continue with goal to take mini-moments throughout the day, mini-breaks, self-soothe, remind self to slow down and take deep breaths.      Have " "some things handy for when times get stressful, remind myself I don't have to engage with negative thoughts, plan activities and time for fun, practice flexible mindset.    2) If there is a crisis situation, remember you are not alone and that there are people who can help you twenty-four hours a day, seven days a week.  Call [facility] ([facility]): [phone].      3) Follow-up visit is scheduled for 12/1/22 at 2:00 pm.  If urgent or crisis concerns arise prior to next scheduled appointment, please reach out to crisis resources, call 911, or go to the emergency department.      Cristy Wilkinson PsyD,   Licensed Psychologist  11/22/202                          Previous skills and strategies to remind self of and to do as needed:    Practice \"STOP\" technique when you recognize yourself feeling angry   Be a  - what do you notice is happening when Kaylee feels upset?     Practice recognizing when feeling angry, and giving self permission to use calming and self-soothing strategies and take a break.   Look for the gray with thinking  slow down  take deep breaths  manage expectations of self and others.    Journal   Practice flexing \"flexiblity and creativity\" -   Continue the great work you are doing with your daily schedule.    Get outside every day.    Play your instrument.    Sing!    Continue with: \"It's just a thought\" - non-judgmental, observe, float down the river on a leaf, clouds in the gege, reframing unhelpful thoughts -   Keep working on being patient when things are tense around me.    Continue with observing/paying attention to warning signs and signals and give self persmission to slow down, especially during the morning routine with Kaylee.    Practice offerring Kaylee choices when appropriate.      Use \"my plan for success\" to help remind you of calming strategies to practice when feeling upset.    Practice decreasing overall vulnerability to emotion mind through getting adequate rest, " "nutrition, time for yourself, and physical activity.         Consider reading \"Fighting for your marriage\".      Technical Assistance for video visits:    Offer patient the website ([email or web address]) and/or phone number ([phone]) for video visit instructions/assistance if needed.                                             ____________________________________    Treatment Plan     Patient's Name: Idalia Hinojosa                        YOB: 1980     Date of Creation: 1/6/2020  Date Treatment Plan Last Reviewed/Revised: 8/4/22     DSM5 Diagnoses: 300.00 (F41.9) Unspecified Anxiety Disorder, major depression, moderate, recurrent episode  Psychosocial / Contextual Factors: strain in marital relationship, parenting challenges, adjustment challenges, 's cancer diagnosis  PROMIS (reviewed every 90 days):      Anticipated number of session for this episode of care: additional 15-20  Anticipation frequency of session: Weekly until symptoms stabilize, then can decrease the frequency of sessions to likely bi-weekly or once every three weeks  Anticipated Duration of each session: 38-52 minutes  Treatment plan will be reviewed in 90 days or when goals have been changed.         Referral / Collaboration:  We discussed a referral to a couples/marital therapist.  The patient is thinking about this and has talked with her  about the referral, but they are unsure if they would like to follow through at this time.       MeasurableTreatment Goal(s) related to diagnosis / functional impairment(s)  Goal 1: Patient will learn emotion regulation strategies to help when she is feeling upset/angry/frustrated/distressed    I will know I've met my goal when I would yell less, I would feel calmer, and I would not push others.  I would be less physical when I'm angry (e.g. wouldn't slam doors or hit things)        Objective #A (Patient Action)                          Patient will learn and practice " at least 2 new emotion regulation strategies.  Status: Continued - Date(s):    11/10/22- Regularly practicing emotion regulation strategies to help manage increase in emotional distress related to spouse's cancer diagnosis.  She has been successful with using strategies to help her stay calm when her daughter's experiencing heightened emotion and/or challenging behaviors.          Intervention(s)  Therapist will provide psychoeducation on emotion regulation module from DBT and will assign patient homework to help reinforce skill development.     Objective #B  Patient will identify factors that increase vulnerability to emotion mind and identify ways to decrease vulnerability to emotion mind.  Status: Continued - Date(s):11/10/22 - routinely incorporating mindfulness and self-awareness strategies to increase attention and focus to factors that increase vulnerability to emotion mind           Intervention(s)  Therapist will provide information on factors that increase vulnerabiliyt to emotion mind.     Objective #C  Patient will identify warning signs and signals that emotions are escalating and will learn ways to skillfully intervene early on.  Status: New - Date: 11/10/22 - in progress - has demonstrated good initial progress in being able to identify warning signs and intervene skillfully -            Intervention(s)  Therapist will help patient identify warning signs and signals, and will guide the patient in identifying skillful ways to intervene when exeriencing warning signs and signals.        Goal 2: Patient will learn new parenting strategies to help with managing parenting challenges.  Parent will work on improving relationship with her daughter and defining what she would like this relationship to look like.      I will know I've met my goal when I'm enjoying time with my daughter, teaching her new things, and not waiting for things to change       Objective #A (Patient Action)                         "  Status: Continued - Date(s):11/10/22     Patient will increase understanding of developmental norms for her daughter and ways to manage challenging behaviors.     Intervention(s)  Therapist will provide psychoeducation on developmental norms and parenting strategies.     Objective #B  Patient will spend time reflecting on her relationship with her daughter and defining what she would like this relaitonship to look like.                  Status: Continued - Date(s):11/10/22 - Making good progress -            Intervention(s)  Therapist will guide the patient in reflecting upon her relationship with her daughter and help her define ways to move towards what she vaues in her relationship with her daughter.     Objective #C  Patient will increase time spent on fun activity and play with her daughter.  Status: Continued - Date(s):11/10/22- has been enjoying time with daughter and devoting regular time to fun activities and play together           Intervention(s)  Therapist will guide the patient in identifying ways she can increase positive time with her daughter.  Therapist will also teach mindfulness strategies to help patient with being in the present moment when appropriate - .        Goal 3: Patiient will improve communication with her .      I will know I've met my goal when I feel less irritated with my  and communicate more openly with my .  I would like to be more assertive and less aggressive.   I would like to not avoid telling him things because I'm worried about his reaction or don't think he will react well.  I would like to be more present to the moment during times when this would be helpful.\"       Objective #A (Patient Action)                          Status: Continued - Date(s):11/10/22 - continues to work on this goal, though emphasis has shift to managing caregiver stress related to 's cancer diagnosis and treatment.              Patient will learn and practice at least " two new interpersonal effectiveness strategies.     Intervention(s)  Therapist will teach strategies from DBT module on interpersonal effectiveness, and will assign homework to help reinforce skill development.        Patient has reviewed and agreed to the above plan.        Cristy Wilkinson PsyD, LP  Licensed Psychologist  Reviewed on  11/10/22

## 2022-11-23 ENCOUNTER — LAB (OUTPATIENT)
Dept: URGENT CARE | Facility: URGENT CARE | Age: 42
End: 2022-11-23
Attending: FAMILY MEDICINE
Payer: COMMERCIAL

## 2022-11-23 DIAGNOSIS — Z20.822 SUSPECTED 2019 NOVEL CORONAVIRUS INFECTION: ICD-10-CM

## 2022-11-23 LAB — SARS-COV-2 RNA RESP QL NAA+PROBE: NEGATIVE

## 2022-11-23 PROCEDURE — U0003 INFECTIOUS AGENT DETECTION BY NUCLEIC ACID (DNA OR RNA); SEVERE ACUTE RESPIRATORY SYNDROME CORONAVIRUS 2 (SARS-COV-2) (CORONAVIRUS DISEASE [COVID-19]), AMPLIFIED PROBE TECHNIQUE, MAKING USE OF HIGH THROUGHPUT TECHNOLOGIES AS DESCRIBED BY CMS-2020-01-R: HCPCS

## 2022-11-23 PROCEDURE — U0005 INFEC AGEN DETEC AMPLI PROBE: HCPCS

## 2022-11-29 ENCOUNTER — VIRTUAL VISIT (OUTPATIENT)
Dept: PSYCHOLOGY | Facility: CLINIC | Age: 42
End: 2022-11-29
Payer: COMMERCIAL

## 2022-11-29 ENCOUNTER — LAB (OUTPATIENT)
Dept: URGENT CARE | Facility: URGENT CARE | Age: 42
End: 2022-11-29
Attending: OBSTETRICS & GYNECOLOGY
Payer: COMMERCIAL

## 2022-11-29 DIAGNOSIS — F33.1 MODERATE EPISODE OF RECURRENT MAJOR DEPRESSIVE DISORDER (H): Primary | ICD-10-CM

## 2022-11-29 DIAGNOSIS — F41.9 ANXIETY DISORDER, UNSPECIFIED TYPE: ICD-10-CM

## 2022-11-29 DIAGNOSIS — Z11.59 ENCOUNTER FOR SCREENING FOR OTHER VIRAL DISEASES: ICD-10-CM

## 2022-11-29 LAB

## 2022-11-29 PROCEDURE — U0005 INFEC AGEN DETEC AMPLI PROBE: HCPCS

## 2022-11-29 PROCEDURE — U0003 INFECTIOUS AGENT DETECTION BY NUCLEIC ACID (DNA OR RNA); SEVERE ACUTE RESPIRATORY SYNDROME CORONAVIRUS 2 (SARS-COV-2) (CORONAVIRUS DISEASE [COVID-19]), AMPLIFIED PROBE TECHNIQUE, MAKING USE OF HIGH THROUGHPUT TECHNOLOGIES AS DESCRIBED BY CMS-2020-01-R: HCPCS

## 2022-11-29 PROCEDURE — 90834 PSYTX W PT 45 MINUTES: CPT | Mod: GT | Performed by: PSYCHOLOGIST

## 2022-11-29 PROCEDURE — 87486 CHLMYD PNEUM DNA AMP PROBE: CPT

## 2022-11-29 PROCEDURE — 87581 M.PNEUMON DNA AMP PROBE: CPT

## 2022-11-29 PROCEDURE — 87633 RESP VIRUS 12-25 TARGETS: CPT

## 2022-11-29 NOTE — PROGRESS NOTES
"             [facility]                                            Progress Note    Patient Name: Idalia Hinojosa  Date: 11/29/2022         Service Type: Individual      Session Start Time:  11:10 am Session End Time: 11:58 pm  Session Length:  48 minutes    Session #: 79    Attendees: Client     Service Modality: Video visit: -       Provider verified identity through the following two step process.  Patient provided:  Patient is known previously to provider    Telemedicine Visit: The patient's condition can be safely assessed and treated via synchronous audio and visual telemedicine encounter.      Reason for Telemedicine Visit: Services only offered telehealth    Originating Site (Patient Location): Patient's home    Distant Site (Provider Location): Provider Remote Setting- Home Office    Consent:  The patient/guardian has verbally consented to: the potential risks and benefits of telemedicine (telephone visit) versus in person care; bill my insurance or make self-payment for services provided; and responsibility for payment of non-covered services.     Patient would like the video invitation sent by:  My Chart    Mode of Communication:  Video Conference via [facility],     As the provider I attest to compliance with applicable laws and regulations related to telemedicine.         Treatment Plan Last Reviewed: 11/10/2022  PHQ-9/DAVID-7: 11/16/2022      DATA  Interactive Complexity: No  Crisis: No        Progress Since Last Session (Related to Symptoms / Goals / Homework):   Symptoms:   She reports it has been a difficult week - feeling more depressed and down, in the context of 's cancer treatment (which was delayed to him being exposed to COVID) and her daughter having more behavioral challenges at home.      Homework: Achieved / completed to satisfaction. Was able to \"let go\" when others are taking care of Kaylee or Colia so that she can rest, and has been successful with asking " support system for help where needed (dad has been coming over to provide extra help with Kaylee in the mornings).          l    Episode of Care Goals: Satisfactory progress - ACTION (Actively working towards change); Intervened by reinforcing change plan / affirming steps taken.  The patient stated that her main goals for therapy would be to learn emotion regulation strategies (in particular, to help with when she is feeling upset/angry/frustrated/distressed).  The changes in her daily life due to COVID restrictions were a focus of our initial work together, and working through changes to routine as her daughter started school and she returned to work.  As she has been making progress, focus of treatment will shift to wellness planning (monitoring mood and symptoms, incorporating into routine what keeps her feeling well, review of triggers, how to manage set backs, etc.).  As her  has been diagnosed with cancer, treatment will also focus on providing her with support in managing the emotional distress and challenges related to this.       Current / Ongoing Stressors and Concerns:   Current: Has been experiencing symptoms of anxiety and depression.  Stress has been heightened in the context of her  going through cancer treatment.  His treatment was delayed as he was exposed to COVID.  Her daughter has been experiencing more behavioral challenges at home.   After her  comes back home, he will need caregiver assistance for 30 days.   Daughter has been diagnosed with ADHD and they are connecting with individual and family support.       Ongoing:Managing daily routine which has contributed to some adjustment and parenting challenges, lack of effective emotion regulation strategies for managing daily frustrations and upsets and marital discord due to differences in parenting styles.       Treatment Objective(s) Addressed in This Session:     Identify strategies to help with caregiver stress  - in  "particular, how to help with increasing emotional support     Remind yourself, and reaffirm - that focusing on your own self-care is important!  (let go of unjustified guilt related to taking care of yourself)      Continue to review/practice/reinforce:    Identify how to practice and encourage having a flexible mindset when things don't go as anticipated  Learn strategy of \"radical acceptance\", benefits of strategy, and how to apply strategy  Practice checking in on a regular basis, remind self to slow down when rushing, take deep breaths   Identify and enforce healthy emotional boundaries (define your role as spouse, not \"therapist\" or \"fixer\")  Identify factors to consider when having crucial conversations   Identify ways to practice and incorporate self-care into routine.   Learn and practice mindfulness strategies - redirect back to present moment, focus on the facts   Identify and enforce healthy emotional boundaries (it is ok to feel different than my spouse does, it is okay for him to have his feelings and to not feel like I have to fix or change how he is feeling)  Continue to practice awareness of your emotions and physical sensations, to help tune in to what you need  Identify opportunities to access support and help from others  Mindful awareness of thoughts, impact on your emotions, and recognize opportunities to redirect thoughts.  Practice re-directing thoughts to a more helpful perspective.    Practice balanced thinking to counteract polarized/all or nothing thinking.       Intervention:   CBTand solution-focused: She wished to focus today on how to build her emotional resilience and how to manage when her  is feeling down and physically not well.  She reflected that she has been feeling very overwhelmed by the demands of caregiver stress.  She's also been feeling more overwhelmed by seeing an increase in her daughter's challenging behaviors at home, including times where her daughter has " become physically aggressive at home.  I encouraged she contact her daughter's care team to review her concerns and seek their guidance.  I also provided her with a review of how to contact [facility].  I asked if she thought respite resources may be helpful, but at this time she believes her family is able to provide her with enough support to take care of her daughter so that she can get respite.        We problem-solved ways for her to seek help for what is contributing to her feeling overwhelmed.  She will ask her parents for extra help with Kaylee, will talk to her daughter's treatment team to get their suggestions, will remind herself to take deep breaths throughout the day, will use affirming and encouraging self-talk, and will remind herself that there are people who can help her through this and that she will get through this.         ASSESSMENT: Current Emotional / Mental Status (status of significant symptoms):   Risk status (Self / Other harm or suicidal ideation)   Patient denies current fears or concerns for personal safety.   Patient denies current or recent suicidal ideation or behaviors.  She reported she has had passive thoughts of wondering if it would matter if something happened to her, like if she got into a car accident.  She denied any intention or plan to harm herself, and denied that she has taken any action to try and hurt herself, and described these as quickly passing thoughts that she feels able to manage.    She continues to agree to use a safety plan should any safety concerns arise.  She also agrees to reach our to her spouse or a family member for help if needed, and/or access crisis/emergency resources.        Patientdenies current or recent homicidal ideation or behaviors.   Patient denies current or recent self injurious behavior or ideation.   Patient denies other safety concerns.   Patient reports there has been no change in risk factors since their last session.   "   Patient reports there has been no change in protective factors since their last session.     Recommended that patient call 911 or go to the local ED should there be a change in any of these risk factors.  We reviewed how to contact [facility]/COPE for urgent/crisis concerns 24/7.  Provided patient with crisis resources and she agrees to use safety plan should any safety concerns arise.      Professionals or agencies I can contact during a crisis:  ? Suicide Prevention Lifeline: call or text 588  ? Crisis Text Line: text \"[place]\" to 138658    Local Crisis Services: [facility] Services: [phone]    Call 911 or go to my nearest emergency department.          Appearance:   Appropriate    Eye Contact:   Good    Psychomotor Behavior: Normal    Attitude:   Cooperative    Orientation:   All   Speech    Rate / Production: Normal     Volume:  Normal    Mood:    Feels more depressed and down   Affect:    Congruent with mood   Thought Content:  Clear    Thought Form:  Coherent  Logical    Insight:    Good      Medication Review:   No changes.  Has been working with Dr. Han on adjusting her anti-depressant medication.        Medication Compliance:   Yes      Changes in Health Issues:   No concerns reported.       Chemical Use Review:   Substance Use: Chemical use reviewed, no changes or active concerns identified.      Tobacco Use: No current tobacco use.      Diagnosis:  Anxiety disorder unspecified  Major depression, recurrent episode, moderate            PLAN: (Patient Tasks / Therapist Tasks / Other)    1) Idalia identified the following goals: Talk to parents about getting more support.  Ask for help with Kaylee (talk to Kaylee's therapist about your concerns, utilize [facility] for urgent concerns)  Remember that thoughts are just thoughts and don't have power over me (\"you have no power over me\").     Continue with goal to take mini-moments throughout the day, mini-breaks, " "self-soothe, remind self to slow down and take deep breaths.      Have some things handy for when times get stressful, remind myself I don't have to engage with negative thoughts, plan activities and time for fun, practice flexible mindset.    2) If there is a crisis situation, remember you are not alone and that there are people who can help you twenty-four hours a day, seven days a week.  Call [facility] ([facility]): [phone].      3) Follow-up visit is scheduled for 12/1/22 at 2:00 pm.  If urgent or crisis concerns arise prior to next scheduled appointment, please reach out to crisis resources, call 911, or go to the emergency department.      Cristy Wilkinson PsyD,   Licensed Psychologist  11/29/2022                          Previous skills and strategies to remind self of and to do as needed:    Practice \"STOP\" technique when you recognize yourself feeling angry   Be a  - what do you notice is happening when Kaylee feels upset?     Practice recognizing when feeling angry, and giving self permission to use calming and self-soothing strategies and take a break.   Look for the gray with thinking  slow down  take deep breaths  manage expectations of self and others.    Journal   Practice flexing \"flexiblity and creativity\" -   Continue the great work you are doing with your daily schedule.    Get outside every day.    Play your instrument.    Sing!    Continue with: \"It's just a thought\" - non-judgmental, observe, float down the river on a leaf, clouds in the gege, reframing unhelpful thoughts -   Keep working on being patient when things are tense around me.    Continue with observing/paying attention to warning signs and signals and give self persmission to slow down, especially during the morning routine with Kaylee.    Practice offerring Kaylee choices when appropriate.      Use \"my plan for success\" to help remind you of calming strategies to practice when feeling upset.    Practice " "decreasing overall vulnerability to emotion mind through getting adequate rest, nutrition, time for yourself, and physical activity.         Consider reading \"Fighting for your marriage\".      Technical Assistance for video visits:    Offer patient the website ([email or web address]) and/or phone number ([phone]) for video visit instructions/assistance if needed.                                             ____________________________________    Treatment Plan     Patient's Name: Idalia Hinojosa                        YOB: 1980     Date of Creation: 1/6/2020  Date Treatment Plan Last Reviewed/Revised: 8/4/22     DSM5 Diagnoses: 300.00 (F41.9) Unspecified Anxiety Disorder, major depression, moderate, recurrent episode  Psychosocial / Contextual Factors: strain in marital relationship, parenting challenges, adjustment challenges, 's cancer diagnosis  PROMIS (reviewed every 90 days):      Anticipated number of session for this episode of care: additional 15-20  Anticipation frequency of session: Weekly until symptoms stabilize, then can decrease the frequency of sessions to likely bi-weekly or once every three weeks  Anticipated Duration of each session: 38-52 minutes  Treatment plan will be reviewed in 90 days or when goals have been changed.         Referral / Collaboration:  We discussed a referral to a couples/marital therapist.  The patient is thinking about this and has talked with her  about the referral, but they are unsure if they would like to follow through at this time.       MeasurableTreatment Goal(s) related to diagnosis / functional impairment(s)  Goal 1: Patient will learn emotion regulation strategies to help when she is feeling upset/angry/frustrated/distressed    I will know I've met my goal when I would yell less, I would feel calmer, and I would not push others.  I would be less physical when I'm angry (e.g. wouldn't slam doors or hit things)   "      Objective #A (Patient Action)                          Patient will learn and practice at least 2 new emotion regulation strategies.  Status: Continued - Date(s):    11/10/22- Regularly practicing emotion regulation strategies to help manage increase in emotional distress related to spouse's cancer diagnosis.  She has been successful with using strategies to help her stay calm when her daughter's experiencing heightened emotion and/or challenging behaviors.          Intervention(s)  Therapist will provide psychoeducation on emotion regulation module from DBT and will assign patient homework to help reinforce skill development.     Objective #B  Patient will identify factors that increase vulnerability to emotion mind and identify ways to decrease vulnerability to emotion mind.  Status: Continued - Date(s):11/10/22 - routinely incorporating mindfulness and self-awareness strategies to increase attention and focus to factors that increase vulnerability to emotion mind           Intervention(s)  Therapist will provide information on factors that increase vulnerabiliyt to emotion mind.     Objective #C  Patient will identify warning signs and signals that emotions are escalating and will learn ways to skillfully intervene early on.  Status: New - Date: 11/10/22 - in progress - has demonstrated good initial progress in being able to identify warning signs and intervene skillfully -            Intervention(s)  Therapist will help patient identify warning signs and signals, and will guide the patient in identifying skillful ways to intervene when exeriencing warning signs and signals.        Goal 2: Patient will learn new parenting strategies to help with managing parenting challenges.  Parent will work on improving relationship with her daughter and defining what she would like this relationship to look like.      I will know I've met my goal when I'm enjoying time with my daughter, teaching her new things, and not  "waiting for things to change       Objective #A (Patient Action)                          Status: Continued - Date(s):11/10/22     Patient will increase understanding of developmental norms for her daughter and ways to manage challenging behaviors.     Intervention(s)  Therapist will provide psychoeducation on developmental norms and parenting strategies.     Objective #B  Patient will spend time reflecting on her relationship with her daughter and defining what she would like this relaitonship to look like.                  Status: Continued - Date(s):11/10/22 - Making good progress -            Intervention(s)  Therapist will guide the patient in reflecting upon her relationship with her daughter and help her define ways to move towards what she vaues in her relationship with her daughter.     Objective #C  Patient will increase time spent on fun activity and play with her daughter.  Status: Continued - Date(s):11/10/22- has been enjoying time with daughter and devoting regular time to fun activities and play together           Intervention(s)  Therapist will guide the patient in identifying ways she can increase positive time with her daughter.  Therapist will also teach mindfulness strategies to help patient with being in the present moment when appropriate - .        Goal 3: Patiient will improve communication with her .      I will know I've met my goal when I feel less irritated with my  and communicate more openly with my .  I would like to be more assertive and less aggressive.   I would like to not avoid telling him things because I'm worried about his reaction or don't think he will react well.  I would like to be more present to the moment during times when this would be helpful.\"       Objective #A (Patient Action)                          Status: Continued - Date(s):11/10/22 - continues to work on this goal, though emphasis has shift to managing caregiver stress related to " 's cancer diagnosis and treatment.              Patient will learn and practice at least two new interpersonal effectiveness strategies.     Intervention(s)  Therapist will teach strategies from DBT module on interpersonal effectiveness, and will assign homework to help reinforce skill development.        Patient has reviewed and agreed to the above plan.        Cristy Wilkinson PsyD, LP  Licensed Psychologist  Reviewed on  11/10/22

## 2022-12-01 ENCOUNTER — VIRTUAL VISIT (OUTPATIENT)
Dept: PSYCHOLOGY | Facility: CLINIC | Age: 42
End: 2022-12-01
Payer: COMMERCIAL

## 2022-12-01 DIAGNOSIS — F33.1 MODERATE EPISODE OF RECURRENT MAJOR DEPRESSIVE DISORDER (H): Primary | ICD-10-CM

## 2022-12-01 NOTE — PROGRESS NOTES
[facility]                                            Progress Note    Patient Name: Idalia Hinojosa  Date: 12/1/2022         Service Type: Individual      Session Start Time: 2:05  pm Session End Time: 2:10 pm  Session Length:  5 minutes    Session #: 78    Attendees: Client     Service Modality: Video visit: -       Provider verified identity through the following two step process.  Patient provided:  Patient is known previously to provider    Telemedicine Visit: The patient's condition can be safely assessed and treated via synchronous audio and visual telemedicine encounter.      Reason for Telemedicine Visit: Services only offered telehealth    Originating Site (Patient Location): Patient's home    Distant Site (Provider Location): Provider Remote Setting- Home Office    Consent:  The patient/guardian has verbally consented to: the potential risks and benefits of telemedicine (telephone visit) versus in person care; bill my insurance or make self-payment for services provided; and responsibility for payment of non-covered services.     Patient would like the video invitation sent by:  My Chart    Mode of Communication:  Video Conference via [facility],     As the provider I attest to compliance with applicable laws and regulations related to telemedicine.         Treatment Plan Last Reviewed: 11/10/2022  PHQ-9/DAVID-7: 11/16/2022      DATA  Interactive Complexity: No  Crisis: No       Brief phone check-in with patient - She reported she has been feeling much better over the last few days.  Her dad has been coming over every morning to help with her daughter, and she was cleared to visit her  in the hospital, so she was able to do that this morning.  She was able to go to work and put in a few hours of work earlier today.  Given that we had just met a few days earlier per her request to move her appointment to an earlier day this week, she reported she did not feel the  need to meet today and preferred to have the time to focus on some self-care activities.    She scheduled a follow-up for next week, 12/5 at 1:00 pm.    Cristy Wilkinson PsyD, LP  Licensed Psychologist   12/1/2022

## 2022-12-07 ENCOUNTER — MYC MEDICAL ADVICE (OUTPATIENT)
Dept: ALLERGY | Facility: CLINIC | Age: 42
End: 2022-12-07

## 2022-12-07 NOTE — TELEPHONE ENCOUNTER
I would recommend to keep the relative humidity of the air between 45 to 55%, and trying to decrease mometasone to 1 spray in each nostril.    Mynor Casey MD

## 2022-12-08 ENCOUNTER — VIRTUAL VISIT (OUTPATIENT)
Dept: PSYCHOLOGY | Facility: CLINIC | Age: 42
End: 2022-12-08
Payer: COMMERCIAL

## 2022-12-08 DIAGNOSIS — F41.9 ANXIETY DISORDER, UNSPECIFIED TYPE: ICD-10-CM

## 2022-12-08 DIAGNOSIS — F33.1 MODERATE EPISODE OF RECURRENT MAJOR DEPRESSIVE DISORDER (H): Primary | ICD-10-CM

## 2022-12-08 PROCEDURE — 90834 PSYTX W PT 45 MINUTES: CPT | Mod: GT | Performed by: PSYCHOLOGIST

## 2022-12-08 NOTE — PROGRESS NOTES
"             [facility]                                            Progress Note    Patient Name: Idalia Hinojosa  Date: 12/8/2022           Service Type: Individual      Session Start Time:  10:05 am Session End Time: 10:57 pm  Session Length:  52 minutes    Session #: 81    Attendees: Client     Service Modality: Video visit: -       Provider verified identity through the following two step process.  Patient provided:  Patient is known previously to provider    Telemedicine Visit: The patient's condition can be safely assessed and treated via synchronous audio and visual telemedicine encounter.      Reason for Telemedicine Visit: Services only offered telehealth    Originating Site (Patient Location): Patient's home    Distant Site (Provider Location): Provider Remote Setting- Home Office    Consent:  The patient/guardian has verbally consented to: the potential risks and benefits of telemedicine (telephone visit) versus in person care; bill my insurance or make self-payment for services provided; and responsibility for payment of non-covered services.     Patient would like the video invitation sent by:  My Chart    Mode of Communication:  Video Conference via [email or web address],     As the provider I attest to compliance with applicable laws and regulations related to telemedicine.         Treatment Plan Last Reviewed: 11/10/2022  PHQ-9/DAVID-7: 11/16/2022      DATA  Interactive Complexity: No  Crisis: No        Progress Since Last Session (Related to Symptoms / Goals / Homework):   Symptoms:  She reported that she has been feeling \"better\" and overall mood is less depressed and anxious.  She reported that Donald is home from the hospital, and her daughter is being cared for by her 's parents.      Homework: Achieved / completed to satisfaction. Very successfully and skillfully reached out to her parents for help when she was feeling overwhelmed by making trips to the hospital by herself.  She " also talked with the  through her 's cancer care team, shared some of the things that have been stressful and challenging for her, and followed some of the suggestions they provided on making a plan to address stressors.         l    Episode of Care Goals: Satisfactory progress - ACTION (Actively working towards change); Intervened by reinforcing change plan / affirming steps taken.  The patient stated that her main goals for therapy would be to learn emotion regulation strategies (in particular, to help with when she is feeling upset/angry/frustrated/distressed).  The changes in her daily life due to COVID restrictions were a focus of our initial work together, and working through changes to routine as her daughter started school and she returned to work.  As she has been making progress, focus of treatment will shift to wellness planning (monitoring mood and symptoms, incorporating into routine what keeps her feeling well, review of triggers, how to manage set backs, etc.).  As her  has been diagnosed with cancer, treatment will also focus on providing her with support in managing the emotional distress and challenges related to this.       Current / Ongoing Stressors and Concerns:   Current: Has been experiencing symptoms of anxiety and depression.  Stress has been heightened in the context of her  going through cancer treatment.   has recently been discharged from the hospital post treatment and requires 30 days of caregiver support.   Daughter has been diagnosed with ADHD, and has reportedly been experiencing more behavioral challenges recently and they are connecting with individual and family support.       Ongoing:Managing daily routine which has contributed to some adjustment and parenting challenges, lack of effective emotion regulation strategies for managing daily frustrations and upsets and marital discord due to differences in parenting styles.       Treatment  "Objective(s) Addressed in This Session:     Identify and enforce healthy emotional boundaries (define your role as spouse, not \"therapist\" or \"fixer\")  Identify strategies to help with caregiver stress  - in particular, how to help with increasing emotional support     Remind yourself, and reaffirm - that focusing on your own self-care is important!  (let go of unjustified guilt related to taking care of yourself)  Identify ways to practice and incorporate self-care into routine.       Continue to review/practice/reinforce:    Identify how to practice and encourage having a flexible mindset when things don't go as anticipated  Learn strategy of \"radical acceptance\", benefits of strategy, and how to apply strategy  Practice checking in on a regular basis, remind self to slow down when rushing, take deep breaths   Learn and practice mindfulness strategies - redirect back to present moment, focus on the facts   Identify and enforce healthy emotional boundaries (it is ok to feel different than my spouse does, it is okay for him to have his feelings and to not feel like I have to fix or change how he is feeling)  Continue to practice awareness of your emotions and physical sensations, to help tune in to what you need  Identify opportunities to access support and help from others  Mindful awareness of thoughts, impact on your emotions, and recognize opportunities to redirect thoughts.  Practice re-directing thoughts to a more helpful perspective.    Practice balanced thinking to counteract polarized/all or nothing thinking.       Intervention:   CBTand solution-focused: She wished to focus today on what she needs in light of her  being discharged from the hospital and needing 30 days of caregiver support.  She identified that having time each day to get out of the house and take a break from caregiving would be very helpful for her.  She believes that if she coordinates this request with her parents, that they have " been able to help arrange caregivers to be at home to provide her with a break.  Reinforced her continuing to focus on her own self-care.  Helped her challenge some negative thinking that surfaced for her around taking time for herself, and emphasized the importance of her attending to her own emotional well-being.     ASSESSMENT: Current Emotional / Mental Status (status of significant symptoms):   Risk status (Self / Other harm or suicidal ideation)   Patient denies current fears or concerns for personal safety.   Patient denies current or recent suicidal ideation or behaviors.  She reported she has continued to have some passive thoughts of wondering if it would matter if something happened to her, like if she got into a car accident.  She noticed these were triggered by going to the hospital by herself, in particular because it was difficult for her to leave the hospital and go back home by herself.  She recognized this, and asked her parents if they would go to the hospital with her.  She reported this was very helpful and helped address the overwhelm she was feeling in this situation.  She denied any intention or plan to harm herself, and denied that she has taken any action to try and hurt herself, and described these as quickly passing thoughts that she feels able to manage.    She continues to agree to use a safety plan should any safety concerns arise.  She also agrees to reach our to her spouse or a family member for help if needed, and/or access crisis/emergency resources.        Patientdenies current or recent homicidal ideation or behaviors.   Patient denies current or recent self injurious behavior or ideation.   Patient denies other safety concerns.   Patient reports there has been no change in risk factors since their last session.     Patient reports there has been no change in protective factors since their last session.     Recommended that patient call 911 or go to the local ED should there be a  "change in any of these risk factors.  We reviewed how to contact [facility] crisis/COPE for urgent/crisis concerns 24/7.  Provided patient with crisis resources and she agrees to use safety plan should any safety concerns arise.      Professionals or agencies I can contact during a crisis:  ? Suicide Prevention Lifeline: call or text 988  ? Crisis Text Line: text \"[place]\" to 093001    Local Crisis Services: [facility]: [phone]    Call 911 or go to my nearest emergency department.          Appearance:   Appropriate    Eye Contact:   Good    Psychomotor Behavior: Normal    Attitude:   Cooperative    Orientation:   All   Speech    Rate / Production: Normal     Volume:  Normal    Mood:    Feels less depressed and down   Affect:    Congruent with mood   Thought Content:  Clear    Thought Form:  Coherent  Logical    Insight:    Good      Medication Review:   No changes.  Has been working with Dr. Han on adjusting her anti-depressant medication - feels good about where things are at with her medication and mood.        Medication Compliance:   Yes      Changes in Health Issues:   No concerns reported.  (sent message to allergy doctor with questions about allergy medications)     Chemical Use Review:   Substance Use: Chemical use reviewed, no changes or active concerns identified.      Tobacco Use: No current tobacco use.       Diagnosis:  Anxiety disorder unspecified  Major depression, recurrent episode, moderate            PLAN: (Patient Tasks / Therapist Tasks / Other)    1) Idalia identified the following goals: Take daily break, practice good self-care, reach out to parents for help, let go of unjustified guilt, stay calm, reach out when feeling overwhelmed.      Remember that thoughts are just thoughts and don't have power over me (\"you have no power over me\").     Have some things handy for when times get stressful, remind myself I don't have to engage with negative thoughts, plan " "activities and time for fun, practice flexible mindset.    2) If there is a crisis situation, remember you are not alone and that there are people who can help you twenty-four hours a day, seven days a week.  Call [facility] ([facility]): [phone].      3) Follow-up visit is scheduled for 12/13/22 at 2:00 pm.  If urgent or crisis concerns arise prior to next scheduled appointment, please reach out to crisis resources, call 911, or go to the emergency department.      Cristy Wilkinson PsyD,   Licensed Psychologist  12/8/2022                            Previous skills and strategies to remind self of and to do as needed:    Practice \"STOP\" technique when you recognize yourself feeling angry   Be a  - what do you notice is happening when Kaylee feels upset?     Practice recognizing when feeling angry, and giving self permission to use calming and self-soothing strategies and take a break.   Look for the gray with thinking  slow down  take deep breaths  manage expectations of self and others.    Journal   Practice flexing \"flexiblity and creativity\" -   Continue the great work you are doing with your daily schedule.    Get outside every day.    Play your instrument.    Sing!    Continue with: \"It's just a thought\" - non-judgmental, observe, float down the river on a leaf, clouds in the gege, reframing unhelpful thoughts -   Keep working on being patient when things are tense around me.    Continue with observing/paying attention to warning signs and signals and give self persmission to slow down, especially during the morning routine with Kaylee.    Practice offerring Kaylee choices when appropriate.      Use \"my plan for success\" to help remind you of calming strategies to practice when feeling upset.    Practice decreasing overall vulnerability to emotion mind through getting adequate rest, nutrition, time for yourself, and physical activity.         Consider reading \"Fighting for your marriage\".  "     Technical Assistance for video visits:    Offer patient the website ([email or web address]) and/or phone number ([phone]) for video visit instructions/assistance if needed.                                             ____________________________________    Treatment Plan     Patient's Name: Idalia Hinojosa                        YOB: 1980     Date of Creation: 1/6/2020  Date Treatment Plan Last Reviewed/Revised: 8/4/22     DSM5 Diagnoses: 300.00 (F41.9) Unspecified Anxiety Disorder, major depression, moderate, recurrent episode  Psychosocial / Contextual Factors: strain in marital relationship, parenting challenges, adjustment challenges, 's cancer diagnosis  PROMIS (reviewed every 90 days):      Anticipated number of session for this episode of care: additional 15-20  Anticipation frequency of session: Weekly until symptoms stabilize, then can decrease the frequency of sessions to likely bi-weekly or once every three weeks  Anticipated Duration of each session: 38-52 minutes  Treatment plan will be reviewed in 90 days or when goals have been changed.         Referral / Collaboration:  We discussed a referral to a couples/marital therapist.  The patient is thinking about this and has talked with her  about the referral, but they are unsure if they would like to follow through at this time.       MeasurableTreatment Goal(s) related to diagnosis / functional impairment(s)  Goal 1: Patient will learn emotion regulation strategies to help when she is feeling upset/angry/frustrated/distressed    I will know I've met my goal when I would yell less, I would feel calmer, and I would not push others.  I would be less physical when I'm angry (e.g. wouldn't slam doors or hit things)        Objective #A (Patient Action)                          Patient will learn and practice at least 2 new emotion regulation strategies.  Status: Continued - Date(s):    11/10/22- Regularly practicing  emotion regulation strategies to help manage increase in emotional distress related to spouse's cancer diagnosis.  She has been successful with using strategies to help her stay calm when her daughter's experiencing heightened emotion and/or challenging behaviors.          Intervention(s)  Therapist will provide psychoeducation on emotion regulation module from DBT and will assign patient homework to help reinforce skill development.     Objective #B  Patient will identify factors that increase vulnerability to emotion mind and identify ways to decrease vulnerability to emotion mind.  Status: Continued - Date(s):11/10/22 - routinely incorporating mindfulness and self-awareness strategies to increase attention and focus to factors that increase vulnerability to emotion mind           Intervention(s)  Therapist will provide information on factors that increase vulnerabiliyt to emotion mind.     Objective #C  Patient will identify warning signs and signals that emotions are escalating and will learn ways to skillfully intervene early on.  Status: New - Date: 11/10/22 - in progress - has demonstrated good initial progress in being able to identify warning signs and intervene skillfully -            Intervention(s)  Therapist will help patient identify warning signs and signals, and will guide the patient in identifying skillful ways to intervene when exeriencing warning signs and signals.        Goal 2: Patient will learn new parenting strategies to help with managing parenting challenges.  Parent will work on improving relationship with her daughter and defining what she would like this relationship to look like.      I will know I've met my goal when I'm enjoying time with my daughter, teaching her new things, and not waiting for things to change       Objective #A (Patient Action)                          Status: Continued - Date(s):11/10/22     Patient will increase understanding of developmental norms for her  "daughter and ways to manage challenging behaviors.     Intervention(s)  Therapist will provide psychoeducation on developmental norms and parenting strategies.     Objective #B  Patient will spend time reflecting on her relationship with her daughter and defining what she would like this relaitonship to look like.                  Status: Continued - Date(s):11/10/22 - Making good progress -            Intervention(s)  Therapist will guide the patient in reflecting upon her relationship with her daughter and help her define ways to move towards what she vaues in her relationship with her daughter.     Objective #C  Patient will increase time spent on fun activity and play with her daughter.  Status: Continued - Date(s):11/10/22- has been enjoying time with daughter and devoting regular time to fun activities and play together           Intervention(s)  Therapist will guide the patient in identifying ways she can increase positive time with her daughter.  Therapist will also teach mindfulness strategies to help patient with being in the present moment when appropriate - .        Goal 3: Patiient will improve communication with her .      I will know I've met my goal when I feel less irritated with my  and communicate more openly with my .  I would like to be more assertive and less aggressive.   I would like to not avoid telling him things because I'm worried about his reaction or don't think he will react well.  I would like to be more present to the moment during times when this would be helpful.\"       Objective #A (Patient Action)                          Status: Continued - Date(s):11/10/22 - continues to work on this goal, though emphasis has shift to managing caregiver stress related to 's cancer diagnosis and treatment.              Patient will learn and practice at least two new interpersonal effectiveness strategies.     Intervention(s)  Therapist will teach strategies from DBT " module on interpersonal effectiveness, and will assign homework to help reinforce skill development.        Patient has reviewed and agreed to the above plan.        Cristy Wilkinson PsyD, LP  Licensed Psychologist  Reviewed on  11/10/22

## 2022-12-13 ENCOUNTER — VIRTUAL VISIT (OUTPATIENT)
Dept: PSYCHOLOGY | Facility: CLINIC | Age: 42
End: 2022-12-13
Payer: COMMERCIAL

## 2022-12-13 DIAGNOSIS — F41.9 ANXIETY DISORDER, UNSPECIFIED TYPE: ICD-10-CM

## 2022-12-13 DIAGNOSIS — F33.1 MODERATE EPISODE OF RECURRENT MAJOR DEPRESSIVE DISORDER (H): Primary | ICD-10-CM

## 2022-12-13 PROCEDURE — 90832 PSYTX W PT 30 MINUTES: CPT | Mod: GT | Performed by: PSYCHOLOGIST

## 2022-12-13 NOTE — PROGRESS NOTES
"             [facility]                                            Progress Note    Patient Name: Idalia Hinojosa  Date: 12/13/2022           Service Type: Individual      Session Start Time:  2:05 pm Session End Time: 2:35 pm  Session Length:  30 minutes    Session #: 82    Attendees: Client     Service Modality: Video visit: -       Provider verified identity through the following two step process.  Patient provided:  Patient is known previously to provider    Telemedicine Visit: The patient's condition can be safely assessed and treated via synchronous audio and visual telemedicine encounter.      Reason for Telemedicine Visit: Services only offered telehealth    Originating Site (Patient Location): Patient's home    Distant Site (Provider Location): Provider Remote Setting- Home Office    Consent:  The patient/guardian has verbally consented to: the potential risks and benefits of telemedicine (telephone visit) versus in person care; bill my insurance or make self-payment for services provided; and responsibility for payment of non-covered services.     Patient would like the video invitation sent by:  My Chart    Mode of Communication:  Video Conference via [employer],     As the provider I attest to compliance with applicable laws and regulations related to telemedicine.         Treatment Plan Last Reviewed: 11/10/2022  PHQ-9/DAVID-7: 11/16/2022      DATA  Interactive Complexity: No  Crisis: No        Progress Since Last Session (Related to Symptoms / Goals / Homework):   Symptoms:  She reported that she's been feeling more \"dysregulated\".  Her  is back in the hospital.      Homework: Achieved / completed to satisfaction. Has been using strategies and skills learned in therapy to help with managing stressors.           l    Episode of Care Goals: Satisfactory progress - ACTION (Actively working towards change); Intervened by reinforcing change plan / affirming steps taken.  The " patient stated that her main goals for therapy would be to learn emotion regulation strategies (in particular, to help with when she is feeling upset/angry/frustrated/distressed).  The changes in her daily life due to COVID restrictions were a focus of our initial work together, and working through changes to routine as her daughter started school and she returned to work.  As she has been making progress, focus of treatment will shift to wellness planning (monitoring mood and symptoms, incorporating into routine what keeps her feeling well, review of triggers, how to manage set backs, etc.).  As her  has been diagnosed with cancer, treatment will also focus on providing her with support in managing the emotional distress and challenges related to this.       Current / Ongoing Stressors and Concerns:   Current: Has been experiencing symptoms of anxiety and depression.  Stress has been heightened in the context of her  going through cancer treatment.  Her  has been re-admitted back into the hospital.  Daughter has been diagnosed with ADHD, and has reportedly been experiencing more behavioral challenges recently and they are connecting with individual and family support.       Ongoing:Managing daily routine which has contributed to some adjustment and parenting challenges, lack of effective emotion regulation strategies for managing daily frustrations and upsets and marital discord due to differences in parenting styles.       Treatment Objective(s) Addressed in This Session:     Identify and let go of unjustified guilt (it is ok and healthy to take a break, when others are taking care of spouse, allow yourself to take a break)  Identify strategies to help with caregiver stress  - in particular, how to help with increasing emotional support     Remind yourself, and reaffirm - that focusing on your own self-care is important!  (let go of unjustified guilt related to taking care of yourself)   "Identify ways to practice and incorporate self-care into routine.        Intervention:   CBTand solution-focused: We talked about cognitive distortions she experiences related to taking a break.  In particular, feeling guilty for taking a break.  She will work on identifying \"unjustified\" guilt, practicing re-framing and releasing unjustified guilt.  She noted that while Donald is back in the hospital and others are taking care of him, she'd like to work on letting go of guilt about taking a break and getting extra rest.      ASSESSMENT: Current Emotional / Mental Status (status of significant symptoms):   Risk status (Self / Other harm or suicidal ideation)   Patient denies current fears or concerns for personal safety.   Patient denies current or recent suicidal ideation or behaviors.   She continues to agree to use a safety plan should any safety concerns arise.  She also agrees to reach our to her spouse or a family member for help if needed, and/or access crisis/emergency resources.        Patientdenies current or recent homicidal ideation or behaviors.   Patient denies current or recent self injurious behavior or ideation.   Patient denies other safety concerns.   Patient reports there has been no change in risk factors since their last session.     Patient reports there has been no change in protective factors since their last session.     Recommended that patient call 911 or go to the local ED should there be a change in any of these risk factors.  We reviewed how to contact [facility] crisis/COPE for urgent/crisis concerns 24/7.  Provided patient with crisis resources and she agrees to use safety plan should any safety concerns arise.      Professionals or agencies I can contact during a crisis:  ? Suicide Prevention Lifeline: call or text 618  ? Crisis Text Line: text \"[place]\" to 739934    Local Crisis Services: [facility]: [phone]    Call 911 or go to my nearest emergency " "department.          Appearance:   Appropriate    Eye Contact:   Good    Psychomotor Behavior: Normal    Attitude:   Cooperative    Orientation:   All   Speech    Rate / Production: Normal     Volume:  Normal    Mood:    Feels more \"dysregulated\" today   Affect:    Congruent with mood   Thought Content:  Clear    Thought Form:  Coherent  Logical    Insight:    Good      Medication Review:   No changes.  Has been working with Dr. Han on adjusting her anti-depressant medication - feels good about where things are at with her medication and mood.        Medication Compliance:   Yes      Changes in Health Issues:   No concerns reported.       Chemical Use Review:   Substance Use: Chemical use reviewed, no changes or active concerns identified.      Tobacco Use: No current tobacco use.       Diagnosis:  Anxiety disorder unspecified  Major depression, recurrent episode, moderate            PLAN: (Patient Tasks / Therapist Tasks / Other)    1) Idalia identified the following goals: Staying flexible, being open to what happens, find times to relax and take a break, let go of guilt,       2) If there is a crisis situation, remember you are not alone and that there are people who can help you twenty-four hours a day, seven days a week.  Call [facility] ([facility]): [phone].      3) Follow-up visit is scheduled for 12/20/22.  If urgent or crisis concerns arise prior to next scheduled appointment, please reach out to crisis resources, call 911, or go to the emergency department.      Cristy Wilkinson PsyD, LP  Licensed Psychologist  12/13/2022                            Previous skills and strategies to remind self of and to do as needed:    Practice \"STOP\" technique when you recognize yourself feeling angry   Be a  - what do you notice is happening when Kaylee feels upset?     Practice recognizing when feeling angry, and giving self permission to use calming and self-soothing strategies and take " "a break.   Look for the gray with thinking  slow down  take deep breaths  manage expectations of self and others.    Journal   Practice flexing \"flexiblity and creativity\" -   Continue the great work you are doing with your daily schedule.    Get outside every day.    Play your instrument.    Sing!    Continue with: \"It's just a thought\" - non-judgmental, observe, float down the river on a leaf, clouds in the gege, reframing unhelpful thoughts -   Keep working on being patient when things are tense around me.    Continue with observing/paying attention to warning signs and signals and give self persmission to slow down, especially during the morning routine with Kaylee.    Practice offerring Kaylee choices when appropriate.      Use \"my plan for success\" to help remind you of calming strategies to practice when feeling upset.    Practice decreasing overall vulnerability to emotion mind through getting adequate rest, nutrition, time for yourself, and physical activity.         Consider reading \"Fighting for your marriage\".      Technical Assistance for video visits:    Offer patient the website ([email or web address]) and/or phone number ([phone]) for video visit instructions/assistance if needed.                                             ____________________________________    Treatment Plan     Patient's Name: Idalia Hinojosa                        YOB: 1980     Date of Creation: 1/6/2020  Date Treatment Plan Last Reviewed/Revised: 8/4/22     DSM5 Diagnoses: 300.00 (F41.9) Unspecified Anxiety Disorder, major depression, moderate, recurrent episode  Psychosocial / Contextual Factors: strain in marital relationship, parenting challenges, adjustment challenges, 's cancer diagnosis  PROMIS (reviewed every 90 days):      Anticipated number of session for this episode of care: additional 15-20  Anticipation frequency of session: Weekly until symptoms stabilize, then can decrease the frequency " of sessions to likely bi-weekly or once every three weeks  Anticipated Duration of each session: 38-52 minutes  Treatment plan will be reviewed in 90 days or when goals have been changed.         Referral / Collaboration:  We discussed a referral to a couples/marital therapist.  The patient is thinking about this and has talked with her  about the referral, but they are unsure if they would like to follow through at this time.       MeasurableTreatment Goal(s) related to diagnosis / functional impairment(s)  Goal 1: Patient will learn emotion regulation strategies to help when she is feeling upset/angry/frustrated/distressed    I will know I've met my goal when I would yell less, I would feel calmer, and I would not push others.  I would be less physical when I'm angry (e.g. wouldn't slam doors or hit things)        Objective #A (Patient Action)                          Patient will learn and practice at least 2 new emotion regulation strategies.  Status: Continued - Date(s):    11/10/22- Regularly practicing emotion regulation strategies to help manage increase in emotional distress related to spouse's cancer diagnosis.  She has been successful with using strategies to help her stay calm when her daughter's experiencing heightened emotion and/or challenging behaviors.          Intervention(s)  Therapist will provide psychoeducation on emotion regulation module from DBT and will assign patient homework to help reinforce skill development.     Objective #B  Patient will identify factors that increase vulnerability to emotion mind and identify ways to decrease vulnerability to emotion mind.  Status: Continued - Date(s):11/10/22 - routinely incorporating mindfulness and self-awareness strategies to increase attention and focus to factors that increase vulnerability to emotion mind           Intervention(s)  Therapist will provide information on factors that increase vulnerabiliyt to emotion mind.     Objective  #C  Patient will identify warning signs and signals that emotions are escalating and will learn ways to skillfully intervene early on.  Status: New - Date: 11/10/22 - in progress - has demonstrated good initial progress in being able to identify warning signs and intervene skillfully -            Intervention(s)  Therapist will help patient identify warning signs and signals, and will guide the patient in identifying skillful ways to intervene when exeriencing warning signs and signals.        Goal 2: Patient will learn new parenting strategies to help with managing parenting challenges.  Parent will work on improving relationship with her daughter and defining what she would like this relationship to look like.      I will know I've met my goal when I'm enjoying time with my daughter, teaching her new things, and not waiting for things to change       Objective #A (Patient Action)                          Status: Continued - Date(s):11/10/22     Patient will increase understanding of developmental norms for her daughter and ways to manage challenging behaviors.     Intervention(s)  Therapist will provide psychoeducation on developmental norms and parenting strategies.     Objective #B  Patient will spend time reflecting on her relationship with her daughter and defining what she would like this relaitonship to look like.                  Status: Continued - Date(s):11/10/22 - Making good progress -            Intervention(s)  Therapist will guide the patient in reflecting upon her relationship with her daughter and help her define ways to move towards what she vaues in her relationship with her daughter.     Objective #C  Patient will increase time spent on fun activity and play with her daughter.  Status: Continued - Date(s):11/10/22- has been enjoying time with daughter and devoting regular time to fun activities and play together           Intervention(s)  Therapist will guide the patient in identifying ways she  "can increase positive time with her daughter.  Therapist will also teach mindfulness strategies to help patient with being in the present moment when appropriate - .        Goal 3: Patiient will improve communication with her .      I will know I've met my goal when I feel less irritated with my  and communicate more openly with my .  I would like to be more assertive and less aggressive.   I would like to not avoid telling him things because I'm worried about his reaction or don't think he will react well.  I would like to be more present to the moment during times when this would be helpful.\"       Objective #A (Patient Action)                          Status: Continued - Date(s):11/10/22 - continues to work on this goal, though emphasis has shift to managing caregiver stress related to 's cancer diagnosis and treatment.              Patient will learn and practice at least two new interpersonal effectiveness strategies.     Intervention(s)  Therapist will teach strategies from DBT module on interpersonal effectiveness, and will assign homework to help reinforce skill development.        Patient has reviewed and agreed to the above plan.        Cristy Wilkinson PsyD, LP  Licensed Psychologist  Reviewed on  11/10/22                                                                                                                                                                                                                                                                          "

## 2022-12-20 ENCOUNTER — VIRTUAL VISIT (OUTPATIENT)
Dept: PSYCHOLOGY | Facility: CLINIC | Age: 42
End: 2022-12-20
Payer: COMMERCIAL

## 2022-12-20 DIAGNOSIS — F41.9 ANXIETY DISORDER, UNSPECIFIED TYPE: ICD-10-CM

## 2022-12-20 DIAGNOSIS — F33.1 MODERATE EPISODE OF RECURRENT MAJOR DEPRESSIVE DISORDER (H): Primary | ICD-10-CM

## 2022-12-20 PROCEDURE — 90834 PSYTX W PT 45 MINUTES: CPT | Mod: GT | Performed by: PSYCHOLOGIST

## 2022-12-20 ASSESSMENT — PATIENT HEALTH QUESTIONNAIRE - PHQ9
SUM OF ALL RESPONSES TO PHQ QUESTIONS 1-9: 6
10. IF YOU CHECKED OFF ANY PROBLEMS, HOW DIFFICULT HAVE THESE PROBLEMS MADE IT FOR YOU TO DO YOUR WORK, TAKE CARE OF THINGS AT HOME, OR GET ALONG WITH OTHER PEOPLE: EXTREMELY DIFFICULT
SUM OF ALL RESPONSES TO PHQ QUESTIONS 1-9: 6

## 2022-12-21 ENCOUNTER — VIRTUAL VISIT (OUTPATIENT)
Dept: PSYCHOLOGY | Facility: CLINIC | Age: 42
End: 2022-12-21
Payer: COMMERCIAL

## 2022-12-21 DIAGNOSIS — F41.9 ANXIETY DISORDER, UNSPECIFIED TYPE: ICD-10-CM

## 2022-12-21 DIAGNOSIS — F33.1 MODERATE EPISODE OF RECURRENT MAJOR DEPRESSIVE DISORDER (H): Primary | ICD-10-CM

## 2022-12-21 PROCEDURE — 90834 PSYTX W PT 45 MINUTES: CPT | Mod: GT | Performed by: PSYCHOLOGIST

## 2022-12-21 NOTE — PROGRESS NOTES
Answers for HPI/ROS submitted by the patient on 12/20/2022  If you checked off any problems, how difficult have these problems made it for you to do your work, take care of things at home, or get along with other people?: Extremely difficult  PHQ9 TOTAL SCORE: 6           [facility]                                            Progress Note    Patient Name: Idalia Hinojosa  Date: 12/20/2022           Service Type: Individual      Session Start Time:  2:06 pm Session End Time: 2:58 pm  Session Length:  52 minutes    Session #: 82    Attendees: Client     Service Modality: Video visit: -       Provider verified identity through the following two step process.  Patient provided:  Patient is known previously to provider    Telemedicine Visit: The patient's condition can be safely assessed and treated via synchronous audio and visual telemedicine encounter.      Reason for Telemedicine Visit: Services only offered telehealth    Originating Site (Patient Location): Patient's home    Distant Site (Provider Location): Provider Remote Setting- Home Office    Consent:  The patient/guardian has verbally consented to: the potential risks and benefits of telemedicine (telephone visit) versus in person care; bill my insurance or make self-payment for services provided; and responsibility for payment of non-covered services.     Patient would like the video invitation sent by:  My Chart    Mode of Communication:  Video Conference via [employer]     As the provider I attest to compliance with applicable laws and regulations related to telemedicine.         Treatment Plan Last Reviewed: 11/10/2022  PHQ-9/DAVID-7: 11/16/2022      DATA  Interactive Complexity: No  Crisis: No        Progress Since Last Session (Related to Symptoms / Goals / Homework):   Symptoms:  She reported that she has had a very stressful and difficult week and depression and anxiety symptoms have intensified.  This is in the context of  witnessing spouse having difficulties breathing at the hospital, right before he was to be discharged, requiring the rapid response team, additional testing, and an extended stay in the hospital.      Homework: Achieved / completed to satisfaction. Very successfully and skillfully reached out to her parents again for help when she was feeling overwhelmed by the sudden turn of events with her spouse.  They've been accompanying her to the hospital, she's been staying at their house, and they will stay with her at her house upon her spouse's return home.         Answers for HPI/ROS submitted by the patient on 12/20/2022  If you checked off any problems, how difficult have these problems made it for you to do your work, take care of things at home, or get along with other people?: Extremely difficult  PHQ9 TOTAL SCORE: 6    l    Episode of Care Goals: Satisfactory progress - ACTION (Actively working towards change); Intervened by reinforcing change plan / affirming steps taken.  The patient stated that her main goals for therapy would be to learn emotion regulation strategies (in particular, to help with when she is feeling upset/angry/frustrated/distressed).  The changes in her daily life due to COVID restrictions were a focus of our initial work together, and working through changes to routine as her daughter started school and she returned to work.  As she has been making progress, focus of treatment will shift to wellness planning (monitoring mood and symptoms, incorporating into routine what keeps her feeling well, review of triggers, how to manage set backs, etc.).  As her  has been diagnosed with cancer, treatment will also focus on providing her with support in managing the emotional distress and challenges related to this.       Current / Ongoing Stressors and Concerns:   Current: Symptoms of anxiety and depression.  Stress has been exacerbated in the context of her  going through cancer  "treatment.  She witnessed him requiring the rapid response team, which was very frightening for her.  He requires 30 days of caregiver support.   Daughter has been diagnosed with ADHD, and has reportedly been experiencing more behavioral challenges recently and they are connecting with individual and family support.       Ongoing:Managing daily routine which has contributed to some adjustment and parenting challenges, lack of effective emotion regulation strategies for managing daily frustrations and upsets and marital discord due to differences in parenting styles.       Treatment Objective(s) Addressed in This Session:     Review of safety plan - warning signs, strategies to help manage increase in symptoms, steps to take if symptoms are worsening, and review of crisis/emergency resources.  Encouraged sharing safety plan with at least 1-2 support people, and offered for support people to join her therapy appointment.    Review of self-care and importance of attending to your self-care  Challenge negative self-talk related to symptoms         Intervention:   CBTand solution-focused:  She processed the recent events from the hospital and said this has been the first time she's talked about it.  She feels badly about how she responded, feels like Donald is the \"patient\" and she's supposed to be the \"caregiver\".  Normalized her response in relation to an abnormal and scary event.  Encouraged her to recognize that she is also impacted emotionally by Donald's cancer and her self-care is of utmost importance.  Recognized the great steps she has taken to reach out to her parents and ask for additional support.  Provided psychoeducation on self-care strategies and symptom management strategies.  We agreed to check-in again tomorrow to see how she is doing, and we can either do a brief check-in or a full session depending upon what she needs.           ASSESSMENT: Current Emotional / Mental Status (status of significant " "symptoms):   Risk status (Self / Other harm or suicidal ideation)   Patient denies current fears or concerns for personal safety.   Patient denies current or recent suicidal ideation or behaviors though stated that while she was processing the recent events of Donald's hospitalization and rapid response team, she reported having thoughts of wishing she were dead.  When asked further about this, she described it as a wish to not have to be in this \"life situation\" she is in right now and not that she wants to be dead.  She described it as a \"maybe I wouldn't care if I got struck by lightning\" but said she has no intention and no plan to harm herself.  She said the thought quickly passed, as she described is similar to when she has had these thoughts in the past that they have quickly passed.  She reported she feels able to manage these thoughts, as she has been through reaching out to her parents and asking for extra support with situations that have felt overwhelming and hard, like going to the hospital alone.  She reports feeling able to continue to do this.      We reviewed safety planning - including identifying warning signs, identifying coping strategies she can use to help manage symptoms, and steps she can take if things worsen.  (she declined working together on completing the [facility] Safety plan that I could send her and thought this was too much, but was open to reviewing strategies together).  Reviewed utilizing crisis resources, calling 911, explained what [facility] is and how it can help in times of a crisis, and [facility] crisis.  She reported that she has these numbers available to her.  She identified that she can reach out to her dad for help if she needs additional support.  Suggested we invite one of her parents into the session today to review her safety plan, she declined this option today, encouraged that she think about this so that others be aware of how she is doing and understand her " "safety plan and how they can help support her.          Patientdenies current or recent homicidal ideation or behaviors.   Patient denies current or recent self injurious behavior or ideation.   Patient denies other safety concerns.   Patient reports there has been a change in risk factors since their last session - witnessed  having difficulties breathing at the hospital and requiring a rapid response.  .       Recommended that patient call 911 or go to the local ED should there be a change in any of these risk factors.  We reviewed how to contact [facility] crisis/COPE for urgent/crisis concerns 24/7.  Provided patient with crisis resources and she agrees to use safety plan should any safety concerns arise.      Professionals or agencies I can contact during a crisis:  ? Suicide Prevention Lifeline: call or text 764  ? Crisis Text Line: text \"[place]\" to 217458    Local Crisis Services: [facility]: [phone]    Call 911 or go to my nearest emergency department, or [facility].           Appearance:   Appropriate    Eye Contact:   Good    Psychomotor Behavior: Normal    Attitude:   Cooperative    Orientation:   All   Speech    Rate / Production: Normal     Volume:  Normal    Mood:    Feels more depressed, anxious, overwhelmed and stressed    Affect:    Congruent with mood   Thought Content:  Clear    Thought Form:  Coherent  Logical    Insight:    Good      Medication Review:   No changes.  Has been working with Dr. Han on adjusting her anti-depressant medication -.        Medication Compliance:   Yes      Changes in Health Issues:   No concerns reported.      Chemical Use Review:   Substance Use: Chemical use reviewed, no changes or active concerns identified.      Tobacco Use: No current tobacco use.       Diagnosis:  Anxiety disorder unspecified  Major depression, recurrent episode, moderate            PLAN: (Patient Tasks / Therapist Tasks / Other)    1) Idalia identified the " "following goals: Communicate with your parents what you need.     Take daily break, practice good self-care, reach out to parents for help, let go of unjustified guilt, stay calm, reach out when feeling overwhelmed.      Remember that thoughts are just thoughts and don't have power over me (\"you have no power over me\").     2) If there is a crisis situation, remember you are not alone and that there are people who can help you twenty-four hours a day, seven days a week.  Call [facility] ([facility]): [phone].      3) Check-in is scheduled for tomorrow - 12/21/22 at 1:00 pm.  If urgent or crisis concerns arise prior to next scheduled appointment, please reach out to crisis resources, call 911, or go to the emergency department.      Cristy Wilkinson PsyD,   Licensed Psychologist  12/20/2022                            Previous skills and strategies to remind self of and to do as needed:    Practice \"STOP\" technique when you recognize yourself feeling angry   Be a  - what do you notice is happening when Kaylee feels upset?     Practice recognizing when feeling angry, and giving self permission to use calming and self-soothing strategies and take a break.   Look for the gray with thinking  slow down  take deep breaths  manage expectations of self and others.    Journal   Practice flexing \"flexiblity and creativity\" -   Continue the great work you are doing with your daily schedule.    Get outside every day.    Play your instrument.    Sing!    Continue with: \"It's just a thought\" - non-judgmental, observe, float down the river on a leaf, clouds in the gege, reframing unhelpful thoughts -   Keep working on being patient when things are tense around me.    Continue with observing/paying attention to warning signs and signals and give self persmission to slow down, especially during the morning routine with Kaylee.    Practice offerring Kaylee choices when appropriate.      Use \"my plan for success\" to " "help remind you of calming strategies to practice when feeling upset.    Practice decreasing overall vulnerability to emotion mind through getting adequate rest, nutrition, time for yourself, and physical activity.         Consider reading \"Fighting for your marriage\".      Technical Assistance for video visits:    Offer patient the website ([email or web address]) and/or phone number ([phone]) for video visit instructions/assistance if needed.                                             ____________________________________    Treatment Plan     Patient's Name: Idalia Hinojosa                        YOB: 1980     Date of Creation: 1/6/2020  Date Treatment Plan Last Reviewed/Revised: 8/4/22     DSM5 Diagnoses: 300.00 (F41.9) Unspecified Anxiety Disorder, major depression, moderate, recurrent episode  Psychosocial / Contextual Factors: strain in marital relationship, parenting challenges, adjustment challenges, 's cancer diagnosis  PROMIS (reviewed every 90 days):      Anticipated number of session for this episode of care: additional 15-20  Anticipation frequency of session: Weekly until symptoms stabilize, then can decrease the frequency of sessions to likely bi-weekly or once every three weeks  Anticipated Duration of each session: 38-52 minutes  Treatment plan will be reviewed in 90 days or when goals have been changed.         Referral / Collaboration:  We discussed a referral to a couples/marital therapist.  The patient is thinking about this and has talked with her  about the referral, but they are unsure if they would like to follow through at this time.       MeasurableTreatment Goal(s) related to diagnosis / functional impairment(s)  Goal 1: Patient will learn emotion regulation strategies to help when she is feeling upset/angry/frustrated/distressed    I will know I've met my goal when I would yell less, I would feel calmer, and I would not push others.  I would be " less physical when I'm angry (e.g. wouldn't slam doors or hit things)        Objective #A (Patient Action)                          Patient will learn and practice at least 2 new emotion regulation strategies.  Status: Continued - Date(s):    11/10/22- Regularly practicing emotion regulation strategies to help manage increase in emotional distress related to spouse's cancer diagnosis.  She has been successful with using strategies to help her stay calm when her daughter's experiencing heightened emotion and/or challenging behaviors.          Intervention(s)  Therapist will provide psychoeducation on emotion regulation module from DBT and will assign patient homework to help reinforce skill development.     Objective #B  Patient will identify factors that increase vulnerability to emotion mind and identify ways to decrease vulnerability to emotion mind.  Status: Continued - Date(s):11/10/22 - routinely incorporating mindfulness and self-awareness strategies to increase attention and focus to factors that increase vulnerability to emotion mind           Intervention(s)  Therapist will provide information on factors that increase vulnerabiliyt to emotion mind.     Objective #C  Patient will identify warning signs and signals that emotions are escalating and will learn ways to skillfully intervene early on.  Status: New - Date: 11/10/22 - in progress - has demonstrated good initial progress in being able to identify warning signs and intervene skillfully -            Intervention(s)  Therapist will help patient identify warning signs and signals, and will guide the patient in identifying skillful ways to intervene when exeriencing warning signs and signals.        Goal 2: Patient will learn new parenting strategies to help with managing parenting challenges.  Parent will work on improving relationship with her daughter and defining what she would like this relationship to look like.      I will know I've met my goal  "when I'm enjoying time with my daughter, teaching her new things, and not waiting for things to change       Objective #A (Patient Action)                          Status: Continued - Date(s):11/10/22     Patient will increase understanding of developmental norms for her daughter and ways to manage challenging behaviors.     Intervention(s)  Therapist will provide psychoeducation on developmental norms and parenting strategies.     Objective #B  Patient will spend time reflecting on her relationship with her daughter and defining what she would like this relaitonship to look like.                  Status: Continued - Date(s):11/10/22 - Making good progress -            Intervention(s)  Therapist will guide the patient in reflecting upon her relationship with her daughter and help her define ways to move towards what she vaues in her relationship with her daughter.     Objective #C  Patient will increase time spent on fun activity and play with her daughter.  Status: Continued - Date(s):11/10/22- has been enjoying time with daughter and devoting regular time to fun activities and play together           Intervention(s)  Therapist will guide the patient in identifying ways she can increase positive time with her daughter.  Therapist will also teach mindfulness strategies to help patient with being in the present moment when appropriate - .        Goal 3: Patiient will improve communication with her .      I will know I've met my goal when I feel less irritated with my  and communicate more openly with my .  I would like to be more assertive and less aggressive.   I would like to not avoid telling him things because I'm worried about his reaction or don't think he will react well.  I would like to be more present to the moment during times when this would be helpful.\"       Objective #A (Patient Action)                          Status: Continued - Date(s):11/10/22 - continues to work on this goal, " though emphasis has shift to managing caregiver stress related to 's cancer diagnosis and treatment.              Patient will learn and practice at least two new interpersonal effectiveness strategies.     Intervention(s)  Therapist will teach strategies from DBT module on interpersonal effectiveness, and will assign homework to help reinforce skill development.        Patient has reviewed and agreed to the above plan.        Cristy Wilkinson PsyD, LP  Licensed Psychologist  Reviewed on  11/10/22

## 2022-12-22 ENCOUNTER — VIRTUAL VISIT (OUTPATIENT)
Dept: PSYCHOLOGY | Facility: CLINIC | Age: 42
End: 2022-12-22
Payer: COMMERCIAL

## 2022-12-22 DIAGNOSIS — F41.9 ANXIETY DISORDER, UNSPECIFIED TYPE: ICD-10-CM

## 2022-12-22 DIAGNOSIS — F33.1 MODERATE EPISODE OF RECURRENT MAJOR DEPRESSIVE DISORDER (H): Primary | ICD-10-CM

## 2022-12-22 PROCEDURE — 90834 PSYTX W PT 45 MINUTES: CPT | Mod: GT | Performed by: PSYCHOLOGIST

## 2022-12-23 NOTE — PROGRESS NOTES
"         [facility]                                            Progress Note    Patient Name: Idalia Hinojosa  Date: 12/21/2022           Service Type: Individual      Session Start Time:  1:05 pm Session End Time: 1:55 pm  Session Length:  50 minutes    Session #: 823    Attendees: Client     Service Modality: Video visit: -       Provider verified identity through the following two step process.  Patient provided:  Patient is known previously to provider    Telemedicine Visit: The patient's condition can be safely assessed and treated via synchronous audio and visual telemedicine encounter.      Reason for Telemedicine Visit: Services only offered telehealth    Originating Site (Patient Location): Patient's work    Distant Site (Provider Location): Provider Remote Setting- Home Office    Consent:  The patient/guardian has verbally consented to: the potential risks and benefits of telemedicine (telephone visit) versus in person care; bill my insurance or make self-payment for services provided; and responsibility for payment of non-covered services.     Patient would like the video invitation sent by:  My Chart    Mode of Communication:  Video Conference via [facility],     As the provider I attest to compliance with applicable laws and regulations related to telemedicine.         Treatment Plan Last Reviewed: 11/10/2022  PHQ-9/DAVID-7: 11/16/2022      DATA  Interactive Complexity: No  Crisis: No        Progress Since Last Session (Related to Symptoms / Goals / Homework):   Symptoms: She reported things have been \"ok\" since Donald has been back home.  They had a good conversation last night which was helpful.  During our appointment she reported feeling heightened anxiety when discussing stressors.      Homework: Achieved / completed to satisfaction.  Using coping strategies to manage symptoms, reaching out for support and accessing support.            Episode of Care Goals: Satisfactory " progress - ACTION (Actively working towards change); Intervened by reinforcing change plan / affirming steps taken.  The patient stated that her main goals for therapy would be to learn emotion regulation strategies (in particular, to help with when she is feeling upset/angry/frustrated/distressed).  The changes in her daily life due to COVID restrictions were a focus of our initial work together, and working through changes to routine as her daughter started school and she returned to work.  As she has been making progress, focus of treatment will shift to wellness planning (monitoring mood and symptoms, incorporating into routine what keeps her feeling well, review of triggers, how to manage set backs, etc.).  As her  has been diagnosed with cancer, treatment will also focus on providing her with support in managing the emotional distress and challenges related to this.       Current / Ongoing Stressors and Concerns:   Current: Symptoms of anxiety and depression.  Stress has been exacerbated in the context of her  going through cancer treatment.  She witnessed him requiring the rapid response team, which was very frightening for her.  He requires 30 days of caregiver support.  Donald is back at home after being discharged from the hospital and she is adjusting to being back at home with him, her parents are there with her to help with caregiving and providing extra support.         Ongoing:Managing daily routine which has contributed to some adjustment and parenting challenges, lack of effective emotion regulation strategies for managing daily frustrations and upsets and marital discord due to differences in parenting styles.       Treatment Objective(s) Addressed in This Session:     Challenge negative self-talk related to symptoms   Review of self-care and importance of attending to your self-care  Review of safety plan - warning signs, strategies to help manage increase in symptoms, steps to take if  "symptoms are worsening, and review of crisis/emergency resources.  Encouraged sharing safety plan with at least 1-2 support people, and offered for support people to join her therapy appointment.         Intervention:   CBTand solution-focused:  She shared that she feels like she's \"not being a caregiver\" because of how she's been impacted emotionally by the recent events and being fearful of being alone with Donald at home.  Provided psychoeducation on her response being a normal response to an abnormal and frightening situation, and recognized her skillfulness in identifying she needed extra support from her parents in light of all she has been through not just this week but over the past year with Donald's cancer.  Emphasized that her attending to her own self-care is very important, and that it was very appropriate and healthy for her to pull in the broader team of support they have to help them both during this very challenging time.  Continue to look at what she is struggling with and what she needs help with - she will ask her questions and concerns about Donald's health to his careteam, so that anxiety is not filling in the blanks with worst case scenarios and she can get info from his careteam.  She'll continue to communicate to her parents how she's feeling and what she needs for support.  She'll practice kind and compassionate self-talk towards her symptoms and experience and recognize that she gets to be emotionally impacted by this too and that her self-care is very important.  Reviewed deep breathing and progressive muscle relaxation to help with managing increase in tension and anxiety.  Encouraged a focus on the basics of sleep, eating, water and moving her body.        ASSESSMENT: Current Emotional / Mental Status (status of significant symptoms):   Risk status (Self / Other harm or suicidal ideation)   Patient denies current fears or concerns for personal safety.   Patient denies current or recent " "suicidal ideation or behaviors.  She reports she is occasionally having passive thoughts of wishing she weren't in this \"life situation\" --  denied any intention to be dead and does not have any plan to harm herself.  She reported she feels able to manage these thoughts, as she has been through reaching out to her parents and asking for extra support with situations that have felt overwhelming and hard, like going to the hospital alone.  She reports feeling able to continue to do this.  She will monitor if these thoughts change to active thoughts, increase in intensity/worsen and reach out for help.    We reviewed safety planning - including identifying warning signs, identifying coping strategies she can use to help manage symptoms, and steps she can take if things worsen.  (she declined again today working together on completing the [place] Safety plan that I could send her and thought this was too much, but was open to reviewing strategies together)   She identified that she can reach out to her dad for help if she needs additional support.  Encouraged she consider inviting one of her parents or support people to a session.          Patientdenies current or recent homicidal ideation or behaviors.   Patient denies current or recent self injurious behavior or ideation.   Patient denies other safety concerns.   Patient reports there has been a change in risk factors since their last session - witnessed  having difficulties breathing at the hospital and requiring a rapid response.  .       Recommended that patient call 911 or go to the local ED should there be a change in any of these risk factors.  We reviewed how to contact [facility] crisis/[facility] for urgent/crisis concerns 24/7.  Provided patient with crisis resources and she agrees to use safety plan should any safety concerns arise.      Professionals or agencies I can contact during a crisis:  ? Suicide Prevention Lifeline: call or text " "988  ? Crisis Text Line: text \"[place]\" to 389907    [facility]: [facility]: [phone]    Call 911 or go to my nearest emergency department, or [facility].           Appearance:   Appropriate    Eye Contact:   Good    Psychomotor Behavior: Normal    Attitude:   Cooperative    Orientation:   All   Speech    Rate / Production: Normal     Volume:  Normal    Mood:    Feels more depressed, anxious, overwhelmed and stressed    Affect:    Congruent with mood   Thought Content:  Clear    Thought Form:  Coherent  Logical    Insight:    Good      Medication Review:   No changes.  Has been working with Dr. Han on adjusting her anti-depressant medication -.        Medication Compliance:   Yes      Changes in Health Issues:   No concerns reported.      Chemical Use Review:   Substance Use: Chemical use reviewed, no changes or active concerns identified.      Tobacco Use: No current tobacco use.       Diagnosis:  Anxiety disorder unspecified  Major depression, recurrent episode, moderate            PLAN: (Patient Tasks / Therapist Tasks / Other)    1) Idalia identified the following goals: Communicate with your parents what you need and how you are feeling (overcommunication is better than under communication!. Practice good self-care and take breaks.  Practice kind and compassionate self-talk towards your self and your experience (think - how would you talk to a friend?)      2) If there is a crisis situation, remember you are not alone and that there are people who can help you twenty-four hours a day, seven days a week.  Call [facility] ([facility]): [phone].      3) Check-in is scheduled for tomorrow - 12/22/22.  If urgent or crisis concerns arise prior to next scheduled appointment, please reach out to crisis resources, call 911, or go to the emergency department.      Cristy Wilkinson PsyD, LP  Licensed Psychologist  12/21/2022                            Previous skills and " "strategies to remind self of and to do as needed:    Practice \"STOP\" technique when you recognize yourself feeling angry   Be a  - what do you notice is happening when Kaylee feels upset?     Practice recognizing when feeling angry, and giving self permission to use calming and self-soothing strategies and take a break.   Look for the gray with thinking  slow down  take deep breaths  manage expectations of self and others.    Journal   Practice flexing \"flexiblity and creativity\" -   Continue the great work you are doing with your daily schedule.    Get outside every day.    Play your instrument.    Sing!    Continue with: \"It's just a thought\" - non-judgmental, observe, float down the river on a leaf, clouds in the gege, reframing unhelpful thoughts -   Keep working on being patient when things are tense around me.    Continue with observing/paying attention to warning signs and signals and give self persmission to slow down, especially during the morning routine with Kaylee.    Practice offerring Kaylee choices when appropriate.      Use \"my plan for success\" to help remind you of calming strategies to practice when feeling upset.    Practice decreasing overall vulnerability to emotion mind through getting adequate rest, nutrition, time for yourself, and physical activity.         Consider reading \"Fighting for your marriage\".      Technical Assistance for video visits:    Offer patient the website ([email or web address]) and/or phone number (414-882-3268) for video visit instructions/assistance if needed.                                             ____________________________________    Treatment Plan     Patient's Name: Idalia Hinojosa                        YOB: 1980     Date of Creation: 1/6/2020  Date Treatment Plan Last Reviewed/Revised: 8/4/22     DSM5 Diagnoses: 300.00 (F41.9) Unspecified Anxiety Disorder, major depression, moderate, recurrent episode  Psychosocial / Contextual " Factors: strain in marital relationship, parenting challenges, adjustment challenges, 's cancer diagnosis  PROMIS (reviewed every 90 days):      Anticipated number of session for this episode of care: additional 15-20  Anticipation frequency of session: Weekly until symptoms stabilize, then can decrease the frequency of sessions to likely bi-weekly or once every three weeks  Anticipated Duration of each session: 38-52 minutes  Treatment plan will be reviewed in 90 days or when goals have been changed.         Referral / Collaboration:  We discussed a referral to a couples/marital therapist.  The patient is thinking about this and has talked with her  about the referral, but they are unsure if they would like to follow through at this time.       MeasurableTreatment Goal(s) related to diagnosis / functional impairment(s)  Goal 1: Patient will learn emotion regulation strategies to help when she is feeling upset/angry/frustrated/distressed    I will know I've met my goal when I would yell less, I would feel calmer, and I would not push others.  I would be less physical when I'm angry (e.g. wouldn't slam doors or hit things)        Objective #A (Patient Action)                          Patient will learn and practice at least 2 new emotion regulation strategies.  Status: Continued - Date(s):    11/10/22- Regularly practicing emotion regulation strategies to help manage increase in emotional distress related to spouse's cancer diagnosis.  She has been successful with using strategies to help her stay calm when her daughter's experiencing heightened emotion and/or challenging behaviors.          Intervention(s)  Therapist will provide psychoeducation on emotion regulation module from DBT and will assign patient homework to help reinforce skill development.     Objective #B  Patient will identify factors that increase vulnerability to emotion mind and identify ways to decrease vulnerability to emotion  mind.  Status: Continued - Date(s):11/10/22 - routinely incorporating mindfulness and self-awareness strategies to increase attention and focus to factors that increase vulnerability to emotion mind           Intervention(s)  Therapist will provide information on factors that increase vulnerabiliyt to emotion mind.     Objective #C  Patient will identify warning signs and signals that emotions are escalating and will learn ways to skillfully intervene early on.  Status: New - Date: 11/10/22 - in progress - has demonstrated good initial progress in being able to identify warning signs and intervene skillfully -            Intervention(s)  Therapist will help patient identify warning signs and signals, and will guide the patient in identifying skillful ways to intervene when exeriencing warning signs and signals.        Goal 2: Patient will learn new parenting strategies to help with managing parenting challenges.  Parent will work on improving relationship with her daughter and defining what she would like this relationship to look like.      I will know I've met my goal when I'm enjoying time with my daughter, teaching her new things, and not waiting for things to change       Objective #A (Patient Action)                          Status: Continued - Date(s):11/10/22     Patient will increase understanding of developmental norms for her daughter and ways to manage challenging behaviors.     Intervention(s)  Therapist will provide psychoeducation on developmental norms and parenting strategies.     Objective #B  Patient will spend time reflecting on her relationship with her daughter and defining what she would like this relaitonship to look like.                  Status: Continued - Date(s):11/10/22 - Making good progress -            Intervention(s)  Therapist will guide the patient in reflecting upon her relationship with her daughter and help her define ways to move towards what she vaues in her relationship with  "her daughter.     Objective #C  Patient will increase time spent on fun activity and play with her daughter.  Status: Continued - Date(s):11/10/22- has been enjoying time with daughter and devoting regular time to fun activities and play together           Intervention(s)  Therapist will guide the patient in identifying ways she can increase positive time with her daughter.  Therapist will also teach mindfulness strategies to help patient with being in the present moment when appropriate - .        Goal 3: Patiient will improve communication with her .      I will know I've met my goal when I feel less irritated with my  and communicate more openly with my .  I would like to be more assertive and less aggressive.   I would like to not avoid telling him things because I'm worried about his reaction or don't think he will react well.  I would like to be more present to the moment during times when this would be helpful.\"       Objective #A (Patient Action)                          Status: Continued - Date(s):11/10/22 - continues to work on this goal, though emphasis has shift to managing caregiver stress related to 's cancer diagnosis and treatment.              Patient will learn and practice at least two new interpersonal effectiveness strategies.     Intervention(s)  Therapist will teach strategies from DBT module on interpersonal effectiveness, and will assign homework to help reinforce skill development.        Patient has reviewed and agreed to the above plan.        Cristy Wilkinson PsyD, LP  Licensed Psychologist  Reviewed on  11/10/22                                                                                                                                                                                                                                                                          "

## 2022-12-23 NOTE — PROGRESS NOTES
"         [facility]                                            Progress Note    Patient Name: Idalia Hinojosa  Date: 12/22/2022           Service Type: Individual      Session Start Time:  10:35 am Session End Time: 11:20 pm  Session Length:  45 minutes    Session #: 85    Attendees: Client     Service Modality: Video visit: -       Provider verified identity through the following two step process.  Patient provided:  Patient is known previously to provider    Telemedicine Visit: The patient's condition can be safely assessed and treated via synchronous audio and visual telemedicine encounter.      Reason for Telemedicine Visit: Services only offered telehealth    Originating Site (Patient Location): Patient's home    Distant Site (Provider Location): Provider Remote Setting- Home Office    Consent:  The patient/guardian has verbally consented to: the potential risks and benefits of telemedicine (telephone visit) versus in person care; bill my insurance or make self-payment for services provided; and responsibility for payment of non-covered services.     Patient would like the video invitation sent by:  My Chart    Mode of Communication:  Video Conference via [employer],     As the provider I attest to compliance with applicable laws and regulations related to telemedicine.         Treatment Plan Last Reviewed: 11/10/2022  PHQ-9/DAVID-7: 11/16/2022      DATA  Interactive Complexity: No  Crisis: No        Progress Since Last Session (Related to Symptoms / Goals / Homework):   Symptoms:  She reported that she is feeling \"better\" - things are settling in at home and feeling more \"normal\".  She had a really nice evening last night with Donald and her dad.  They played a game, spent time together and she enjoyed it.       Homework: Achieved / completed to satisfaction. Was able to communicate to her parents how she's feeling and what she needs for support and they have been responsive and receptive.  "        l    Episode of Care Goals: Satisfactory progress - ACTION (Actively working towards change); Intervened by reinforcing change plan / affirming steps taken.  The patient stated that her main goals for therapy would be to learn emotion regulation strategies (in particular, to help with when she is feeling upset/angry/frustrated/distressed).  The changes in her daily life due to COVID restrictions were a focus of our initial work together, and working through changes to routine as her daughter started school and she returned to work.  As she has been making progress, focus of treatment will shift to wellness planning (monitoring mood and symptoms, incorporating into routine what keeps her feeling well, review of triggers, how to manage set backs, etc.).  As her  has been diagnosed with cancer, treatment will also focus on providing her with support in managing the emotional distress and challenges related to this and caregiver stress.       Current / Ongoing Stressors and Concerns:   Current: Symptoms of anxiety and depression.  Stress has been exacerbated in the context of her  going through cancer treatment.  She witnessed him requiring the rapid response team, which was very frightening for her and she has been worried to be home alone with him.  Her parents have been staying at the house with her to provide additional support.  Today she said she thinks she may be able to try being at the house with Donald without her parents there.  She reported that his appointments are going very and are reassuring regarding his response to the transplant.  He requires 30 days of caregiver support and they are on day 17.   Daughter has been diagnosed with ADHD, and has reportedly been experiencing more behavioral challenges recently and they are connecting with individual and family support.       Ongoing:Managing daily routine which has contributed to some adjustment and parenting challenges, lack of  "effective emotion regulation strategies for managing daily frustrations and upsets and marital discord due to differences in parenting styles.       Treatment Objective(s) Addressed in This Session:     Psychoeducation on managing panic and intense anxiety  Review of wellness plan - warning signs, strategies to help manage increase in symptoms, steps to take if symptoms are worsening, and review of crisis/emergency resources.  Encouraged sharing coping plan with at least 1-2 support people, and offered for support people to join her therapy appointment.    Review of self-care and importance of attending to your self-care  Challenge negative self-talk related to symptoms         Continue to review/practice/reinforce:    Identify strategies to help with caregiver stress  - in particular, how to help with increasing emotional support     Remind yourself, and reaffirm - that focusing on your own self-care is important!  (let go of unjustified guilt related to taking care of yourself)  Identify ways to practice and incorporate self-care into routine.   Identify and enforce healthy emotional boundaries (define your role as spouse, not \"therapist\" or \"fixer\")  Identify how to practice and encourage having a flexible mindset when things don't go as anticipated  Learn strategy of \"radical acceptance\", benefits of strategy, and how to apply strategy  Practice checking in on a regular basis, remind self to slow down when rushing, take deep breaths   Learn and practice mindfulness strategies - redirect back to present moment, focus on the facts   Identify and enforce healthy emotional boundaries (it is ok to feel different than my spouse does, it is okay for him to have his feelings and to not feel like I have to fix or change how he is feeling)  Continue to practice awareness of your emotions and physical sensations, to help tune in to what you need  Identify opportunities to access support and help from others  Mindful " "awareness of thoughts, impact on your emotions, and recognize opportunities to redirect thoughts.  Practice re-directing thoughts to a more helpful perspective.    Practice balanced thinking to counteract polarized/all or nothing thinking.       Intervention:   CBTand solution-focused: She continues to process her experience of witnessing Donald's medical emergency.  She shared that she feels badly for how she reacted (had panic).  Provided psychoeducation regarding her experience - she had a normal response to an abnormal situation.  Reviewed steps she can continue to take to help her recover from this.  We also talked about strategies to manage when she is feeling panic/heightened anxiety.  Reviewed deep breathing, \"riding the wave\", self-soothe using five senses, and challenging irrational/negative self-talk.  Also encouraged she share any questions or concerns she has about Donald's symptoms and recovery with his care team to get accurate information.  They have a nurse line they can call at any time.  She'll also be meeting with the oncology social worker next week, who she has also been talking with about how she's doing and who is providing support.         ASSESSMENT: Current Emotional / Mental Status (status of significant symptoms):   Risk status (Self / Other harm or suicidal ideation)   Patient denies current fears or concerns for personal safety.   Patient denies current or recent suicidal ideation or behaviors.  She reports she has not had any more passive suicidal thoughts.  She reports if the thoughts do occur, she knows steps she can take to manage them and believes she can manage them, and reach out for additional help if needed.     Patientdenies current or recent homicidal ideation or behaviors.   Patient denies current or recent self injurious behavior or ideation.   Patient denies other safety concerns.   Patient reports there has not been a change in risk factors since their last session -   " "   Recommended that patient call 911 or go to the local ED should there be a change in any of these risk factors.  We reviewed how to contact [facility] crisis/[facility] for urgent/crisis concerns 24/7.  Provided patient with crisis resources and she agrees to use safety plan should any safety concerns arise.      Professionals or agencies I can contact during a crisis:  ? Suicide Prevention Lifeline: call or text 988  ? Crisis Text Line: text \"[place]\" to 796448    Local Crisis Services: [facility]: [phone]    Call 911 or go to my nearest emergency department, or [facility].         Appearance:   Appropriate    Eye Contact:   Good    Psychomotor Behavior: Normal    Attitude:   Cooperative    Orientation:   All   Speech    Rate / Production: Normal     Volume:  Normal    Mood:    Feels \"better\" - less anxious   Affect:    Congruent with mood - appeared less anxious, affect brighter    Thought Content:  Clear    Thought Form:  Coherent  Logical    Insight:    Good      Medication Review:   No changes.  Has been working with Dr. Han on adjusting her anti-depressant medication -.  Believes this is in a good place.      Medication Compliance:   Yes      Changes in Health Issues:   No concerns reported.      Chemical Use Review:   Substance Use: Chemical use reviewed, no changes or active concerns identified. She said she has been avoiding any alcohol use.     Tobacco Use: No current tobacco use.       Diagnosis:  Anxiety disorder unspecified  Major depression, recurrent episode, moderate    Will continue to monitor symptoms in response to witnessing  experience medical emergency and assist patient with processing event, self-care, and symptom management.              PLAN: (Patient Tasks / Therapist Tasks / Other)    1) Idalia identified the following goals: Use strategies to help manage panic and anxiety (review my-chart message with ideas we talked about today)     Take daily break, " "practice good self-care, reach out to parents for help, let go of unjustified guilt, stay calm, reach out when feeling overwhelmed.      Remember that thoughts are just thoughts and don't have power over me (\"you have no power over me\").     Have some things handy for when times get stressful, remind myself I don't have to engage with negative thoughts, plan activities and time for fun, practice flexible mindset.    2) If there is a crisis situation, remember you are not alone and that there are people who can help you twenty-four hours a day, seven days a week.  Call [facility] ([facility]): [phone].      3) Check-in scheduled for 12/26 at 9:00 am.  Reviewed my out of office plans for the holidays and made her aware of my colleague Joe Elizondoer who can see her if she would like.  She thought this may be helpful - we will assess on Monday if she would like to set up an appt with him.    If urgent or crisis concerns arise prior to next scheduled appointment, please reach out to crisis resources, call 911, or go to the emergency department.      Cristy Wilkinson PsyD, LP  Licensed Psychologist  12/22/2022                                                                       ____________________________________    Treatment Plan     Patient's Name: Idalia Hinojosa                        YOB: 1980     Date of Creation: 1/6/2020  Date Treatment Plan Last Reviewed/Revised: 8/4/22     DSM5 Diagnoses: 300.00 (F41.9) Unspecified Anxiety Disorder, major depression, moderate, recurrent episode  Psychosocial / Contextual Factors: strain in marital relationship, parenting challenges, adjustment challenges, 's cancer diagnosis  PROMIS (reviewed every 90 days):      Anticipated number of session for this episode of care: additional 15-20  Anticipation frequency of session: Weekly until symptoms stabilize, then can decrease the frequency of sessions to likely bi-weekly or once every three " weeks  Anticipated Duration of each session: 38-52 minutes  Treatment plan will be reviewed in 90 days or when goals have been changed.         Referral / Collaboration:  We discussed a referral to a couples/marital therapist.  The patient is thinking about this and has talked with her  about the referral, but they are unsure if they would like to follow through at this time.       MeasurableTreatment Goal(s) related to diagnosis / functional impairment(s)  Goal 1: Patient will learn emotion regulation strategies to help when she is feeling upset/angry/frustrated/distressed    I will know I've met my goal when I would yell less, I would feel calmer, and I would not push others.  I would be less physical when I'm angry (e.g. wouldn't slam doors or hit things)        Objective #A (Patient Action)                          Patient will learn and practice at least 2 new emotion regulation strategies.  Status: Continued - Date(s):    11/10/22- Regularly practicing emotion regulation strategies to help manage increase in emotional distress related to spouse's cancer diagnosis.  She has been successful with using strategies to help her stay calm when her daughter's experiencing heightened emotion and/or challenging behaviors.          Intervention(s)  Therapist will provide psychoeducation on emotion regulation module from DBT and will assign patient homework to help reinforce skill development.     Objective #B  Patient will identify factors that increase vulnerability to emotion mind and identify ways to decrease vulnerability to emotion mind.  Status: Continued - Date(s):11/10/22 - routinely incorporating mindfulness and self-awareness strategies to increase attention and focus to factors that increase vulnerability to emotion mind           Intervention(s)  Therapist will provide information on factors that increase vulnerabiliyt to emotion mind.     Objective #C  Patient will identify warning signs and signals  that emotions are escalating and will learn ways to skillfully intervene early on.  Status: New - Date: 11/10/22 - in progress - has demonstrated good initial progress in being able to identify warning signs and intervene skillfully -            Intervention(s)  Therapist will help patient identify warning signs and signals, and will guide the patient in identifying skillful ways to intervene when exeriencing warning signs and signals.        Goal 2: Patient will learn new parenting strategies to help with managing parenting challenges.  Parent will work on improving relationship with her daughter and defining what she would like this relationship to look like.      I will know I've met my goal when I'm enjoying time with my daughter, teaching her new things, and not waiting for things to change       Objective #A (Patient Action)                          Status: Continued - Date(s):11/10/22     Patient will increase understanding of developmental norms for her daughter and ways to manage challenging behaviors.     Intervention(s)  Therapist will provide psychoeducation on developmental norms and parenting strategies.     Objective #B  Patient will spend time reflecting on her relationship with her daughter and defining what she would like this relaitonship to look like.                  Status: Continued - Date(s):11/10/22 - Making good progress -            Intervention(s)  Therapist will guide the patient in reflecting upon her relationship with her daughter and help her define ways to move towards what she vaues in her relationship with her daughter.     Objective #C  Patient will increase time spent on fun activity and play with her daughter.  Status: Continued - Date(s):11/10/22- has been enjoying time with daughter and devoting regular time to fun activities and play together           Intervention(s)  Therapist will guide the patient in identifying ways she can increase positive time with her daughter.   "Therapist will also teach mindfulness strategies to help patient with being in the present moment when appropriate - .        Goal 3: Patiient will improve communication with her .      I will know I've met my goal when I feel less irritated with my  and communicate more openly with my .  I would like to be more assertive and less aggressive.   I would like to not avoid telling him things because I'm worried about his reaction or don't think he will react well.  I would like to be more present to the moment during times when this would be helpful.\"       Objective #A (Patient Action)                          Status: Continued - Date(s):11/10/22 - continues to work on this goal, though emphasis has shift to managing caregiver stress related to 's cancer diagnosis and treatment.              Patient will learn and practice at least two new interpersonal effectiveness strategies.     Intervention(s)  Therapist will teach strategies from DBT module on interpersonal effectiveness, and will assign homework to help reinforce skill development.        Patient has reviewed and agreed to the above plan.        Cristy Wilkinson PsyD,   Licensed Psychologist  Reviewed on  11/10/22                                                                                                                                                                                                                                                                          "

## 2022-12-26 ENCOUNTER — TELEPHONE (OUTPATIENT)
Dept: PSYCHOLOGY | Facility: CLINIC | Age: 42
End: 2022-12-26

## 2022-12-26 NOTE — TELEPHONE ENCOUNTER
"Called and spoke with patient for brief phone check-in and to assess treatment needs for week.  Idalia stated that overall, things are going ok as and she is feeling better than last week - though notes their main focus now is adjusting to new routines with having her daughter back at home after her daughter has been staying with relatives for the past month..  She said she would like to meet with my colleague, Joe Gatica, for an appointment this week.  I let her know he will contact her for an appointment.      We did a few reminders of what she thought would be helpful for her to focus on this week - slow down, breathe, \"don't push the river - let it flow\".  She'll continue to let her parents know what she needs in terms of support and help with Kaylee.      Idalia and I set up an appointment for 1/2 at 1:30 pm.    Cristy Wilkinson PsyD, LP  Licensed Psychologist  12/26/2022  "

## 2022-12-29 ENCOUNTER — VIRTUAL VISIT (OUTPATIENT)
Dept: BEHAVIORAL HEALTH | Facility: CLINIC | Age: 42
End: 2022-12-29
Payer: COMMERCIAL

## 2022-12-29 DIAGNOSIS — F41.9 ANXIETY DISORDER, UNSPECIFIED TYPE: ICD-10-CM

## 2022-12-29 DIAGNOSIS — F33.1 MODERATE RECURRENT MAJOR DEPRESSION (H): Primary | ICD-10-CM

## 2022-12-29 PROCEDURE — 90834 PSYTX W PT 45 MINUTES: CPT | Mod: GT | Performed by: MARRIAGE & FAMILY THERAPIST

## 2022-12-29 NOTE — PROGRESS NOTES
[facility]                                            Progress Note    Patient Name: Idalia Hinojosa  Date: 12/29/2022           Service Type: Individual      Session Start Time:  2pm Session End Time: 240pm  Session Length:  40 minutes    Session #: #86: supplemental session while patient's usual therapist is out of the office     Attendees: Client     Service Modality: Video visit: -       Provider verified identity through the following two step process.  Patient provided:  Patient is known previously to provider    Telemedicine Visit: The patient's condition can be safely assessed and treated via synchronous audio and visual telemedicine encounter.      Reason for Telemedicine Visit: Services only offered telehealth    Originating Site (Patient Location): Patient's home    Distant Site (Provider Location): Provider Remote Setting- Home Office    Consent:  The patient/guardian has verbally consented to: the potential risks and benefits of telemedicine (telephone visit) versus in person care; bill my insurance or make self-payment for services provided; and responsibility for payment of non-covered services.     Patient would like the video invitation sent by:  My Chart    Mode of Communication:  Video Conference via [facility],     As the provider I attest to compliance with applicable laws and regulations related to telemedicine.         Treatment Plan Last Reviewed: 11/10/2022  PHQ-9/DAVID-7: 11/16/2022      DATA  Interactive Complexity: No  Crisis: No        Progress Since Last Session (Related to Symptoms / Goals / Homework):  Patient met with Joe Gatica MA, CARL today since Cristy Wilkinson PsyD, LP - her regular therapist - is out of the office.    Symptoms:  She reported that she had a rough day Tuesday, but she is feeling better today. It was their wedding anniversary Tuesday and with the end-of-the-year reminiscing everywhere and seeing [place] and everyone's  "\"happy years\" reminded her of how bad her own year has been.     Homework: Achieved / completed to satisfaction. This week, she used a variety of skills she has learned in therapy to work with these difficult emotions and found that she has felt better over each day as a result. She is working on staying in the present moment.        Episode of Care Goals: Satisfactory progress - ACTION (Actively working towards change); Intervened by reinforcing change plan / affirming steps taken.  The patient stated that her main goals for therapy would be to learn emotion regulation strategies (in particular, to help with when she is feeling upset/angry/frustrated/distressed).  The changes in her daily life due to COVID restrictions were a focus of our initial work together, and working through changes to routine as her daughter started school and she returned to work.  As she has been making progress, focus of treatment will shift to wellness planning (monitoring mood and symptoms, incorporating into routine what keeps her feeling well, review of triggers, how to manage set backs, etc.).  As her  has been diagnosed with cancer, treatment will also focus on providing her with support in managing the emotional distress and challenges related to this and caregiver stress.       Current / Ongoing Stressors and Concerns:   Current: Symptoms of anxiety and depression.  Her  will have rakan appointment next week with the transplant team to determine the next stage of care for his cancer. Idalia reports she is anxious about what this will look like for him, her and the family. Reports feeling constrained by some of the issues related to this - wearing masks, being so confined to home, etc. She is also experiencing increased stress as she encounters the end-of-the-year holidays with [employer] cards reviewing everyone else's wonderful experiences this year, and New Year's being all about excitement for the future - both of " "which she finds challenging to feel about her own life. She also reports some social anxiety symptoms - worrying what others think about her and what she says to them, feeling anxious around people much of the time.     Ongoing:Managing daily routine which has contributed to some adjustment and parenting challenges, lack of effective emotion regulation strategies for managing daily frustrations and upsets and marital discord due to differences in parenting styles.       Treatment Objective(s) Addressed in This Session:     Psychoeducation on managing panic and intense anxiety  Review of wellness plan - warning signs, strategies to help manage increase in symptoms, steps to take if symptoms are worsening, and review of crisis/emergency resources.  Encouraged sharing coping plan with at least 1-2 support people, and offered for support people to join her therapy appointment.    Review of self-care and importance of attending to your self-care  Challenge negative self-talk related to symptoms     Continue to review/practice/reinforce - per ongoing treatment plan:    Identify strategies to help with caregiver stress  - in particular, how to help with increasing emotional support     Remind yourself, and reaffirm - that focusing on your own self-care is important!  (let go of unjustified guilt related to taking care of yourself)  Identify ways to practice and incorporate self-care into routine.   Identify and enforce healthy emotional boundaries (define your role as spouse, not \"therapist\" or \"fixer\")  Identify how to practice and encourage having a flexible mindset when things don't go as anticipated  Learn strategy of \"radical acceptance\", benefits of strategy, and how to apply strategy  Practice checking in on a regular basis, remind self to slow down when rushing, take deep breaths   Learn and practice mindfulness strategies - redirect back to present moment, focus on the facts   Identify and enforce healthy " emotional boundaries (it is ok to feel different than my spouse does, it is okay for him to have his feelings and to not feel like I have to fix or change how he is feeling)  Continue to practice awareness of your emotions and physical sensations, to help tune in to what you need  Identify opportunities to access support and help from others  Mindful awareness of thoughts, impact on your emotions, and recognize opportunities to redirect thoughts.  Practice re-directing thoughts to a more helpful perspective.    Practice balanced thinking to counteract polarized/all or nothing thinking.       Intervention:   Reviewed strategies she has learned with her regular therapist, and encouraged use of the ones that feel good to her at this time. Discussed mindfulness and present-moment focus. Discussed working with an awareness of the past and planning for the future and balancing this with a focus on what is available to work with - the present. Explored and validated the unique challenges of being the partner and caregiver of someone who is struggling with a significant illness. Discussed the way her body is carrying stress right now (very cramped shoulders and pain from tension). Discussed concept of self-compassion and how she might offer herself the space to have whatever feelings and thoughts are present, but in a context of kindness and non-judgment. Encouraged her to explore simply letting feelings be for a bit, in a context of compassionate self-awareness, before moving onto change strategies. Discussed how we can even be self-critical while trying to use therapeutic skills. Also explored briefly the possible benefit of some relaxation exercises.    She will be seeing her regular therapist next week.        ASSESSMENT: Current Emotional / Mental Status (status of significant symptoms):   Risk status (Self / Other harm or suicidal ideation)   Patient denies current fears or concerns for personal safety.   Patient  "denies current or recent suicidal ideation or behaviors.  She reports she has not had any more passive suicidal thoughts.  She reports if the thoughts do occur, she knows steps she can take to manage them and believes she can manage them, and reach out for additional help if needed.     Patientdenies current or recent homicidal ideation or behaviors.   Patient denies current or recent self injurious behavior or ideation.   Patient denies other safety concerns.   Patient reports there has not been a change in risk factors since their last session -      Recommended that patient call 911 or go to the local ED should there be a change in any of these risk factors.  We reviewed how to contact [facility] crisis/COPE for urgent/crisis concerns 24/7.  Provided patient with crisis resources and she agrees to use safety plan should any safety concerns arise.      Professionals or agencies I can contact during a crisis:  ? Suicide Prevention Lifeline: call or text 346  ? Crisis Text Line: text \"[place]\" to 069928    [facility]: [facility]: [phone]    Call 911 or go to my nearest emergency department, or [facility].         Appearance:   Appropriate    Eye Contact:   Good    Psychomotor Behavior: Normal    Attitude:   Cooperative    Orientation:   All   Speech    Rate / Production: Normal     Volume:  Normal    Mood:    Some increase in stress and anxiety this week; feeling a bit better today   Affect:    Congruent with mood    Thought Content:  Clear    Thought Form:  Coherent  Logical    Insight:    Good      Medication Review:   No changes.  Has been working with Dr. Han on adjusting her anti-depressant medication -.  Believes this is in a good place.      Medication Compliance:   Yes      Changes in Health Issues:   No concerns reported.      Chemical Use Review:   Substance Use: Chemical use reviewed, no changes or active concerns identified. She said she has been avoiding any alcohol " "use.     Tobacco Use: No current tobacco use.       Diagnosis:  Anxiety disorder unspecified  Major depression, recurrent episode, moderate    Will continue to monitor symptoms in response to witnessing  experience medical emergency and assist patient with processing event, self-care, and symptom management.              PLAN: (Patient Tasks / Therapist Tasks / Other)    Continue with established therapy treatment plan and recommendations of current therapist. Consider adding an element of conscious self-compassion to current efforts.    Current plan with regular therapist:  1) Idalia identified the following goals: Use strategies to help manage panic and anxiety (review my-chart message with ideas we talked about today)     Take daily break, practice good self-care, reach out to parents for help, let go of unjustified guilt, stay calm, reach out when feeling overwhelmed.      Remember that thoughts are just thoughts and don't have power over me (\"you have no power over me\").     Have some things handy for when times get stressful, remind myself I don't have to engage with negative thoughts, plan activities and time for fun, practice flexible mindset.    2) If there is a crisis situation, remember you are not alone and that there are people who can help you twenty-four hours a day, seven days a week.  Call [place] ([facility]): [phone].      3) Next appointment with Cristy Wilkinson PsyD, LP: 1/2/2023      Juan Gatica MA, [facility]  Behavioral Health Clinician                                           ____________________________________    Treatment Plan     Patient's Name: Idalia Hinojosa                        YOB: 1980     Date of Creation: 1/6/2020  Date Treatment Plan Last Reviewed/Revised: 8/4/22     DSM5 Diagnoses: 300.00 (F41.9) Unspecified Anxiety Disorder, major depression, moderate, recurrent episode  Psychosocial / Contextual Factors: strain in marital " relationship, parenting challenges, adjustment challenges, 's cancer diagnosis  PROMIS (reviewed every 90 days):      Anticipated number of session for this episode of care: additional 15-20  Anticipation frequency of session: Weekly until symptoms stabilize, then can decrease the frequency of sessions to likely bi-weekly or once every three weeks  Anticipated Duration of each session: 38-52 minutes  Treatment plan will be reviewed in 90 days or when goals have been changed.         Referral / Collaboration:  We discussed a referral to a couples/marital therapist.  The patient is thinking about this and has talked with her  about the referral, but they are unsure if they would like to follow through at this time.       MeasurableTreatment Goal(s) related to diagnosis / functional impairment(s)  Goal 1: Patient will learn emotion regulation strategies to help when she is feeling upset/angry/frustrated/distressed    I will know I've met my goal when I would yell less, I would feel calmer, and I would not push others.  I would be less physical when I'm angry (e.g. wouldn't slam doors or hit things)        Objective #A (Patient Action)                          Patient will learn and practice at least 2 new emotion regulation strategies.  Status: Continued - Date(s):    11/10/22- Regularly practicing emotion regulation strategies to help manage increase in emotional distress related to spouse's cancer diagnosis.  She has been successful with using strategies to help her stay calm when her daughter's experiencing heightened emotion and/or challenging behaviors.          Intervention(s)  Therapist will provide psychoeducation on emotion regulation module from DBT and will assign patient homework to help reinforce skill development.     Objective #B  Patient will identify factors that increase vulnerability to emotion mind and identify ways to decrease vulnerability to emotion mind.  Status: Continued -  Date(s):11/10/22 - routinely incorporating mindfulness and self-awareness strategies to increase attention and focus to factors that increase vulnerability to emotion mind           Intervention(s)  Therapist will provide information on factors that increase vulnerabiliyt to emotion mind.     Objective #C  Patient will identify warning signs and signals that emotions are escalating and will learn ways to skillfully intervene early on.  Status: New - Date: 11/10/22 - in progress - has demonstrated good initial progress in being able to identify warning signs and intervene skillfully -            Intervention(s)  Therapist will help patient identify warning signs and signals, and will guide the patient in identifying skillful ways to intervene when exeriencing warning signs and signals.        Goal 2: Patient will learn new parenting strategies to help with managing parenting challenges.  Parent will work on improving relationship with her daughter and defining what she would like this relationship to look like.      I will know I've met my goal when I'm enjoying time with my daughter, teaching her new things, and not waiting for things to change       Objective #A (Patient Action)                          Status: Continued - Date(s):11/10/22     Patient will increase understanding of developmental norms for her daughter and ways to manage challenging behaviors.     Intervention(s)  Therapist will provide psychoeducation on developmental norms and parenting strategies.     Objective #B  Patient will spend time reflecting on her relationship with her daughter and defining what she would like this relaitonship to look like.                  Status: Continued - Date(s):11/10/22 - Making good progress -            Intervention(s)  Therapist will guide the patient in reflecting upon her relationship with her daughter and help her define ways to move towards what she vaues in her relationship with her  "daughter.     Objective #C  Patient will increase time spent on fun activity and play with her daughter.  Status: Continued - Date(s):11/10/22- has been enjoying time with daughter and devoting regular time to fun activities and play together           Intervention(s)  Therapist will guide the patient in identifying ways she can increase positive time with her daughter.  Therapist will also teach mindfulness strategies to help patient with being in the present moment when appropriate - .        Goal 3: Patiient will improve communication with her .      I will know I've met my goal when I feel less irritated with my  and communicate more openly with my .  I would like to be more assertive and less aggressive.   I would like to not avoid telling him things because I'm worried about his reaction or don't think he will react well.  I would like to be more present to the moment during times when this would be helpful.\"       Objective #A (Patient Action)                          Status: Continued - Date(s):11/10/22 - continues to work on this goal, though emphasis has shift to managing caregiver stress related to 's cancer diagnosis and treatment.              Patient will learn and practice at least two new interpersonal effectiveness strategies.     Intervention(s)  Therapist will teach strategies from DBT module on interpersonal effectiveness, and will assign homework to help reinforce skill development.        Patient has reviewed and agreed to the above plan.        Cristy Wilkinson PsyD, LP  Licensed Psychologist  Reviewed on  11/10/22                                                                                                                                                                                                                                                                          "

## 2023-01-02 ENCOUNTER — VIRTUAL VISIT (OUTPATIENT)
Dept: PSYCHOLOGY | Facility: CLINIC | Age: 43
End: 2023-01-02
Payer: COMMERCIAL

## 2023-01-02 DIAGNOSIS — F41.9 ANXIETY DISORDER, UNSPECIFIED TYPE: Primary | ICD-10-CM

## 2023-01-02 DIAGNOSIS — F33.1 MODERATE EPISODE OF RECURRENT MAJOR DEPRESSIVE DISORDER (H): ICD-10-CM

## 2023-01-02 PROCEDURE — 90832 PSYTX W PT 30 MINUTES: CPT | Mod: GT | Performed by: PSYCHOLOGIST

## 2023-01-02 NOTE — PROGRESS NOTES
"         [facility]                                            Progress Note    Patient Name: Idalia Hinojosa  Date: 1/2/2023           Service Type: Individual      Session Start Time:  1:35 pm Session End Time: 2:10 pm  Session Length:  35 minutes    Session #: 87    Attendees: Client     Service Modality: Video visit: -       Provider verified identity through the following two step process.  Patient provided:  Patient is known previously to provider    Telemedicine Visit: The patient's condition can be safely assessed and treated via synchronous audio and visual telemedicine encounter.      Reason for Telemedicine Visit: Services only offered telehealth    Originating Site (Patient Location): Patient's home    Distant Site (Provider Location): Provider Remote Setting- Home Office    Consent:  The patient/guardian has verbally consented to: the potential risks and benefits of telemedicine (telephone visit) versus in person care; bill my insurance or make self-payment for services provided; and responsibility for payment of non-covered services.     Patient would like the video invitation sent by:  My Chart    Mode of Communication:  Video Conference via [employer],     As the provider I attest to compliance with applicable laws and regulations related to telemedicine.         Treatment Plan Last Reviewed: 11/10/2022  PHQ-9/DAVID-7: 11/16/2022      DATA  Interactive Complexity: No  Crisis: No        Progress Since Last Session (Related to Symptoms / Goals / Homework):   Symptoms:  She reports that \"every day is different, but mostly feeling better\".  She stated she's not had any episodes of panic or extreme anxiety since her last appointment.  She reports a generalized sense of anxiety that is off and on throughout the day.      Homework: Achieved / completed to satisfaction. Has been able to utilize strategies to manage symptoms of anxiety.  Has also been able to continue to ask for help and " support from parents.  Also was able to assert healthy emotional boundaries in a recent interaction with spouse.        l    Episode of Care Goals: Satisfactory progress - ACTION (Actively working towards change); Intervened by reinforcing change plan / affirming steps taken.  The patient stated that her main goals for therapy would be to learn emotion regulation strategies (in particular, to help with when she is feeling upset/angry/frustrated/distressed).  The changes in her daily life due to COVID restrictions were a focus of our initial work together, and working through changes to routine as her daughter started school and she returned to work.  As she has been making progress, focus of treatment will shift to wellness planning (monitoring mood and symptoms, incorporating into routine what keeps her feeling well, review of triggers, how to manage set backs, etc.).  As her  has been diagnosed with cancer, treatment will also focus on providing her with support in managing the emotional distress and challenges related to this and caregiver stress.       Current / Ongoing Stressors and Concerns:   Current: Symptoms of anxiety and depression.  Stress has been exacerbated in the context of her  going through cancer treatment and her witnessing him requiring the rapid response team, which was very frightening for her.  Parents had been staying with her initially, and she reports that since Christmas Eve she's been able to be home alone with her spouse and things have been going well.     Daughter has been diagnosed with ADHD, and recently started on medications and is in therapy.       Ongoing:Managing daily routine which has contributed to some adjustment and parenting challenges, lack of effective emotion regulation strategies for managing daily frustrations and upsets and marital discord due to differences in parenting styles.       Treatment Objective(s) Addressed in This  Session:     Psychoeducation on managing panic and intense anxiety  Psychoeducation on deep breathing   Review of self-care and importance of attending to your self-care  Challenge negative self-talk related to symptoms      Intervention:   CBTand solution-focused: We continue to review how she is doing with self-care, managing caregiver stress, and managing symptoms of anxiety and depression.  She noted feeling tense and anxious and that this has exacerbated pain she has in her neck/shoulder area.  Provided psychoeducation on deep breathing and did an exercise in-session on deep breathing and releasing tension.  Encouraged her continued awareness of self-talk, with awareness of how  negative thoughts can impact tension and anxiety and encouraged redirecting to non-judgmental and compassionate self-talk.         ASSESSMENT: Current Emotional / Mental Status (status of significant symptoms):   Risk status (Self / Other harm or suicidal ideation)   Patient denies current fears or concerns for personal safety.   Patient denies current or recent suicidal ideation or behaviors.  She reports she has not had any more passive suicidal thoughts.  She reports if the thoughts do occur, she knows steps she can take to manage them and believes she can manage them, and reach out for additional help if needed.     Patientdenies current or recent homicidal ideation or behaviors.   Patient denies current or recent self injurious behavior or ideation.   Patient denies other safety concerns.   Patient reports there has not been a change in risk factors since their last session -      Recommended that patient call 911 or go to the local ED should there be a change in any of these risk factors.  We reviewed how to contact [facility] crisis/COPE for urgent/crisis concerns 24/7.  Provided patient with crisis resources and she agrees to use safety plan should any safety concerns arise.      Professionals or agencies I can contact  "during a crisis:  ? Suicide Prevention Lifeline: call or text 988  ? Crisis Text Line: text \"[place]\" to 174346    Local Crisis Services: [facility]: [phone]    Call 911 or go to my nearest emergency department, or [facility].         Appearance:   Appropriate    Eye Contact:   Good    Psychomotor Behavior: Identified that she felt pain in her shoulder/neck area and was stretching this area during the session   Attitude:   Cooperative    Orientation:   All   Speech    Rate / Production: Normal     Volume:  Normal    Mood:    Anxious and stressed    Affect:    Congruent with mood   Thought Content:  Clear    Thought Form:  Coherent  Logical    Insight:    Good      Medication Review:   No changes.  Has been working with Dr. Han on adjusting her anti-depressant medication -.  Believes this is in a good place.      Medication Compliance:   Yes      Changes in Health Issues:   Reports has  shoulder/neck pain, which she has been working with a massage therapist to help address.       Chemical Use Review:   Substance Use: Chemical use reviewed, no changes or active concerns identified. She said she has been avoiding any alcohol use.     Tobacco Use: No current tobacco use.       Diagnosis:  Anxiety disorder unspecified  Major depression, recurrent episode, moderate    Will continue to monitor symptoms in response to witnessing  experience medical emergency and assist patient with processing event, self-care, and symptom management.              PLAN: (Patient Tasks / Therapist Tasks / Other)    1) Idalia identified the following goals: Slow down, practice calming strategies (stop, take deep breaths, slow down more, warm bath).      Use strategies to help manage panic and anxiety (reviewed in my-chart message)     Take daily break, practice good self-care, reach out to parents for help, let go of unjustified guilt, stay calm, reach out when feeling overwhelmed.      Have some things handy for " when times get stressful, remind myself I don't have to engage with negative thoughts, plan activities and time for fun, practice flexible mindset.    2) If there is a crisis situation, remember you are not alone and that there are people who can help you twenty-four hours a day, seven days a week.  Call [facility] ([facility]): [phone].      3) Check-in scheduled for 1/5 at 10:00 am.   If urgent or crisis concerns arise prior to next scheduled appointment, please reach out to crisis resources, call 911, or go to the emergency department.      Cristy Wilkinson PsyD, LP  Licensed Psychologist  1/2/2023                                                                         ____________________________________    Treatment Plan     Patient's Name: Idalia Hinojosa                        YOB: 1980     Date of Creation: 1/6/2020  Date Treatment Plan Last Reviewed/Revised: 8/4/22     DSM5 Diagnoses: 300.00 (F41.9) Unspecified Anxiety Disorder, major depression, moderate, recurrent episode  Psychosocial / Contextual Factors: strain in marital relationship, parenting challenges, adjustment challenges, 's cancer diagnosis  PROMIS (reviewed every 90 days):      Anticipated number of session for this episode of care: additional 15-20  Anticipation frequency of session: Weekly until symptoms stabilize, then can decrease the frequency of sessions to likely bi-weekly or once every three weeks  Anticipated Duration of each session: 38-52 minutes  Treatment plan will be reviewed in 90 days or when goals have been changed.         Referral / Collaboration:  We discussed a referral to a couples/marital therapist.  The patient is thinking about this and has talked with her  about the referral, but they are unsure if they would like to follow through at this time.       MeasurableTreatment Goal(s) related to diagnosis / functional impairment(s)  Goal 1: Patient will learn emotion  regulation strategies to help when she is feeling upset/angry/frustrated/distressed    I will know I've met my goal when I would yell less, I would feel calmer, and I would not push others.  I would be less physical when I'm angry (e.g. wouldn't slam doors or hit things)        Objective #A (Patient Action)                          Patient will learn and practice at least 2 new emotion regulation strategies.  Status: Continued - Date(s):    11/10/22- Regularly practicing emotion regulation strategies to help manage increase in emotional distress related to spouse's cancer diagnosis.  She has been successful with using strategies to help her stay calm when her daughter's experiencing heightened emotion and/or challenging behaviors.          Intervention(s)  Therapist will provide psychoeducation on emotion regulation module from DBT and will assign patient homework to help reinforce skill development.     Objective #B  Patient will identify factors that increase vulnerability to emotion mind and identify ways to decrease vulnerability to emotion mind.  Status: Continued - Date(s):11/10/22 - routinely incorporating mindfulness and self-awareness strategies to increase attention and focus to factors that increase vulnerability to emotion mind           Intervention(s)  Therapist will provide information on factors that increase vulnerabiliyt to emotion mind.     Objective #C  Patient will identify warning signs and signals that emotions are escalating and will learn ways to skillfully intervene early on.  Status: New - Date: 11/10/22 - in progress - has demonstrated good initial progress in being able to identify warning signs and intervene skillfully -            Intervention(s)  Therapist will help patient identify warning signs and signals, and will guide the patient in identifying skillful ways to intervene when exeriencing warning signs and signals.        Goal 2: Patient will learn new parenting strategies to  help with managing parenting challenges.  Parent will work on improving relationship with her daughter and defining what she would like this relationship to look like.      I will know I've met my goal when I'm enjoying time with my daughter, teaching her new things, and not waiting for things to change       Objective #A (Patient Action)                          Status: Continued - Date(s):11/10/22     Patient will increase understanding of developmental norms for her daughter and ways to manage challenging behaviors.     Intervention(s)  Therapist will provide psychoeducation on developmental norms and parenting strategies.     Objective #B  Patient will spend time reflecting on her relationship with her daughter and defining what she would like this relaitonship to look like.                  Status: Continued - Date(s):11/10/22 - Making good progress -            Intervention(s)  Therapist will guide the patient in reflecting upon her relationship with her daughter and help her define ways to move towards what she vaues in her relationship with her daughter.     Objective #C  Patient will increase time spent on fun activity and play with her daughter.  Status: Continued - Date(s):11/10/22- has been enjoying time with daughter and devoting regular time to fun activities and play together           Intervention(s)  Therapist will guide the patient in identifying ways she can increase positive time with her daughter.  Therapist will also teach mindfulness strategies to help patient with being in the present moment when appropriate - .        Goal 3: Patiient will improve communication with her .      I will know I've met my goal when I feel less irritated with my  and communicate more openly with my .  I would like to be more assertive and less aggressive.   I would like to not avoid telling him things because I'm worried about his reaction or don't think he will react well.  I would like to be  "more present to the moment during times when this would be helpful.\"       Objective #A (Patient Action)                          Status: Continued - Date(s):11/10/22 - continues to work on this goal, though emphasis has shift to managing caregiver stress related to 's cancer diagnosis and treatment.              Patient will learn and practice at least two new interpersonal effectiveness strategies.     Intervention(s)  Therapist will teach strategies from DBT module on interpersonal effectiveness, and will assign homework to help reinforce skill development.        Patient has reviewed and agreed to the above plan.        Cristy Wilkinson PsyD, LP  Licensed Psychologist  Reviewed on  11/10/22                                                                                                                                                                                                                                                                          "

## 2023-01-05 ENCOUNTER — VIRTUAL VISIT (OUTPATIENT)
Dept: PSYCHOLOGY | Facility: CLINIC | Age: 43
End: 2023-01-05
Payer: COMMERCIAL

## 2023-01-05 DIAGNOSIS — F33.1 MODERATE EPISODE OF RECURRENT MAJOR DEPRESSIVE DISORDER (H): Primary | ICD-10-CM

## 2023-01-05 DIAGNOSIS — F41.9 ANXIETY DISORDER, UNSPECIFIED TYPE: ICD-10-CM

## 2023-01-05 PROCEDURE — 90834 PSYTX W PT 45 MINUTES: CPT | Mod: GT | Performed by: PSYCHOLOGIST

## 2023-01-05 NOTE — PROGRESS NOTES
[facility]                                            Progress Note    Patient Name: Idalia Hinojosa  Date: 1/5/2023         Service Type: Individual      Session Start Time:  10:07 am Session End Time: 10:51 am  Session Length:  44 minutes    Session #: 88    Attendees: Client     Service Modality: Video visit: -       Provider verified identity through the following two step process.  Patient provided:  Patient is known previously to provider    Telemedicine Visit: The patient's condition can be safely assessed and treated via synchronous audio and visual telemedicine encounter.      Reason for Telemedicine Visit: Services only offered telehealth    Originating Site (Patient Location): Patient's home    Distant Site (Provider Location): Provider Remote Setting- Home Office    Consent:  The patient/guardian has verbally consented to: the potential risks and benefits of telemedicine (telephone visit) versus in person care; bill my insurance or make self-payment for services provided; and responsibility for payment of non-covered services.     Patient would like the video invitation sent by:  My Chart    Mode of Communication:  Video Conference via [employer],     As the provider I attest to compliance with applicable laws and regulations related to telemedicine.         Treatment Plan Last Reviewed: 11/10/2022  PHQ-9/DAVID-7: 11/16/2022      DATA  Interactive Complexity: No  Crisis: No        Progress Since Last Session (Related to Symptoms / Goals / Homework):   Symptoms:  She reports that things had been going better until yesterday, which was a hard day for her, and brought up an increase in symptoms of depression, anxiety and stress.    Homework: Achieved / completed to satisfaction. Has been able to utilize strategies to manage symptoms of anxiety.  Has been working on slowing down, taking deep breaths, and engaging calming strategies.          Episode of Care Goals: Satisfactory  progress - ACTION (Actively working towards change); Intervened by reinforcing change plan / affirming steps taken.  The patient stated that her main goals for therapy would be to learn emotion regulation strategies (in particular, to help with when she is feeling upset/angry/frustrated/distressed).  The changes in her daily life due to COVID restrictions were a focus of our initial work together, and working through changes to routine as her daughter started school and she returned to work.  As she has been making progress, focus of treatment will shift to wellness planning (monitoring mood and symptoms, incorporating into routine what keeps her feeling well, review of triggers, how to manage set backs, etc.).  As her  has been diagnosed with cancer, treatment will also focus on providing her with support in managing the emotional distress and challenges related to this and caregiver stress.       Current / Ongoing Stressors and Concerns:   Current: Symptoms of anxiety and depression.  Stress has been exacerbated in the context of her  going through cancer treatment and her witnessing him requiring the rapid response team, which was very frightening for her.   Her  recently required 30 days of around the clock caregiver support, which has come to an end.  She reported that they had an appointment with his transplant team and they shared that her  is doing as well as he could possibly be doing, which is reassuring.  She reports ontinued stress and anxiety in regards to the road ahead with maintenance therapy and next steps.  Daughter has been diagnosed with ADHD, and recently started on medications and is in therapy.       Ongoing:Managing daily routine which has contributed to some adjustment and parenting challenges, lack of effective emotion regulation strategies for managing daily frustrations and upsets and marital discord due to differences in parenting styles.       Treatment  "Objective(s) Addressed in This Session:     Psychoeducation on mindfulness strategies - radical acceptance, and practicing a compassionate and non-judgmental stance   Psychoeducation on deep breathing   Review of self-care and importance of attending to your self-care       Intervention:  DBT and solution-focused therapy:  Provided psychoeducation on mindfulness strategies, including radical acceptance and practicing a compassionate and non-judgmental stance, to help increase awareness of how negative thoughts can impact tension and anxiety and encouraged redirecting to non-judgmental and compassionate self-talk.  She shared a couple of reflections of recent examples of having difficulties accepting \"what is\" - (feeling bad about how her emotional reaction to Donald's doctor appointment, being angry about the physical pain in her shoulder and neck) which adds additional struggle and suffering to her emotional and physical reaction.  We talked about ways to relate differently via mindfulness and radical acceptance - which opens opportunity to validate her experience and look at how to help herself with how she is feeling.  We also spent some time talking about how she is doing with getting emotional support from others.         ASSESSMENT: Current Emotional / Mental Status (status of significant symptoms):   Risk status (Self / Other harm or suicidal ideation)   Patient denies current fears or concerns for personal safety.   Patient denies current or recent suicidal ideation or behaviors.  She reports she has not had any more passive suicidal thoughts.  She reports if the thoughts do occur, she knows steps she can take to manage them and believes she can manage them, and reach out for additional help if needed.     Patientdenies current or recent homicidal ideation or behaviors.   Patient denies current or recent self injurious behavior or ideation.   Patient denies other safety concerns.   Patient reports there has " "not been a change in risk factors since their last session -      Recommended that patient call 911 or go to the local ED should there be a change in any of these risk factors.  We reviewed how to contact [facility] crisis/[facility] for urgent/crisis concerns 24/7.  Provided patient with crisis resources and she agrees to use safety plan should any safety concerns arise.      Professionals or agencies I can contact during a crisis:  ? Suicide Prevention Lifeline: call or text 078  ? Crisis Text Line: text \"[place]\" to 858212    Local Crisis Services: [facility]: [phone]    Call 911 or go to my nearest emergency department, or [facility].         Appearance:   Appropriate    Eye Contact:   Good    Psychomotor Behavior: She reports continued pain in her shoulder/neck area and was stretching this area during the session   Attitude:   Cooperative    Orientation:   All   Speech    Rate / Production: Normal     Volume:  Normal    Mood:    Anxious, depressed and stressed    Affect:    Congruent with mood   Thought Content:  Clear    Thought Form:  Coherent  Logical    Insight:    Good      Medication Review:   No changes.  (reports that recent change Dr. Han made her to anti-depressant has been helpful).        Medication Compliance:   Yes      Changes in Health Issues:   Reports has  shoulder/neck pain, which she has been working with a massage therapist to help address.  Suggested she also consider the nurse hotline and/or her PCP for additional advise.       Chemical Use Review:   Substance Use: Chemical use reviewed, no changes or active concerns identified. She said she has been avoiding any alcohol use.     Tobacco Use: No current tobacco use.       Diagnosis:  Anxiety disorder unspecified  Major depression, recurrent episode, moderate    Will continue to monitor symptoms in response to witnessing  experience medical emergency and assist patient with processing event, self-care, and " "symptom management.  She reports she has not had any additional episodes of intense panic or anxiety.  She reported feeling \"triggered\" when she went to her spouse's doctors appointment yesterday, and felt an increase in feeling anxious and depressed.              PLAN: (Patient Tasks / Therapist Tasks / Other)    1) Idalia identified the following goals: Slow down, practice calming strategies (take deep breaths, not fight against or get angry because of shoulder pain, practice compassionate, non-judgmental stance, I encouraged asking nurse hotline or PCP for advice on self-cares for shoulder pain and/or any other steps they would advise she take).      Use strategies to help manage anxiety.      Take daily breaks, practice good self-care, reach out to parents for help, let go of unjustified guilt, reach out when feeling overwhelmed.      Have some things handy for when times get stressful, remind myself I don't have to engage with negative thoughts, plan activities and time for fun, practice flexible mindset.    2) If there is a crisis situation, remember you are not alone and that there are people who can help you twenty-four hours a day, seven days a week.  Call [facility] ([facility]): [phone].      3) Check-in scheduled for 1/12 at 10:00 am.   If urgent or crisis concerns arise prior to next scheduled appointment, please reach out to crisis resources, call 911, or go to the emergency department.      Cristy Wilkinson PsyD, LP  Licensed Psychologist  1/5/2023                                                                           ____________________________________    Treatment Plan     Patient's Name: Idalia Hinojosa                        YOB: 1980     Date of Creation: 1/6/2020  Date Treatment Plan Last Reviewed/Revised: 8/4/22     DSM5 Diagnoses: 300.00 (F41.9) Unspecified Anxiety Disorder, major depression, moderate, recurrent episode  Psychosocial / Contextual " Factors: strain in marital relationship, parenting challenges, adjustment challenges, 's cancer diagnosis  PROMIS (reviewed every 90 days):      Anticipated number of session for this episode of care: additional 15-20  Anticipation frequency of session: Weekly until symptoms stabilize, then can decrease the frequency of sessions to likely bi-weekly or once every three weeks  Anticipated Duration of each session: 38-52 minutes  Treatment plan will be reviewed in 90 days or when goals have been changed.         Referral / Collaboration:  We discussed a referral to a couples/marital therapist.  The patient is thinking about this and has talked with her  about the referral, but they are unsure if they would like to follow through at this time.       MeasurableTreatment Goal(s) related to diagnosis / functional impairment(s)  Goal 1: Patient will learn emotion regulation strategies to help when she is feeling upset/angry/frustrated/distressed    I will know I've met my goal when I would yell less, I would feel calmer, and I would not push others.  I would be less physical when I'm angry (e.g. wouldn't slam doors or hit things)        Objective #A (Patient Action)                          Patient will learn and practice at least 2 new emotion regulation strategies.  Status: Continued - Date(s):    11/10/22- Regularly practicing emotion regulation strategies to help manage increase in emotional distress related to spouse's cancer diagnosis.  She has been successful with using strategies to help her stay calm when her daughter's experiencing heightened emotion and/or challenging behaviors.          Intervention(s)  Therapist will provide psychoeducation on emotion regulation module from DBT and will assign patient homework to help reinforce skill development.     Objective #B  Patient will identify factors that increase vulnerability to emotion mind and identify ways to decrease vulnerability to emotion  mind.  Status: Continued - Date(s):11/10/22 - routinely incorporating mindfulness and self-awareness strategies to increase attention and focus to factors that increase vulnerability to emotion mind           Intervention(s)  Therapist will provide information on factors that increase vulnerabiliyt to emotion mind.     Objective #C  Patient will identify warning signs and signals that emotions are escalating and will learn ways to skillfully intervene early on.  Status: New - Date: 11/10/22 - in progress - has demonstrated good initial progress in being able to identify warning signs and intervene skillfully -            Intervention(s)  Therapist will help patient identify warning signs and signals, and will guide the patient in identifying skillful ways to intervene when exeriencing warning signs and signals.        Goal 2: Patient will learn new parenting strategies to help with managing parenting challenges.  Parent will work on improving relationship with her daughter and defining what she would like this relationship to look like.      I will know I've met my goal when I'm enjoying time with my daughter, teaching her new things, and not waiting for things to change       Objective #A (Patient Action)                          Status: Continued - Date(s):11/10/22     Patient will increase understanding of developmental norms for her daughter and ways to manage challenging behaviors.     Intervention(s)  Therapist will provide psychoeducation on developmental norms and parenting strategies.     Objective #B  Patient will spend time reflecting on her relationship with her daughter and defining what she would like this relaitonship to look like.                  Status: Continued - Date(s):11/10/22 - Making good progress -            Intervention(s)  Therapist will guide the patient in reflecting upon her relationship with her daughter and help her define ways to move towards what she vaues in her relationship with  "her daughter.     Objective #C  Patient will increase time spent on fun activity and play with her daughter.  Status: Continued - Date(s):11/10/22- has been enjoying time with daughter and devoting regular time to fun activities and play together           Intervention(s)  Therapist will guide the patient in identifying ways she can increase positive time with her daughter.  Therapist will also teach mindfulness strategies to help patient with being in the present moment when appropriate - .        Goal 3: Patiient will improve communication with her .      I will know I've met my goal when I feel less irritated with my  and communicate more openly with my .  I would like to be more assertive and less aggressive.   I would like to not avoid telling him things because I'm worried about his reaction or don't think he will react well.  I would like to be more present to the moment during times when this would be helpful.\"       Objective #A (Patient Action)                          Status: Continued - Date(s):11/10/22 - continues to work on this goal, though emphasis has shift to managing caregiver stress related to 's cancer diagnosis and treatment.              Patient will learn and practice at least two new interpersonal effectiveness strategies.     Intervention(s)  Therapist will teach strategies from DBT module on interpersonal effectiveness, and will assign homework to help reinforce skill development.        Patient has reviewed and agreed to the above plan.        Cristy Wilkinson PsyD, LP  Licensed Psychologist  Reviewed on  11/10/22                                                                                                                                                                                                                                                                          "

## 2023-01-12 ENCOUNTER — VIRTUAL VISIT (OUTPATIENT)
Dept: PSYCHOLOGY | Facility: CLINIC | Age: 43
End: 2023-01-12
Payer: COMMERCIAL

## 2023-01-12 DIAGNOSIS — F41.9 ANXIETY DISORDER, UNSPECIFIED TYPE: ICD-10-CM

## 2023-01-12 DIAGNOSIS — F33.1 MODERATE EPISODE OF RECURRENT MAJOR DEPRESSIVE DISORDER (H): Primary | ICD-10-CM

## 2023-01-12 PROCEDURE — 90834 PSYTX W PT 45 MINUTES: CPT | Mod: GT | Performed by: PSYCHOLOGIST

## 2023-01-12 NOTE — PROGRESS NOTES
"         [facility]                                            Progress Note    Patient Name: Idalia Hinojosa  Date: 1/12/2023         Service Type: Individual      Session Start Time:  12:15 pm Session End Time: 1:00 pm  Session Length:  45 minutes    Session #: 89    Attendees: Client     Service Modality: Video visit: -       Provider verified identity through the following two step process.  Patient provided:  Patient is known previously to provider    Telemedicine Visit: The patient's condition can be safely assessed and treated via synchronous audio and visual telemedicine encounter.      Reason for Telemedicine Visit: Services only offered telehealth    Originating Site (Patient Location): Patient's home    Distant Site (Provider Location): Provider Remote Setting- Home Office    Consent:  The patient/guardian has verbally consented to: the potential risks and benefits of telemedicine (telephone visit) versus in person care; bill my insurance or make self-payment for services provided; and responsibility for payment of non-covered services.     Patient would like the video invitation sent by:  My Chart    Mode of Communication:  Video Conference via [employer],     As the provider I attest to compliance with applicable laws and regulations related to telemedicine.         Treatment Plan Last Reviewed: 11/10/2022  PHQ-9/DAVID-7: 11/16/2022      DATA  Interactive Complexity: No  Crisis: No        Progress Since Last Session (Related to Symptoms / Goals / Homework):   Symptoms:  She reports things have been \"mostly better\" though notes the last few days she's been feeling more \"cranky\" and notes that the pain in her shoulder/neck area has had an impact on her mood and made it hard for her to focus.      Homework: Achieved / completed to satisfaction. Was successful with homework assignment to practice deep breathing and calming strategies.          Episode of Care Goals: Satisfactory " progress - ACTION (Actively working towards change); Intervened by reinforcing change plan / affirming steps taken.  The patient stated that her main goals for therapy would be to learn emotion regulation strategies (in particular, to help with when she is feeling upset/angry/frustrated/distressed).  The changes in her daily life due to COVID restrictions were a focus of our initial work together, and working through changes to routine as her daughter started school and she returned to work.  As she has been making progress, focus of treatment will shift to wellness planning (monitoring mood and symptoms, incorporating into routine what keeps her feeling well, review of triggers, how to manage set backs, etc.).  As her  has been diagnosed with cancer, treatment will also focus on providing her with support in managing the emotional distress and challenges related to this and caregiver stress.       Current / Ongoing Stressors and Concerns:   Current: Symptoms of anxiety and depression.  Stress has been exacerbated in the context of her  going through cancer treatment and her witnessing him requiring the rapid response team, which was very frightening for her.   She's been experiencing neck/shoulder pain since this.  Her  recently required 30 days of around the clock caregiver support, which has come to an end.  She reports ontinued stress and anxiety in regards to the road ahead with maintenance therapy and next steps.  Daughter has been diagnosed with ADHD, and recently started on medications and is in therapy.       Ongoing:Managing daily routine which has contributed to some adjustment and parenting challenges, lack of effective emotion regulation strategies for managing daily frustrations and upsets and marital discord due to differences in parenting styles.       Treatment Objective(s) Addressed in This Session:     Psychoeducation on calming strategies   Review of self-care and importance  "of attending to your self-care       Intervention:  CBT: She reported having a difficult time focusing and was noticing a lot of tension in her neck/shoulder area.  Provided pschoeducation on calming strategies and led her through a breathing exercise during the session today.  Reviewed thoughts and images that help promote calming and relaxation for her.  She also will practice reassuring herself/reminding herself that she made it through the stressful event at the hospital.          ASSESSMENT: Current Emotional / Mental Status (status of significant symptoms):   Risk status (Self / Other harm or suicidal ideation)   Patient denies current fears or concerns for personal safety.   Patient denies current or recent suicidal ideation or behaviors.  She reports she has not had any more passive suicidal thoughts.  She reports if the thoughts do occur, she knows steps she can take to manage them and believes she can manage them, and reach out for additional help if needed.     Patientdenies current or recent homicidal ideation or behaviors.   Patient denies current or recent self injurious behavior or ideation.   Patient denies other safety concerns.   Patient reports there has not been a change in risk factors since their last session -      Recommended that patient call 911 or go to the local ED should there be a change in any of these risk factors.  We reviewed how to contact [facility] crisis/COPE for urgent/crisis concerns 24/7.  Provided patient with crisis resources and she agrees to use safety plan should any safety concerns arise.      Professionals or agencies I can contact during a crisis:  ? Suicide Prevention Lifeline: call or text 581  ? Crisis Text Line: text \"[place]\" to 337197    Local Crisis Services: [facility] Crisis Services: [phone]    Call 911 or go to my nearest emergency department, or [facility].         Appearance:   Appropriate    Eye Contact:   Good    Psychomotor Behavior: She reports " "continued pain in her shoulder/neck area    Attitude:   Cooperative    Orientation:   All   Speech    Rate / Production: Normal     Volume:  Normal    Mood:    Reports feeling more cranky and having difficulties focusing    Affect:    Congruent with mood   Thought Content:  Clear    Thought Form:  Coherent  Logical    Insight:    Good      Medication Review:   No changes.  (reports that recent change Dr. Han made her to anti-depressant has been helpful).        Medication Compliance:   Yes      Changes in Health Issues:   Reports has  shoulder/neck pain, which she has been working with a massage therapist to help address.  Suggested she also consider the nurse hotline and/or her PCP for additional advise.       Chemical Use Review:   Substance Use: Chemical use reviewed, no changes or active concerns identified. She said she has been avoiding any alcohol use.     Tobacco Use: No current tobacco use.       Diagnosis:  Anxiety disorder unspecified  Major depression, recurrent episode, moderate    Will continue to monitor symptoms in response to witnessing  experience medical emergency and assist patient with processing event, self-care, and symptom management.  She reports she has not had any additional episodes of intense panic or anxiety.  She reported feeling \"triggered\" when she went to her spouse's doctors appointment yesterday, and felt an increase in feeling anxious and depressed.              PLAN: (Patient Tasks / Therapist Tasks / Other)    1) Idalia identified the following goals: Slow down, practice calming strategies (take deep breaths, not fight against or get angry because of shoulder pain, practice compassionate, non-judgmental stance, I encouraged asking nurse hotline or PCP for advice on self-cares for shoulder pain and/or any other steps they would advise she take).      Use strategies to help manage anxiety.      Take daily breaks, practice good self-care, reach out to parents for " help, let go of unjustified guilt, reach out when feeling overwhelmed.      Have some things handy for when times get stressful, remind myself I don't have to engage with negative thoughts, plan activities and time for fun, practice flexible mindset.    2) If there is a crisis situation, remember you are not alone and that there are people who can help you twenty-four hours a day, seven days a week.  Call [facility] ([facility] Crisis Services): [phone].      3) Check-in scheduled for 1/17 at 12:30 pm.   If urgent or crisis concerns arise prior to next scheduled appointment, please reach out to crisis resources, call 911, or go to the emergency department.      Cristy Wilkinson PsyD, LP  Licensed Psychologist  1/12/2023                                                                           ____________________________________    Treatment Plan     Patient's Name: Idalia Hinojosa                        YOB: 1980     Date of Creation: 1/6/2020  Date Treatment Plan Last Reviewed/Revised: 8/4/22     DSM5 Diagnoses: 300.00 (F41.9) Unspecified Anxiety Disorder, major depression, moderate, recurrent episode  Psychosocial / Contextual Factors: strain in marital relationship, parenting challenges, adjustment challenges, 's cancer diagnosis  PROMIS (reviewed every 90 days):      Anticipated number of session for this episode of care: additional 15-20  Anticipation frequency of session: Weekly until symptoms stabilize, then can decrease the frequency of sessions to likely bi-weekly or once every three weeks  Anticipated Duration of each session: 38-52 minutes  Treatment plan will be reviewed in 90 days or when goals have been changed.         Referral / Collaboration:  We discussed a referral to a couples/marital therapist.  The patient is thinking about this and has talked with her  about the referral, but they are unsure if they would like to follow through at this time.        MeasurableTreatment Goal(s) related to diagnosis / functional impairment(s)  Goal 1: Patient will learn emotion regulation strategies to help when she is feeling upset/angry/frustrated/distressed    I will know I've met my goal when I would yell less, I would feel calmer, and I would not push others.  I would be less physical when I'm angry (e.g. wouldn't slam doors or hit things)        Objective #A (Patient Action)                          Patient will learn and practice at least 2 new emotion regulation strategies.  Status: Continued - Date(s):    11/10/22- Regularly practicing emotion regulation strategies to help manage increase in emotional distress related to spouse's cancer diagnosis.  She has been successful with using strategies to help her stay calm when her daughter's experiencing heightened emotion and/or challenging behaviors.          Intervention(s)  Therapist will provide psychoeducation on emotion regulation module from DBT and will assign patient homework to help reinforce skill development.     Objective #B  Patient will identify factors that increase vulnerability to emotion mind and identify ways to decrease vulnerability to emotion mind.  Status: Continued - Date(s):11/10/22 - routinely incorporating mindfulness and self-awareness strategies to increase attention and focus to factors that increase vulnerability to emotion mind           Intervention(s)  Therapist will provide information on factors that increase vulnerabiliyt to emotion mind.     Objective #C  Patient will identify warning signs and signals that emotions are escalating and will learn ways to skillfully intervene early on.  Status: New - Date: 11/10/22 - in progress - has demonstrated good initial progress in being able to identify warning signs and intervene skillfully -            Intervention(s)  Therapist will help patient identify warning signs and signals, and will guide the patient in identifying skillful ways to  intervene when exeriencing warning signs and signals.        Goal 2: Patient will learn new parenting strategies to help with managing parenting challenges.  Parent will work on improving relationship with her daughter and defining what she would like this relationship to look like.      I will know I've met my goal when I'm enjoying time with my daughter, teaching her new things, and not waiting for things to change       Objective #A (Patient Action)                          Status: Continued - Date(s):11/10/22     Patient will increase understanding of developmental norms for her daughter and ways to manage challenging behaviors.     Intervention(s)  Therapist will provide psychoeducation on developmental norms and parenting strategies.     Objective #B  Patient will spend time reflecting on her relationship with her daughter and defining what she would like this relaitonship to look like.                  Status: Continued - Date(s):11/10/22 - Making good progress -            Intervention(s)  Therapist will guide the patient in reflecting upon her relationship with her daughter and help her define ways to move towards what she vaues in her relationship with her daughter.     Objective #C  Patient will increase time spent on fun activity and play with her daughter.  Status: Continued - Date(s):11/10/22- has been enjoying time with daughter and devoting regular time to fun activities and play together           Intervention(s)  Therapist will guide the patient in identifying ways she can increase positive time with her daughter.  Therapist will also teach mindfulness strategies to help patient with being in the present moment when appropriate - .        Goal 3: Patiient will improve communication with her .      I will know I've met my goal when I feel less irritated with my  and communicate more openly with my .  I would like to be more assertive and less aggressive.   I would like to not  "avoid telling him things because I'm worried about his reaction or don't think he will react well.  I would like to be more present to the moment during times when this would be helpful.\"       Objective #A (Patient Action)                          Status: Continued - Date(s):11/10/22 - continues to work on this goal, though emphasis has shift to managing caregiver stress related to 's cancer diagnosis and treatment.              Patient will learn and practice at least two new interpersonal effectiveness strategies.     Intervention(s)  Therapist will teach strategies from DBT module on interpersonal effectiveness, and will assign homework to help reinforce skill development.        Patient has reviewed and agreed to the above plan.        Cristy Wilkinson PsyD, LP  Licensed Psychologist  Reviewed on  11/10/22                                                                                                                                                                                                                                                                          "

## 2023-01-13 ENCOUNTER — OFFICE VISIT (OUTPATIENT)
Dept: ORTHOPEDICS | Facility: CLINIC | Age: 43
End: 2023-01-13
Payer: COMMERCIAL

## 2023-01-13 VITALS
DIASTOLIC BLOOD PRESSURE: 70 MMHG | WEIGHT: 172 LBS | HEIGHT: 66 IN | SYSTOLIC BLOOD PRESSURE: 110 MMHG | BODY MASS INDEX: 27.64 KG/M2

## 2023-01-13 DIAGNOSIS — M62.838 TRAPEZIUS MUSCLE SPASM: ICD-10-CM

## 2023-01-13 DIAGNOSIS — M25.511 ACUTE PAIN OF RIGHT SHOULDER: Primary | ICD-10-CM

## 2023-01-13 PROCEDURE — 99204 OFFICE O/P NEW MOD 45 MIN: CPT | Performed by: STUDENT IN AN ORGANIZED HEALTH CARE EDUCATION/TRAINING PROGRAM

## 2023-01-13 RX ORDER — CYCLOBENZAPRINE HCL 5 MG
5-10 TABLET ORAL
Qty: 30 TABLET | Refills: 0 | Status: SHIPPED | OUTPATIENT
Start: 2023-01-13 | End: 2023-04-03

## 2023-01-13 NOTE — PROGRESS NOTES
ASSESSMENT & PLAN  Patient Instructions     1. Acute pain of right shoulder    2. Trapezius muscle spasm      Idalia Hinojosa is a 42 year old female presenting for evaluation of acute right shoulder and neck pain/spasm. History, examination and imaging are consistent with scapular stabilizer spasm. Differential includes possible pinched nerve in the neck. Low suspicion for rotator cuff/shoulder injury. Reviewed treatment plan inclusive of pain management (topicals, NSAIDS, muscle relaxants, injection), activity modification (avoiding painful activities), formal physical therapy and timing of advance imaging (ie MRI).      At this time, plan to proceed with the following:    - Cyclobenzaprine (Flexeril) 5 mg tabs prescribed. Take 1-2 tabs at bedtime as needed for muscle spasm. May take up to 3 times per day as needed, although this is sedating (no driving).  - Diclofenac 50 mg tabs prescribed. Take 1 tab with food every 12 hours (breakfast and dinner) for the next 7-14 days, then reduce to as-needed. This is a prescription-strength non-steroidal anti-inflammatory (NSAID) medication. Do not use any other NSAIDS (ie ibuprofen products) while taking this medication. Stop this medication if you have stomach upset.  - You may also take Tylenol 1000 mg up to 3 times per day as needed for pain.      - Referral placed for formal physical therapy. Please call [phone] to schedule.   - Massage, heat, acupuncture, etc as needed.  - May consider trigger point injections if a palpable muscle knot develops.   - Activity modifications: Avoid painful activities but continue moving and stretching to prevent stiffness.    Please schedule a follow up appointment to see me in 6 weeks, or sooner as needed for persistence or worsening of pain. You may call our direct clinic number ([phone]) at any time with questions or concerns.    Renetta Ponce MD, [facility]      -----    SUBJECTIVE  Idalia Hinojosa is a/an 42 year old Right handed female who is seen as a self referral for evaluation of acute right shoulder/neck pain. The patient is seen by themselves.    Onset: 4 week(s) ago. Patient describes injury as an acutely stressful situation causing her body to tense up, now with persistent right upper should/neck pain and spasm.  Location of Pain: right upper shoulder/trapezius and right sided neck tightness/pain  Rating of Pain at worst: 8/10  Rating of Pain Currently: 5/10  Worsened by: stressful situations, work- repetitive use of arms, sitting still   Better with: heat, stretching  Treatments tried: heat, massage, stretching, ice, Advil  Associated symptoms: pain radiates to posterior shoulder; no numbness, tingling or weakness  Orthopedic history: NO  Relevant surgical history: NO  Social history: works at [employer] in [place]    Past Medical History:   Diagnosis Date     ASCUS of cervix with negative high risk HPV 04/07/2017 04/07/17 ASCUS, Neg HPV     Seasonal affective disorder (H)     no meds     Social History     Socioeconomic History     Marital status:      Number of children: 1   Occupational History     Employer: [employer]   Tobacco Use     Smoking status: Never     Smokeless tobacco: Never   Vaping Use     Vaping Use: Never used   Substance and Sexual Activity     Alcohol use: Yes     Alcohol/week: 0.0 standard drinks     Comment: 0-2 PER WK     Drug use: No     Sexual activity: Yes     Partners: Male     Birth control/protection: Pill, Condom   Other Topics Concern     Parent/sibling w/ CABG, MI or angioplasty before 65F 55M? No   Social History Narrative                 Patient's past medical, surgical, social, and family histories were reviewed today and no changes are noted.    REVIEW OF SYSTEMS:  10 point ROS is negative other than symptoms noted above in HPI, Past Medical History or as stated below  Constitutional: NEGATIVE  "for fever, chills, change in weight  Skin: NEGATIVE for worrisome rashes, moles or lesions  GI/: NEGATIVE for bowel or bladder changes  Neuro: NEGATIVE for weakness, dizziness or paresthesias    OBJECTIVE:  /70   Ht 1.676 m (5' 6\")   Wt 78 kg (172 lb)   BMI 27.76 kg/m     General: healthy, alert and in no distress  HEENT: no scleral icterus or conjunctival erythema  Skin: no suspicious lesions or rash. No jaundice.  CV: regular rhythm by palpation  Resp: normal respiratory effort without conversational dyspnea   Psych: normal mood and affect  Gait: normal steady gait with appropriate coordination and balance  Neuro: normal light touch sensory exam of the bilateral upper extremities.    MSK:  RIGHT SHOULDER  Inspection:   No swelling, bruising, discoloration, or obvious deformity or asymmetry.  Palpation:    Tender about the upper trapezius region and rhomboids. Remainder of bony and tendinous landmarks are nontender.  Active Range of Motion:     Abduction 1800, FF 1800, , IR T12.      Scapular dyskinesis absent but pain provoked by testing  Strength:    Scapular plane abduction 5/5,  ER 5/5, IR 5/5, biceps 5/5, triceps 5/5  Special Tests:    Positive: Neer's and Wood' provoke posterior shoulder/neck pain    Negative: supraspinatus (empty can), lift-off and Speed's    CERVICAL SPINE  Inspection:    Normal cervical lordosis present, rounded shoulders, forward head posture  Palpation:    Tender about the paracervical musculature (right), levator scapula (right), upper trapezius (right), lower trapezius (right), rhomboids (right) and medial border of scapula. Otherwise remainder of the landmarks and nontender.  Range of Motion:     Flexion within normal limits    Extension limited by tightness    Right side bend limited by pain    Left side bend limited by tightness/stretch of right sided neck    Right rotation limited by pain    Left rotation limited by tightness/stretch of right sided " neck  Strength:    Deltoid (C5) 5/5, biceps (C6) 5/5, wrist extension (C6) 5/5, triceps (C7) 5/5, wrist flexion (C7) 5/5, finger flexion (C8) 5/5  Special Tests:    Positive: Equivocal right Adson's    Negative: Spurling's (bilateral), Katt Pnoce MD, [facility]

## 2023-01-13 NOTE — PATIENT INSTRUCTIONS
1. Acute pain of right shoulder    2. Trapezius muscle spasm      Idalia Hinojosa is a 42 year old female presenting for evaluation of acute right shoulder and neck pain/spasm. History, examination and imaging are consistent with scapular stabilizer spasm. Differential includes possible pinched nerve in the neck. Low suspicion for rotator cuff/shoulder injury. Reviewed treatment plan inclusive of pain management (topicals, NSAIDS, muscle relaxants, injection), activity modification (avoiding painful activities), formal physical therapy and timing of advance imaging (ie MRI).      At this time, plan to proceed with the following:    - Cyclobenzaprine (Flexeril) 5 mg tabs prescribed. Take 1-2 tabs at bedtime as needed for muscle spasm. May take up to 3 times per day as needed, although this is sedating (no driving).  - Diclofenac 50 mg tabs prescribed. Take 1 tab with food every 12 hours (breakfast and dinner) for the next 7-14 days, then reduce to as-needed. This is a prescription-strength non-steroidal anti-inflammatory (NSAID) medication. Do not use any other NSAIDS (ie ibuprofen products) while taking this medication. Stop this medication if you have stomach upset.  - You may also take Tylenol 1000 mg up to 3 times per day as needed for pain.      - Referral placed for formal physical therapy. Please call [phone] to schedule.   - Massage, heat, acupuncture, etc as needed.  - May consider trigger point injections if a palpable muscle knot develops.   - Activity modifications: Avoid painful activities but continue moving and stretching to prevent stiffness.    Please schedule a follow up appointment to see me in 6 weeks, or sooner as needed for persistence or worsening of pain. You may call our direct clinic number ([phone]) at any time with questions or concerns.    Renetta Ponce MD, [facility]

## 2023-01-13 NOTE — LETTER
1/13/2023         RE: Idalia Hinojosa  [address]  [facility] [address]        Dear Colleague,    Thank you for referring your patient, Idalia Hinojosa, to the [facility] [place]. Please see a copy of my visit note below.    ASSESSMENT & PLAN  Patient Instructions     1. Acute pain of right shoulder    2. Trapezius muscle spasm      Idalia Hinojosa is a 42 year old female presenting for evaluation of acute right shoulder and neck pain/spasm. History, examination and imaging are consistent with scapular stabilizer spasm. Differential includes possible pinched nerve in the neck. Low suspicion for rotator cuff/shoulder injury. Reviewed treatment plan inclusive of pain management (topicals, NSAIDS, muscle relaxants, injection), activity modification (avoiding painful activities), formal physical therapy and timing of advance imaging (ie MRI).      At this time, plan to proceed with the following:    - Cyclobenzaprine (Flexeril) 5 mg tabs prescribed. Take 1-2 tabs at bedtime as needed for muscle spasm. May take up to 3 times per day as needed, although this is sedating (no driving).  - Diclofenac 50 mg tabs prescribed. Take 1 tab with food every 12 hours (breakfast and dinner) for the next 7-14 days, then reduce to as-needed. This is a prescription-strength non-steroidal anti-inflammatory (NSAID) medication. Do not use any other NSAIDS (ie ibuprofen products) while taking this medication. Stop this medication if you have stomach upset.  - You may also take Tylenol 1000 mg up to 3 times per day as needed for pain.      - Referral placed for formal physical therapy. Please call [phone] to schedule.   - Massage, heat, acupuncture, etc as needed.  - May consider trigger point injections if a palpable muscle knot develops.   - Activity modifications: Avoid painful activities but continue moving and stretching to prevent stiffness.    Please schedule a follow up  appointment to see me in 6 weeks, or sooner as needed for persistence or worsening of pain. You may call our direct clinic number ([phone]) at any time with questions or concerns.    Renetta Ponce MD, [facility]      -----    SUBJECTIVE  Idalia Hinojosa is a/an 42 year old Right handed female who is seen as a self referral for evaluation of acute right shoulder/neck pain. The patient is seen by themselves.    Onset: 4 week(s) ago. Patient describes injury as an acutely stressful situation causing her body to tense up, now with persistent right upper should/neck pain and spasm.  Location of Pain: right upper shoulder/trapezius and right sided neck tightness/pain  Rating of Pain at worst: 8/10  Rating of Pain Currently: 5/10  Worsened by: stressful situations, work- repetitive use of arms, sitting still   Better with: heat, stretching  Treatments tried: heat, massage, stretching, ice, Advil  Associated symptoms: pain radiates to posterior shoulder; no numbness, tingling or weakness  Orthopedic history: NO  Relevant surgical history: NO  Social history: works at [employer] in [place]    Past Medical History:   Diagnosis Date     ASCUS of cervix with negative high risk HPV 04/07/2017 04/07/17 ASCUS, Neg HPV     Seasonal affective disorder (H)     no meds     Social History     Socioeconomic History     Marital status:      Number of children: 1   Occupational History     Employer: [employer]   Tobacco Use     Smoking status: Never     Smokeless tobacco: Never   Vaping Use     Vaping Use: Never used   Substance and Sexual Activity     Alcohol use: Yes     Alcohol/week: 0.0 standard drinks     Comment: 0-2 PER WK     Drug use: No     Sexual activity: Yes     Partners: Male     Birth control/protection: Pill, Condom   Other Topics Concern     Parent/sibling w/ CABG, MI or angioplasty before 65F 55M? No   Social History Narrative          "        Patient's past medical, surgical, social, and family histories were reviewed today and no changes are noted.    REVIEW OF SYSTEMS:  10 point ROS is negative other than symptoms noted above in HPI, Past Medical History or as stated below  Constitutional: NEGATIVE for fever, chills, change in weight  Skin: NEGATIVE for worrisome rashes, moles or lesions  GI/: NEGATIVE for bowel or bladder changes  Neuro: NEGATIVE for weakness, dizziness or paresthesias    OBJECTIVE:  /70   Ht 1.676 m (5' 6\")   Wt 78 kg (172 lb)   BMI 27.76 kg/m     General: healthy, alert and in no distress  HEENT: no scleral icterus or conjunctival erythema  Skin: no suspicious lesions or rash. No jaundice.  CV: regular rhythm by palpation  Resp: normal respiratory effort without conversational dyspnea   Psych: normal mood and affect  Gait: normal steady gait with appropriate coordination and balance  Neuro: normal light touch sensory exam of the bilateral upper extremities.    MSK:  RIGHT SHOULDER  Inspection:   No swelling, bruising, discoloration, or obvious deformity or asymmetry.  Palpation:    Tender about the upper trapezius region and rhomboids. Remainder of bony and tendinous landmarks are nontender.  Active Range of Motion:     Abduction 1800, FF 1800, , IR T12.      Scapular dyskinesis absent but pain provoked by testing  Strength:    Scapular plane abduction 5/5,  ER 5/5, IR 5/5, biceps 5/5, triceps 5/5  Special Tests:    Positive: Neer's and Wood' provoke posterior shoulder/neck pain    Negative: supraspinatus (empty can), lift-off and Speed's    CERVICAL SPINE  Inspection:    Normal cervical lordosis present, rounded shoulders, forward head posture  Palpation:    Tender about the paracervical musculature (right), levator scapula (right), upper trapezius (right), lower trapezius (right), rhomboids (right) and medial border of scapula. Otherwise remainder of the landmarks and nontender.  Range of Motion:     " Flexion within normal limits    Extension limited by tightness    Right side bend limited by pain    Left side bend limited by tightness/stretch of right sided neck    Right rotation limited by pain    Left rotation limited by tightness/stretch of right sided neck  Strength:    Deltoid (C5) 5/5, biceps (C6) 5/5, wrist extension (C6) 5/5, triceps (C7) 5/5, wrist flexion (C7) 5/5, finger flexion (C8) 5/5  Special Tests:    Positive: Equivocal right Adson's    Negative: Spurling's (bilateral), Katt      Renetta Ponce MD, [facility]        Again, thank you for allowing me to participate in the care of your patient.        Sincerely,        Renetta Ponce MD

## 2023-01-17 ENCOUNTER — THERAPY VISIT (OUTPATIENT)
Dept: PHYSICAL THERAPY | Facility: CLINIC | Age: 43
End: 2023-01-17
Attending: STUDENT IN AN ORGANIZED HEALTH CARE EDUCATION/TRAINING PROGRAM
Payer: COMMERCIAL

## 2023-01-17 ENCOUNTER — VIRTUAL VISIT (OUTPATIENT)
Dept: PSYCHOLOGY | Facility: CLINIC | Age: 43
End: 2023-01-17
Payer: COMMERCIAL

## 2023-01-17 DIAGNOSIS — M62.838 TRAPEZIUS MUSCLE SPASM: ICD-10-CM

## 2023-01-17 DIAGNOSIS — M25.511 PAIN IN JOINT OF RIGHT SHOULDER: ICD-10-CM

## 2023-01-17 DIAGNOSIS — F33.1 MODERATE EPISODE OF RECURRENT MAJOR DEPRESSIVE DISORDER (H): Primary | ICD-10-CM

## 2023-01-17 DIAGNOSIS — M25.511 ACUTE PAIN OF RIGHT SHOULDER: ICD-10-CM

## 2023-01-17 DIAGNOSIS — F41.9 ANXIETY DISORDER, UNSPECIFIED TYPE: ICD-10-CM

## 2023-01-17 DIAGNOSIS — M54.2 NECK PAIN ON RIGHT SIDE: ICD-10-CM

## 2023-01-17 PROBLEM — M25.519 PAIN IN JOINT, SHOULDER REGION: Status: ACTIVE | Noted: 2023-01-17

## 2023-01-17 PROCEDURE — 97140 MANUAL THERAPY 1/> REGIONS: CPT | Mod: GP | Performed by: PHYSICAL THERAPIST

## 2023-01-17 PROCEDURE — 97161 PT EVAL LOW COMPLEX 20 MIN: CPT | Mod: GP | Performed by: PHYSICAL THERAPIST

## 2023-01-17 PROCEDURE — 97110 THERAPEUTIC EXERCISES: CPT | Mod: GP | Performed by: PHYSICAL THERAPIST

## 2023-01-17 PROCEDURE — 90834 PSYTX W PT 45 MINUTES: CPT | Mod: GT | Performed by: PSYCHOLOGIST

## 2023-01-17 NOTE — PROGRESS NOTES
Physical Therapy Initial Evaluation  Subjective:    Patient Health History             Pertinent medical history includes: depression.     Medical allergies: none.       Current medications:  Anti-depressants, anti-inflammatory and muscle relaxants.    Current occupation is Musician/ retail receiving.   Primary job tasks include:  Computer work, lifting/carrying, prolonged sitting, pushing/pulling and repetitive tasks.                                    Objective:  System    Physical Exam    General     ROS    Assessment/Plan:

## 2023-01-17 NOTE — PROGRESS NOTES
[facility]                                            Progress Note    Patient Name: Idalia Hinojosa  Date: 1/17/2023         Service Type: Individual      Session Start Time:  12:36 pm Session End Time: 1:26 pm  Session Length:  50 minutes    Session #: 90    Attendees: Client     Service Modality: Video visit: -       Provider verified identity through the following two step process.  Patient provided:  Patient is known previously to provider    Telemedicine Visit: The patient's condition can be safely assessed and treated via synchronous audio and visual telemedicine encounter.      Reason for Telemedicine Visit: Services only offered telehealth    Originating Site (Patient Location): Patient's home    Distant Site (Provider Location): Provider Remote Setting- Home Office    Consent:  The patient/guardian has verbally consented to: the potential risks and benefits of telemedicine (telephone visit) versus in person care; bill my insurance or make self-payment for services provided; and responsibility for payment of non-covered services.     Patient would like the video invitation sent by:  My Chart    Mode of Communication:  Video Conference via [place],     As the provider I attest to compliance with applicable laws and regulations related to telemedicine.         Treatment Plan Last Reviewed: 11/10/2022  PHQ-9/DAVID-7: 11/16/2022      DATA  Interactive Complexity: No  Crisis: No        Progress Since Last Session (Related to Symptoms / Goals / Homework):   Symptoms:  She reports that things have been better for her since she saw a doctor and a physical therapist regarding her pain - they prescribed medication and physical therapy exercises, and she notes that her mood has improved as her pain has improved.  Saturday was a hard day due to stressful things at home, though she notes a gradual improvement in her mood since.      Homework: Achieved / completed to satisfaction. Has  been able to utilize strategies to manage symptoms of anxiety.            Episode of Care Goals: Satisfactory progress - ACTION (Actively working towards change); Intervened by reinforcing change plan / affirming steps taken.  The patient stated that her main goals for therapy would be to learn emotion regulation strategies (in particular, to help with when she is feeling upset/angry/frustrated/distressed).  The changes in her daily life due to COVID restrictions were a focus of our initial work together, and working through changes to routine as her daughter started school and she returned to work.  As she has been making progress, focus of treatment will shift to wellness planning (monitoring mood and symptoms, incorporating into routine what keeps her feeling well, review of triggers, how to manage set backs, etc.).  As her  has been diagnosed with cancer, treatment will also focus on providing her with support in managing the emotional distress and challenges related to this and caregiver stress.       Current / Ongoing Stressors and Concerns:   Current: Symptoms of anxiety and depression.  Stress has been exacerbated in the context of her  going through cancer treatment and her witnessing him requiring the rapid response team, which was very frightening for her.  She's been experiencing pain in her neck/shoulder area, and has been experiencing some relief since working with a doctor and physical therapist.  She reports ontinued stress and anxiety in regards to the road ahead with maintenance therapy and next steps.  Daughter has been diagnosed with ADHD, and recently started on medications and is in therapy.       Ongoing:Managing daily routine which has contributed to some adjustment and parenting challenges, lack of effective emotion regulation strategies for managing daily frustrations and upsets and marital discord due to differences in parenting styles.       Treatment Objective(s) Addressed  in This Session:     Psychoeducation on mindfulness strategies - radical acceptance, and practicing a compassionate and non-judgmental stance   Practice balanced thinking- challenge polarized thinking   Psychoeducation on deep breathing   Review of self-care and importance of attending to your self-care       Intervention:  DBT and solution-focused therapy:  She noted wanting to be patient with her own experience of pain and with Kaylee's process of going through medication changes.  We talked about things that make it hard for her to practice patience to help with finding solutions.  She notes that when she's feeling more physical pain it is hard for her to feel patient - she'll look up the info the doc provided her with on stretches that will help when she's feeling in pain.  She also reflected upon her tendency to think in polarized terms, which was an opportunity to practice seeing the gray that exists in -between.  Rreviewed impact of cognitions on emotions.  WE also continue to talk about difference between primary and secondary emotions, and ways to practice radical acceptance.            ASSESSMENT: Current Emotional / Mental Status (status of significant symptoms):   Risk status (Self / Other harm or suicidal ideation)   Patient denies current fears or concerns for personal safety.   Patient denies current or recent suicidal ideation or behaviors.  She reports she has not had any more passive suicidal thoughts.  She reports if the thoughts do occur, she knows steps she can take to manage them and believes she can manage them, and reach out for additional help if needed.     Patientdenies current or recent homicidal ideation or behaviors.   Patient denies current or recent self injurious behavior or ideation.   Patient denies other safety concerns.   Patient reports there has not been a change in risk factors since their last session -      Recommended that patient call 911 or go to the local ED should there be  "a change in any of these risk factors.  We reviewed how to contact [facility] crisis/[facility] for urgent/crisis concerns 24/7.  Provided patient with crisis resources and she agrees to use safety plan should any safety concerns arise.      Professionals or agencies I can contact during a crisis:  ? Suicide Prevention Lifeline: call or text 988  ? Crisis Text Line: text \"[place]\" to 025145    Local Crisis Services: [facility] Services: [phone]    Call 911 or go to my nearest emergency department, or [facility].         Appearance:   Appropriate    Eye Contact:   Good    Psychomotor Behavior: She reports continued pain in her shoulder/neck area and was stretching this area during the session   Attitude:   Cooperative    Orientation:   All   Speech    Rate / Production: Normal     Volume:  Normal    Mood:    Anxious, depressed and stressed    Affect:    Congruent with mood   Thought Content:  Clear    Thought Form:  Coherent  Logical    Insight:    Good      Medication Review:   No changes.  (reports that recent change Dr. Han made her to anti-depressant has been helpful).    Muscle relaxer, anti-inflammatory for pain - taking per prescribed       Medication Compliance:   Yes      Changes in Health Issues:    She reports it has been helpful to be working with her physical therapist and doctor on her shoulder/neck pain - she finds this to be helpful.         Chemical Use Review:    No alcohol use    Substance Use: Chemical use reviewed, no changes or active concerns identified. She said she has been avoiding any alcohol use.     Tobacco Use: No current tobacco use.       Diagnosis:  Anxiety disorder unspecified  Major depression, recurrent episode, moderate    Will continue to monitor symptoms in response to witnessing  experience medical emergency and assist patient with processing event, self-care, and symptom management.  She reports she has not had any additional episodes of intense panic or " "anxiety.  She reported feeling \"triggered\" when she went to her spouse's doctors appointment yesterday, and felt an increase in feeling anxious and depressed.      No nightmares, no flashbacks, no instrusive thoughts,   Things better -         PLAN: (Patient Tasks / Therapist Tasks / Other)    1) Idalia identified the following goals: Remember things are a process, challenge all or nothing thinking, (I want everything right away).  Patience with the process     Slow down, practice calming strategies (take deep breaths, not fight against or get angry because of shoulder pain, practice compassionate, non-judgmental stance, I encouraged asking nurse hotline or PCP for advice on self-cares for shoulder pain and/or any other steps they would advise she take).      Use strategies to help manage anxiety.      Take daily breaks, practice good self-care, reach out to parents for help, let go of unjustified guilt, reach out when feeling overwhelmed.      Have some things handy for when times get stressful, remind myself I don't have to engage with negative thoughts, plan activities and time for fun, practice flexible mindset.    2) If there is a crisis situation, remember you are not alone and that there are people who can help you twenty-four hours a day, seven days a week.  Call [facility] ([facility]): [phone].      3) Check-in scheduled for 1/26 at 12:30 pm.   If urgent or crisis concerns arise prior to next scheduled appointment, please reach out to crisis resources, call 911, or go to the emergency department.      Cristy Wilkinson PsyD,   Licensed Psychologist  1/17/2023                                                                             ____________________________________    Treatment Plan     Patient's Name: Idalia Hinojosa                        YOB: 1980     Date of Creation: 1/6/2020  Date Treatment Plan Last Reviewed/Revised: 8/4/22     DSM5 Diagnoses: 300.00 (F41.9) " Unspecified Anxiety Disorder, major depression, moderate, recurrent episode  Psychosocial / Contextual Factors: strain in marital relationship, parenting challenges, adjustment challenges, 's cancer diagnosis  PROMIS (reviewed every 90 days):      Anticipated number of session for this episode of care: additional 15-20  Anticipation frequency of session: Weekly until symptoms stabilize, then can decrease the frequency of sessions to likely bi-weekly or once every three weeks  Anticipated Duration of each session: 38-52 minutes  Treatment plan will be reviewed in 90 days or when goals have been changed.         Referral / Collaboration:  We discussed a referral to a couples/marital therapist.  The patient is thinking about this and has talked with her  about the referral, but they are unsure if they would like to follow through at this time.       MeasurableTreatment Goal(s) related to diagnosis / functional impairment(s)  Goal 1: Patient will learn emotion regulation strategies to help when she is feeling upset/angry/frustrated/distressed    I will know I've met my goal when I would yell less, I would feel calmer, and I would not push others.  I would be less physical when I'm angry (e.g. wouldn't slam doors or hit things)        Objective #A (Patient Action)                          Patient will learn and practice at least 2 new emotion regulation strategies.  Status: Continued - Date(s):    11/10/22- Regularly practicing emotion regulation strategies to help manage increase in emotional distress related to spouse's cancer diagnosis.  She has been successful with using strategies to help her stay calm when her daughter's experiencing heightened emotion and/or challenging behaviors.          Intervention(s)  Therapist will provide psychoeducation on emotion regulation module from DBT and will assign patient homework to help reinforce skill development.     Objective #B  Patient will identify factors  that increase vulnerability to emotion mind and identify ways to decrease vulnerability to emotion mind.  Status: Continued - Date(s):11/10/22 - routinely incorporating mindfulness and self-awareness strategies to increase attention and focus to factors that increase vulnerability to emotion mind           Intervention(s)  Therapist will provide information on factors that increase vulnerabiliyt to emotion mind.     Objective #C  Patient will identify warning signs and signals that emotions are escalating and will learn ways to skillfully intervene early on.  Status: New - Date: 11/10/22 - in progress - has demonstrated good initial progress in being able to identify warning signs and intervene skillfully -            Intervention(s)  Therapist will help patient identify warning signs and signals, and will guide the patient in identifying skillful ways to intervene when exeriencing warning signs and signals.        Goal 2: Patient will learn new parenting strategies to help with managing parenting challenges.  Parent will work on improving relationship with her daughter and defining what she would like this relationship to look like.      I will know I've met my goal when I'm enjoying time with my daughter, teaching her new things, and not waiting for things to change       Objective #A (Patient Action)                          Status: Continued - Date(s):11/10/22     Patient will increase understanding of developmental norms for her daughter and ways to manage challenging behaviors.     Intervention(s)  Therapist will provide psychoeducation on developmental norms and parenting strategies.     Objective #B  Patient will spend time reflecting on her relationship with her daughter and defining what she would like this relaitonship to look like.                  Status: Continued - Date(s):11/10/22 - Making good progress -            Intervention(s)  Therapist will guide the patient in reflecting upon her relationship  "with her daughter and help her define ways to move towards what she vaues in her relationship with her daughter.     Objective #C  Patient will increase time spent on fun activity and play with her daughter.  Status: Continued - Date(s):11/10/22- has been enjoying time with daughter and devoting regular time to fun activities and play together           Intervention(s)  Therapist will guide the patient in identifying ways she can increase positive time with her daughter.  Therapist will also teach mindfulness strategies to help patient with being in the present moment when appropriate - .        Goal 3: Patiient will improve communication with her .      I will know I've met my goal when I feel less irritated with my  and communicate more openly with my .  I would like to be more assertive and less aggressive.   I would like to not avoid telling him things because I'm worried about his reaction or don't think he will react well.  I would like to be more present to the moment during times when this would be helpful.\"       Objective #A (Patient Action)                          Status: Continued - Date(s):11/10/22 - continues to work on this goal, though emphasis has shift to managing caregiver stress related to 's cancer diagnosis and treatment.              Patient will learn and practice at least two new interpersonal effectiveness strategies.     Intervention(s)  Therapist will teach strategies from DBT module on interpersonal effectiveness, and will assign homework to help reinforce skill development.        Patient has reviewed and agreed to the above plan.        Cristy Wilkinson PsyD, LP  Licensed Psychologist  Reviewed on  11/10/22                                                                                                                                                                                                                                             "

## 2023-01-17 NOTE — PROGRESS NOTES
Physical Therapy Initial Evaluation  Subjective:  The history is provided by the patient.   Therapist Generated HPI Evaluation  Problem details: Pt complains of R neck/upper back/shoulder pain starting about 4 weeks ago after a stressful situation.  Pt reports her  was in the hospital for stem cell cancer treatment and crashed.  Pt was in the room and was shivering and so stressed out.  Since then, has complained of R neck and upper back pain and shoulder pain.  Current pain is 2/10 but at its worst was 8/10.  Denies numbness or tingling or weakness.      Has tried 2 different massages and stretches and exercise with minimal relief.  Was recently prescribed muscle relaxer and anti-inflammatory last week which has helped a lot.  Exacerbating activities: stress, shoveling, sitting and reading  .         Type of problem:  Cervical spine.    This is a new condition.  Condition occurred with:  Other reason.  Where condition occurred: at home.  Patient reports pain:  Cervical right side and upper thoracic.  Pain is described as aching and stabbing (varies) and is intermittent.  Pain radiates to:  Shoulder right. Pain is worse in the A.M. and the same all the time.  Since onset symptoms are unchanged (slight improvement with meds).  Symptoms are exacerbated by sitting and stress (sleep was disturbed)  and relieved by NSAID's, muscle relaxants and heat.    Past treatment: working with psychology therapist.                           Objective:  Standing Alignment:    Cervical/Thoracic:  Forward head and thoracic kyphosis decreased  Shoulder/UE:  Rounded shoulders                  Flexibility/Screens:   Positive screens:  Cervical; Thoracic and Shoulder  Upper Extremity:        Decreased right upper extremity flexibility present at:  Pectoralis Major and Pectoralis Minor    Spine:      Decreased right spine flexibility:  Rhomboids and Upper Trap                  Cervical/Thoracic Evaluation    AROM:    AROM  Thoracic:    Flexion:             Extension:          Mod loss with UE overhead flexion, firm endfeel R > L side  Rotation:            Left:     Right:        Cervical Myotomes:  normal                  DTR's:  normal            Cervical Palpation:      Tenderness present at Right:    Rhomboids and Upper Trap               Shoulder Evaluation:  ROM:  AROM:  normal                            PROM:  normal                                Strength:  normal                            Mobility Tests:      Glenohumeral posterior right:  Hypomobile  Glenohumeral inferior right:  Hypomobile      Scapulothoracic right:  Hypomobile                                       Samuel Cervical Evaluation    Posture:  Sitting: poor  Standing: poor  Protruding Head: yes  Wry Neck: no  Correction of Posture: better    Movement Loss:    Flexion (Flex): min  Retraction (RET): mod  Extension (EXT): mod  Lateral Flexion Right (LF R): min  Lateral Flexion Left (LF L): min  Rotation Right (ROT R): min  Rotation Left (ROT L): min                                                 ROS    Assessment/Plan:    Patient is a 42 year old female with cervical and right side shoulder complaints.    Patient has the following significant findings with corresponding treatment plan.                Diagnosis 1:  R cervical and R shoulder pain  Pain -  manual therapy, self management, education and home program  Decreased ROM/flexibility - manual therapy, therapeutic exercise, therapeutic activity and home program  Decreased joint mobility - manual therapy, therapeutic exercise, therapeutic activity and home program  Decreased function - therapeutic activities and home program  Impaired posture - neuro re-education, therapeutic activities and home program    Therapy Evaluation Codes:   1) History comprised of:   Personal factors that impact the plan of care:      None.    Comorbidity factors that impact the plan of care are:      Depression.     Medications  impacting care: Anti-depressant, Anti-inflammatory and Muscle relaxant.  2) Examination of Body Systems comprised of:   Body structures and functions that impact the plan of care:      Cervical spine and Shoulder.   Activity limitations that impact the plan of care are:      Sitting, Sleeping and lifting/ADls.  3) Clinical presentation characteristics are:   Stable/Uncomplicated.  4) Decision-Making    Low complexity using standardized patient assessment instrument and/or measureable assessment of functional outcome.  Cumulative Therapy Evaluation is: Low complexity.    Previous and current functional limitations:  (See Goal Flow Sheet for this information)    Short term and Long term goals: (See Goal Flow Sheet for this information)     Communication ability:  Patient appears to be able to clearly communicate and understand verbal and written communication and follow directions correctly.  Treatment Explanation - The following has been discussed with the patient:   RX ordered/plan of care  Anticipated outcomes  Possible risks and side effects  This patient would benefit from PT intervention to resume normal activities.   Rehab potential is good.    Frequency:  1 X week, once daily  Duration:  for 6 weeks  Discharge Plan:  Achieve all LTG.  Independent in home treatment program.  Reach maximal therapeutic benefit.    Please refer to the daily flowsheet for treatment today, total treatment time and time spent performing 1:1 timed codes.

## 2023-01-26 ENCOUNTER — MYC MEDICAL ADVICE (OUTPATIENT)
Dept: OBGYN | Facility: CLINIC | Age: 43
End: 2023-01-26

## 2023-01-26 ENCOUNTER — VIRTUAL VISIT (OUTPATIENT)
Dept: PSYCHOLOGY | Facility: CLINIC | Age: 43
End: 2023-01-26
Payer: COMMERCIAL

## 2023-01-26 ENCOUNTER — THERAPY VISIT (OUTPATIENT)
Dept: PHYSICAL THERAPY | Facility: CLINIC | Age: 43
End: 2023-01-26
Payer: COMMERCIAL

## 2023-01-26 DIAGNOSIS — M25.511 PAIN IN JOINT OF RIGHT SHOULDER: ICD-10-CM

## 2023-01-26 DIAGNOSIS — F33.1 MODERATE EPISODE OF RECURRENT MAJOR DEPRESSIVE DISORDER (H): Primary | ICD-10-CM

## 2023-01-26 DIAGNOSIS — M54.2 NECK PAIN ON RIGHT SIDE: Primary | ICD-10-CM

## 2023-01-26 DIAGNOSIS — F32.5 MAJOR DEPRESSIVE DISORDER WITH SINGLE EPISODE, IN FULL REMISSION (H): ICD-10-CM

## 2023-01-26 DIAGNOSIS — F41.9 ANXIETY DISORDER, UNSPECIFIED TYPE: ICD-10-CM

## 2023-01-26 PROCEDURE — 97110 THERAPEUTIC EXERCISES: CPT | Mod: GP | Performed by: PHYSICAL THERAPIST

## 2023-01-26 PROCEDURE — 90834 PSYTX W PT 45 MINUTES: CPT | Mod: GT | Performed by: PSYCHOLOGIST

## 2023-01-26 PROCEDURE — 97140 MANUAL THERAPY 1/> REGIONS: CPT | Mod: GP | Performed by: PHYSICAL THERAPIST

## 2023-01-26 ASSESSMENT — PATIENT HEALTH QUESTIONNAIRE - PHQ9
10. IF YOU CHECKED OFF ANY PROBLEMS, HOW DIFFICULT HAVE THESE PROBLEMS MADE IT FOR YOU TO DO YOUR WORK, TAKE CARE OF THINGS AT HOME, OR GET ALONG WITH OTHER PEOPLE: SOMEWHAT DIFFICULT
SUM OF ALL RESPONSES TO PHQ QUESTIONS 1-9: 6
SUM OF ALL RESPONSES TO PHQ QUESTIONS 1-9: 6

## 2023-01-26 NOTE — PROGRESS NOTES
[facility]                                            Progress Note    Patient Name: Idalia Hinojosa  Date: 1/26/2023         Service Type: Individual      Session Start Time:  12:36 pm Session End Time: 1:26 pm  Session Length:  50 minutes    Session #: 90    Attendees: Client     Service Modality: Video visit: -       Provider verified identity through the following two step process.  Patient provided:  Patient is known previously to provider    Telemedicine Visit: The patient's condition can be safely assessed and treated via synchronous audio and visual telemedicine encounter.      Reason for Telemedicine Visit: Services only offered telehealth    Originating Site (Patient Location): Patient's home    Distant Site (Provider Location): Provider Remote Setting- Home Office    Consent:  The patient/guardian has verbally consented to: the potential risks and benefits of telemedicine (telephone visit) versus in person care; bill my insurance or make self-payment for services provided; and responsibility for payment of non-covered services.     Patient would like the video invitation sent by:  My Chart    Mode of Communication:  Video Conference via [employer],     As the provider I attest to compliance with applicable laws and regulations related to telemedicine.         Treatment Plan Last Reviewed: 11/10/2022  PHQ-9/DAVID-7: 1/26/2023        DATA  Interactive Complexity: No  Crisis: No        Progress Since Last Session (Related to Symptoms / Goals / Homework):   Symptoms:  PHQ of 6 today.  She reports that the last week has been stressful and challenging due to multiple stressors.  She reports that sxs of anxiety, depression and physical pain in her shoulder/neck area have all been heightened.       Answers for HPI/ROS submitted by the patient on 1/26/2023  If you checked off any problems, how difficult have these problems made it for you to do your work, take care of things at  home, or get along with other people?: Somewhat difficult  PHQ9 TOTAL SCORE: 6    Homework: Achieved / completed to satisfaction. Was able to utilize strategies to manage symptoms of anxiety the first part of last week, but noted that she had more difficulties connecting with strategies over the weekend when Donald had to return to the ED.  She reports she has been successful with practicing the exercises the PT gave her to help with her neck/shoulder pain.            Episode of Care Goals: Satisfactory progress - ACTION (Actively working towards change); Intervened by reinforcing change plan / affirming steps taken.  The patient stated that her main goals for therapy would be to learn emotion regulation strategies (in particular, to help with when she is feeling upset/angry/frustrated/distressed).  The changes in her daily life due to COVID restrictions were a focus of our initial work together, and working through changes to routine as her daughter started school and she returned to work.  As she has been making progress, focus of treatment will shift to wellness planning (monitoring mood and symptoms, incorporating into routine what keeps her feeling well, review of triggers, how to manage set backs, etc.).  As her  has been diagnosed with cancer, treatment will also focus on providing her with support in managing the emotional distress and challenges related to this and caregiver stress.       Current / Ongoing Stressors and Concerns:   Current: Symptoms of anxiety and depression.  Caregiver stress related to 's cancer treatment.  He is in maintenance therapy and the weekend was stressful due to unanticipated difficulties he had with this that required him to go to the ED.  She reports ontinued stress and anxiety in regards to the road ahead with maintenance therapy and next steps.  She's been experiencing pain in her neck/shoulder area, and has been experiencing some relief since working with a  doctor and physical therapist.  Her daughter has been diagnosed with ADHD, has been having behavioral challenges at home and is undergoing medication changes.  She is working with her daughter's therapist to help address.       Ongoing:Managing daily routine which has contributed to some adjustment and parenting challenges, lack of effective emotion regulation strategies for managing daily frustrations and upsets and marital discord due to differences in parenting styles.       Treatment Objective(s) Addressed in This Session:     Psychoeducation on mindfulness strategies -   Learn and practice distress tolerance skills-  self-soothe strategy using the five senses   Practice balanced thinking- challenge polarized thinking     Continue with:  Practice deep breathing   Review of self-care and importance of attending to your self-care     Intervention:  DBT and solution-focused therapy:  She noted feeling overwhelmed and distressed by the many current stressors on her plate.  We talked about breaking down what is contributing to feeling overwhelmed and looking at factors she has control over that she can focus on to help with managing distress.  We also talked about distress tolerance and mindfulness strategies.  We talked about ways to practice strategy of self-soothe using the five senses and being present to the moment.  Practiced a mindfulness and self-soothe exercise in session.               ASSESSMENT: Current Emotional / Mental Status (status of significant symptoms):   Risk status (Self / Other harm or suicidal ideation)   Patient denies current fears or concerns for personal safety.   Patient denies current or recent suicidal ideation or behaviors.  She reports she has not had any more passive suicidal thoughts.  She reports if the thoughts do occur, she knows steps she can take to manage them and believes she can manage them, and reach out for additional help if needed.     Patientdenies current or recent  "homicidal ideation or behaviors.   Patient denies current or recent self injurious behavior or ideation.   Patient denies other safety concerns.   Patient reports there has not been a change in risk factors since their last session -      Recommended that patient call 911 or go to the local ED should there be a change in any of these risk factors.  We reviewed how to contact [facility] crisis/COPE for urgent/crisis concerns 24/7.  Provided patient with crisis resources and she agrees to use safety plan should any safety concerns arise.      Professionals or agencies I can contact during a crisis:  ? Suicide Prevention Lifeline: call or text 837  ? Crisis Text Line: text \"[place]\" to 915640    [facility] Crisis Services: [facility]: [phone]    Call 911 or go to my nearest emergency department, or [facility].         Appearance:   Appropriate    Eye Contact:   Good    Psychomotor Behavior: She reported that she continues to have neck, shoulder and some back pain, and was stretching at different points during the session    Attitude:   Cooperative    Orientation:   All   Speech    Rate / Production: Normal     Volume:  Normal    Mood:    Reports feeling stressed and anxious    Affect:    Congruent with mood   Thought Content:  Clear    Thought Form:  Coherent  Logical    Insight:    Good      Medication Review:   No changes.         Medication Compliance:   Yes      Changes in Health Issues:    Reports continued Shoulder and back pain - she said she saw the physical therapist this morning and finds this to be very helpful and has been using the exercises they taught her.            Chemical Use Review:    No alcohol use    Substance Use: Chemical use reviewed, no changes or active concerns identified. She said she has been avoiding any alcohol use.     Tobacco Use: No current tobacco use.       Diagnosis:  Anxiety disorder unspecified  Major depression, recurrent episode, moderate    Will continue to " monitor symptoms in response to witnessing  experience medical emergency and assist patient with processing event, self-care, and symptom management.  She reports she has not had any additional episodes of intense panic or anxiety.     No nightmares, no flashbacks, no instrusive thoughts,   Things better -         PLAN: (Patient Tasks / Therapist Tasks / Other)    1) Idalia identified the following goals: Practice Self-soothing strategies and practice mindfulness.  Give myself permission to slow down.    Remember things are a process, challenge all or nothing thinking, (I want everything right away).  Patience with the process     Use strategies to help manage anxiety.      2) If there is a crisis situation, remember you are not alone and that there are people who can help you twenty-four hours a day, seven days a week.  Call [facility] ([facility]): [phone].      3) Check-in scheduled for 2/2 at 1:00 pm.   If urgent or crisis concerns arise prior to next scheduled appointment, please reach out to crisis resources, call 911, or go to the emergency department.      Cristy Wilkinson PsyD,   Licensed Psychologist  1/26/2023                                           ____________________________________    Treatment Plan     Patient's Name: Idalia Hinojosa                        YOB: 1980     Date of Creation: 1/6/2020  Date Treatment Plan Last Reviewed/Revised: 8/4/22     DSM5 Diagnoses: 300.00 (F41.9) Unspecified Anxiety Disorder, major depression, moderate, recurrent episode  Psychosocial / Contextual Factors: strain in marital relationship, parenting challenges, adjustment challenges, 's cancer diagnosis  PROMIS (reviewed every 90 days):      Anticipated number of session for this episode of care: additional 15-20  Anticipation frequency of session: Weekly until symptoms stabilize, then can decrease the frequency of sessions to likely bi-weekly or once every three  weeks  Anticipated Duration of each session: 38-52 minutes  Treatment plan will be reviewed in 90 days or when goals have been changed.         Referral / Collaboration:  We discussed a referral to a couples/marital therapist.  The patient is thinking about this and has talked with her  about the referral, but they are unsure if they would like to follow through at this time.       MeasurableTreatment Goal(s) related to diagnosis / functional impairment(s)  Goal 1: Patient will learn emotion regulation strategies to help when she is feeling upset/angry/frustrated/distressed    I will know I've met my goal when I would yell less, I would feel calmer, and I would not push others.  I would be less physical when I'm angry (e.g. wouldn't slam doors or hit things)        Objective #A (Patient Action)                          Patient will learn and practice at least 2 new emotion regulation strategies.  Status: Continued - Date(s):    11/10/22- Regularly practicing emotion regulation strategies to help manage increase in emotional distress related to spouse's cancer diagnosis.  She has been successful with using strategies to help her stay calm when her daughter's experiencing heightened emotion and/or challenging behaviors.          Intervention(s)  Therapist will provide psychoeducation on emotion regulation module from DBT and will assign patient homework to help reinforce skill development.     Objective #B  Patient will identify factors that increase vulnerability to emotion mind and identify ways to decrease vulnerability to emotion mind.  Status: Continued - Date(s):11/10/22 - routinely incorporating mindfulness and self-awareness strategies to increase attention and focus to factors that increase vulnerability to emotion mind           Intervention(s)  Therapist will provide information on factors that increase vulnerabiliyt to emotion mind.     Objective #C  Patient will identify warning signs and signals  that emotions are escalating and will learn ways to skillfully intervene early on.  Status: New - Date: 11/10/22 - in progress - has demonstrated good initial progress in being able to identify warning signs and intervene skillfully -            Intervention(s)  Therapist will help patient identify warning signs and signals, and will guide the patient in identifying skillful ways to intervene when exeriencing warning signs and signals.        Goal 2: Patient will learn new parenting strategies to help with managing parenting challenges.  Parent will work on improving relationship with her daughter and defining what she would like this relationship to look like.      I will know I've met my goal when I'm enjoying time with my daughter, teaching her new things, and not waiting for things to change       Objective #A (Patient Action)                          Status: Continued - Date(s):11/10/22     Patient will increase understanding of developmental norms for her daughter and ways to manage challenging behaviors.     Intervention(s)  Therapist will provide psychoeducation on developmental norms and parenting strategies.     Objective #B  Patient will spend time reflecting on her relationship with her daughter and defining what she would like this relaitonship to look like.                  Status: Continued - Date(s):11/10/22 - Making good progress -            Intervention(s)  Therapist will guide the patient in reflecting upon her relationship with her daughter and help her define ways to move towards what she vaues in her relationship with her daughter.     Objective #C  Patient will increase time spent on fun activity and play with her daughter.  Status: Continued - Date(s):11/10/22- has been enjoying time with daughter and devoting regular time to fun activities and play together           Intervention(s)  Therapist will guide the patient in identifying ways she can increase positive time with her daughter.   "Therapist will also teach mindfulness strategies to help patient with being in the present moment when appropriate - .        Goal 3: Patiient will improve communication with her .      I will know I've met my goal when I feel less irritated with my  and communicate more openly with my .  I would like to be more assertive and less aggressive.   I would like to not avoid telling him things because I'm worried about his reaction or don't think he will react well.  I would like to be more present to the moment during times when this would be helpful.\"       Objective #A (Patient Action)                          Status: Continued - Date(s):11/10/22 - continues to work on this goal, though emphasis has shift to managing caregiver stress related to 's cancer diagnosis and treatment.              Patient will learn and practice at least two new interpersonal effectiveness strategies.     Intervention(s)  Therapist will teach strategies from DBT module on interpersonal effectiveness, and will assign homework to help reinforce skill development.        Patient has reviewed and agreed to the above plan.        Cristy Wilkinson PsyD,   Licensed Psychologist  Reviewed on  11/10/22                                                                                                                                                                                                                                                                          Answers for HPI/ROS submitted by the patient on 1/26/2023  If you checked off any problems, how difficult have these problems made it for you to do your work, take care of things at home, or get along with other people?: Somewhat difficult  PHQ9 TOTAL SCORE: 6      "

## 2023-01-26 NOTE — TELEPHONE ENCOUNTER
Mychart exchange between Dr. Han and patient between 11/16-11/23 pt and MD verified pt okay to increase zoloft to 100mg but not changed in chart.   Pt needing refills, changed to 100mg dosing and refill ordered.   Reyna Monroy RN on 1/26/2023 at 9:35 AM

## 2023-01-27 ENCOUNTER — OFFICE VISIT (OUTPATIENT)
Dept: DERMATOLOGY | Facility: CLINIC | Age: 43
End: 2023-01-27
Attending: OBSTETRICS & GYNECOLOGY
Payer: COMMERCIAL

## 2023-01-27 DIAGNOSIS — I78.1 NON-NEOPLASTIC NEVUS: ICD-10-CM

## 2023-01-27 DIAGNOSIS — D48.5 NEOPLASM OF UNCERTAIN BEHAVIOR OF SKIN: ICD-10-CM

## 2023-01-27 PROCEDURE — 11102 TANGNTL BX SKIN SINGLE LES: CPT | Performed by: PHYSICIAN ASSISTANT

## 2023-01-27 PROCEDURE — 88305 TISSUE EXAM BY PATHOLOGIST: CPT | Performed by: PATHOLOGY

## 2023-01-27 ASSESSMENT — PAIN SCALES - GENERAL: PAINLEVEL: NO PAIN (0)

## 2023-01-27 NOTE — PATIENT INSTRUCTIONS
Wound Care Instructions     FOR SUPERFICIAL WOUNDS     [facility]  [phone]                 AFTER 24 HOURS YOU SHOULD REMOVE THE BANDAGE AND BEGIN DAILY DRESSING CHANGES AS FOLLOWS:     1) Remove Dressing.     2) Clean and dry the area with tap water using a Q-tip or sterile gauze pad.     3) Apply Vaseline, Aquaphor, Polysporin ointment or Bacitracin ointment over entire wound.  Do NOT use Neosporin ointment.     4) Cover the wound with a band-aid, or a sterile non-stick gauze pad and micropore paper tape    REPEAT THESE INSTRUCTIONS AT LEAST ONCE A DAY UNTIL THE WOUND HAS COMPLETELY HEALED.    It is an old wives tale that a wound heals better when it is exposed to air and allowed to dry out. The wound will heal faster with a better cosmetic result if it is kept moist with ointment and covered with a bandage.    **Do not let the wound dry out.**    Supplies Needed:      *Cotton tipped applicators (Q-tips)    *Vaseline, Aquaphor, Polysporin or Bacitracin Ointment (NOT NEOSPORIN)    *Band-aids or non-stick gauze pads and micropore paper tape.      PATIENT INFORMATION:    During the healing process you will notice a number of changes. All wounds develop a small halo of redness surrounding the wound.  This means healing is occurring. Severe itching with extensive redness usually indicates sensitivity to the ointment or bandage tape used to dress the wound.  You should call our office if this develops.      Swelling  and/or discoloration around your surgical site is common, particularly when performed around the eye.    All wounds normally drain.  The larger the wound the more drainage there will be.  After 7-10 days, you will notice the wound beginning to shrink and new skin will begin to grow.  The wound is healed when you can see skin has formed over the entire area.  A healed wound has a healthy, shiny look to the surface and is red to dark pink in color to normalize.  Wounds may  take approximately 4-6 weeks to heal.  Larger wounds may take 6-8 weeks.  After the wound is healed you may discontinue dressing changes.    You may experience a sensation of tightness as your wound heals. This is normal and will gradually subside.    Your healed wound may be sensitive to temperature changes. This sensitivity improves with time, but if you re having a lot of discomfort, try to avoid temperature extremes.    Patients frequently experience itching after their wound appears to have healed because of the continue healing under the skin.  Plain Vaseline will help relieve the itching.      POSSIBLE COMPLICATIONS    BLEEDING:    Leave the bandage in place.  Use tightly rolled up gauze or a cloth to apply direct pressure over the bandage for 30  minutes.  Reapply pressure for an additional 30 minutes if necessary  Use additional gauze and tape to maintain pressure once the bleeding has stopped.

## 2023-01-27 NOTE — LETTER
1/27/2023         RE: Idalia Hinojosa  [address]  [facility] [address]        Dear Colleague,    Thank you for referring your patient, Idalia Hinojosa, to the [facility]. Please see a copy of my visit note below.    HPI:   Chief complaints: Idalia Hinojosa is a pleasant 42 year old female who presents for evaluation of an irritated mole on the scalp. She has had the mole for a long time but it will get traumatized. No history of skin cancer.     Shx: 8 yo daughter    PHYSICAL EXAM:    Breastfeeding No   Skin exam performed as follows: Type 2 skin. Mood appropriate  Alert and Oriented X 3. Well developed, well nourished in no distress.  General appearance: Normal  Head including face: Normal  Eyes: conjunctiva and lids: Normal  Mouth: Lips, teeth, gums: Normal  Neck: Normal  Cardiovascular: Exam of peripheral vascular system by observation for swelling, varicosities, edema: Normal  Right upper extremity: Normal  Left upper extremity: Normal  Right lower extremity: Normal  Left lower extremity: Normal  Skin: Scalp and body hair: See below    6 mm tan fleshy papule on the left scalp    ASSESSMENT/PLAN:     1. Dermal nevus r/o atypicality on the left scalp. Shave biopsy performed.  Area cleaned and anesthetized with 1% lidocaine with epinephrine.  Dermablade used to remove the lesion and sent to pathology. Bleeding was cauterized. Pt tolerated procedure well with no complications.             Follow-up: PRN  CC:   Scribed By: Maddi Rai MS, TRISTA          Again, thank you for allowing me to participate in the care of your patient.        Sincerely,        Maddi Rai PA-C

## 2023-01-27 NOTE — PROGRESS NOTES
HPI:   Chief complaints: Idalia Hinojosa is a pleasant 42 year old female who presents for evaluation of an irritated mole on the scalp. She has had the mole for a long time but it will get traumatized. No history of skin cancer.     Shx: 6 yo daughter    PHYSICAL EXAM:    Breastfeeding No   Skin exam performed as follows: Type 2 skin. Mood appropriate  Alert and Oriented X 3. Well developed, well nourished in no distress.  General appearance: Normal  Head including face: Normal  Eyes: conjunctiva and lids: Normal  Mouth: Lips, teeth, gums: Normal  Neck: Normal  Cardiovascular: Exam of peripheral vascular system by observation for swelling, varicosities, edema: Normal  Right upper extremity: Normal  Left upper extremity: Normal  Right lower extremity: Normal  Left lower extremity: Normal  Skin: Scalp and body hair: See below    6 mm tan fleshy papule on the left scalp    ASSESSMENT/PLAN:     1. Dermal nevus r/o atypicality on the left scalp. Shave biopsy performed.  Area cleaned and anesthetized with 1% lidocaine with epinephrine.  Dermablade used to remove the lesion and sent to pathology. Bleeding was cauterized. Pt tolerated procedure well with no complications.             Follow-up: PRN  CC:   Scribed By: Maddi Rai, MS, PAPRAVIN

## 2023-01-31 LAB
PATH REPORT.COMMENTS IMP SPEC: NORMAL
PATH REPORT.COMMENTS IMP SPEC: NORMAL
PATH REPORT.FINAL DX SPEC: NORMAL
PATH REPORT.GROSS SPEC: NORMAL
PATH REPORT.MICROSCOPIC SPEC OTHER STN: NORMAL
PATH REPORT.RELEVANT HX SPEC: NORMAL

## 2023-02-02 ENCOUNTER — VIRTUAL VISIT (OUTPATIENT)
Dept: PSYCHOLOGY | Facility: CLINIC | Age: 43
End: 2023-02-02
Payer: COMMERCIAL

## 2023-02-02 ENCOUNTER — THERAPY VISIT (OUTPATIENT)
Dept: PHYSICAL THERAPY | Facility: CLINIC | Age: 43
End: 2023-02-02
Payer: COMMERCIAL

## 2023-02-02 DIAGNOSIS — M25.511 PAIN IN JOINT OF RIGHT SHOULDER: ICD-10-CM

## 2023-02-02 DIAGNOSIS — F33.1 MODERATE EPISODE OF RECURRENT MAJOR DEPRESSIVE DISORDER (H): Primary | ICD-10-CM

## 2023-02-02 DIAGNOSIS — F41.9 ANXIETY DISORDER, UNSPECIFIED TYPE: ICD-10-CM

## 2023-02-02 DIAGNOSIS — M54.2 NECK PAIN ON RIGHT SIDE: Primary | ICD-10-CM

## 2023-02-02 PROCEDURE — 97140 MANUAL THERAPY 1/> REGIONS: CPT | Mod: GP | Performed by: PHYSICAL THERAPIST

## 2023-02-02 PROCEDURE — 97110 THERAPEUTIC EXERCISES: CPT | Mod: GP | Performed by: PHYSICAL THERAPIST

## 2023-02-02 PROCEDURE — 90832 PSYTX W PT 30 MINUTES: CPT | Mod: VID | Performed by: PSYCHOLOGIST

## 2023-02-02 NOTE — PROGRESS NOTES
"           [facility]                                            Progress Note    Patient Name: Idalia Hinojosa  Date: 2/2/2023           Service Type: Individual      Session Start Time:  1:08  pm Session End Time: 1:38 pm  Session Length:  30 minutes    Session #: 92    Attendees: Client     Service Modality: Video visit: -       Provider verified identity through the following two step process.  Patient provided:  Patient is known previously to provider    Telemedicine Visit: The patient's condition can be safely assessed and treated via synchronous audio and visual telemedicine encounter.      Reason for Telemedicine Visit: Services only offered telehealth    Originating Site (Patient Location): Patient's home    Distant Site (Provider Location): Provider Remote Setting- Home Office    Consent:  The patient/guardian has verbally consented to: the potential risks and benefits of telemedicine (telephone visit) versus in person care; bill my insurance or make self-payment for services provided; and responsibility for payment of non-covered services.     Patient would like the video invitation sent by:  My Chart    Mode of Communication:  Video Conference via [employer],     As the provider I attest to compliance with applicable laws and regulations related to telemedicine.         Treatment Plan Last Reviewed: 11/10/2022  PHQ-9/DAVID-7: 1/26/2023        DATA  Interactive Complexity: No  Crisis: No        Progress Since Last Session (Related to Symptoms / Goals / Homework):   Symptoms:  She reported that overall symptoms of anxiety and depression have been better (decreased) over the last week, notes feeling more \"grumpy\" and \"irritable\" in the past day or two.      Homework: Achieved / completed to satisfaction. Successful with practicing distress tolerance/self-soothe strategies.              Episode of Care Goals: Satisfactory progress - ACTION (Actively working towards change); Intervened " "by reinforcing change plan / affirming steps taken.  The patient stated that her main goals for therapy would be to learn emotion regulation strategies (in particular, to help with when she is feeling upset/angry/frustrated/distressed).  The changes in her daily life due to COVID restrictions were a focus of our initial work together, and working through changes to routine as her daughter started school and she returned to work.  As she has been making progress, focus of treatment will shift to wellness planning (monitoring mood and symptoms, incorporating into routine what keeps her feeling well, review of triggers, how to manage set backs, etc.).  As her  has been diagnosed with cancer, treatment will also focus on providing her with support in managing the emotional distress and challenges related to this and caregiver stress.       Current / Ongoing Stressors and Concerns:   Current: Symptoms of anxiety and depression.  Caregiver stress related to 's cancer treatment, they are shifting into next phase of maintenance therapy and navigating the unknowns and \"newness\" of this next phase.  She's been experiencing pain in her neck/shoulder area and has been working with physical therapy and her doctor to address this.  Her daughter has been diagnosed with ADHD, has been having behavioral challenges at home and is undergoing medication changes.  She is working with her daughter's therapist to help address.       Ongoing:Managing daily routine which has contributed to some adjustment and parenting challenges, lack of effective emotion regulation strategies for managing daily frustrations and upsets and marital discord due to differences in parenting styles.       Treatment Objective(s) Addressed in This Session:     Psychoeducation on mindfulness strategies - practicing a non-judgmental and compassionate stance towards self and others   Learn and practice distress tolerance skills-  self-soothe strategy " "using the five senses   Practice deep breathing  - practice at regular intervals throughout the day     Continue with:  Review of self-care and importance of attending to your self-care  Practice balanced thinking- challenge polarized thinking      Intervention:  DBT and solution-focused therapy:  Continued focus on how to use and apply mindfulness and distress tolerance strategies to help when feeling overwhelmed and distressed.  Reviewed examples from the past week where she was successfully able to practice and utilize strategies, and identified opportunities to continue to practice and apply these strategies.  She identified that she has been feeling \"grumpier\" over the past few days - used this as an opportunity to examine what she thinks may be contributing - she identified that she has been feeling overwhelmed and busy with all of the things she has to do, that she hasn't been prioritizing some of the things she would jarad to do and that she believes would be helpful for her self-care.  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      Reviewed self-soothe strategies and calming strategies.  Encouraged practicing calming strategies at regular intervals throughout the day (e.g. mini breaks throughout the day to pause and take deep breaths).         ASSESSMENT: Current Emotional / Mental Status (status of significant symptoms):   Risk status (Self / Other harm or suicidal ideation)   Patient denies current fears or concerns for personal safety.   Patient denies current or recent suicidal ideation or behaviors.  She reports if suicidal thoughts do occur, she knows steps she can take to manage them and believes she can manage them, and reach " "out for additional help if needed.     Patient denies current or recent homicidal ideation or behaviors.   Patient denies current or recent self injurious behavior or ideation.   Patient denies other safety concerns.   Patient reports there has not been a change in risk factors since their last session -      Recommended that patient call 911 or go to the local ED should there be a change in any of these risk factors.  We reviewed how to contact [facility] crisis/COPE for urgent/crisis concerns 24/7.  Provided patient with crisis resources and she agrees to use safety plan should any safety concerns arise.      Professionals or agencies I can contact during a crisis:  ? Suicide Prevention Lifeline: call or text 880  ? Crisis Text Line: text \"[place]\" to 531069    Local Crisis Services: [facility] Crisis Services: [phone]    Call 911 or go to my nearest emergency department, or [facility].         Appearance:   Appropriate    Eye Contact:   Good    Psychomotor Behavior: Normal   Attitude:   Cooperative    Orientation:   All   Speech    Rate / Production: Normal     Volume:  Normal    Mood:    Calm   Affect:    Congruent with mood   Thought Content:  Clear    Thought Form:  Coherent  Logical    Insight:    Good      Medication Review:   No changes.         Medication Compliance:   Yes      Changes in Health Issues:    Reports continued neck and shoulder pain, saw the physical therapist this morning and has been doing the physical therapy exercises they recommend.              Chemical Use Review:    No alcohol use    Substance Use: Chemical use reviewed, no changes or active concerns identified. She said she has been avoiding any alcohol use.     Tobacco Use: No current tobacco use.       Diagnosis:  Anxiety disorder unspecified  Major depression, recurrent episode, moderate    I have been monitoring her response to recently witnessing her  experience a medical emergency.  She reported initial episodes of " intense panic and anxiety related to this event, but since has reported having no additional episodes of intense anxiety or panic.  She reported no nightmares, no flashbacks, no intrusive thoughts, and appears to have been able to process this event and is not experiencing any current difficulties or on-going symptoms related to this event.  Will continue to assist patient with processing event, self-care, and symptom management as indicated.          PLAN: (Patient Tasks / Therapist Tasks / Other)    1) Idalia identified the following goals: Remind myself to slow down.  Practice taking deep breaths throughout the day.   Practice Self-soothing strategies and practice mindfulness.       Remember things are a process, challenge all or nothing thinking, (I want everything right away).  Patience with the process     Use strategies to help manage anxiety.      2) If there is a crisis situation, remember you are not alone and that there are people who can help you twenty-four hours a day, seven days a week.  Call [place] ([facility]): [phone].      3) Check-in scheduled for 2/9 at 12:00 pm.   If urgent or crisis concerns arise prior to next scheduled appointment, please reach out to crisis resources, call 911, or go to the emergency department.      Cristy Wilkinson PsyD, LP  Licensed Psychologist  2/2/2023                                           ____________________________________    Treatment Plan     Patient's Name: Idalia Hinojosa                        YOB: 1980     Date of Creation: 1/6/2020  Date Treatment Plan Last Reviewed/Revised: 8/4/22     DSM5 Diagnoses: 300.00 (F41.9) Unspecified Anxiety Disorder, major depression, moderate, recurrent episode  Psychosocial / Contextual Factors: strain in marital relationship, parenting challenges, adjustment challenges, 's cancer diagnosis  PROMIS (reviewed every 90 days):      Anticipated number of session for this episode of  care: additional 15-20  Anticipation frequency of session: Weekly until symptoms stabilize, then can decrease the frequency of sessions to likely bi-weekly or once every three weeks  Anticipated Duration of each session: 38-52 minutes  Treatment plan will be reviewed in 90 days or when goals have been changed.         Referral / Collaboration:  We discussed a referral to a couples/marital therapist.  The patient is thinking about this and has talked with her  about the referral, but they are unsure if they would like to follow through at this time.       MeasurableTreatment Goal(s) related to diagnosis / functional impairment(s)  Goal 1: Patient will learn emotion regulation strategies to help when she is feeling upset/angry/frustrated/distressed    I will know I've met my goal when I would yell less, I would feel calmer, and I would not push others.  I would be less physical when I'm angry (e.g. wouldn't slam doors or hit things)        Objective #A (Patient Action)                          Patient will learn and practice at least 2 new emotion regulation strategies.  Status: Continued - Date(s):    11/10/22- Regularly practicing emotion regulation strategies to help manage increase in emotional distress related to spouse's cancer diagnosis.  She has been successful with using strategies to help her stay calm when her daughter's experiencing heightened emotion and/or challenging behaviors.          Intervention(s)  Therapist will provide psychoeducation on emotion regulation module from DBT and will assign patient homework to help reinforce skill development.     Objective #B  Patient will identify factors that increase vulnerability to emotion mind and identify ways to decrease vulnerability to emotion mind.  Status: Continued - Date(s):11/10/22 - routinely incorporating mindfulness and self-awareness strategies to increase attention and focus to factors that increase vulnerability to emotion  mind           Intervention(s)  Therapist will provide information on factors that increase vulnerabiliyt to emotion mind.     Objective #C  Patient will identify warning signs and signals that emotions are escalating and will learn ways to skillfully intervene early on.  Status: New - Date: 11/10/22 - in progress - has demonstrated good initial progress in being able to identify warning signs and intervene skillfully -            Intervention(s)  Therapist will help patient identify warning signs and signals, and will guide the patient in identifying skillful ways to intervene when exeriencing warning signs and signals.        Goal 2: Patient will learn new parenting strategies to help with managing parenting challenges.  Parent will work on improving relationship with her daughter and defining what she would like this relationship to look like.      I will know I've met my goal when I'm enjoying time with my daughter, teaching her new things, and not waiting for things to change       Objective #A (Patient Action)                          Status: Continued - Date(s):11/10/22     Patient will increase understanding of developmental norms for her daughter and ways to manage challenging behaviors.     Intervention(s)  Therapist will provide psychoeducation on developmental norms and parenting strategies.     Objective #B  Patient will spend time reflecting on her relationship with her daughter and defining what she would like this relaitonship to look like.                  Status: Continued - Date(s):11/10/22 - Making good progress -            Intervention(s)  Therapist will guide the patient in reflecting upon her relationship with her daughter and help her define ways to move towards what she vaues in her relationship with her daughter.     Objective #C  Patient will increase time spent on fun activity and play with her daughter.  Status: Continued - Date(s):11/10/22- has been enjoying time with daughter and  "devoting regular time to fun activities and play together           Intervention(s)  Therapist will guide the patient in identifying ways she can increase positive time with her daughter.  Therapist will also teach mindfulness strategies to help patient with being in the present moment when appropriate - .        Goal 3: Patiient will improve communication with her .      I will know I've met my goal when I feel less irritated with my  and communicate more openly with my .  I would like to be more assertive and less aggressive.   I would like to not avoid telling him things because I'm worried about his reaction or don't think he will react well.  I would like to be more present to the moment during times when this would be helpful.\"       Objective #A (Patient Action)                          Status: Continued - Date(s):11/10/22 - continues to work on this goal, though emphasis has shift to managing caregiver stress related to 's cancer diagnosis and treatment.              Patient will learn and practice at least two new interpersonal effectiveness strategies.     Intervention(s)  Therapist will teach strategies from DBT module on interpersonal effectiveness, and will assign homework to help reinforce skill development.        Patient has reviewed and agreed to the above plan.        Cristy Wilkinson PsyD, LP  Licensed Psychologist  Reviewed on  11/10/22                                                                                                                                                                                                                                                                          Answers for HPI/ROS submitted by the patient on 1/26/2023  If you checked off any problems, how difficult have these problems made it for you to do your work, take care of things at home, or get along with other people?: Somewhat difficult  PHQ9 TOTAL SCORE: 6      "

## 2023-02-09 ENCOUNTER — THERAPY VISIT (OUTPATIENT)
Dept: PHYSICAL THERAPY | Facility: CLINIC | Age: 43
End: 2023-02-09
Payer: COMMERCIAL

## 2023-02-09 ENCOUNTER — VIRTUAL VISIT (OUTPATIENT)
Dept: PSYCHOLOGY | Facility: CLINIC | Age: 43
End: 2023-02-09
Payer: COMMERCIAL

## 2023-02-09 DIAGNOSIS — M54.2 NECK PAIN ON RIGHT SIDE: Primary | ICD-10-CM

## 2023-02-09 DIAGNOSIS — F33.1 MODERATE EPISODE OF RECURRENT MAJOR DEPRESSIVE DISORDER (H): Primary | ICD-10-CM

## 2023-02-09 DIAGNOSIS — M25.511 PAIN IN JOINT OF RIGHT SHOULDER: ICD-10-CM

## 2023-02-09 DIAGNOSIS — F41.9 ANXIETY DISORDER, UNSPECIFIED TYPE: ICD-10-CM

## 2023-02-09 PROCEDURE — 90834 PSYTX W PT 45 MINUTES: CPT | Mod: VID | Performed by: PSYCHOLOGIST

## 2023-02-09 PROCEDURE — 97140 MANUAL THERAPY 1/> REGIONS: CPT | Mod: GP | Performed by: PHYSICAL THERAPIST

## 2023-02-09 PROCEDURE — 97110 THERAPEUTIC EXERCISES: CPT | Mod: GP | Performed by: PHYSICAL THERAPIST

## 2023-02-09 NOTE — PROGRESS NOTES
[facility]                                            Progress Note    Patient Name: Idalia Hinojosa  Date: 2/9/2023           Service Type: Individual      Session Start Time:  12:10  pm Session End Time: 12:55 pm  Session Length:  45 minutes    Session #: 93    Attendees: Client     Service Modality: Video visit: -       Provider verified identity through the following two step process.  Patient provided:  Patient is known previously to provider    Telemedicine Visit: The patient's condition can be safely assessed and treated via synchronous audio and visual telemedicine encounter.      Reason for Telemedicine Visit: Services only offered telehealth    Originating Site (Patient Location): Patient's home    Distant Site (Provider Location): Provider Remote Setting- Home Office    Consent:  The patient/guardian has verbally consented to: the potential risks and benefits of telemedicine (telephone visit) versus in person care; bill my insurance or make self-payment for services provided; and responsibility for payment of non-covered services.     Patient would like the video invitation sent by:  My Chart    Mode of Communication:  Video Conference via [employer],     As the provider I attest to compliance with applicable laws and regulations related to telemedicine.         Treatment Plan Last Reviewed: 2/9/2023  PHQ-9/DAVID-7: 1/26/2023        DATA  Interactive Complexity: No  Crisis: No        Progress Since Last Session (Related to Symptoms / Goals / Homework):   Symptoms:  Stable since last visit     Homework: Achieved / completed to satisfaction. Successful with practicing flexible mindset, distress tolerance and self-soothe strategies.              Episode of Care Goals: Satisfactory progress - ACTION (Actively working towards change); Intervened by reinforcing change plan / affirming steps taken.  The patient stated that her main goals for therapy would be to learn emotion  regulation strategies (in particular, to help with when she is feeling upset/angry/frustrated/distressed).  The changes in her daily life due to COVID restrictions were a focus of our initial work together, and working through changes to routine as her daughter started school and she returned to work.  As she has been making progress, focus of treatment will shift to wellness planning (monitoring mood and symptoms, incorporating into routine what keeps her feeling well, review of triggers, how to manage set backs, etc.).  As her  has been diagnosed with cancer, treatment will also focus on providing her with support in managing the emotional distress and challenges related to this and caregiver stress.       Current / Ongoing Stressors and Concerns:   Current: Symptoms of anxiety and depression.  Her daughter has been doing better and bedtime routine has been going better, which ha been helpful.   Caregiver stress related to 's cancer treatment, they are shifting into next phase of maintenance therapy.  She's processing through feelings of anger and grief /loss regarding impact of this on their lives.  She's been experiencing pain in her neck/shoulder area and has been working with physical therapy and her doctor to address this.     Ongoing:Managing daily routine which has contributed to some adjustment and parenting challenges, lack of effective emotion regulation strategies for managing daily frustrations and upsets and marital discord due to differences in parenting styles.       Treatment Objective(s) Addressed in This Session:     Learn and 1-2 behavioral activation strategies to help with goal of incorporating physical activity into routine    Continue with/review of:  Review of self-care and importance of attending to your self-care  Practice balanced thinking- challenge polarized thinking   Psychoeducation on mindfulness strategies - practicing a non-judgmental and compassionate stance towards  "self and others   Learn and practice distress tolerance skills-  self-soothe strategy using the five senses   Practice deep breathing  - practice at regular intervals throughout the day      Intervention:  CBT and solution-focused therapy: Idalia shared that she would like to focus on incorporating regular physical activity back into her routine.  Assisted her in creating a \"SMART\" (specific, measurable, actionable, realistic and time-based) goal - she identified that she would like to exercise 4-6 days per week, using the work-out videos she likes on youtube and walking.  Guided her in identifying what she thinks will help her to be successful in following through with this goal and problem-solving barriers she thinks may interfere with success.   Reviewed behavioral activation strategies, including the behavioral preceding the emotion (not \"waiting\" for motivation or desire to exercise), five minute rule to help with \"getting going\", scheduling into calendar on front end of week during days and times when it will work best, telling Donald about her goal and asking for his encouragement and support, and practicing a flexible mindset.      She also processed some feelings she's experiencing related to caregiver stress and impact of spouses illness and treatment on their lives.         ASSESSMENT: Current Emotional / Mental Status (status of significant symptoms):   Risk status (Self / Other harm or suicidal ideation)   Patient denies current fears or concerns for personal safety.   Patient denies current or recent suicidal ideation or behaviors.  She reports she has not had any more passive suicidal thoughts.  She reports if the thoughts do occur, she knows steps she can take to manage them and believes she can manage them, and reach out for additional help if needed.     Patientdenies current or recent homicidal ideation or behaviors.   Patient denies current or recent self injurious behavior or ideation.   Patient " "denies other safety concerns.   Patient reports there has not been a change in risk factors since their last session -      Recommended that patient call 911 or go to the local ED should there be a change in any of these risk factors.  We reviewed how to contact [facility] crisis/[facility] for urgent/crisis concerns 24/7.  Provided patient with crisis resources and she agrees to use safety plan should any safety concerns arise.      Professionals or agencies I can contact during a crisis:  ? Suicide Prevention Lifeline: call or text 988  ? Crisis Text Line: text \"[place]\" to 824990    Local Crisis Services: [facility]: [phone]    Call 911 or go to my nearest emergency department, or [facility].         Appearance:   Appropriate    Eye Contact:   Good    Psychomotor Behavior: Normal   Attitude:   Cooperative    Orientation:   All   Speech    Rate / Production: Normal     Volume:  Normal    Mood:    Calm    Affect:    Congruent with mood   Thought Content:  Clear    Thought Form:  Coherent  Logical    Insight:    Good      Medication Review:   No changes.         Medication Compliance:   Yes      Changes in Health Issues:    Reports continued Shoulder and back pain - she said she saw the physical therapist this morning and finds this to be very helpful and has been using the exercises they taught her.            Chemical Use Review:    No alcohol use    Substance Use: Chemical use reviewed, no changes or active concerns identified. She said she has been avoiding any alcohol use.     Tobacco Use: No current tobacco use.       Diagnosis:  Anxiety disorder unspecified  Major depression, recurrent episode, moderate    Will continue to monitor symptoms in response to witnessing  experience medical emergency and assist patient with processing event, self-care, and symptom management.  She reports she has not had any additional episodes of intense panic or anxiety.     No nightmares, no flashbacks, no " instrusive thoughts,   Things better -         PLAN: (Patient Tasks / Therapist Tasks / Other)    1) Idalia identified the following goals: exercise 4-6 days, plan on calendar days where it will work best , tell Kellee about my goal and ask for his help with encouraging me.      Use strategies to help manage anxiety.      2) If there is a crisis situation, remember you are not alone and that there are people who can help you twenty-four hours a day, seven days a week.  Call [facility] ([facility]): [phone].      3) Check-in scheduled for 2/14 at 2:00 pm.   If urgent or crisis concerns arise prior to next scheduled appointment, please reach out to crisis resources, call 911, or go to the emergency department.      Cristy Wilkinson PsyD, LP  Licensed Psychologist  2/9/2023                                             ____________________________________    Treatment Plan     Patient's Name: Idalia Hinojosa                        YOB: 1980     Date of Creation: 1/6/2020  Date Treatment Plan Last Reviewed/Revised: 2/9/2023     DSM5 Diagnoses: 300.00 (F41.9) Unspecified Anxiety Disorder, major depression, moderate, recurrent episode  Psychosocial / Contextual Factors: strain in marital relationship, parenting challenges, adjustment challenges, 's cancer diagnosis  PROMIS (reviewed every 90 days):      Anticipated number of session for this episode of care: additional 15-20  Anticipation frequency of session: Weekly until symptoms stabilize, then can decrease the frequency of sessions to likely bi-weekly or once every three weeks  Anticipated Duration of each session: 38-52 minutes  Treatment plan will be reviewed in 90 days or when goals have been changed.         Referral / Collaboration:  No referrals at this time.  Have previously recommended couples therapy referral.  Coordinate with Dr. Han, patient's PCP.        MeasurableTreatment Goal(s) related to diagnosis /  functional impairment(s)  Goal 1: Patient will learn emotion regulation strategies to help when she is feeling upset/angry/frustrated/distressed    I will know I've met my goal when I would yell less, I would feel calmer, and I would not push others.  I would be less physical when I'm angry (e.g. wouldn't slam doors or hit things)        Objective #A (Patient Action)                          Patient will learn and practice at least 2 new emotion regulation strategies.  Status: Continued - Date(s):    2/9/23- Regularly practicing emotion regulation strategies to help manage increase in emotional distress related to spouse's cancer diagnosis.  She has been successful with using strategies to help her stay calm when her daughter's experiencing heightened emotion and/or challenging behaviors.          Intervention(s)  Therapist will provide psychoeducation on emotion regulation module from DBT and will assign patient homework to help reinforce skill development.     Objective #B  Patient will identify factors that increase vulnerability to emotion mind and identify ways to decrease vulnerability to emotion mind.  Status: Continued - Date(s):2/9/23 - routinely incorporating mindfulness, distress tolerance and self-awareness strategies to increase attention and focus to factors that increase vulnerability to emotion mind           Intervention(s)  Therapist will provide information on factors that increase vulnerabiliyt to emotion mind.     Objective #C  Patient will identify warning signs and signals that emotions are escalating and will learn ways to skillfully intervene early on.  Status: Date: 2/9/23 - in progress - has demonstrated good progress in being able to identify warning signs and intervene skillfully -            Intervention(s)  Therapist will help patient identify warning signs and signals, and will guide the patient in identifying skillful ways to intervene when exeriencing warning signs and  signals.        Goal 2: Patient will learn new parenting strategies to help with managing parenting challenges.  Parent will work on improving relationship with her daughter and defining what she would like this relationship to look like.      I will know I've met my goal when I'm enjoying time with my daughter, teaching her new things, and not waiting for things to change       Objective #A (Patient Action)                          Status: Continued - Date(s):2/9/2023     Patient will increase understanding of developmental norms for her daughter and ways to manage challenging behaviors.     Intervention(s)  Therapist will provide psychoeducation on developmental norms and parenting strategies.     Objective #B  Patient will spend time reflecting on her relationship with her daughter and defining what she would like this relaitonship to look like.                  Status: Continued - Date(s):2/9/2023 Making good progress - has also been working with daughter's therapist and has been actively practicing and using suggested strategies.           Intervention(s)  Therapist will guide the patient in reflecting upon her relationship with her daughter and help her define ways to move towards what she vaues in her relationship with her daughter.     Objective #C  Patient will increase time spent on fun activity and play with her daughter.  Status: Continued - Date(s):2/9/2023  - has been enjoying time with daughter and devoting regular time to fun activities and play together           Intervention(s)  Therapist will guide the patient in identifying ways she can increase positive time with her daughter.  Therapist will also teach mindfulness strategies to help patient with being in the present moment when appropriate - .        Goal 3: Patiient will improve communication with her .      I will know I've met my goal when I feel less irritated with my  and communicate more openly with my .  I would like  "to be more assertive and less aggressive.   I would like to not avoid telling him things because I'm worried about his reaction or don't think he will react well.  I would like to be more present to the moment during times when this would be helpful.\"       Objective #A (Patient Action)                          Status: Continued - Date(s):2/9/2023   - continues to work on this goal, though emphasis has shift to managing caregiver stress related to 's cancer diagnosis and treatment.              Patient will learn and practice at least two new interpersonal effectiveness strategies.     Intervention(s)  Therapist will teach strategies from DBT module on interpersonal effectiveness, and will assign homework to help reinforce skill development.        Patient has reviewed and agreed to the above plan.        Cristy Wilkinson PsyD, LP  Licensed Psychologist  Reviewed on  2/9/2023                                                                                                                                                                                                                                                                              "

## 2023-02-14 ENCOUNTER — VIRTUAL VISIT (OUTPATIENT)
Dept: PSYCHOLOGY | Facility: CLINIC | Age: 43
End: 2023-02-14
Payer: COMMERCIAL

## 2023-02-14 DIAGNOSIS — F33.1 MODERATE EPISODE OF RECURRENT MAJOR DEPRESSIVE DISORDER (H): Primary | ICD-10-CM

## 2023-02-14 DIAGNOSIS — F41.9 ANXIETY DISORDER, UNSPECIFIED TYPE: ICD-10-CM

## 2023-02-14 PROCEDURE — 90832 PSYTX W PT 30 MINUTES: CPT | Mod: VID | Performed by: PSYCHOLOGIST

## 2023-02-14 NOTE — PROGRESS NOTES
"       [facility]                                            Progress Note    Patient Name: Idalia Hinojosa  Date: 2/14/2023           Service Type: Individual      Session Start Time:  2:15  pm Session End Time: 2:49 pm  Session Length:  34 minutes    Session #: 94    Attendees: Client     Service Modality: Video visit: -       Provider verified identity through the following two step process.  Patient provided:  Patient is known previously to provider    Telemedicine Visit: The patient's condition can be safely assessed and treated via synchronous audio and visual telemedicine encounter.      Reason for Telemedicine Visit: Services only offered telehealth    Originating Site (Patient Location): Patient's home    Distant Site (Provider Location): Provider Remote Setting- Home Office    Consent:  The patient/guardian has verbally consented to: the potential risks and benefits of telemedicine (telephone visit) versus in person care; bill my insurance or make self-payment for services provided; and responsibility for payment of non-covered services.     Patient would like the video invitation sent by:  My Chart    Mode of Communication:  Video Conference via [employer],     As the provider I attest to compliance with applicable laws and regulations related to telemedicine.         Treatment Plan Last Reviewed: 2/9/2023  PHQ-9/DAVID-7: 1/26/2023        DATA  Interactive Complexity: No  Crisis: No        Progress Since Last Session (Related to Symptoms / Goals / Homework):   Symptoms:  Better - reports energy level is better, and feels \"slightly less grumpy\", was successful with goal to incorporate more physical activity into day and notes feels more alert, physically and emotionally better     Homework: Achieved / completed to satisfaction. Successful with goal of incorporating physical activity into routine - she said she was physically active every day since her last appointment.         Episode " of Care Goals: Satisfactory progress - ACTION (Actively working towards change); Intervened by reinforcing change plan / affirming steps taken.  The patient stated that her main goals for therapy would be to learn emotion regulation strategies (in particular, to help with when she is feeling upset/angry/frustrated/distressed).  The changes in her daily life due to COVID restrictions were a focus of our initial work together, and working through changes to routine as her daughter started school and she returned to work.  As she has been making progress, focus of treatment will shift to wellness planning (monitoring mood and symptoms, incorporating into routine what keeps her feeling well, review of triggers, how to manage set backs, etc.).  As her  has been diagnosed with cancer, treatment will also focus on providing her with support in managing the emotional distress and challenges related to this and caregiver stress.       Current / Ongoing Stressors and Concerns:   Current: Symptoms of anxiety and depression.  Was successful over the past week with goal to incorporate physical activity into daily routine and found this very helpful.  Her birthday is this [birth year].  Caregiver stress related to 's cancer treatment.  She's been experiencing pain in her neck/shoulder area and has been working with physical therapy and her doctor to address this.     Ongoing:Managing daily routine which has contributed to some adjustment and parenting challenges, lack of effective emotion regulation strategies for managing daily frustrations and upsets and marital discord due to differences in parenting styles.       Treatment Objective(s) Addressed in This Session:     Continue to incorporate 1-2 behavioral activation strategies to help maintain goal of incorporating physical activity into routine    Continue with/review of:  Review of self-care and importance of attending to your self-care  Practice balanced thinking-  "challenge polarized thinking   Psychoeducation on mindfulness strategies - practicing a non-judgmental and compassionate stance towards self and others   Learn and practice distress tolerance skills-  self-soothe strategy using the five senses   Practice deep breathing  - practice at regular intervals throughout the day      Intervention:  CBT and solution-focused therapy: She reported that she would like to focus on maintaining goal of incorporating physical activity into her daily routine.  She found it very helpful over this past week and experienced positive benefits, and would like to focus on staying consistent with this.  We talked about what she believes helped her to be successful over this past week, and what she believes will help her to stay consistent.  She reported that having a specific goal that is important to her has been helpful, communicating it in therapy, communicating it to her , prioritizing it amongst the things she needs to get done, reminding herself of the benefits she feels, reminding herself \"I can do this\", and having others remind her that she can do this!  She also shared that she had a very helpful conversation with her spouse around her work schedule and work-life balance, and shared that he is in support of her.           ASSESSMENT: Current Emotional / Mental Status (status of significant symptoms):   Risk status (Self / Other harm or suicidal ideation)   Patient denies current fears or concerns for personal safety.   Patient denies current or recent suicidal ideation or behaviors.  She reports she has not had any more passive suicidal thoughts.  She reports if the thoughts do occur, she knows steps she can take to manage them and believes she can manage them, and reach out for additional help if needed.     Patientdenies current or recent homicidal ideation or behaviors.   Patient denies current or recent self injurious behavior or ideation.   Patient denies other safety " "concerns.   Patient reports there has not been a change in risk factors since their last session -      Recommended that patient call 911 or go to the local ED should there be a change in any of these risk factors.  We reviewed how to contact [facility] crisis/[facility] for urgent/crisis concerns 24/7.  Provided patient with crisis resources and she agrees to use safety plan should any safety concerns arise.      Professionals or agencies I can contact during a crisis:  ? Suicide Prevention Lifeline: call or text 607  ? Crisis Text Line: text \"[place]\" to 669253    Local Crisis Services: [facility]: [phone]    Call 911 or go to my nearest emergency department, or [facility].         Appearance:   Appropriate    Eye Contact:   Good    Psychomotor Behavior: Normal   Attitude:   Cooperative    Orientation:   All   Speech    Rate / Production: Normal     Volume:  Normal    Mood:    She reports feeling better, mood bright today   Affect:    Congruent with mood   Thought Content:  Clear    Thought Form:  Coherent  Logical    Insight:    Good      Medication Review:   No changes.         Medication Compliance:   Yes      Changes in Health Issues:    She reports that she continues to have shoulder and back pain - has been working with her physical therapist and has future appointments scheduled to help with this.         Chemical Use Review:  No alcohol use    Substance Use: Chemical use reviewed, no changes or active concerns identified. She said she has been avoiding any alcohol use.     Tobacco Use: No current tobacco use.       Diagnosis:  Anxiety disorder unspecified  Major depression, recurrent episode, moderate    Will continue to monitor symptoms in response to witnessing  experience medical emergency and assist patient with processing event, self-care, and symptom management.  She reports she has not had any additional episodes of intense panic or anxiety.     No nightmares, no flashbacks, " no instrusive thoughts,   Things better -         PLAN: (Patient Tasks / Therapist Tasks / Other)    1) Idalia identified the following goals: continue with goal to exercise 4-6 days, plan on calendar days where it will work best , tell Donald about my goal and ask for his help with encouraging me.  Remind myself of the benefits of getting exercise, remind myself I can do this, listen to my body and what it needs.       Use strategies to help manage anxiety.      2) If there is a crisis situation, remember you are not alone and that there are people who can help you twenty-four hours a day, seven days a week.  Call [facility] ([facility]): [phone].      3) Check-in scheduled for 2/23 at 10:00 am   If urgent or crisis concerns arise prior to next scheduled appointment, please reach out to crisis resources, call 911, or go to the emergency department.      Cristy Wilkinson PsyD,   Licensed Psychologist  2/14/2023                                             ____________________________________    Treatment Plan     Patient's Name: Idalia Hinojosa                        YOB: 1980     Date of Creation: 1/6/2020  Date Treatment Plan Last Reviewed/Revised: 2/9/2023     DSM5 Diagnoses: 300.00 (F41.9) Unspecified Anxiety Disorder, major depression, moderate, recurrent episode  Psychosocial / Contextual Factors: strain in marital relationship, parenting challenges, adjustment challenges, 's cancer diagnosis  PROMIS (reviewed every 90 days):      Anticipated number of session for this episode of care: additional 15-20  Anticipation frequency of session: Weekly until symptoms stabilize, then can decrease the frequency of sessions to likely bi-weekly or once every three weeks  Anticipated Duration of each session: 38-52 minutes  Treatment plan will be reviewed in 90 days or when goals have been changed.         Referral / Collaboration:  No referrals at this time.  Have previously  recommended couples therapy referral.  Coordinate with Dr. Han, patient's PCP.        MeasurableTreatment Goal(s) related to diagnosis / functional impairment(s)  Goal 1: Patient will learn emotion regulation strategies to help when she is feeling upset/angry/frustrated/distressed    I will know I've met my goal when I would yell less, I would feel calmer, and I would not push others.  I would be less physical when I'm angry (e.g. wouldn't slam doors or hit things)        Objective #A (Patient Action)                          Patient will learn and practice at least 2 new emotion regulation strategies.  Status: Continued - Date(s):    2/9/23- Regularly practicing emotion regulation strategies to help manage increase in emotional distress related to spouse's cancer diagnosis.  She has been successful with using strategies to help her stay calm when her daughter's experiencing heightened emotion and/or challenging behaviors.          Intervention(s)  Therapist will provide psychoeducation on emotion regulation module from DBT and will assign patient homework to help reinforce skill development.     Objective #B  Patient will identify factors that increase vulnerability to emotion mind and identify ways to decrease vulnerability to emotion mind.  Status: Continued - Date(s):2/9/23 - routinely incorporating mindfulness, distress tolerance and self-awareness strategies to increase attention and focus to factors that increase vulnerability to emotion mind           Intervention(s)  Therapist will provide information on factors that increase vulnerabiliyt to emotion mind.     Objective #C  Patient will identify warning signs and signals that emotions are escalating and will learn ways to skillfully intervene early on.  Status: Date: 2/9/23 - in progress - has demonstrated good progress in being able to identify warning signs and intervene skillfully -            Intervention(s)  Therapist will help patient identify  warning signs and signals, and will guide the patient in identifying skillful ways to intervene when exeriencing warning signs and signals.        Goal 2: Patient will learn new parenting strategies to help with managing parenting challenges.  Parent will work on improving relationship with her daughter and defining what she would like this relationship to look like.      I will know I've met my goal when I'm enjoying time with my daughter, teaching her new things, and not waiting for things to change       Objective #A (Patient Action)                          Status: Continued - Date(s):2/9/2023     Patient will increase understanding of developmental norms for her daughter and ways to manage challenging behaviors.     Intervention(s)  Therapist will provide psychoeducation on developmental norms and parenting strategies.     Objective #B  Patient will spend time reflecting on her relationship with her daughter and defining what she would like this relaitonship to look like.                  Status: Continued - Date(s):2/9/2023 Making good progress - has also been working with daughter's therapist and has been actively practicing and using suggested strategies.           Intervention(s)  Therapist will guide the patient in reflecting upon her relationship with her daughter and help her define ways to move towards what she vaues in her relationship with her daughter.     Objective #C  Patient will increase time spent on fun activity and play with her daughter.  Status: Continued - Date(s):2/9/2023  - has been enjoying time with daughter and devoting regular time to fun activities and play together           Intervention(s)  Therapist will guide the patient in identifying ways she can increase positive time with her daughter.  Therapist will also teach mindfulness strategies to help patient with being in the present moment when appropriate - .        Goal 3: Patiient will improve communication with her .      " I will know I've met my goal when I feel less irritated with my  and communicate more openly with my .  I would like to be more assertive and less aggressive.   I would like to not avoid telling him things because I'm worried about his reaction or don't think he will react well.  I would like to be more present to the moment during times when this would be helpful.\"       Objective #A (Patient Action)                          Status: Continued - Date(s):2/9/2023   - continues to work on this goal, though emphasis has shift to managing caregiver stress related to 's cancer diagnosis and treatment.              Patient will learn and practice at least two new interpersonal effectiveness strategies.     Intervention(s)  Therapist will teach strategies from DBT module on interpersonal effectiveness, and will assign homework to help reinforce skill development.        Patient has reviewed and agreed to the above plan.        Cristy Wilkinson PsyD, LP  Licensed Psychologist  Reviewed on  2/9/2023                                                                                                                                                                                                                                                                              "

## 2023-02-23 ENCOUNTER — THERAPY VISIT (OUTPATIENT)
Dept: PHYSICAL THERAPY | Facility: CLINIC | Age: 43
End: 2023-02-23
Payer: COMMERCIAL

## 2023-02-23 DIAGNOSIS — M54.2 NECK PAIN ON RIGHT SIDE: Primary | ICD-10-CM

## 2023-02-23 DIAGNOSIS — M25.511 PAIN IN JOINT OF RIGHT SHOULDER: ICD-10-CM

## 2023-02-23 PROCEDURE — 97110 THERAPEUTIC EXERCISES: CPT | Mod: GP | Performed by: PHYSICAL THERAPIST

## 2023-02-23 PROCEDURE — 97140 MANUAL THERAPY 1/> REGIONS: CPT | Mod: GP | Performed by: PHYSICAL THERAPIST

## 2023-03-02 ENCOUNTER — VIRTUAL VISIT (OUTPATIENT)
Dept: PSYCHOLOGY | Facility: CLINIC | Age: 43
End: 2023-03-02
Payer: COMMERCIAL

## 2023-03-02 ENCOUNTER — THERAPY VISIT (OUTPATIENT)
Dept: PHYSICAL THERAPY | Facility: CLINIC | Age: 43
End: 2023-03-02
Payer: COMMERCIAL

## 2023-03-02 DIAGNOSIS — M25.511 PAIN IN JOINT OF RIGHT SHOULDER: ICD-10-CM

## 2023-03-02 DIAGNOSIS — M54.2 NECK PAIN ON RIGHT SIDE: Primary | ICD-10-CM

## 2023-03-02 DIAGNOSIS — F41.9 ANXIETY DISORDER, UNSPECIFIED TYPE: ICD-10-CM

## 2023-03-02 DIAGNOSIS — F33.1 MODERATE EPISODE OF RECURRENT MAJOR DEPRESSIVE DISORDER (H): Primary | ICD-10-CM

## 2023-03-02 PROCEDURE — 90832 PSYTX W PT 30 MINUTES: CPT | Mod: VID | Performed by: PSYCHOLOGIST

## 2023-03-02 PROCEDURE — 97140 MANUAL THERAPY 1/> REGIONS: CPT | Mod: GP | Performed by: PHYSICAL THERAPIST

## 2023-03-02 NOTE — PROGRESS NOTES
[facility]                                            Progress Note    Patient Name: Idalia Hinojosa  Date: 3/2/2023           Service Type: Individual      Session Start Time:  12:37  pm Session End Time:1:14 pm  Session Length:  37 minutes    Session #: 95    Attendees: Client     Service Modality: Video visit: -       Provider verified identity through the following two step process.  Patient provided:  Patient is known previously to provider    Telemedicine Visit: The patient's condition can be safely assessed and treated via synchronous audio and visual telemedicine encounter.      Reason for Telemedicine Visit: Services only offered telehealth    Originating Site (Patient Location): Patient's home    Distant Site (Provider Location): Provider Remote Setting- Home Office    Consent:  The patient/guardian has verbally consented to: the potential risks and benefits of telemedicine (telephone visit) versus in person care; bill my insurance or make self-payment for services provided; and responsibility for payment of non-covered services.     Patient would like the video invitation sent by:  My Chart    Mode of Communication:  Video Conference via [employer],     As the provider I attest to compliance with applicable laws and regulations related to telemedicine.         Treatment Plan Last Reviewed: 2/9/2023  PHQ-9/DAVID-7: 3/2/2023        DATA  Interactive Complexity: No  Crisis: No        Progress Since Last Session (Related to Symptoms / Goals / Homework):   Symptoms:  Stable - has been using strategies and following through on goals to be more physically active, while also practicing a flexible mindset with remote school days and the cold weather, which made following through on her    Homework: Achieved / completed to satisfaction. Successful with goal of incorporating physical activity into routine and practicing a flexible mindset to keep expectations realistic        Episode  of Care Goals: Satisfactory progress - ACTION (Actively working towards change); Intervened by reinforcing change plan / affirming steps taken.  The patient stated that her main goals for therapy would be to learn emotion regulation strategies (in particular, to help with when she is feeling upset/angry/frustrated/distressed).  The changes in her daily life due to COVID restrictions were a focus of our initial work together, and working through changes to routine as her daughter started school and she returned to work.  As she has been making progress, focus of treatment will shift to wellness planning (monitoring mood and symptoms, incorporating into routine what keeps her feeling well, review of triggers, how to manage set backs, etc.).  As her  has been diagnosed with cancer, treatment will also focus on providing her with support in managing the emotional distress and challenges related to this and caregiver stress.       Current / Ongoing Stressors and Concerns:   Current: Symptoms of anxiety and depression.  Caregiver stress related to 's cancer treatment.  Stress related to daughter's diagnosis of ADHD and behavioral/parenting challenges, she continues to work with her daughter's therapist to provide support with this.  She's been experiencing pain in her neck/shoulder area and has been working with physical therapy and her doctor to address this.     Ongoing:Managing daily routine which has contributed to some adjustment and parenting challenges, lack of effective emotion regulation strategies for managing daily frustrations and upsets and marital discord due to differences in parenting styles.       Treatment Objective(s) Addressed in This Session:     Emotion regulation - identify and practice strategies to help manage emotional distress more effectively  Continue to incorporate 1-2 behavioral activation strategies to help maintain goal of incorporating physical activity into  routine    Continue with/review of:  Review of self-care and importance of attending to your self-care  Practice balanced thinking- challenge polarized thinking   Psychoeducation on mindfulness strategies - practicing a non-judgmental and compassionate stance towards self and others   Learn and practice distress tolerance skills-  self-soothe strategy using the five senses   Practice deep breathing  - practice at regular intervals throughout the day      Intervention:  DBT and solution-focused therapy: She wished to focus today on how to stay calm when feeling emotionally distressed.  Specifically, she identified wanting to yell less, and instead, think before she speaks and speak calmly.  We reviewed emotion regulation strategies to help support her with these goals.  We also reviewed situations that trigger emotional distress, to help with planning for how she can skillfully respond in those situations.       ASSESSMENT: Current Emotional / Mental Status (status of significant symptoms):   Risk status (Self / Other harm or suicidal ideation)   Patient denies current fears or concerns for personal safety.   Patient denies current or recent suicidal ideation or behaviors.  She reports she has not had any more passive suicidal thoughts.  She reports if the thoughts do occur, she knows steps she can take to manage them and believes she can manage them, and reach out for additional help if needed.     Patientdenies current or recent homicidal ideation or behaviors.   Patient denies current or recent self injurious behavior or ideation.   Patient denies other safety concerns.   Patient reports there has not been a change in risk factors since their last session -      Recommended that patient call 911 or go to the local ED should there be a change in any of these risk factors.  We reviewed how to contact [facility] crisis/COPE for urgent/crisis concerns 24/7.  Provided patient with crisis resources and she agrees  "to use safety plan should any safety concerns arise.      Professionals or agencies I can contact during a crisis:  ? Suicide Prevention Lifeline: call or text 818  ? Crisis Text Line: text \"[place]\" to 174644    Local Crisis Services: [facility]: [phone]    Call 911 or go to my nearest emergency department, or [facility].         Appearance:   Appropriate    Eye Contact:   Good    Psychomotor Behavior: Normal   Attitude:   Cooperative    Orientation:   All   Speech    Rate / Production: Normal     Volume:  Normal    Mood:    She reports feeling \"better\"   Affect:    Congruent with mood   Thought Content:  Clear    Thought Form:  Coherent  Logical    Insight:    Good      Medication Review:   No changes.         Medication Compliance:   Yes      Changes in Health Issues:    She continues to have shoulder and back pain - has been working with her physical therapist and has future appointments scheduled to help with this.         Chemical Use Review:  No alcohol use    Substance Use: Chemical use reviewed, no changes or active concerns identified. She said she has been avoiding any alcohol use.     Tobacco Use: No current tobacco use.       Diagnosis:  Anxiety disorder unspecified  Major depression, recurrent episode, moderate    Will continue to monitor symptoms in response to witnessing  experience medical emergency and assist patient with processing event, self-care, and symptom management.  She reports she has not had any additional episodes of intense panic or anxiety.     No nightmares, no flashbacks, no instrusive thoughts,   Things better -         PLAN: (Patient Tasks / Therapist Tasks / Other)    1) Idalia identified the following goals: Notice how I'm feeling, take time to slow down, think about how I'm feeling and what I need before I respond.       Continue with goal to exercise 4-6 days, plan on calendar days where it will work best , tell Donald about my goal and ask for his help " with encouraging me.  Remind myself of the benefits of getting exercise, remind myself I can do this, listen to my body and what it needs.       Use strategies to help manage anxiety.      2) If there is a crisis situation, remember you are not alone and that there are people who can help you twenty-four hours a day, seven days a week.  Call [facility] ([facility]): [phone].      3) Check-in scheduled for 3/13 at 12:00 pm   If urgent or crisis concerns arise prior to next scheduled appointment, please reach out to crisis resources, call 911, or go to the emergency department.      Cristy Wilkinson PsyD,   Licensed Psychologist  3/2/2023                                               ____________________________________    Treatment Plan     Patient's Name: Idalia Hinojosa                        YOB: 1980     Date of Creation: 1/6/2020  Date Treatment Plan Last Reviewed/Revised: 2/9/2023     DSM5 Diagnoses: 300.00 (F41.9) Unspecified Anxiety Disorder, major depression, moderate, recurrent episode  Psychosocial / Contextual Factors: strain in marital relationship, parenting challenges, adjustment challenges, 's cancer diagnosis  PROMIS (reviewed every 90 days):      Anticipated number of session for this episode of care: additional 15-20  Anticipation frequency of session: Weekly until symptoms stabilize, then can decrease the frequency of sessions to likely bi-weekly or once every three weeks  Anticipated Duration of each session: 38-52 minutes  Treatment plan will be reviewed in 90 days or when goals have been changed.         Referral / Collaboration:  No referrals at this time.  Have previously recommended couples therapy referral.  Coordinate with Dr. Han, patient's PCP.        MeasurableTreatment Goal(s) related to diagnosis / functional impairment(s)  Goal 1: Patient will learn emotion regulation strategies to help when she is feeling  upset/angry/frustrated/distressed    I will know I've met my goal when I would yell less, I would feel calmer, and I would not push others.  I would be less physical when I'm angry (e.g. wouldn't slam doors or hit things)        Objective #A (Patient Action)                          Patient will learn and practice at least 2 new emotion regulation strategies.  Status: Continued - Date(s):    2/9/23- Regularly practicing emotion regulation strategies to help manage increase in emotional distress related to spouse's cancer diagnosis.  She has been successful with using strategies to help her stay calm when her daughter's experiencing heightened emotion and/or challenging behaviors.          Intervention(s)  Therapist will provide psychoeducation on emotion regulation module from DBT and will assign patient homework to help reinforce skill development.     Objective #B  Patient will identify factors that increase vulnerability to emotion mind and identify ways to decrease vulnerability to emotion mind.  Status: Continued - Date(s):2/9/23 - routinely incorporating mindfulness, distress tolerance and self-awareness strategies to increase attention and focus to factors that increase vulnerability to emotion mind           Intervention(s)  Therapist will provide information on factors that increase vulnerabiliyt to emotion mind.     Objective #C  Patient will identify warning signs and signals that emotions are escalating and will learn ways to skillfully intervene early on.  Status: Date: 2/9/23 - in progress - has demonstrated good progress in being able to identify warning signs and intervene skillfully -            Intervention(s)  Therapist will help patient identify warning signs and signals, and will guide the patient in identifying skillful ways to intervene when exeriencing warning signs and signals.        Goal 2: Patient will learn new parenting strategies to help with managing parenting challenges.  Parent will  work on improving relationship with her daughter and defining what she would like this relationship to look like.      I will know I've met my goal when I'm enjoying time with my daughter, teaching her new things, and not waiting for things to change       Objective #A (Patient Action)                          Status: Continued - Date(s):2/9/2023     Patient will increase understanding of developmental norms for her daughter and ways to manage challenging behaviors.     Intervention(s)  Therapist will provide psychoeducation on developmental norms and parenting strategies.     Objective #B  Patient will spend time reflecting on her relationship with her daughter and defining what she would like this relaitonship to look like.                  Status: Continued - Date(s):2/9/2023 Making good progress - has also been working with daughter's therapist and has been actively practicing and using suggested strategies.           Intervention(s)  Therapist will guide the patient in reflecting upon her relationship with her daughter and help her define ways to move towards what she vaues in her relationship with her daughter.     Objective #C  Patient will increase time spent on fun activity and play with her daughter.  Status: Continued - Date(s):2/9/2023  - has been enjoying time with daughter and devoting regular time to fun activities and play together           Intervention(s)  Therapist will guide the patient in identifying ways she can increase positive time with her daughter.  Therapist will also teach mindfulness strategies to help patient with being in the present moment when appropriate - .        Goal 3: Patiient will improve communication with her .      I will know I've met my goal when I feel less irritated with my  and communicate more openly with my .  I would like to be more assertive and less aggressive.   I would like to not avoid telling him things because I'm worried about his  "reaction or don't think he will react well.  I would like to be more present to the moment during times when this would be helpful.\"       Objective #A (Patient Action)                          Status: Continued - Date(s):2/9/2023   - continues to work on this goal, though emphasis has shift to managing caregiver stress related to 's cancer diagnosis and treatment.              Patient will learn and practice at least two new interpersonal effectiveness strategies.     Intervention(s)  Therapist will teach strategies from DBT module on interpersonal effectiveness, and will assign homework to help reinforce skill development.        Patient has reviewed and agreed to the above plan.        Cristy Wilkinson PsyD, LP  Licensed Psychologist  Reviewed on  2/9/2023                                                                                                                                                                                                                                                                            Answers for HPI/ROS submitted by the patient on 3/2/2023  If you checked off any problems, how difficult have these problems made it for you to do your work, take care of things at home, or get along with other people?: Somewhat difficult  PHQ9 TOTAL SCORE: 4      "

## 2023-03-09 ENCOUNTER — THERAPY VISIT (OUTPATIENT)
Dept: PHYSICAL THERAPY | Facility: CLINIC | Age: 43
End: 2023-03-09
Payer: COMMERCIAL

## 2023-03-09 DIAGNOSIS — M54.2 NECK PAIN ON RIGHT SIDE: Primary | ICD-10-CM

## 2023-03-09 DIAGNOSIS — M25.511 PAIN IN JOINT OF RIGHT SHOULDER: ICD-10-CM

## 2023-03-09 PROCEDURE — 97110 THERAPEUTIC EXERCISES: CPT | Mod: GP | Performed by: PHYSICAL THERAPIST

## 2023-03-09 PROCEDURE — 97140 MANUAL THERAPY 1/> REGIONS: CPT | Mod: GP | Performed by: PHYSICAL THERAPIST

## 2023-03-13 ENCOUNTER — VIRTUAL VISIT (OUTPATIENT)
Dept: PSYCHOLOGY | Facility: CLINIC | Age: 43
End: 2023-03-13
Payer: COMMERCIAL

## 2023-03-13 DIAGNOSIS — F41.9 ANXIETY DISORDER, UNSPECIFIED TYPE: ICD-10-CM

## 2023-03-13 DIAGNOSIS — F33.1 MODERATE EPISODE OF RECURRENT MAJOR DEPRESSIVE DISORDER (H): Primary | ICD-10-CM

## 2023-03-13 PROCEDURE — 90832 PSYTX W PT 30 MINUTES: CPT | Mod: VID | Performed by: PSYCHOLOGIST

## 2023-03-16 ENCOUNTER — THERAPY VISIT (OUTPATIENT)
Dept: PHYSICAL THERAPY | Facility: CLINIC | Age: 43
End: 2023-03-16
Payer: COMMERCIAL

## 2023-03-16 DIAGNOSIS — M54.2 NECK PAIN ON RIGHT SIDE: Primary | ICD-10-CM

## 2023-03-16 DIAGNOSIS — M25.511 PAIN IN JOINT OF RIGHT SHOULDER: ICD-10-CM

## 2023-03-16 PROCEDURE — 97140 MANUAL THERAPY 1/> REGIONS: CPT | Mod: GP | Performed by: PHYSICAL THERAPIST

## 2023-03-16 PROCEDURE — 97110 THERAPEUTIC EXERCISES: CPT | Mod: GP | Performed by: PHYSICAL THERAPIST

## 2023-03-23 ENCOUNTER — THERAPY VISIT (OUTPATIENT)
Dept: PHYSICAL THERAPY | Facility: CLINIC | Age: 43
End: 2023-03-23
Payer: COMMERCIAL

## 2023-03-23 DIAGNOSIS — M25.511 PAIN IN JOINT OF RIGHT SHOULDER: ICD-10-CM

## 2023-03-23 DIAGNOSIS — M54.2 NECK PAIN ON RIGHT SIDE: Primary | ICD-10-CM

## 2023-03-23 PROCEDURE — 97140 MANUAL THERAPY 1/> REGIONS: CPT | Mod: GP | Performed by: PHYSICAL THERAPIST

## 2023-03-23 PROCEDURE — 97014 ELECTRIC STIMULATION THERAPY: CPT | Mod: GP | Performed by: PHYSICAL THERAPIST

## 2023-03-27 ENCOUNTER — VIRTUAL VISIT (OUTPATIENT)
Dept: PSYCHOLOGY | Facility: CLINIC | Age: 43
End: 2023-03-27
Payer: COMMERCIAL

## 2023-03-27 DIAGNOSIS — F33.1 MODERATE EPISODE OF RECURRENT MAJOR DEPRESSIVE DISORDER (H): Primary | ICD-10-CM

## 2023-03-27 DIAGNOSIS — F41.9 ANXIETY DISORDER, UNSPECIFIED TYPE: ICD-10-CM

## 2023-03-27 PROCEDURE — 90832 PSYTX W PT 30 MINUTES: CPT | Mod: VID | Performed by: PSYCHOLOGIST

## 2023-03-27 NOTE — PROGRESS NOTES
"    [facility]                                            Progress Note    Patient Name: Idalia Hinojosa  Date: 3/13/2023           Service Type: Individual      Session Start Time:  12:09  pm Session End Time: 12:32 pm  Session Length:  23 minutes    Session #: 96    Attendees: Client     Service Modality: Video visit: -       Provider verified identity through the following two step process.  Patient provided:  Patient is known previously to provider    Telemedicine Visit: The patient's condition can be safely assessed and treated via synchronous audio and visual telemedicine encounter.      Reason for Telemedicine Visit: Services only offered telehealth    Originating Site (Patient Location): Patient's home    Distant Site (Provider Location): Provider Remote Setting- Home Office    Consent:  The patient/guardian has verbally consented to: the potential risks and benefits of telemedicine (telephone visit) versus in person care; bill my insurance or make self-payment for services provided; and responsibility for payment of non-covered services.     Patient would like the video invitation sent by:  My Chart    Mode of Communication:  Video Conference via [employer],     As the provider I attest to compliance with applicable laws and regulations related to telemedicine.         Treatment Plan Last Reviewed: 2/9/2023  PHQ-9/DAVID-7: 3/2/2023        DATA  Interactive Complexity: No  Crisis: No        Progress Since Last Session (Related to Symptoms / Goals / Homework):   Symptoms:  She reports she has been feeling \"better\", less depressed and less anxious.  Able to manage her emotions better when feeling escalated.      Homework: Achieved / completed to satisfaction. Successful with goal of slowing down, not rushing, thinking about how she is feeling and what she needs, and thinking before she responds.          Episode of Care Goals: Satisfactory progress - ACTION (Actively working towards " change); Intervened by reinforcing change plan / affirming steps taken.  The patient stated that her main goals for therapy would be to learn emotion regulation strategies (in particular, to help with when she is feeling upset/angry/frustrated/distressed).  The changes in her daily life due to COVID restrictions were a focus of our initial work together, and working through changes to routine as her daughter started school and she returned to work.  As she has been making progress, focus of treatment will shift to wellness planning (monitoring mood and symptoms, incorporating into routine what keeps her feeling well, review of triggers, how to manage set backs, etc.).  As her  has been diagnosed with cancer, treatment will also focus on providing her with support in managing the emotional distress and challenges related to this and caregiver stress.       Current / Ongoing Stressors and Concerns:   Current: Symptoms of anxiety and depression.  She reports feeling better and that she had a really good day last week where she was able to take the day off from work and do things she enjoys doing - went to a coffee shop, went to the mall, read a book.  Experiences continued caregiver stress related to 's cancer treatment.  Stress related to daughter's diagnosis of ADHD and behavioral/parenting challenges, she continues to work with her daughter's therapist to provide support with this.  She's been experiencing pain in her neck/shoulder area and has been working with physical therapy and her doctor to address this.     Ongoing:Managing daily routine which has contributed to some adjustment and parenting challenges, lack of effective emotion regulation strategies for managing daily frustrations and upsets and marital discord due to differences in parenting styles.       Treatment Objective(s) Addressed in This Session:     Emotion regulation - identify and practice strategies to help manage emotional  "distress more effectively  Continue to incorporate 1-2 behavioral activation strategies to help maintain goal of incorporating physical activity into routine    Continue with/review of:  Review of self-care and importance of attending to your self-care  Practice balanced thinking- challenge polarized thinking   Psychoeducation on mindfulness strategies - practicing a non-judgmental and compassionate stance towards self and others   Learn and practice distress tolerance skills-  self-soothe strategy using the five senses   Practice deep breathing  - practice at regular intervals throughout the day      Intervention:  DBT and solution-focused therapy: Reviewed and reinforced practice and use of skills to help with calming when feeling emotionally distressed.  Reviewed self-care strategies and what she needs to help her with re-charging (and releasing guilt or feeling \"bad\" for doing this, reframing this as healthy, important for her well-being, and what helps her to be able to take care of others).  Discussed ways to recognize and practice slowing down when rushing when she doesn't need to be.        We also continue to review situations that trigger emotional distress, to help with planning for how she can skillfully respond in those situations.       ASSESSMENT: Current Emotional / Mental Status (status of significant symptoms):   Risk status (Self / Other harm or suicidal ideation)   Patient denies current fears or concerns for personal safety.   Patient denies current or recent suicidal ideation or behaviors.  She reports she has not had any more passive suicidal thoughts.  She reports if the thoughts do occur, she knows steps she can take to manage them and believes she can manage them, and reach out for additional help if needed.     Patientdenies current or recent homicidal ideation or behaviors.   Patient denies current or recent self injurious behavior or ideation.   Patient denies other safety " "concerns.   Patient reports there has not been a change in risk factors since their last session -      Recommended that patient call 911 or go to the local ED should there be a change in any of these risk factors.  We reviewed how to contact [facility] crisis/[facility] for urgent/crisis concerns 24/7.  Provided patient with crisis resources and she agrees to use safety plan should any safety concerns arise.      Professionals or agencies I can contact during a crisis:  ? Suicide Prevention Lifeline: call or text 232  ? Crisis Text Line: text \"[place]\" to 500313    Local Crisis Services: [facility]: [phone]    Call 911 or go to my nearest emergency department, or [facility].         Appearance:   Appropriate    Eye Contact:   Good    Psychomotor Behavior: Normal   Attitude:   Cooperative    Orientation:   All   Speech    Rate / Production: Normal     Volume:  Normal    Mood:    She reports feeling \"better\" - less depressed and less anxious   Affect:    Congruent with mood   Thought Content:  Clear    Thought Form:  Coherent  Logical    Insight:    Good      Medication Review:   No changes.         Medication Compliance:   Yes      Changes in Health Issues:    She reports she continues to work with physical therapy to address neck and shoulder pain.  She's finding this helpful.         Chemical Use Review:  No alcohol use    Substance Use: Chemical use reviewed, no changes or active concerns identified. She said she has been avoiding any alcohol use.     Tobacco Use: No current tobacco use.       Diagnosis:  Anxiety disorder unspecified  Major depression, recurrent episode, moderate    Will continue to monitor symptoms in response to witnessing  experience medical emergency and assist patient with processing event, self-care, and symptom management.  She reports she has not had any additional episodes of intense panic or anxiety.     No nightmares, no flashbacks, no instrusive thoughts, "   Things better -         PLAN: (Patient Tasks / Therapist Tasks / Other)    1) Idalia identified the following goals: Make and take time to re-charge.  (build legos, read, go outside).  Recognize when I'm rushing, remind myself to slow down.  Notice how I'm feeling, think about how I'm feeling and what I need before I respond.       Use strategies to help manage anxiety.      2) If there is a crisis situation, remember you are not alone and that there are people who can help you twenty-four hours a day, seven days a week.  Call [facility] ([facility]): [phone].      3) Check-in scheduled for 3/27.   If urgent or crisis concerns arise prior to next scheduled appointment, please reach out to crisis resources, call 911, or go to the emergency department.      Cristy Wilkinson PsyD,   Licensed Psychologist  3/13/2023                                               ____________________________________    Treatment Plan     Patient's Name: Idalia Hinojosa                        YOB: 1980     Date of Creation: 1/6/2020  Date Treatment Plan Last Reviewed/Revised: 2/9/2023     DSM5 Diagnoses: 300.00 (F41.9) Unspecified Anxiety Disorder, major depression, moderate, recurrent episode  Psychosocial / Contextual Factors: strain in marital relationship, parenting challenges, adjustment challenges, 's cancer diagnosis  PROMIS (reviewed every 90 days):      Anticipated number of session for this episode of care: additional 15-20  Anticipation frequency of session: Weekly until symptoms stabilize, then can decrease the frequency of sessions to likely bi-weekly or once every three weeks  Anticipated Duration of each session: 38-52 minutes  Treatment plan will be reviewed in 90 days or when goals have been changed.         Referral / Collaboration:  No referrals at this time.  Have previously recommended couples therapy referral.  Coordinate with Dr. Han, patient's PCP.         MeasurableTreatment Goal(s) related to diagnosis / functional impairment(s)  Goal 1: Patient will learn emotion regulation strategies to help when she is feeling upset/angry/frustrated/distressed    I will know I've met my goal when I would yell less, I would feel calmer, and I would not push others.  I would be less physical when I'm angry (e.g. wouldn't slam doors or hit things)        Objective #A (Patient Action)                          Patient will learn and practice at least 2 new emotion regulation strategies.  Status: Continued - Date(s):    2/9/23- Regularly practicing emotion regulation strategies to help manage increase in emotional distress related to spouse's cancer diagnosis.  She has been successful with using strategies to help her stay calm when her daughter's experiencing heightened emotion and/or challenging behaviors.          Intervention(s)  Therapist will provide psychoeducation on emotion regulation module from DBT and will assign patient homework to help reinforce skill development.     Objective #B  Patient will identify factors that increase vulnerability to emotion mind and identify ways to decrease vulnerability to emotion mind.  Status: Continued - Date(s):2/9/23 - routinely incorporating mindfulness, distress tolerance and self-awareness strategies to increase attention and focus to factors that increase vulnerability to emotion mind           Intervention(s)  Therapist will provide information on factors that increase vulnerabiliyt to emotion mind.     Objective #C  Patient will identify warning signs and signals that emotions are escalating and will learn ways to skillfully intervene early on.  Status: Date: 2/9/23 - in progress - has demonstrated good progress in being able to identify warning signs and intervene skillfully -            Intervention(s)  Therapist will help patient identify warning signs and signals, and will guide the patient in identifying skillful ways to  intervene when exeriencing warning signs and signals.        Goal 2: Patient will learn new parenting strategies to help with managing parenting challenges.  Parent will work on improving relationship with her daughter and defining what she would like this relationship to look like.      I will know I've met my goal when I'm enjoying time with my daughter, teaching her new things, and not waiting for things to change       Objective #A (Patient Action)                          Status: Continued - Date(s):2/9/2023     Patient will increase understanding of developmental norms for her daughter and ways to manage challenging behaviors.     Intervention(s)  Therapist will provide psychoeducation on developmental norms and parenting strategies.     Objective #B  Patient will spend time reflecting on her relationship with her daughter and defining what she would like this relaitonship to look like.                  Status: Continued - Date(s):2/9/2023 Making good progress - has also been working with daughter's therapist and has been actively practicing and using suggested strategies.           Intervention(s)  Therapist will guide the patient in reflecting upon her relationship with her daughter and help her define ways to move towards what she vaues in her relationship with her daughter.     Objective #C  Patient will increase time spent on fun activity and play with her daughter.  Status: Continued - Date(s):2/9/2023  - has been enjoying time with daughter and devoting regular time to fun activities and play together           Intervention(s)  Therapist will guide the patient in identifying ways she can increase positive time with her daughter.  Therapist will also teach mindfulness strategies to help patient with being in the present moment when appropriate - .        Goal 3: Patiient will improve communication with her .      I will know I've met my goal when I feel less irritated with my  and  "communicate more openly with my .  I would like to be more assertive and less aggressive.   I would like to not avoid telling him things because I'm worried about his reaction or don't think he will react well.  I would like to be more present to the moment during times when this would be helpful.\"       Objective #A (Patient Action)                          Status: Continued - Date(s):2/9/2023   - continues to work on this goal, though emphasis has shift to managing caregiver stress related to 's cancer diagnosis and treatment.              Patient will learn and practice at least two new interpersonal effectiveness strategies.     Intervention(s)  Therapist will teach strategies from DBT module on interpersonal effectiveness, and will assign homework to help reinforce skill development.        Patient has reviewed and agreed to the above plan.        Cristy Wilkinson PsyD,   Licensed Psychologist  Reviewed on  2/9/2023                                                                                                                                                                                                                                                                            Answers for HPI/ROS submitted by the patient on 3/2/2023  If you checked off any problems, how difficult have these problems made it for you to do your work, take care of things at home, or get along with other people?: Somewhat difficult  PHQ9 TOTAL SCORE: 4      "

## 2023-03-27 NOTE — PROGRESS NOTES
"         [facility]                                            Progress Note    Patient Name: Idalia Hinojosa  Date: 3/27/2023           Service Type: Individual      Session Start Time:  12:05  pm Session End Time: 12:30 pm  Session Length:  25 minutes    Session #: 97    Attendees: Client     Service Modality: Video visit: -       Provider verified identity through the following two step process.  Patient provided:  Patient is known previously to provider    Telemedicine Visit: The patient's condition can be safely assessed and treated via synchronous audio and visual telemedicine encounter.      Reason for Telemedicine Visit: Services only offered telehealth    Originating Site (Patient Location): Patient's home    Distant Site (Provider Location): Provider Remote Setting- Home Office    Consent:  The patient/guardian has verbally consented to: the potential risks and benefits of telemedicine (telephone visit) versus in person care; bill my insurance or make self-payment for services provided; and responsibility for payment of non-covered services.     Patient would like the video invitation sent by:  My Chart    Mode of Communication:  Video Conference via [employer],     As the provider I attest to compliance with applicable laws and regulations related to telemedicine.         Treatment Plan Last Reviewed: 2/9/2023  PHQ-9/DAVID-7: 3/2/2023        DATA  Interactive Complexity: No  Crisis: No        Progress Since Last Session (Related to Symptoms / Goals / Homework):   Symptoms:  She reports she has been feeling \"a lot better\" - less depressed and less anxious.      Homework: Achieved / completed to satisfaction. Successful with goal of slowing down, communicating what she needs, and taking time to re-charge.       Episode of Care Goals: Satisfactory progress - ACTION (Actively working towards change); Intervened by reinforcing change plan / affirming steps taken.  The patient stated that " "her main goals for therapy would be to learn emotion regulation strategies (in particular, to help with when she is feeling upset/angry/frustrated/distressed).  The changes in her daily life due to COVID restrictions were a focus of our initial work together, and working through changes to routine as her daughter started school and she returned to work.  As she has been making progress, focus of treatment will shift to wellness planning (monitoring mood and symptoms, incorporating into routine what keeps her feeling well, review of triggers, how to manage set backs, etc.).  As her  has been diagnosed with cancer, treatment will also focus on providing her with support in managing the emotional distress and challenges related to this and caregiver stress.       Current / Ongoing Stressors and Concerns:   Current: Symptoms of anxiety and depression.   She reports symptoms have improved.  She had a really good day where she was able to spend time \"re-charging\" and doing things that she finds helpful for her self-care and well-being.  She continues to work with Physical therapy and her physical pain has been improving.  Experiences continued caregiver stress related to 's cancer treatment.  Stress related to daughter's diagnosis of ADHD and behavioral/parenting challenges, she continues to work with her daughter's therapist to provide support with this.       Ongoing:Managing daily routine which has contributed to some adjustment and parenting challenges, lack of effective emotion regulation strategies for managing daily frustrations and upsets and marital discord due to differences in parenting styles.       Treatment Objective(s) Addressed in This Session:     Review of and continued focus on:    Emotion regulation - identify and practice strategies to help manage emotional distress more effectively  Continue to incorporate 1-2 behavioral activation strategies to help maintain goal of incorporating " "physical activity into routine    Continue with/review of:  Review of self-care and importance of attending to your self-care  Practice balanced thinking- challenge polarized thinking   Psychoeducation on mindfulness strategies - practicing a non-judgmental and compassionate stance towards self and others   Learn and practice distress tolerance skills-  self-soothe strategy using the five senses   Practice deep breathing  - practice at regular intervals throughout the day      Intervention:  DBT and solution-focused therapy: She wished to focus today on how she moves at what she calls \"Tee speed\" - which is her reference to her maiden name and a family trait of rushing/moving quickly.  She talked about how this can happen without awareness, and contribute to a sense of feeling pressured/rushed/anxious.  We talked about ways to increase awareness of the \"speed\" she is moving at throughout the day, and ways to assess what speed would be helpful (when does she need to slow down?  When can she slow down but she's on auto- and rushing? Etc.).     She also shared several examples of paying attention to how she's feeling and using this to help her communicate what she needs.        ASSESSMENT: Current Emotional / Mental Status (status of significant symptoms):   Risk status (Self / Other harm or suicidal ideation)   Patient denies current fears or concerns for personal safety.   Patient denies current or recent suicidal ideation or behaviors.  She reports she has not had any more passive suicidal thoughts.  She reports if the thoughts do occur, she knows steps she can take to manage them and believes she can manage them, and reach out for additional help if needed.     Patientdenies current or recent homicidal ideation or behaviors.   Patient denies current or recent self injurious behavior or ideation.   Patient denies other safety concerns.   Patient reports there has not been a change in risk factors since " "their last session -      Recommended that patient call 911 or go to the local ED should there be a change in any of these risk factors.  We reviewed how to contact [facility] crisis/COPE for urgent/crisis concerns 24/7.  Provided patient with crisis resources and she agrees to use safety plan should any safety concerns arise.      Professionals or agencies I can contact during a crisis:  ? Suicide Prevention Lifeline: call or text 365  ? Crisis Text Line: text \"[place]\" to 569262    [facility]: [facility]: [phone]    Call 911 or go to my nearest emergency department, or [facility].         Appearance:   Appropriate    Eye Contact:   Good    Psychomotor Behavior: Normal   Attitude:   Cooperative    Orientation:   All   Speech    Rate / Production: Normal     Volume:  Normal    Mood:    She reports feeling \"better\", less depressed and less anxious.     Affect:    Congruent with mood   Thought Content:  Clear    Thought Form:  Coherent  Logical    Insight:    Good      Medication Review:   No changes.         Medication Compliance:   Yes      Changes in Health Issues:    She reports pain has decreased - has been working with her physical therapist and finds this helpful.      Chemical Use Review:  No alcohol use    Substance Use: Chemical use reviewed, no changes or active concerns identified. She said she has been avoiding any alcohol use.     Tobacco Use: No current tobacco use.       Diagnosis:  Anxiety disorder unspecified  Major depression, recurrent episode, moderate    Will continue to monitor symptoms in response to witnessing  experience medical emergency and assist patient with processing event, self-care, and symptom management.  She reports she has not had any additional episodes of intense panic or anxiety.     No nightmares, no flashbacks, no instrusive thoughts,   Things better -         PLAN: (Patient Tasks / Therapist Tasks / Other)    1) Idalia identified the " following goals: Check-in with self - what speed am I going?  Connecting my check-ins with something I am already doing - Ask myself - what speed am I going?  What speed do I need to be going?  How am I feeling?  What do I need?      Continue to take regular time to re-charge.       Notice how I'm feeling, take time to slow down, think about how I'm feeling and what I need before I respond.       Continue with goal to exercise 4-6 days, plan on calendar days where it will work best , tell Donald about my goal and ask for his help with encouraging me.  Remind myself of the benefits of getting exercise, remind myself I can do this, listen to my body and what it needs.       Use strategies to help manage anxiety.      2) If there is a crisis situation, remember you are not alone and that there are people who can help you twenty-four hours a day, seven days a week.  Call [facility] ([facility]): [phone].      3) Check-in scheduled for 4/10 at 12:00 pm   If urgent or crisis concerns arise prior to next scheduled appointment, please reach out to crisis resources, call 911, or go to the emergency department.      Cristy Wilkinson PsyD,   Licensed Psychologist  3/27/2023                                               ____________________________________    Treatment Plan     Patient's Name: Idalia Hinojosa                        YOB: 1980     Date of Creation: 1/6/2020  Date Treatment Plan Last Reviewed/Revised: 2/9/2023     DSM5 Diagnoses: 300.00 (F41.9) Unspecified Anxiety Disorder, major depression, moderate, recurrent episode  Psychosocial / Contextual Factors: strain in marital relationship, parenting challenges, adjustment challenges, 's cancer diagnosis  PROMIS (reviewed every 90 days):      Anticipated number of session for this episode of care: additional 15-20  Anticipation frequency of session: Weekly until symptoms stabilize, then can decrease the frequency of sessions to  likely bi-weekly or once every three weeks  Anticipated Duration of each session: 38-52 minutes  Treatment plan will be reviewed in 90 days or when goals have been changed.         Referral / Collaboration:  No referrals at this time.  Have previously recommended couples therapy referral.  Coordinate with Dr. Han, patient's PCP.        MeasurableTreatment Goal(s) related to diagnosis / functional impairment(s)  Goal 1: Patient will learn emotion regulation strategies to help when she is feeling upset/angry/frustrated/distressed    I will know I've met my goal when I would yell less, I would feel calmer, and I would not push others.  I would be less physical when I'm angry (e.g. wouldn't slam doors or hit things)        Objective #A (Patient Action)                          Patient will learn and practice at least 2 new emotion regulation strategies.  Status: Continued - Date(s):    2/9/23- Regularly practicing emotion regulation strategies to help manage increase in emotional distress related to spouse's cancer diagnosis.  She has been successful with using strategies to help her stay calm when her daughter's experiencing heightened emotion and/or challenging behaviors.          Intervention(s)  Therapist will provide psychoeducation on emotion regulation module from DBT and will assign patient homework to help reinforce skill development.     Objective #B  Patient will identify factors that increase vulnerability to emotion mind and identify ways to decrease vulnerability to emotion mind.  Status: Continued - Date(s):2/9/23 - routinely incorporating mindfulness, distress tolerance and self-awareness strategies to increase attention and focus to factors that increase vulnerability to emotion mind           Intervention(s)  Therapist will provide information on factors that increase vulnerabiliyt to emotion mind.     Objective #C  Patient will identify warning signs and signals that emotions are escalating and  will learn ways to skillfully intervene early on.  Status: Date: 2/9/23 - in progress - has demonstrated good progress in being able to identify warning signs and intervene skillfully -            Intervention(s)  Therapist will help patient identify warning signs and signals, and will guide the patient in identifying skillful ways to intervene when exeriencing warning signs and signals.        Goal 2: Patient will learn new parenting strategies to help with managing parenting challenges.  Parent will work on improving relationship with her daughter and defining what she would like this relationship to look like.      I will know I've met my goal when I'm enjoying time with my daughter, teaching her new things, and not waiting for things to change       Objective #A (Patient Action)                          Status: Continued - Date(s):2/9/2023     Patient will increase understanding of developmental norms for her daughter and ways to manage challenging behaviors.     Intervention(s)  Therapist will provide psychoeducation on developmental norms and parenting strategies.     Objective #B  Patient will spend time reflecting on her relationship with her daughter and defining what she would like this relaitonship to look like.                  Status: Continued - Date(s):2/9/2023 Making good progress - has also been working with daughter's therapist and has been actively practicing and using suggested strategies.           Intervention(s)  Therapist will guide the patient in reflecting upon her relationship with her daughter and help her define ways to move towards what she vaues in her relationship with her daughter.     Objective #C  Patient will increase time spent on fun activity and play with her daughter.  Status: Continued - Date(s):2/9/2023  - has been enjoying time with daughter and devoting regular time to fun activities and play together           Intervention(s)  Therapist will guide the patient in  "identifying ways she can increase positive time with her daughter.  Therapist will also teach mindfulness strategies to help patient with being in the present moment when appropriate - .        Goal 3: Patiient will improve communication with her .      I will know I've met my goal when I feel less irritated with my  and communicate more openly with my .  I would like to be more assertive and less aggressive.   I would like to not avoid telling him things because I'm worried about his reaction or don't think he will react well.  I would like to be more present to the moment during times when this would be helpful.\"       Objective #A (Patient Action)                          Status: Continued - Date(s):2/9/2023   - continues to work on this goal, though emphasis has shift to managing caregiver stress related to 's cancer diagnosis and treatment.              Patient will learn and practice at least two new interpersonal effectiveness strategies.     Intervention(s)  Therapist will teach strategies from DBT module on interpersonal effectiveness, and will assign homework to help reinforce skill development.        Patient has reviewed and agreed to the above plan.        Cristy Wilkinson PsyD,   Licensed Psychologist  Reviewed on  2/9/2023                                                                                                                                                                                                                                                                            Answers for HPI/ROS submitted by the patient on 3/2/2023  If you checked off any problems, how difficult have these problems made it for you to do your work, take care of things at home, or get along with other people?: Somewhat difficult  PHQ9 TOTAL SCORE: 4      "

## 2023-03-30 ENCOUNTER — THERAPY VISIT (OUTPATIENT)
Dept: PHYSICAL THERAPY | Facility: CLINIC | Age: 43
End: 2023-03-30
Payer: COMMERCIAL

## 2023-03-30 DIAGNOSIS — M54.2 NECK PAIN ON RIGHT SIDE: Primary | ICD-10-CM

## 2023-03-30 DIAGNOSIS — M25.511 PAIN IN JOINT OF RIGHT SHOULDER: ICD-10-CM

## 2023-03-30 PROCEDURE — 97110 THERAPEUTIC EXERCISES: CPT | Mod: GP | Performed by: PHYSICAL THERAPIST

## 2023-03-30 PROCEDURE — 97140 MANUAL THERAPY 1/> REGIONS: CPT | Mod: GP | Performed by: PHYSICAL THERAPIST

## 2023-04-06 ENCOUNTER — THERAPY VISIT (OUTPATIENT)
Dept: PHYSICAL THERAPY | Facility: CLINIC | Age: 43
End: 2023-04-06
Payer: COMMERCIAL

## 2023-04-06 DIAGNOSIS — M54.2 NECK PAIN ON RIGHT SIDE: Primary | ICD-10-CM

## 2023-04-06 DIAGNOSIS — M25.511 PAIN IN JOINT OF RIGHT SHOULDER: ICD-10-CM

## 2023-04-06 PROCEDURE — 97140 MANUAL THERAPY 1/> REGIONS: CPT | Mod: GP | Performed by: PHYSICAL THERAPIST

## 2023-04-06 PROCEDURE — 97110 THERAPEUTIC EXERCISES: CPT | Mod: GP | Performed by: PHYSICAL THERAPIST

## 2023-04-10 ENCOUNTER — VIRTUAL VISIT (OUTPATIENT)
Dept: PSYCHOLOGY | Facility: CLINIC | Age: 43
End: 2023-04-10
Payer: COMMERCIAL

## 2023-04-10 DIAGNOSIS — F41.9 ANXIETY DISORDER, UNSPECIFIED TYPE: ICD-10-CM

## 2023-04-10 DIAGNOSIS — F33.1 MODERATE EPISODE OF RECURRENT MAJOR DEPRESSIVE DISORDER (H): Primary | ICD-10-CM

## 2023-04-10 PROCEDURE — 90832 PSYTX W PT 30 MINUTES: CPT | Mod: VID | Performed by: PSYCHOLOGIST

## 2023-04-10 ASSESSMENT — PATIENT HEALTH QUESTIONNAIRE - PHQ9
SUM OF ALL RESPONSES TO PHQ QUESTIONS 1-9: 0
10. IF YOU CHECKED OFF ANY PROBLEMS, HOW DIFFICULT HAVE THESE PROBLEMS MADE IT FOR YOU TO DO YOUR WORK, TAKE CARE OF THINGS AT HOME, OR GET ALONG WITH OTHER PEOPLE: NOT DIFFICULT AT ALL
SUM OF ALL RESPONSES TO PHQ QUESTIONS 1-9: 0

## 2023-04-11 NOTE — PROGRESS NOTES
"Answers for HPI/ROS submitted by the patient on 4/10/2023  If you checked off any problems, how difficult have these problems made it for you to do your work, take care of things at home, or get along with other people?: Not difficult at all  PHQ9 TOTAL SCORE: 0             [facility]                                            Progress Note    Patient Name: Idalia Hinojosa  Date: 4/10/23           Service Type: Individual      Session Start Time:  12:04  pm Session End Time: 12:28 pm  Session Length:  24 minutes    Session #: 98    Attendees: Client     Service Modality: Video visit: -       Provider verified identity through the following two step process.  Patient provided:  Patient is known previously to provider    Telemedicine Visit: The patient's condition can be safely assessed and treated via synchronous audio and visual telemedicine encounter.      Reason for Telemedicine Visit: Services only offered telehealth    Originating Site (Patient Location): Patient's home    Distant Site (Provider Location): Provider Remote Setting- Home Office    Consent:  The patient/guardian has verbally consented to: the potential risks and benefits of telemedicine (telephone visit) versus in person care; bill my insurance or make self-payment for services provided; and responsibility for payment of non-covered services.     Patient would like the video invitation sent by:  My Chart    Mode of Communication:  Video Conference via [employer],     As the provider I attest to compliance with applicable laws and regulations related to telemedicine.         Treatment Plan Last Reviewed: 2/9/2023  PHQ-9/DAVID-7: 4/10/2023        DATA  Interactive Complexity: No  Crisis: No        Progress Since Last Session (Related to Symptoms / Goals / Homework):   Symptoms:  Idalia reports she has been feeling really \"well\" and is working on enjoying this!      Homework: Achieved / completed to satisfaction.   Idalia " "reported she's been successful with increasing awareness of what \"speed\" she is moving at, recognizing how it may be contributing to how she is feeling, and slowing down when needed.       Episode of Care Goals: Satisfactory progress - ACTION (Actively working towards change); Intervened by reinforcing change plan / affirming steps taken.  The patient stated that her main goals for therapy would be to learn emotion regulation strategies (in particular, to help with when she is feeling upset/angry/frustrated/distressed).  The changes in her daily life due to COVID restrictions were a focus of our initial work together, and working through changes to routine as her daughter started school and she returned to work.   As her  has been diagnosed with cancer, treatment will also focus on providing her with support in managing the emotional distress and challenges related to this and caregiver stress.  As she has been making progress and reports improvement in mood, focus of treatment will shift to wellness planning (monitoring mood and symptoms, incorporating into routine what keeps her feeling well, review of triggers, how to manage set backs, etc.)     Current / Ongoing Stressors and Concerns:   Current: Symptoms of anxiety and depression.   She reports symptoms have improved and she is feeling better.  She's also been doing well with managing caregiver stresses/demands and continues to work with daughter's therapist to help with concerns related to her daughter.       Ongoing:s, Working on building effective emotion regulation strategies for managing daily frustrations and upsets and marital discord due to differences in parenting styles.       Treatment Objective(s) Addressed in This Session:     Review of and continued focus on:    Identify what has been helping her to feel well and ways to incorporate this into regular routine      Continue with/review of:  Emotion regulation - identify and practice " "strategies to help manage emotional distress more effectively  Continue to incorporate 1-2 behavioral activation strategies to help maintain goal of incorporating physical activity into routine  Review of self-care and importance of attending to your self-care  Practice balanced thinking- challenge polarized thinking   Psychoeducation on mindfulness strategies - practicing a non-judgmental and compassionate stance towards self and others   Learn and practice distress tolerance skills-  self-soothe strategy using the five senses   Practice deep breathing  - practice at regular intervals throughout the day      Intervention:  CBT and solution-focused therapy: She reported that she is feeling \"better\" and wants to be able to focus on enjoying feeling better.  We spent time talking about she believes has been helping her to feel better, and what may be helpful to incorporate into her routine on a regular basis to help with feeling well.  Also encouraged her to acknowledge the hard work she has put into therapy, learning and practicing strategies, and her overall commitment to her well-being during a tough and challenging time.        ASSESSMENT: Current Emotional / Mental Status (status of significant symptoms):   Risk status (Self / Other harm or suicidal ideation)   Patient denies current fears or concerns for personal safety.   Patient denies current or recent suicidal ideation or behaviors.  She reports she has not had any more passive suicidal thoughts.  She reports if the thoughts do occur, she knows steps she can take to manage them and believes she can manage them, and reach out for additional help if needed.     Patientdenies current or recent homicidal ideation or behaviors.   Patient denies current or recent self injurious behavior or ideation.   Patient denies other safety concerns.   Patient reports there has not been a change in risk factors since their last session -      Recommended that patient call 911 " "or go to the local ED should there be a change in any of these risk factors.  We reviewed how to contact [facility] crisis/[facility] for urgent/crisis concerns 24/7.  Provided patient with crisis resources and she agrees to use safety plan should any safety concerns arise.      Professionals or agencies I can contact during a crisis:  ? Suicide Prevention Lifeline: call or text 988  ? Crisis Text Line: text \"[place]\" to 422389    Local Crisis Services: [facility]: [phone]    Call 911 or go to my nearest emergency department, or [facility].         Appearance:   Appropriate    Eye Contact:   Good    Psychomotor Behavior: Normal   Attitude:   Cooperative    Orientation:   All   Speech    Rate / Production: Normal     Volume:  Normal    Mood:    She reports she is feeling \"well\" - mood is improved and less anxious and depressed.     Affect:    Congruent with mood   Thought Content:  Clear    Thought Form:  Coherent  Logical    Insight:    Good      Medication Review:   No changes.         Medication Compliance:   Yes      Changes in Health Issues:    She reports she continues to work with physical therapist to address neck and shoulder pain - finds this has been helpful and that pain is improved -      Chemical Use Review:  No alcohol use    Substance Use: Chemical use reviewed, no changes or active concerns identified. She said she has been avoiding any alcohol use.     Tobacco Use: No current tobacco use.       Diagnosis:  Anxiety disorder unspecified  Major depression, recurrent episode, moderate    Will continue to monitor symptoms in response to witnessing  experience medical emergency and assist patient with processing event, self-care, and symptom management.  She reports she has not had any additional episodes of intense panic or anxiety and appears to not be experiencing any on-going or long-term symptoms as a result of this.      No nightmares, no flashbacks, no instrusive thoughts, "   Things better -         PLAN: (Patient Tasks / Therapist Tasks / Other)    1) Idalia identified the following goals: Continue to do things on a regular basis that help me to feel well.       Continue to take regular time to re-charge.       Notice how I'm feeling, take time to slow down, think about how I'm feeling and what I need before I respond.       Use strategies to help manage anxiety.      2) If there is a crisis situation, remember you are not alone and that there are people who can help you twenty-four hours a day, seven days a week.  Call [facility] ([facility]): [phone].      3) Check-in scheduled for 5/1 at 12:00 pm   If urgent or crisis concerns arise prior to next scheduled appointment, please reach out to crisis resources, call 911, or go to the emergency department.      Cristy Wilkinson PsyD,   Licensed Psychologist  4/10/23                                               ____________________________________    Treatment Plan     Patient's Name: Idalia Hinojosa                        YOB: 1980     Date of Creation: 1/6/2020  Date Treatment Plan Last Reviewed/Revised: 2/9/2023     DSM5 Diagnoses: 300.00 (F41.9) Unspecified Anxiety Disorder, major depression, moderate, recurrent episode  Psychosocial / Contextual Factors: strain in marital relationship, parenting challenges, adjustment challenges, 's cancer diagnosis  PROMIS (reviewed every 90 days):      Anticipated number of session for this episode of care: additional 15-20  Anticipation frequency of session: Weekly until symptoms stabilize, then can decrease the frequency of sessions to likely bi-weekly or once every three weeks  Anticipated Duration of each session: 38-52 minutes  Treatment plan will be reviewed in 90 days or when goals have been changed.         Referral / Collaboration:  No referrals at this time.  Have previously recommended couples therapy referral.  Coordinate with Dr. Han  patient's PCP.        MeasurableTreatment Goal(s) related to diagnosis / functional impairment(s)  Goal 1: Patient will learn emotion regulation strategies to help when she is feeling upset/angry/frustrated/distressed    I will know I've met my goal when I would yell less, I would feel calmer, and I would not push others.  I would be less physical when I'm angry (e.g. wouldn't slam doors or hit things)        Objective #A (Patient Action)                          Patient will learn and practice at least 2 new emotion regulation strategies.  Status: Continued - Date(s):    2/9/23- Regularly practicing emotion regulation strategies to help manage increase in emotional distress related to spouse's cancer diagnosis.  She has been successful with using strategies to help her stay calm when her daughter's experiencing heightened emotion and/or challenging behaviors.          Intervention(s)  Therapist will provide psychoeducation on emotion regulation module from DBT and will assign patient homework to help reinforce skill development.     Objective #B  Patient will identify factors that increase vulnerability to emotion mind and identify ways to decrease vulnerability to emotion mind.  Status: Continued - Date(s):2/9/23 - routinely incorporating mindfulness, distress tolerance and self-awareness strategies to increase attention and focus to factors that increase vulnerability to emotion mind           Intervention(s)  Therapist will provide information on factors that increase vulnerabiliyt to emotion mind.     Objective #C  Patient will identify warning signs and signals that emotions are escalating and will learn ways to skillfully intervene early on.  Status: Date: 2/9/23 - in progress - has demonstrated good progress in being able to identify warning signs and intervene skillfully -            Intervention(s)  Therapist will help patient identify warning signs and signals, and will guide the patient in identifying  skillful ways to intervene when exeriencing warning signs and signals.        Goal 2: Patient will learn new parenting strategies to help with managing parenting challenges.  Parent will work on improving relationship with her daughter and defining what she would like this relationship to look like.      I will know I've met my goal when I'm enjoying time with my daughter, teaching her new things, and not waiting for things to change       Objective #A (Patient Action)                          Status: Continued - Date(s):2/9/2023     Patient will increase understanding of developmental norms for her daughter and ways to manage challenging behaviors.     Intervention(s)  Therapist will provide psychoeducation on developmental norms and parenting strategies.     Objective #B  Patient will spend time reflecting on her relationship with her daughter and defining what she would like this relaitonship to look like.                  Status: Continued - Date(s):2/9/2023 Making good progress - has also been working with daughter's therapist and has been actively practicing and using suggested strategies.           Intervention(s)  Therapist will guide the patient in reflecting upon her relationship with her daughter and help her define ways to move towards what she vaues in her relationship with her daughter.     Objective #C  Patient will increase time spent on fun activity and play with her daughter.  Status: Continued - Date(s):2/9/2023  - has been enjoying time with daughter and devoting regular time to fun activities and play together           Intervention(s)  Therapist will guide the patient in identifying ways she can increase positive time with her daughter.  Therapist will also teach mindfulness strategies to help patient with being in the present moment when appropriate - .        Goal 3: Patiient will improve communication with her .      I will know I've met my goal when I feel less irritated with my  " and communicate more openly with my .  I would like to be more assertive and less aggressive.   I would like to not avoid telling him things because I'm worried about his reaction or don't think he will react well.  I would like to be more present to the moment during times when this would be helpful.\"       Objective #A (Patient Action)                          Status: Continued - Date(s):2/9/2023   - continues to work on this goal, though emphasis has shift to managing caregiver stress related to 's cancer diagnosis and treatment.              Patient will learn and practice at least two new interpersonal effectiveness strategies.     Intervention(s)  Therapist will teach strategies from DBT module on interpersonal effectiveness, and will assign homework to help reinforce skill development.        Patient has reviewed and agreed to the above plan.        Cristy Wilkinson PsyD,   Licensed Psychologist  Reviewed on  2/9/2023                                                                                                                                                                                                                                                                            Answers for HPI/ROS submitted by the patient on 3/2/2023  If you checked off any problems, how difficult have these problems made it for you to do your work, take care of things at home, or get along with other people?: Somewhat difficult  PHQ9 TOTAL SCORE: 4      "

## 2023-04-13 ENCOUNTER — THERAPY VISIT (OUTPATIENT)
Dept: PHYSICAL THERAPY | Facility: CLINIC | Age: 43
End: 2023-04-13
Payer: COMMERCIAL

## 2023-04-13 DIAGNOSIS — M54.2 NECK PAIN ON RIGHT SIDE: Primary | ICD-10-CM

## 2023-04-13 DIAGNOSIS — M25.511 PAIN IN JOINT OF RIGHT SHOULDER: ICD-10-CM

## 2023-04-13 PROCEDURE — 97110 THERAPEUTIC EXERCISES: CPT | Mod: GP | Performed by: PHYSICAL THERAPIST

## 2023-04-13 PROCEDURE — 97140 MANUAL THERAPY 1/> REGIONS: CPT | Mod: GP | Performed by: PHYSICAL THERAPIST

## 2023-04-14 DIAGNOSIS — M62.838 TRAPEZIUS MUSCLE SPASM: ICD-10-CM

## 2023-04-14 DIAGNOSIS — M25.511 ACUTE PAIN OF RIGHT SHOULDER: Primary | ICD-10-CM

## 2023-04-14 NOTE — PROGRESS NOTES
TENS order placed per PT recommendations.    Renetta Ponce MD  [facility]

## 2023-04-20 ENCOUNTER — THERAPY VISIT (OUTPATIENT)
Dept: PHYSICAL THERAPY | Facility: CLINIC | Age: 43
End: 2023-04-20
Payer: COMMERCIAL

## 2023-04-20 DIAGNOSIS — M25.511 PAIN IN JOINT OF RIGHT SHOULDER: ICD-10-CM

## 2023-04-20 DIAGNOSIS — M79.644 CHRONIC PAIN OF RIGHT THUMB: Primary | ICD-10-CM

## 2023-04-20 DIAGNOSIS — G89.29 CHRONIC PAIN OF RIGHT THUMB: Primary | ICD-10-CM

## 2023-04-20 DIAGNOSIS — M54.2 NECK PAIN ON RIGHT SIDE: Primary | ICD-10-CM

## 2023-04-20 PROCEDURE — 97110 THERAPEUTIC EXERCISES: CPT | Mod: GP | Performed by: PHYSICAL THERAPIST

## 2023-04-20 PROCEDURE — 97140 MANUAL THERAPY 1/> REGIONS: CPT | Mod: GP | Performed by: PHYSICAL THERAPIST

## 2023-04-20 NOTE — PROGRESS NOTES
Case discussed with PT, requesting OT referral for right thumb pain. Referral placed. Recommend follow up in sports medicine clinic if symptoms do not improve with trial of OT or sooner if worsening.    Renetta Ponce MD  [facility]

## 2023-04-24 ENCOUNTER — THERAPY VISIT (OUTPATIENT)
Dept: PHYSICAL THERAPY | Facility: CLINIC | Age: 43
End: 2023-04-24
Payer: COMMERCIAL

## 2023-04-24 DIAGNOSIS — M25.511 PAIN IN JOINT OF RIGHT SHOULDER: ICD-10-CM

## 2023-04-24 DIAGNOSIS — M54.2 NECK PAIN ON RIGHT SIDE: Primary | ICD-10-CM

## 2023-04-24 PROCEDURE — 97110 THERAPEUTIC EXERCISES: CPT | Mod: GP | Performed by: PHYSICAL THERAPIST

## 2023-04-24 PROCEDURE — 97140 MANUAL THERAPY 1/> REGIONS: CPT | Mod: GP | Performed by: PHYSICAL THERAPIST

## 2023-05-01 ENCOUNTER — THERAPY VISIT (OUTPATIENT)
Dept: PHYSICAL THERAPY | Facility: CLINIC | Age: 43
End: 2023-05-01
Payer: COMMERCIAL

## 2023-05-01 DIAGNOSIS — M25.511 PAIN IN JOINT OF RIGHT SHOULDER: ICD-10-CM

## 2023-05-01 DIAGNOSIS — M54.2 NECK PAIN ON RIGHT SIDE: Primary | ICD-10-CM

## 2023-05-01 PROCEDURE — 97110 THERAPEUTIC EXERCISES: CPT | Mod: GP | Performed by: PHYSICAL THERAPIST

## 2023-05-01 PROCEDURE — 97140 MANUAL THERAPY 1/> REGIONS: CPT | Mod: GP | Performed by: PHYSICAL THERAPIST

## 2023-05-04 ENCOUNTER — VIRTUAL VISIT (OUTPATIENT)
Dept: PSYCHOLOGY | Facility: CLINIC | Age: 43
End: 2023-05-04
Payer: COMMERCIAL

## 2023-05-04 DIAGNOSIS — F33.1 MODERATE EPISODE OF RECURRENT MAJOR DEPRESSIVE DISORDER (H): Primary | ICD-10-CM

## 2023-05-04 DIAGNOSIS — F41.9 ANXIETY DISORDER, UNSPECIFIED TYPE: ICD-10-CM

## 2023-05-04 PROCEDURE — 90832 PSYTX W PT 30 MINUTES: CPT | Mod: VID | Performed by: PSYCHOLOGIST

## 2023-05-04 NOTE — PROGRESS NOTES
"           [facility]                                            Progress Note    Patient Name: Idalia Hinojosa  Date: 5/4/2023           Service Type: Individual      Session Start Time:  12:05  pm Session End Time: 12:29 pm  Session Length:  25 minutes    Session #: 99    Attendees: Client     Service Modality: Video visit: -       Provider verified identity through the following two step process.  Patient provided:  Patient is known previously to provider    Telemedicine Visit: The patient's condition can be safely assessed and treated via synchronous audio and visual telemedicine encounter.      Reason for Telemedicine Visit: Services only offered telehealth    Originating Site (Patient Location): Patient's home    Distant Site (Provider Location): Provider Remote Setting- Home Office    Consent:  The patient/guardian has verbally consented to: the potential risks and benefits of telemedicine (telephone visit) versus in person care; bill my insurance or make self-payment for services provided; and responsibility for payment of non-covered services.     Patient would like the video invitation sent by:  My Chart    Mode of Communication:  Video Conference via [employer],     As the provider I attest to compliance with applicable laws and regulations related to telemedicine.         Treatment Plan Last Reviewed: 2/9/2023  PHQ-9/DAVID-7: 4/10/2023        DATA  Interactive Complexity: No  Crisis: No        Progress Since Last Session (Related to Symptoms / Goals / Homework):   Symptoms:  Idalia reports the last few weeks have been more difficult.  Her daughter has had a difficult few weeks and her  has been experiencing more stress, which carries into the household.  She reported it has been difficult to get some \"down time\" and relaxation.        Homework: not completed to patient's satisfaction.   Difficult to find time to practice self-care over the past few weeks -       Episode of " "Care Goals: Satisfactory progress - ACTION (Actively working towards change); Intervened by reinforcing change plan / affirming steps taken.  The patient stated that her main goals for therapy would be to learn emotion regulation strategies (in particular, to help with when she is feeling upset/angry/frustrated/distressed).   As her  had been diagnosed with cancer, treatment also focused on providing her with support in managing the emotional distress and challenges related to this and caregiver stress.  As she has been making progress and reports improvement in mood, focus of treatment will shift to wellness planning (monitoring mood and symptoms, incorporating into routine what keeps her feeling well, review of triggers, how to manage set backs, etc.)     Current / Ongoing Stressors and Concerns:   Current:  She reports the last couple of weeks have been \"more difficult\" related to Kaylee experiencing more difficulties and her  being more \"stressed\".  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         Ongoing:s, Working on building effective emotion regulation strategies for managing daily frustrations and upsets and marital discord due to differences in parenting styles.       Treatment Objective(s) Addressed in This Session:     Review of and continued focus on:    Build distress tolerance/frustration management strategies       Continue with/review of:  Emotion regulation - identify and practice strategies to help manage emotional distress more effectively  Continue to incorporate 1-2 behavioral activation strategies to help maintain goal of incorporating physical activity into routine  Review of self-care and importance of attending to your self-care  Practice balanced thinking- challenge polarized thinking   Psychoeducation on " "mindfulness strategies - practicing a non-judgmental and compassionate stance towards self and others   Learn and practice distress tolerance skills-  self-soothe strategy using the five senses   Practice deep breathing  - practice at regular intervals throughout the day      Intervention:  CBT and DBT: She reported that she struggles with thoughts of \"I want it right now, I need it right now\".  She shared this in the context of difficulties she has had finding time for down-time, and her emotional reaction to that.  She would like to be able to connect more with patience and a flexible mind-set.  Spent time identifying and problem-solving what will help her with this - \"I can choose patience\", practicing flexible mindset and distress tolerance strategies, focusing on \"what will work\", what can I do.      ASSESSMENT: Current Emotional / Mental Status (status of significant symptoms):   Risk status (Self / Other harm or suicidal ideation)   Patient denies current fears or concerns for personal safety.   Patient denies current or recent suicidal ideation or behaviors.  She reports she has not had any more passive suicidal thoughts.  She reports if the thoughts do occur, she knows steps she can take to manage them and believes she can manage them, and reach out for additional help if needed.     Patientdenies current or recent homicidal ideation or behaviors.   Patient denies current or recent self injurious behavior or ideation.   Patient denies other safety concerns.   Patient reports there has not been a change in risk factors since their last session -      Recommended that patient call 911 or go to the local ED should there be a change in any of these risk factors.  We reviewed how to contact [facility] crisis/COPE for urgent/crisis concerns 24/7.  Provided patient with crisis resources and she agrees to use safety plan should any safety concerns arise.      Professionals or agencies I can contact during a " "crisis:  ? Suicide Prevention Lifeline: call or text 988  ? Crisis Text Line: text \"[place]\" to 498441    Local Crisis Services: [facility]: [phone]    Call 911 or go to my nearest emergency department, or [facility].         Appearance:   Appropriate    Eye Contact:   Good    Psychomotor Behavior: Normal   Attitude:   Cooperative    Orientation:   All   Speech    Rate / Production: Normal     Volume:  Normal    Mood:    She reports last few weeks have been more difficult, has felt more anxious and stressed   Affect:    Congruent with mood   Thought Content:  Clear    Thought Form:  Coherent  Logical    Insight:    Good      Medication Review:   No changes.         Medication Compliance:   Yes      Changes in Health Issues:    She reports physical pain has decreased, has new massaging machine.       Chemical Use Review:  No alcohol use    Substance Use: Chemical use reviewed, no changes or active concerns identified. She said she has been avoiding any alcohol use.     Tobacco Use: No current tobacco use.       Diagnosis:  Anxiety disorder unspecified  Major depression, recurrent episode, moderate    Will continue to monitor symptoms in response to witnessing  experience medical emergency and assist patient with processing event, self-care, and symptom management.  She reports she has not had any additional episodes of intense panic or anxiety and appears to not be experiencing any on-going or long-term symptoms as a result of this.      No nightmares, no flashbacks, no instrusive thoughts,   Things better -         PLAN: (Patient Tasks / Therapist Tasks / Other)    1) Idalia identified the following goals: Choose patience, reaction to changes, flexiblity with changes in schedule,     Continue to do things on a regular basis that help me to feel well.       Continue to take regular time to re-charge.       Notice how I'm feeling, take time to slow down, think about how I'm feeling and what I " need before I respond.       Use strategies to help manage anxiety.      2) If there is a crisis situation, remember you are not alone and that there are people who can help you twenty-four hours a day, seven days a week.  Call [facility] ([facility]): [phone].      3) Check-in scheduled for May 25th at 12:30 pm   If urgent or crisis concerns arise prior to next scheduled appointment, please reach out to crisis resources, call 911, or go to the emergency department.      Cristy Wilkinson PsyD,   Licensed Psychologist  5/4/2023                                                 ____________________________________    Treatment Plan     Patient's Name: Idalia Hinojosa                        YOB: 1980     Date of Creation: 1/6/2020  Date Treatment Plan Last Reviewed/Revised: 2/9/2023     DSM5 Diagnoses: 300.00 (F41.9) Unspecified Anxiety Disorder, major depression, moderate, recurrent episode  Psychosocial / Contextual Factors: strain in marital relationship, parenting challenges, adjustment challenges, 's cancer diagnosis  PROMIS (reviewed every 90 days):      Anticipated number of session for this episode of care: additional 15-20  Anticipation frequency of session: Weekly until symptoms stabilize, then can decrease the frequency of sessions to likely bi-weekly or once every three weeks  Anticipated Duration of each session: 38-52 minutes  Treatment plan will be reviewed in 90 days or when goals have been changed.         Referral / Collaboration:  No referrals at this time.  Have previously recommended couples therapy referral.  Coordinate with Dr. Han, patient's PCP.        MeasurableTreatment Goal(s) related to diagnosis / functional impairment(s)  Goal 1: Patient will learn emotion regulation strategies to help when she is feeling upset/angry/frustrated/distressed    I will know I've met my goal when I would yell less, I would feel calmer, and I would not push others.   I would be less physical when I'm angry (e.g. wouldn't slam doors or hit things)        Objective #A (Patient Action)                          Patient will learn and practice at least 2 new emotion regulation strategies.  Status: Continued - Date(s):    2/9/23- Regularly practicing emotion regulation strategies to help manage increase in emotional distress related to spouse's cancer diagnosis.  She has been successful with using strategies to help her stay calm when her daughter's experiencing heightened emotion and/or challenging behaviors.          Intervention(s)  Therapist will provide psychoeducation on emotion regulation module from DBT and will assign patient homework to help reinforce skill development.     Objective #B  Patient will identify factors that increase vulnerability to emotion mind and identify ways to decrease vulnerability to emotion mind.  Status: Continued - Date(s):2/9/23 - routinely incorporating mindfulness, distress tolerance and self-awareness strategies to increase attention and focus to factors that increase vulnerability to emotion mind           Intervention(s)  Therapist will provide information on factors that increase vulnerabiliyt to emotion mind.     Objective #C  Patient will identify warning signs and signals that emotions are escalating and will learn ways to skillfully intervene early on.  Status: Date: 2/9/23 - in progress - has demonstrated good progress in being able to identify warning signs and intervene skillfully -            Intervention(s)  Therapist will help patient identify warning signs and signals, and will guide the patient in identifying skillful ways to intervene when exeriencing warning signs and signals.        Goal 2: Patient will learn new parenting strategies to help with managing parenting challenges.  Parent will work on improving relationship with her daughter and defining what she would like this relationship to look like.      I will know I've met  "my goal when I'm enjoying time with my daughter, teaching her new things, and not waiting for things to change       Objective #A (Patient Action)                          Status: Continued - Date(s):2/9/2023     Patient will increase understanding of developmental norms for her daughter and ways to manage challenging behaviors.     Intervention(s)  Therapist will provide psychoeducation on developmental norms and parenting strategies.     Objective #B  Patient will spend time reflecting on her relationship with her daughter and defining what she would like this relaitonship to look like.                  Status: Continued - Date(s):2/9/2023 Making good progress - has also been working with daughter's therapist and has been actively practicing and using suggested strategies.           Intervention(s)  Therapist will guide the patient in reflecting upon her relationship with her daughter and help her define ways to move towards what she vaues in her relationship with her daughter.     Objective #C  Patient will increase time spent on fun activity and play with her daughter.  Status: Continued - Date(s):2/9/2023  - has been enjoying time with daughter and devoting regular time to fun activities and play together           Intervention(s)  Therapist will guide the patient in identifying ways she can increase positive time with her daughter.  Therapist will also teach mindfulness strategies to help patient with being in the present moment when appropriate - .        Goal 3: Patiient will improve communication with her .      I will know I've met my goal when I feel less irritated with my  and communicate more openly with my .  I would like to be more assertive and less aggressive.   I would like to not avoid telling him things because I'm worried about his reaction or don't think he will react well.  I would like to be more present to the moment during times when this would be helpful.\"  "      Objective #A (Patient Action)                          Status: Continued - Date(s):2/9/2023   - continues to work on this goal, though emphasis has shift to managing caregiver stress related to 's cancer diagnosis and treatment.              Patient will learn and practice at least two new interpersonal effectiveness strategies.     Intervention(s)  Therapist will teach strategies from DBT module on interpersonal effectiveness, and will assign homework to help reinforce skill development.        Patient has reviewed and agreed to the above plan.        Cristy Wilkinson PsyD,   Licensed Psychologist  Reviewed on  2/9/2023                                                                                                                                                                                                                                                                            Answers for HPI/ROS submitted by the patient on 3/2/2023  If you checked off any problems, how difficult have these problems made it for you to do your work, take care of things at home, or get along with other people?: Somewhat difficult  PHQ9 TOTAL SCORE: 4

## 2023-05-08 ENCOUNTER — THERAPY VISIT (OUTPATIENT)
Dept: PHYSICAL THERAPY | Facility: CLINIC | Age: 43
End: 2023-05-08
Payer: COMMERCIAL

## 2023-05-08 DIAGNOSIS — M54.2 NECK PAIN ON RIGHT SIDE: Primary | ICD-10-CM

## 2023-05-08 DIAGNOSIS — M25.511 PAIN IN JOINT OF RIGHT SHOULDER: ICD-10-CM

## 2023-05-08 PROCEDURE — 97110 THERAPEUTIC EXERCISES: CPT | Mod: GP | Performed by: PHYSICAL THERAPIST

## 2023-05-08 PROCEDURE — 97140 MANUAL THERAPY 1/> REGIONS: CPT | Mod: GP | Performed by: PHYSICAL THERAPIST

## 2023-05-09 ENCOUNTER — THERAPY VISIT (OUTPATIENT)
Dept: OCCUPATIONAL THERAPY | Facility: CLINIC | Age: 43
End: 2023-05-09
Attending: STUDENT IN AN ORGANIZED HEALTH CARE EDUCATION/TRAINING PROGRAM
Payer: COMMERCIAL

## 2023-05-09 DIAGNOSIS — G89.29 CHRONIC PAIN OF RIGHT THUMB: ICD-10-CM

## 2023-05-09 DIAGNOSIS — M79.644 CHRONIC PAIN OF RIGHT THUMB: ICD-10-CM

## 2023-05-09 PROCEDURE — 97140 MANUAL THERAPY 1/> REGIONS: CPT | Mod: GO

## 2023-05-09 PROCEDURE — 97110 THERAPEUTIC EXERCISES: CPT | Mod: GO

## 2023-05-09 PROCEDURE — 97165 OT EVAL LOW COMPLEX 30 MIN: CPT | Mod: GO

## 2023-05-09 PROCEDURE — 97535 SELF CARE MNGMENT TRAINING: CPT | Mod: GO

## 2023-05-09 NOTE — PROGRESS NOTES
[facility] Initial Evaluation     Current Date: 2023    Diagnosis: R thumb pain   DOI: ~2 months  Referring Provider: Dr. Renetta Marcos      Subjective:  Pt reports overall good health. She reports PMH including depression and surgical history of a . Pt works at [employer] requiring computer work, lifting, repetitive tasks, and pushing/pulling.   She reports no medical allergies.    Onset of pain was a couple of months ago, with no clear precipitating event.     Occupational Profile Information:  Right hand dominant  Prior functional level:  no limitations  Patient reports symptoms of pain and weakness/loss of strength  Special tests:  none.    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, [employer] music, computer, opening boxes, lifting music out of them   Leisure activities/hobbies: reading, legos, oboe (lots of tension to the thumb)  Other: pinching, pushing with the thumb     Functional Outcome Measure:   Upper Extremity Functional Index Score:  SCORE:   Column Totals: 65/80:  (A lower score indicates greater disability.)    Objective:  Pain Level (Scale 0-10)   2023   At Rest 0   With Use 7-8     Pain Description  Date 2023   Location thumb   Pain Quality Pressure and Sharp   Frequency intermittent     Pain is worst  daytime   Exacerbated by  pinching, pushing    Relieved by rest   Progression Unchanged      Edema (Circumference measured in cm)   2023    L R   Thumb middle phalanx 6.6 7.0     ROM  Thumb 2023   AROM  (PROM) L R    MP /62 /55+   IP /68 /55+   RABD 55 45   PABD 45 45   Kapandji Opposition Scale (0-10/10) 10 8     Difficulty with full  Extension of the thumb, lag of ~5* at MP and IP     Provocative Tests  Pain Report:  - none    + mild    ++ moderate    +++ severe     2023   AP Joint Laxity of Trapezium R:  -   Crepitus present R: -   CMC Adduction Stress Test R: -   CMC Extension Stress Test R: -   Finkelstein's R: -   WHAT Test R: -   Resisted FPB R: +   Resisted FPL R: +   Resisted Opponens Pollicis R:+       Strength   (Measured in pounds)  Pain Report: - none  + mild    ++ moderate    +++ severe    5/9/2023 5/9/2023   Trials L R   1  2  3 46 45   Average       Lat Pinch 5/9/2023 5/9/2023   Trials L R   1  2  3 14 8   Average       3 Pt Pinch 5/9/2023 5/9/2023   Trials L R   1  2  3 13 8   Average       Palpation   Pain Report:  - none    + mild    ++ moderate    +++ severe    5/9/2023   CMC Joint Line R: -   Thenar [place] R: ++   Web Space R: +   1st DC R: -   Radial Styloid R: -   FCR R: -   A1 pulley R: ++     Assessment:  Patient presents with symptoms consistent with diagnosis of right thumb pain, with conservative intervention. Per evaluation today, pt's symptoms are most consistent w/ overuse injury of thumb flexors and opponens    Patient's limitations or Problem List includes:  Pain, Decreased ROM/motion, Increased edema, Decreased , Decreased pinch and Tightness in musculature of the right thumb which interferes with the patient's ability to perform Self Care Tasks (dressing), Work Tasks, Recreational Activities, Household Chores and Driving  as compared to previous level of function.    Rehab Potential:  Excellent - Return to full activity, no limitations    Patient will benefit from skilled Occupational Therapy to increase ROM,  strength and pinch strength and decrease pain and edema to return to previous activity level and resume normal daily tasks and to reach their rehab potential.    Barriers to Learning:  No barrier    Communication Issues:  Patient appears to be able to clearly communicate and understand verbal and written communication and follow directions correctly.    Chart Review: Chart Review and Simple history review with patient    Identified Performance Deficits: dressing,  driving and community mobility, home establishment and management, meal preparation and cleanup, shopping, work and leisure activities    Assessment of Occupational Performance:  5 or more Performance Deficits    Clinical Decision Making (Complexity): Low complexity    Treatment Explanation:  The following has been discussed with the patient:    RX ordered/plan of care  Anticipated outcomes  Possible risks and side effects    Plan:  Frequency:  1 X week, once daily  Duration:  for 8 weeks    Treatment Plan:    Modalities:    US and Paraffin   Therapeutic Exercise:    AROM, PROM, Blocking, Isotonics and Isometrics  Therapeutic Activities:   Functional activities   Neuromuscular re-ed:   Kinesiotaping  Manual Techniques:   Joint mobilization, Friction massage and Myofascial release  Orthotic Fabrication:    Static  Self Care:    Self Care Tasks, Ergonomic Considerations and Work Tasks    Discharge Plan:  Achieve all LTG  Becker in home treatment program.  Reach maximal therapeutic benefit.    Home Program:  Icing  Thumb spica brace during activity  Activity modifications to avoid pain/irritation  FM to A1 pulley  [place] massage to thenars  IP and MP blocking flex/ext AROM  Thumb opposition AROM    Next Visit:  Check response to HEP  Check response to US and k-taping  US to thenars, manual therapy as tolerated

## 2023-05-15 ENCOUNTER — THERAPY VISIT (OUTPATIENT)
Dept: PHYSICAL THERAPY | Facility: CLINIC | Age: 43
End: 2023-05-15
Payer: COMMERCIAL

## 2023-05-15 DIAGNOSIS — M54.2 NECK PAIN ON RIGHT SIDE: Primary | ICD-10-CM

## 2023-05-15 DIAGNOSIS — M25.511 PAIN IN JOINT OF RIGHT SHOULDER: ICD-10-CM

## 2023-05-15 PROCEDURE — 97140 MANUAL THERAPY 1/> REGIONS: CPT | Mod: GP | Performed by: PHYSICAL THERAPIST

## 2023-05-15 PROCEDURE — 97110 THERAPEUTIC EXERCISES: CPT | Mod: GP | Performed by: PHYSICAL THERAPIST

## 2023-05-22 ENCOUNTER — THERAPY VISIT (OUTPATIENT)
Dept: PHYSICAL THERAPY | Facility: CLINIC | Age: 43
End: 2023-05-22
Payer: COMMERCIAL

## 2023-05-22 ENCOUNTER — MYC MEDICAL ADVICE (OUTPATIENT)
Dept: ORTHOPEDICS | Facility: CLINIC | Age: 43
End: 2023-05-22

## 2023-05-22 DIAGNOSIS — M54.2 NECK PAIN ON RIGHT SIDE: Primary | ICD-10-CM

## 2023-05-22 DIAGNOSIS — M25.511 PAIN IN JOINT OF RIGHT SHOULDER: ICD-10-CM

## 2023-05-22 PROCEDURE — 97140 MANUAL THERAPY 1/> REGIONS: CPT | Mod: GP | Performed by: PHYSICAL THERAPIST

## 2023-05-22 PROCEDURE — 97110 THERAPEUTIC EXERCISES: CPT | Mod: GP | Performed by: PHYSICAL THERAPIST

## 2023-05-23 ENCOUNTER — TELEPHONE (OUTPATIENT)
Dept: PSYCHOLOGY | Facility: CLINIC | Age: 43
End: 2023-05-23

## 2023-05-23 NOTE — TELEPHONE ENCOUNTER
Left message for patient indicating need to reschedule Thursday appointment.  Offered appointment times for tomorrow, 5/24, and also sent my-chart message.  Will offer additional appointment times if needed.    Cristy Wilkinson PsyD, LP

## 2023-05-24 ENCOUNTER — THERAPY VISIT (OUTPATIENT)
Dept: OCCUPATIONAL THERAPY | Facility: CLINIC | Age: 43
End: 2023-05-24
Payer: COMMERCIAL

## 2023-05-24 DIAGNOSIS — G89.29 CHRONIC PAIN OF RIGHT THUMB: Primary | ICD-10-CM

## 2023-05-24 DIAGNOSIS — M79.644 CHRONIC PAIN OF RIGHT THUMB: Primary | ICD-10-CM

## 2023-05-24 PROCEDURE — 97110 THERAPEUTIC EXERCISES: CPT | Mod: GO | Performed by: OCCUPATIONAL THERAPIST

## 2023-05-24 PROCEDURE — 97035 APP MDLTY 1+ULTRASOUND EA 15: CPT | Mod: GO | Performed by: OCCUPATIONAL THERAPIST

## 2023-05-24 PROCEDURE — 97140 MANUAL THERAPY 1/> REGIONS: CPT | Mod: GO | Performed by: OCCUPATIONAL THERAPIST

## 2023-05-24 NOTE — PROGRESS NOTES
Please refer to the daily flowsheet for treatment today, total treatment time and time spent performing 1:1 timed codes.       Home Program:  Icing  Thumb spica brace during activity  Activity modifications to avoid pain/irritation  FM to A1 pulley  [place] massage to thenars  IP and MP blocking flex/ext AROM  Thumb opposition AROM  Ktaping  5/24/23  Adductor release with clip    Next Visit:  US to bay, manual therapy   Progress stability strengthening  K-tape

## 2023-05-30 ENCOUNTER — THERAPY VISIT (OUTPATIENT)
Dept: OCCUPATIONAL THERAPY | Facility: CLINIC | Age: 43
End: 2023-05-30
Payer: COMMERCIAL

## 2023-05-30 ENCOUNTER — OFFICE VISIT (OUTPATIENT)
Dept: OBGYN | Facility: CLINIC | Age: 43
End: 2023-05-30
Payer: COMMERCIAL

## 2023-05-30 VITALS
DIASTOLIC BLOOD PRESSURE: 71 MMHG | HEART RATE: 71 BPM | HEIGHT: 66 IN | WEIGHT: 174 LBS | SYSTOLIC BLOOD PRESSURE: 112 MMHG | OXYGEN SATURATION: 97 % | BODY MASS INDEX: 27.97 KG/M2

## 2023-05-30 DIAGNOSIS — G89.29 CHRONIC PAIN OF RIGHT THUMB: Primary | ICD-10-CM

## 2023-05-30 DIAGNOSIS — M79.644 CHRONIC PAIN OF RIGHT THUMB: Primary | ICD-10-CM

## 2023-05-30 DIAGNOSIS — F32.81 PMDD (PREMENSTRUAL DYSPHORIC DISORDER): ICD-10-CM

## 2023-05-30 DIAGNOSIS — Z01.419 ENCOUNTER FOR GYNECOLOGICAL EXAMINATION WITHOUT ABNORMAL FINDING: Primary | ICD-10-CM

## 2023-05-30 PROCEDURE — 97140 MANUAL THERAPY 1/> REGIONS: CPT | Mod: GO

## 2023-05-30 PROCEDURE — 97035 APP MDLTY 1+ULTRASOUND EA 15: CPT | Mod: GO

## 2023-05-30 PROCEDURE — 97110 THERAPEUTIC EXERCISES: CPT | Mod: GO

## 2023-05-30 PROCEDURE — 99396 PREV VISIT EST AGE 40-64: CPT | Performed by: OBSTETRICS & GYNECOLOGY

## 2023-05-30 RX ORDER — NORETHINDRONE ACETATE AND ETHINYL ESTRADIOL .02; 1 MG/1; MG/1
1 TABLET ORAL DAILY
Qty: 112 TABLET | Refills: 4 | Status: SHIPPED | OUTPATIENT
Start: 2023-05-30 | End: 2024-08-14

## 2023-05-30 RX ORDER — SERTRALINE HYDROCHLORIDE 100 MG/1
100 TABLET, FILM COATED ORAL DAILY
Qty: 90 TABLET | Refills: 4 | Status: SHIPPED | OUTPATIENT
Start: 2023-05-30 | End: 2024-07-18

## 2023-05-30 ASSESSMENT — ANXIETY QUESTIONNAIRES
1. FEELING NERVOUS, ANXIOUS, OR ON EDGE: NOT AT ALL
3. WORRYING TOO MUCH ABOUT DIFFERENT THINGS: NOT AT ALL
7. FEELING AFRAID AS IF SOMETHING AWFUL MIGHT HAPPEN: NOT AT ALL
IF YOU CHECKED OFF ANY PROBLEMS ON THIS QUESTIONNAIRE, HOW DIFFICULT HAVE THESE PROBLEMS MADE IT FOR YOU TO DO YOUR WORK, TAKE CARE OF THINGS AT HOME, OR GET ALONG WITH OTHER PEOPLE: NOT DIFFICULT AT ALL
6. BECOMING EASILY ANNOYED OR IRRITABLE: SEVERAL DAYS
GAD7 TOTAL SCORE: 2
2. NOT BEING ABLE TO STOP OR CONTROL WORRYING: NOT AT ALL
5. BEING SO RESTLESS THAT IT IS HARD TO SIT STILL: NOT AT ALL
GAD7 TOTAL SCORE: 2

## 2023-05-30 ASSESSMENT — PATIENT HEALTH QUESTIONNAIRE - PHQ9
SUM OF ALL RESPONSES TO PHQ QUESTIONS 1-9: 5
5. POOR APPETITE OR OVEREATING: SEVERAL DAYS

## 2023-05-30 NOTE — PROGRESS NOTES
Idalia is a 43 year old  female who presents for annual exam.     Menses are rare and  No LMP recorded. (Menstrual status: Birth Control).. Using oral contraceptives for contraception.  She is not currently considering pregnancy.  Besides routine health maintenance,  she would like to discuss body pain.  Has been seeing physical therapy for the last 5 months for upper back and neck pain that seems to move.  Started when Donald needed a code called on him while she was at the hospital and she was watching.  Due to go back and see sports medicine doctor soon and hoping they will have more options.  Daughter is 7 now and has ADHD, being treated. Donald is in remission now.   DAVID=2 and PHQ=5 on zoloft 100 mg and stable.  Did not do well when went down on her dose.  GYNECOLOGIC HISTORY:  Menarche:  Idalia is sexually active with 1male partner(s) and is currently in monogamous relationship.    History sexually transmitted infections:No STD history  STI testing offered?  Declined  DEBORA exposure: Unknown  History of abnormal Pap smear: NO - age 30-65 PAP every 5 years with negative HPV co-testing recommended  Family history of breast CA: No  Family history of uterine/ovarian CA: No    Family history of colon CA: No    HEALTH MAINTENANCE:  Cholesterol: (  Cholesterol   Date Value Ref Range Status   10/24/2022 187 <200 mg/dL Final   2018 193 <200 mg/dL Final   12/10/2013 177 0 - 200 mg/dL Final     Comment:     LDL Cholesterol is the primary guide to therapy.   The NCEP recommends further evaluation of: patients with cholesterol greater   than 200 mg/dL if additional risk factors are present, cholesterol greater   than   240 mg/dL, triglycerides greater than 150 mg/dL, or HDL less than 40 mg/dL.    History of abnormal lipids: No  Mammo: na . History of abnormal Mammo: Not applicable.  Regular Self Breast Exams: No  Calcium/Vitamin D intake: source:  dairy Adequate? Yes  TSH: (  TSH   Date Value Ref Range Status    10/24/2022 2.43 0.40 - 4.00 mU/L Final    )  Pap; (  Lab Results   Component Value Date    PAP NIL 2021    PAP ASC-US 2017    PAP NIL 12/10/2013    )    HISTORY:  OB History    Para Term  AB Living   1 1 0 1 0 1   SAB IAB Ectopic Multiple Live Births   0 0 0 0 1      # Outcome Date GA Lbr Alfredito/2nd Weight Sex Delivery Anes PTL Lv   1  10/28/15 34w5d  2.509 kg (5 lb 8.5 oz) F CS-LTranv   OPHELIA      Birth Comments: arrest of descent, LOP, cat 2 tracing after PPROM      Name: Alma Delia      Apgar1: 5  Apgar5: 7     Past Medical History:   Diagnosis Date     ASCUS of cervix with negative high risk HPV 2017 ASCUS, Neg HPV     Seasonal affective disorder (H)     no meds     Past Surgical History:   Procedure Laterality Date      SECTION N/A 10/28/2015    Procedure:  SECTION;  Surgeon: Mónica Madrid MD;  Location: [facility]     DENTAL SURGERY      wisdom teeth     TONSILLECTOMY  age 10     Family History   Problem Relation Age of Onset     Heart Disease Maternal Grandmother      Skin Cancer Maternal Grandmother      Thyroid Disease Paternal Grandmother      Other Cancer Paternal Grandmother      Social History     Socioeconomic History     Marital status:      Number of children: 1   Occupational History     Employer: [employer]   Tobacco Use     Smoking status: Never     Smokeless tobacco: Never   Vaping Use     Vaping status: Never Used   Substance and Sexual Activity     Alcohol use: Yes     Alcohol/week: 0.0 standard drinks of alcohol     Comment: 0-2 PER WK     Drug use: No     Sexual activity: Yes     Partners: Male     Birth control/protection: Pill, Condom   Other Topics Concern     Parent/sibling w/ CABG, MI or angioplasty before 65F 55M? No   Social History Narrative               Current Outpatient Medications:      cyclobenzaprine (FLEXERIL) 5 MG tablet, Take 1-2 tablets (5-10 mg) by mouth nightly as needed for muscle spasms You  "can take up to 3 times per day as needed, but this is sedating so no driving or alcohol while taking., Disp: 30 tablet, Rfl: 0     diclofenac (VOLTAREN) 50 MG EC tablet, Take 1 tablet (50 mg) by mouth 2 times daily for 14 days Always take with food. Do not use with any other NSAIDS like ibuprofen., Disp: 28 tablet, Rfl: 0     ipratropium (ATROVENT) 0.06 % nasal spray, Spray 2 sprays into both nostrils 4 times daily as needed for rhinitis, Disp: 15 mL, Rfl: 5     mometasone (NASONEX) 50 MCG/ACT nasal spray, Spray 2 sprays into both nostrils daily, Disp: 51 g, Rfl: 3     montelukast (SINGULAIR) 10 MG tablet, Take 1 tablet (10 mg) by mouth At Bedtime, Disp: 90 tablet, Rfl: 3     norethindrone-ethinyl estradiol (MICROGESTIN 1/20) 1-20 MG-MCG tablet, Take 1 tablet by mouth daily Active tablets only for prevention of menses, Disp: 112 tablet, Rfl: 4     sertraline (ZOLOFT) 50 MG tablet, Take 2 tablets (100 mg) by mouth daily, Disp: 180 tablet, Rfl: 1    Current Facility-Administered Medications:      sodium chloride (PF) 0.9% PF flush 3 mL, 3 mL, Intracatheter, q1 min elisan, Cristy Wilkerson PA-C, 3 mL at 10/10/22 1436   No Known Allergies    Past medical, surgical, social and family history were reviewed and updated in EPIC.    EXAM:  /71   Pulse 71   Ht 1.676 m (5' 6\")   Wt 78.9 kg (174 lb)   SpO2 97%   BMI 28.08 kg/m     BMI: Body mass index is 28.08 kg/m .  Constitutional: healthy, alert and no distress  Head: Normocephalic. No masses, lesions, tenderness or abnormalities  Neck: Neck supple. Trachea midline. No adenopathy. Thyroid symmetric, normal size.   Cardiovascular: RRR.   Respiratory: Negative.   Breast: Breasts reveal mild symmetric fibrocystic densities, but there are no dominant, discrete, fixed or suspicious masses found.  Gastrointestinal: Abdomen soft, non-tender, non-distended. No masses, organomegaly.  :  Vulva:  No external lesions, normal female hair distribution, no inguinal " adenopathy.    Urethra:  Midline, non-tender, well supported, no discharge  Vagina:  Moist, pink, no abnormal discharge, no lesions  Uterus:  Normal size , non-tender, freely mobile  Ovaries:  No masses appreciated, non-tender, mobile  Rectal Exam: deferred  Musculoskeletal: extremities normal  Skin: no suspicious lesions or rashes  Psychiatric: Affect appropriate, cooperative,mentation appears normal.     COUNSELING:   Reviewed preventive health counseling, as reflected in patient instructions       Contraception   reports that she has never smoked. She has never used smokeless tobacco.    Body mass index is 28.08 kg/m .  Weight management plan: Not addressed today, triggering  FRAX Risk Assessment    ASSESSMENT:  43 year old female with satisfactory annual exam  (Z01.419) Encounter for gynecological examination without abnormal finding  (primary encounter diagnosis)  Comment: OCP  Plan: Refill was done today with continuous therapy.  Discussed mammograms and she will likely started age 47.    Labs done within the last year were normal, plan repeat cholesterol next year    (F32.81) PMDD (premenstrual dysphoric disorder)  Comment: Stable  Plan: sertraline (ZOLOFT) 100 MG tablet,         norethindrone-ethinyl estradiol (MICROGESTIN         1/20) 1-20 MG-MCG tablet        Refills were done continue Zoloft at 100 mg.    Monique Han MD

## 2023-05-30 NOTE — PATIENT INSTRUCTIONS
Calcium [id number] mg per day     Take supplement at different time  viactive chews caramel flavor

## 2023-06-05 ENCOUNTER — VIRTUAL VISIT (OUTPATIENT)
Dept: PSYCHOLOGY | Facility: CLINIC | Age: 43
End: 2023-06-05
Payer: COMMERCIAL

## 2023-06-05 DIAGNOSIS — F33.1 MODERATE EPISODE OF RECURRENT MAJOR DEPRESSIVE DISORDER (H): Primary | ICD-10-CM

## 2023-06-05 DIAGNOSIS — F41.9 ANXIETY DISORDER, UNSPECIFIED TYPE: ICD-10-CM

## 2023-06-05 PROCEDURE — 90832 PSYTX W PT 30 MINUTES: CPT | Mod: VID | Performed by: PSYCHOLOGIST

## 2023-06-05 ASSESSMENT — PATIENT HEALTH QUESTIONNAIRE - PHQ9
10. IF YOU CHECKED OFF ANY PROBLEMS, HOW DIFFICULT HAVE THESE PROBLEMS MADE IT FOR YOU TO DO YOUR WORK, TAKE CARE OF THINGS AT HOME, OR GET ALONG WITH OTHER PEOPLE: SOMEWHAT DIFFICULT
SUM OF ALL RESPONSES TO PHQ QUESTIONS 1-9: 5
SUM OF ALL RESPONSES TO PHQ QUESTIONS 1-9: 5

## 2023-06-05 NOTE — PROGRESS NOTES
Answers for HPI/ROS submitted by the patient on 6/5/2023  If you checked off any problems, how difficult have these problems made it for you to do your work, take care of things at home, or get along with other people?: Somewhat difficult  PHQ9 TOTAL SCORE: 5               [facility]                                            Progress Note    Patient Name: Idalia Hinojosa  Date: 6/5/2023         Service Type: Individual      Session Start Time:  12:05  pm Session End Time: 12:35 pm  Session Length:  30 minutes    Session #: 100    Attendees: Client     Service Modality: Video visit: -       Provider verified identity through the following two step process.  Patient provided:  Patient is known previously to provider    Telemedicine Visit: The patient's condition can be safely assessed and treated via synchronous audio and visual telemedicine encounter.      Reason for Telemedicine Visit: Services only offered telehealth    Originating Site (Patient Location): Patient's home    Distant Site (Provider Location): Provider Remote Setting- Home Office    Consent:  The patient/guardian has verbally consented to: the potential risks and benefits of telemedicine (telephone visit) versus in person care; bill my insurance or make self-payment for services provided; and responsibility for payment of non-covered services.     Patient would like the video invitation sent by:  My Chart    Mode of Communication:  Video Conference via [employer],     As the provider I attest to compliance with applicable laws and regulations related to telemedicine.         Treatment Plan Last Reviewed: 06/05/2023  PHQ-9/DAVID-7: 6/5/2023          DATA  Interactive Complexity: No  Crisis: No        Progress Since Last Session (Related to Symptoms / Goals / Homework):   Symptoms:  Idalia reports an increase in feeling stressed over the past few weeks, notices herself moving more quickly, breathing is more shallow, and feeling  more tense and tight - and her experienced an increase in physical pain - pain and tightness in her back.         PHQ score of 5 today.      Homework: not completed to patient's satisfaction.   Has been focusing on practicing patience, slowing down, flexibility when things don't go as anticipated -       Episode of Care Goals: Satisfactory progress - ACTION (Actively working towards change); Intervened by reinforcing change plan / affirming steps taken.  The patient stated that her main goals for therapy would be to learn emotion regulation strategies (in particular, to help with when she is feeling upset/angry/frustrated/distressed).   As her  had been diagnosed with cancer, treatment also focused on providing her with support in managing the emotional distress and challenges related to this and caregiver stress.  As she has been making progress and reports improvement in mood, focus of treatment will shift to wellness planning (monitoring mood and symptoms, incorporating into routine what keeps her feeling well, review of triggers, how to manage set backs, etc.)     Current / Ongoing Stressors and Concerns:   Current:  Increase in stress - her  has made more stress, which has been hard on the household.  He is close to six months post transplant.  Her daughter will be starting soccer, they are anticipating changes to the evening routine and incorporating this.  She has noticed an increase in tension/tightness in her back, after having felt better previously in regards to her physical pain.         Ongoing:s, Working on building effective emotion regulation strategies for managing daily frustrations and upsets and marital discord due to differences in parenting styles.       Treatment Objective(s) Addressed in This Session:     Psychoeducation on mind-body connection  Psychoeducation on calming/deep breathing strategies       Continue with/review of:  Build and practice distress tolerance/frustration  "management strategies   Emotion regulation - identify and practice strategies to help manage emotional distress more effectively  Continue to incorporate 1-2 behavioral activation strategies to help maintain goal of incorporating physical activity into routine  Review of self-care and importance of attending to your self-care  Practice balanced thinking- challenge polarized thinking   Psychoeducation on mindfulness strategies - practicing a non-judgmental and compassionate stance towards self and others   Learn and practice distress tolerance skills-  self-soothe strategy using the five senses   Practice deep breathing  - practice at regular intervals throughout the day      Intervention:  CBT and DBT:  We talked about the mind-body connection and encouraged her continued work with her physical therapist to help her with her pain, as well as attending to her emotional well-being and cognitive and behavioral factors that can influence her pain.  She reported that she notices when she is feeling more stress, that she tends to experience more physical pain.  We talked about the things she has been \"carrying\" over this past year with her 's cancer diagnosis, his transplant procedure, and all the additional caregiver demands and stresses she experienced.  Encouraged her continued awareness of what she needs emotionally to take care of herself, and her recognition of what helps and what hurts.  She noticed that recently she has been rushing more, tends to breathe more shallow, and feel more tightness in her body.   Suggested that she continue to practice mindful awareness of when this is happening to allow herself to choose a more helpful response - practiced a deep breathing strategy in session, which she reported was helpful and generally helps her to redirect in a positive way when she is feeling rushed.      She also reflected that recently, she has noticed that when she exercises, she tends to do so in an \"all " "or nothing\" fashion - going all out and overdoing it, then needing to retreat to \"doing nothing\" to recover.  Encouraged she worked with her PT on finding the \"pacing\" they would recommend.  We talked about finding the \"dimmer switch\" in terms of how she approaches things, including how she moves about her day (when is she rushing when she doesn't need to?  What's in-between nothing and rushing?  What pace or speed is most helpful?  What does she need?)  Recognizing her \"automatic\" reaction and pausing - asking herself - what is most helpful right now?  Would it help me if I shifted gears?       ASSESSMENT: Current Emotional / Mental Status (status of significant symptoms):   Risk status (Self / Other harm or suicidal ideation)   Patient denies current fears or concerns for personal safety.   Patient denies current or recent suicidal ideation or behaviors.  She reports she has not had any more passive suicidal thoughts.  She reports if the thoughts do occur, she knows steps she can take to manage them and believes she can manage them, and reach out for additional help if needed.     Patientdenies current or recent homicidal ideation or behaviors.   Patient denies current or recent self injurious behavior or ideation.   Patient denies other safety concerns.   Patient reports there has not been a change in risk factors since their last session -      Recommended that patient call 911 or go to the local ED should there be a change in any of these risk factors.  We reviewed how to contact [facility] crisis/COPE for urgent/crisis concerns 24/7.  Provided patient with crisis resources and she agrees to use safety plan should any safety concerns arise.      Professionals or agencies I can contact during a crisis:  ? Suicide Prevention Lifeline: call or text 988  ? Crisis Text Line: text \"[place]\" to 078190    Local Crisis Services: [facility] Crisis Services: [phone]    Call 911 or go to my nearest emergency " "department, or [employer].         Appearance:   Appropriate    Eye Contact:   Good    Psychomotor Behavior: Normal   Attitude:   Cooperative    Orientation:   All   Speech    Rate / Production: Normal     Volume:  Normal    Mood:    She reports an increase in feeling stressed over the past few weeks   Affect:    Congruent with mood   Thought Content:  Clear    Thought Form:  Coherent  Logical    Insight:    Good      Medication Review:   No changes.         Medication Compliance:   Yes      Changes in Health Issues:    Saw Dr. Han on 5/30 for her physical.  Increase in back pain recently, continues to work with PT to help with this.  .         Chemical Use Review:  No alcohol use other than had one drink the other evening during a night out with her .    Substance Use: Chemical use reviewed, no changes or active concerns identified. She said she has been avoiding any alcohol use.     Tobacco Use: No current tobacco use.       Diagnosis:  Anxiety disorder unspecified  Major depression, recurrent episode, moderate    Will continue to monitor symptoms in response to witnessing  experience medical emergency and assist patient with processing event, self-care, and symptom management.  She reports she has not had any additional episodes of intense panic or anxiety and appears to not be experiencing any on-going or long-term symptoms as a result of this.      No nightmares, no flashbacks, no instrusive thoughts,   Things better -         PLAN: (Patient Tasks / Therapist Tasks / Other)    1) Idalia identified the following goals: Find the \"dimmer switch\", keep reminding myself its ok to slow down and I don't have to be in a hurry.  (be aware of your \"automatic reaction\" and giving yourself permission to pause and consider if it would be helpful to choose a different reaction.  Practice deep breathing to help with calming and re-centering)    Continue to do things on a regular basis that help me to feel well " and continue to take regular time to re-charge.       Notice how I'm feeling, take time to slow down, think about how I'm feeling and what I need before I respond.       Use strategies to help manage anxiety.      2) If there is a crisis situation, remember you are not alone and that there are people who can help you twenty-four hours a day, seven days a week.  Call [facility] ([facility]): [phone].      3) Check-in scheduled for June 22nd at 12:00 pm.   If urgent or crisis concerns arise prior to next scheduled appointment, please reach out to crisis resources, call 911, or go to the emergency department.  I informed Idalia that I will be out of office 6/7-6/19.  Reviewed steps to take if she needs assistance while I'm out of the office.      Cristy Wilkinson PsyD,   Licensed Psychologist  6/5/2023                                                   ____________________________________    Treatment Plan     Patient's Name: Idalia Hinojosa                        YOB: 1980     Date of Creation: 1/6/2020  Date Treatment Plan Last Reviewed/Revised: 6/5/2023       DSM5 Diagnoses: 300.00 (F41.9) Unspecified Anxiety Disorder, major depression, moderate, recurrent episode  Psychosocial / Contextual Factors: strain in marital relationship, parenting challenges, adjustment challenges, 's cancer diagnosis  PROMIS (reviewed every 90 days):      Anticipated number of session for this episode of care: additional 15-20  Anticipation frequency of session: Sessions have been decreased to bi-weekly or once every three weeks due to progress with treatment goals.   Anticipated Duration of each session: 38-52 minutes  Treatment plan will be reviewed in 90 days or when goals have been changed.         Referral / Collaboration:  No referrals at this time.  Have previously recommended couples therapy referral.  Coordinate with Dr. Han, patient's PCP.        MeasurableTreatment Goal(s)  related to diagnosis / functional impairment(s)  Goal 1: Patient will learn emotion regulation strategies to help when she is feeling upset/angry/frustrated/distressed    I will know I've met my goal when I would yell less, I would feel calmer, and I would not push others.  I would be less physical when I'm angry (e.g. wouldn't slam doors or hit things)        Objective #A (Patient Action)                          Patient will learn and practice at least 2 new emotion regulation strategies.  Status: Continued - Date(s):    6/5/2023- Regularly practicing emotion regulation strategies to help manage increase in emotional distress related to spouse's cancer diagnosis.  She has been successful with using strategies to help her stay calm when her daughter's experiencing heightened emotion and/or challenging behaviors.          Intervention(s)  Therapist will provide psychoeducation on emotion regulation module from DBT and will assign patient homework to help reinforce skill development.     Objective #B  Patient will identify factors that increase vulnerability to emotion mind and identify ways to decrease vulnerability to emotion mind.  Status: Continued - Date(s):6/5/2023 - routinely incorporating mindfulness, distress tolerance and self-awareness strategies to increase attention and focus to factors that increase vulnerability to emotion mind     Intervention(s)  Therapist will provide information on factors that increase vulnerabiliyt to emotion mind      Objective #C  Patient will identify warning signs and signals that emotions are escalating and will learn ways to skillfully intervene early on.  Status: Date: 6/5/2023- in progress - has demonstrated good progress in being able to identify warning signs and intervene skillfully-working on incorporating deep breathing to help with calming and re-centering.         Intervention(s)  Therapist will help patient identify warning signs and signals, and will guide the  patient in identifying skillful ways to intervene when exeriencing warning signs and signals.      Objective #B (Patient Action)                          Status: NEW - Date(s):6/5/2023- Patient will learn mind-body connection, healthy ways to manage stress, calming strategies, and ways she can address cognitive and behavioral factors that can contribute to pain experience.        Patient will learn and practice at least two strategies that help improve her emotional well-being.     Intervention(s)  Therapist will teach calming strategies and healthy stress management strategies, and will assign homework to help reinforce skill development.             Goal 2: Patient will learn new parenting strategies to help with managing parenting challenges.  Parent will work on improving relationship with her daughter and defining what she would like this relationship to look like.      I will know I've met my goal when I'm enjoying time with my daughter, teaching her new things, and not waiting for things to change       Objective #A (Patient Action)                          Status: Continued - Date(s):6/5/2023       Patient will increase understanding of developmental norms for her daughter and ways to manage challenging behaviors.     Intervention(s)  Therapist will provide psychoeducation on developmental norms and parenting strategies.     Objective #B  Patient will spend time reflecting on her relationship with her daughter and defining what she would like this relaitonship to look like.                  Status: Continued - Date(s):6/5/2023   Making good progress - has also been working with daughter's therapist and has been actively practicing and using suggested strategies.           Intervention(s)  Therapist will guide the patient in reflecting upon her relationship with her daughter and help her define ways to move towards what she vaues in her relationship with her daughter.     Objective #C  Patient will increase  "time spent on fun activity and play with her daughter.  Status: Continued - Date(s):6/5/2023-has been enjoying time with daughter and devoting regular time to fun activities and play together           Intervention(s)  Therapist will guide the patient in identifying ways she can increase positive time with her daughter.  Therapist will also teach mindfulness strategies to help patient with being in the present moment when appropriate - .        Goal 3: Patiient will improve communication with her .      I will know I've met my goal when I feel less irritated with my  and communicate more openly with my .  I would like to be more assertive and less aggressive.   I would like to not avoid telling him things because I'm worried about his reaction or don't think he will react well.  I would like to be more present to the moment during times when this would be helpful.\"       Objective #A (Patient Action)                          Status: Continued - Date(s):6/5/2023- continues to work on this goal, though emphasis has shift to managing caregiver stress related to 's cancer diagnosis and treatment.              Patient will learn and practice at least two new interpersonal effectiveness strategies.     Intervention(s)  Therapist will teach strategies from DBT module on interpersonal effectiveness, and will assign homework to help reinforce skill development.            Patient has reviewed and agreed to the above plan.        Cristy Wilkinson PsyD, LP  Licensed Psychologist  Reviewed on 6/5/2023                                                                                                                                                                                                                                                                                    "

## 2023-06-06 ENCOUNTER — THERAPY VISIT (OUTPATIENT)
Dept: OCCUPATIONAL THERAPY | Facility: CLINIC | Age: 43
End: 2023-06-06
Payer: COMMERCIAL

## 2023-06-06 DIAGNOSIS — M79.644 CHRONIC PAIN OF RIGHT THUMB: Primary | ICD-10-CM

## 2023-06-06 DIAGNOSIS — G89.29 CHRONIC PAIN OF RIGHT THUMB: Primary | ICD-10-CM

## 2023-06-06 PROCEDURE — 97110 THERAPEUTIC EXERCISES: CPT | Mod: GO

## 2023-06-06 PROCEDURE — 97035 APP MDLTY 1+ULTRASOUND EA 15: CPT | Mod: GO

## 2023-06-06 PROCEDURE — 97140 MANUAL THERAPY 1/> REGIONS: CPT | Mod: GO

## 2023-06-09 ENCOUNTER — THERAPY VISIT (OUTPATIENT)
Dept: PHYSICAL THERAPY | Facility: CLINIC | Age: 43
End: 2023-06-09
Payer: COMMERCIAL

## 2023-06-09 DIAGNOSIS — M62.838 TRAPEZIUS MUSCLE SPASM: ICD-10-CM

## 2023-06-09 DIAGNOSIS — M25.511 PAIN IN JOINT OF RIGHT SHOULDER: ICD-10-CM

## 2023-06-09 DIAGNOSIS — M54.2 NECK PAIN ON RIGHT SIDE: Primary | ICD-10-CM

## 2023-06-09 DIAGNOSIS — M25.511 ACUTE PAIN OF RIGHT SHOULDER: ICD-10-CM

## 2023-06-09 PROCEDURE — 97140 MANUAL THERAPY 1/> REGIONS: CPT | Mod: GP | Performed by: PHYSICAL THERAPIST

## 2023-06-09 PROCEDURE — 97110 THERAPEUTIC EXERCISES: CPT | Mod: GP | Performed by: PHYSICAL THERAPIST

## 2023-06-13 DIAGNOSIS — F32.81 PMDD (PREMENSTRUAL DYSPHORIC DISORDER): ICD-10-CM

## 2023-06-15 RX ORDER — NORETHINDRONE ACETATE AND ETHINYL ESTRADIOL 1; 20 MG/1; UG/1
TABLET ORAL
Qty: 84 TABLET | Refills: 6 | OUTPATIENT
Start: 2023-06-15

## 2023-06-15 NOTE — TELEPHONE ENCOUNTER
"Requested Prescriptions   Pending Prescriptions Disp Refills     JUNEL 1/20 1-20 MG-MCG tablet [Pharmacy Med Name: JUNEL 1 MG-20 MCG TABLET] 84 tablet 6     Sig: TAKE 1 TABLET BY MOUTH DAILY. TAKE ACTIVE TABLETS ONLY FOR PREVENTION OF MENSES       Contraceptives Protocol Passed - 6/15/2023  7:18 AM        Passed - Patient is not a current smoker if age is 35 or older        Passed - Recent (12 mo) or future (30 days) visit within the authorizing provider's specialty     Patient has had an office visit with the authorizing provider or a provider within the authorizing providers department within the previous 12 mos or has a future within next 30 days. See \"Patient Info\" tab in inbasket, or \"Choose Columns\" in Meds & Orders section of the refill encounter.              Passed - Medication is active on med list        Passed - No active pregnancy on record        Passed - No positive pregnancy test in past 12 months           Last Written Prescription Date:  5/30/23  Last Fill Quantity: 112,  # refills: 4   Last office visit: 5/30/23 with Dr. Han  Future Office Visit:    None    Refill request refused due to:  [x]Refills available at pharmacy. Request sent back to pharmacy as duplicate.  []Patient reports no longer needing medication.  []Medication discontinued.     New prescription sent to pharmacy on 5/30  Reyna Monroy RN on 6/15/2023 at 9:26 AM        "

## 2023-06-19 ENCOUNTER — THERAPY VISIT (OUTPATIENT)
Dept: OCCUPATIONAL THERAPY | Facility: CLINIC | Age: 43
End: 2023-06-19
Payer: COMMERCIAL

## 2023-06-19 ENCOUNTER — THERAPY VISIT (OUTPATIENT)
Dept: PHYSICAL THERAPY | Facility: CLINIC | Age: 43
End: 2023-06-19
Payer: COMMERCIAL

## 2023-06-19 DIAGNOSIS — M25.511 PAIN IN JOINT OF RIGHT SHOULDER: ICD-10-CM

## 2023-06-19 DIAGNOSIS — G89.29 CHRONIC PAIN OF RIGHT THUMB: Primary | ICD-10-CM

## 2023-06-19 DIAGNOSIS — M79.644 CHRONIC PAIN OF RIGHT THUMB: Primary | ICD-10-CM

## 2023-06-19 DIAGNOSIS — M54.2 NECK PAIN ON RIGHT SIDE: Primary | ICD-10-CM

## 2023-06-19 PROCEDURE — 97140 MANUAL THERAPY 1/> REGIONS: CPT | Mod: GP | Performed by: PHYSICAL THERAPIST

## 2023-06-19 PROCEDURE — 97140 MANUAL THERAPY 1/> REGIONS: CPT | Mod: GO | Performed by: OCCUPATIONAL THERAPIST

## 2023-06-19 PROCEDURE — 97760 ORTHOTIC MGMT&TRAING 1ST ENC: CPT | Mod: GO | Performed by: OCCUPATIONAL THERAPIST

## 2023-06-19 PROCEDURE — 97035 APP MDLTY 1+ULTRASOUND EA 15: CPT | Mod: GO | Performed by: OCCUPATIONAL THERAPIST

## 2023-06-19 PROCEDURE — 97110 THERAPEUTIC EXERCISES: CPT | Mod: GP | Performed by: PHYSICAL THERAPIST

## 2023-06-21 ENCOUNTER — ANCILLARY PROCEDURE (OUTPATIENT)
Dept: GENERAL RADIOLOGY | Facility: CLINIC | Age: 43
End: 2023-06-21
Attending: FAMILY MEDICINE
Payer: COMMERCIAL

## 2023-06-21 ENCOUNTER — OFFICE VISIT (OUTPATIENT)
Dept: ORTHOPEDICS | Facility: CLINIC | Age: 43
End: 2023-06-21
Payer: COMMERCIAL

## 2023-06-21 VITALS — BODY MASS INDEX: 27.97 KG/M2 | HEIGHT: 66 IN | WEIGHT: 174 LBS

## 2023-06-21 DIAGNOSIS — M54.12 CERVICAL RADICULOPATHY: ICD-10-CM

## 2023-06-21 DIAGNOSIS — M54.2 NECK PAIN: ICD-10-CM

## 2023-06-21 DIAGNOSIS — S46.911A MUSCLE STRAIN OF RIGHT SCAPULAR REGION, INITIAL ENCOUNTER: ICD-10-CM

## 2023-06-21 DIAGNOSIS — M54.12 CERVICAL RADICULOPATHY: Primary | ICD-10-CM

## 2023-06-21 DIAGNOSIS — G89.29 CHRONIC PAIN OF BOTH SHOULDERS: ICD-10-CM

## 2023-06-21 DIAGNOSIS — M25.511 CHRONIC PAIN OF BOTH SHOULDERS: ICD-10-CM

## 2023-06-21 DIAGNOSIS — M25.512 CHRONIC PAIN OF BOTH SHOULDERS: ICD-10-CM

## 2023-06-21 DIAGNOSIS — S46.819A: ICD-10-CM

## 2023-06-21 DIAGNOSIS — S46.912A MUSCLE STRAIN OF LEFT SCAPULAR REGION, INITIAL ENCOUNTER: ICD-10-CM

## 2023-06-21 PROCEDURE — 99214 OFFICE O/P EST MOD 30 MIN: CPT | Performed by: FAMILY MEDICINE

## 2023-06-21 PROCEDURE — 72040 X-RAY EXAM NECK SPINE 2-3 VW: CPT | Mod: TC | Performed by: RADIOLOGY

## 2023-06-21 RX ORDER — CELECOXIB 100 MG/1
100 CAPSULE ORAL 2 TIMES DAILY PRN
Qty: 60 CAPSULE | Refills: 0 | Status: SHIPPED | OUTPATIENT
Start: 2023-06-21 | End: 2023-10-02

## 2023-06-21 RX ORDER — METHYLPREDNISOLONE 4 MG
TABLET, DOSE PACK ORAL
Qty: 21 TABLET | Refills: 0 | Status: SHIPPED | OUTPATIENT
Start: 2023-06-21 | End: 2023-08-10

## 2023-06-21 RX ORDER — TIZANIDINE 2 MG/1
2 TABLET ORAL 2 TIMES DAILY PRN
Qty: 30 TABLET | Refills: 0 | Status: SHIPPED | OUTPATIENT
Start: 2023-06-21 | End: 2024-02-21

## 2023-06-21 NOTE — PATIENT INSTRUCTIONS
1. Cervical radiculopathy    2. Chronic pain of both shoulders    3. Neck pain    4. Muscle strain of left scapular region, initial encounter    5. Muscle strain of right scapular region, initial encounter    6. Strain of levator scapulae muscle, initial encounter      -Patient has chronic neck and upper back pains due to muscle strains and spasms that are triggered by both physical and emotional stressors  -X-rays taken office today show no bone or spinal abnormalities  -Patient will switch the focus of her physical therapy from her shoulders to the upper back and shoulder blades  -Patient will start an oral steroid taper.  Once complete, she will continue with Celebrex as needed.  Patient will also start tizanidine 2 mg at nighttime for muscle spasms and strain.  -Patient may consider manual therapy and acupuncture.  I would recommend Jayla Hathaway in [place].  You can call [phone].  -Patient will follow up if pain does not improve  -Call direct clinic number [[phone]] at any time with questions or concerns.    Albert Yeo MD CAQSM  [facility]  [facility] [place]

## 2023-06-21 NOTE — PROGRESS NOTES
ASSESSMENT & PLAN  Patient Instructions     1. Cervical radiculopathy    2. Chronic pain of both shoulders    3. Neck pain    4. Muscle strain of left scapular region, initial encounter    5. Muscle strain of right scapular region, initial encounter    6. Strain of levator scapulae muscle, initial encounter      -Patient has chronic neck and upper back pains due to muscle strains and spasms that are triggered by both physical and emotional stressors  -X-rays taken office today show no bone or spinal abnormalities  -Patient will switch the focus of her physical therapy from her shoulders to the upper back and shoulder blades  -Patient will start an oral steroid taper.  Once complete, she will continue with Celebrex as needed.  Patient will also start tizanidine 2 mg at nighttime for muscle spasms and strain.  -Patient may consider manual therapy and acupuncture.  I would recommend Jayla Hathaway in [place].  You can call [phone].  -Patient will follow up if pain does not improve  -Call direct clinic number [[phone]] at any time with questions or concerns.    Albert Yeo MD [facility] Orthoped[facility] and Sports Medicine  [facility]          -----    SUBJECTIVE:  Idalia Hinojosa is a 43 year old female who is seen in follow-up for bilateral shoulder pain. They were last seen by Dr. Marcos on 1/13/23.     Since their last visit reports 0% - (About the same as last time). She reports pain has changed since her last visit. She notes pain has moved around. Today she reports pain in right posterior shoulder and left superior shoulder. Patient has also experienced has also experienced pain in bilateral posterior shoulders closer to midline spine, bilateral axilla and bilateral neck. She has previously experienced numbness and tingling radiating down bilateral arms. They indicate that their current pain level is 2-3/10 and at worst is 8-9/10. They have tried rest/activity avoidance,  "other medications: Cyclobenzaprine (Flexeril) - takes PRN - helps with sleep  and Diclofenac 50mg - unsure if it provided relief, home exercises, physical therapy (18 visits) and heat, TENS unit - provides temporary relief.      The patient is seen by themselves.    Patient's past medical, surgical, social, and family histories were reviewed today and no changes are noted.    REVIEW OF SYSTEMS:  Constitutional: NEGATIVE for fever, chills, change in weight  Skin: NEGATIVE for worrisome rashes, moles or lesions  GI/: NEGATIVE for bowel or bladder changes  Neuro: NEGATIVE for weakness, dizziness or paresthesias    OBJECTIVE:  Ht 1.676 m (5' 6\")   Wt 78.9 kg (174 lb)   BMI 28.08 kg/m     General: healthy, alert and in no distress  HEENT: no scleral icterus or conjunctival erythema  Skin: no suspicious lesions or rash. No jaundice.  CV: regular rhythm by palpation, no pedal edema  Resp: normal respiratory effort without conversational dyspnea   Psych: normal mood and affect  Gait: normal steady gait with appropriate coordination and balance  Neuro: normal light touch sensory exam of the extremities.    MSK:  CERVICAL SPINE  Inspection:    No redness, swelling, ecchymosis, overlying skin change, or gross deformity/asymmetry, normal cervical lordosis present  Palpation:    Tender about the paracervical musculature (bilateral), levator scapula (bilateral), upper trapezius (bilateral), lower trapezius (both), rhomboids (bilateral) and medial border of scapula. Otherwise remainder of the landmarks and nontender.  Range of Motion:     Flexion within normal limits    Extension within normal limits    Right side bend within normal limits    Left side bend within normal limits    Right rotation within normal limits    Left rotation within normal limits  Strength:    Full strength throughout all neck muscles  Special Tests:    Positive: None    Negative: Spurling's (bilateral), Ballard's (bilateral)    Independent " visualization of the below image:    X-rays taken in office today of the cervical spine show no acute fracture, listhesis, scoliosis or intervertebral joint space narrowing    Albert Yeo MD, [facility]

## 2023-06-21 NOTE — LETTER
6/21/2023         RE: Idalia Hinojosa  [address]  [place] [address]        Dear Colleague,    Thank you for referring your patient, Idalia Hinojosa, to the [facility] [place]. Please see a copy of my visit note below.    ASSESSMENT & PLAN  Patient Instructions     1. Cervical radiculopathy    2. Chronic pain of both shoulders    3. Neck pain    4. Muscle strain of left scapular region, initial encounter    5. Muscle strain of right scapular region, initial encounter    6. Strain of levator scapulae muscle, initial encounter      -Patient has chronic neck and upper back pains due to muscle strains and spasms that are triggered by both physical and emotional stressors  -X-rays taken office today show no bone or spinal abnormalities  -Patient will switch the focus of her physical therapy from her shoulders to the upper back and shoulder blades  -Patient will start an oral steroid taper.  Once complete, she will continue with Celebrex as needed.  Patient will also start tizanidine 2 mg at nighttime for muscle spasms and strain.  -Patient may consider manual therapy and acupuncture.  I would recommend Jayla Hathaway in [place].  You can call [phone].  -Patient will follow up if pain does not improve  -Call direct clinic number [[phone]] at any time with questions or concerns.    Albert Yeo MD [facility] Orthopedics and Sports Medicine  [facility] [place]          -----    SUBJECTIVE:  Idalia Hinojosa is a 43 year old female who is seen in follow-up for bilateral shoulder pain. They were last seen by Dr. Marcos on 1/13/23.     Since their last visit reports 0% - (About the same as last time). She reports pain has changed since her last visit. She notes pain has moved around. Today she reports pain in right posterior shoulder and left superior shoulder. Patient has also experienced has also experienced pain in bilateral posterior  "shoulders closer to midline spine, bilateral axilla and bilateral neck. She has previously experienced numbness and tingling radiating down bilateral arms. They indicate that their current pain level is 2-3/10 and at worst is 8-9/10. They have tried rest/activity avoidance, other medications: Cyclobenzaprine (Flexeril) - takes PRN - helps with sleep  and Diclofenac 50mg - unsure if it provided relief, home exercises, physical therapy (18 visits) and heat, TENS unit - provides temporary relief.      The patient is seen by themselves.    Patient's past medical, surgical, social, and family histories were reviewed today and no changes are noted.    REVIEW OF SYSTEMS:  Constitutional: NEGATIVE for fever, chills, change in weight  Skin: NEGATIVE for worrisome rashes, moles or lesions  GI/: NEGATIVE for bowel or bladder changes  Neuro: NEGATIVE for weakness, dizziness or paresthesias    OBJECTIVE:  Ht 1.676 m (5' 6\")   Wt 78.9 kg (174 lb)   BMI 28.08 kg/m     General: healthy, alert and in no distress  HEENT: no scleral icterus or conjunctival erythema  Skin: no suspicious lesions or rash. No jaundice.  CV: regular rhythm by palpation, no pedal edema  Resp: normal respiratory effort without conversational dyspnea   Psych: normal mood and affect  Gait: normal steady gait with appropriate coordination and balance  Neuro: normal light touch sensory exam of the extremities.    MSK:  CERVICAL SPINE  Inspection:    No redness, swelling, ecchymosis, overlying skin change, or gross deformity/asymmetry, normal cervical lordosis present  Palpation:    Tender about the paracervical musculature (bilateral), levator scapula (bilateral), upper trapezius (bilateral), lower trapezius (both), rhomboids (bilateral) and medial border of scapula. Otherwise remainder of the landmarks and nontender.  Range of Motion:     Flexion within normal limits    Extension within normal limits    Right side bend within normal limits    Left side bend " within normal limits    Right rotation within normal limits    Left rotation within normal limits  Strength:    Full strength throughout all neck muscles  Special Tests:    Positive: None    Negative: Spurling's (bilateral), Ballard's (bilateral)    Independent visualization of the below image:    X-rays taken in office today of the cervical spine show no acute fracture, listhesis, scoliosis or intervertebral joint space narrowing    Albert Yeo MD, [facility]            Again, thank you for allowing me to participate in the care of your patient.        Sincerely,        Albert Yeo, MD

## 2023-06-22 ENCOUNTER — VIRTUAL VISIT (OUTPATIENT)
Dept: PSYCHOLOGY | Facility: CLINIC | Age: 43
End: 2023-06-22
Payer: COMMERCIAL

## 2023-06-22 DIAGNOSIS — F41.9 ANXIETY DISORDER, UNSPECIFIED TYPE: Primary | ICD-10-CM

## 2023-06-22 DIAGNOSIS — F33.1 MODERATE EPISODE OF RECURRENT MAJOR DEPRESSIVE DISORDER (H): ICD-10-CM

## 2023-06-22 PROCEDURE — 90832 PSYTX W PT 30 MINUTES: CPT | Mod: VID | Performed by: PSYCHOLOGIST

## 2023-06-22 NOTE — PROGRESS NOTES
[facility]                                            Progress Note    Patient Name: Idalia Hinojosa  Date: 6/22/2023         Service Type: Individual      Session Start Time:  12:07  pm Session End Time: 12:30 pm  Session Length:  23 minutes    Session #: 101    Attendees: Client     Service Modality: Video visit: -       Provider verified identity through the following two step process.  Patient provided:  Patient is known previously to provider    Telemedicine Visit: The patient's condition can be safely assessed and treated via synchronous audio and visual telemedicine encounter.      Reason for Telemedicine Visit: Services only offered telehealth    Originating Site (Patient Location): Patient's home    Distant Site (Provider Location): Provider Remote Setting- Home Office    Consent:  The patient/guardian has verbally consented to: the potential risks and benefits of telemedicine (telephone visit) versus in person care; bill my insurance or make self-payment for services provided; and responsibility for payment of non-covered services.     Patient would like the video invitation sent by:  My Chart    Mode of Communication:  Video Conference via [employer],     As the provider I attest to compliance with applicable laws and regulations related to telemedicine.         Treatment Plan Last Reviewed: 06/05/2023  PHQ-9/DAVID-7: 6/5/2023          DATA  Interactive Complexity: No  Crisis: No        Progress Since Last Session (Related to Symptoms / Goals / Homework):   Symptoms:  Idalia reports she notices feeling triggered by things that remind her of witnessing her 's medical event at the hospital.  She reports that this has also led to avoidance of things that remind her of this, including his appointments and medical buildings.  She reported she continues to have physical pain and tension in her neck/shoulders/back area, and is working with PT to address this.         PHQ  score of 5 on 6/5.      Homework:  - she reported she forgot about what she was going to work on - she had COVID and was quarantined at home, but also was able to slow down and relax more because of this, which was good for her.        Episode of Care Goals: Satisfactory progress - ACTION (Actively working towards change); Intervened by reinforcing change plan / affirming steps taken.  The patient stated that her main goals for therapy would be to learn emotion regulation strategies (in particular, to help with when she is feeling upset/angry/frustrated/distressed).   As her  had been diagnosed with cancer, treatment also focused on providing her with support in managing the emotional distress and challenges related to this and caregiver stress.  Current focus has been shifting to managing symptoms and emotional reaction to witnessing her  experience a serious medical event.       Current / Ongoing Stressors and Concerns:   Current:  Symptoms of depression, anxiety, stress and physical pain.  Symptoms, including feeling triggered by things that remind her of witnessing medical event her  experienced, and avoidance of things that remind her of medical episode he experienced.           Ongoing:s, Working on building effective emotion regulation strategies for managing daily frustrations and upsets and marital discord due to differences in parenting styles.       Treatment Objective(s) Addressed in This Session:     Psychoeducation on mind-body connection  Psychoeducation on calming/deep breathing strategies   Further assessment of symptoms related to traumatic event and psychoed on impact of witnessing a traumatic event, options for further treatment      Continue with/review of:  Build and practice distress tolerance/frustration management strategies   Emotion regulation - identify and practice strategies to help manage emotional distress more effectively  Continue to incorporate 1-2 behavioral  "activation strategies to help maintain goal of incorporating physical activity into routine  Review of self-care and importance of attending to your self-care  Practice balanced thinking- challenge polarized thinking   Psychoeducation on mindfulness strategies - practicing a non-judgmental and compassionate stance towards self and others   Learn and practice distress tolerance skills-  self-soothe strategy using the five senses   Practice deep breathing  - practice at regular intervals throughout the day      Intervention:  CBT:  Idalia shared further today about recognizing that she feels triggered by things that remind her of witnessing the medical episode her  had in the hospital, and that his has led to avoidance of things that remind her of this, including attending his appointments and going into certain medical buildings.  She reported that her  will also say things like \"don't get tense when I tell you this\", which often results in her feeling more tension and pain.  Provided psychoeducation on impact of witnessing a traumatic event on the body and mind, and suggested that additional sessions focused more specifically on helping her to learn and practice grounding and containment strategies, further processing of the trauma and healthy expression of emotions related to traumatic event, improve coping and emotion regulation.  She said she would be interested in focus on this. She shared a few experiences she has related to this - feeling anger that \"I let myself get kinked up\" (physically) when she witnessed the event.  She looks back and thinks she was not helpful or supportive during the event and that she herself felt physically and emotionally wiped out.  She also minimizes and invalidates her emotional experience related to witnessing the medical event - comparing herself to her  who survived cancer and feels badly that she has struggled emotionally, tells herself things like \"but I " "didn't have cancer\".  We will spend more time in future sessions exploring her experiences, helping her to look at meaning she has given and identifying meaning that may benefit from correcting unhelpful, negative or distorted thinking.  Validated her emotional experience for what she experienced, normalized how scary and  painful it must have been to witness what she did, and normalized the impact it can have on the mind and body when witnessing a traumatic event.  Encouraged compassionate and kind self-talk, (what she would say to a friend), and see this as an opportunity for her to experience healing, and get treatment, to help her with what she experienced.          ASSESSMENT: Current Emotional / Mental Status (status of significant symptoms):   Risk status (Self / Other harm or suicidal ideation)   Patient denies current fears or concerns for personal safety.   Patient denies current or recent suicidal ideation or behaviors.  She reports she has not had any more passive suicidal thoughts.  She reports if the thoughts do occur, she knows steps she can take to manage them and believes she can manage them, and reach out for additional help if needed.     Patientdenies current or recent homicidal ideation or behaviors.   Patient denies current or recent self injurious behavior or ideation.   Patient denies other safety concerns.   Patient reports there has not been a change in risk factors since their last session -      Recommended that patient call 911 or go to the local ED should there be a change in any of these risk factors.  We reviewed how to contact [facility] crisis/COPE for urgent/crisis concerns 24/7.  Provided patient with crisis resources and she agrees to use safety plan should any safety concerns arise.      Professionals or agencies I can contact during a crisis:  ? Suicide Prevention Lifeline: call or text 584  ? Crisis Text Line: text \"[place]\" to 434356    Local Crisis Services: [facility] " "Crisis Services: [phone]    Call 911 or go to my nearest emergency department, or [facility].         Appearance:   Appropriate    Eye Contact:   Good    Psychomotor Behavior: Normal   Attitude:   Cooperative    Orientation:   All   Speech    Rate / Production: Normal     Volume:  Normal    Mood:    Anxious   Affect:    Congruent with mood   Thought Content:  Clear    Thought Form:  Coherent  Logical    Insight:    Good      Medication Review:   No changes to psychiatric medication (she reported she has been started on a steroid to help with physical pain)      Medication Compliance:   Yes      Changes in Health Issues:    Continues to have back pain and continues to work with PT to help with this.  .         Chemical Use Review:  Occasional alcoholic beverage on social occasions, she reports she is able to enjoy and isn't experiencing any negative impacts on her mood, like she has in the past when feeling more depressed.     Substance Use: Chemical use reviewed, no changes or active concerns identified.      Tobacco Use: No current tobacco use.       Diagnosis:  Anxiety disorder unspecified  Major depression, recurrent episode, moderate  Rule out Post-Traumatic Stress Disorder versus Adjustment Disorder     Will continue to monitor symptoms in response to witnessing  experience medical emergency and assist patient with processing event, self-care, and symptom management.          PLAN: (Patient Tasks / Therapist Tasks / Other)    1) Idalia identified the following goals: Use strategies to help manage anxiety.  Continue to do things on a regular basis that help me to feel well and continue to take regular time to re-charge.  Notice how I'm feeling, take time to slow down, think about how I'm feeling and what I need before I respond.      Find the \"dimmer switch\", keep reminding myself its ok to slow down and I don't have to be in a hurry.  (be aware of your \"automatic reaction\" and giving yourself " permission to pause and consider if it would be helpful to choose a different reaction.  Practice deep breathing to help with calming and re-centering)     2) If there is a crisis situation, remember you are not alone and that there are people who can help you twenty-four hours a day, seven days a week.  Call [facility] ([facility] Crisis [facility]): [phone].      3) Check-in scheduled for 7/5 at 1:00 pm.   If urgent or crisis concerns arise prior to next scheduled appointment, please reach out to crisis resources, call 911, or go to the emergency department.  I informed Idalia that I will be out of office 6/7-6/19.  Reviewed steps to take if she needs assistance while I'm out of the office.      Cristy Wilkinson PsyD,   Licensed Psychologist  6/22/2023                                                   ____________________________________    Treatment Plan     Patient's Name: Idalia Hinojosa                        YOB: 1980     Date of Creation: 1/6/2020  Date Treatment Plan Last Reviewed/Revised: 6/5/2023       DSM5 Diagnoses: 300.00 (F41.9) Unspecified Anxiety Disorder, major depression, moderate, recurrent episode  Psychosocial / Contextual Factors: strain in marital relationship, parenting challenges, adjustment challenges, 's cancer diagnosis  PROMIS (reviewed every 90 days):      Anticipated number of session for this episode of care: additional 15-20  Anticipation frequency of session: Sessions have been decreased to bi-weekly or once every three weeks due to progress with treatment goals.   Anticipated Duration of each session: 38-52 minutes  Treatment plan will be reviewed in 90 days or when goals have been changed.         Referral / Collaboration:  No referrals at this time.  Have previously recommended couples therapy referral.  Coordinate with Dr. Han, patient's PCP.        MeasurableTreatment Goal(s) related to diagnosis / functional impairment(s)  Goal 1: Patient will  learn emotion regulation strategies to help when she is feeling upset/angry/frustrated/distressed    I will know I've met my goal when I would yell less, I would feel calmer, and I would not push others.  I would be less physical when I'm angry (e.g. wouldn't slam doors or hit things)        Objective #A (Patient Action)                          Patient will learn and practice at least 2 new emotion regulation strategies.  Status: Continued - Date(s):    6/5/2023- Regularly practicing emotion regulation strategies to help manage increase in emotional distress related to spouse's cancer diagnosis.  She has been successful with using strategies to help her stay calm when her daughter's experiencing heightened emotion and/or challenging behaviors.          Intervention(s)  Therapist will provide psychoeducation on emotion regulation module from DBT and will assign patient homework to help reinforce skill development.     Objective #B  Patient will identify factors that increase vulnerability to emotion mind and identify ways to decrease vulnerability to emotion mind.  Status: Continued - Date(s):6/5/2023 - routinely incorporating mindfulness, distress tolerance and self-awareness strategies to increase attention and focus to factors that increase vulnerability to emotion mind     Intervention(s)  Therapist will provide information on factors that increase vulnerabiliyt to emotion mind      Objective #C  Patient will identify warning signs and signals that emotions are escalating and will learn ways to skillfully intervene early on.  Status: Date: 6/5/2023- in progress - has demonstrated good progress in being able to identify warning signs and intervene skillfully-working on incorporating deep breathing to help with calming and re-centering.         Intervention(s)  Therapist will help patient identify warning signs and signals, and will guide the patient in identifying skillful ways to intervene when exeriencing  warning signs and signals.      Objective #B (Patient Action)                          Status: NEW - Date(s):6/5/2023- Patient will learn mind-body connection, healthy ways to manage stress, calming strategies, and ways she can address cognitive and behavioral factors that can contribute to pain experience.        Patient will learn and practice at least two strategies that help improve her emotional well-being.     Intervention(s)  Therapist will teach calming strategies and healthy stress management strategies, and will assign homework to help reinforce skill development.             Goal 2: Patient will learn new parenting strategies to help with managing parenting challenges.  Parent will work on improving relationship with her daughter and defining what she would like this relationship to look like.      I will know I've met my goal when I'm enjoying time with my daughter, teaching her new things, and not waiting for things to change       Objective #A (Patient Action)                          Status: Continued - Date(s):6/5/2023       Patient will increase understanding of developmental norms for her daughter and ways to manage challenging behaviors.     Intervention(s)  Therapist will provide psychoeducation on developmental norms and parenting strategies.     Objective #B  Patient will spend time reflecting on her relationship with her daughter and defining what she would like this relaitonship to look like.                  Status: Continued - Date(s):6/5/2023   Making good progress - has also been working with daughter's therapist and has been actively practicing and using suggested strategies.           Intervention(s)  Therapist will guide the patient in reflecting upon her relationship with her daughter and help her define ways to move towards what she vaues in her relationship with her daughter.     Objective #C  Patient will increase time spent on fun activity and play with her daughter.  Status:  "Continued - Date(s):6/5/2023-has been enjoying time with daughter and devoting regular time to fun activities and play together           Intervention(s)  Therapist will guide the patient in identifying ways she can increase positive time with her daughter.  Therapist will also teach mindfulness strategies to help patient with being in the present moment when appropriate - .        Goal 3: Patiient will improve communication with her .      I will know I've met my goal when I feel less irritated with my  and communicate more openly with my .  I would like to be more assertive and less aggressive.   I would like to not avoid telling him things because I'm worried about his reaction or don't think he will react well.  I would like to be more present to the moment during times when this would be helpful.\"       Objective #A (Patient Action)                          Status: Continued - Date(s):6/5/2023- continues to work on this goal, though emphasis has shift to managing caregiver stress related to 's cancer diagnosis and treatment.              Patient will learn and practice at least two new interpersonal effectiveness strategies.     Intervention(s)  Therapist will teach strategies from DBT module on interpersonal effectiveness, and will assign homework to help reinforce skill development.            Patient has reviewed and agreed to the above plan.        Cristy Wilkinson PsyD, LP  Licensed Psychologist  Reviewed on 6/5/2023                "

## 2023-06-26 ENCOUNTER — THERAPY VISIT (OUTPATIENT)
Dept: OCCUPATIONAL THERAPY | Facility: CLINIC | Age: 43
End: 2023-06-26
Payer: COMMERCIAL

## 2023-06-26 DIAGNOSIS — M79.644 CHRONIC PAIN OF RIGHT THUMB: Primary | ICD-10-CM

## 2023-06-26 DIAGNOSIS — G89.29 CHRONIC PAIN OF RIGHT THUMB: Primary | ICD-10-CM

## 2023-06-26 PROCEDURE — 97140 MANUAL THERAPY 1/> REGIONS: CPT | Mod: GO | Performed by: OCCUPATIONAL THERAPIST

## 2023-06-26 PROCEDURE — 97035 APP MDLTY 1+ULTRASOUND EA 15: CPT | Mod: GO | Performed by: OCCUPATIONAL THERAPIST

## 2023-06-30 ENCOUNTER — TELEPHONE (OUTPATIENT)
Dept: ORTHOPEDICS | Facility: CLINIC | Age: 43
End: 2023-06-30
Payer: COMMERCIAL

## 2023-06-30 DIAGNOSIS — M54.2 NECK PAIN: ICD-10-CM

## 2023-06-30 RX ORDER — TIZANIDINE 2 MG/1
2 TABLET ORAL 2 TIMES DAILY PRN
Qty: 30 TABLET | Refills: 0 | OUTPATIENT
Start: 2023-06-30

## 2023-06-30 NOTE — TELEPHONE ENCOUNTER
Received a 90 day prescription request from [facility] for Tizanidine 2mg.     Request was denied.     SALVADOR Salamanca RN

## 2023-07-06 ENCOUNTER — THERAPY VISIT (OUTPATIENT)
Dept: OCCUPATIONAL THERAPY | Facility: CLINIC | Age: 43
End: 2023-07-06
Payer: COMMERCIAL

## 2023-07-06 DIAGNOSIS — J30.89 ALLERGIC RHINITIS DUE TO DUST MITE: ICD-10-CM

## 2023-07-06 DIAGNOSIS — M79.644 CHRONIC PAIN OF RIGHT THUMB: Primary | ICD-10-CM

## 2023-07-06 DIAGNOSIS — G89.29 CHRONIC PAIN OF RIGHT THUMB: Primary | ICD-10-CM

## 2023-07-06 PROCEDURE — 97035 APP MDLTY 1+ULTRASOUND EA 15: CPT | Mod: GO

## 2023-07-06 PROCEDURE — 97110 THERAPEUTIC EXERCISES: CPT | Mod: GO

## 2023-07-06 RX ORDER — MONTELUKAST SODIUM 10 MG/1
10 TABLET ORAL AT BEDTIME
Qty: 90 TABLET | Refills: 0 | Status: SHIPPED | OUTPATIENT
Start: 2023-07-06 | End: 2023-08-10

## 2023-07-06 NOTE — TELEPHONE ENCOUNTER
"Routing refill request to provider for review/approval because:  Patient needs to be seen because it has been more than 1 year since last office visit.    LOV: 6/21/2022 with Dr. Lewis    Patient is scheduled for an appointment with Dr. Lewis on 8/10/2022    Do MARIO RN, BSN, PHN    Requested Prescriptions   Pending Prescriptions Disp Refills     montelukast (SINGULAIR) 10 MG tablet 90 tablet 3     Sig: Take 1 tablet (10 mg) by mouth At Bedtime       Leukotriene Inhibitors Protocol Failed - 7/6/2023  9:58 AM        Failed - Recent (12 mo) or future (30 days) visit within the authorizing provider's specialty     Patient has had an office visit with the authorizing provider or a provider within the authorizing providers department within the previous 12 mos or has a future within next 30 days. See \"Patient Info\" tab in inbasket, or \"Choose Columns\" in Meds & Orders section of the refill encounter.              Passed - Patient is age 12 or older     If patient is under 16, ok to refill using age based dosing.           Passed - Medication is active on med list             "

## 2023-07-06 NOTE — TELEPHONE ENCOUNTER
Pending Prescriptions:                       Disp   Refills    montelukast (SINGULAIR) 10 MG tablet      90 tab*3            Sig: Take 1 tablet (10 mg) by mouth At Bedtime    Last OV: 6/21/2022    No follow up appointment scheduled at this time.    Jocelyne FALLON MA

## 2023-07-06 NOTE — PROGRESS NOTES
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 The epsom salt soaks seem to be helping.   Objective Measures   Objective Measures Objective Measure 1;Objective Measure 2;Objective Measure 3;Objective Measure 4;Objective Measure 5;Objective Measure 6   Objective Measure 1   Objective Measure Pain   Details 1-2 on average   Objective Measure 2   Objective Measure Middle phalanx   Details 6.9  cm emmanuelle   Objective Measure 3   Objective Measure MP and IP AROM   Details 70, 75   Objective Measure 4   Objective Measure Thumb opposition   Details 10   Objective Measure 5   Objective Measure Palpation R   Details sore to A1 pulley,  all other improved   OT Modalities   OT Modalities Ultrasound    Ultrasound   Ultrasound, Minutes (29389) 10 Minutes   Ultrasound -Type (does not include 3-5 min prep/cleanup time) Continuous   Intensity 1.1   Duration (does not include the 3-5 min set up/clean up time) 10 min   Frequency 3 MHz   Location B Thumb thenars, A1 pulley   Positioning sitting   Patient Response/Progress Tolerates well on R; occ discomfort on L, may benefit fromo alterting  parameters/intensity next session   Treatment Interventions (OT)   Interventions Neuromuscular Re-education;Therapeutic Procedure/Exercise   Neuromuscular Re-education   Neuromuscular Re-ed Minutes (46312) 5   Neuro Re-ed 1 Taping   Neuro Re-ed 1 - Details K-tape to B thenar eminence   Skilled Intervention see above   Patient Response/Progress Good, pain reduces with tape on per patient report   Therapeutic Procedure/Exercise   Therapeutic Procedure: strength, endurance, ROM, flexibillity minutes (87694) 8   Therapeutic Procedures Ther Proc 2;Ther Proc 3   Ther Proc 1 Thumb AROM   Ther Proc 1 - Details emmanuelle; review and assessment   Manual Therapy   Manual Therapy Manual Therapy 2   Manual Therapy 1 STM   Manual Therapy 1 - Details B Thenars, adductor   Manual Therapy 2 FM   Manual Therapy 2 - Details thumb A1 pulley   Skilled Intervention To reduce muscular tension contributing to pain   Patient Response/Progress good, more tender through bilateral adductor this week   Plan   Home program HEP, brace as needed, self massage, symptm mgmt techniques   Updates to plan of care Initiate HEP to L thumb, recommend buying OTC HBTSS for L that patient has for right to wear at work and sleeping as needed.   Plan for next session Progress HEP to faciliate return to IADL/leisure function, cont US, manual, taping   Total Session Time   Timed Code Treatment Minutes 23   Total Treatment Time (sum of timed and untimed services) 23         ASSESSMENT  Pt demonstrates improved thumb AROM in pain-free range, improved tolerance to palpation at web space and thenar eminence, and decreased pain levels. Pt would benefit from continuing hand therapy to progress to PLOF in work, IADLs and leisure.    PLAN  Continue therapy per current plan of care. Request for addl 4 weeks tx.     Beginning/End Dates of Progress Note Reporting Period:    5/9/23 to 07/06/2023    Referring Provider:  Renetta Ponce

## 2023-07-13 ENCOUNTER — THERAPY VISIT (OUTPATIENT)
Dept: OCCUPATIONAL THERAPY | Facility: CLINIC | Age: 43
End: 2023-07-13
Payer: COMMERCIAL

## 2023-07-13 DIAGNOSIS — M79.644 CHRONIC PAIN OF RIGHT THUMB: Primary | ICD-10-CM

## 2023-07-13 DIAGNOSIS — G89.29 CHRONIC PAIN OF RIGHT THUMB: Primary | ICD-10-CM

## 2023-07-13 PROCEDURE — 97140 MANUAL THERAPY 1/> REGIONS: CPT | Mod: GO | Performed by: OCCUPATIONAL THERAPIST

## 2023-07-13 PROCEDURE — 97026 INFRARED THERAPY: CPT | Mod: GO | Performed by: OCCUPATIONAL THERAPIST

## 2023-07-13 PROCEDURE — 97110 THERAPEUTIC EXERCISES: CPT | Mod: GO | Performed by: OCCUPATIONAL THERAPIST

## 2023-07-17 ENCOUNTER — THERAPY VISIT (OUTPATIENT)
Dept: PHYSICAL THERAPY | Facility: CLINIC | Age: 43
End: 2023-07-17
Payer: COMMERCIAL

## 2023-07-17 DIAGNOSIS — M54.2 NECK PAIN ON RIGHT SIDE: Primary | ICD-10-CM

## 2023-07-17 DIAGNOSIS — M25.511 PAIN IN JOINT OF RIGHT SHOULDER: ICD-10-CM

## 2023-07-17 PROCEDURE — 97110 THERAPEUTIC EXERCISES: CPT | Mod: GP | Performed by: PHYSICAL THERAPIST

## 2023-07-19 ENCOUNTER — VIRTUAL VISIT (OUTPATIENT)
Dept: PSYCHOLOGY | Facility: CLINIC | Age: 43
End: 2023-07-19
Payer: COMMERCIAL

## 2023-07-19 DIAGNOSIS — F33.1 MODERATE EPISODE OF RECURRENT MAJOR DEPRESSIVE DISORDER (H): Primary | ICD-10-CM

## 2023-07-19 DIAGNOSIS — F41.9 ANXIETY DISORDER, UNSPECIFIED TYPE: ICD-10-CM

## 2023-07-19 PROCEDURE — 90834 PSYTX W PT 45 MINUTES: CPT | Mod: VID | Performed by: PSYCHOLOGIST

## 2023-07-19 ASSESSMENT — PATIENT HEALTH QUESTIONNAIRE - PHQ9
SUM OF ALL RESPONSES TO PHQ QUESTIONS 1-9: 2
SUM OF ALL RESPONSES TO PHQ QUESTIONS 1-9: 2
10. IF YOU CHECKED OFF ANY PROBLEMS, HOW DIFFICULT HAVE THESE PROBLEMS MADE IT FOR YOU TO DO YOUR WORK, TAKE CARE OF THINGS AT HOME, OR GET ALONG WITH OTHER PEOPLE: NOT DIFFICULT AT ALL

## 2023-07-19 NOTE — PROGRESS NOTES
"         [facility]                                            Progress Note    Patient Name: Idalia Hinojosa  Date: 7/19/2023       Service Type: Individual      Session Start Time:  12:06  pm Session End Time: 12:48 pm  Session Length:  42  minutes    Session #: 102    Attendees: Client     Service Modality: Video visit: -       Provider verified identity through the following two step process.  Patient provided:  Patient is known previously to provider    Telemedicine Visit: The patient's condition can be safely assessed and treated via synchronous audio and visual telemedicine encounter.      Reason for Telemedicine Visit: Services only offered telehealth    Originating Site (Patient Location): Patient's home    Distant Site (Provider Location): Provider Remote Setting- Home Office    Consent:  The patient/guardian has verbally consented to: the potential risks and benefits of telemedicine (telephone visit) versus in person care; bill my insurance or make self-payment for services provided; and responsibility for payment of non-covered services.     Patient would like the video invitation sent by:  My Chart    Mode of Communication:  Video Conference via [employer],     As the provider I attest to compliance with applicable laws and regulations related to telemedicine.     Treatment Plan Last Reviewed: 06/05/2023  PHQ-9/DAVID-7: 7/19/2023      DATA  Interactive Complexity: No  Crisis: No        Progress Since Last Session (Related to Symptoms / Goals / Homework):   Symptoms:  Idalia reports that she's had moments of feeling more depressed and sad, but overall mood has been \"better\" and her PHQ score today was a 2.  She notes that her mood is more irritable when her physical pain is worse.  She had a few weeks of heightened physical pain, however over the past week reports this has improved.       Answers for HPI/ROS submitted by the patient on 7/19/2023  If you checked off any problems, " how difficult have these problems made it for you to do your work, take care of things at home, or get along with other people?: Not difficult at all  PHQ9 TOTAL SCORE: 2  PHQ score of 5 on 6/5.      Homework:  - used strategies to help calm self when had a stressful situation      Episode of Care Goals: Satisfactory progress - ACTION (Actively working towards change); Intervened by reinforcing change plan / affirming steps taken.  The patient stated that her main goals for therapy would be to learn emotion regulation strategies (in particular, to help with when she is feeling upset/angry/frustrated/distressed).   As her  had been diagnosed with cancer, treatment also focused on providing her with support in managing the emotional distress and challenges related to this and caregiver stress.  Current focus has been shifting to managing symptoms and emotional reaction to witnessing her  experience a serious medical event.       Current / Ongoing Stressors and Concerns:   Current:  Symptoms of depression, anxiety, stress and physical pain.  Symptoms, including feeling triggered by things that remind her of witnessing medical event her  experienced, and avoidance of things that remind her of medical episode he experienced.   Recently had a stressful episode at the cabin, where she noticed an increase in emotional and physical pain.  She was able to engage strategies to help calm.       Ongoing:s, Working on building effective emotion regulation strategies for managing daily frustrations and upsets     Treatment Objective(s) Addressed in This Session:     Psychoeducation on mind-body connection  Psychoeducation on pain experience and factors that can impact pain experience - with focus today on impact of cognitions  Psychoeducation on calming/deep breathing strategies     Further assessment of symptoms related to traumatic event and psychoed on impact of witnessing a traumatic event, options for  further treatment      Continue with/review of:  Build and practice distress tolerance/frustration management strategies   Emotion regulation - identify and practice strategies to help manage emotional distress more effectively  Continue to incorporate 1-2 behavioral activation strategies to help maintain goal of incorporating physical activity into routine  Review of self-care and importance of attending to your self-care  Practice balanced thinking- challenge polarized thinking   Psychoeducation on mindfulness strategies - practicing a non-judgmental and compassionate stance towards self and others   Learn and practice distress tolerance skills-  self-soothe strategy using the five senses   Practice deep breathing  - practice at regular intervals throughout the day      Intervention: Psychoeducation on pain experience and factors that can impact pain experience - with focus today on impact of cognitions.  Reviewed recent situation where she experienced a stressful event and she noted her body tensed up in reaction.  Talked about impact of cognitions on emotions and pain experience, and provided psychoeducation on how to identify and reframe cognitive distortions.  Continue to encourage deep breathing and calming strategies.      CBT:      Further assessment of trauma related symptoms - denies nightmares, flashbacks and hypervigilance.  She said she does intentionally avoid things that remind her of the medical episode her  experienced.  Experiences increase in tension, physical and emotional pain when exposed to things that remind her of medical episode.  We reviewed potential treatment options to help address symptoms- including building grounding and emotion regulation strategies, to help with building foundation for further treatment and processing.  She said she thinks at some point she believes this may be helpful, but she's not ready right now.  Emphasized that she is in charge of if/when she chooses  "to do this.     ASSESSMENT: Current Emotional / Mental Status (status of significant symptoms):   Risk status (Self / Other harm or suicidal ideation)   Patient denies current fears or concerns for personal safety.   Patient denies current or recent suicidal ideation or behaviors.  She reports if the thoughts do occur, she knows steps she can take to manage them and believes she can manage them, and reach out for additional help if needed.     Patientdenies current or recent homicidal ideation or behaviors.   Patient denies current or recent self injurious behavior or ideation.   Patient denies other safety concerns.   Patient reports there has not been a change in risk factors since their last session -      Recommended that patient call 911 or go to the local ED should there be a change in any of these risk factors.  We reviewed how to contact [facility] crisis/[facility] for urgent/crisis concerns 24/7.  Provided patient with crisis resources and she agrees to use safety plan should any safety concerns arise.      Professionals or agencies I can contact during a crisis:  Suicide Prevention Lifeline: call or text 965  Crisis Text Line: text \"[place]\" to 863231  Local Crisis Services: [facility]: [phone]  Call 911 or go to my nearest emergency department, or [facility].         Appearance:   Appropriate    Eye Contact:   Good    Psychomotor Behavior: Normal   Attitude:   Cooperative    Orientation:   All   Speech    Rate / Production: Normal     Volume:  Normal    Mood:    Reports feeling \"Pretty good\"    Affect:    Congruent with mood   Thought Content:  Clear    Thought Form:  Coherent  Logical    Insight:    Good      Medication Review:   No changes to psychiatric medication   She reported she's been prescribed new prn/as needed medication for her physical pain      Medication Compliance:   Yes      Changes in Health Issues:    Continues to have back pain and continues to work with PT and " recently started acupuncture to help with this.  She reports physical pain has been improved over the past week.         Chemical Use Review:  Occasional alcoholic beverage on social occasions, she reports she's observant to how it impacts her mood to help with making choices as to whether or not to have any alcohol.     Substance Use: Chemical use reviewed, no changes or active concerns identified.      Tobacco Use: No current tobacco use.       Diagnosis:  Anxiety disorder unspecified  Major depression, recurrent episode, moderate  Rule out Post-Traumatic Stress Disorder versus Adjustment Disorder (does not appear to meet criteria for PTSD, however, does continue to experience symptoms, in particular avoidance and hyperarousal, in the context of having witnessed her  experience a medical emergency)    Will continue to monitor symptoms in response to witnessing  experience medical emergency and assist patient with processing event, self-care, and symptom management.          PLAN: (Patient Tasks / Therapist Tasks / Other)    1) Idalia identified the following goals:  Being aware of my thoughts when reacting to stressful/situations.  When are they not helpful?  Practice reframing.       2) If there is a crisis situation, remember you are not alone and that there are people who can help you twenty-four hours a day, seven days a week.  Call [facility] ([facility]): [phone].      3) Check-in scheduled for 8/2/2023 at 12:00 pm.   If urgent or crisis concerns arise prior to next scheduled appointment, please reach out to crisis resources, call 911, or go to the emergency department.      Cristy Wilkinson PsyD, LP  Licensed Psychologist  7/19/2023                                                     ____________________________________    Treatment Plan     Patient's Name: Idalia Hinojosa                        YOB: 1980     Date of Creation: 1/6/2020  Date Treatment Plan  Last Reviewed/Revised: 6/5/2023       DSM5 Diagnoses: 300.00 (F41.9) Unspecified Anxiety Disorder, major depression, moderate, recurrent episode  Psychosocial / Contextual Factors: strain in marital relationship, parenting challenges, adjustment challenges, 's cancer diagnosis  PROMIS (reviewed every 90 days):      Anticipated number of session for this episode of care: additional 15-20  Anticipation frequency of session: Sessions have been decreased to bi-weekly or once every three weeks due to progress with treatment goals.   Anticipated Duration of each session: 38-52 minutes  Treatment plan will be reviewed in 90 days or when goals have been changed.         Referral / Collaboration:  No referrals at this time.  Have previously recommended couples therapy referral.  Coordinate with Dr. Han, patient's PCP.        MeasurableTreatment Goal(s) related to diagnosis / functional impairment(s)  Goal 1: Patient will learn emotion regulation strategies to help when she is feeling upset/angry/frustrated/distressed    I will know I've met my goal when I would yell less, I would feel calmer, and I would not push others.  I would be less physical when I'm angry (e.g. wouldn't slam doors or hit things)        Objective #A (Patient Action)                          Patient will learn and practice at least 2 new emotion regulation strategies.  Status: Continued - Date(s):    6/5/2023- Regularly practicing emotion regulation strategies to help manage increase in emotional distress related to spouse's cancer diagnosis.  She has been successful with using strategies to help her stay calm when her daughter's experiencing heightened emotion and/or challenging behaviors.          Intervention(s)  Therapist will provide psychoeducation on emotion regulation module from DBT and will assign patient homework to help reinforce skill development.     Objective #B  Patient will identify factors that increase vulnerability to  emotion mind and identify ways to decrease vulnerability to emotion mind.  Status: Continued - Date(s):6/5/2023 - routinely incorporating mindfulness, distress tolerance and self-awareness strategies to increase attention and focus to factors that increase vulnerability to emotion mind     Intervention(s)  Therapist will provide information on factors that increase vulnerabiliyt to emotion mind      Objective #C  Patient will identify warning signs and signals that emotions are escalating and will learn ways to skillfully intervene early on.  Status: Date: 6/5/2023- in progress - has demonstrated good progress in being able to identify warning signs and intervene skillfully-working on incorporating deep breathing to help with calming and re-centering.         Intervention(s)  Therapist will help patient identify warning signs and signals, and will guide the patient in identifying skillful ways to intervene when exeriencing warning signs and signals.      Objective #B (Patient Action)                          Status: NEW - Date(s):6/5/2023- Patient will learn mind-body connection, healthy ways to manage stress, calming strategies, and ways she can address cognitive and behavioral factors that can contribute to pain experience.        Patient will learn and practice at least two strategies that help improve her emotional well-being.     Intervention(s)  Therapist will teach calming strategies and healthy stress management strategies, and will assign homework to help reinforce skill development.             Goal 2: Patient will learn new parenting strategies to help with managing parenting challenges.  Parent will work on improving relationship with her daughter and defining what she would like this relationship to look like.      I will know I've met my goal when I'm enjoying time with my daughter, teaching her new things, and not waiting for things to change       Objective #A (Patient Action)                         "  Status: Continued - Date(s):6/5/2023       Patient will increase understanding of developmental norms for her daughter and ways to manage challenging behaviors.     Intervention(s)  Therapist will provide psychoeducation on developmental norms and parenting strategies.     Objective #B  Patient will spend time reflecting on her relationship with her daughter and defining what she would like this relaitonship to look like.                  Status: Continued - Date(s):6/5/2023   Making good progress - has also been working with daughter's therapist and has been actively practicing and using suggested strategies.           Intervention(s)  Therapist will guide the patient in reflecting upon her relationship with her daughter and help her define ways to move towards what she vaues in her relationship with her daughter.     Objective #C  Patient will increase time spent on fun activity and play with her daughter.  Status: Continued - Date(s):6/5/2023-has been enjoying time with daughter and devoting regular time to fun activities and play together           Intervention(s)  Therapist will guide the patient in identifying ways she can increase positive time with her daughter.  Therapist will also teach mindfulness strategies to help patient with being in the present moment when appropriate - .        Goal 3: Patiient will improve communication with her .      I will know I've met my goal when I feel less irritated with my  and communicate more openly with my .  I would like to be more assertive and less aggressive.   I would like to not avoid telling him things because I'm worried about his reaction or don't think he will react well.  I would like to be more present to the moment during times when this would be helpful.\"       Objective #A (Patient Action)                          Status: Continued - Date(s):6/5/2023- continues to work on this goal, though emphasis has shift to managing caregiver " stress related to 's cancer diagnosis and treatment.              Patient will learn and practice at least two new interpersonal effectiveness strategies.     Intervention(s)  Therapist will teach strategies from DBT module on interpersonal effectiveness, and will assign homework to help reinforce skill development.            Patient has reviewed and agreed to the above plan.        Cristy Wilkinson PsyD, LP  Licensed Psychologist  Reviewed on 6/5/2023

## 2023-07-20 ENCOUNTER — THERAPY VISIT (OUTPATIENT)
Dept: OCCUPATIONAL THERAPY | Facility: CLINIC | Age: 43
End: 2023-07-20
Payer: COMMERCIAL

## 2023-07-20 DIAGNOSIS — G89.29 CHRONIC PAIN OF RIGHT THUMB: Primary | ICD-10-CM

## 2023-07-20 DIAGNOSIS — M79.644 CHRONIC PAIN OF RIGHT THUMB: Primary | ICD-10-CM

## 2023-07-20 PROCEDURE — 97140 MANUAL THERAPY 1/> REGIONS: CPT | Mod: GO | Performed by: OCCUPATIONAL THERAPIST

## 2023-07-20 PROCEDURE — 97110 THERAPEUTIC EXERCISES: CPT | Mod: GO | Performed by: OCCUPATIONAL THERAPIST

## 2023-07-24 ENCOUNTER — THERAPY VISIT (OUTPATIENT)
Dept: PHYSICAL THERAPY | Facility: CLINIC | Age: 43
End: 2023-07-24
Payer: COMMERCIAL

## 2023-07-24 DIAGNOSIS — S46.911A MUSCLE STRAIN OF RIGHT SCAPULAR REGION, INITIAL ENCOUNTER: ICD-10-CM

## 2023-07-24 DIAGNOSIS — S46.912A MUSCLE STRAIN OF LEFT SCAPULAR REGION, INITIAL ENCOUNTER: ICD-10-CM

## 2023-07-24 DIAGNOSIS — M25.511 PAIN IN JOINT OF RIGHT SHOULDER: ICD-10-CM

## 2023-07-24 DIAGNOSIS — M54.2 NECK PAIN ON RIGHT SIDE: Primary | ICD-10-CM

## 2023-07-24 DIAGNOSIS — S46.819A: ICD-10-CM

## 2023-07-24 PROCEDURE — 97140 MANUAL THERAPY 1/> REGIONS: CPT | Mod: GP | Performed by: PHYSICAL THERAPIST

## 2023-07-24 PROCEDURE — 97110 THERAPEUTIC EXERCISES: CPT | Mod: GP | Performed by: PHYSICAL THERAPIST

## 2023-07-25 DIAGNOSIS — F32.5 MAJOR DEPRESSIVE DISORDER WITH SINGLE EPISODE, IN FULL REMISSION (H): ICD-10-CM

## 2023-07-27 NOTE — TELEPHONE ENCOUNTER
"Requested Prescriptions   Pending Prescriptions Disp Refills    sertraline (ZOLOFT) 50 MG tablet [Pharmacy Med Name: SERTRALINE HCL 50 MG TABLET] 180 tablet 1     Sig: TAKE 2 TABLETS BY MOUTH EVERY DAY       SSRIs Protocol Passed - 7/27/2023  8:29 AM        Passed - PHQ-9 score less than 5 in past 6 months     Please review last PHQ-9 score.           Passed - Medication is active on med list        Passed - Patient is age 18 or older        Passed - No active pregnancy on record        Passed - No positive pregnancy test in last 12 months        Passed - Recent (6 mo) or future (30 days) visit within the authorizing provider's specialty     Patient had office visit in the last 6 months or has a visit in the next 30 days with authorizing provider or within the authorizing provider's specialty.  See \"Patient Info\" tab in inbasket, or \"Choose Columns\" in Meds & Orders section of the refill encounter.               Last Written Prescription Date:  1/26/23  Last Fill Quantity: 180,  # refills: 1   Last office visit: 5/30/23 with Dr. Han   Future Office Visit:   Next 5 appointments (look out 90 days)      Aug 10, 2023  9:40 AM  Return Visit with Gerber Lewis MD  [facility] ([facility] - [address] [address] [phone]             Refill request refused due to:  [x]Refills available at pharmacy. Request sent back to pharmacy as duplicate.  []Patient reports no longer needing medication.  []Medication discontinued.       100mg tablets ordered in may, no longer needing 50mg -only needing zoloft 10mg tablets  Reyna Monroy RN on 7/27/2023 at 9:06 AM    "

## 2023-07-31 ENCOUNTER — THERAPY VISIT (OUTPATIENT)
Dept: PHYSICAL THERAPY | Facility: CLINIC | Age: 43
End: 2023-07-31
Payer: COMMERCIAL

## 2023-07-31 DIAGNOSIS — M25.511 PAIN IN JOINT OF RIGHT SHOULDER: ICD-10-CM

## 2023-07-31 DIAGNOSIS — M54.2 NECK PAIN ON RIGHT SIDE: Primary | ICD-10-CM

## 2023-07-31 PROCEDURE — 97140 MANUAL THERAPY 1/> REGIONS: CPT | Mod: GP | Performed by: PHYSICAL THERAPIST

## 2023-07-31 PROCEDURE — 97110 THERAPEUTIC EXERCISES: CPT | Mod: GP | Performed by: PHYSICAL THERAPIST

## 2023-08-04 ENCOUNTER — OFFICE VISIT (OUTPATIENT)
Dept: URGENT CARE | Facility: URGENT CARE | Age: 43
End: 2023-08-04
Payer: COMMERCIAL

## 2023-08-04 VITALS
HEART RATE: 64 BPM | DIASTOLIC BLOOD PRESSURE: 68 MMHG | SYSTOLIC BLOOD PRESSURE: 100 MMHG | OXYGEN SATURATION: 96 % | TEMPERATURE: 98.6 F

## 2023-08-04 DIAGNOSIS — M54.2 NECK PAIN, BILATERAL: Primary | ICD-10-CM

## 2023-08-04 PROCEDURE — 99213 OFFICE O/P EST LOW 20 MIN: CPT | Performed by: INTERNAL MEDICINE

## 2023-08-04 NOTE — LETTER
August 4, 2023      Idalia JAMAL Hinojosa  [address] [facility] [address]        To Whom It May Concern:    Idalia JAMAL Hinojosa  was seen on August 4, 2023.  Please excuse her  from work today.        Sincerely,        Talita Yu MD

## 2023-08-04 NOTE — PROGRESS NOTES
ASSESSMENT AND PLAN:      ICD-10-CM    1. Neck pain, bilateral  M54.2         Patient Instructions   Today you have muscle spasm   Woke up with so most sleeping position and pillow.    Work noted today  Try your muscle relaxant  Ice/heat/stretching        Dr Yeo  Recheck with Dr Yeo Lora Lynn Truckenbrod, MD  [facility]    Subjective     Idalia Hinojosa is a 43 year old who presents for Patient presents with:  Urgent Care  Back Pain: Neck pain/ right side on and off x's few months     an established patient of [facility].    MS Injury/Pain    Onset of symptoms was since December   Location: right neck   right > >L side neck  Current and Associated symptoms: Pain, Swelling, and Decreased range of motion    Therapies to improve symptoms include: celebrex  Seen by sports medicine  Physical Therapy   Acupuncture yesterday  Felt ok at bedtime  Woke up with symptoms     This is not the first time this type of problem has occurred for this patient.       treatment tizanidine, Celebrex & steroid by sports medicine in past     Study Result    Narrative & Impression   CERVICAL SPINE TWO TO THREE VIEWS 6/21/2023 9:55 AM      HISTORY: Cervical radiculopathy. Chronic pain of both shoulders. Neck  pain.     COMPARISON: None.                                                                       IMPRESSION: Cervical spine alignment is within normal limits. No loss  of vertebral body height. Mild degenerative endplate spurring without  significant loss of disc height at C5-C6. The base of the dens is  unremarkable on the odontoid view.      CORY OLVERA MD            Review of Systems        Objective    /68   Pulse 64   Temp 98.6  F (37  C) (Temporal)   SpO2 96%   Physical Exam  Vitals reviewed.   Constitutional:       Appearance: Normal appearance.   Musculoskeletal:      Comments: Cervical spine - nontender   paracervical and upper back pain muscle spasm   Neurological:      Mental Status: She  is alert.

## 2023-08-04 NOTE — PATIENT INSTRUCTIONS
Today you have muscle spasm   Woke up with so most sleeping position and pillow.    Work noted today  Try your muscle relaxant  Ice/heat/stretching

## 2023-08-07 ENCOUNTER — THERAPY VISIT (OUTPATIENT)
Dept: PHYSICAL THERAPY | Facility: CLINIC | Age: 43
End: 2023-08-07
Payer: COMMERCIAL

## 2023-08-07 DIAGNOSIS — M54.2 NECK PAIN ON RIGHT SIDE: Primary | ICD-10-CM

## 2023-08-07 DIAGNOSIS — M25.511 PAIN IN JOINT OF RIGHT SHOULDER: ICD-10-CM

## 2023-08-07 PROCEDURE — 97140 MANUAL THERAPY 1/> REGIONS: CPT | Mod: GP | Performed by: PHYSICAL THERAPIST

## 2023-08-07 PROCEDURE — 97110 THERAPEUTIC EXERCISES: CPT | Mod: GP | Performed by: PHYSICAL THERAPIST

## 2023-08-10 ENCOUNTER — OFFICE VISIT (OUTPATIENT)
Dept: ALLERGY | Facility: CLINIC | Age: 43
End: 2023-08-10
Payer: COMMERCIAL

## 2023-08-10 VITALS
BODY MASS INDEX: 27.76 KG/M2 | OXYGEN SATURATION: 97 % | HEART RATE: 77 BPM | WEIGHT: 172 LBS | DIASTOLIC BLOOD PRESSURE: 72 MMHG | SYSTOLIC BLOOD PRESSURE: 106 MMHG

## 2023-08-10 DIAGNOSIS — J30.89 ALLERGIC RHINITIS DUE TO DUST MITE: ICD-10-CM

## 2023-08-10 DIAGNOSIS — J30.0 CHRONIC VASOMOTOR RHINITIS: ICD-10-CM

## 2023-08-10 PROCEDURE — 99213 OFFICE O/P EST LOW 20 MIN: CPT | Performed by: ALLERGY & IMMUNOLOGY

## 2023-08-10 RX ORDER — MONTELUKAST SODIUM 10 MG/1
10 TABLET ORAL AT BEDTIME
Qty: 90 TABLET | Refills: 3 | Status: SHIPPED | OUTPATIENT
Start: 2023-08-10 | End: 2024-08-14

## 2023-08-10 RX ORDER — MOMETASONE FUROATE MONOHYDRATE 50 UG/1
2 SPRAY, METERED NASAL DAILY
Qty: 51 G | Refills: 3 | Status: SHIPPED | OUTPATIENT
Start: 2023-08-10 | End: 2024-07-25

## 2023-08-10 RX ORDER — IPRATROPIUM BROMIDE 42 UG/1
2 SPRAY, METERED NASAL 4 TIMES DAILY PRN
Qty: 15 ML | Refills: 5 | Status: SHIPPED | OUTPATIENT
Start: 2023-08-10

## 2023-08-10 ASSESSMENT — ENCOUNTER SYMPTOMS
EYE DISCHARGE: 0
FACIAL SWELLING: 0
ARTHRALGIAS: 1
CHEST TIGHTNESS: 0
SHORTNESS OF BREATH: 0
WHEEZING: 0
ACTIVITY CHANGE: 0
EYE REDNESS: 0
SINUS PRESSURE: 0
FEVER: 0
DIARRHEA: 0
NAUSEA: 0
HEADACHES: 0
JOINT SWELLING: 1
CHILLS: 0
RHINORRHEA: 1
EYE ITCHING: 0
COUGH: 0
VOMITING: 0
ADENOPATHY: 0
MYALGIAS: 0

## 2023-08-10 NOTE — LETTER
8/10/2023         RE: Idalia Hinojosa  [address]  [facility] [address]        Dear Colleague,    Thank you for referring your patient, Idalia Hinojosa, to the [facility]. Please see a copy of my visit note below.    Idalia Hinojosa was seen in the Allergy Clinic at [facility].      Idalia Hinojosa is a 43 year old Not  or  female who is seen today for a follow-up visit.    Feels she has been doing well. Got an air purifier for her bedroom and this has helped. Continues to take montelukast every evening. Reduced the mometasone nasal spray to 1 spray in each nostril and her symptoms have remained controlled. Hasn't needed to use ipratropium in quite some time.      Past Medical History:   Diagnosis Date     ASCUS of cervix with negative high risk HPV 04/07/2017 04/07/17 ASCUS, Neg HPV     Seasonal affective disorder (H)     no meds     Family History   Problem Relation Age of Onset     Heart Disease Maternal Grandmother      Skin Cancer Maternal Grandmother      Thyroid Disease Paternal Grandmother      Other Cancer Paternal Grandmother      Social History     Tobacco Use     Smoking status: Never     Smokeless tobacco: Never   Vaping Use     Vaping Use: Never used   Substance Use Topics     Alcohol use: Yes     Alcohol/week: 0.0 standard drinks of alcohol     Comment: 0-2 PER WK     Drug use: No     Social History     Social History Narrative               Past medical, family, and social history were reviewed.    Review of Systems   Constitutional:  Negative for activity change, chills and fever.   HENT:  Positive for rhinorrhea. Negative for congestion, dental problem, ear pain, facial swelling, nosebleeds, postnasal drip, sinus pressure and sneezing.    Eyes:  Negative for discharge, redness and itching.   Respiratory:  Negative for cough, chest tightness, shortness of breath and wheezing.    Cardiovascular:  Negative for chest  pain.   Gastrointestinal:  Negative for diarrhea, nausea and vomiting.   Musculoskeletal:  Positive for arthralgias and joint swelling. Negative for myalgias.   Skin:  Negative for rash.   Neurological:  Negative for headaches.   Hematological:  Negative for adenopathy.   Psychiatric/Behavioral:  Negative for behavioral problems and self-injury.          Current Outpatient Medications:      celecoxib (CELEBREX) 100 MG capsule, Take 1 capsule (100 mg) by mouth 2 times daily as needed for moderate pain, Disp: 60 capsule, Rfl: 0     cyclobenzaprine (FLEXERIL) 5 MG tablet, Take 1-2 tablets (5-10 mg) by mouth nightly as needed for muscle spasms You can take up to 3 times per day as needed, but this is sedating so no driving or alcohol while taking., Disp: 30 tablet, Rfl: 0     diclofenac (VOLTAREN) 50 MG EC tablet, Take 1 tablet (50 mg) by mouth 2 times daily for 14 days Always take with food. Do not use with any other NSAIDS like ibuprofen., Disp: 28 tablet, Rfl: 0     ipratropium (ATROVENT) 0.06 % nasal spray, Spray 2 sprays into both nostrils 4 times daily as needed for rhinitis, Disp: 15 mL, Rfl: 5     methylPREDNISolone (MEDROL DOSEPAK) 4 MG tablet therapy pack, Follow Package Directions, Disp: 21 tablet, Rfl: 0     mometasone (NASONEX) 50 MCG/ACT nasal spray, Spray 2 sprays into both nostrils daily, Disp: 51 g, Rfl: 3     montelukast (SINGULAIR) 10 MG tablet, Take 1 tablet (10 mg) by mouth At Bedtime, Disp: 90 tablet, Rfl: 0     norethindrone-ethinyl estradiol (MICROGESTIN 1/20) 1-20 MG-MCG tablet, Take 1 tablet by mouth daily Active tablets only for prevention of menses, Disp: 112 tablet, Rfl: 4     sertraline (ZOLOFT) 100 MG tablet, Take 1 tablet (100 mg) by mouth daily, Disp: 90 tablet, Rfl: 4     sertraline (ZOLOFT) 50 MG tablet, Take 2 tablets (100 mg) by mouth daily, Disp: 180 tablet, Rfl: 1     tiZANidine (ZANAFLEX) 2 MG tablet, Take 1 tablet (2 mg) by mouth 2 times daily as needed for muscle spasms, Disp:  30 tablet, Rfl: 0    Current Facility-Administered Medications:      sodium chloride (PF) 0.9% PF flush 3 mL, 3 mL, Intracatheter, q1 min prn, Cristy Wilkerson PA-C, 3 mL at 10/10/22 1436  No Known Allergies    EXAM:   /72 (BP Location: Left arm, Patient Position: Sitting, Cuff Size: Adult Regular)   Pulse 77   Wt 78 kg (172 lb)   SpO2 97%   BMI 27.76 kg/m    Physical Exam  Vitals and nursing note reviewed.   Constitutional:       Appearance: Normal appearance.   HENT:      Head: Normocephalic and atraumatic.      Right Ear: External ear normal.      Left Ear: External ear normal.      Nose: No mucosal edema or rhinorrhea.      Mouth/Throat:      Mouth: Mucous membranes are moist. No oral lesions.      Pharynx: Oropharynx is clear. Uvula midline. No posterior oropharyngeal erythema.   Eyes:      General: Lids are normal.      Extraocular Movements: Extraocular movements intact.      Conjunctiva/sclera: Conjunctivae normal.   Neck:      Comments: No asymmetry, masses, or scars  Cardiovascular:      Rate and Rhythm: Normal rate and regular rhythm.      Heart sounds: Normal heart sounds, S1 normal and S2 normal.   Pulmonary:      Effort: Pulmonary effort is normal. No respiratory distress.      Breath sounds: Normal breath sounds and air entry.   Musculoskeletal:      Comments: No musculoskeletal defects appreciated   Skin:     General: Skin is warm and dry.      Findings: No lesion or rash.   Neurological:      General: No focal deficit present.      Mental Status: She is alert.   Psychiatric:         Mood and Affect: Mood and affect normal.           WORKUP:  None    ASSESSMENT/PLAN:  Idalia Hinojosa is a 43 year old female here for a follow-up visit.    1. Chronic vasomotor rhinitis - Well controlled with steroid nasal spray reports no side effects from the medication. Would like to have a prescription for ipratropium to have on hand if needed.    - ipratropium (ATROVENT) 0.06 % nasal spray; Spray 2  sprays into both nostrils 4 times daily as needed for rhinitis  Dispense: 15 mL; Refill: 5  - mometasone (NASONEX) 50 MCG/ACT nasal spray; Spray 2 sprays into both nostrils daily  Dispense: 51 g; Refill: 3    2. Allergic rhinitis due to dust mite - Symptoms have improved since she purchased an air purifier for her bedroom. Also continues to take montelukast in addition to using nasal steroid spray.    - montelukast (SINGULAIR) 10 MG tablet; Take 1 tablet (10 mg) by mouth At Bedtime  Dispense: 90 tablet; Refill: 3  - mometasone (NASONEX) 50 MCG/ACT nasal spray; Spray 2 sprays into both nostrils daily  Dispense: 51 g; Refill: 3      Follow-up in the allergy clinic as needed - symptoms are stable and medications are working well, she can follow-up with PCP for medication refills.      Thank you for allowing me to participate in the care of Idalia Hinojosa.      Gerber Lewis MD, FAAAAI  Allergy/Immunology  [facility]      Chart documentation done in part with Dragon Voice Recognition Software. Although reviewed after completion, some word and grammatical errors may remain.      Again, thank you for allowing me to participate in the care of your patient.        Sincerely,        Gerber Lewis MD

## 2023-08-10 NOTE — PROGRESS NOTES
Idalia Hinojosa was seen in the Allergy Clinic at [facility].      Idalia Hinojosa is a 43 year old Not  or  female who is seen today for a follow-up visit.    Feels she has been doing well. Got an air purifier for her bedroom and this has helped. Continues to take montelukast every evening. Reduced the mometasone nasal spray to 1 spray in each nostril and her symptoms have remained controlled. Hasn't needed to use ipratropium in quite some time.      Past Medical History:   Diagnosis Date    ASCUS of cervix with negative high risk HPV 04/07/2017 04/07/17 ASCUS, Neg HPV    Seasonal affective disorder (H)     no meds     Family History   Problem Relation Age of Onset    Heart Disease Maternal Grandmother     Skin Cancer Maternal Grandmother     Thyroid Disease Paternal Grandmother     Other Cancer Paternal Grandmother      Social History     Tobacco Use    Smoking status: Never    Smokeless tobacco: Never   Vaping Use    Vaping Use: Never used   Substance Use Topics    Alcohol use: Yes     Alcohol/week: 0.0 standard drinks of alcohol     Comment: 0-2 PER WK    Drug use: No     Social History     Social History Narrative               Past medical, family, and social history were reviewed.    Review of Systems   Constitutional:  Negative for activity change, chills and fever.   HENT:  Positive for rhinorrhea. Negative for congestion, dental problem, ear pain, facial swelling, nosebleeds, postnasal drip, sinus pressure and sneezing.    Eyes:  Negative for discharge, redness and itching.   Respiratory:  Negative for cough, chest tightness, shortness of breath and wheezing.    Cardiovascular:  Negative for chest pain.   Gastrointestinal:  Negative for diarrhea, nausea and vomiting.   Musculoskeletal:  Positive for arthralgias and joint swelling. Negative for myalgias.   Skin:  Negative for rash.   Neurological:  Negative for headaches.   Hematological:  Negative for adenopathy.    Psychiatric/Behavioral:  Negative for behavioral problems and self-injury.          Current Outpatient Medications:     celecoxib (CELEBREX) 100 MG capsule, Take 1 capsule (100 mg) by mouth 2 times daily as needed for moderate pain, Disp: 60 capsule, Rfl: 0    cyclobenzaprine (FLEXERIL) 5 MG tablet, Take 1-2 tablets (5-10 mg) by mouth nightly as needed for muscle spasms You can take up to 3 times per day as needed, but this is sedating so no driving or alcohol while taking., Disp: 30 tablet, Rfl: 0    diclofenac (VOLTAREN) 50 MG EC tablet, Take 1 tablet (50 mg) by mouth 2 times daily for 14 days Always take with food. Do not use with any other NSAIDS like ibuprofen., Disp: 28 tablet, Rfl: 0    ipratropium (ATROVENT) 0.06 % nasal spray, Spray 2 sprays into both nostrils 4 times daily as needed for rhinitis, Disp: 15 mL, Rfl: 5    methylPREDNISolone (MEDROL DOSEPAK) 4 MG tablet therapy pack, Follow Package Directions, Disp: 21 tablet, Rfl: 0    mometasone (NASONEX) 50 MCG/ACT nasal spray, Spray 2 sprays into both nostrils daily, Disp: 51 g, Rfl: 3    montelukast (SINGULAIR) 10 MG tablet, Take 1 tablet (10 mg) by mouth At Bedtime, Disp: 90 tablet, Rfl: 0    norethindrone-ethinyl estradiol (MICROGESTIN 1/20) 1-20 MG-MCG tablet, Take 1 tablet by mouth daily Active tablets only for prevention of menses, Disp: 112 tablet, Rfl: 4    sertraline (ZOLOFT) 100 MG tablet, Take 1 tablet (100 mg) by mouth daily, Disp: 90 tablet, Rfl: 4    sertraline (ZOLOFT) 50 MG tablet, Take 2 tablets (100 mg) by mouth daily, Disp: 180 tablet, Rfl: 1    tiZANidine (ZANAFLEX) 2 MG tablet, Take 1 tablet (2 mg) by mouth 2 times daily as needed for muscle spasms, Disp: 30 tablet, Rfl: 0    Current Facility-Administered Medications:     sodium chloride (PF) 0.9% PF flush 3 mL, 3 mL, Intracatheter, q1 min prn, Cristy Wilkerson PA-C, 3 mL at 10/10/22 1436  No Known Allergies    EXAM:   /72 (BP Location: Left arm, Patient Position: Sitting,  Cuff Size: Adult Regular)   Pulse 77   Wt 78 kg (172 lb)   SpO2 97%   BMI 27.76 kg/m    Physical Exam  Vitals and nursing note reviewed.   Constitutional:       Appearance: Normal appearance.   HENT:      Head: Normocephalic and atraumatic.      Right Ear: External ear normal.      Left Ear: External ear normal.      Nose: No mucosal edema or rhinorrhea.      Mouth/Throat:      Mouth: Mucous membranes are moist. No oral lesions.      Pharynx: Oropharynx is clear. Uvula midline. No posterior oropharyngeal erythema.   Eyes:      General: Lids are normal.      Extraocular Movements: Extraocular movements intact.      Conjunctiva/sclera: Conjunctivae normal.   Neck:      Comments: No asymmetry, masses, or scars  Cardiovascular:      Rate and Rhythm: Normal rate and regular rhythm.      Heart sounds: Normal heart sounds, S1 normal and S2 normal.   Pulmonary:      Effort: Pulmonary effort is normal. No respiratory distress.      Breath sounds: Normal breath sounds and air entry.   Musculoskeletal:      Comments: No musculoskeletal defects appreciated   Skin:     General: Skin is warm and dry.      Findings: No lesion or rash.   Neurological:      General: No focal deficit present.      Mental Status: She is alert.   Psychiatric:         Mood and Affect: Mood and affect normal.           WORKUP:  None    ASSESSMENT/PLAN:  Idalia Hinojosa is a 43 year old female here for a follow-up visit.    1. Chronic vasomotor rhinitis - Well controlled with steroid nasal spray reports no side effects from the medication. Would like to have a prescription for ipratropium to have on hand if needed.    - ipratropium (ATROVENT) 0.06 % nasal spray; Spray 2 sprays into both nostrils 4 times daily as needed for rhinitis  Dispense: 15 mL; Refill: 5  - mometasone (NASONEX) 50 MCG/ACT nasal spray; Spray 2 sprays into both nostrils daily  Dispense: 51 g; Refill: 3    2. Allergic rhinitis due to dust mite - Symptoms have improved since she  purchased an air purifier for her bedroom. Also continues to take montelukast in addition to using nasal steroid spray.    - montelukast (SINGULAIR) 10 MG tablet; Take 1 tablet (10 mg) by mouth At Bedtime  Dispense: 90 tablet; Refill: 3  - mometasone (NASONEX) 50 MCG/ACT nasal spray; Spray 2 sprays into both nostrils daily  Dispense: 51 g; Refill: 3      Follow-up in the allergy clinic as needed - symptoms are stable and medications are working well, she can follow-up with PCP for medication refills.      Thank you for allowing me to participate in the care of Idalia JAMAL Hinojosa.      Gerber Lewis MD, FAAAAI  Allergy/Immunology  [facility]      Chart documentation done in part with Dragon Voice Recognition Software. Although reviewed after completion, some word and grammatical errors may remain.

## 2023-08-15 ENCOUNTER — DOCUMENTATION ONLY (OUTPATIENT)
Dept: OBGYN | Facility: CLINIC | Age: 43
End: 2023-08-15
Payer: COMMERCIAL

## 2023-08-15 NOTE — PROGRESS NOTES
Idalia Hinojosa has an upcoming lab appointment.        Future Appointments   Date Time Provider Department Center   8/21/2023  7:30 AM [facility]   8/23/2023 12:30 PM Cristy Wilkinson LP [facility] [place]   8/31/2023  9:00 AM Sweetie Delarosa, PT [facility] [address]   9/14/2023  9:40 AM Sweetie Delarosa, PT [facility] [address]   9/28/2023  9:40 AM Sweetie Delarosa, PT [facility] [address]       The appointment note says: bloodwork    There is no Lab order available.      Please review and place future orders as appropriate.  If no Lab order will be placed, please advise patient.      Also for consideration: PO    Health Maintenance Due   Topic    ANNUAL REVIEW OF HM ORDERS        Thanks,    Judy Garcia

## 2023-08-17 NOTE — PROGRESS NOTES
I saw patient for an annual in May 2023 and in my note discussed repeating labs next year.  Not sure why she has a lab appointment this year unless she wants cholesterol rechecked?    Please either call or MyChart her to find out  Thanks  Monique Han MD

## 2023-08-23 ENCOUNTER — VIRTUAL VISIT (OUTPATIENT)
Dept: PSYCHOLOGY | Facility: CLINIC | Age: 43
End: 2023-08-23
Payer: COMMERCIAL

## 2023-08-23 DIAGNOSIS — F41.9 ANXIETY DISORDER, UNSPECIFIED TYPE: Primary | ICD-10-CM

## 2023-08-23 PROCEDURE — 90832 PSYTX W PT 30 MINUTES: CPT | Mod: GT | Performed by: PSYCHOLOGIST

## 2023-08-23 ASSESSMENT — PATIENT HEALTH QUESTIONNAIRE - PHQ9
SUM OF ALL RESPONSES TO PHQ QUESTIONS 1-9: 1
SUM OF ALL RESPONSES TO PHQ QUESTIONS 1-9: 1
10. IF YOU CHECKED OFF ANY PROBLEMS, HOW DIFFICULT HAVE THESE PROBLEMS MADE IT FOR YOU TO DO YOUR WORK, TAKE CARE OF THINGS AT HOME, OR GET ALONG WITH OTHER PEOPLE: NOT DIFFICULT AT ALL

## 2023-08-23 NOTE — PROGRESS NOTES
"         [facility]                                            Progress Note    Patient Name: Idalia Hinojosa  Date: 8/23/2023         Service Type: Individual      Session Start Time:  12:38  pm Session End Time: 1: 12 pm  Session Length:  34 minutes    Session #: 103    Attendees: Client     Service Modality: Video visit: -       Provider verified identity through the following two step process.  Patient provided:  Patient is known previously to provider    Telemedicine Visit: The patient's condition can be safely assessed and treated via synchronous audio and visual telemedicine encounter.      Reason for Telemedicine Visit: Services only offered telehealth    Originating Site (Patient Location): Patient's home    Distant Site (Provider Location): Provider Remote Setting- Home Office    Consent:  The patient/guardian has verbally consented to: the potential risks and benefits of telemedicine (telephone visit) versus in person care; bill my insurance or make self-payment for services provided; and responsibility for payment of non-covered services.     Patient would like the video invitation sent by:  My Chart    Mode of Communication:  Video Conference via [employer],     As the provider I attest to compliance with applicable laws and regulations related to telemedicine.     Treatment Plan Last Reviewed: 06/05/2023  PHQ-9/DAVID-7: 7/19/2023      DATA  Interactive Complexity: No  Crisis: No        Progress Since Last Session (Related to Symptoms / Goals / Homework):   Symptoms:  Idalia reports that her mood is \"better\" - feeling less depressed, PHQ score continues to decrease, score of 1 today       Answers submitted by the patient for this visit:  Patient Health Questionnaire (Submitted on 8/23/2023)  If you checked off any problems, how difficult have these problems made it for you to do your work, take care of things at home, or get along with other people?: Not difficult at all  PHQ9 " TOTAL SCORE: 1    Homework:  - was able to be aware of thinking during stressful situations, recognized when her thinking was unhelpful and was able to successfully reframe unhelpful thinking and found this to be helpful.        Episode of Care Goals: Satisfactory progress - ACTION (Actively working towards change); Intervened by reinforcing change plan / affirming steps taken.  The patient stated that her main goals for therapy would be to learn emotion regulation strategies (in particular, to help with when she is feeling upset/angry/frustrated/distressed).   As her  had been diagnosed with cancer, treatment also focused on providing her with support in managing the emotional distress and challenges related to this and caregiver stress.  Current focus has been shifting to managing symptoms and emotional reaction to witnessing her  experience a serious medical event.   PHQ score continues to decrease - will also work with patient on wellness planning (monitoring symptoms, identifying what helps her to stay well, identifying triggers and warning steps, steps she can take if symptoms return)     Current / Ongoing Stressors and Concerns:   Current:  Symptoms of depression, anxiety, stress and physical pain.  She reports that symptoms of depression and anxiety have improved/decreased.  She's also been experiencing symptoms related to feeling triggered by things that remind her of witnessing medical event her  experienced, and avoidance of things that remind her of medical episode he experienced - though she reports feeling less triggered and bothered by this.     They had a very good vacation to [place] and she found the vacation to be very restorative.  She recently got an e-bike and has been enjoying being able to go on bike rides and be outside.  She also reported that work is going well - she's staying busy and productive and is enjoying this.  She also had a good annual review at work - they  "recognized her hard work and she got a good raise.     Ongoing:s, Working on building effective emotion regulation strategies for managing daily frustrations and upsets     Treatment Objective(s) Addressed in This Session:     Psychoeducation on impact of cognitions on emotions and pain experience, mindful awareness of cognitions, and making intentional choice to redirect unhelpful self-talk  Psychoed on mind-body connection      Further assessment of symptoms related to traumatic event and psychoed on impact of witnessing a traumatic event, options for further treatment      Continue with/review of:  Psychoeducation on pain experience and factors that can impact pain experience - with focus on impact of cognitions  Psychoeducation on calming/deep breathing strategies   Continue to incorporate 1-2 behavioral activation strategies to help maintain goal of incorporating physical activity into routine  Review of self-care and importance of attending to your self-care  Practice balanced thinking- challenge polarized thinking   Psychoeducation on mindfulness strategies - practicing a non-judgmental and compassionate stance towards self and others   Learn and practice distress tolerance skills-  self-soothe strategy using the five senses   Practice deep breathing  - practice at regular intervals throughout the day      Intervention: CBT: further discussion on impact of cognitions on emotions and pain experience.   Talked about \"atuomatic negative thoughts\" and opportunity to mindfully observe thoughts, identify unhelpful self-talk, and opportunity we have to consciously choose to redirect unhelpful thoughts to more helpful self-talk.  We also reviewed how to engage calming strategies when feeling tense - focusing on her breath and slowing down (rather than trying to \"force\" or \"try too hard\" to calm, which counters the relaxation response)    Further assessment of trauma related symptoms - she reported that feeling " triggered by things that remind her of the medical episode have decreased.  She said at some point she may want to do further processing/treatment focused on this, but doesn't want to now.  She's aware that we can focus on grounding and emotion regulation strategies as a foundational strategy, which are strategies that can be helpful to have in her tool kit to help her with other things as well.      Denies nightmares, flashbacks and hypervigilance.  She said she does intentionally avoid things that remind her of the medical episode her  experienced.  Experiences increase in tension, physical and emotional pain when exposed to things that remind her of medical episode.      We have previously reviewed potential treatment options to help address symptoms-  She said she thinks at some point she believes this may be helpful, but she's not ready right now.  Emphasized that she is in charge of if/when she chooses to do this.     ASSESSMENT: Current Emotional / Mental Status (status of significant symptoms):   Risk status (Self / Other harm or suicidal ideation)   Patient denies current fears or concerns for personal safety.   Patient denies current or recent suicidal ideation or behaviors.  She reports if the thoughts do occur, she knows steps she can take to manage them and believes she can manage them, and reach out for additional help if needed.     Patientdenies current or recent homicidal ideation or behaviors.   Patient denies current or recent self injurious behavior or ideation.   Patient denies other safety concerns.   Patient reports there has not been a change in risk factors since their last session -      Recommended that patient call 911 or go to the local ED should there be a change in any of these risk factors.  We reviewed how to contact [facility] crisis/COPE for urgent/crisis concerns 24/7.  Provided patient with crisis resources and she agrees to use safety plan should any safety concerns  "arise.      Professionals or agencies I can contact during a crisis:  Suicide Prevention Lifeline: call or text 988  Crisis Text Line: text \"[place]\" to 237629  Local Crisis Services: [facility]: [phone]  Call 911 or go to my nearest emergency department, or [facility].         Appearance:   Appropriate    Eye Contact:   Good    Psychomotor Behavior: Normal   Attitude:   Cooperative    Orientation:   All   Speech    Rate / Production: Normal     Volume:  Normal    Mood:    Reports mood is improved and she is feeling \"better\"    Affect:    Congruent with mood   Thought Content:  Clear    Thought Form:  Coherent  Logical    Insight:    Good      Medication Review:   No changes to psychiatric medication   She reported she's been prescribed nprn/as needed medication for her physical pain      Medication Compliance:   Yes      Changes in Health Issues:    Continues to have back pain and continues to work with PT and recently started acupuncture to help with this.  She reports physical pain has been improved over the past week.         Chemical Use Review:  Occasional alcoholic beverage on social occasions, she reports she's observant to how it impacts her mood to help with making choices as to whether or not to have any alcohol.     Substance Use: Chemical use reviewed, no changes or active concerns identified.      Tobacco Use: No current tobacco use.       Diagnosis:  Anxiety disorder unspecified  Major depression, recurrent episode, moderate  Rule out Post-Traumatic Stress Disorder versus Adjustment Disorder (does not appear to meet criteria for PTSD, however, does continue to experience symptoms, in particular avoidance and hyperarousal, in the context of having witnessed her  experience a medical emergency)    Will continue to monitor symptoms in response to witnessing  experience medical emergency and assist patient with processing event, self-care, and symptom management.       " "   PLAN: (Patient Tasks / Therapist Tasks / Other)    1) Idalia identified the following goals:  Practice calming strategies when body/muscles are tensing  -pay attention to breathing, awareness of breath, without trying to \"force\" herself or \"try too hard\" to calm, which can counter the calming response -     Being aware of my thoughts when reacting to stressful/situations.  When are they not helpful?  Practice reframing.       2) If there is a crisis situation, remember you are not alone and that there are people who can help you twenty-four hours a day, seven days a week.  Call [place] ([facility] Crisis [facility]): [phone].      3) Check-in scheduled for 9/13 at 12:30 pm.   If urgent or crisis concerns arise prior to next scheduled appointment, please reach out to crisis resources, call 911, or go to the emergency department.      Cristy Wilkinson PsyD, LP  Licensed Psychologist  8/23/2023                                                     ____________________________________    Treatment Plan     Patient's Name: Idalia Hinojosa                        YOB: 1980     Date of Creation: 1/6/2020  Date Treatment Plan Last Reviewed/Revised: 6/5/2023       DSM5 Diagnoses: 300.00 (F41.9) Unspecified Anxiety Disorder, major depression, moderate, recurrent episode  Psychosocial / Contextual Factors: strain in marital relationship, parenting challenges, adjustment challenges, 's cancer diagnosis  PROMIS (reviewed every 90 days):      Anticipated number of session for this episode of care: additional 15-20  Anticipation frequency of session: Sessions have been decreased to bi-weekly or once every three weeks due to progress with treatment goals.   Anticipated Duration of each session: 38-52 minutes  Treatment plan will be reviewed in 90 days or when goals have been changed.         Referral / Collaboration:  No referrals at this time.  Have previously recommended couples therapy referral.  " Coordinate with Dr. Han, patient's PCP.        MeasurableTreatment Goal(s) related to diagnosis / functional impairment(s)  Goal 1: Patient will learn emotion regulation strategies to help when she is feeling upset/angry/frustrated/distressed    I will know I've met my goal when I would yell less, I would feel calmer, and I would not push others.  I would be less physical when I'm angry (e.g. wouldn't slam doors or hit things)        Objective #A (Patient Action)                          Patient will learn and practice at least 2 new emotion regulation strategies.  Status: Continued - Date(s):    6/5/2023- Regularly practicing emotion regulation strategies to help manage increase in emotional distress related to spouse's cancer diagnosis.  She has been successful with using strategies to help her stay calm when her daughter's experiencing heightened emotion and/or challenging behaviors.          Intervention(s)  Therapist will provide psychoeducation on emotion regulation module from DBT and will assign patient homework to help reinforce skill development.     Objective #B  Patient will identify factors that increase vulnerability to emotion mind and identify ways to decrease vulnerability to emotion mind.  Status: Continued - Date(s):6/5/2023 - routinely incorporating mindfulness, distress tolerance and self-awareness strategies to increase attention and focus to factors that increase vulnerability to emotion mind     Intervention(s)  Therapist will provide information on factors that increase vulnerabiliyt to emotion mind      Objective #C  Patient will identify warning signs and signals that emotions are escalating and will learn ways to skillfully intervene early on.  Status: Date: 6/5/2023- in progress - has demonstrated good progress in being able to identify warning signs and intervene skillfully-working on incorporating deep breathing to help with calming and re-centering.          Intervention(s)  Therapist will help patient identify warning signs and signals, and will guide the patient in identifying skillful ways to intervene when exeriencing warning signs and signals.      Objective #B (Patient Action)                          Status: NEW - Date(s):6/5/2023- Patient will learn mind-body connection, healthy ways to manage stress, calming strategies, and ways she can address cognitive and behavioral factors that can contribute to pain experience.        Patient will learn and practice at least two strategies that help improve her emotional well-being.     Intervention(s)  Therapist will teach calming strategies and healthy stress management strategies, and will assign homework to help reinforce skill development.             Goal 2: Patient will learn new parenting strategies to help with managing parenting challenges.  Parent will work on improving relationship with her daughter and defining what she would like this relationship to look like.      I will know I've met my goal when I'm enjoying time with my daughter, teaching her new things, and not waiting for things to change       Objective #A (Patient Action)                          Status: Continued - Date(s):6/5/2023       Patient will increase understanding of developmental norms for her daughter and ways to manage challenging behaviors.     Intervention(s)  Therapist will provide psychoeducation on developmental norms and parenting strategies.     Objective #B  Patient will spend time reflecting on her relationship with her daughter and defining what she would like this relaitonship to look like.                  Status: Continued - Date(s):6/5/2023   Making good progress - has also been working with daughter's therapist and has been actively practicing and using suggested strategies.           Intervention(s)  Therapist will guide the patient in reflecting upon her relationship with her daughter and help her define ways to move  "towards what she vaues in her relationship with her daughter.     Objective #C  Patient will increase time spent on fun activity and play with her daughter.  Status: Continued - Date(s):6/5/2023-has been enjoying time with daughter and devoting regular time to fun activities and play together           Intervention(s)  Therapist will guide the patient in identifying ways she can increase positive time with her daughter.  Therapist will also teach mindfulness strategies to help patient with being in the present moment when appropriate - .        Goal 3: Patiient will improve communication with her .      I will know I've met my goal when I feel less irritated with my  and communicate more openly with my .  I would like to be more assertive and less aggressive.   I would like to not avoid telling him things because I'm worried about his reaction or don't think he will react well.  I would like to be more present to the moment during times when this would be helpful.\"       Objective #A (Patient Action)                          Status: Continued - Date(s):6/5/2023- continues to work on this goal, though emphasis has shift to managing caregiver stress related to 's cancer diagnosis and treatment.              Patient will learn and practice at least two new interpersonal effectiveness strategies.     Intervention(s)  Therapist will teach strategies from DBT module on interpersonal effectiveness, and will assign homework to help reinforce skill development.            Patient has reviewed and agreed to the above plan.        Cristy Wilkinson PsyD,   Licensed Psychologist  Reviewed on 6/5/2023                "

## 2023-08-28 ENCOUNTER — LAB (OUTPATIENT)
Dept: LAB | Facility: CLINIC | Age: 43
End: 2023-08-28
Payer: COMMERCIAL

## 2023-08-28 DIAGNOSIS — Z13.29 SCREENING FOR THYROID DISORDER: ICD-10-CM

## 2023-08-28 DIAGNOSIS — Z13.0 SCREENING, ANEMIA, DEFICIENCY, IRON: ICD-10-CM

## 2023-08-28 DIAGNOSIS — Z13.220 SCREENING FOR LIPID DISORDERS: ICD-10-CM

## 2023-08-28 DIAGNOSIS — Z13.1 SCREENING FOR DIABETES MELLITUS: ICD-10-CM

## 2023-08-28 DIAGNOSIS — Z11.59 ENCOUNTER FOR SCREENING FOR OTHER VIRAL DISEASES: ICD-10-CM

## 2023-08-28 LAB
ALBUMIN SERPL BCG-MCNC: 4 G/DL (ref 3.5–5.2)
ALP SERPL-CCNC: 59 U/L (ref 35–104)
ALT SERPL W P-5'-P-CCNC: 27 U/L (ref 0–50)
ANION GAP SERPL CALCULATED.3IONS-SCNC: 11 MMOL/L (ref 7–15)
AST SERPL W P-5'-P-CCNC: 45 U/L (ref 0–45)
BILIRUB SERPL-MCNC: 0.5 MG/DL
BUN SERPL-MCNC: 8.8 MG/DL (ref 6–20)
CALCIUM SERPL-MCNC: 8.8 MG/DL (ref 8.6–10)
CHLORIDE SERPL-SCNC: 104 MMOL/L (ref 98–107)
CHOLEST SERPL-MCNC: 214 MG/DL
CREAT SERPL-MCNC: 0.79 MG/DL (ref 0.51–0.95)
DEPRECATED HCO3 PLAS-SCNC: 22 MMOL/L (ref 22–29)
ERYTHROCYTE [DISTWIDTH] IN BLOOD BY AUTOMATED COUNT: 12.3 % (ref 10–15)
GFR SERPL CREATININE-BSD FRML MDRD: >90 ML/MIN/1.73M2
GLUCOSE SERPL-MCNC: 87 MG/DL (ref 70–99)
HBV SURFACE AB SERPL IA-ACNC: 34.44 M[IU]/ML
HBV SURFACE AB SERPL IA-ACNC: REACTIVE M[IU]/ML
HCT VFR BLD AUTO: 40.8 % (ref 35–47)
HDLC SERPL-MCNC: 62 MG/DL
HGB BLD-MCNC: 13.9 G/DL (ref 11.7–15.7)
LDLC SERPL CALC-MCNC: 133 MG/DL
MCH RBC QN AUTO: 31.4 PG (ref 26.5–33)
MCHC RBC AUTO-ENTMCNC: 34.1 G/DL (ref 31.5–36.5)
MCV RBC AUTO: 92 FL (ref 78–100)
NONHDLC SERPL-MCNC: 152 MG/DL
PLATELET # BLD AUTO: 288 10E3/UL (ref 150–450)
POTASSIUM SERPL-SCNC: 4.3 MMOL/L (ref 3.4–5.3)
PROT SERPL-MCNC: 6.7 G/DL (ref 6.4–8.3)
RBC # BLD AUTO: 4.42 10E6/UL (ref 3.8–5.2)
SODIUM SERPL-SCNC: 137 MMOL/L (ref 136–145)
TRIGL SERPL-MCNC: 96 MG/DL
TSH SERPL DL<=0.005 MIU/L-ACNC: 1.52 UIU/ML (ref 0.3–4.2)
WBC # BLD AUTO: 5 10E3/UL (ref 4–11)

## 2023-08-28 PROCEDURE — 86706 HEP B SURFACE ANTIBODY: CPT

## 2023-08-28 PROCEDURE — 84443 ASSAY THYROID STIM HORMONE: CPT

## 2023-08-28 PROCEDURE — 80061 LIPID PANEL: CPT

## 2023-08-28 PROCEDURE — 85027 COMPLETE CBC AUTOMATED: CPT

## 2023-08-28 PROCEDURE — 80053 COMPREHEN METABOLIC PANEL: CPT

## 2023-08-28 PROCEDURE — 36415 COLL VENOUS BLD VENIPUNCTURE: CPT

## 2023-08-29 ENCOUNTER — MYC MEDICAL ADVICE (OUTPATIENT)
Dept: ORTHOPEDICS | Facility: CLINIC | Age: 43
End: 2023-08-29
Payer: COMMERCIAL

## 2023-08-30 NOTE — TELEPHONE ENCOUNTER
Jane with Augustine Harrison PA-C in clinic in Dr. Yeo's absence. Numbness / tingling in toes could be a side effect of taking Celebrex. He recommends patient stop taking the Celebrex for 3 days to see if symptoms resolve.    Sent patient [employer] message.    Arlen Saunders MBA, ATC

## 2023-08-31 ENCOUNTER — THERAPY VISIT (OUTPATIENT)
Dept: PHYSICAL THERAPY | Facility: CLINIC | Age: 43
End: 2023-08-31
Payer: COMMERCIAL

## 2023-08-31 DIAGNOSIS — M54.2 NECK PAIN ON RIGHT SIDE: Primary | ICD-10-CM

## 2023-08-31 DIAGNOSIS — M25.511 PAIN IN JOINT OF RIGHT SHOULDER: ICD-10-CM

## 2023-08-31 PROCEDURE — 97140 MANUAL THERAPY 1/> REGIONS: CPT | Mod: GP | Performed by: PHYSICAL THERAPIST

## 2023-08-31 PROCEDURE — 97110 THERAPEUTIC EXERCISES: CPT | Mod: GP | Performed by: PHYSICAL THERAPIST

## 2023-09-06 NOTE — TELEPHONE ENCOUNTER
Spoke with patient personally today.  She states that she has been on Celebrex for numerous months but only taking it intermittently prior to seeing Dr. Yeo for her back condition and therefore she started taking it every 12 hours.  She did go on an extensive walking vacation which seem to correlate to the onset of current symptoms.  She states that she has abnormal tingling of the second through fourth digits of her toes on both feet of unknown etiology.  She is not diabetic per her statement.  She wears tennis shoes almost exclusively but they may be wearing out.  She has been off Celebrex now for 1 week without any significant improvement of symptoms.  We discussed that Celebrex is unlikely to be the causative agent but I would recommend that she remain off of it at this time.  I recommend that she see one of our podiatrist to evaluate her feet to give a second opinion as she denies any other abnormal radicular or tingling sensation throughout the lower extremities.  Patient will call the appointment line at her convenience and make an appointment with one of our podiatrist for assessment.

## 2023-09-09 DIAGNOSIS — M25.511 ACUTE PAIN OF RIGHT SHOULDER: ICD-10-CM

## 2023-09-09 DIAGNOSIS — M62.838 TRAPEZIUS MUSCLE SPASM: ICD-10-CM

## 2023-09-11 DIAGNOSIS — M54.2 NECK PAIN: ICD-10-CM

## 2023-09-11 RX ORDER — CYCLOBENZAPRINE HCL 5 MG
5-10 TABLET ORAL
Qty: 30 TABLET | Refills: 0 | OUTPATIENT
Start: 2023-09-11

## 2023-09-11 RX ORDER — CELECOXIB 100 MG/1
100 CAPSULE ORAL 2 TIMES DAILY PRN
Qty: 60 CAPSULE | Refills: 0 | OUTPATIENT
Start: 2023-09-11

## 2023-09-11 NOTE — TELEPHONE ENCOUNTER
Received refill request for Celecoxib 100mg from [facility] in [facility].     Please see [employer] message dated 8/29/23.   Per Augustine Harrison PA-C :  We discussed that Celebrex is unlikely to be the causative agent but I would recommend that she remain off of it at this time.     Refill request denied.     SALVADOR Salamanca RN

## 2023-09-11 NOTE — TELEPHONE ENCOUNTER
Received a refill request for Cyclobenzaprine 5mg.   Last prescribed on 4/3/23 by Dr. Marcos     Patient then saw Dr. Yeo on 6/21/23 and he prescribed Tizanidine 2mg instead.     Patient was to follow up if pain did not improve. Patient did not respond to 8/4/23 [employer] message regarding Dr. Yeo's previous recommendations.   If patient continues to have pain, she needs to follow up in the office.     Refill denied as patient needs to be seen.   Patient has an appointment scheduled for 10/2/23 with Dr. Yeo.     [employer] message sent.     SALVADOR Salamanca RN

## 2023-09-13 ENCOUNTER — VIRTUAL VISIT (OUTPATIENT)
Dept: PSYCHOLOGY | Facility: CLINIC | Age: 43
End: 2023-09-13
Payer: COMMERCIAL

## 2023-09-13 DIAGNOSIS — F33.0 MILD EPISODE OF RECURRENT MAJOR DEPRESSIVE DISORDER (H): Primary | ICD-10-CM

## 2023-09-13 DIAGNOSIS — F41.9 ANXIETY DISORDER, UNSPECIFIED TYPE: ICD-10-CM

## 2023-09-13 PROCEDURE — 90834 PSYTX W PT 45 MINUTES: CPT | Mod: VID | Performed by: PSYCHOLOGIST

## 2023-09-13 NOTE — PROGRESS NOTES
"         [facility]                                            Progress Note    Patient Name: Idalia Hinojosa  Date: 9/13/2023           Service Type: Individual      Session Start Time:  12:32  pm Session End Time: 1: 17 pm  Session Length:  45 minutes    Session #: 104    Attendees: Client     Service Modality: Video visit: -       Provider verified identity through the following two step process.  Patient provided:  Patient is known previously to provider    Telemedicine Visit: The patient's condition can be safely assessed and treated via synchronous audio and visual telemedicine encounter.      Reason for Telemedicine Visit: Services only offered telehealth    Originating Site (Patient Location): Patient's home    Distant Site (Provider Location): Provider Remote Setting- Home Office    Consent:  The patient/guardian has verbally consented to: the potential risks and benefits of telemedicine (telephone visit) versus in person care; bill my insurance or make self-payment for services provided; and responsibility for payment of non-covered services.     Patient would like the video invitation sent by:  My Chart    Mode of Communication:  Video Conference via [employer],     As the provider I attest to compliance with applicable laws and regulations related to telemedicine.     Treatment Plan Last Reviewed: 09/13/2023  PHQ-9/DAVID-7: 7/19/2023      DATA  Interactive Complexity: No  Crisis: No        Progress Since Last Session (Related to Symptoms / Goals / Homework):   Symptoms:  Idalia reports that her mood remains about the same since last session which she describes as overall improved since returning to therapy, and \"pretty good\".  Notes that she could be more mindful of practicing calming strategies when feeling tense/stressed and tune into her thinking more.      Homework:  - she reported she forgot about engaging calming strategies when feeling tense/stressed - thinks this would be " helpful for her to focus on - we talked about ways to help remind her to practice this strategy (e.g. post-it note on mirror, on desk at work, etc.)      Episode of Care Goals: Satisfactory progress - ACTION (Actively working towards change); Intervened by reinforcing change plan / affirming steps taken.  The patient stated that her main goals for therapy would be to learn emotion regulation strategies (in particular, to help with when she is feeling upset/angry/frustrated/distressed).   As her  had been diagnosed with cancer, treatment also focused on providing her with support in managing the emotional distress and challenges related to this and caregiver stress.  Current focus has been shifting to managing symptoms and emotional reaction to witnessing her  experience a serious medical event.   PHQ score continues to decrease - will also work with patient on wellness planning (monitoring symptoms, identifying what helps her to stay well, identifying triggers and warning steps, steps she can take if symptoms return)     Current / Ongoing Stressors and Concerns:  Current: Symptoms of depression, anxiety, stress and physical pain, all of which have experienced some improvement.  She shared some insights she has had regarding her identity and acceptance of herself.       Recent:  Symptoms of depression, anxiety, stress and physical pain.  She reports that symptoms of depression and anxiety have improved/decreased.  She's also been experiencing symptoms related to feeling triggered by things that remind her of witnessing medical event her  experienced, and avoidance of things that remind her of medical episode he experienced - though she reports feeling less triggered and bothered by this.     Ongoing: Working on building effective emotion regulation strategies for managing daily frustrations and upsets     Treatment Objective(s) Addressed in This Session:     New today: Discussion around recent  "insights she's had around her identity and acceptance of herself.      Reviewed/reinforced:  Psychoeducation on impact of cognitions on emotions and pain experience, mindful awareness of cognitions, and making intentional choice to redirect unhelpful self-talk  Psychoed on mind-body connection    Continue with:  Psychoeducation on pain experience and factors that can impact pain experience - with focus on impact of cognitions  Psychoeducation on calming/deep breathing strategies   Continue to incorporate 1-2 behavioral activation strategies to help maintain goal of incorporating physical activity into routine  Review of self-care and importance of attending to your self-care  Practice balanced thinking- challenge polarized thinking   Psychoeducation on mindfulness strategies - practicing a non-judgmental and compassionate stance towards self and others   Learn and practice distress tolerance skills-  self-soothe strategy using the five senses   Practice deep breathing  - practice at regular intervals throughout the day      Intervention:     Review and reinforcement of CBT strategies to help with awareness of  impact of cognitions on emotions and pain experience.   Review of how to engage calming strategies when feeling tense - focusing on her breath and slowing down (rather than trying to \"force\" or \"try too hard\" to calm, which counters the relaxation response) - with focus on ways to help her remind her to be aware of these things (e.g. post-it note reminders, reminders to breathe, tune into her thoughts, etc.      She also processed recent insights she has had around her identity and how she's been able to share this with a few close people and has had a positive response to this.  She reports feeling lightened by being able to acknowledge feelings, thoughts and experiences she has had and make sense of this.         ASSESSMENT: Current Emotional / Mental Status (status of significant symptoms):   Risk status " "(Self / Other harm or suicidal ideation)   Patient denies current fears or concerns for personal safety.   Patient denies current or recent suicidal ideation or behaviors.  She reports if the thoughts do occur, she knows steps she can take to manage them and believes she can manage them, and reach out for additional help if needed.     Patientdenies current or recent homicidal ideation or behaviors.   Patient denies current or recent self injurious behavior or ideation.   Patient denies other safety concerns.   Patient reports there has not been a change in risk factors since their last session -      Recommended that patient call 911 or go to the local ED should there be a change in any of these risk factors.  We reviewed how to contact [facility] crisis/COPE for urgent/crisis concerns 24/7.  Provided patient with crisis resources and she agrees to use safety plan should any safety concerns arise.      Professionals or agencies I can contact during a crisis:  Suicide Prevention Lifeline: call or text 657  Crisis Text Line: text \"[place]\" to 572043  Local Crisis Services: [facility] Crisis Services: [phone]  Call 911 or go to my nearest emergency department, or [facility].         Appearance:   Appropriate    Eye Contact:   Good    Psychomotor Behavior: Normal   Attitude:   Cooperative    Orientation:   All   Speech    Rate / Production: Normal     Volume:  Normal    Mood:    Reports mood is \"pretty good\"   Affect:    Congruent with mood   Thought Content:  Clear    Thought Form:  Coherent  Logical    Insight:    Good      Medication Review:   No changes to psychiatric medication   She said she's no longer taking pain medication (doctor advised she take a break from it due to tingling/numbness in her toes)      Medication Compliance:   Yes      Changes in Health Issues:    Continues to have back pain- she reported that it is more manageable and she's learning things that are helping her to manage it.  " "Continues with physical therapy and acupuncture      Chemical Use Review:  Occasional alcoholic beverage on social occasions, she reports she's observant to how it impacts her mood to help with making choices as to whether or not to have any alcohol.  She reported she also had a drink with monserrat in it/she contacted pharmacist to see how it reacts with medications.       Substance Use: Chemical use reviewed, no active concerns identified.      Tobacco Use: No current tobacco use.       Diagnosis:  Anxiety disorder unspecified  Major depression, recurrent episode, moderate  Rule out Post-Traumatic Stress Disorder versus Adjustment Disorder (does not appear to meet criteria for PTSD, however, does continue to experience symptoms, in particular avoidance and hyperarousal, in the context of having witnessed her  experience a medical emergency)    Will continue to monitor symptoms in response to witnessing  experience medical emergency and assist patient with processing event, self-care, and symptom management.          PLAN: (Patient Tasks / Therapist Tasks / Other)    1) Idalia identified the following goals:  Use strategies to help with remembering to practice calming strategies when body/muscles are tensing  -pay attention to breathing, awareness of breath, without trying to \"force\" herself or \"try too hard\" to calm, which can counter the calming response -     Being aware of my thoughts when reacting to stressful/situations.  When are they not helpful?  Practice reframing.       2) If there is a crisis situation, remember you are not alone and that there are people who can help you twenty-four hours a day, seven days a week.  Call [facility] ([facility]): [phone].      3) Check-in scheduled for October 11th at 12:30 pm.   If urgent or crisis concerns arise prior to next scheduled appointment, please reach out to crisis resources, call 911, or go to the emergency department.  "     Cristy Wilkinson PsyD, LP6  Licensed Psychologist  9/13/2023                                                     ____________________________________    Treatment Plan     Patient's Name: Idalia Hinojosa                        YOB: 1980     Date of Creation: 1/6/2020  Date Treatment Plan Last Reviewed/Revised: 9/13/2023       DSM5 Diagnoses: 300.00 (F41.9) Unspecified Anxiety Disorder, major depression, moderate, recurrent episode  Psychosocial / Contextual Factors: strain in marital relationship, parenting challenges, adjustment challenges, 's cancer diagnosis  PROMIS (reviewed every 90 days):      Anticipated number of session for this episode of care: additional 15-20  Anticipation frequency of session: Sessions have been decreased to bi-weekly or once every three weeks due to progress with treatment goals.   Anticipated Duration of each session: 38-52 minutes  Treatment plan will be reviewed in 90 days or when goals have been changed.         Referral / Collaboration:  No referrals at this time.  Have previously recommended couples therapy referral.  Coordinate with Dr. Han, patient's PCP.        MeasurableTreatment Goal(s) related to diagnosis / functional impairment(s)  Goal 1: Patient will learn emotion regulation strategies to help when she is feeling upset/angry/frustrated/distressed    I will know I've met my goal when I would yell less, I would feel calmer, and I would not push others.  I would be less physical when I'm angry (e.g. wouldn't slam doors or hit things)        Objective #A (Patient Action)                          Patient will learn and practice at least 2 new emotion regulation strategies.  Status: Continued - Date(s):    9/13/2023- Regularly practicing emotion regulation strategies to help manage increase in emotional distress related to spouse's cancer diagnosis.  She has been successful with using strategies to help her stay calm when her daughter's  experiencing heightened emotion and/or challenging behaviors.  She's working on remembering to engage calming strategies when feeling physically tense and/or anxious/stressed.          Intervention(s)  Therapist will provide psychoeducation on emotion regulation module from DBT and will assign patient homework to help reinforce skill development.     Objective #B  Patient will identify factors that increase vulnerability to emotion mind and identify ways to decrease vulnerability to emotion mind.  Status: Continued - Date(s):9/13/2023 - routinely incorporating mindfulness, distress tolerance and self-awareness strategies to increase attention and focus to factors that increase vulnerability to emotion mind     Intervention(s)  Therapist will provide information on factors that increase vulnerabiliyt to emotion mind      Objective #C  Patient will identify warning signs and signals that emotions are escalating and will learn ways to skillfully intervene early on.  Status: Date: 9/13/2023- in progress - has demonstrated good progress and awareness in being able to identify warning signs and intervene skillfully-working on incorporating deep breathing to help with calming and re-centering.         Intervention(s)  Therapist will help patient identify warning signs and signals, and will guide the patient in identifying skillful ways to intervene when exeriencing warning signs and signals.      Objective #B (Patient Action)                          Status: NEW - Date(s):9/13/2023- Patient will learn mind-body connection, healthy ways to manage stress, calming strategies, and ways she can address cognitive and behavioral factors that can contribute to pain experience.        Patient will learn and practice at least two strategies that help improve her emotional well-being.       Intervention(s)  Therapist will teach calming strategies and healthy stress management strategies, and will assign homework to help reinforce  "skill development.  Status - patient has been working on engaging calming strategies and in particular, not trying to \"force herself\" to calm down which counters the relaxation response.  Working currently on how to cue/remind herself to engage these strategies.               Goal 2: Patient will learn new parenting strategies to help with managing parenting challenges.  Parent will work on improving relationship with her daughter and defining what she would like this relationship to look like.      I will know I've met my goal when I'm enjoying time with my daughter, teaching her new things, and not waiting for things to change       Objective #A (Patient Action)                          Status: Continued - Date(s):9/13/2023         Patient will increase understanding of developmental norms for her daughter and ways to manage challenging behaviors.     Intervention(s)  Therapist will provide psychoeducation on developmental norms and parenting strategies.     Objective #B  Patient will spend time reflecting on her relationship with her daughter and defining what she would like this relaitonship to look like.                  Status: Continued - Date(s):9/13/2023     Making good progress - has also been working with daughter's therapist and has been actively practicing and using suggested strategies.   Likely can work towards resolving this goal, as this goal has largely shifted towards the work she is doing with her daughter's individual therapist, where she believes they have been making good progress on helping her in her relationship with her daughter.          Intervention(s)  Therapist will guide the patient in reflecting upon her relationship with her daughter and help her define ways to move towards what she vaues in her relationship with her daughter.     Objective #C  Patient will increase time spent on fun activity and play with her daughter.  Status: Continued - Date(s):9/13/2023-has been enjoying time " "with daughter and devoting regular time to fun activities and play together           Intervention(s)  Therapist will guide the patient in identifying ways she can increase positive time with her daughter.  Therapist will also teach mindfulness strategies to help patient with being in the present moment when appropriate - .        Goal 3: Patiient will improve communication with her .      I will know I've met my goal when I feel less irritated with my  and communicate more openly with my .  I would like to be more assertive and less aggressive.   I would like to not avoid telling him things because I'm worried about his reaction or don't think he will react well.  I would like to be more present to the moment during times when this would be helpful.\"       Objective #A (Patient Action)                          Status: Continued - Date(s):9/13/2023   continues to work on this goal, though emphasis has shift to managing caregiver stress related to 's cancer diagnosis and treatment.              Patient will learn and practice at least two new interpersonal effectiveness strategies.     Intervention(s)  Therapist will teach strategies from DBT module on interpersonal effectiveness, and will assign homework to help reinforce skill development.            Patient has reviewed and agreed to the above plan.        Cristy Wilkinson PsyD, LP  Licensed Psychologist  Reviewed on 9/13/2023                  "

## 2023-09-14 ENCOUNTER — THERAPY VISIT (OUTPATIENT)
Dept: PHYSICAL THERAPY | Facility: CLINIC | Age: 43
End: 2023-09-14
Payer: COMMERCIAL

## 2023-09-14 DIAGNOSIS — M25.511 PAIN IN JOINT OF RIGHT SHOULDER: ICD-10-CM

## 2023-09-14 DIAGNOSIS — M54.2 NECK PAIN ON RIGHT SIDE: Primary | ICD-10-CM

## 2023-09-14 PROCEDURE — 97110 THERAPEUTIC EXERCISES: CPT | Mod: GP | Performed by: PHYSICAL THERAPIST

## 2023-09-14 PROCEDURE — 97140 MANUAL THERAPY 1/> REGIONS: CPT | Mod: GP | Performed by: PHYSICAL THERAPIST

## 2023-10-02 ENCOUNTER — OFFICE VISIT (OUTPATIENT)
Dept: ORTHOPEDICS | Facility: CLINIC | Age: 43
End: 2023-10-02
Payer: COMMERCIAL

## 2023-10-02 VITALS
HEIGHT: 66 IN | BODY MASS INDEX: 27.32 KG/M2 | DIASTOLIC BLOOD PRESSURE: 70 MMHG | SYSTOLIC BLOOD PRESSURE: 111 MMHG | WEIGHT: 170 LBS | HEART RATE: 83 BPM

## 2023-10-02 DIAGNOSIS — M54.12 CERVICAL RADICULOPATHY: ICD-10-CM

## 2023-10-02 DIAGNOSIS — M54.2 NECK PAIN: ICD-10-CM

## 2023-10-02 DIAGNOSIS — M62.838 TRAPEZIUS MUSCLE SPASM: Primary | ICD-10-CM

## 2023-10-02 PROCEDURE — 99214 OFFICE O/P EST MOD 30 MIN: CPT | Performed by: FAMILY MEDICINE

## 2023-10-02 RX ORDER — TIZANIDINE 2 MG/1
2 TABLET ORAL
Qty: 90 TABLET | Refills: 0 | Status: SHIPPED | OUTPATIENT
Start: 2023-10-02

## 2023-10-02 RX ORDER — CELECOXIB 100 MG/1
100 CAPSULE ORAL 2 TIMES DAILY PRN
Qty: 60 CAPSULE | Refills: 0 | Status: SHIPPED | OUTPATIENT
Start: 2023-10-02 | End: 2024-04-24

## 2023-10-02 NOTE — PATIENT INSTRUCTIONS
1. Trapezius muscle spasm    2. Cervical radiculopathy    3. Neck pain      -Patient is following up for chronic neck and upper back pain due to muscle strains  -Patient reports improvement in her symptoms with physical therapy and home exercise program  -It is also receiving acupuncture which is temporarily helping with her pain  -Patient is taking tizanidine and Celebrex as needed for muscle spasm exacerbations which are helping with her symptoms.  Patient states she is taking the medications 0-2 times per week as needed.  -Refill for tizanidine and Celebrex were sent to the pharmacy today  -Patient will continue with home exercise program but slowly increase the weights and resistance to increase her strength  -Patient will follow up if pain worsens  -Call direct clinic number [[phone]] at any time with questions or concerns.    Albert Yeo MD [facility]  [facility]

## 2023-10-02 NOTE — PROGRESS NOTES
ASSESSMENT & PLAN  Patient Instructions     1. Trapezius muscle spasm    2. Cervical radiculopathy    3. Neck pain      -Patient is following up for chronic neck and upper back pain due to muscle strains  -Patient reports improvement in her symptoms with physical therapy and home exercise program  -It is also receiving acupuncture which is temporarily helping with her pain  -Patient is taking tizanidine and Celebrex as needed for muscle spasm exacerbations which are helping with her symptoms.  Patient states she is taking the medications 0-2 times per week as needed.  -Refill for tizanidine and Celebrex were sent to the pharmacy today  -Patient will continue with home exercise program but slowly increase the weights and resistance to increase her strength  -Patient will follow up if pain worsens  -Call direct clinic number [[phone]] at any time with questions or concerns.    Albert Yeo MD [facility]  [facility]        -----    SUBJECTIVE:  Idalia Hinojosa is a 43 year old female who is seen in follow-up for cervical radiculopathy.They were last seen 6/30/2023 for cervical radiculopathy.     Since their last visit reports 25% improvement, pt states that pain is overall improving. They indicate that their current pain level is 4/10. They have tried rest/activity avoidance, heat, other medications: Cyclobenzaprine (Flexeril), Celebrex, tizanidine (2 mg) at night time, oral steroid, home exercises, physical therapy.      Pt would like to speak about tingling in the toe and if it is related to her other pain.    The patient is seen by themselves.    Patient's past medical, surgical, social, and family histories were reviewed today and no changes are noted.    REVIEW OF SYSTEMS:  Constitutional: NEGATIVE for fever, chills, change in weight  Skin: NEGATIVE for worrisome rashes, moles or lesions  GI/: NEGATIVE for bowel or bladder changes  Neuro: NEGATIVE for  "weakness, dizziness or paresthesias    OBJECTIVE:  /70   Pulse 83   Ht 1.676 m (5' 6\")   Wt 77.1 kg (170 lb)   BMI 27.44 kg/m     General: healthy, alert and in no distress  HEENT: no scleral icterus or conjunctival erythema  Skin: no suspicious lesions or rash. No jaundice.  CV: regular rhythm by palpation, no pedal edema  Resp: normal respiratory effort without conversational dyspnea   Psych: normal mood and affect  Gait: normal steady gait with appropriate coordination and balance  Neuro: normal light touch sensory exam of the extremities.    MSK:  CERVICAL SPINE  Inspection:    No redness, swelling, ecchymosis, overlying skin change, or gross deformity/asymmetry, normal cervical lordosis present  Palpation:    Tender about the paracervical musculature (bilateral), levator scapula (bilateral), upper trapezius (bilateral), lower trapezius (both), rhomboids (bilateral) and medial border of scapula. Otherwise remainder of the landmarks and nontender.  Range of Motion:     Flexion within normal limits    Extension within normal limits    Right side bend within normal limits    Left side bend within normal limits    Right rotation within normal limits    Left rotation within normal limits  Strength:    Full strength throughout all neck muscles  Special Tests:    Positive: None    Negative: Spurling's (bilateral), Ballard's (bilateral)    Independent visualization of the below image:      Albert Yeo MD, [facility]      "

## 2023-10-02 NOTE — LETTER
10/2/2023         RE: Idalia Hinojosa  [address]  [facility] [address]        Dear Colleague,    Thank you for referring your patient, Idalia Hinojosa, to the [facility] [place]. Please see a copy of my visit note below.    ASSESSMENT & PLAN  Patient Instructions     1. Trapezius muscle spasm    2. Cervical radiculopathy    3. Neck pain      -Patient is following up for chronic neck and upper back pain due to muscle strains  -Patient reports improvement in her symptoms with physical therapy and home exercise program  -It is also receiving acupuncture which is temporarily helping with her pain  -Patient is taking tizanidine and Celebrex as needed for muscle spasm exacerbations which are helping with her symptoms.  Patient states she is taking the medications 0-2 times per week as needed.  -Refill for tizanidine and Celebrex were sent to the pharmacy today  -Patient will continue with home exercise program but slowly increase the weights and resistance to increase her strength  -Patient will follow up if pain worsens  -Call direct clinic number [[phone]] at any time with questions or concerns.    Albert Yeo MD [facility]  [facility]        -----    SUBJECTIVE:  Idalia Hinojosa is a 43 year old female who is seen in follow-up for cervical radiculopathy.They were last seen 6/30/2023 for cervical radiculopathy.     Since their last visit reports 25% improvement, pt states that pain is overall improving. They indicate that their current pain level is 4/10. They have tried rest/activity avoidance, heat, other medications: Cyclobenzaprine (Flexeril), Celebrex, tizanidine (2 mg) at night time, oral steroid, home exercises, physical therapy.      Pt would like to speak about tingling in the toe and if it is related to her other pain.    The patient is seen by themselves.    Patient's past medical, surgical,  "social, and family histories were reviewed today and no changes are noted.    REVIEW OF SYSTEMS:  Constitutional: NEGATIVE for fever, chills, change in weight  Skin: NEGATIVE for worrisome rashes, moles or lesions  GI/: NEGATIVE for bowel or bladder changes  Neuro: NEGATIVE for weakness, dizziness or paresthesias    OBJECTIVE:  /70   Pulse 83   Ht 1.676 m (5' 6\")   Wt 77.1 kg (170 lb)   BMI 27.44 kg/m     General: healthy, alert and in no distress  HEENT: no scleral icterus or conjunctival erythema  Skin: no suspicious lesions or rash. No jaundice.  CV: regular rhythm by palpation, no pedal edema  Resp: normal respiratory effort without conversational dyspnea   Psych: normal mood and affect  Gait: normal steady gait with appropriate coordination and balance  Neuro: normal light touch sensory exam of the extremities.    MSK:  CERVICAL SPINE  Inspection:    No redness, swelling, ecchymosis, overlying skin change, or gross deformity/asymmetry, normal cervical lordosis present  Palpation:    Tender about the paracervical musculature (bilateral), levator scapula (bilateral), upper trapezius (bilateral), lower trapezius (both), rhomboids (bilateral) and medial border of scapula. Otherwise remainder of the landmarks and nontender.  Range of Motion:     Flexion within normal limits    Extension within normal limits    Right side bend within normal limits    Left side bend within normal limits    Right rotation within normal limits    Left rotation within normal limits  Strength:    Full strength throughout all neck muscles  Special Tests:    Positive: None    Negative: Spurling's (bilateral), Ballard's (bilateral)    Independent visualization of the below image:      Albert Yeo MD, [facility]        Again, thank you for allowing me to participate in the care of your patient.        Sincerely,        Albert Yeo, MD  "

## 2023-10-05 ENCOUNTER — VIRTUAL VISIT (OUTPATIENT)
Dept: PSYCHOLOGY | Facility: CLINIC | Age: 43
End: 2023-10-05
Payer: COMMERCIAL

## 2023-10-05 DIAGNOSIS — F41.9 ANXIETY DISORDER, UNSPECIFIED TYPE: ICD-10-CM

## 2023-10-05 DIAGNOSIS — F33.1 MODERATE EPISODE OF RECURRENT MAJOR DEPRESSIVE DISORDER (H): Primary | ICD-10-CM

## 2023-10-05 PROCEDURE — 90834 PSYTX W PT 45 MINUTES: CPT | Mod: VID | Performed by: PSYCHOLOGIST
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
## 2023-10-06 ENCOUNTER — TELEPHONE (OUTPATIENT)
Dept: PSYCHOLOGY | Facility: CLINIC | Age: 43
End: 2023-10-06
Payer: COMMERCIAL

## 2023-10-11 ENCOUNTER — VIRTUAL VISIT (OUTPATIENT)
Dept: PSYCHOLOGY | Facility: CLINIC | Age: 43
End: 2023-10-11
Payer: COMMERCIAL

## 2023-10-11 DIAGNOSIS — F41.9 ANXIETY DISORDER, UNSPECIFIED TYPE: ICD-10-CM

## 2023-10-11 DIAGNOSIS — F33.0 MILD EPISODE OF RECURRENT MAJOR DEPRESSIVE DISORDER (H): Primary | ICD-10-CM

## 2023-10-11 PROCEDURE — 90834 PSYTX W PT 45 MINUTES: CPT | Mod: VID | Performed by: PSYCHOLOGIST

## 2023-10-11 NOTE — PROGRESS NOTES
"             [facility]                                            Progress Note    Patient Name: Idalia Hinojosa  Date:  10/11/2023           Service Type: Individual      Session Start Time:  12:45 pm Session End Time: 1:26 pm  Session Length:  41 minutes    Session #: 106    Attendees: Client     Service Modality: Video visit: -       Provider verified identity through the following two step process.  Patient provided:  Patient is known previously to provider    Telemedicine Visit: The patient's condition can be safely assessed and treated via synchronous audio and visual telemedicine encounter.      Reason for Telemedicine Visit: Services only offered telehealth    Originating Site (Patient Location): Patient's home    Distant Site (Provider Location): Provider Remote Setting- Home Office    Consent:  The patient/guardian has verbally consented to: the potential risks and benefits of telemedicine (telephone visit) versus in person care; bill my insurance or make self-payment for services provided; and responsibility for payment of non-covered services.     Patient would like the video invitation sent by:  My Chart    Mode of Communication:  Video Conference via [employer],     As the provider I attest to compliance with applicable laws and regulations related to telemedicine.     Treatment Plan Last Reviewed: 09/13/2023  PHQ-9/DAVID-7: 10/11/2023        DATA  Interactive Complexity: No  Crisis: No        Progress Since Last Session (Related to Symptoms / Goals / Homework):   Symptoms:  Idalia reported that things are \"better\", after having had a difficult week the week prior.  She said things are going better at home with her daughter and her spouse.       PHQ score of 2 today.      Homework:  - she had a good conversation with her  where she shared with him some of the things she was concerned about with their parenting differences         Episode of Care Goals: Satisfactory " progress - ACTION (Actively working towards change); Intervened by reinforcing change plan / affirming steps taken.  The patient stated that her main goals for therapy would be to learn emotion regulation strategies (in particular, to help with when she is feeling upset/angry/frustrated/distressed).   As her  had been diagnosed with cancer, treatment also focused on providing her with support in managing the emotional distress and challenges related to this and caregiver stress.  Current focus has been shifting to managing symptoms and emotional reaction to witnessing her  experience a serious medical event.   PHQ score continues to decrease - will also work with patient on wellness planning (monitoring symptoms, identifying what helps her to stay well, identifying triggers and warning steps, steps she can take if symptoms return).  Current focus is helping identify when her emotions are escalating and engaging emotion regulation strategies.       Current / Ongoing Stressors and Concerns:  Current: Continued symptoms of depression, anxiety, stress and physical pain.  Stresses related to behavioral challenges her daughter is experiencing at home, and parenting differences between she and her   She reported that following her last therapy session, where she identified how much it was contributing to her frustration and stress that they were approaching difficulties with their daughter differently, she was able to have a very productive conversation with her spouse where they collectively agreed that their priority will be to focus on de-escalation.  They also recognized that hunger is a trigger for their daughter's outbursts, and are working on staying ahead of this.          Ongoing: Working on building effective emotion regulation strategies for managing daily frustrations and upsets     Treatment Objective(s) Addressed in This Session:     Learn and practice emotion regulation strategies that  allow you to separate your emotional reaction from other's emotion reactions   Review of:  Identification of -early signs that her emotions are escalation and emotion regulation strategies she can use  Identification of triggers to feeling escalated and how she is doing with using strategies and plan to manage emotions        Continue with:    Psychoeducation on calming/deep breathing strategies   Continue to incorporate 1-2 behavioral activation strategies to help maintain goal of incorporating physical activity into routine  Review of self-care and importance of attending to your self-care  Practice balanced thinking- challenge polarized thinking   Psychoeducation on mindfulness strategies - practicing a non-judgmental and compassionate stance towards self and others   Learn and practice distress tolerance skills-  self-soothe strategy using the five senses   Practice deep breathing  - practice at regular intervals throughout the day      Intervention:     DBT strategies: Psychoeducation on emotion-regulation strategies to help with  her emotional reaction from other's emotions-including practicing a non-judgemental stance towards others reactions, reminding myself I don't have to take on other's emotions or change how others are feeling, and I'm not responsible for their emotions and reactions.  Review of signs emotions are escalating and strategies to help with intervening early on.      She also continued to process her exploration of her identity and what she is learning about herself, as well as conversations she and her  are having about this.      ASSESSMENT: Current Emotional / Mental Status (status of significant symptoms):   Risk status (Self / Other harm or suicidal ideation)   Patient denies current fears or concerns for personal safety.   Patient denies current or recent suicidal ideation or behaviors.  She reports if the thoughts do occur, she knows steps she can take to manage them  "and believes she can manage them, and reach out for additional help if needed.     Patientdenies current or recent homicidal ideation or behaviors.   Patient denies current or recent self injurious behavior or ideation.   Patient denies other safety concerns.   Patient reports there has not been a change in risk factors since their last session -      Recommended that patient call 911 or go to the local ED should there be a change in any of these risk factors.  We reviewed how to contact [facility] crisis/[facility] for urgent/crisis concerns 24/7.  Provided patient with crisis resources and she agrees to use safety plan should any safety concerns arise.      Professionals or agencies I can contact during a crisis:  Suicide Prevention Lifeline: call or text 078  Crisis Text Line: text \"[place]\" to 711361  Local Crisis Services: [facility]: [phone]  Call 911 or go to my nearest emergency department, or [facility].         Appearance:   Appropriate    Eye Contact:   Good    Psychomotor Behavior: Normal   Attitude:   Cooperative    Orientation:   All   Speech    Rate / Production: Normal     Volume:  Normal    Mood:    Reports mood is better, feeling less stressed and overwhelmed    Affect:    Congruent with mood   Thought Content:  Clear    Thought Form:  Coherent  Logical    Insight:    Good      Medication Review:   No changes to psychiatric medication         Medication Compliance:   Yes      Changes in Health Issues:    No changes or new health concerns - a lot more good days with shoulder/neck pain,      Chemical Use Review:  No changes or concerns with alcohol use.  She reports an occasional alcoholic beverage.       Substance Use: Chemical use reviewed, no active concerns identified.      Tobacco Use: No current tobacco use.       Diagnosis:  Anxiety disorder unspecified  Major depression, recurrent episode, moderate    Rule out Post-Traumatic Stress Disorder versus Adjustment Disorder (does not " "appear to meet criteria for PTSD, however did experience symptoms, including avoidance and hyperarousal, in the context of having witnessed her  experience a medical emergency) -     Will continue to monitor symptoms in response to witnessing  experience medical emergency and assist patient with processing event, self-care, and symptom management.          PLAN: (Patient Tasks / Therapist Tasks / Other)    1) Idalia identified the following goals: practice non-judgmental stance, remind myself to separate my reaction from others reactions and feelings, remind myself it is ok for Donald to have questions and to be making sense of the changes she is experiencing, practice self-talk that reinforces feeling positive about myself.      Use strategies to help with remembering to practice calming strategies when body/muscles are tensing  -pay attention to breathing, awareness of breath, without trying to \"force\" herself or \"try too hard\" to calm, which can counter the calming response -     Being aware of my thoughts when reacting to stressful/situations.  When are they not helpful?  Practice reframing.       2) If there is a crisis situation, remember you are not alone and that there are people who can help you twenty-four hours a day, seven days a week.  Call [facility] ([facility]): [phone].      3) Check-in scheduled for 11/1 at 12:00pm.   If urgent or crisis concerns arise prior to next scheduled appointment, please reach out to crisis resources, call 911, or go to the emergency department.      Cristy Wilkinson PsyD, LP6  Licensed Psychologist  10/11/2023                                 ____________________________________    Treatment Plan     Patient's Name: Idalia Hinojosa                        YOB: 1980     Date of Creation: 1/6/2020  Date Treatment Plan Last Reviewed/Revised: 9/13/2023       DSM5 Diagnoses: 300.00 (F41.9) Unspecified Anxiety Disorder, major " depression, moderate, recurrent episode  Psychosocial / Contextual Factors: strain in marital relationship, parenting challenges, adjustment challenges, 's cancer diagnosis  PROMIS (reviewed every 90 days):      Anticipated number of session for this episode of care: additional 15-20  Anticipation frequency of session: Sessions have been decreased to bi-weekly or once every three weeks due to progress with treatment goals.   Anticipated Duration of each session: 38-52 minutes  Treatment plan will be reviewed in 90 days or when goals have been changed.         Referral / Collaboration:  No referrals at this time.  Have previously recommended couples therapy referral.  Coordinate with Dr. Han, patient's PCP.        MeasurableTreatment Goal(s) related to diagnosis / functional impairment(s)  Goal 1: Patient will learn emotion regulation strategies to help when she is feeling upset/angry/frustrated/distressed    I will know I've met my goal when I would yell less, I would feel calmer, and I would not push others.  I would be less physical when I'm angry (e.g. wouldn't slam doors or hit things)        Objective #A (Patient Action)                          Patient will learn and practice at least 2 new emotion regulation strategies.  Status: Continued - Date(s):    9/13/2023- Regularly practicing emotion regulation strategies to help manage increase in emotional distress related to spouse's cancer diagnosis.  She has been successful with using strategies to help her stay calm when her daughter's experiencing heightened emotion and/or challenging behaviors.  She's working on remembering to engage calming strategies when feeling physically tense and/or anxious/stressed.          Intervention(s)  Therapist will provide psychoeducation on emotion regulation module from DBT and will assign patient homework to help reinforce skill development.     Objective #B  Patient will identify factors that increase  "vulnerability to emotion mind and identify ways to decrease vulnerability to emotion mind.  Status: Continued - Date(s):9/13/2023 - routinely incorporating mindfulness, distress tolerance and self-awareness strategies to increase attention and focus to factors that increase vulnerability to emotion mind     Intervention(s)  Therapist will provide information on factors that increase vulnerabiliyt to emotion mind      Objective #C  Patient will identify warning signs and signals that emotions are escalating and will learn ways to skillfully intervene early on.  Status: Date: 9/13/2023- in progress - has demonstrated good progress and awareness in being able to identify warning signs and intervene skillfully-working on incorporating deep breathing to help with calming and re-centering.         Intervention(s)  Therapist will help patient identify warning signs and signals, and will guide the patient in identifying skillful ways to intervene when exeriencing warning signs and signals.      Objective #B (Patient Action)                          Status: NEW - Date(s):9/13/2023- Patient will learn mind-body connection, healthy ways to manage stress, calming strategies, and ways she can address cognitive and behavioral factors that can contribute to pain experience.        Patient will learn and practice at least two strategies that help improve her emotional well-being.       Intervention(s)  Therapist will teach calming strategies and healthy stress management strategies, and will assign homework to help reinforce skill development.  Status - patient has been working on engaging calming strategies and in particular, not trying to \"force herself\" to calm down which counters the relaxation response.  Working currently on how to cue/remind herself to engage these strategies.               Goal 2: Patient will learn new parenting strategies to help with managing parenting challenges.  Parent will work on improving " relationship with her daughter and defining what she would like this relationship to look like.      I will know I've met my goal when I'm enjoying time with my daughter, teaching her new things, and not waiting for things to change       Objective #A (Patient Action)                          Status: Continued - Date(s):9/13/2023         Patient will increase understanding of developmental norms for her daughter and ways to manage challenging behaviors.     Intervention(s)  Therapist will provide psychoeducation on developmental norms and parenting strategies.     Objective #B  Patient will spend time reflecting on her relationship with her daughter and defining what she would like this relaitonship to look like.                  Status: Continued - Date(s):9/13/2023     Making good progress - has also been working with daughter's therapist and has been actively practicing and using suggested strategies.   Likely can work towards resolving this goal, as this goal has largely shifted towards the work she is doing with her daughter's individual therapist, where she believes they have been making good progress on helping her in her relationship with her daughter.          Intervention(s)  Therapist will guide the patient in reflecting upon her relationship with her daughter and help her define ways to move towards what she vaues in her relationship with her daughter.     Objective #C  Patient will increase time spent on fun activity and play with her daughter.  Status: Continued - Date(s):9/13/2023-has been enjoying time with daughter and devoting regular time to fun activities and play together           Intervention(s)  Therapist will guide the patient in identifying ways she can increase positive time with her daughter.  Therapist will also teach mindfulness strategies to help patient with being in the present moment when appropriate - .        Goal 3: Patiient will improve communication with her .      I  "will know I've met my goal when I feel less irritated with my  and communicate more openly with my .  I would like to be more assertive and less aggressive.   I would like to not avoid telling him things because I'm worried about his reaction or don't think he will react well.  I would like to be more present to the moment during times when this would be helpful.\"       Objective #A (Patient Action)                          Status: Continued - Date(s):9/13/2023   continues to work on this goal, though emphasis has shift to managing caregiver stress related to 's cancer diagnosis and treatment.              Patient will learn and practice at least two new interpersonal effectiveness strategies.     Intervention(s)  Therapist will teach strategies from DBT module on interpersonal effectiveness, and will assign homework to help reinforce skill development.            Patient has reviewed and agreed to the above plan.        Cristy Wilkinson PsyD,   Licensed Psychologist  Reviewed on 9/13/2023                    "

## 2023-10-26 ENCOUNTER — THERAPY VISIT (OUTPATIENT)
Dept: PHYSICAL THERAPY | Facility: CLINIC | Age: 43
End: 2023-10-26
Payer: COMMERCIAL

## 2023-10-26 DIAGNOSIS — M25.511 PAIN IN JOINT OF RIGHT SHOULDER: ICD-10-CM

## 2023-10-26 DIAGNOSIS — M54.2 NECK PAIN ON RIGHT SIDE: Primary | ICD-10-CM

## 2023-10-26 PROCEDURE — 97140 MANUAL THERAPY 1/> REGIONS: CPT | Mod: GP | Performed by: PHYSICAL THERAPIST

## 2023-10-26 PROCEDURE — 97530 THERAPEUTIC ACTIVITIES: CPT | Mod: GP | Performed by: PHYSICAL THERAPIST

## 2023-10-26 PROCEDURE — 97110 THERAPEUTIC EXERCISES: CPT | Mod: GP | Performed by: PHYSICAL THERAPIST

## 2023-10-29 DIAGNOSIS — M62.838 TRAPEZIUS MUSCLE SPASM: ICD-10-CM

## 2023-10-29 DIAGNOSIS — M54.2 NECK PAIN: ICD-10-CM

## 2023-10-30 ENCOUNTER — MYC MEDICAL ADVICE (OUTPATIENT)
Dept: ORTHOPEDICS | Facility: CLINIC | Age: 43
End: 2023-10-30
Payer: COMMERCIAL

## 2023-10-30 RX ORDER — CELECOXIB 100 MG/1
100 CAPSULE ORAL 2 TIMES DAILY PRN
Qty: 60 CAPSULE | Refills: 0 | OUTPATIENT
Start: 2023-10-30

## 2023-10-30 NOTE — TELEPHONE ENCOUNTER
Medication Request for: Celecoxib 100mg      Prescription last written on 10/2/23 by Dr. Yeo   Sig: take 1 capsule twice daily prn   Last Fill Quantity: 60 with # refills: 0     Last Office Visit by provider: 10/2/23  Office visit plan:   Patient is taking tizanidine and Celebrex as needed for muscle spasm exacerbations which are helping with her symptoms.  Patient states she is taking the medications 0-2 times per week as needed.  -Refill for tizanidine and Celebrex were sent to the pharmacy today  -Patient will continue with home exercise program but slowly increase the weights and resistance to increase her strength  -Patient will follow up if pain worsens.    "[employer] message sent to patient.     SALVADOR Salamanca RN

## 2023-11-01 ENCOUNTER — VIRTUAL VISIT (OUTPATIENT)
Dept: PSYCHOLOGY | Facility: CLINIC | Age: 43
End: 2023-11-01
Payer: COMMERCIAL

## 2023-11-01 DIAGNOSIS — F33.0 MILD EPISODE OF RECURRENT MAJOR DEPRESSIVE DISORDER (H): ICD-10-CM

## 2023-11-01 DIAGNOSIS — F41.9 ANXIETY DISORDER, UNSPECIFIED TYPE: Primary | ICD-10-CM

## 2023-11-01 PROCEDURE — 90834 PSYTX W PT 45 MINUTES: CPT | Mod: VID | Performed by: PSYCHOLOGIST

## 2023-11-01 NOTE — PROGRESS NOTES
[facility]                                            Progress Note    Patient Name: Idalia Hinojosa  Date:  11/1/2023           Service Type: Individual      Session Start Time:  12:04 pm Session End Time: 12:56 pm  Session Length:  52 minutes    Session #: 107    Attendees: Client     Service Modality: Video visit: -       Provider verified identity through the following two step process.  Patient provided:  Patient is known previously to provider    Telemedicine Visit: The patient's condition can be safely assessed and treated via synchronous audio and visual telemedicine encounter.      Reason for Telemedicine Visit: Services only offered telehealth    Originating Site (Patient Location): Patient's home    Distant Site (Provider Location): Provider Remote Setting- Home Office    Consent:  The patient/guardian has verbally consented to: the potential risks and benefits of telemedicine (telephone visit) versus in person care; bill my insurance or make self-payment for services provided; and responsibility for payment of non-covered services.     Patient would like the video invitation sent by:  My Chart    Mode of Communication:  Video Conference via [employer],     As the provider I attest to compliance with applicable laws and regulations related to telemedicine.     Treatment Plan Last Reviewed: 09/13/2023  PHQ-9/DAVID-7: 10/11/2023        DATA  Interactive Complexity: No  Crisis: No        Progress Since Last Session (Related to Symptoms / Goals / Homework):   Symptoms:  Idalia reported that it has been a very challenging few weeks, which has exacerbated symptoms of depression and anxiety, due to behavioral challenges they are having with their daughter.  It also recently was her daughter's birthday, which stirs up feelings for her around how difficult her pregnancy and the postpartum period was.      PHQ score of 2 on 10/11/2023 visit.      Homework:  - is working closely with  her daughter's therapist to help manage daughter's difficulties at home, and she has been using emotion regulation strategies to help manage her emotional reaction to challenging situations.          Episode of Care Goals: Satisfactory progress - ACTION (Actively working towards change); Intervened by reinforcing change plan / affirming steps taken.  The patient stated that her main goals for therapy would be to learn emotion regulation strategies (in particular, to help with when she is feeling upset/angry/frustrated/distressed).  Current focus is helping identify when her emotions are escalating and engaging emotion regulation strategies.       Current / Ongoing Stressors and Concerns:  Current: Symptoms of depression, anxiety, stress and physical pain.  Stresses related to behavioral challenges her daughter is experiencing at home, and has been working closely with her daughter's therapist to receive support and help with this.  She and her  have been making progress with addressing parenting differences between the two of them and have been having productive conversations with her daughter's therapist.  It was recently her daughter's birthday, which reminded her of difficulties she experienced with her pregnancy and post-partum period.        Ongoing: Working on building effective emotion regulation strategies for managing daily frustrations and upsets     Treatment Objective(s) Addressed in This Session:     Learn and practice emotion regulation strategies that allow you to separate your emotional reaction from other's emotion reactions   Identify cognitions/helpful reminders for how to frame difficulties daughter is experiencing       Review of:  Identification of -early signs that her emotions are escalation and emotion regulation strategies she can use  Identification of triggers to feeling escalated and how she is doing with using strategies and plan to manage emotions        Continue  "with:    Psychoeducation on calming/deep breathing strategies   Continue to incorporate 1-2 behavioral activation strategies to help maintain goal of incorporating physical activity into routine  Review of self-care and importance of attending to your self-care  Practice balanced thinking- challenge polarized thinking   Psychoeducation on mindfulness strategies - practicing a non-judgmental and compassionate stance towards self and others   Learn and practice distress tolerance skills-  self-soothe strategy using the five senses   Practice deep breathing  - practice at regular intervals throughout the day      Intervention:     DBT and CBT strategies: Psychoeducation on emotion-regulation strategies to help with managing emotional reaction to challenging situations.  Idalia wished to focus today on how to manage frustration she experiences when her daughter is having a difficult time at home.  She shared that one of the things that can make it more frustrating for her is thinking that her daughter is doing the behaviors on purpose.  We talked about ways she can frame and understand her daughter's behaviors and hold realistic expectations - including she's not doing it on purpose, she's doing the best she can with what she knows how to do, she's showing through her behavior what she needs help with, and reminding herself that the change process takes time, consistency and patience. Idalia also identified it would be helpful for her to remind herself that her daughter needs her help and support during these challenging times.        She reflected that one of the things that also makes it challenging is how long the bedtime routine can take, and that this takes away from time she'd like to have to de-compress and relax at night.  Looked at ways to practice radical acceptance of \"what is\" right now while practicing a flexible mindset with getting what she needs (are there other times right now that are better times to " have some time to herself to de-compress and take a break, while they continue to work on challenges with the bedtime routine?)       Reinforced the work she is doing to work with her daughter's therapist to learn new strategies to help manage difficulties at home, as well as the great work she and her  have been doing to talk through parenting differences and work to find a united front.        Towards the end of the session, Idalia reported that talking through these difficulties was making her feel more dysregulated and she expressed frustration with how many appointments she is attending between her daughter's and hers to address these concerns.  She was able to pause, take deep breaths and re-center.  She shared that what she needed most right now was to do some nice things for herself - she was able to identify some things she can do and I encouraged the importance of her attending to her self-care.  I also encouraged her to recognize the hard work she is doing.    I also encouraged she reach out to [facility] and/or a family member, if she and her  need additional support with their daughter.  She said she's aware of this resource and they had contemplated asking for additional help the other night.  Encouraged she reach out for additional support when needed.      ASSESSMENT: Current Emotional / Mental Status (status of significant symptoms):   Risk status (Self / Other harm or suicidal ideation)   Patient denies current fears or concerns for personal safety.   Patient reports current suicidal ideation, she denies any current or recent suicidal behaviors.  She reports that after a difficult bedtime routine with their daughter the other night, she had a few passive and fleeting suicidal thoughts.  She reported she had no intention to act on them and no suicidal behaviors.  She stated she was tired, went to bed, and when she woke up in the morning she didn't have any further suicidal  "thoughts and hasn't had suicidal ideation outside of that evening.  She reports if suicidal  thoughts do occur, she knows steps she can take to manage them and believes she can manage them, and will reach out for additional help if needed.     Patientdenies current or recent homicidal ideation or behaviors.   Patient denies current or recent self injurious behavior or ideation.   Patient denies other safety concerns.   Patient reports there has not been a change in risk factors since their last session -      Recommended that patient call 911 or go to the local ED should there be a change in any of these risk factors.  We reviewed how to contact [facility] crisis/COPE for urgent/crisis concerns 24/7.  Provided patient with crisis resources and she agrees to use safety plan should any safety concerns arise.      Professionals or agencies I can contact during a crisis:  Suicide Prevention Lifeline: call or text 124  Crisis Text Line: text \"[place]\" to 427946  Local Crisis Services: [facility] Crisis Services: [phone]  Call 911 or go to my nearest emergency department, or [facility].         Appearance:   Appropriate    Eye Contact:   Good    Psychomotor Behavior: Normal   Attitude:   Cooperative    Orientation:   All   Speech    Rate / Production: Normal     Volume:  Normal    Mood:    Frustrated, stressed    Affect:    Congruent with mood   Thought Content:  Clear    Thought Form:  Coherent  Logical    Insight:    Good      Medication Review:   No changes to psychiatric medication      Medication Compliance:   Yes      Changes in Health Issues:    No changes or new health concerns -      Chemical Use Review:  No changes or concerns with alcohol use.  She reports an occasional alcoholic beverage.  She also reported that she is occasionally consuming a THC drink.  She said she has reached out to a pharmacist to ask about interactions with her medications and she said they didn't know.  Encourage she also ask her " prescriber.        Substance Use: Chemical use reviewed, no active concerns identified.      Tobacco Use: No current tobacco use.       Diagnosis:  Anxiety disorder unspecified  Major depression, recurrent episode, moderate    Patient has not displayed any continued or on-going symptoms post witnessing her  experience a significant medical event.  Therefore, I don't have current concern that she meets criteria for Post-Traumatic Stress Disorder  (she  did experience avoidance of things that remind her of the medical event and hyperarousal)    Will continue to monitor symptoms in response to witnessing  experience medical emergency and assist patient with processing event, self-care, and symptom management as indicated.          PLAN: (Patient Tasks / Therapist Tasks / Other)    1) Idalia identified the following goals: Be aware of my thoughts when reacting to stressful/situations.  When are they not helpful?  Practice reframing - she's not doing it on purpose, she's doing the best she can, she's showing me what she needs help with through her behaviors, she needs me to help her.  Remember to practice calming strategies -     Practice non-judgmental stance, remind myself to separate my reaction from others reactions and feelings.  Practice self-talk that reinforces feeling positive about myself.       2) If there is a crisis situation, with your daughter or yourself, remember you are not alone and that there are people who can help you twenty-four hours a day, seven days a week.  Call [facility] ([facility] Crisis Services): [phone].      3) Idalia didn't want to schedule at the time of her appt and wished to reach out to schedule her next appt.  She will be in touch when she's ready to schedule.   If urgent or crisis concerns arise prior to next scheduled appointment, please reach out to crisis resources, call 911, or go to the emergency department.      4) Will ask Idalia at next appt if she  would like to sign FORD for me to speak to her daughter's therapist, to see if they have any additional information that may be helpful for Idalia's individual therapy.      Cristy Wilkinson PsyD,   Licensed Psychologist  11/01/2023                                 ____________________________________    Treatment Plan     Patient's Name: Idalia Hinojosa                        YOB: 1980     Date of Creation: 1/6/2020  Date Treatment Plan Last Reviewed/Revised: 9/13/2023       DSM5 Diagnoses: 300.00 (F41.9) Unspecified Anxiety Disorder, major depression, moderate, recurrent episode  Psychosocial / Contextual Factors: strain in marital relationship, parenting challenges, adjustment challenges, 's cancer diagnosis  PROMIS (reviewed every 90 days):      Anticipated number of session for this episode of care: additional 15-20  Anticipation frequency of session: Sessions have been decreased to bi-weekly or once every three weeks due to progress with treatment goals.   Anticipated Duration of each session: 38-52 minutes  Treatment plan will be reviewed in 90 days or when goals have been changed.         Referral / Collaboration:  No referrals at this time.  Have previously recommended couples therapy referral.  Coordinate with Dr. Han, patient's PCP.        MeasurableTreatment Goal(s) related to diagnosis / functional impairment(s)  Goal 1: Patient will learn emotion regulation strategies to help when she is feeling upset/angry/frustrated/distressed    I will know I've met my goal when I would yell less, I would feel calmer, and I would not push others.  I would be less physical when I'm angry (e.g. wouldn't slam doors or hit things)        Objective #A (Patient Action)                          Patient will learn and practice at least 2 new emotion regulation strategies.  Status: Continued - Date(s):    9/13/2023- Regularly practicing emotion regulation strategies to help manage increase in  emotional distress related to spouse's cancer diagnosis.  She has been successful with using strategies to help her stay calm when her daughter's experiencing heightened emotion and/or challenging behaviors.  She's working on remembering to engage calming strategies when feeling physically tense and/or anxious/stressed.          Intervention(s)  Therapist will provide psychoeducation on emotion regulation module from DBT and will assign patient homework to help reinforce skill development.     Objective #B  Patient will identify factors that increase vulnerability to emotion mind and identify ways to decrease vulnerability to emotion mind.  Status: Continued - Date(s):9/13/2023 - routinely incorporating mindfulness, distress tolerance and self-awareness strategies to increase attention and focus to factors that increase vulnerability to emotion mind     Intervention(s)  Therapist will provide information on factors that increase vulnerabiliyt to emotion mind      Objective #C  Patient will identify warning signs and signals that emotions are escalating and will learn ways to skillfully intervene early on.  Status: Date: 9/13/2023- in progress - has demonstrated good progress and awareness in being able to identify warning signs and intervene skillfully-working on incorporating deep breathing to help with calming and re-centering.         Intervention(s)  Therapist will help patient identify warning signs and signals, and will guide the patient in identifying skillful ways to intervene when exeriencing warning signs and signals.      Objective #B (Patient Action)                          Status: NEW - Date(s):9/13/2023- Patient will learn mind-body connection, healthy ways to manage stress, calming strategies, and ways she can address cognitive and behavioral factors that can contribute to pain experience.        Patient will learn and practice at least two strategies that help improve her emotional well-being.      "  Intervention(s)  Therapist will teach calming strategies and healthy stress management strategies, and will assign homework to help reinforce skill development.  Status - patient has been working on engaging calming strategies and in particular, not trying to \"force herself\" to calm down which counters the relaxation response.  Working currently on how to cue/remind herself to engage these strategies.               Goal 2: Patient will learn new parenting strategies to help with managing parenting challenges.  Parent will work on improving relationship with her daughter and defining what she would like this relationship to look like.      I will know I've met my goal when I'm enjoying time with my daughter, teaching her new things, and not waiting for things to change       Objective #A (Patient Action)                          Status: Continued - Date(s):9/13/2023         Patient will increase understanding of developmental norms for her daughter and ways to manage challenging behaviors.     Intervention(s)  Therapist will provide psychoeducation on developmental norms and parenting strategies.     Objective #B  Patient will spend time reflecting on her relationship with her daughter and defining what she would like this relaitonship to look like.                  Status: Continued - Date(s):9/13/2023     Making good progress - has also been working with daughter's therapist and has been actively practicing and using suggested strategies.   Likely can work towards resolving this goal, as this goal has largely shifted towards the work she is doing with her daughter's individual therapist, where she believes they have been making good progress on helping her in her relationship with her daughter.          Intervention(s)  Therapist will guide the patient in reflecting upon her relationship with her daughter and help her define ways to move towards what she vaues in her relationship with her daughter.     Objective " "#C  Patient will increase time spent on fun activity and play with her daughter.  Status: Continued - Date(s):9/13/2023-has been enjoying time with daughter and devoting regular time to fun activities and play together           Intervention(s)  Therapist will guide the patient in identifying ways she can increase positive time with her daughter.  Therapist will also teach mindfulness strategies to help patient with being in the present moment when appropriate - .        Goal 3: Patiient will improve communication with her .      I will know I've met my goal when I feel less irritated with my  and communicate more openly with my .  I would like to be more assertive and less aggressive.   I would like to not avoid telling him things because I'm worried about his reaction or don't think he will react well.  I would like to be more present to the moment during times when this would be helpful.\"       Objective #A (Patient Action)                          Status: Continued - Date(s):9/13/2023   continues to work on this goal, though emphasis has shift to managing caregiver stress related to 's cancer diagnosis and treatment.              Patient will learn and practice at least two new interpersonal effectiveness strategies.     Intervention(s)  Therapist will teach strategies from DBT module on interpersonal effectiveness, and will assign homework to help reinforce skill development.            Patient has reviewed and agreed to the above plan.        Cristy Wilkinson PsyD,   Licensed Psychologist  Reviewed on 9/13/2023                    "

## 2023-11-22 ENCOUNTER — OFFICE VISIT (OUTPATIENT)
Dept: PODIATRY | Facility: CLINIC | Age: 43
End: 2023-11-22
Payer: COMMERCIAL

## 2023-11-22 VITALS — SYSTOLIC BLOOD PRESSURE: 128 MMHG | DIASTOLIC BLOOD PRESSURE: 70 MMHG

## 2023-11-22 DIAGNOSIS — R20.2 PARESTHESIA OF ARM: ICD-10-CM

## 2023-11-22 DIAGNOSIS — M54.9 UPPER BACK PAIN: ICD-10-CM

## 2023-11-22 DIAGNOSIS — R20.2 PARESTHESIA OF BOTH FEET: Primary | ICD-10-CM

## 2023-11-22 DIAGNOSIS — M54.2 NECK PAIN: ICD-10-CM

## 2023-11-22 PROCEDURE — 99203 OFFICE O/P NEW LOW 30 MIN: CPT | Performed by: PODIATRIST

## 2023-11-22 NOTE — PATIENT INSTRUCTIONS
[facility] you for choosing [facility]!    DR. WATSON'S CLINIC LOCATIONS:     [facility] TRIAGE LINE: [phone]   [address] [place] [address] APPOINTMENTS: [phone]   [place] [address] RADIOLOGY: [phone]   (Every other Tues - Wed - Fri PM) SET UP SURGERY: [phone]    PHYSICAL THERAPY: [phone]   [employer] SPECIALTY BILLING QUESTIONS: [phone] [id number] FAX: [phone]   [place] [address]    (Thurs & Fri AM)        Your forefoot tingling and numbness also called paresthesias might simply be from high pressure related to activities, notably cycling.    Recommendations:  Stiffer soled shoes  Felt metatarsal pads. [employer] is a good brand  Avoidance of barefoot walking, even at home  Stretching can also help take some stress off the plantar forefoot.    A referral to neurology is placed.  They should call you within 2 business days to schedule an appointment.  Otherwise, the phone number is on this form somewhere.

## 2023-11-22 NOTE — PROGRESS NOTES
ASSESSMENT:  Encounter Diagnoses   Name Primary?    Paresthesia of both feet Yes    Paresthesia of arm     Upper back pain     Neck pain        MEDICAL DECISION MAKING:  I explained that sometimes difficult to know the source or cause of the paresthesias.  If stemming from the foot, it is most likely from high forefoot pressure related to cycling.  An intermetatarsal neuroma is considered yet no Polo's click appreciated today.  This is not consistent with a tarsal tunnel scenario.    The problem might be self-limiting.    I explained that paresthesias in the foot can also be related to more proximal nerve irritation.  A referral to neurology was was offered.  She then reported having some upper back and neck pain with paresthesias involving the right arm.  She is interested in this referral.    Recommendations to offload the forefoot:  Stiff soled shoes  Good arch support  Felt metatarsal pads  Calf stretching exercises  Avoidance of barefoot walking    Follow-up on an as-needed basis.    Disclaimer: This note consists of symbols derived from keyboarding, dictation and/or voice recognition software. As a result, there may be errors in the script that have gone undetected. Please consider this when interpreting information found in this chart.    Carmelo Cabral, ADRIANA, FACFAS, MS    [place] Department of Podiatry/Foot & Ankle Surgery      ____________________________________________________________________    HPI:       Idalia presents today reporting toe tingling and numbness, primarily on the right.  This involves her second third toes and dorsal foot.  However she does occasionally have some on the left.  Onset 3 months  It comes and goes  Sometimes she notices it avid cyclist after periods of sitting or rest  Avid cyclist  I asked her if it ever feels like her sock is balled up under the ball of her foot.  She reported yes  *  Past Medical History:   Diagnosis Date    ASCUS of cervix with negative high risk HPV  2017 ASCUS, Neg HPV    Seasonal affective disorder (H24)     no meds   *  *  Past Surgical History:   Procedure Laterality Date     SECTION N/A 10/28/2015    Procedure:  SECTION;  Surgeon: Mónica Madrid MD;  Location:  L+D    DENTAL SURGERY      wisdom teeth    TONSILLECTOMY  age 10   *  *  Current Outpatient Medications   Medication Sig Dispense Refill    celecoxib (CELEBREX) 100 MG capsule Take 1 capsule (100 mg) by mouth 2 times daily as needed for moderate pain 60 capsule 0    mometasone (NASONEX) 50 MCG/ACT nasal spray Spray 2 sprays into both nostrils daily 51 g 3    montelukast (SINGULAIR) 10 MG tablet Take 1 tablet (10 mg) by mouth At Bedtime 90 tablet 3    norethindrone-ethinyl estradiol (MICROGESTIN ) 1-20 MG-MCG tablet Take 1 tablet by mouth daily Active tablets only for prevention of menses 112 tablet 4    sertraline (ZOLOFT) 100 MG tablet Take 1 tablet (100 mg) by mouth daily 90 tablet 4    tiZANidine (ZANAFLEX) 2 MG tablet Take 1 tablet (2 mg) by mouth 2 times daily as needed for muscle spasms 30 tablet 0    cyclobenzaprine (FLEXERIL) 5 MG tablet Take 1-2 tablets (5-10 mg) by mouth nightly as needed for muscle spasms You can take up to 3 times per day as needed, but this is sedating so no driving or alcohol while taking. (Patient not taking: Reported on 2023) 30 tablet 0    ipratropium (ATROVENT) 0.06 % nasal spray Spray 2 sprays into both nostrils 4 times daily as needed for rhinitis (Patient not taking: Reported on 2023) 15 mL 5    tiZANidine (ZANAFLEX) 2 MG tablet Take 1 tablet (2 mg) by mouth nightly as needed for muscle spasms (Patient not taking: Reported on 2023) 90 tablet 0         EXAM:    Vitals: /70   BMI: There is no height or weight on file to calculate BMI.    Constitutional:  Idalia Hinojosa is in no apparent distress, appears well-nourished.  Cooperative with history and physical exam.    Vascular:  Pedal pulses  are palpable for both the DP and PT arteries.  CFT < 3 sec.  No edema.      Neuro: Light touch sensation is intact to the L4, L5, S1 distributions  No evidence of weakness, spasticity, or contracture in the lower extremities.     Derm: Normal texture and turgor.  No erythema, ecchymosis, or cyanosis.  No open lesions.     Musculoskeletal:    Lower extremity muscle strength is normal. No gross deformities.  Palpatory exam, her second metatarsals are noted to be longer than the first.

## 2023-11-22 NOTE — LETTER
11/22/2023         RE: Idalia Hinojosa  [address]  [facility] [address]        Dear Colleague,    Thank you for referring your patient, Idalia Hinojosa, to the [facility]. Please see a copy of my visit note below.    ASSESSMENT:  Encounter Diagnoses   Name Primary?     Paresthesia of both feet Yes     Paresthesia of arm      Upper back pain      Neck pain        MEDICAL DECISION MAKING:  I explained that sometimes difficult to know the source or cause of the paresthesias.  If stemming from the foot, it is most likely from high forefoot pressure related to cycling.  An intermetatarsal neuroma is considered yet no Polo's click appreciated today.  This is not consistent with a tarsal tunnel scenario.    The problem might be self-limiting.    I explained that paresthesias in the foot can also be related to more proximal nerve irritation.  A referral to neurology was was offered.  She then reported having some upper back and neck pain with paresthesias involving the right arm.  She is interested in this referral.    Recommendations to offload the forefoot:  Stiff soled shoes  Good arch support  Felt metatarsal pads  Calf stretching exercises  Avoidance of barefoot walking    Follow-up on an as-needed basis.    Disclaimer: This note consists of symbols derived from keyboarding, dictation and/or voice recognition software. As a result, there may be errors in the script that have gone undetected. Please consider this when interpreting information found in this chart.    Carmelo Cabral DPM, FACFAS, [facility] Department of Podiatry/Foot & Ankle Surgery      ____________________________________________________________________    HPI:       Idalia presents today reporting toe tingling and numbness, primarily on the right.  This involves her second third toes and dorsal foot.  However she does occasionally have some on the left.  Onset 3 months  It comes and  goes  Sometimes she notices it avid cyclist after periods of sitting or rest  Avid cyclist  I asked her if it ever feels like her sock is balled up under the ball of her foot.  She reported yes  *  Past Medical History:   Diagnosis Date     ASCUS of cervix with negative high risk HPV 2017 ASCUS, Neg HPV     Seasonal affective disorder (H24)     no meds   *  *  Past Surgical History:   Procedure Laterality Date      SECTION N/A 10/28/2015    Procedure:  SECTION;  Surgeon: Mónica Madrid MD;  Location:  L+D     DENTAL SURGERY      wisdom teeth     TONSILLECTOMY  age 10   *  *  Current Outpatient Medications   Medication Sig Dispense Refill     celecoxib (CELEBREX) 100 MG capsule Take 1 capsule (100 mg) by mouth 2 times daily as needed for moderate pain 60 capsule 0     mometasone (NASONEX) 50 MCG/ACT nasal spray Spray 2 sprays into both nostrils daily 51 g 3     montelukast (SINGULAIR) 10 MG tablet Take 1 tablet (10 mg) by mouth At Bedtime 90 tablet 3     norethindrone-ethinyl estradiol (MICROGESTIN ) 1-20 MG-MCG tablet Take 1 tablet by mouth daily Active tablets only for prevention of menses 112 tablet 4     sertraline (ZOLOFT) 100 MG tablet Take 1 tablet (100 mg) by mouth daily 90 tablet 4     tiZANidine (ZANAFLEX) 2 MG tablet Take 1 tablet (2 mg) by mouth 2 times daily as needed for muscle spasms 30 tablet 0     cyclobenzaprine (FLEXERIL) 5 MG tablet Take 1-2 tablets (5-10 mg) by mouth nightly as needed for muscle spasms You can take up to 3 times per day as needed, but this is sedating so no driving or alcohol while taking. (Patient not taking: Reported on 2023) 30 tablet 0     ipratropium (ATROVENT) 0.06 % nasal spray Spray 2 sprays into both nostrils 4 times daily as needed for rhinitis (Patient not taking: Reported on 2023) 15 mL 5     tiZANidine (ZANAFLEX) 2 MG tablet Take 1 tablet (2 mg) by mouth nightly as needed for muscle spasms (Patient not  taking: Reported on 11/22/2023) 90 tablet 0         EXAM:    Vitals: /70   BMI: There is no height or weight on file to calculate BMI.    Constitutional:  Idalia Hinojosa is in no apparent distress, appears well-nourished.  Cooperative with history and physical exam.    Vascular:  Pedal pulses are palpable for both the DP and PT arteries.  CFT < 3 sec.  No edema.      Neuro: Light touch sensation is intact to the L4, L5, S1 distributions  No evidence of weakness, spasticity, or contracture in the lower extremities.     Derm: Normal texture and turgor.  No erythema, ecchymosis, or cyanosis.  No open lesions.     Musculoskeletal:    Lower extremity muscle strength is normal. No gross deformities.  Palpatory exam, her second metatarsals are noted to be longer than the first.        Again, thank you for allowing me to participate in the care of your patient.        Sincerely,        Carmelo Cabral DPM

## 2023-11-24 ENCOUNTER — THERAPY VISIT (OUTPATIENT)
Dept: PHYSICAL THERAPY | Facility: CLINIC | Age: 43
End: 2023-11-24
Payer: COMMERCIAL

## 2023-11-24 DIAGNOSIS — M54.2 NECK PAIN ON RIGHT SIDE: Primary | ICD-10-CM

## 2023-11-24 DIAGNOSIS — M25.511 PAIN IN JOINT OF RIGHT SHOULDER: ICD-10-CM

## 2023-11-24 PROCEDURE — 97110 THERAPEUTIC EXERCISES: CPT | Mod: GP | Performed by: PHYSICAL THERAPIST

## 2023-11-24 PROCEDURE — 97140 MANUAL THERAPY 1/> REGIONS: CPT | Mod: GP | Performed by: PHYSICAL THERAPIST

## 2023-11-24 PROCEDURE — 97530 THERAPEUTIC ACTIVITIES: CPT | Mod: GP | Performed by: PHYSICAL THERAPIST

## 2023-12-15 ENCOUNTER — THERAPY VISIT (OUTPATIENT)
Dept: PHYSICAL THERAPY | Facility: CLINIC | Age: 43
End: 2023-12-15
Payer: COMMERCIAL

## 2023-12-15 DIAGNOSIS — M25.511 PAIN IN JOINT OF RIGHT SHOULDER: ICD-10-CM

## 2023-12-15 DIAGNOSIS — M54.2 NECK PAIN ON RIGHT SIDE: Primary | ICD-10-CM

## 2023-12-15 PROCEDURE — 97110 THERAPEUTIC EXERCISES: CPT | Mod: GP | Performed by: PHYSICAL THERAPIST

## 2023-12-15 PROCEDURE — 97140 MANUAL THERAPY 1/> REGIONS: CPT | Mod: GP | Performed by: PHYSICAL THERAPIST

## 2023-12-22 ENCOUNTER — THERAPY VISIT (OUTPATIENT)
Dept: PHYSICAL THERAPY | Facility: CLINIC | Age: 43
End: 2023-12-22
Payer: COMMERCIAL

## 2023-12-22 DIAGNOSIS — M54.2 NECK PAIN ON RIGHT SIDE: Primary | ICD-10-CM

## 2023-12-22 DIAGNOSIS — M25.511 PAIN IN JOINT OF RIGHT SHOULDER: ICD-10-CM

## 2023-12-22 PROCEDURE — 97110 THERAPEUTIC EXERCISES: CPT | Mod: GP | Performed by: PHYSICAL THERAPIST

## 2023-12-22 PROCEDURE — 97140 MANUAL THERAPY 1/> REGIONS: CPT | Mod: GP | Performed by: PHYSICAL THERAPIST

## 2023-12-28 DIAGNOSIS — M62.838 TRAPEZIUS MUSCLE SPASM: ICD-10-CM

## 2023-12-28 DIAGNOSIS — M54.2 NECK PAIN: ICD-10-CM

## 2023-12-29 NOTE — TELEPHONE ENCOUNTER
Medication Request for: Tizanidine 2mg            Prescription last written on 10/2/23 by Dr. Yeo   Sig: take 1 tab nightly prn for muscle spasms   Last Fill Quantity: 90 with # refills: 0     Last Office Visit by provider: 10/2/23    [employer] message sent to patient.     SALVADOR Salamanca RN

## 2024-01-02 RX ORDER — TIZANIDINE 2 MG/1
TABLET ORAL
Qty: 90 TABLET | Refills: 0 | OUTPATIENT
Start: 2024-01-02

## 2024-01-02 NOTE — TELEPHONE ENCOUNTER
Patient responded via Mychart that she did not request a refill.   Refill denied.     SALVADOR Salamanca RN

## 2024-01-22 ENCOUNTER — ANCILLARY PROCEDURE (OUTPATIENT)
Dept: GENERAL RADIOLOGY | Facility: CLINIC | Age: 44
End: 2024-01-22
Attending: FAMILY MEDICINE
Payer: COMMERCIAL

## 2024-01-22 ENCOUNTER — OFFICE VISIT (OUTPATIENT)
Dept: ORTHOPEDICS | Facility: CLINIC | Age: 44
End: 2024-01-22
Payer: COMMERCIAL

## 2024-01-22 VITALS
BODY MASS INDEX: 27.97 KG/M2 | SYSTOLIC BLOOD PRESSURE: 102 MMHG | HEIGHT: 66 IN | DIASTOLIC BLOOD PRESSURE: 72 MMHG | WEIGHT: 174 LBS

## 2024-01-22 DIAGNOSIS — M62.838 TRAPEZIUS MUSCLE SPASM: ICD-10-CM

## 2024-01-22 DIAGNOSIS — M25.512 CHRONIC PAIN OF BOTH SHOULDERS: ICD-10-CM

## 2024-01-22 DIAGNOSIS — S46.912D MUSCLE STRAIN OF LEFT SCAPULAR REGION, SUBSEQUENT ENCOUNTER: ICD-10-CM

## 2024-01-22 DIAGNOSIS — S46.911D MUSCLE STRAIN OF RIGHT SCAPULAR REGION, SUBSEQUENT ENCOUNTER: ICD-10-CM

## 2024-01-22 DIAGNOSIS — M25.511 CHRONIC PAIN OF BOTH SHOULDERS: ICD-10-CM

## 2024-01-22 DIAGNOSIS — M54.2 CERVICALGIA: Primary | ICD-10-CM

## 2024-01-22 DIAGNOSIS — G89.29 CHRONIC PAIN OF BOTH SHOULDERS: ICD-10-CM

## 2024-01-22 PROCEDURE — 72070 X-RAY EXAM THORAC SPINE 2VWS: CPT | Mod: TC | Performed by: RADIOLOGY

## 2024-01-22 PROCEDURE — 99214 OFFICE O/P EST MOD 30 MIN: CPT | Performed by: FAMILY MEDICINE

## 2024-01-22 NOTE — PROGRESS NOTES
ASSESSMENT & PLAN  Patient Instructions     1. Cervicalgia    2. Trapezius muscle spasm    3. Chronic pain of both shoulders    4. Muscle strain of left scapular region, subsequent encounter    5. Muscle strain of right scapular region, subsequent encounter      -She is following up for chronic neck, bilateral shoulder, thoracic spine, bilateral scapular pains due to muscle strains and potential underlying spinal pathology  -Patient has completed physical therapy and performs regular home exercises including stretching and strengthening.  Patient states that her strength has improved but pain has not.  Patient will have regular exacerbations with normal activities such as doing laundry or cooking  -Patient reports improvement in nighttime pain with tizanidine.  She may continue as needed.  -Patient reports initially that she had pain relief with Celebrex.  Patient may try Tylenol, or ibuprofen or Aleve as needed during the day.  -Patient will get an MRI of the cervical and thoracic spines for further evaluation of spinal pathology.  - Your MRI has been ordered. You may call [phone] to schedule over the phone. Once you get notified on [employer] of your results, send me a [employer] message to discuss results and next of treatment options.    -A spine consult was ordered with Dr. Carmelo Saez to discuss potential spinal pathology and procedures to improve chronic pain.  Possible fibromyalgia diagnosis?  -Call direct clinic number [[phone]] at any time with questions or concerns.    Albert Yeo MD [facility] Orthopedics and Sports Medicine  [facility] [place]        -----    SUBJECTIVE:  Idalia Hinojosa is a 43 year old female who is seen in follow-up for cervical radiculopathy.They were last seen 10/2/2023.     Since their last visit reports 0% - (About the same as last time). They indicate that their current pain level is 3/10. They have tried rest/activity avoidance, heat, other medications:  "Tizanidine, Celebrex, home exercises, and physical therapy. Patient reports that starting about 2 months there is a now a sharp in the middle of her upper back.       The patient is seen by themselves.    Patient's past medical, surgical, social, and family histories were reviewed today and no changes are noted.    REVIEW OF SYSTEMS:  Constitutional: NEGATIVE for fever, chills, change in weight  Skin: NEGATIVE for worrisome rashes, moles or lesions  GI/: NEGATIVE for bowel or bladder changes  Neuro: NEGATIVE for weakness, dizziness or paresthesias    OBJECTIVE:  /72   Ht 1.676 m (5' 6\")   Wt 78.9 kg (174 lb)   BMI 28.08 kg/m     General: healthy, alert and in no distress  HEENT: no scleral icterus or conjunctival erythema  Skin: no suspicious lesions or rash. No jaundice.  CV: regular rhythm by palpation, no pedal edema  Resp: normal respiratory effort without conversational dyspnea   Psych: normal mood and affect  Gait: normal steady gait with appropriate coordination and balance  Neuro: normal light touch sensory exam of the extremities.    MSK:  CERVICAL SPINE  Inspection:    No redness, swelling, ecchymosis, overlying skin change, or gross deformity/asymmetry, normal cervical lordosis present  Palpation:    Tender about the paracervical musculature (bilateral), levator scapula (bilateral), upper trapezius (bilateral), lower trapezius (both), rhomboids (bilateral) and medial border of scapula. Otherwise remainder of the landmarks and nontender.  Range of Motion:     Flexion within normal limits    Extension within normal limits    Right side bend within normal limits    Left side bend within normal limits    Right rotation within normal limits    Left rotation within normal limits  Strength:    Full strength throughout all neck muscles  Special Tests:    Positive: None    Negative: Spurling's (bilateral), Ballard's (bilateral)    Independent visualization of the below image:    Recent Results (from the " past 24 hour(s))   XR Thoracic Spine 2 Views    Narrative    THORACIC SPINE TWO VIEWS 1/22/2024 10:12 AM     COMPARISON: None.    HISTORY: Trapezius muscle spasm.      Impression    IMPRESSION: Normal alignment. Questionable mild age-indeterminate  anterior wedge compression fracture deformity of the T8 vertebral  body. Vertebral body heights and contours otherwise normal. No  definite recent fractures.     PRISCILA GALLOWAY MD         SYSTEM ID:  [id number]         Albert Yeo MD, [facility]

## 2024-01-22 NOTE — LETTER
1/22/2024         RE: Idalia Hinojosa  [address]  [place] [facility] [address]        Dear Colleague,    Thank you for referring your patient, Idalia Hinojosa, to the [facility] [place]. Please see a copy of my visit note below.    ASSESSMENT & PLAN  Patient Instructions     1. Cervicalgia    2. Trapezius muscle spasm    3. Chronic pain of both shoulders    4. Muscle strain of left scapular region, subsequent encounter    5. Muscle strain of right scapular region, subsequent encounter      -She is following up for chronic neck, bilateral shoulder, thoracic spine, bilateral scapular pains due to muscle strains and potential underlying spinal pathology  -Patient has completed physical therapy and performs regular home exercises including stretching and strengthening.  Patient states that her strength has improved but pain has not.  Patient will have regular exacerbations with normal activities such as doing laundry or cooking  -Patient reports improvement in nighttime pain with tizanidine.  She may continue as needed.  -Patient reports initially that she had pain relief with Celebrex.  Patient may try Tylenol, or ibuprofen or Aleve as needed during the day.  -Patient will get an MRI of the cervical and thoracic spines for further evaluation of spinal pathology.  - Your MRI has been ordered. You may call [phone] to schedule over the phone. Once you get notified on [employer] of your results, send me a [employer] message to discuss results and next of treatment options.    -A spine consult was ordered with Dr. Carmelo Saez to discuss potential spinal pathology and procedures to improve chronic pain.  Possible fibromyalgia diagnosis?  -Call direct clinic number [[phone]] at any time with questions or concerns.    Albert Yeo MD [facility]  [facility]        -----    SUBJECTIVE:  Idalia Hinojosa is a 43 year old female  "who is seen in follow-up for cervical radiculopathy.They were last seen 10/2/2023.     Since their last visit reports 0% - (About the same as last time). They indicate that their current pain level is 3/10. They have tried rest/activity avoidance, heat, other medications: Tizanidine, Celebrex, home exercises, and physical therapy. Patient reports that starting about 2 months there is a now a sharp in the middle of her upper back.       The patient is seen by themselves.    Patient's past medical, surgical, social, and family histories were reviewed today and no changes are noted.    REVIEW OF SYSTEMS:  Constitutional: NEGATIVE for fever, chills, change in weight  Skin: NEGATIVE for worrisome rashes, moles or lesions  GI/: NEGATIVE for bowel or bladder changes  Neuro: NEGATIVE for weakness, dizziness or paresthesias    OBJECTIVE:  /72   Ht 1.676 m (5' 6\")   Wt 78.9 kg (174 lb)   BMI 28.08 kg/m     General: healthy, alert and in no distress  HEENT: no scleral icterus or conjunctival erythema  Skin: no suspicious lesions or rash. No jaundice.  CV: regular rhythm by palpation, no pedal edema  Resp: normal respiratory effort without conversational dyspnea   Psych: normal mood and affect  Gait: normal steady gait with appropriate coordination and balance  Neuro: normal light touch sensory exam of the extremities.    MSK:  CERVICAL SPINE  Inspection:    No redness, swelling, ecchymosis, overlying skin change, or gross deformity/asymmetry, normal cervical lordosis present  Palpation:    Tender about the paracervical musculature (bilateral), levator scapula (bilateral), upper trapezius (bilateral), lower trapezius (both), rhomboids (bilateral) and medial border of scapula. Otherwise remainder of the landmarks and nontender.  Range of Motion:     Flexion within normal limits    Extension within normal limits    Right side bend within normal limits    Left side bend within normal limits    Right rotation within " normal limits    Left rotation within normal limits  Strength:    Full strength throughout all neck muscles  Special Tests:    Positive: None    Negative: Spurling's (bilateral), Ballard's (bilateral)    Independent visualization of the below image:    Recent Results (from the past 24 hour(s))   XR Thoracic Spine 2 Views    Narrative    THORACIC SPINE TWO VIEWS 1/22/2024 10:12 AM     COMPARISON: None.    HISTORY: Trapezius muscle spasm.      Impression    IMPRESSION: Normal alignment. Questionable mild age-indeterminate  anterior wedge compression fracture deformity of the T8 vertebral  body. Vertebral body heights and contours otherwise normal. No  definite recent fractures.     PRISCILA GALLOWAY MD         SYSTEM ID:  [id number]         Albert Yeo MD, [facility]      Again, thank you for allowing me to participate in the care of your patient.        Sincerely,        Albert Yeo, MD

## 2024-01-22 NOTE — PATIENT INSTRUCTIONS
1. Cervicalgia    2. Trapezius muscle spasm    3. Chronic pain of both shoulders    4. Muscle strain of left scapular region, subsequent encounter    5. Muscle strain of right scapular region, subsequent encounter      -She is following up for chronic neck, bilateral shoulder, thoracic spine, bilateral scapular pains due to muscle strains and potential underlying spinal pathology  -Patient has completed physical therapy and performs regular home exercises including stretching and strengthening.  Patient states that her strength has improved but pain has not.  Patient will have regular exacerbations with normal activities such as doing laundry or cooking  -Patient reports improvement in nighttime pain with tizanidine.  She may continue as needed.  -Patient reports initially that she had pain relief with Celebrex.  Patient may try Tylenol, or ibuprofen or Aleve as needed during the day.  -Patient will get an MRI of the cervical and thoracic spines for further evaluation of spinal pathology.  - Your MRI has been ordered. You may call [phone] to schedule over the phone. Once you get notified on [employer] of your results, send me a [employer] message to discuss results and next of treatment options.    -A spine consult was ordered with Dr. Carmelo Saez to discuss potential spinal pathology and procedures to improve chronic pain.  Possible fibromyalgia diagnosis?  -Call direct clinic number [[phone]] at any time with questions or concerns.    Albert Yeo MD [facility] Orthopedics and Sports Medicine  [facility] [place]

## 2024-02-01 ENCOUNTER — ANCILLARY PROCEDURE (OUTPATIENT)
Dept: MRI IMAGING | Facility: CLINIC | Age: 44
End: 2024-02-01
Attending: FAMILY MEDICINE
Payer: COMMERCIAL

## 2024-02-01 DIAGNOSIS — S46.912D MUSCLE STRAIN OF LEFT SCAPULAR REGION, SUBSEQUENT ENCOUNTER: ICD-10-CM

## 2024-02-01 DIAGNOSIS — M25.512 CHRONIC PAIN OF BOTH SHOULDERS: ICD-10-CM

## 2024-02-01 DIAGNOSIS — M62.838 TRAPEZIUS MUSCLE SPASM: ICD-10-CM

## 2024-02-01 DIAGNOSIS — S46.911D MUSCLE STRAIN OF RIGHT SCAPULAR REGION, SUBSEQUENT ENCOUNTER: ICD-10-CM

## 2024-02-01 DIAGNOSIS — G89.29 CHRONIC PAIN OF BOTH SHOULDERS: ICD-10-CM

## 2024-02-01 DIAGNOSIS — M54.2 CERVICALGIA: ICD-10-CM

## 2024-02-01 DIAGNOSIS — M25.511 CHRONIC PAIN OF BOTH SHOULDERS: ICD-10-CM

## 2024-02-01 PROCEDURE — 72141 MRI NECK SPINE W/O DYE: CPT

## 2024-02-01 PROCEDURE — 72146 MRI CHEST SPINE W/O DYE: CPT

## 2024-02-12 ENCOUNTER — TELEPHONE (OUTPATIENT)
Dept: PALLIATIVE MEDICINE | Facility: CLINIC | Age: 44
End: 2024-02-12
Payer: COMMERCIAL

## 2024-02-12 NOTE — TELEPHONE ENCOUNTER
"Injection Order (copy and paste all info under \"order questions\" section:   Referral Type:   Procedure            Procedure:   Other            My Clinical Question Is:   Patient has chronic neck pain, without relief with PT and meds. Your choice of injection              Associated Diagnosis: Cervicalgia   Referring Provider:     Yeo, Albert, MD i     Injection History (previous injections with pain, type and date):     After Review please route to Pain Nurse Pool for nursing to triage.       Zoraida Almendarez  Complex   [place] [facility]     "

## 2024-02-12 NOTE — TELEPHONE ENCOUNTER
Per referral review it appears this patient may be referred to PM&R.     Routing to Carolyn Allen to review / advise if this is a patient of that clinic or if this should be processed by pain management.

## 2024-02-18 ENCOUNTER — HEALTH MAINTENANCE LETTER (OUTPATIENT)
Age: 44
End: 2024-02-18

## 2024-02-21 ENCOUNTER — OFFICE VISIT (OUTPATIENT)
Dept: ORTHOPEDICS | Facility: CLINIC | Age: 44
End: 2024-02-21
Attending: FAMILY MEDICINE
Payer: COMMERCIAL

## 2024-02-21 VITALS
HEIGHT: 66 IN | WEIGHT: 174 LBS | HEART RATE: 55 BPM | BODY MASS INDEX: 27.97 KG/M2 | DIASTOLIC BLOOD PRESSURE: 80 MMHG | OXYGEN SATURATION: 97 % | SYSTOLIC BLOOD PRESSURE: 123 MMHG

## 2024-02-21 DIAGNOSIS — M62.838 TRAPEZIUS MUSCLE SPASM: ICD-10-CM

## 2024-02-21 DIAGNOSIS — M47.814 FACET ARTHROPATHY, THORACIC: ICD-10-CM

## 2024-02-21 DIAGNOSIS — M47.812 FACET ARTHROPATHY, CERVICAL: ICD-10-CM

## 2024-02-21 DIAGNOSIS — M25.512 CHRONIC PAIN OF BOTH SHOULDERS: ICD-10-CM

## 2024-02-21 DIAGNOSIS — M25.511 CHRONIC PAIN OF BOTH SHOULDERS: ICD-10-CM

## 2024-02-21 DIAGNOSIS — M79.18 CERVICAL MYOFASCIAL PAIN SYNDROME: Primary | ICD-10-CM

## 2024-02-21 DIAGNOSIS — M54.2 CERVICALGIA: ICD-10-CM

## 2024-02-21 DIAGNOSIS — M79.18 MYALGIA, OTHER SITE: ICD-10-CM

## 2024-02-21 DIAGNOSIS — S46.911D MUSCLE STRAIN OF RIGHT SCAPULAR REGION, SUBSEQUENT ENCOUNTER: ICD-10-CM

## 2024-02-21 DIAGNOSIS — S46.912D MUSCLE STRAIN OF LEFT SCAPULAR REGION, SUBSEQUENT ENCOUNTER: ICD-10-CM

## 2024-02-21 DIAGNOSIS — G89.29 CHRONIC PAIN OF BOTH SHOULDERS: ICD-10-CM

## 2024-02-21 PROCEDURE — 99205 OFFICE O/P NEW HI 60 MIN: CPT | Performed by: PHYSICAL MEDICINE & REHABILITATION

## 2024-02-21 ASSESSMENT — PAIN SCALES - GENERAL: PAINLEVEL: MILD PAIN (2)

## 2024-02-21 NOTE — PROGRESS NOTES
"PHYSICAL MEDICINE & REHABILITATION / MEDICAL SPINE        Date:  Feb 21, 2024    Name:  Idalia Hinojosa  YOB: 1980  MRN:  [id number]          REASON FOR CONSULTATION:  Chronic neck, mid back, and right foot numbness.        REFERRING PROVIDER:  Albert Yeo, MD        CHART REVIEW:  Reviewed 01/22/2024 note from Albert Yeo, MD (family medicine-sports medicine).  Ms. Idalia Hinojosa was being seen for chronic neck, bilateral shoulder, bilateral scapular, and thoracic pain.  Pain was rated as 3/10.  She had tried rest/activity avoidance, heat, tizanidine, celecoxib, home exercises, physical therapy.  Strength improved with physical therapy, but pain had not.  Normal activities such as laundry and cooking could exacerbate her pain.  MRI of the cervical and thoracic spines were ordered.  Referral to medical spine was placed.        HISTORY OF PRESENT ILLNESS:  Ms. Idalia Hinojosa is a 44-year-old, right-hand-dominant, female.  She works at [employer].  Ms. 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  She enjoys reading and building with [employer].  Ms. Idalia Hinojosa is  and has an 8-year-old daughter.  Ms. Hinojosa states that her daughter has ADHD and some behavioral issues.  Ms. Idalia Hinojosa states that her  had Hodgkin's lymphoma that is now considered in remission.  Ms. Idalia Hinojosa notes significant stress in her life.    Ms. Idalia Hinojosa began noting pain in December 2022.  She was staying with her  in the hospital when a rapid response had to be called for him.  Ms. Idalia Hinojosa has bilateral posterior neck pain, bilateral parascapular pain, bilateral upper trapezius pain.  Pain is constant and described as \"achy, sharp.\"  Pain is currently rated as 2/10.  There is no radiation of the pain.  Neck movements, twisting, and any activity in front of her body seem to worsen the pain.  Heat does provide some alleviation of the pain.  This pain has " improved a little.    Ms. Idalia Hinojosa's pain does not change with coughing or sneezing.  Driving does worsen her pain.  The pain no longer wakes her.  The pain sometimes keeps her awake.    Ms. Idalia Hinojosa noted benefit with acupuncture.  She has not tried injections.  She found massage made her pain worse.  She has not tried chiropractic treatments.  She had 1 year of physical therapy that provided some benefit.  Ms. Idalia Hinojosa has used muscle relaxants, acetaminophen, and celecoxib.    Ms. Idalia Hinojosa denies any lightheadedness, dizziness, balance problems.  She denies any weakness.  Ms. Hinojosa denies dropping objects.  She denies any difficulty with overhead activities.  Ms. Idalia Hinojosa notes some locking in her right thumb.  She denies any catching.  She does note popping in her upper back and neck.  Ms. Idalia Hinojosa denies any bruising, redness, swelling.  She denies any tripping, stumbling, falling.  Ms. Idalia Hinojosa notes intermittent numbness and tingling in her right first, second, and third toes.  She denies any other numbness, tingling, pins-and-needles.  Ms. Idalia Hinojosa denies any saddle anesthesia, bowel incontinence, bladder incontinence.    Ms. Idalia Hinojosa denies any prior or recent injuries of her head, neck, upper back, chest, shoulders, arms, elbows, forearms, wrist, hands, fingers.    Ms. Idalia Hinojosa denies any personal or family history of autoimmune diseases, rheumatologic diseases, gout, pseudogout.  She denies any personal or family history of neurologic diseases.  Ms. Idalia Hinojosa denies any personal or family history of inflammatory bowel diseases.        REVIEW OF SYSTEMS:  As detailed in history of present illness.        ALLERGIES:  No Known Allergies      MEDICATIONS:  Current Outpatient Medications   Medication Sig Dispense Refill    ipratropium (ATROVENT) 0.06 % nasal spray Spray 2 sprays into both nostrils 4 times daily  as needed for rhinitis 15 mL 5    tiZANidine (ZANAFLEX) 2 MG tablet Take 1 tablet (2 mg) by mouth nightly as needed for muscle spasms 90 tablet 0    celecoxib (CELEBREX) 100 MG capsule Take 1 capsule (100 mg) by mouth 2 times daily as needed for moderate pain 60 capsule 0    cyclobenzaprine (FLEXERIL) 5 MG tablet Take 1-2 tablets (5-10 mg) by mouth nightly as needed for muscle spasms You can take up to 3 times per day as needed, but this is sedating so no driving or alcohol while taking. (Patient not taking: Reported on 2023) 30 tablet 0    mometasone (NASONEX) 50 MCG/ACT nasal spray Spray 2 sprays into both nostrils daily 51 g 3    montelukast (SINGULAIR) 10 MG tablet Take 1 tablet (10 mg) by mouth At Bedtime 90 tablet 3    norethindrone-ethinyl estradiol (MICROGESTIN ) 1-20 MG-MCG tablet Take 1 tablet by mouth daily Active tablets only for prevention of menses 112 tablet 4    sertraline (ZOLOFT) 100 MG tablet Take 1 tablet (100 mg) by mouth daily 90 tablet 4         PAST MEDICAL HISTORY:  Past Medical History:   Diagnosis Date    ASCUS of cervix with negative high risk HPV 2017 ASCUS, Neg HPV    Seasonal affective disorder (H24)     no meds         PAST SURGICAL HISTORY:  Past Surgical History:   Procedure Laterality Date     SECTION N/A 10/28/2015    Procedure:  SECTION;  Surgeon: Mónica Madrid MD;  Location:  [facility]    DENTAL SURGERY      wisdom teeth    TONSILLECTOMY  age 10         FAMILY HISTORY:  Family History   Problem Relation Age of Onset    Heart Disease Maternal Grandmother     Skin Cancer Maternal Grandmother     Thyroid Disease Paternal Grandmother     Other Cancer Paternal Grandmother          SOCIAL HISTORY:  Social History     Socioeconomic History    Marital status:      Spouse name: Not on file    Number of children: 1    Years of education: Not on file    Highest education level: Not on file   Occupational History     Employer: [employer]   Tobacco Use    Smoking status: Never    Smokeless tobacco: Never   Vaping Use    Vaping Use: Never used   Substance and Sexual Activity    Alcohol use: Yes     Alcohol/week: 0.0 standard drinks of alcohol     Comment: 0-2 PER WK    Drug use: No    Sexual activity: Yes     Partners: Male     Birth control/protection: Pill   Other Topics Concern    Parent/sibling w/ CABG, MI or angioplasty before 65F 55M? No   Social History Narrative             Social Determinants of Health     Financial Resource Strain: Not on file   Food Insecurity: Not on file   Transportation Needs: Not on file   Physical Activity: Not on file   Stress: Not on file   Social Connections: Not on file   Interpersonal Safety: Not on file   Housing Stability: Not on file         PHYSICAL EXAMINATION:  Vitals:    02/21/24 1238   BP: 123/80   Pulse: 55   SpO2: 97%   Weight: 78.9 kg (174 lb)   Height: 1.676 m (5' 6\")       GENERAL:  No acute distress.  Pleasant and cooperative.   PSYCH:  Normal mood and affect.  HEAD:  Normocephalic.  SPEECH:  No dysarthria.  EYES:  No scleral icterus.  Wearing glasses.  EARS:  Hearing is intact to spoken voice.  NOSE:  Midline, symmetric, no rhinorrhea.  LUNGS:  No respiratory distress.  No increased work of breathing.  VASCULAR/PULSES:  Radial:  Right 2+.  Left 2+.  UPPER EXTREMITIES:  No clubbing, cyanosis, or edema bilaterally.    BALANCE AND GAIT:   The patient has reciprocal gait pattern without antalgia.  She is able to toe walk, heel walk, tandem walk without difficulty.    INSPECTION:  There is no erythema, ecchymosis, deformity, asymmetry, or abnormality of the neck.  There is no erythema, ecchymosis, deformity, asymmetry, or abnormality of the thoracic back.    CERVICAL PALPATION:  Inion:  not tender.  Greater Occipital Nerve:  Right not tender.  Left not tender.  Lesser Occipital Nerve:  Right not tender.  Left not tender.  Cervical Spinous Processes:  not tender.  Cervical Paraspinals:  Right " not tender.  Left not tender.  Splenius Capitis:  Right mild tenderness.  Left mild tenderness.  Splenius Cervicis:  Right mild tenderness.  Left mild tenderness.  Levator Scapulae at the Neck:  Right not tender.  Left not tender.  Upper Cervical Facet Joints:  Right not tender.  Left not tender.  Middle Cervical Facet Joints:  Right mild tenderness.  Left mild tenderness.  Lower Cervical Facet Joints:  Right not tender.  Left not tender.  Longissimus:  Right mild tenderness.  Left mild tenderness.  Anterior, Middle, Posterior Scalenes:  Right not tender.  Left not tender.  Sternocleidomastoid:  Right not tender.  Left not tender.  Trapezius:  Right not tender.  Left not tender.    THORACIC PALPATION:  Thoracic Spinous Processes:  not tender.  Thoracic Paraspinals:  Right not tender.  Left not tender.  Levator Scapulae at the Scapular Insertion:  Right not tender.  Left not tender.  Rhomboid Minor:  Right not tender.  Left not tender.  Rhomboid Major:  Right not tender.  Left not tender.    CERVICAL RANGE OF MOTION:  Forward Flexion (40 ): Normal range of motion, causes slight increase in posterior neck pain, does not cause radiating pain.  Extension (40 ):  Normal range of motion, causes slight increase in posterior neck pain, does not cause radiating pain.  Rotating Right (45 ):  Normal range of motion, causes slight increase in posterior neck pain, does not cause radiating pain.  Rotating Left (45 ):  Normal range of motion, causes slight increase in posterior neck pain, does not cause radiating pain.  Lateral Bending Right (40 ):  Normal range of motion, causes slight increase in posterior neck pain, does not cause radiating pain.  Lateral Bending Left (40 ):  Normal range of motion, causes slight increase in posterior neck pain, does not cause radiating pain.    SHOULDER RANGE OF MOTION:  Active Forward Flexion (180 ):  Right 180 .  Left 180 .  Active Extension (60 ):  Right 40 .  Left 40 .  Active Abduction  (180 ):  Right 180 .  Left 180 .  Scapular Dyskinesis:  Right -.  Left -.    THORACOLUMBAR RANGE OF MOTION:  Forward flexion (85 ): Normal range of motion, causes slight increase in thoracic back pain, does not cause radiating pain.  Extension (30 ):  Normal range of motion, causes slight increase in thoracic back pain, does not cause radiating pain.  Lateral bending right (30 ):  Normal range of motion, causes slight increase in thoracic back pain, does not cause radiating pain.  Lateral bending left (30 ):  Normal range of motion, causes slight increase in thoracic back pain, does not cause radiating pain.  Twisting right with extension:  Normal range of motion, causes slight increase in thoracic back pain, does not cause radiating pain.  Twisting left with extension:  Normal range of motion, causes slight increase in thoracic back pain, does not cause radiating pain.    STRENGTH:  Shoulder abduction:  Right 5/5.  Left 5/5.  Elbow flexion:  Right 5/5.  Left 5/5.  Wrist extension:  Right 5/5.  Left 5/5.  Elbow extension:  Right 5/5.  Left 5/5.  Wrist flexion:  Right 5/5.  Left 5/5.  Finger flexion:  Right 5/5.  Left 5/5.  Finger abduction:  Right 5/5.  Left 5/5.    SENSATION:  Intact to light touch along right C3, C4, C5, C6, C7, C8, T1, T2.  Intact to light touch along left C3, C4, C5, C6, C7, C8, T1, T2.    REFLEXES:  Biceps (C5-C6):  Right 2+.  Left 2+.  Brachioradialis (C5-C6):  Right 2+.  Left 2+.  Triceps (C7-C8):  Right 2+.  Left 2+.  Ballard's:  Right +.  Left +.    CERVICAL SPECIAL TESTS:  Facet Loading:  Right +.  Left +.  Spurling Neck Compression:  Right -.  Left -.    SHOULDER SPECIAL TESTS:  Drop Arm:  Right - . Left -.        IMAGING:  EXAM: MR THORACIC SPINE W/O CONTRAST, MR CERVICAL SPINE W/O CONTRAST  LOCATION: [facility]  DATE: 2/1/2024     INDICATION: Chronic neck and bilateral shoulder pain. Muscle strain of the right scapular region. No improvement with  NSAIDs.  COMPARISON: Thoracic spine radiographs dated 01/22/2024 and cervical spine radiographs dated 06/21/2023.  TECHNIQUE:   1) MRI Cervical Spine without IV contrast.  2) MRI Thoracic Spine without IV contrast.     FINDINGS:     CERVICAL SPINE:   Similar alignment with slight reversal of the normal lordosis without significant spondylolisthesis. Mild to moderate intervertebral disc height loss and desiccation at C5-C6, mild at C4-C5. Mild intervertebral disc desiccation without significant height loss at C3-C4 and C6-C7. No suspicious bone marrow signal intensity or significant focal edema. No abnormal spinal cord signal intensity. Within technique limitations, no significant extraspinal finding.     Craniovertebral junction and C1-C2: No significant degenerative change or spinal canal stenosis.     C2-C3: Mild left facet arthrosis. No significant disc degenerative change, spinal canal stenosis, or foraminal narrowing.      C3-C4: Mild bilateral uncovertebral and facet arthrosis. No significant disc herniation, spinal canal stenosis, or foraminal narrowing.      C4-C5: A tiny posterior disc-osteophyte complex without significant spinal canal stenosis. Mild bilateral facet and uncovertebral arthropathy, worse on the left where there is mild foraminal narrowing. No significant right foraminal stenosis.      C5-C6: A posterior disc-osteophyte complex effacing the ventral thecal sac without significant spinal canal stenosis. Bilateral uncovertebral and facet arthrosis, worse on the left where there is mild to moderate foraminal narrowing. No significant right   foraminal stenosis.      C6-C7: A tiny posterior disc-osteophyte complex and mild buckling of the ligamenta flava without significant spinal canal stenosis. Mild bilateral uncovertebral and left facet arthrosis without significant foraminal narrowing. A right intraforaminal perineural cysts.      C7-T1: Mild bilateral facet arthrosis, worse on the left. No  significant disc degenerative change, spinal canal stenosis, or foraminal narrowing.     THORACIC SPINE:   Twelve thoracic-type rib-bearing vertebral bodies with hypoplastic 12th ribs. Maintained alignment and curvature. Mild chronic anterior wedging at T8 with approximately 10% vertebral body height loss without retropulsion, likely degenerative in the setting of a prominent superior endplate Schmorl's node. Several additional Schmorl's nodes at the inferior T10-T11, superior/inferior T12-L1, and superior L2 endplates without associated bone marrow edema. Maintained remainder of the vertebral body heights. Mild Modic type I degenerative change at T7-T8 and T10-T11. Mild intervertebral disc height loss and desiccation at T7-T9, T10-T11, and, to a lesser extent, at T11-T12.     Tiny disc protrusions at T7-T8 and T11-T12 without significant spinal canal stenosis. Mild multilevel facet arthrosis, most notably at the mid to lower thoracic levels without significant foraminal narrowing.     No suspicious bone marrow signal intensity. Several T1-hyperintense vertebral body foci, the largest measuring 2.0 cm at T12, most compatible with benign intraosseous hemangiomas.     No abnormal spinal cord signal intensity. Unremarkable conus medullaris terminating at the superior L1 endplate.     Partially imaged subcentimeter T2-hyperintense hepatic foci, inadequately characterized on this exam although most likely simple hepatic cysts. Unremarkable paraspinal soft tissues.    IMPRESSION:     CERVICAL SPINE MRI:  1.  Mild multilevel spondylosis with mild to moderate C5-C6 and mild C4-C5 foraminal narrowing on the LEFT.  2.  No significant spinal canal stenosis or abnormal spinal cord signal intensity.     THORACIC SPINE MRI:  1.  Mild multilevel spondylosis without significant spinal canal or foraminal stenosis.  2.  No abnormal spinal cord signal intensity.        ASSESSMENT/PLAN:  Ms. Idalia Hinojosa is a 44-year-old,  right-hand-dominant, female.  She does have some very mild cervical facet arthropathy and thoracic facet arthropathy.  She does have some very mild cervical and thoracic degenerative disc disease.  While these could be contributing to her pain, myofascial pain seems to be a large contributor.  Do question how Ms. Idalia Hinojosa's stress plays into her pain.  Question if switching from sertraline to duloxetine might be beneficial.  Discussed diagnoses and treatment options with Ms. Idalia Hinojosa.  Discussed the options of doing nothing/living with it, acupuncture, dry needling, trigger point injections, physical therapy, chiropractic care, facet joint injections, facet joint medial branch blocks, switching from sertraline to duloxetine.  Ms. Idalia Hinojosa will be set up for trigger point injections.  She is being referred to physical therapy.  Ms. Idalia Hinojosa is to follow-up in this clinic in 2 months.        Total Time on encounter:  65 minutes were spent on one more or more of the following:  discussion with patient, history, exam, coordinating care, treatment goals, record review, documenting clinical information, and/or data review.      Carmelo Saez MD

## 2024-02-21 NOTE — LETTER
2/21/2024         RE: Idalia Hinojosa  [address]  [facility] [address]        Dear Colleague,    Thank you for referring your patient, Idalia Hinojosa, to the [facility]. Please see a copy of my visit note below.    PHYSICAL MEDICINE & REHABILITATION / MEDICAL SPINE        Date:  Feb 21, 2024    Name:  Idalia Hinojosa  YOB: 1980  MRN:  [id number]          REASON FOR CONSULTATION:  Chronic neck, mid back, and right foot numbness.        REFERRING PROVIDER:  Albert Yeo, MD        CHART REVIEW:  Reviewed 01/22/2024 note from Albert Yeo, MD (family medicine-sports medicine).  Ms. Idalia Hinojosa was being seen for chronic neck, bilateral shoulder, bilateral scapular, and thoracic pain.  Pain was rated as 3/10.  She had tried rest/activity avoidance, heat, tizanidine, celecoxib, home exercises, physical therapy.  Strength improved with physical therapy, but pain had not.  Normal activities such as laundry and cooking could exacerbate her pain.  MRI of the cervical and thoracic spines were ordered.  Referral to medical spine was placed.        HISTORY OF PRESENT ILLNESS:  Ms. Idalia Hinojosa is a 44-year-old, right-hand-dominant, female.  She works at [employer].  Ms. Idalia Hinojosa plays the oboe, English horn, and piano.  She enjoys reading and building with Lego.  Ms. Idalia Hinojosa is  and has an 8-year-old daughter.  Ms. Hinojosa states that her daughter has ADHD and some behavioral issues.  Ms. Idalia Hinojosa states that her  had Hodgkin's lymphoma that is now considered in remission.  Ms. Idalia Hinojosa notes significant stress in her life.    Ms. Idalia Hinojosa began noting pain in December 2022.  She was staying with her  in the hospital when a rapid response had to be called for him.  Ms. Idalia Hinojosa has bilateral posterior neck pain, bilateral parascapular pain, bilateral upper trapezius pain.  Pain is  "constant and described as \"achy, sharp.\"  Pain is currently rated as 2/10.  There is no radiation of the pain.  Neck movements, twisting, and any activity in front of her body seem to worsen the pain.  Heat does provide some alleviation of the pain.  This pain has improved a little.    Ms. Idalia Hellers pain does not change with coughing or sneezing.  Driving does worsen her pain.  The pain no longer wakes her.  The pain sometimes keeps her awake.    Ms. Idalia Hinojosa noted benefit with acupuncture.  She has not tried injections.  She found massage made her pain worse.  She has not tried chiropractic treatments.  She had 1 year of physical therapy that provided some benefit.  Ms. Idalia Hinojosa has used muscle relaxants, acetaminophen, and celecoxib.    Ms. Idalia Hinojosa denies any lightheadedness, dizziness, balance problems.  She denies any weakness.  Ms. Hinojosa denies dropping objects.  She denies any difficulty with overhead activities.  Ms. Idalia Hinojosa notes some locking in her right thumb.  She denies any catching.  She does note popping in her upper back and neck.  Ms. Idalia Hinojosa denies any bruising, redness, swelling.  She denies any tripping, stumbling, falling.  Ms. Idalia Hinojosa notes intermittent numbness and tingling in her right first, second, and third toes.  She denies any other numbness, tingling, pins-and-needles.  Ms. Idalia Hinojosa denies any saddle anesthesia, bowel incontinence, bladder incontinence.    Ms. Idalia Hinojosa denies any prior or recent injuries of her head, neck, upper back, chest, shoulders, arms, elbows, forearms, wrist, hands, fingers.    Ms. Idalia Hinojosa denies any personal or family history of autoimmune diseases, rheumatologic diseases, gout, pseudogout.  She denies any personal or family history of neurologic diseases.  Ms. Idalia Hinojosa denies any personal or family history of inflammatory bowel diseases.        REVIEW OF " SYSTEMS:  As detailed in history of present illness.        ALLERGIES:  No Known Allergies      MEDICATIONS:  Current Outpatient Medications   Medication Sig Dispense Refill    ipratropium (ATROVENT) 0.06 % nasal spray Spray 2 sprays into both nostrils 4 times daily as needed for rhinitis 15 mL 5    tiZANidine (ZANAFLEX) 2 MG tablet Take 1 tablet (2 mg) by mouth nightly as needed for muscle spasms 90 tablet 0    celecoxib (CELEBREX) 100 MG capsule Take 1 capsule (100 mg) by mouth 2 times daily as needed for moderate pain 60 capsule 0    cyclobenzaprine (FLEXERIL) 5 MG tablet Take 1-2 tablets (5-10 mg) by mouth nightly as needed for muscle spasms You can take up to 3 times per day as needed, but this is sedating so no driving or alcohol while taking. (Patient not taking: Reported on 2023) 30 tablet 0    mometasone (NASONEX) 50 MCG/ACT nasal spray Spray 2 sprays into both nostrils daily 51 g 3    montelukast (SINGULAIR) 10 MG tablet Take 1 tablet (10 mg) by mouth At Bedtime 90 tablet 3    norethindrone-ethinyl estradiol (MICROGESTIN ) 1-20 MG-MCG tablet Take 1 tablet by mouth daily Active tablets only for prevention of menses 112 tablet 4    sertraline (ZOLOFT) 100 MG tablet Take 1 tablet (100 mg) by mouth daily 90 tablet 4         PAST MEDICAL HISTORY:  Past Medical History:   Diagnosis Date    ASCUS of cervix with negative high risk HPV 2017 ASCUS, Neg HPV    Seasonal affective disorder (H24)     no meds         PAST SURGICAL HISTORY:  Past Surgical History:   Procedure Laterality Date     SECTION N/A 10/28/2015    Procedure:  SECTION;  Surgeon: Mónica Madrid MD;  Location:  [facility]    DENTAL SURGERY      wisdom teeth    TONSILLECTOMY  age 10         FAMILY HISTORY:  Family History   Problem Relation Age of Onset    Heart Disease Maternal Grandmother     Skin Cancer Maternal Grandmother     Thyroid Disease Paternal Grandmother     Other Cancer Paternal Grandmother   "        SOCIAL HISTORY:  Social History     Socioeconomic History    Marital status:      Spouse name: Not on file    Number of children: 1    Years of education: Not on file    Highest education level: Not on file   Occupational History     Employer: [employer]   Tobacco Use    Smoking status: Never    Smokeless tobacco: Never   Vaping Use    Vaping Use: Never used   Substance and Sexual Activity    Alcohol use: Yes     Alcohol/week: 0.0 standard drinks of alcohol     Comment: 0-2 PER WK    Drug use: No    Sexual activity: Yes     Partners: Male     Birth control/protection: Pill   Other Topics Concern    Parent/sibling w/ CABG, MI or angioplasty before 65F 55M? No   Social History Narrative             Social Determinants of Health     Financial Resource Strain: Not on file   Food Insecurity: Not on file   Transportation Needs: Not on file   Physical Activity: Not on file   Stress: Not on file   Social Connections: Not on file   Interpersonal Safety: Not on file   Housing Stability: Not on file         PHYSICAL EXAMINATION:  Vitals:    02/21/24 1238   BP: 123/80   Pulse: 55   SpO2: 97%   Weight: 78.9 kg (174 lb)   Height: 1.676 m (5' 6\")       GENERAL:  No acute distress.  Pleasant and cooperative.   PSYCH:  Normal mood and affect.  HEAD:  Normocephalic.  SPEECH:  No dysarthria.  EYES:  No scleral icterus.  Wearing glasses.  EARS:  Hearing is intact to spoken voice.  NOSE:  Midline, symmetric, no rhinorrhea.  LUNGS:  No respiratory distress.  No increased work of breathing.  VASCULAR/PULSES:  Radial:  Right 2+.  Left 2+.  UPPER EXTREMITIES:  No clubbing, cyanosis, or edema bilaterally.    BALANCE AND GAIT:   The patient has reciprocal gait pattern without antalgia.  She is able to toe walk, heel walk, tandem walk without difficulty.    INSPECTION:  There is no erythema, ecchymosis, deformity, asymmetry, or abnormality of the neck.  There is no erythema, ecchymosis, deformity, asymmetry, or abnormality " of the thoracic back.    CERVICAL PALPATION:  Inion:  not tender.  Greater Occipital Nerve:  Right not tender.  Left not tender.  Lesser Occipital Nerve:  Right not tender.  Left not tender.  Cervical Spinous Processes:  not tender.  Cervical Paraspinals:  Right not tender.  Left not tender.  Splenius Capitis:  Right mild tenderness.  Left mild tenderness.  Splenius Cervicis:  Right mild tenderness.  Left mild tenderness.  Levator Scapulae at the Neck:  Right not tender.  Left not tender.  Upper Cervical Facet Joints:  Right not tender.  Left not tender.  Middle Cervical Facet Joints:  Right mild tenderness.  Left mild tenderness.  Lower Cervical Facet Joints:  Right not tender.  Left not tender.  Longissimus:  Right mild tenderness.  Left mild tenderness.  Anterior, Middle, Posterior Scalenes:  Right not tender.  Left not tender.  Sternocleidomastoid:  Right not tender.  Left not tender.  Trapezius:  Right not tender.  Left not tender.    THORACIC PALPATION:  Thoracic Spinous Processes:  not tender.  Thoracic Paraspinals:  Right not tender.  Left not tender.  Levator Scapulae at the Scapular Insertion:  Right not tender.  Left not tender.  Rhomboid Minor:  Right not tender.  Left not tender.  Rhomboid Major:  Right not tender.  Left not tender.    CERVICAL RANGE OF MOTION:  Forward Flexion (40 ): Normal range of motion, causes slight increase in posterior neck pain, does not cause radiating pain.  Extension (40 ):  Normal range of motion, causes slight increase in posterior neck pain, does not cause radiating pain.  Rotating Right (45 ):  Normal range of motion, causes slight increase in posterior neck pain, does not cause radiating pain.  Rotating Left (45 ):  Normal range of motion, causes slight increase in posterior neck pain, does not cause radiating pain.  Lateral Bending Right (40 ):  Normal range of motion, causes slight increase in posterior neck pain, does not cause radiating pain.  Lateral Bending Left  (40 ):  Normal range of motion, causes slight increase in posterior neck pain, does not cause radiating pain.    SHOULDER RANGE OF MOTION:  Active Forward Flexion (180 ):  Right 180 .  Left 180 .  Active Extension (60 ):  Right 40 .  Left 40 .  Active Abduction (180 ):  Right 180 .  Left 180 .  Scapular Dyskinesis:  Right -.  Left -.    THORACOLUMBAR RANGE OF MOTION:  Forward flexion (85 ): Normal range of motion, causes slight increase in thoracic back pain, does not cause radiating pain.  Extension (30 ):  Normal range of motion, causes slight increase in thoracic back pain, does not cause radiating pain.  Lateral bending right (30 ):  Normal range of motion, causes slight increase in thoracic back pain, does not cause radiating pain.  Lateral bending left (30 ):  Normal range of motion, causes slight increase in thoracic back pain, does not cause radiating pain.  Twisting right with extension:  Normal range of motion, causes slight increase in thoracic back pain, does not cause radiating pain.  Twisting left with extension:  Normal range of motion, causes slight increase in thoracic back pain, does not cause radiating pain.    STRENGTH:  Shoulder abduction:  Right 5/5.  Left 5/5.  Elbow flexion:  Right 5/5.  Left 5/5.  Wrist extension:  Right 5/5.  Left 5/5.  Elbow extension:  Right 5/5.  Left 5/5.  Wrist flexion:  Right 5/5.  Left 5/5.  Finger flexion:  Right 5/5.  Left 5/5.  Finger abduction:  Right 5/5.  Left 5/5.    SENSATION:  Intact to light touch along right C3, C4, C5, C6, C7, C8, T1, T2.  Intact to light touch along left C3, C4, C5, C6, C7, C8, T1, T2.    REFLEXES:  Biceps (C5-C6):  Right 2+.  Left 2+.  Brachioradialis (C5-C6):  Right 2+.  Left 2+.  Triceps (C7-C8):  Right 2+.  Left 2+.  Ballard's:  Right +.  Left +.    CERVICAL SPECIAL TESTS:  Facet Loading:  Right +.  Left +.  Spurling Neck Compression:  Right -.  Left -.    SHOULDER SPECIAL TESTS:  Drop Arm:  Right - . Left -.        IMAGING:  EXAM:  MR THORACIC SPINE W/O CONTRAST, MR CERVICAL SPINE W/O CONTRAST  LOCATION: [facility]  DATE: 2/1/2024     INDICATION: Chronic neck and bilateral shoulder pain. Muscle strain of the right scapular region. No improvement with NSAIDs.  COMPARISON: Thoracic spine radiographs dated 01/22/2024 and cervical spine radiographs dated 06/21/2023.  TECHNIQUE:   1) MRI Cervical Spine without IV contrast.  2) MRI Thoracic Spine without IV contrast.     FINDINGS:     CERVICAL SPINE:   Similar alignment with slight reversal of the normal lordosis without significant spondylolisthesis. Mild to moderate intervertebral disc height loss and desiccation at C5-C6, mild at C4-C5. Mild intervertebral disc desiccation without significant height loss at C3-C4 and C6-C7. No suspicious bone marrow signal intensity or significant focal edema. No abnormal spinal cord signal intensity. Within technique limitations, no significant extraspinal finding.     Craniovertebral junction and C1-C2: No significant degenerative change or spinal canal stenosis.     C2-C3: Mild left facet arthrosis. No significant disc degenerative change, spinal canal stenosis, or foraminal narrowing.      C3-C4: Mild bilateral uncovertebral and facet arthrosis. No significant disc herniation, spinal canal stenosis, or foraminal narrowing.      C4-C5: A tiny posterior disc-osteophyte complex without significant spinal canal stenosis. Mild bilateral facet and uncovertebral arthropathy, worse on the left where there is mild foraminal narrowing. No significant right foraminal stenosis.      C5-C6: A posterior disc-osteophyte complex effacing the ventral thecal sac without significant spinal canal stenosis. Bilateral uncovertebral and facet arthrosis, worse on the left where there is mild to moderate foraminal narrowing. No significant right   foraminal stenosis.      C6-C7: A tiny posterior disc-osteophyte complex and mild buckling of the ligamenta  flava without significant spinal canal stenosis. Mild bilateral uncovertebral and left facet arthrosis without significant foraminal narrowing. A right intraforaminal perineural cysts.      C7-T1: Mild bilateral facet arthrosis, worse on the left. No significant disc degenerative change, spinal canal stenosis, or foraminal narrowing.     THORACIC SPINE:   Twelve thoracic-type rib-bearing vertebral bodies with hypoplastic 12th ribs. Maintained alignment and curvature. Mild chronic anterior wedging at T8 with approximately 10% vertebral body height loss without retropulsion, likely degenerative in the setting of a prominent superior endplate Schmorl's node. Several additional Schmorl's nodes at the inferior T10-T11, superior/inferior T12-L1, and superior L2 endplates without associated bone marrow edema. Maintained remainder of the vertebral body heights. Mild Modic type I degenerative change at T7-T8 and T10-T11. Mild intervertebral disc height loss and desiccation at T7-T9, T10-T11, and, to a lesser extent, at T11-T12.     Tiny disc protrusions at T7-T8 and T11-T12 without significant spinal canal stenosis. Mild multilevel facet arthrosis, most notably at the mid to lower thoracic levels without significant foraminal narrowing.     No suspicious bone marrow signal intensity. Several T1-hyperintense vertebral body foci, the largest measuring 2.0 cm at T12, most compatible with benign intraosseous hemangiomas.     No abnormal spinal cord signal intensity. Unremarkable conus medullaris terminating at the superior L1 endplate.     Partially imaged subcentimeter T2-hyperintense hepatic foci, inadequately characterized on this exam although most likely simple hepatic cysts. Unremarkable paraspinal soft tissues.    IMPRESSION:     CERVICAL SPINE MRI:  1.  Mild multilevel spondylosis with mild to moderate C5-C6 and mild C4-C5 foraminal narrowing on the LEFT.  2.  No significant spinal canal stenosis or abnormal spinal cord  signal intensity.     THORACIC SPINE MRI:  1.  Mild multilevel spondylosis without significant spinal canal or foraminal stenosis.  2.  No abnormal spinal cord signal intensity.        ASSESSMENT/PLAN:  Ms. Idalia Hinojosa is a 44-year-old, right-hand-dominant, female.  She does have some very mild cervical facet arthropathy and thoracic facet arthropathy.  She does have some very mild cervical and thoracic degenerative disc disease.  While these could be contributing to her pain, myofascial pain seems to be a large contributor.  Do question how Ms. Idalia Hinojosa's stress plays into her pain.  Question if switching from sertraline to duloxetine might be beneficial.  Discussed diagnoses and treatment options with Ms. Idalia Hinojosa.  Discussed the options of doing nothing/living with it, acupuncture, dry needling, trigger point injections, physical therapy, chiropractic care, facet joint injections, facet joint medial branch blocks, switching from sertraline to duloxetine.  Ms. Idalia Hinojosa will be set up for trigger point injections.  She is being referred to physical therapy.  Ms. Idalia Hinojosa is to follow-up in this clinic in 2 months.        Total Time on encounter:  65 minutes were spent on one more or more of the following:  discussion with patient, history, exam, coordinating care, treatment goals, record review, documenting clinical information, and/or data review.      Carmelo Saez MD

## 2024-02-21 NOTE — PATIENT INSTRUCTIONS
Please schedule ultrasound-guided trigger point injections with me.  The [facility] Procedure Scheduling Team will call you to schedule your procedure in the next 0-3 days.  You can also call them directly at [phone].    For nursing questions, please call [phone].    Please schedule a follow-up appointment in 2 months.  You can schedule this at the , or you can call [phone].    Please schedule an appointment with Physical Therapy.

## 2024-02-28 ENCOUNTER — TELEPHONE (OUTPATIENT)
Dept: NEUROLOGY | Facility: CLINIC | Age: 44
End: 2024-02-28
Payer: COMMERCIAL

## 2024-02-28 NOTE — TELEPHONE ENCOUNTER
Patient was called from the Neurology wait list and offered to reschedule appointment from May 1 to March 1 at 1:00 p.m.    Patient was advised to call [phone] if interested in moving appointment, and that cancellation appointments are filled on a first come first serve basis.

## 2024-03-11 ENCOUNTER — THERAPY VISIT (OUTPATIENT)
Dept: PHYSICAL THERAPY | Facility: CLINIC | Age: 44
End: 2024-03-11
Attending: PHYSICAL MEDICINE & REHABILITATION
Payer: COMMERCIAL

## 2024-03-11 DIAGNOSIS — M79.18 MYALGIA, OTHER SITE: ICD-10-CM

## 2024-03-11 DIAGNOSIS — M47.814 FACET ARTHROPATHY, THORACIC: ICD-10-CM

## 2024-03-11 DIAGNOSIS — M47.812 FACET ARTHROPATHY, CERVICAL: ICD-10-CM

## 2024-03-11 DIAGNOSIS — M79.18 CERVICAL MYOFASCIAL PAIN SYNDROME: ICD-10-CM

## 2024-03-11 PROBLEM — M79.644 CHRONIC PAIN OF RIGHT THUMB: Status: RESOLVED | Noted: 2023-05-09 | Resolved: 2024-03-11

## 2024-03-11 PROBLEM — M25.519 PAIN IN JOINT, SHOULDER REGION: Status: RESOLVED | Noted: 2023-01-17 | Resolved: 2024-03-11

## 2024-03-11 PROBLEM — M54.2 NECK PAIN ON RIGHT SIDE: Status: RESOLVED | Noted: 2023-01-17 | Resolved: 2024-03-11

## 2024-03-11 PROBLEM — G89.29 CHRONIC PAIN OF RIGHT THUMB: Status: RESOLVED | Noted: 2023-05-09 | Resolved: 2024-03-11

## 2024-03-11 PROCEDURE — 97161 PT EVAL LOW COMPLEX 20 MIN: CPT | Mod: GP | Performed by: PHYSICAL THERAPIST

## 2024-03-11 PROCEDURE — 97110 THERAPEUTIC EXERCISES: CPT | Mod: GP | Performed by: PHYSICAL THERAPIST

## 2024-03-11 NOTE — PROGRESS NOTES
PHYSICAL THERAPY EVALUATION  Type of Visit: Evaluation    See electronic medical record for Abuse and Falls Screening details.    Subjective       Presenting condition or subjective complaint: Chronic pain - bilateral neck, thoracic spine and shoulders. Began . DId PT for 1 year with some benefit but pain never fully went away. States that she was given a lot of strengthening exercises in the end, and feels very strong, but continues to have back pain on L side of neck, bilateral lats, and lower thoracic spine. Worse after 10 minutes of standing. No numbness or tingling  Date of onset: 24 (MD order)    Relevant medical history: Depression   Dates & types of surgery: tonsils,     Prior diagnostic imaging/testing results: MRI; X-ray     Prior therapy history for the same diagnosis, illness or injury: Yes PT for whole year of , Acupuncture 2023 summer    Prior Level of Function  Transfers: Independent  Ambulation: Independent  ADL: Independent  IADL: Childcare, Finances, Housekeeping, Laundry, Meal preparation, Work    Living Environment  Social support: With family members   Type of home: House   Stairs to enter the home: Yes   Is there a railing: Yes   Ramp: No   Stairs inside the home: Yes 10 Is there a railing: Yes   Help at home: None  Equipment owned:       Employment: Yes [employer] receiving  Hobbies/Interests: reading, legos, music, physical activities like cycling, running, yoga, pilates, swimming    Patient goals for therapy: Perform regular home and work tasks without pain    Pain assessment: Location: Left sided neck around C7 and up to skull, T8-T11, bilateral lats /Rating: 3-5/10     Objective   CERVICAL SPINE EVALUATION  PAIN: Pain is Exacerbated By: standing, working, repetitive lifting  Pain is Relieved By: NSAIDs, otc medications, rest, and stretch    POSTURE: Sitting Posture: Rounded shoulders, Forward head    ROM:   (Degrees) Left AROM Right AROM    Cervical Flexion Nil loss  + strain on L side of neck     Cervical Extension Min loss + pain L side of neck     Cervical Side bend Min loss Mod loss     Cervical Rotation 30% loss 40% loss    Cervical Protrusion Nil loss    Cervical Retraction Mod loss    Thoracic Flexion Nil loss     Thoracic Extension Min loss    Thoracic Rotation Mod loss Mod loss     Pain:   End Feel:     MYOTOMES: WNL  DTR S: WNL  CORD SIGNS: WNL  DERMATOMES: WNL  NEURAL TENSION: Cervical WNL  FLEXIBILITY:  bilateral lat tightness, bilateral cervical paraspinal tightness, L levator scapulae tightness    SPECIAL TESTS:    Left Right   Alar Ligament Negative    Cervical Flexion-Rotation Negative  Negative    Cervical Rot/Lateral Flex Positive Negative    Compression Negative  Negative    Distraction Negative  Negative    Spurling s Negative  Negative    Thoracic Outlet Screen (Katt, Adson) Negative  Negative    Transverse Ligament Negative  Negative    Vertebral Artery Negative  Negative      PALPATION:  TTP along C5-C6 on L  SPINAL SEGMENTAL CONCLUSIONS:  Stiffness T6-T10, C5-C6      Assessment & Plan   CLINICAL IMPRESSIONS  Medical Diagnosis: Cervical myofascial pain syndrome  Facet arthropathy, cervical  Facet arthropathy, thoracic  Myalgia, other site    Treatment Diagnosis: Mechanical neck and back pain   Impression/Assessment: Patient is a 44 year old female with neck pain complaints.  The following significant findings have been identified: Pain, Decreased ROM/flexibility, Decreased joint mobility, Decreased strength, Impaired balance, and Decreased proprioception. These impairments interfere with their ability to perform self care tasks, work tasks, recreational activities, household chores, driving , and community mobility as compared to previous level of function.     Clinical Decision Making (Complexity):  Clinical Presentation: Stable/Uncomplicated  Clinical Presentation Rationale: based on medical and personal factors listed in PT evaluation  Clinical Decision  Making (Complexity): Low complexity    PLAN OF CARE  Treatment Interventions:  Modalities: Cryotherapy, Dry Needling, Mechanical Traction  Interventions: Manual Therapy, Neuromuscular Re-education, Therapeutic Activity, Therapeutic Exercise, Self-Care/Home Management    Long Term Goals     PT Goal 1  Goal Identifier: Standing  Goal Description: patient will stand for 30 minutes at work with 1/10 pain  Rationale: to maximize safety and independence with performance of ADLs and functional tasks;to maximize safety and independence within the community;to maximize safety and independence with self cares  Target Date: 06/03/24      Frequency of Treatment: 1x per week  Duration of Treatment: 6 weeks    Recommended Referrals to Other Professionals:  Seeing Dr. Yeo   Education Assessment:   Learner/Method: Patient;No Barriers to Learning    Risks and benefits of evaluation/treatment have been explained.   Patient/Family/caregiver agrees with Plan of Care.     Evaluation Time:     PT Eval, Low Complexity Minutes (11555): 20       Signing Clinician: Jennifer Hartley, PT

## 2024-03-14 ENCOUNTER — HOSPITAL ENCOUNTER (OUTPATIENT)
Dept: ULTRASOUND IMAGING | Facility: CLINIC | Age: 44
Discharge: HOME | End: 2024-03-14
Attending: PHYSICAL MEDICINE & REHABILITATION | Admitting: PHYSICAL MEDICINE & REHABILITATION
Payer: COMMERCIAL

## 2024-03-14 VITALS — OXYGEN SATURATION: 97 % | DIASTOLIC BLOOD PRESSURE: 65 MMHG | SYSTOLIC BLOOD PRESSURE: 111 MMHG | HEART RATE: 61 BPM

## 2024-03-14 DIAGNOSIS — M79.18 CERVICAL MYOFASCIAL PAIN SYNDROME: ICD-10-CM

## 2024-03-14 DIAGNOSIS — M79.18 MYALGIA, OTHER SITE: Primary | ICD-10-CM

## 2024-03-14 PROCEDURE — 76942 ECHO GUIDE FOR BIOPSY: CPT | Mod: 26 | Performed by: PHYSICAL MEDICINE & REHABILITATION

## 2024-03-14 PROCEDURE — 20553 NJX 1/MLT TRIGGER POINTS 3/>: CPT

## 2024-03-14 PROCEDURE — 250N000009 HC RX 250

## 2024-03-14 PROCEDURE — 20553 NJX 1/MLT TRIGGER POINTS 3/>: CPT | Performed by: PHYSICAL MEDICINE & REHABILITATION

## 2024-03-14 RX ORDER — LIDOCAINE HYDROCHLORIDE 10 MG/ML
10 INJECTION, SOLUTION EPIDURAL; INFILTRATION; INTRACAUDAL; PERINEURAL ONCE
Status: COMPLETED | OUTPATIENT
Start: 2024-03-14 | End: 2024-03-14

## 2024-03-14 RX ORDER — LIDOCAINE HYDROCHLORIDE 10 MG/ML
INJECTION, SOLUTION EPIDURAL; INFILTRATION; INTRACAUDAL; PERINEURAL
Status: COMPLETED
Start: 2024-03-14 | End: 2024-03-14

## 2024-03-14 RX ADMIN — LIDOCAINE HYDROCHLORIDE 10 ML: 10 INJECTION, SOLUTION EPIDURAL; INFILTRATION; INTRACAUDAL; PERINEURAL at 14:04

## 2024-03-14 NOTE — PROGRESS NOTES
Pt was in Radiology today for a neck and upper back trigger point injections. Pt tolerated ortho procedure well. Pre procedure pain was 3/10 post procedure pain level 1/10. Procedure was completed by [facility]CA Saez. There were no complications during this procedure. Pt verbalized understanding of written and verbal instructions and left department in stable and satisfactory condition with self. There is no evidence of bleeding or any other complications upon discharge.

## 2024-03-14 NOTE — PROCEDURES
"PHYSICAL MEDICINE & REHABILITATION / MEDICAL SPINE      PROCEDURE DATE:  Mar 14, 2024      PATIENT NAME:  Idalia Hinojosa  MRN:  [id number]  YOB: 1980      PRE-PROCEDURE DIAGNOSIS:  1. Myalgia, other site    2. Cervical myofascial pain syndrome        POST-PROCEDURE DIAGNOSIS:  1. Myalgia, other site    2. Cervical myofascial pain syndrome        PROCEDURE:  Ultrasound-guided bilateral levator scapulae at the scapular insertion, bilateral infraspinatus, bilateral teres minor, bilateral latissimus dorsi near the inferior angle of the scapula, bilateral splenius cervicis trigger point injections.  (CPT Codes:  95376, 14383)      PROCEDURE IN DETAIL:  Prior to the procedure, educational material was provided for the patient to review and take home.  After a discussion of the risks, benefits, and alternatives to the procedure, the patient expressed understanding and wished to proceed.  Consent form was signed.  The patient was brought to the ultrasound suite and placed in the seated position.  Procedural pause was conducted to verify:  patient identity, procedure to be performed, laterality, site, and patient position.    The patient's trigger points were identified, and the ideal injection sites were marked.  The skin was then cleaned and prepped using chlorhexidine.  A sterile transducer cover was then placed on the linear ultrasound probe.    Each injection site was scanned using the sterile transducer probe.  Each trigger point was identified under ultrasound.  25g 2\" needle was directed using ultrasound guidance for an in-plane approach into each trigger point.  The needle was then aspirated.  After negative aspiration, 1 ml 1% lidocaine was injected into each trigger point in a fan-like pattern.  Images of proper needle position and injectate solution were saved.  The needle was then removed.     The skin was wiped clean, and bandages were placed as appropriate.  There were no apparent " complications.  The patient tolerated the procedure well.  The patient was observed for an appropriate period of time and discharged in excellent condition.    Preprocedure pain level: 2-3/10.  Postprocedure pain level: 1/10.      Carmelo Saez MD

## 2024-03-18 ENCOUNTER — THERAPY VISIT (OUTPATIENT)
Dept: PHYSICAL THERAPY | Facility: CLINIC | Age: 44
End: 2024-03-18
Attending: PHYSICAL MEDICINE & REHABILITATION
Payer: COMMERCIAL

## 2024-03-18 DIAGNOSIS — M47.812 FACET ARTHROPATHY, CERVICAL: ICD-10-CM

## 2024-03-18 DIAGNOSIS — M79.18 CERVICAL MYOFASCIAL PAIN SYNDROME: Primary | ICD-10-CM

## 2024-03-18 DIAGNOSIS — M47.814 FACET ARTHROPATHY, THORACIC: ICD-10-CM

## 2024-03-18 DIAGNOSIS — M79.18 MYALGIA, OTHER SITE: ICD-10-CM

## 2024-03-18 PROCEDURE — 97140 MANUAL THERAPY 1/> REGIONS: CPT | Mod: GP | Performed by: PHYSICAL THERAPIST

## 2024-03-18 PROCEDURE — 97110 THERAPEUTIC EXERCISES: CPT | Mod: GP | Performed by: PHYSICAL THERAPIST

## 2024-03-20 ENCOUNTER — VIRTUAL VISIT (OUTPATIENT)
Dept: PSYCHOLOGY | Facility: CLINIC | Age: 44
End: 2024-03-20
Payer: COMMERCIAL

## 2024-03-20 DIAGNOSIS — F41.9 ANXIETY DISORDER, UNSPECIFIED TYPE: Primary | ICD-10-CM

## 2024-03-20 PROCEDURE — 90834 PSYTX W PT 45 MINUTES: CPT | Mod: 95 | Performed by: PSYCHOLOGIST

## 2024-03-20 ASSESSMENT — PATIENT HEALTH QUESTIONNAIRE - PHQ9
10. IF YOU CHECKED OFF ANY PROBLEMS, HOW DIFFICULT HAVE THESE PROBLEMS MADE IT FOR YOU TO DO YOUR WORK, TAKE CARE OF THINGS AT HOME, OR GET ALONG WITH OTHER PEOPLE: NOT DIFFICULT AT ALL
SUM OF ALL RESPONSES TO PHQ QUESTIONS 1-9: 0
SUM OF ALL RESPONSES TO PHQ QUESTIONS 1-9: 0

## 2024-03-20 NOTE — PROGRESS NOTES
"           [facility]                                            Progress Note    Patient Name: Idalia Hinojosa  Date:  3/20/24           Service Type: Individual  Start time: 2:07 pm   End time:  2:59  pm  Session Length:  52 minutes    Session #: 1    Attendees: Client     Service Modality: Video visit: -       Provider verified identity through the following two step process.  Patient provided:  Patient is known previously to provider    Telemedicine Visit: The patient's condition can be safely assessed and treated via synchronous audio and visual telemedicine encounter.      Reason for Telemedicine Visit: Services only offered telehealth    Originating Site (Patient Location): Patient's home    Distant Site (Provider Location): Provider Remote Setting- Home Office    Consent:  The patient/guardian has verbally consented to: the potential risks and benefits of telemedicine (telephone visit) versus in person care; bill my insurance or make self-payment for services provided; and responsibility for payment of non-covered services.     Patient would like the video invitation sent by:  My Chart    Mode of Communication:  Video Conference via [place],     As the provider I attest to compliance with applicable laws and regulations related to telemedicine.     Treatment Plan Last Reviewed: 03/20/2024  PHQ-9/DAVID-7: 3/20/2024        DATA  Interactive Complexity: No  Crisis: No        Progress Since Last Session (Related to Symptoms / Goals / Homework):   Symptoms:  Idalia is returning to therapy after a several month pause in her therapy sessions since November.  She shared that she took a break from therapy while she reflected upon how she felt like she focused her therapy sessions on the difficulties her spouse and daughter were having, and didn't feel like she was focusing enough on the things she needed to talk about.  She shared that her symptoms of depression and anxiety have been \"up and " "down\" over the last several months, though describes them as \"mostly getting better\".  She's continued to have back and neck pain.          PHQ score of 0 today.      Homework:  - She noted successes with recognizing when she was going into \"crisis mode\" when there wasn't a crisis - she's been able to redirect negative self-talk and utilize coping skills to manage stressors.  She's found breathing and yoga to be helpful.              Episode of Care Goals: Idalia is returning to therapy after a several month break - we will re-evaluate progress on previous therapy goals and further assess what treatment needs remain and if there are any new treatment goals that need to be incorporated, in light of her returning to therapy.        Current / Ongoing Stressors and Concerns:  Current: Symptoms of anxiety, depression, stress and physical pain.  Difficulties regulating emotions.  Returning to therapy after a several month pause in therapy sessions.  She is re-evaluating what she believes she needs from therapy.      Ongoing: Working on building effective emotion regulation strategies for managing daily frustrations and upsets.  Context for previous episode of care included stresses related to behavioral challenges with her daughter, who has been diagnosed with ADHD, and working with her  on addressing parenting differences.   Her  also had previously been diagnosed with cancer and went through treatment, and previous therapy focused on caregiver stress and helping her to attend to her self-care during this very stressful period of time.         Treatment Objective(s) Addressed in This Session:     Review of how Idalia has been doing since her last therapy session in Nov  Review what she would like to focus on and gain from therapy       Previous treatment objectives -    Identification of -triggers and early signs that her emotions are escalation and emotion regulation strategies she can use  Learn and " "practice emotion regulation strategies that allow you to separate your emotional reaction from other's emotion reactions   Identify cognitions/helpful reminders for how to frame difficulties daughter is experiencing   Psychoeducation on calming/deep breathing strategies  - Practice deep breathing  - practice at regular intervals throughout the day   Continue to incorporate 1-2 behavioral activation strategies to help maintain goal of incorporating physical activity into routine  Review of self-care and importance of attending to your self-care  Practice balanced thinking- challenge polarized thinking   Psychoeducation on mindfulness strategies - practicing a non-judgmental and compassionate stance towards self and others   Learn and practice distress tolerance skills-  self-soothe strategy using the five senses        Intervention:     CBT and Solution-Focused: Idalia shared that what she believes she most needs to focus on in her therapy and allowing herself to be separate from others - meaning, that she can have own thoughts, feeling and reactions that are separate from others, in particular her spouse and daughter.  She said that she'd ultimately like to be able to come to a place where if her spouse or daughter are having difficulties, that she can \"use my own logical brain\" to evaluate the situation and determine the reaction that is most helpful for her.  She'd also like to work on \"my body doesn't need to take on his stress\" (referring to her spouse) and acceptance that she can't control others reactions, but she can focus on her own.  We talked today about  - what is the first thing she thinks she needs to do that will help move her in this direction?  She will focus on previous skills related to practice of mindfulness/radical acceptance - practicing a compassionate and non-judgmental stance towards herself and others.  She'd like to think of how she can incorporate an intention/reminder for herself in the " "morning and will spend time thinking about what she thinks is the best way for her to do this.        ASSESSMENT: Current Emotional / Mental Status (status of significant symptoms):   Risk status (Self / Other harm or suicidal ideation)   Patient denies current fears or concerns for personal safety.   Patient reports current suicidal ideation, she denies any current or recent suicidal behaviors.  She reports if suicidal  thoughts do occur, she knows steps she can take to manage them and believes she can manage them, and will reach out for additional help if needed.     Patientdenies current or recent homicidal ideation or behaviors.   Patient denies current or recent self injurious behavior or ideation.   Patient denies other safety concerns.   Patient reports there has not been a change in risk factors since their last session -      Recommended that patient call 911 or go to the local ED should there be a change in any of these risk factors.  We reviewed how to contact [facility] crisis/COPE for urgent/crisis concerns 24/7.  Provided patient with crisis resources and she agrees to use safety plan should any safety concerns arise.      Professionals or agencies I can contact during a crisis:  Suicide Prevention Lifeline: call or text 532  Crisis Text Line: text \"[place]\" to 396793  Local Crisis Services: [facility]: [phone]  Call 911 or go to my nearest emergency department, or [facility].         Appearance:   Appropriate    Eye Contact:   Good    Psychomotor Behavior: Normal   Attitude:   Cooperative    Orientation:   All   Speech    Rate / Production: Normal     Volume:  Normal    Mood:    Calm   Affect:    Congruent with mood   Thought Content:  Clear    Thought Form:  Coherent  Logical    Insight:    Good      Medication Review:   No changes to psychiatric medication      Medication Compliance:   Yes      Changes in Health Issues:    No changes or new health concerns - (on-going concerns " with physical pain in back and neck/shoulders.  She has been working with her care team to address and is participating in physical therapy and is getting injections to help manage).     Chemical Use Review:  No changes or concerns with alcohol use.         Substance Use: Chemical use reviewed, no active concerns identified.      Tobacco Use: No current tobacco use.       Diagnosis:  Anxiety disorder unspecified    Will further evaluate if previous diagnosis of Major depression, recurrent episode, moderate is still active or meets criteria for remission.          PLAN: (Patient Tasks / Therapist Tasks / Other)    1) Idalia identified the following goals:   She'd like to think of how she can incorporate an intention/reminder for herself in the morning relate to practicing radical acceptance (focus on what she can control and let go of what she can't control) and will spend time thinking about what she thinks is the best way for her to do this.        2) If there is a crisis situation, with your daughter or yourself, remember you are not alone and that there are people who can help you twenty-four hours a day, seven days a week.  Call [place] ([facility]): [phone].      3) Next appt is scheduled for: 4/10 at 12:00 pm        Cristy Wilkinson PsyD, KYLIE  Licensed Psychologist  2/20/24                                ____________________________________    Treatment Plan     Patient's Name: Idalia Hinojosa                        YOB: 1980     Date of Creation: 1/6/2020  Date Treatment Plan Last Reviewed/Revised: 9/13/2023       DSM5 Diagnoses: 300.00 (F41.9) Unspecified Anxiety Disorder  Psychosocial / Contextual Factors: strain in marital relationship, parenting challenges, adjustment challenges, 's cancer diagnosis  PROMIS (reviewed every 90 days):      Anticipated number of session for this episode of care: 10-15  Anticipation frequency of session: Bi-weekly.   Anticipated  Duration of each session: 38-52 minutes  Treatment plan will be reviewed in 90 days or when goals have been changed.         Referral / Collaboration:  No referrals at this time.  Have previously recommended couples therapy referral.  Coordinate with Dr. Han, patient's PCP.        MeasurableTreatment Goal(s) related to diagnosis / functional impairment(s)  Goal 1: Patient will learn emotion regulation strategies to help when she is feeling upset/angry/frustrated/distressed    I will know I've met my goal when I would yell less, I would feel calmer, and I would not push others.  I would be less physical when I'm angry (e.g. wouldn't slam doors or hit things)        Objective #A (Patient Action)                          Patient will learn and practice at least 2 new emotion regulation strategies.  Status: Continued - Date(s):    9/13/2023- Regularly practicing emotion regulation strategies to help manage increase in emotional distress related to spouse's cancer diagnosis.  She has been successful with using strategies to help her stay calm when her daughter's experiencing heightened emotion and/or challenging behaviors.  She's working on remembering to engage calming strategies when feeling physically tense and/or anxious/stressed.    2/20/24 - Will continue to focus on building emotion regulation strategies         Intervention(s)  Therapist will provide psychoeducation on emotion regulation module from DBT and will assign patient homework to help reinforce skill development.     Objective #B  Patient will identify factors that increase vulnerability to emotion mind and identify ways to decrease vulnerability to emotion mind.  Status: Continued - Date(s):9/13/2023 - routinely incorporating mindfulness, distress tolerance and self-awareness strategies to increase attention and focus to factors that increase vulnerability to emotion mind  2/20/2024 - further review of treatment goals is in progress and will  "continue over the next session, given break in therapy      Intervention(s)  Therapist will provide information on factors that increase vulnerabiliyt to emotion mind      Objective #C  Patient will identify warning signs and signals that emotions are escalating and will learn ways to skillfully intervene early on.  Status: Date: 9/13/2023- in progress - has demonstrated good progress and awareness in being able to identify warning signs and intervene skillfully-working on incorporating deep breathing to help with calming and re-centering.    2/20/2024 - further review of treatment goals is in progress and will continue over the next session, given break in therapy        Intervention(s)  Therapist will help patient identify warning signs and signals, and will guide the patient in identifying skillful ways to intervene when exeriencing warning signs and signals.      Objective #B (Patient Action)                          Status: NEW - Date(s):9/13/2023- Patient will learn mind-body connection, healthy ways to manage stress, calming strategies, and ways she can address cognitive and behavioral factors that can contribute to pain experience.    2/20/2024 - further review of treatment goals is in progress and will continue over the next session, given break in therapy       Patient will learn and practice at least two strategies that help improve her emotional well-being.       Intervention(s)  Therapist will teach calming strategies and healthy stress management strategies, and will assign homework to help reinforce skill development.  Status - patient has been working on engaging calming strategies and in particular, not trying to \"force herself\" to calm down which counters the relaxation response.  Working currently on how to cue/remind herself to engage these strategies.               Goal 2: Patient will learn new parenting strategies to help with managing parenting challenges.  Parent will work on improving " relationship with her daughter and defining what she would like this relationship to look like.      I will know I've met my goal when I'm enjoying time with my daughter, teaching her new things, and not waiting for things to change       Objective #A (Patient Action)                          Status: Continued - Date(s):9/13/2023 2/20/2024 - further review of treatment goals is in progress and will continue over the next session, given break in therapy          Patient will increase understanding of developmental norms for her daughter and ways to manage challenging behaviors.     Intervention(s)  Therapist will provide psychoeducation on developmental norms and parenting strategies.     Objective #B  Patient will spend time reflecting on her relationship with her daughter and defining what she would like this relaitonship to look like.                  Status: Continued - Date(s):9/13/2023     Making good progress - has also been working with daughter's therapist and has been actively practicing and using suggested strategies.   Likely can work towards resolving this goal, as this goal has largely shifted towards the work she is doing with her daughter's individual therapist, where she believes they have been making good progress on helping her in her relationship with her daughter.       2/20/2024 - further review of treatment goals is in progress and will continue over the next session, given break in therapy      Intervention(s)  Therapist will guide the patient in reflecting upon her relationship with her daughter and help her define ways to move towards what she vaues in her relationship with her daughter.     Objective #C  Patient will increase time spent on fun activity and play with her daughter.  Status: Continued - Date(s):9/13/2023-has been enjoying time with daughter and devoting regular time to fun activities and play together  2/20/2024 - further review of treatment goals is in progress and will  "continue over the next session, given break in therapy            Intervention(s)  Therapist will guide the patient in identifying ways she can increase positive time with her daughter.  Therapist will also teach mindfulness strategies to help patient with being in the present moment when appropriate - .        Goal 3: Patiient will improve communication with her .      I will know I've met my goal when I feel less irritated with my  and communicate more openly with my .  I would like to be more assertive and less aggressive.   I would like to not avoid telling him things because I'm worried about his reaction or don't think he will react well.  I would like to be more present to the moment during times when this would be helpful.\"       Objective #A (Patient Action)                          Status: Continued - Date(s):9/13/2023   continues to work on this goal, though emphasis has shift to managing caregiver stress related to 's cancer diagnosis and treatment.         2/20/2024 - further review of treatment goals is in progress and will continue over the next session, given break in therapy      Patient will learn and practice at least two new interpersonal effectiveness strategies.     Intervention(s)  Therapist will teach strategies from DBT module on interpersonal effectiveness, and will assign homework to help reinforce skill development.            Patient has reviewed and agreed to the above plan.        Cristy Wilkinson PsyD, LP  Licensed Psychologist  Reviewed on 2/20/2024 -                      "

## 2024-03-25 ENCOUNTER — THERAPY VISIT (OUTPATIENT)
Dept: PHYSICAL THERAPY | Facility: CLINIC | Age: 44
End: 2024-03-25
Payer: COMMERCIAL

## 2024-03-25 DIAGNOSIS — M47.812 FACET ARTHROPATHY, CERVICAL: ICD-10-CM

## 2024-03-25 DIAGNOSIS — M47.814 FACET ARTHROPATHY, THORACIC: ICD-10-CM

## 2024-03-25 DIAGNOSIS — M79.18 CERVICAL MYOFASCIAL PAIN SYNDROME: Primary | ICD-10-CM

## 2024-03-25 DIAGNOSIS — M79.18 MYALGIA, OTHER SITE: ICD-10-CM

## 2024-03-25 PROCEDURE — 97140 MANUAL THERAPY 1/> REGIONS: CPT | Mod: GP | Performed by: PHYSICAL THERAPIST

## 2024-03-25 PROCEDURE — 97110 THERAPEUTIC EXERCISES: CPT | Mod: GP | Performed by: PHYSICAL THERAPIST

## 2024-04-17 ENCOUNTER — THERAPY VISIT (OUTPATIENT)
Dept: PHYSICAL THERAPY | Facility: CLINIC | Age: 44
End: 2024-04-17
Payer: COMMERCIAL

## 2024-04-17 DIAGNOSIS — M47.812 FACET ARTHROPATHY, CERVICAL: ICD-10-CM

## 2024-04-17 DIAGNOSIS — M79.18 MYALGIA, OTHER SITE: ICD-10-CM

## 2024-04-17 DIAGNOSIS — M47.814 FACET ARTHROPATHY, THORACIC: ICD-10-CM

## 2024-04-17 DIAGNOSIS — M79.18 CERVICAL MYOFASCIAL PAIN SYNDROME: Primary | ICD-10-CM

## 2024-04-17 PROCEDURE — 97112 NEUROMUSCULAR REEDUCATION: CPT | Mod: GP | Performed by: PHYSICAL THERAPIST

## 2024-04-17 PROCEDURE — 97140 MANUAL THERAPY 1/> REGIONS: CPT | Mod: GP | Performed by: PHYSICAL THERAPIST

## 2024-04-17 PROCEDURE — 97110 THERAPEUTIC EXERCISES: CPT | Mod: GP | Performed by: PHYSICAL THERAPIST

## 2024-04-18 ENCOUNTER — VIRTUAL VISIT (OUTPATIENT)
Dept: PSYCHOLOGY | Facility: CLINIC | Age: 44
End: 2024-04-18
Payer: COMMERCIAL

## 2024-04-18 DIAGNOSIS — M62.838 TRAPEZIUS MUSCLE SPASM: ICD-10-CM

## 2024-04-18 DIAGNOSIS — F41.9 ANXIETY DISORDER, UNSPECIFIED TYPE: Primary | ICD-10-CM

## 2024-04-18 DIAGNOSIS — M54.2 NECK PAIN: ICD-10-CM

## 2024-04-18 PROCEDURE — 90834 PSYTX W PT 45 MINUTES: CPT | Mod: 95 | Performed by: PSYCHOLOGIST

## 2024-04-18 NOTE — TELEPHONE ENCOUNTER
Medication Request for:    celecoxib (CELEBREX) 100 MG capsule          Patient currently taking: as needed, but due to recent flare up as been taking twice daily for the last few days  Patient has 2 of medication remaining.  Patient is also  taking OTC: Tylenol (does not take at the same time as the Celebrex)    Problems/ Concerns of Patient: no side effects reported  Prescription last written on 10/2/23 by Dr. Yeo  Sig:   Route: Take 1 capsule (100 mg) by mouth 2 times daily as needed for moderate pain - Oral   Last Fill Quantity: 60 with # refills: 0     Last Office Visit by provider: 1/22/24    Future Office Visit:   none scheduled   Patient desires to have faxed or E-prescribe to pharmacy if available  Pharmacy selected in [place].    Phone number patient can be reached at: Home number on file [phone] (home)    Can we leave a detailed message on this number? YES    Medication requests may take up to 3 business days for a response  Has patient been notified of this No    *Patient was last seen by Dr. Yeo 1/22/24 and was referred to Dr. Saez after completion of MRI. Patient saw Dr. Saez on 2/21/24. Unsure if you're able to refill this or if you recommend patient contact Dr. Saez.

## 2024-04-19 NOTE — PROGRESS NOTES
"           [facility]                                            Progress Note    Patient Name: Idalia Hinojosa  Date:  4/18/2024         Service Type: Individual  Start time: 12:35 pm   End time:  1:27 pm  Session Length:  52 minutes    Session #: 2    Attendees: Client     Service Modality: Video visit: -       Provider verified identity through the following two step process.  Patient provided:  Patient is known previously to provider    Telemedicine Visit: The patient's condition can be safely assessed and treated via synchronous audio and visual telemedicine encounter.      Reason for Telemedicine Visit: Services only offered telehealth    Originating Site (Patient Location): Patient's home    Distant Site (Provider Location): Provider Remote Setting- Home Office    Consent:  The patient/guardian has verbally consented to: the potential risks and benefits of telemedicine (telephone visit) versus in person care; bill my insurance or make self-payment for services provided; and responsibility for payment of non-covered services.     Patient would like the video invitation sent by:  My Chart    Mode of Communication:  Video Conference via [employer],     As the provider I attest to compliance with applicable laws and regulations related to telemedicine.     Treatment Plan Last Reviewed: 4/18/2024    PHQ-9/DAVID-7: 3/20/2024        DATA  Interactive Complexity: No  Crisis: No        Progress Since Last Session (Related to Symptoms / Goals / Homework):   Symptoms:  Idalia is returning to therapy after a several month pause in her therapy sessions since November.  She reported that physically she hasn't been feeling well over the past few weeks, which has impacted her mood.  She describes her mood as feeling \"blah\" and has been struggling with feeling low motivation and low energy to get the things done that she would like to.      She reports that symptoms of depression and anxiety have been " "\"up and down\" over the last several months.         PHQ score of 0 last session.      Homework:  - She noted successes with setting daily intention related to focusing on radial acceptance (accepting what she can control, letting go of what she can't control)  - this was helpful the first few weeks she did this.           Episode of Care Goals: Idalia is returning to therapy after a several month break - we are re-evaluating current goals and progress on previous therapy goals to further assess what treatment needs remain and if there are any new treatment goals that need to be incorporated, in light of her returning to therapy.        Current / Ongoing Stressors and Concerns:  Current: Symptoms of anxiety, depression, stress and physical pain.  Difficulties regulating emotions.  Returning to therapy after a several month pause in therapy sessions.  She is re-evaluating what she believes she needs from therapy.  She identified today wanting to focus on getting out of being in \"survival mode\" - being more organized and how to manage when feeling low motivation and low energy.      Ongoing: Working on building effective emotion regulation strategies for managing daily frustrations and upsets.  Context for previous episode of care included stresses related to behavioral challenges with her daughter, who has been diagnosed with ADHD, and working with her  on addressing parenting differences.   Her  also had previously been diagnosed with cancer and went through treatment, and previous therapy focused on caregiver stress and helping her to attend to her self-care during this very stressful period of time.         Treatment Objective(s) Addressed in This Session:     Continued - review of how Idalia has been doing since her last therapy session in Nov, review of what she would like to focus on and gain from therapy   Psychoeducation on behavioral activation strategies to help with managing feelings of low " "motivation and low energy      Previous treatment objectives -    Identification of -triggers and early signs that her emotions are escalation and emotion regulation strategies she can use  Learn and practice emotion regulation strategies that allow you to separate your emotional reaction from other's emotion reactions   Identify cognitions/helpful reminders for how to frame difficulties daughter is experiencing   Psychoeducation on calming/deep breathing strategies  - Practice deep breathing  - practice at regular intervals throughout the day   Continue to incorporate 1-2 behavioral activation strategies to help maintain goal of incorporating physical activity into routine  Review of self-care and importance of attending to your self-care  Practice balanced thinking- challenge polarized thinking   Psychoeducation on mindfulness strategies - practicing a non-judgmental and compassionate stance towards self and others   Learn and practice distress tolerance skills-  self-soothe strategy using the five senses        Intervention:     CBT and Solution-Focused: Idalia shared that what she wanted to focus on most today was how to get out of \"survival mode\" and how to manage symptoms of low energy and motivation.  We talked about how and when \"survival mode\" has been helpful for her, and differentiating when her body is going into survival mode and doesn't need to be - how to ground herself in the present moment and make more mindful choices about how she is reacting.  Psychoeducation on behavioral activation strategies to help with managing when feeling low motivation and energy - including, not waiting for the motivation or energy, but rather, setting realistic and achievable goals, remembering that emotion often follows, not precedes behavior changes, and structuring her time to help her to be successful with her goals (so that she, rather than her emotions, are \"in charge\").  She said she'd like to work more over the " "next few weeks on getting more movement and exercise (gentle yoga, walking) - and guided her in setting realistic and achievable goals to incorporate physical activity back into her routine and talked about potential barriers that may surface and problem-solved overcoming barriers.      ASSESSMENT: Current Emotional / Mental Status (status of significant symptoms):   Risk status (Self / Other harm or suicidal ideation)   Patient denies current fears or concerns for personal safety.   Patient reports current suicidal ideation, she denies any current or recent suicidal behaviors.  She reports if suicidal  thoughts do occur, she knows steps she can take to manage them and believes she can manage them, and will reach out for additional help if needed.     Patientdenies current or recent homicidal ideation or behaviors.   Patient denies current or recent self injurious behavior or ideation.   Patient denies other safety concerns.   Patient reports there has not been a change in risk factors since their last session -      Recommended that patient call 911 or go to the local ED should there be a change in any of these risk factors.  We reviewed how to contact [facility] crisis/[facility] for urgent/crisis concerns 24/7.  Provided patient with crisis resources and she agrees to use safety plan should any safety concerns arise.      Professionals or agencies I can contact during a crisis:  Suicide Prevention Lifeline: call or text 440  Crisis Text Line: text \"[place]\" to 169132  Local Crisis Services: [facility]: [phone]  Call 911 or go to my nearest emergency department, or [facility].         Appearance:   Appropriate    Eye Contact:   Good    Psychomotor Behavior: Normal   Attitude:   Cooperative    Orientation:   All   Speech    Rate / Production: Normal     Volume:  Normal    Mood:    Calm   Affect:    Congruent with mood   Thought Content:  Clear    Thought Form:  Coherent  Logical    Insight:    Good "      Medication Review:   No changes to psychiatric medication      Medication Compliance:   Yes      Changes in Health Issues:    No changes or new health concerns - she continues to work with physical therapy and finds this helpful to manage on-going concerns she has experienced with physical pain in back and neck/shoulders).       Chemical Use Review:  No changes or concerns with alcohol use.         Substance Use: Chemical use reviewed, no active concerns identified.      Tobacco Use: No current tobacco use.       Diagnosis:  Anxiety disorder unspecified    Will further evaluate if previous diagnosis of Major depression, recurrent episode, moderate is still active or meets criteria for remission.          PLAN: (Patient Tasks / Therapist Tasks / Other)    1) Idalia identified the following goals:  work on incorporating regular gentle yoga and walking back into routine (most days).  Listen to your body.  Practice flexible mindset.  Practice behavioral activation strategies when feeling low motivation or low energy (structuring your time so that you, not your emotions, are in charge, five minute rule , don't wait for the motivation, remember that motivation after follows the behavior change).       2) If there is a crisis situation, with your daughter or yourself, remember you are not alone and that there are people who can help you twenty-four hours a day, seven days a week.  Call [facility] ([facility]): [phone].      3) Next appt is scheduled for: 5/22 at 12:00 pm        Cristy Wilkinson PsyD, LP  Licensed Psychologist  4/18/2024                              ____________________________________    Treatment Plan     Patient's Name: Idalia Hinojosa                        YOB: 1980     Date of Creation: 1/6/2020  Date Treatment Plan Last Reviewed/Revised: 9/13/2023       DSM5 Diagnoses: 300.00 (F41.9) Unspecified Anxiety Disorder  Psychosocial / Contextual Factors: strain in  marital relationship, parenting challenges, adjustment challenges, 's cancer diagnosis  PROMIS (reviewed every 90 days):      Anticipated number of session for this episode of care: 10-15  Anticipation frequency of session: Bi-weekly.   Anticipated Duration of each session: 38-52 minutes  Treatment plan will be reviewed in 90 days or when goals have been changed.         Referral / Collaboration:  No referrals at this time.  Have previously recommended couples therapy referral.  Coordinate with Dr. Han, patient's PCP.        MeasurableTreatment Goal(s) related to diagnosis / functional impairment(s)  Goal 1: Patient will learn emotion regulation strategies to help when she is feeling upset/angry/frustrated/distressed    I will know I've met my goal when I would yell less, I would feel calmer, and I would not push others.  I would be less physical when I'm angry (e.g. wouldn't slam doors or hit things)        Objective #A (Patient Action)                          Patient will learn and practice at least 2 new emotion regulation strategies.  Status: Continued - Date(s):    9/13/2023- Regularly practicing emotion regulation strategies to help manage increase in emotional distress related to spouse's cancer diagnosis.  She has been successful with using strategies to help her stay calm when her daughter's experiencing heightened emotion and/or challenging behaviors.  She's working on remembering to engage calming strategies when feeling physically tense and/or anxious/stressed.    2/20/24 - Will continue to focus on building emotion regulation strategies         Intervention(s)  Therapist will provide psychoeducation on emotion regulation module from DBT and will assign patient homework to help reinforce skill development.     Objective #B  Patient will identify factors that increase vulnerability to emotion mind and identify ways to decrease vulnerability to emotion mind.  Status: Continued -  "Date(s):9/13/2023 - routinely incorporating mindfulness, distress tolerance and self-awareness strategies to increase attention and focus to factors that increase vulnerability to emotion mind  2/20/2024 - further review of treatment goals is in progress and will continue over the next session, given break in therapy      Intervention(s)  Therapist will provide information on factors that increase vulnerabiliyt to emotion mind      Objective #C  Patient will identify warning signs and signals that emotions are escalating and will learn ways to skillfully intervene early on.  Status: Date: 9/13/2023- in progress - has demonstrated good progress and awareness in being able to identify warning signs and intervene skillfully-working on incorporating deep breathing to help with calming and re-centering.    2/20/2024 - further review of treatment goals is in progress and will continue over the next session, given break in therapy        Intervention(s)  Therapist will help patient identify warning signs and signals, and will guide the patient in identifying skillful ways to intervene when exeriencing warning signs and signals.      Objective #B (Patient Action)                          Status: NEW - Date(s):9/13/2023- Patient will learn mind-body connection, healthy ways to manage stress, calming strategies, and ways she can address cognitive and behavioral factors that can contribute to pain experience.    2/20/2024 - further review of treatment goals is in progress and will continue over the next session, given break in therapy       Patient will learn and practice at least two strategies that help improve her emotional well-being.       Intervention(s)  Therapist will teach calming strategies and healthy stress management strategies, and will assign homework to help reinforce skill development.  Status - patient has been working on engaging calming strategies and in particular, not trying to \"force herself\" to calm " down which counters the relaxation response.  Working currently on how to cue/remind herself to engage these strategies.               Goal 2: Patient will learn new parenting strategies to help with managing parenting challenges.  Parent will work on improving relationship with her daughter and defining what she would like this relationship to look like.      I will know I've met my goal when I'm enjoying time with my daughter, teaching her new things, and not waiting for things to change       Objective #A (Patient Action)                          Status: Continued - Date(s):9/13/2023 2/20/2024 - further review of treatment goals is in progress and will continue over the next session, given break in therapy          Patient will increase understanding of developmental norms for her daughter and ways to manage challenging behaviors.     Intervention(s)  Therapist will provide psychoeducation on developmental norms and parenting strategies.     Objective #B  Patient will spend time reflecting on her relationship with her daughter and defining what she would like this relaitonship to look like.                  Status: Continued - Date(s):9/13/2023     Making good progress - has also been working with daughter's therapist and has been actively practicing and using suggested strategies.   Likely can work towards resolving this goal, as this goal has largely shifted towards the work she is doing with her daughter's individual therapist, where she believes they have been making good progress on helping her in her relationship with her daughter.       2/20/2024 - further review of treatment goals is in progress and will continue over the next session, given break in therapy      Intervention(s)  Therapist will guide the patient in reflecting upon her relationship with her daughter and help her define ways to move towards what she vaues in her relationship with her daughter.     Objective #C  Patient will increase time  "spent on fun activity and play with her daughter.  Status: Continued - Date(s):9/13/2023-has been enjoying time with daughter and devoting regular time to fun activities and play together  2/20/2024 - further review of treatment goals is in progress and will continue over the next session, given break in therapy            Intervention(s)  Therapist will guide the patient in identifying ways she can increase positive time with her daughter.  Therapist will also teach mindfulness strategies to help patient with being in the present moment when appropriate - .        Goal 3: Patiient will improve communication with her .      I will know I've met my goal when I feel less irritated with my  and communicate more openly with my .  I would like to be more assertive and less aggressive.   I would like to not avoid telling him things because I'm worried about his reaction or don't think he will react well.  I would like to be more present to the moment during times when this would be helpful.\"       Objective #A (Patient Action)                          Status: Continued - Date(s):9/13/2023   continues to work on this goal, though emphasis has shift to managing caregiver stress related to 's cancer diagnosis and treatment.         2/20/2024 - further review of treatment goals is in progress and will continue over the next session, given break in therapy      Patient will learn and practice at least two new interpersonal effectiveness strategies.     Intervention(s)  Therapist will teach strategies from DBT module on interpersonal effectiveness, and will assign homework to help reinforce skill development.            Patient has reviewed and agreed to the above plan.        Cristy Wilkinson PsyD, LP  Licensed Psychologist  Reviewed on 2/20/2024 -                      "

## 2024-04-24 ENCOUNTER — THERAPY VISIT (OUTPATIENT)
Dept: PHYSICAL THERAPY | Facility: CLINIC | Age: 44
End: 2024-04-24
Payer: COMMERCIAL

## 2024-04-24 DIAGNOSIS — M79.18 MYALGIA, OTHER SITE: ICD-10-CM

## 2024-04-24 DIAGNOSIS — M47.812 FACET ARTHROPATHY, CERVICAL: ICD-10-CM

## 2024-04-24 DIAGNOSIS — M79.18 CERVICAL MYOFASCIAL PAIN SYNDROME: Primary | ICD-10-CM

## 2024-04-24 DIAGNOSIS — M47.814 FACET ARTHROPATHY, THORACIC: ICD-10-CM

## 2024-04-24 PROCEDURE — 97112 NEUROMUSCULAR REEDUCATION: CPT | Mod: GP | Performed by: PHYSICAL THERAPIST

## 2024-04-24 PROCEDURE — 97140 MANUAL THERAPY 1/> REGIONS: CPT | Mod: GP | Performed by: PHYSICAL THERAPIST

## 2024-04-24 RX ORDER — CELECOXIB 100 MG/1
100 CAPSULE ORAL 2 TIMES DAILY PRN
Qty: 60 CAPSULE | Refills: 0 | Status: SHIPPED | OUTPATIENT
Start: 2024-04-24

## 2024-04-30 ENCOUNTER — PATIENT OUTREACH (OUTPATIENT)
Dept: CARE COORDINATION | Facility: CLINIC | Age: 44
End: 2024-04-30
Payer: COMMERCIAL

## 2024-05-01 ENCOUNTER — THERAPY VISIT (OUTPATIENT)
Dept: PHYSICAL THERAPY | Facility: CLINIC | Age: 44
End: 2024-05-01
Payer: COMMERCIAL

## 2024-05-01 DIAGNOSIS — M47.812 FACET ARTHROPATHY, CERVICAL: ICD-10-CM

## 2024-05-01 DIAGNOSIS — M47.814 FACET ARTHROPATHY, THORACIC: ICD-10-CM

## 2024-05-01 DIAGNOSIS — M79.18 CERVICAL MYOFASCIAL PAIN SYNDROME: Primary | ICD-10-CM

## 2024-05-01 DIAGNOSIS — M79.18 MYALGIA, OTHER SITE: ICD-10-CM

## 2024-05-01 PROCEDURE — 97140 MANUAL THERAPY 1/> REGIONS: CPT | Mod: GP | Performed by: PHYSICAL THERAPIST

## 2024-05-01 PROCEDURE — 97110 THERAPEUTIC EXERCISES: CPT | Mod: GP | Performed by: PHYSICAL THERAPIST

## 2024-05-01 PROCEDURE — 97112 NEUROMUSCULAR REEDUCATION: CPT | Mod: GP | Performed by: PHYSICAL THERAPIST

## 2024-05-08 ENCOUNTER — THERAPY VISIT (OUTPATIENT)
Dept: PHYSICAL THERAPY | Facility: CLINIC | Age: 44
End: 2024-05-08
Payer: COMMERCIAL

## 2024-05-08 DIAGNOSIS — M79.18 CERVICAL MYOFASCIAL PAIN SYNDROME: Primary | ICD-10-CM

## 2024-05-08 DIAGNOSIS — M47.814 FACET ARTHROPATHY, THORACIC: ICD-10-CM

## 2024-05-08 DIAGNOSIS — M47.812 FACET ARTHROPATHY, CERVICAL: ICD-10-CM

## 2024-05-08 DIAGNOSIS — M79.18 MYALGIA, OTHER SITE: ICD-10-CM

## 2024-05-08 PROCEDURE — 97112 NEUROMUSCULAR REEDUCATION: CPT | Mod: GP | Performed by: PHYSICAL THERAPIST

## 2024-05-08 PROCEDURE — 97110 THERAPEUTIC EXERCISES: CPT | Mod: GP | Performed by: PHYSICAL THERAPIST

## 2024-05-08 PROCEDURE — 97140 MANUAL THERAPY 1/> REGIONS: CPT | Mod: GP | Performed by: PHYSICAL THERAPIST

## 2024-05-12 NOTE — PROGRESS NOTES
PLAN  Continue therapy per current plan of care. Continue to address serratus pain with focus on building endurance and maintaing good posture on bike and at computer    Beginning/End Dates of Progress Note Reporting Period:  3/11/24 to 05/08/2024    Referring Provider:  Carmelo Saez         05/08/24 0500   Appointment Info   Signing clinician's name / credentials Alison Merino DPT   Total/Authorized Visits 10 per PT   Visits Used 7   Medical Diagnosis Cervical myofascial pain syndrome  Facet arthropathy, cervical  Facet arthropathy, thoracic  Myalgia, other site   PT Tx Diagnosis Mechanical neck and back pain   Progress Note/Certification   Onset of illness/injury or Date of Surgery 02/21/24  (MD order)   Therapy Frequency 1 x per week   Predicted Duration 3 weeks   Progress Note Due Date 05/01/24   Progress Note Completed Date 05/08/24   GOALS   PT Goals 3   PT Goal 1   Goal Identifier Standing   Goal Description patient will stand for 30 minutes at work with 1/10 pain   Rationale to maximize safety and independence with performance of ADLs and functional tasks;to maximize safety and independence within the community;to maximize safety and independence with self cares   Goal Progress able to stand for 30 mins at work without neck pain   Target Date 06/03/24   Date Met 04/25/24   PT Goal 2   Goal Identifier standing   Goal Description patient will stand for 60  minutes at work with 1/10 pain   Rationale to maximize safety and independence with performance of ADLs and functional tasks   Goal Progress able to stand without issues   Target Date 06/03/24   Date Met 05/08/24   PT Goal 3   Goal Identifier Cycling   Goal Description pt will be able to bike up to 1 hour without periscapular pain   Rationale   (in order to commute to work and maintain a healthy activity level)   Goal Progress currently biking up to 30 mins, reports moderate serratus pain   Target Date 05/31/24   Subjective Report   Subjective  Report Reports serratus symptoms with cycling and sometimes at work.  This has been persistent.   Objective Measures   Objective Measures Objective Measure 3   Objective Measure 1   Objective Measure Cervical mobility   Details Flexion: WNL,  Extension WNL but min hinging at C4-5,  R rotation WNL today, L rotation min limit left lateral neck.   Objective Measure 2   Objective Measure Thoracic   Details Flexion: WNL,  EXT WNL, WNL in B rotation   Treatment Interventions (PT)   Interventions Therapeutic Procedure/Exercise;Manual Therapy;Neuromuscular Re-education   Therapeutic Procedure/Exercise   Therapeutic Procedures: strength, endurance, ROM, flexibility minutes (31376) 15   Therapeutic Procedures Ther Proc 2;Ther Proc 4;Ther Proc 3   Ther Proc 1 Thoracic rotation quadruped with thread the needle and elbow   Ther Proc 1 - Details x 10   Ther Proc 2 shoulder sweeps -   Ther Proc 2 - Details x 6 B on wall B   Ther Proc 3 Scalene stretch   Ther Proc 3 - Details d/c from HEP   Ther Proc 4 Cervical extension with SNAG   Ther Proc 4 - Details vr   PTRx Ther Proc 1 Cervical Retraction With Patient Overpressure   PTRx Ther Proc 1 - Details No Notesx 10   PTRx Ther Proc 2 Cervical Extension Supported   PTRx Ther Proc 2 - Details No Notes   PTRx Ther Proc 3 Trunk Rotation Stretch   PTRx Ther Proc 3 - Details vr   PTRx Ther Proc 4 All Fours Thoracic Rotation   PTRx Ther Proc 4 - Details x 5 B   PTRx Ther Proc 5 Latissimus Dorsi Stretch   PTRx Ther Proc 5 - Details No Notes   PTRx Ther Proc 6 Spinal extensions   PTRx Ther Proc 6 - Details No Notes   PTRx Ther Proc 8 Push-Up Plus At Counter   PTRx Ther Proc 8 - Details  x 10 with fatigue   Skilled Intervention Assessment of strength and functional deficits to determine exercise prescription; tactile and verbal cuing to assist with proper performance; education in loading principles and expected response   Patient Response/Progress tolerates well - ceiling punch is fatiguing  and improves thoracic rotation following   Therapeutic Activity   PTRx Ther Act 1 Foam Roller Shoulder Flexion/Horizontal Abduction   PTRx Ther Act 1 - Details vr   PTRx Ther Act 2 Foam Roller Stretch: Pectoralis Major   PTRx Ther Act 2 - Details vr   PTRx Ther Act 3 Foam Roller stretch: Pectoralis Minor   PTRx Ther Act 3 - Details vr   Neuromuscular Re-education   Neuromuscular re-ed of mvmt, balance, coord, kinesthetic sense, posture, proprioception minutes (08990) 15   Neuro Re-ed 1 quadruped cervical rotation x 10 B L tighter than right today cues to keep nod   Neuro Re-ed 1 - Details serratus press B with rotation x 13 B with fatigue- tacitle cues for proper mm activation   PTRx Neuro Re-ed 1 Serratus Press on Wall - Bent Elbows   PTRx Neuro Re-ed 1 - Details x 20 easy, d/c from HEP   PTRx Neuro Re-ed 2 Scapular Stabilization Serratus Anterior B   PTRx Neuro Re-ed 2 - Details x 8 B with fatigue on R x 10 on L - cues to reduce upper trap   Skilled Intervention cues for proper mm activation   Patient Response/Progress tolerates well   Manual Therapy   Manual Therapy: Mobilization, MFR, MLD, friction massage minutes (99813) 12   Manual Therapy Manual Therapy 3   Manual Therapy 1 Sideglides L C3-6   Manual Therapy 1 - Details x 10   Manual Therapy 2 upper trap STM -ischemic compression   Manual Therapy 2 - Details demo'd how to do this on own in supine   Skilled Intervention Identified precautions and contraindications to manual treatment; assessed mobility and pain; adjusted technique based on patient response.   Patient Response/Progress improved ROM following   Intervention (Other)   PTRx Other  1 Ball Massage   PTRx Other 1 - Details No Notes   Education   Learner/Method Patient;No Barriers to Learning   Plan   Plan for next session continue to address serratus pain with focus on building endurance and maintaing good posture on bike and at computer   Total Session Time   Timed Code Treatment Minutes 42   Total  Treatment Time (sum of timed and untimed services) 42

## 2024-05-13 ENCOUNTER — OFFICE VISIT (OUTPATIENT)
Dept: ORTHOPEDICS | Facility: CLINIC | Age: 44
End: 2024-05-13
Payer: COMMERCIAL

## 2024-05-13 VITALS
SYSTOLIC BLOOD PRESSURE: 117 MMHG | HEART RATE: 70 BPM | HEIGHT: 66 IN | WEIGHT: 172.1 LBS | BODY MASS INDEX: 27.66 KG/M2 | DIASTOLIC BLOOD PRESSURE: 69 MMHG | OXYGEN SATURATION: 97 %

## 2024-05-13 DIAGNOSIS — M79.18 MYALGIA, OTHER SITE: Primary | ICD-10-CM

## 2024-05-13 DIAGNOSIS — M79.18 CERVICAL MYOFASCIAL PAIN SYNDROME: ICD-10-CM

## 2024-05-13 DIAGNOSIS — M79.18 MYOFASCIAL PAIN SYNDROME OF THORACIC SPINE: ICD-10-CM

## 2024-05-13 PROCEDURE — 99215 OFFICE O/P EST HI 40 MIN: CPT | Performed by: PHYSICAL MEDICINE & REHABILITATION

## 2024-05-13 PROCEDURE — 99417 PROLNG OP E/M EACH 15 MIN: CPT | Performed by: PHYSICAL MEDICINE & REHABILITATION

## 2024-05-13 ASSESSMENT — PAIN SCALES - GENERAL: PAINLEVEL: MILD PAIN (2)

## 2024-05-13 NOTE — PATIENT INSTRUCTIONS
Please schedule ultrasound-guided trigger point injections with me.  The [facility] Procedure Scheduling Team will call you to schedule your procedure in the next 0-3 days.  You can also call them directly at [phone].    For nursing questions, please call [phone].    For medical records, please call [phone].    Please schedule a follow-up appointment in 2 months.  You can schedule this at the , or you can call [phone].    Please continue with Physical Therapy.    Please discuss with your primary care provider possibly switching from sertraline to duloxetine.

## 2024-05-13 NOTE — LETTER
5/13/2024         RE: Idalia Hinojosa  [address]  [facility] [address]        Dear Colleague,    Thank you for referring your patient, Idalia Hinojosa, to the [facility]. Please see a copy of my visit note below.    PHYSICAL MEDICINE & REHABILITATION / MEDICAL SPINE        Date:  May 13, 2024    Name:  Idalia Hinojosa  YOB: 1980  MRN:  [id number]          CHART REVIEW:  Reviewed 01/22/2024 note from Albert Yeo, MD (family medicine-sports medicine).  Ms. Idalia Hinojosa was being seen for chronic neck, bilateral shoulder, bilateral scapular, and thoracic pain.  Pain was rated as 3/10.  She had tried rest/activity avoidance, heat, tizanidine, celecoxib, home exercises, physical therapy.  Strength improved with physical therapy, but pain had not.  Normal activities such as laundry and cooking could exacerbate her pain.  MRI of the cervical and thoracic spines were ordered.  Referral to medical spine was placed.         CHIEF COMPLAINT:  Left neck and upper back pain.        HISTORY OF PRESENT ILLNESS:  Ms. Idalia Hinojosa is a 44-year-old, right-hand-dominant, female.  She works at [employer].  Ms. Idalia Hinojosa plays the oboe, English horn, and piano.  She enjoys reading and building with [employer].  Ms. Idalia Hinojosa is  and has an 8-year-old daughter.  Ms. Hionjosa states that her daughter has ADHD and some behavioral issues.  Ms. Idalia Hinojosa stated that her  had Hodgkin's lymphoma that is now considered in remission.     Ms. Idalia Hinojosa began noting pain in December 2022.  She was staying with her  in the hospital when a rapid response had to be called for him.  Ms. Idalia Hinojosa had bilateral posterior neck pain, bilateral periscapular pain, bilateral upper trapezius pain.    Ms. Idalia Hinojosa denied any prior or recent injuries of her head, neck, upper back, chest, shoulders, arms, elbows, forearms, wrist,  "hands, fingers.     Ms. Idalia Hinojosa denied any personal or family history of autoimmune diseases, rheumatologic diseases, gout, pseudogout.  She denied any personal or family history of neurologic diseases.  Ms. Idalia Hinojosa denied any personal or family history of inflammatory bowel diseases.    Ms. Idalia Hinojosa was last seen in this clinic in 02/21/2024.    On 03/14/2024, Ms. Idalia Hinojosa had ultrasound-guided bilateral levator scapulae at the scapular insertion, bilateral infraspinatus, bilateral teres minor, bilateral latissimus dorsi near the inferior angle of the scapula, bilateral splenius cervicis trigger point injections.    Ms. Idalia Hinojosa returns to clinic today, 05/13/2024.    Ms. Idalia Hinojosa notes 70% improvement in her pain from the trigger point injections.  Ms. Hinojosa notes that after the trigger point injections her bilateral shoulder range of motion was improved.  She notes a little bit of left neck pain that extends to the left posterior shoulder and left upper back.  She describes the pain in this area as \"spasming, weird.\"  This pain is constant.  This pain has been improved with trigger point injections but has not completely gone away.    Ms. Idalia Hinojosa did note that couple days after the trigger point injections she developed some hives just on her neck.    Ms. Idalia Hinojosa has found physical therapy beneficial.  She feels that her stress overall is improved.  She also notes that the stress items that would trigger symptoms before are not currently triggering her symptoms.    Ms. Idalia Hinojosa rates her left shoulder and left upper back pain as 2/10.  She is using celecoxib 100 mg at most once per day.  Ms. Idalia Hinojosa is on sertraline 100 mg daily.    Ms. Idalia Hinojosa is making some changes to her workstation, and this is also helping with her pain.        ALLERGIES:  No Known Allergies      MEDICATIONS:  Current Outpatient " Medications   Medication Sig Dispense Refill    celecoxib (CELEBREX) 100 MG capsule TAKE 1 CAPSULE (100 MG) BY MOUTH 2 TIMES DAILY AS NEEDED FOR MODERATE PAIN 60 capsule 0    ipratropium (ATROVENT) 0.06 % nasal spray Spray 2 sprays into both nostrils 4 times daily as needed for rhinitis 15 mL 5    mometasone (NASONEX) 50 MCG/ACT nasal spray Spray 2 sprays into both nostrils daily 51 g 3    montelukast (SINGULAIR) 10 MG tablet Take 1 tablet (10 mg) by mouth At Bedtime 90 tablet 3    norethindrone-ethinyl estradiol (MICROGESTIN ) 1-20 MG-MCG tablet Take 1 tablet by mouth daily Active tablets only for prevention of menses 112 tablet 4    sertraline (ZOLOFT) 100 MG tablet Take 1 tablet (100 mg) by mouth daily 90 tablet 4    tiZANidine (ZANAFLEX) 2 MG tablet Take 1 tablet (2 mg) by mouth nightly as needed for muscle spasms 90 tablet 0    cyclobenzaprine (FLEXERIL) 5 MG tablet Take 1-2 tablets (5-10 mg) by mouth nightly as needed for muscle spasms You can take up to 3 times per day as needed, but this is sedating so no driving or alcohol while taking. (Patient not taking: Reported on 2023) 30 tablet 0         PAST MEDICAL HISTORY:  Past Medical History:   Diagnosis Date    ASCUS of cervix with negative high risk HPV 2017 ASCUS, Neg HPV    Seasonal affective disorder (H24)     no meds         PAST SURGICAL HISTORY:  Past Surgical History:   Procedure Laterality Date     SECTION N/A 10/28/2015    Procedure:  SECTION;  Surgeon: Mónica Madrid MD;  Location:  L+D    DENTAL SURGERY      wisdom teeth    TONSILLECTOMY  age 10         FAMILY HISTORY:  Family History   Problem Relation Age of Onset    Heart Disease Maternal Grandmother     Skin Cancer Maternal Grandmother     Thyroid Disease Paternal Grandmother     Other Cancer Paternal Grandmother          SOCIAL HISTORY:  Social History     Socioeconomic History    Marital status:      Spouse name: Not on file     "Number of children: 1    Years of education: Not on file    Highest education level: Not on file   Occupational History     Employer: [employer]   Tobacco Use    Smoking status: Never    Smokeless tobacco: Never   Vaping Use    Vaping status: Never Used   Substance and Sexual Activity    Alcohol use: Yes     Alcohol/week: 0.0 standard drinks of alcohol     Comment: 0-2 PER WK    Drug use: No    Sexual activity: Yes     Partners: Male     Birth control/protection: Pill   Other Topics Concern    Parent/sibling w/ CABG, MI or angioplasty before 65F 55M? No   Social History Narrative             Social Determinants of Health     Financial Resource Strain: Not on file   Food Insecurity: Not on file   Transportation Needs: Not on file   Physical Activity: Not on file   Stress: Not on file   Social Connections: Not on file   Interpersonal Safety: Not on file   Housing Stability: Not on file         PHYSICAL EXAMINATION:  Vitals:    05/13/24 1249   BP: 117/69   Pulse: 70   SpO2: 97%   Weight: 78.1 kg (172 lb 1.6 oz)   Height: 1.676 m (5' 6\")       GENERAL:  No acute distress.  Pleasant and cooperative.   PSYCH:  Normal mood and affect.  HEAD:  Normocephalic.  SPEECH:  No dysarthria.  EYES:  No scleral icterus.  Wearing glasses.  EARS:  Hearing is intact to spoken voice.  NOSE:  Midline, symmetric, no rhinorrhea.  LUNGS:  No respiratory distress.  No increased work of breathing.    INSPECTION:  There is no erythema, ecchymosis, deformity, asymmetry, or abnormality of the neck, upper back, mid back.    CERVICAL PALPATION:  Inion:  not tender.  Greater Occipital Nerve:  Right not tender.  Left not tender.  Lesser Occipital Nerve:  Right not tender.  Left not tender.  Cervical Spinous Processes:  not tender.  Cervical Paraspinals:  Right not tender.  Left not tender.  Splenius Capitis:  Right not tender.  Left not tender.  Splenius Cervicis:  Right not tender.  Left mild tenderness.  Levator Scapulae at the Neck:  Right " mild tenderness.  Left mild to moderate tenderness.  Cervical Facet Joints:  Right not tender.  Left not tender.  Longissimus:  Right not tender.  Left not tender.  Anterior, Middle, Posterior Scalenes:  Right not tender.  Left not tender.  Sternocleidomastoid:  Right not tender.  Left not tender.  Trapezius:  Right not tender.  Left not tender.    THORACIC PALPATION:  Thoracic Spinous Processes:  not tender.  Thoracic Paraspinals:  Right not tender.  Left not tender.  Levator Scapulae at the Scapular Insertion:  Right mild tenderness.  Left mild to moderate tenderness.  Rhomboid Minor:  Right mild tenderness.  Left mild to moderate tenderness.  Rhomboid Major:  Right not tender.  Left not tender.  Infraspinatus:  Right mild tenderness.  Left mild to moderate tenderness.  Teres Minor:  Right mild tenderness.  Left mild to moderate tenderness.  Infraspinatus:  Right not palpated.  Left mild to moderate tenderness.    CERVICAL RANGE OF MOTION:  Forward Flexion (40 ): Normal range of motion, does not cause pain, does not cause radiating pain.  Extension (40 ): Normal range of motion, does not cause pain, does not cause radiating pain.  Rotating Right (45 ): Normal range of motion, causes slight increase in left posterolateral neck pain, does not cause radiating pain.  Rotating Left (45 ):  Normal range of motion, causes slight increase in left posterolateral neck pain, does not cause radiating pain.  Lateral Bending Right (40 ):  Normal range of motion, causes slight increase in left posterolateral neck pain, does not cause radiating pain.  Lateral Bending Left (40 ):  Normal range of motion, causes slight increase in left posterolateral neck pain, does not cause radiating pain.    SHOULDER RANGE OF MOTION:  Active Forward Flexion (180 ):  Right 180 .  Left 180 .  Active Extension (60 ):  Right 40 .  Left 40 .  Active Abduction (180 ):  Right 180 .  Left 180 .  Scapular Dyskinesis:  Right -.  Left -.    SHOULDER  SPECIAL TESTS:  Drop Arm:  Right - . Left -.        IMAGING:  There is no new imaging.        ASSESSMENT/PLAN:  Ms. Idalia Hinojosa is a 44-year-old, right-hand-dominant, female.  She has very mild cervical facet arthropathy and thoracic facet arthropathy.  She has very mild cervical and thoracic degenerative disc disease.  Myofascial pain seems to be the bulk of her pain.  Stress is also likely playing a role in Ms. Idalia Hinojosa's myofascial pain.  Question if switching from sertraline to duloxetine might be beneficial.  Discussed diagnoses and treatment options with Ms. Hinojosa.  Discussed the options of doing nothing/living with it, acupuncture, dry needling, trigger point injections, physical therapy, massage, switching from sertraline to duloxetine.  Ms. Idalia Hinojosa will be set up for repeat trigger point injections.  She is to continue with physical therapy.  Ms. Idalia Hinojosa is follow-up in this clinic in 2 months.        Total Time on encounter:  57 minutes were spent on one more or more of the following:  discussion with patient, history, exam, coordinating care, treatment goals, record review, documenting clinical information, and/or data review.      Carmelo Saez MD

## 2024-05-13 NOTE — PROGRESS NOTES
PHYSICAL MEDICINE & REHABILITATION / MEDICAL SPINE        Date:  May 13, 2024    Name:  Idalia Hinojosa  YOB: 1980  MRN:  [id number]          CHART REVIEW:  Reviewed 01/22/2024 note from Albert Yeo, MD (family medicine-sports medicine).  Ms. Idalia Hinojosa was being seen for chronic neck, bilateral shoulder, bilateral scapular, and thoracic pain.  Pain was rated as 3/10.  She had tried rest/activity avoidance, heat, tizanidine, celecoxib, home exercises, physical therapy.  Strength improved with physical therapy, but pain had not.  Normal activities such as laundry and cooking could exacerbate her pain.  MRI of the cervical and thoracic spines were ordered.  Referral to medical spine was placed.         CHIEF COMPLAINT:  Left neck and upper back pain.        HISTORY OF PRESENT ILLNESS:  Ms. Idalia Hinojosa is a 44-year-old, right-hand-dominant, female.  She works at [employer].  Ms. Idalia Hinojosa plays the oboe, English horn, and piano.  She enjoys reading and building with [employer].  Ms. Idalia Hinojosa is  and has an 8-year-old daughter.  Ms. Hinojosa states that her daughter has ADHD and some behavioral issues.  Ms. Idalia Hinojosa stated that her  had Hodgkin's lymphoma that is now considered in remission.     Ms. Idalia Hinojosa began noting pain in December 2022.  She was staying with her  in the hospital when a rapid response had to be called for him.  Ms. Idalia Hinojosa had bilateral posterior neck pain, bilateral periscapular pain, bilateral upper trapezius pain.    Ms. Idalia Hinojosa denied any prior or recent injuries of her head, neck, upper back, chest, shoulders, arms, elbows, forearms, wrist, hands, fingers.     Ms. Idalia Hinojosa denied any personal or family history of autoimmune diseases, rheumatologic diseases, gout, pseudogout.  She denied any personal or family history of neurologic diseases.  Ms. Idalia Hinojosa denied any personal or  "family history of inflammatory bowel diseases.    Ms. Idalia Hinojosa was last seen in this clinic in 02/21/2024.    On 03/14/2024, Ms. Idalia Hinojosa had ultrasound-guided bilateral levator scapulae at the scapular insertion, bilateral infraspinatus, bilateral teres minor, bilateral latissimus dorsi near the inferior angle of the scapula, bilateral splenius cervicis trigger point injections.    Ms. Idalia Hinojosa returns to clinic today, 05/13/2024.    Ms. Idalia Hinojosa notes 70% improvement in her pain from the trigger point injections.  Ms. Hinojosa notes that after the trigger point injections her bilateral shoulder range of motion was improved.  She notes a little bit of left neck pain that extends to the left posterior shoulder and left upper back.  She describes the pain in this area as \"spasming, weird.\"  This pain is constant.  This pain has been improved with trigger point injections but has not completely gone away.    Ms. Idalia Hinojosa did note that couple days after the trigger point injections she developed some hives just on her neck.    Ms. Idalia Hinojosa has found physical therapy beneficial.  She feels that her stress overall is improved.  She also notes that the stress items that would trigger symptoms before are not currently triggering her symptoms.    Ms. Idalia Hinojosa rates her left shoulder and left upper back pain as 2/10.  She is using celecoxib 100 mg at most once per day.  Ms. Idalia Hinojosa is on sertraline 100 mg daily.    Ms. Idalia Hinojosa is making some changes to her workstation, and this is also helping with her pain.        ALLERGIES:  No Known Allergies      MEDICATIONS:  Current Outpatient Medications   Medication Sig Dispense Refill    celecoxib (CELEBREX) 100 MG capsule TAKE 1 CAPSULE (100 MG) BY MOUTH 2 TIMES DAILY AS NEEDED FOR MODERATE PAIN 60 capsule 0    ipratropium (ATROVENT) 0.06 % nasal spray Spray 2 sprays into both nostrils 4 times daily " as needed for rhinitis 15 mL 5    mometasone (NASONEX) 50 MCG/ACT nasal spray Spray 2 sprays into both nostrils daily 51 g 3    montelukast (SINGULAIR) 10 MG tablet Take 1 tablet (10 mg) by mouth At Bedtime 90 tablet 3    norethindrone-ethinyl estradiol (MICROGESTIN ) 1-20 MG-MCG tablet Take 1 tablet by mouth daily Active tablets only for prevention of menses 112 tablet 4    sertraline (ZOLOFT) 100 MG tablet Take 1 tablet (100 mg) by mouth daily 90 tablet 4    tiZANidine (ZANAFLEX) 2 MG tablet Take 1 tablet (2 mg) by mouth nightly as needed for muscle spasms 90 tablet 0    cyclobenzaprine (FLEXERIL) 5 MG tablet Take 1-2 tablets (5-10 mg) by mouth nightly as needed for muscle spasms You can take up to 3 times per day as needed, but this is sedating so no driving or alcohol while taking. (Patient not taking: Reported on 2023) 30 tablet 0         PAST MEDICAL HISTORY:  Past Medical History:   Diagnosis Date    ASCUS of cervix with negative high risk HPV 2017 ASCUS, Neg HPV    Seasonal affective disorder (H24)     no meds         PAST SURGICAL HISTORY:  Past Surgical History:   Procedure Laterality Date     SECTION N/A 10/28/2015    Procedure:  SECTION;  Surgeon: Mónica Madrid MD;  Location:  L+D    DENTAL SURGERY      wisdom teeth    TONSILLECTOMY  age 10         FAMILY HISTORY:  Family History   Problem Relation Age of Onset    Heart Disease Maternal Grandmother     Skin Cancer Maternal Grandmother     Thyroid Disease Paternal Grandmother     Other Cancer Paternal Grandmother          SOCIAL HISTORY:  Social History     Socioeconomic History    Marital status:      Spouse name: Not on file    Number of children: 1    Years of education: Not on file    Highest education level: Not on file   Occupational History     Employer: [employer]   Tobacco Use    Smoking status: Never    Smokeless tobacco: Never   Vaping Use    Vaping status: Never Used  "  Substance and Sexual Activity    Alcohol use: Yes     Alcohol/week: 0.0 standard drinks of alcohol     Comment: 0-2 PER WK    Drug use: No    Sexual activity: Yes     Partners: Male     Birth control/protection: Pill   Other Topics Concern    Parent/sibling w/ CABG, MI or angioplasty before 65F 55M? No   Social History Narrative             Social Determinants of Health     Financial Resource Strain: Not on file   Food Insecurity: Not on file   Transportation Needs: Not on file   Physical Activity: Not on file   Stress: Not on file   Social Connections: Not on file   Interpersonal Safety: Not on file   Housing Stability: Not on file         PHYSICAL EXAMINATION:  Vitals:    05/13/24 1249   BP: 117/69   Pulse: 70   SpO2: 97%   Weight: 78.1 kg (172 lb 1.6 oz)   Height: 1.676 m (5' 6\")       GENERAL:  No acute distress.  Pleasant and cooperative.   PSYCH:  Normal mood and affect.  HEAD:  Normocephalic.  SPEECH:  No dysarthria.  EYES:  No scleral icterus.  Wearing glasses.  EARS:  Hearing is intact to spoken voice.  NOSE:  Midline, symmetric, no rhinorrhea.  LUNGS:  No respiratory distress.  No increased work of breathing.    INSPECTION:  There is no erythema, ecchymosis, deformity, asymmetry, or abnormality of the neck, upper back, mid back.    CERVICAL PALPATION:  Inion:  not tender.  Greater Occipital Nerve:  Right not tender.  Left not tender.  Lesser Occipital Nerve:  Right not tender.  Left not tender.  Cervical Spinous Processes:  not tender.  Cervical Paraspinals:  Right not tender.  Left not tender.  Splenius Capitis:  Right not tender.  Left not tender.  Splenius Cervicis:  Right not tender.  Left mild tenderness.  Levator Scapulae at the Neck:  Right mild tenderness.  Left mild to moderate tenderness.  Cervical Facet Joints:  Right not tender.  Left not tender.  Longissimus:  Right not tender.  Left not tender.  Anterior, Middle, Posterior Scalenes:  Right not tender.  Left not tender.  Sternocleidomastoid: "  Right not tender.  Left not tender.  Trapezius:  Right not tender.  Left not tender.    THORACIC PALPATION:  Thoracic Spinous Processes:  not tender.  Thoracic Paraspinals:  Right not tender.  Left not tender.  Levator Scapulae at the Scapular Insertion:  Right mild tenderness.  Left mild to moderate tenderness.  Rhomboid Minor:  Right mild tenderness.  Left mild to moderate tenderness.  Rhomboid Major:  Right not tender.  Left not tender.  Infraspinatus:  Right mild tenderness.  Left mild to moderate tenderness.  Teres Minor:  Right mild tenderness.  Left mild to moderate tenderness.  Infraspinatus:  Right not palpated.  Left mild to moderate tenderness.    CERVICAL RANGE OF MOTION:  Forward Flexion (40 ): Normal range of motion, does not cause pain, does not cause radiating pain.  Extension (40 ): Normal range of motion, does not cause pain, does not cause radiating pain.  Rotating Right (45 ): Normal range of motion, causes slight increase in left posterolateral neck pain, does not cause radiating pain.  Rotating Left (45 ):  Normal range of motion, causes slight increase in left posterolateral neck pain, does not cause radiating pain.  Lateral Bending Right (40 ):  Normal range of motion, causes slight increase in left posterolateral neck pain, does not cause radiating pain.  Lateral Bending Left (40 ):  Normal range of motion, causes slight increase in left posterolateral neck pain, does not cause radiating pain.    SHOULDER RANGE OF MOTION:  Active Forward Flexion (180 ):  Right 180 .  Left 180 .  Active Extension (60 ):  Right 40 .  Left 40 .  Active Abduction (180 ):  Right 180 .  Left 180 .  Scapular Dyskinesis:  Right -.  Left -.    SHOULDER SPECIAL TESTS:  Drop Arm:  Right - . Left -.        IMAGING:  There is no new imaging.        ASSESSMENT/PLAN:  Ms. Idalia Hinojosa is a 44-year-old, right-hand-dominant, female.  She has very mild cervical facet arthropathy and thoracic facet arthropathy.  She has  very mild cervical and thoracic degenerative disc disease.  Myofascial pain seems to be the bulk of her pain.  Stress is also likely playing a role in Ms. Idalia Hinojosa's myofascial pain.  Question if switching from sertraline to duloxetine might be beneficial.  Discussed diagnoses and treatment options with Ms. Hinojosa.  Discussed the options of doing nothing/living with it, acupuncture, dry needling, trigger point injections, physical therapy, massage, switching from sertraline to duloxetine.  Ms. Idalia Hinojosa will be set up for repeat trigger point injections.  She is to continue with physical therapy.  Ms. Idalia Hinojosa is follow-up in this clinic in 2 months.        Total Time on encounter:  57 minutes were spent on one more or more of the following:  discussion with patient, history, exam, coordinating care, treatment goals, record review, documenting clinical information, and/or data review.      Carmelo Saez MD

## 2024-05-14 ENCOUNTER — PATIENT OUTREACH (OUTPATIENT)
Dept: CARE COORDINATION | Facility: CLINIC | Age: 44
End: 2024-05-14
Payer: COMMERCIAL

## 2024-05-15 ENCOUNTER — THERAPY VISIT (OUTPATIENT)
Dept: PHYSICAL THERAPY | Facility: CLINIC | Age: 44
End: 2024-05-15
Payer: COMMERCIAL

## 2024-05-15 DIAGNOSIS — M47.814 FACET ARTHROPATHY, THORACIC: ICD-10-CM

## 2024-05-15 DIAGNOSIS — M79.18 CERVICAL MYOFASCIAL PAIN SYNDROME: Primary | ICD-10-CM

## 2024-05-15 DIAGNOSIS — M79.18 MYALGIA, OTHER SITE: ICD-10-CM

## 2024-05-15 DIAGNOSIS — M47.812 FACET ARTHROPATHY, CERVICAL: ICD-10-CM

## 2024-05-15 PROCEDURE — 20561 NDL INSJ W/O NJX 3+ MUSC: CPT | Mod: GA | Performed by: PHYSICAL THERAPIST

## 2024-05-15 PROCEDURE — 97110 THERAPEUTIC EXERCISES: CPT | Mod: GP | Performed by: PHYSICAL THERAPIST

## 2024-05-15 PROCEDURE — 97112 NEUROMUSCULAR REEDUCATION: CPT | Mod: GP | Performed by: PHYSICAL THERAPIST

## 2024-05-22 ENCOUNTER — VIRTUAL VISIT (OUTPATIENT)
Dept: PSYCHOLOGY | Facility: CLINIC | Age: 44
End: 2024-05-22
Payer: COMMERCIAL

## 2024-05-22 DIAGNOSIS — F41.9 ANXIETY DISORDER, UNSPECIFIED TYPE: Primary | ICD-10-CM

## 2024-05-22 PROCEDURE — 90832 PSYTX W PT 30 MINUTES: CPT | Mod: 95 | Performed by: PSYCHOLOGIST

## 2024-05-22 NOTE — PROGRESS NOTES
"         [facility]                                            Progress Note    Patient Name: Idalia Hinojosa  Date:  5/22/2024         Service Type: Individual  Start time: 12:05 pm   End time:  12:27 pm  Session Length:  22 minutes    Session #: 3    Attendees: Client     Service Modality: Video visit: -       Provider verified identity through the following two step process.  Patient provided:  Patient is known previously to provider    Telemedicine Visit: The patient's condition can be safely assessed and treated via synchronous audio and visual telemedicine encounter.      Reason for Telemedicine Visit: Services only offered telehealth    Originating Site (Patient Location): Patient's home    Distant Site (Provider Location): Provider Remote Setting- Home Office    Consent:  The patient/guardian has verbally consented to: the potential risks and benefits of telemedicine (telephone visit) versus in person care; bill my insurance or make self-payment for services provided; and responsibility for payment of non-covered services.     Patient would like the video invitation sent by:  My Chart    Mode of Communication:  Video Conference via [employer],     As the provider I attest to compliance with applicable laws and regulations related to telemedicine.     Treatment Plan Last Reviewed: 5/22/2024    PHQ-9/DAVID-7: 5/22/2024        DATA  Interactive Complexity: No  Crisis: No        Progress Since Last Session (Related to Symptoms / Goals / Homework):   Symptoms:  Idalia reported that she's been feeling better - both physically and emotionally - she's been taking good care of herself and feels like she's \"turned the corner\".          Answers submitted by the patient for this visit:  Patient Health Questionnaire (Submitted on 5/22/2024)  If you checked off any problems, how difficult have these problems made it for you to do your work, take care of things at home, or get along with other " "people?: Somewhat difficult  PHQ9 TOTAL SCORE: 2    Homework:  - She noted successes with following through on her goals - has been practicing good self-care strategies, completing her PT exercises on a regular basis, recognizing when she's been in \"survival mode\" and re-directing to the present moment.           Episode of Care Goals: Idalia had returned to therapy after a several month break - we had been re-evaluating current goals and progress on previous therapy goals to further assess what treatment needs remain and if there are any new treatment goals that need to be incorporated, in light of her returning to therapy.   During her session today, she reported feeling like she's feeling better, using good coping strategies, taking care of herself and feeling better.       Current / Ongoing Stressors and Concerns:  Current: Symptoms of anxiety, depression, stress and physical pain have all improved.  She reported she's been successful with practicing good self-care in her regards to both her physical and emotional well-being.  She's also been taking some training courses to learn new skills and has really been enjoying this.      Ongoing: Working on building effective emotion regulation strategies for managing daily frustrations and upsets.  Context for previous episode of care included stresses related to behavioral challenges with her daughter, who has been diagnosed with ADHD, and working with her  on addressing parenting differences.   Her  also had previously been diagnosed with cancer and went through treatment, and previous therapy focused on caregiver stress and helping her to attend to her self-care during this very stressful period of time.         Treatment Objective(s) Addressed in This Session:     Review of strategies that have been helping with managing mood and stressors  Identify what helps you to feel well      Previous treatment objectives -    Psychoeducation on behavioral " "activation strategies to help with managing feelings of low motivation and low energy  Identification of -triggers and early signs that her emotions are escalation and emotion regulation strategies she can use  Learn and practice emotion regulation strategies that allow you to separate your emotional reaction from other's emotion reactions   Identify cognitions/helpful reminders for how to frame difficulties daughter is experiencing   Psychoeducation on calming/deep breathing strategies  - Practice deep breathing  - practice at regular intervals throughout the day   Continue to incorporate 1-2 behavioral activation strategies to help maintain goal of incorporating physical activity into routine  Review of self-care and importance of attending to your self-care  Practice balanced thinking- challenge polarized thinking   Psychoeducation on mindfulness strategies - practicing a non-judgmental and compassionate stance towards self and others   Learn and practice distress tolerance skills-  self-soothe strategy using the five senses        Intervention:     CBT and Solution-Focused: Review of strategies that have been helping Idalia to manage her physical and emotional well-being.  Recognized the great work she has done to be consistent with her PT and self-care strategies.  She noted that she feels she is going less into \"survival mode\" and overall feels much better physically and emotionally.  She also has been able to maintain good emotional boundaries and not \"take on\" others emotions and skillfully recognize and manage her own reaction.       ASSESSMENT: Current Emotional / Mental Status (status of significant symptoms):   Risk status (Self / Other harm or suicidal ideation)   Patient denies current fears or concerns for personal safety.   Patient reports current suicidal ideation, she denies any current or recent suicidal behaviors.  She reports if suicidal  thoughts do occur, she knows steps she can take to " "manage them and believes she can manage them, and will reach out for additional help if needed.     Patientdenies current or recent homicidal ideation or behaviors.   Patient denies current or recent self injurious behavior or ideation.   Patient denies other safety concerns.   Patient reports there has not been a change in risk factors since their last session -      Recommended that patient call 911 or go to the local ED should there be a change in any of these risk factors.  We reviewed how to contact [facility] crisis/[facility] for urgent/crisis concerns 24/7.  Provided patient with crisis resources and she agrees to use safety plan should any safety concerns arise.      Professionals or agencies I can contact during a crisis:  Suicide Prevention Lifeline: call or text 731  Crisis Text Line: text \"[place]\" to 029622  Local Crisis Services: [facility]: [phone]  Call 911 or go to my nearest emergency department, or [facility].         Appearance:   Appropriate    Eye Contact:   Good    Psychomotor Behavior: Normal   Attitude:   Cooperative    Orientation:   All   Speech    Rate / Production: Normal     Volume:  Normal    Mood:    Improved - less anxious and depressed   Affect:    Bright, congruent with mood   Thought Content:  Clear    Thought Form:  Coherent  Logical    Insight:    Good      Medication Review:   No changes to psychiatric medication      Medication Compliance:   Yes      Changes in Health Issues:    No changes or new health concerns - physical pain (in back) has improved - she's been doing her PT exercises regularly.     Chemical Use Review:  No changes or concerns with alcohol use.         Substance Use: Chemical use reviewed, no active concerns identified.      Tobacco Use: No current tobacco use.       Diagnosis:  Anxiety disorder unspecified    Will further evaluate if previous diagnosis of Major depression, recurrent episode, moderate is still active or meets criteria for " remission.          PLAN: (Patient Tasks / Therapist Tasks / Other)    1) Idalia identified the following goals:  continue practicing the strategies that have been helping to support your physical and emotional well-being.  Schedule follow-up appointments as needed.         2) If there is a crisis situation, with your daughter or yourself, remember you are not alone and that there are people who can help you twenty-four hours a day, seven days a week.  Call [facility] ([facility]): [phone].      3) Next appt is scheduled for: Idalia reported she didn't wish to schedule another appt at this time and will be in touch if she'd like to schedule a follow-up.  Suggested a follow-up in 6-8 weeks to touch base and ensure continued stability with her symptoms and progress, and to reinforce strategies for on-going wellness and symptom management, however she is declining at this time and stated she feels comfortable reaching out if needed to schedule a follow-up.          Cristy Wilkinson PsyD, LP  5/22/2024                                  ____________________________________    Treatment Plan     Patient's Name: Idalia Hinojosa                        YOB: 1980     Date of Creation: 1/6/2020  Date Treatment Plan Last Reviewed/Revised: 5/22/2024       DSM5 Diagnoses: 300.00 (F41.9) Unspecified Anxiety Disorder  Psychosocial / Contextual Factors: strain in marital relationship, parenting challenges, adjustment challenges, 's cancer diagnosis  PROMIS (reviewed every 90 days):      Anticipated number of session for this episode of care: possibly a few more sessions if needed to support sustained progress and maintenance  Anticipation frequency of session: Monthly   Anticipated Duration of each session: 38-52 minutes  Treatment plan will be reviewed in 90 days or when goals have been changed.         Referral / Collaboration:  No referrals at this time.  Have previously recommended  couples therapy referral.  Coordinate with Dr. aHn, patient's PCP.        MeasurableTreatment Goal(s) related to diagnosis / functional impairment(s)  Goal 1: Patient will learn emotion regulation strategies to help when she is feeling upset/angry/frustrated/distressed    I will know I've met my goal when I would yell less, I would feel calmer, and I would not push others.  I would be less physical when I'm angry (e.g. wouldn't slam doors or hit things)        Objective #A (Patient Action)                          Patient will learn and practice at least 2 new emotion regulation strategies.  Status: Continued - Date(s):    9/13/2023- Regularly practicing emotion regulation strategies to help manage increase in emotional distress related to spouse's cancer diagnosis.  She has been successful with using strategies to help her stay calm when her daughter's experiencing heightened emotion and/or challenging behaviors.  She's working on remembering to engage calming strategies when feeling physically tense and/or anxious/stressed.    2/20/24 - Will continue to focus on building emotion regulation strategies    5/22/2024 - Idalia has done an excellent job learning and practicing emotion regulation strategies       Intervention(s)  Therapist will provide psychoeducation on emotion regulation module from DBT and will assign patient homework to help reinforce skill development.     Objective #B  Patient will identify factors that increase vulnerability to emotion mind and identify ways to decrease vulnerability to emotion mind.  Status: Continued - Date(s):9/13/2023 - routinely incorporating mindfulness, distress tolerance and self-awareness strategies to increase attention and focus to factors that increase vulnerability to emotion mind  2/20/2024 - further review of treatment goals is in progress and will continue over the next session, given break in therapy   5/22/2024 - Idalia has made great progress identifying  "and practicing self-care habits/strategies that help decrease vulnerability to emotion mind     Intervention(s)  Therapist will provide information on factors that increase vulnerabiliyt to emotion mind      Objective #C  Patient will identify warning signs and signals that emotions are escalating and will learn ways to skillfully intervene early on.  Status: Date: 9/13/2023- in progress - has demonstrated good progress and awareness in being able to identify warning signs and intervene skillfully-working on incorporating deep breathing to help with calming and re-centering.    2/20/2024 - further review of treatment goals is in progress and will continue over the next session, given break in therapy   5/22/2024 - Goal completed        Intervention(s)  Therapist will help patient identify warning signs and signals, and will guide the patient in identifying skillful ways to intervene when exeriencing warning signs and signals.      Objective #B (Patient Action)                          Status: NEW - Date(s):9/13/2023- Patient will learn mind-body connection, healthy ways to manage stress, calming strategies, and ways she can address cognitive and behavioral factors that can contribute to pain experience.    2/20/2024 - further review of treatment goals is in progress and will continue over the next session, given break in therapy    5/22/2024 - goal completed       Patient will learn and practice at least two strategies that help improve her emotional well-being.       Intervention(s)  Therapist will teach calming strategies and healthy stress management strategies, and will assign homework to help reinforce skill development.  Status - patient has been working on engaging calming strategies and in particular, not trying to \"force herself\" to calm down which counters the relaxation response.  Working currently on how to cue/remind herself to engage these strategies.               Goal 2: Patient will learn new " parenting strategies to help with managing parenting challenges.  Parent will work on improving relationship with her daughter and defining what she would like this relationship to look like.      I will know I've met my goal when I'm enjoying time with my daughter, teaching her new things, and not waiting for things to change       Objective #A (Patient Action)                          Status: Continued - Date(s):9/13/2023 2/20/2024 - further review of treatment goals is in progress and will continue over the next session, given break in therapy   5/22/2024 - this goal was not addressed in this most recent episode of care         Patient will increase understanding of developmental norms for her daughter and ways to manage challenging behaviors.     Intervention(s)  Therapist will provide psychoeducation on developmental norms and parenting strategies.     Objective #B  Patient will spend time reflecting on her relationship with her daughter and defining what she would like this relaitonship to look like.                  Status: Continued - Date(s):9/13/2023     Making good progress - has also been working with daughter's therapist and has been actively practicing and using suggested strategies.   Likely can work towards resolving this goal, as this goal has largely shifted towards the work she is doing with her daughter's individual therapist, where she believes they have been making good progress on helping her in her relationship with her daughter.       2/20/2024 - further review of treatment goals is in progress and will continue over the next session, given break in therapy    5/22/2024 - this goal was not addressed in this most recent episode of care     Intervention(s)  Therapist will guide the patient in reflecting upon her relationship with her daughter and help her define ways to move towards what she vaues in her relationship with her daughter.     Objective #C  Patient will increase time spent on fun  "activity and play with her daughter.  Status: Continued - Date(s):9/13/2023-has been enjoying time with daughter and devoting regular time to fun activities and play together  2/20/2024 - further review of treatment goals is in progress and will continue over the next session, given break in therapy    5/22/2024 - this goal was not addressed in this most recent episode of care           Intervention(s)  Therapist will guide the patient in identifying ways she can increase positive time with her daughter.  Therapist will also teach mindfulness strategies to help patient with being in the present moment when appropriate - .        Goal 3: Patiient will improve communication with her .      I will know I've met my goal when I feel less irritated with my  and communicate more openly with my .  I would like to be more assertive and less aggressive.   I would like to not avoid telling him things because I'm worried about his reaction or don't think he will react well.  I would like to be more present to the moment during times when this would be helpful.\"       Objective #A (Patient Action)                          Status: Continued - Date(s):9/13/2023   continues to work on this goal, though emphasis has shift to managing caregiver stress related to 's cancer diagnosis and treatment.         2/20/2024 - further review of treatment goals is in progress and will continue over the next session, given break in therapy    5/22/2024 - this goal was not addressed in this most recent episode of care      Patient will learn and practice at least two new interpersonal effectiveness strategies.     Intervention(s)  Therapist will teach strategies from DBT module on interpersonal effectiveness, and will assign homework to help reinforce skill development.            Patient has reviewed and agreed to the above plan.        Cristy Wilkinson PsyD,   Licensed Psychologist  Reviewed on " 5/22/2024

## 2024-05-24 ENCOUNTER — TELEPHONE (OUTPATIENT)
Dept: INTERVENTIONAL RADIOLOGY/VASCULAR | Facility: CLINIC | Age: 44
End: 2024-05-24
Payer: COMMERCIAL

## 2024-05-30 ENCOUNTER — HOSPITAL ENCOUNTER (OUTPATIENT)
Dept: ULTRASOUND IMAGING | Facility: CLINIC | Age: 44
Discharge: HOME | End: 2024-05-30
Attending: PHYSICAL MEDICINE & REHABILITATION | Admitting: PHYSICAL MEDICINE & REHABILITATION
Payer: COMMERCIAL

## 2024-05-30 VITALS
HEART RATE: 85 BPM | DIASTOLIC BLOOD PRESSURE: 88 MMHG | SYSTOLIC BLOOD PRESSURE: 122 MMHG | RESPIRATION RATE: 16 BRPM | OXYGEN SATURATION: 97 %

## 2024-05-30 DIAGNOSIS — M79.18 CERVICAL MYOFASCIAL PAIN SYNDROME: ICD-10-CM

## 2024-05-30 DIAGNOSIS — M79.18 MYALGIA, OTHER SITE: Primary | ICD-10-CM

## 2024-05-30 DIAGNOSIS — M79.18 MYOFASCIAL PAIN SYNDROME OF THORACIC SPINE: ICD-10-CM

## 2024-05-30 PROCEDURE — 76942 ECHO GUIDE FOR BIOPSY: CPT

## 2024-05-30 PROCEDURE — 20553 NJX 1/MLT TRIGGER POINTS 3/>: CPT | Performed by: PHYSICAL MEDICINE & REHABILITATION

## 2024-05-30 PROCEDURE — 20553 NJX 1/MLT TRIGGER POINTS 3/>: CPT

## 2024-05-30 PROCEDURE — 76942 ECHO GUIDE FOR BIOPSY: CPT | Mod: 26 | Performed by: PHYSICAL MEDICINE & REHABILITATION

## 2024-05-30 PROCEDURE — 250N000009 HC RX 250: Performed by: PHYSICAL MEDICINE & REHABILITATION

## 2024-05-30 RX ORDER — LIDOCAINE HYDROCHLORIDE 10 MG/ML
4 INJECTION, SOLUTION EPIDURAL; INFILTRATION; INTRACAUDAL; PERINEURAL ONCE
Status: COMPLETED | OUTPATIENT
Start: 2024-05-30 | End: 2024-05-30

## 2024-05-30 RX ORDER — LIDOCAINE HYDROCHLORIDE 10 MG/ML
INJECTION, SOLUTION EPIDURAL; INFILTRATION; INTRACAUDAL; PERINEURAL
Status: COMPLETED
Start: 2024-05-30 | End: 2024-05-30

## 2024-05-30 RX ADMIN — LIDOCAINE HYDROCHLORIDE 4 ML: 10 INJECTION, SOLUTION EPIDURAL; INFILTRATION; INTRACAUDAL; PERINEURAL at 14:11

## 2024-05-30 NOTE — PROGRESS NOTES
Patient tolerated neck trigger point injections well by Dr Saez.  Site Clean Dry and intact, dressing applied with no drainage.  Patient rated pre procedural pain at 2-3/10 and post pain at 0/10.  Patient home per self, understands written and oral instructions.

## 2024-05-30 NOTE — PROCEDURES
"PHYSICAL MEDICINE & REHABILITATION / MEDICAL SPINE      PROCEDURE DATE:  May 30, 2024      PATIENT NAME:  Idalia Hinojosa  MRN:  [id number]  YOB: 1980      PRE-PROCEDURE DIAGNOSIS:  1. Myalgia, other site    2. Cervical myofascial pain syndrome    3. Myofascial pain syndrome of thoracic spine        POST-PROCEDURE DIAGNOSIS:  1. Myalgia, other site    2. Cervical myofascial pain syndrome    3. Myofascial pain syndrome of thoracic spine        PROCEDURE:  Ultrasound-guided bilateral splenius cervicis, bilateral infraspinatus, bilateral teres minor, left latissimus dorsi x 2, right latissimus dorsi x 2 trigger point injections.  (CPT Codes:  98234, 16662)      PROCEDURE IN DETAIL:  Prior to the procedure, educational material was provided for the patient to review and take home.  After a discussion of the risks, benefits, and alternatives to the procedure, the patient expressed understanding and wished to proceed.  Consent form was signed.  The patient was brought to the ultrasound suite and placed in the seated position.  Procedural pause was conducted to verify:  patient identity, procedure to be performed, laterality, site, and patient position.    The patient's trigger points were identified, and the ideal injection sites were marked.  The skin was then cleaned and prepped using povidone-iodine.  A sterile transducer cover was then placed on the linear ultrasound probe.    Each injection site was scanned using the sterile transducer probe.  Each trigger point was identified under ultrasound.  25g 2\" needle was directed using ultrasound guidance for an in-plane approach into each trigger point.  The needle was then aspirated.  After negative aspiration, 1.6 ml D5W and 0.4 ml 1% lidocaine were injected into each trigger point in a fan-like pattern.  Images of proper needle position and injectate solution were saved.  The needle was then removed.     The skin was wiped clean, and bandages were " placed as appropriate.  There were no apparent complications.  The patient tolerated the procedure well.  The patient was observed for an appropriate period of time and discharged in excellent condition.    Preprocedure pain level: 2-3/10.  Postprocedure pain level: 0/10.      Carmelo Saez MD

## 2024-06-07 ENCOUNTER — THERAPY VISIT (OUTPATIENT)
Dept: PHYSICAL THERAPY | Facility: CLINIC | Age: 44
End: 2024-06-07
Payer: COMMERCIAL

## 2024-06-07 DIAGNOSIS — M47.812 FACET ARTHROPATHY, CERVICAL: ICD-10-CM

## 2024-06-07 DIAGNOSIS — M79.18 MYALGIA, OTHER SITE: ICD-10-CM

## 2024-06-07 DIAGNOSIS — M79.18 CERVICAL MYOFASCIAL PAIN SYNDROME: Primary | ICD-10-CM

## 2024-06-07 DIAGNOSIS — M47.814 FACET ARTHROPATHY, THORACIC: ICD-10-CM

## 2024-06-07 PROCEDURE — 97112 NEUROMUSCULAR REEDUCATION: CPT | Mod: GP | Performed by: PHYSICAL THERAPIST

## 2024-06-07 PROCEDURE — 97140 MANUAL THERAPY 1/> REGIONS: CPT | Mod: GP | Performed by: PHYSICAL THERAPIST

## 2024-06-12 ENCOUNTER — THERAPY VISIT (OUTPATIENT)
Dept: PHYSICAL THERAPY | Facility: CLINIC | Age: 44
End: 2024-06-12
Payer: COMMERCIAL

## 2024-06-12 DIAGNOSIS — M79.18 CERVICAL MYOFASCIAL PAIN SYNDROME: Primary | ICD-10-CM

## 2024-06-12 DIAGNOSIS — M79.18 MYALGIA, OTHER SITE: ICD-10-CM

## 2024-06-12 DIAGNOSIS — M47.814 FACET ARTHROPATHY, THORACIC: ICD-10-CM

## 2024-06-12 DIAGNOSIS — M47.812 FACET ARTHROPATHY, CERVICAL: ICD-10-CM

## 2024-06-12 PROCEDURE — 97110 THERAPEUTIC EXERCISES: CPT | Mod: GP | Performed by: PHYSICAL THERAPIST

## 2024-06-12 PROCEDURE — 97530 THERAPEUTIC ACTIVITIES: CPT | Mod: GP | Performed by: PHYSICAL THERAPIST

## 2024-06-12 PROCEDURE — 20561 NDL INSJ W/O NJX 3+ MUSC: CPT | Performed by: PHYSICAL THERAPIST

## 2024-06-26 ENCOUNTER — THERAPY VISIT (OUTPATIENT)
Dept: PHYSICAL THERAPY | Facility: CLINIC | Age: 44
End: 2024-06-26
Payer: COMMERCIAL

## 2024-06-26 DIAGNOSIS — M47.814 FACET ARTHROPATHY, THORACIC: ICD-10-CM

## 2024-06-26 DIAGNOSIS — M79.18 CERVICAL MYOFASCIAL PAIN SYNDROME: Primary | ICD-10-CM

## 2024-06-26 DIAGNOSIS — M79.18 MYALGIA, OTHER SITE: ICD-10-CM

## 2024-06-26 DIAGNOSIS — M47.812 FACET ARTHROPATHY, CERVICAL: ICD-10-CM

## 2024-06-26 PROCEDURE — 97140 MANUAL THERAPY 1/> REGIONS: CPT | Mod: GP | Performed by: PHYSICAL THERAPIST

## 2024-06-26 PROCEDURE — 97110 THERAPEUTIC EXERCISES: CPT | Mod: GP | Performed by: PHYSICAL THERAPIST

## 2024-07-07 ENCOUNTER — HEALTH MAINTENANCE LETTER (OUTPATIENT)
Age: 44
End: 2024-07-07

## 2024-07-17 ENCOUNTER — VIRTUAL VISIT (OUTPATIENT)
Dept: PSYCHOLOGY | Facility: CLINIC | Age: 44
End: 2024-07-17
Payer: COMMERCIAL

## 2024-07-17 DIAGNOSIS — F41.9 ANXIETY DISORDER, UNSPECIFIED TYPE: Primary | ICD-10-CM

## 2024-07-17 DIAGNOSIS — F33.0 MILD EPISODE OF RECURRENT MAJOR DEPRESSIVE DISORDER (H): ICD-10-CM

## 2024-07-17 PROCEDURE — 90834 PSYTX W PT 45 MINUTES: CPT | Mod: 95 | Performed by: PSYCHOLOGIST

## 2024-07-17 ASSESSMENT — PATIENT HEALTH QUESTIONNAIRE - PHQ9
SUM OF ALL RESPONSES TO PHQ QUESTIONS 1-9: 5
SUM OF ALL RESPONSES TO PHQ QUESTIONS 1-9: 5
10. IF YOU CHECKED OFF ANY PROBLEMS, HOW DIFFICULT HAVE THESE PROBLEMS MADE IT FOR YOU TO DO YOUR WORK, TAKE CARE OF THINGS AT HOME, OR GET ALONG WITH OTHER PEOPLE: SOMEWHAT DIFFICULT

## 2024-07-17 NOTE — PROGRESS NOTES
[facility]                                            Progress Note    Patient Name: Idalia Hinojosa  Date:  7/17/2024         Service Type: Individual  Start time: 2:08 pm  End time:  3:00 pm  Session Length:  52 minutes    Session #: 4    Attendees: Client     Service Modality: Video visit: -       Provider verified identity through the following two step process.  Patient provided:  Patient is known previously to provider    Telemedicine Visit: The patient's condition can be safely assessed and treated via synchronous audio and visual telemedicine encounter.      Reason for Telemedicine Visit: Services only offered telehealth    Originating Site (Patient Location): Patient's home    Distant Site (Provider Location): Provider Remote Setting- Home Office    Consent:  The patient/guardian has verbally consented to: the potential risks and benefits of telemedicine (telephone visit) versus in person care; bill my insurance or make self-payment for services provided; and responsibility for payment of non-covered services.     Patient would like the video invitation sent by:  My Chart    Mode of Communication:  Video Conference via [employer],     As the provider I attest to compliance with applicable laws and regulations related to telemedicine.     Treatment Plan Last Reviewed: 5/22/2024    PHQ-9/DAVID-7: 7/18/2024      DATA  Interactive Complexity: No  Crisis: No        Progress Since Last Session (Related to Symptoms / Goals / Homework):   Symptoms:  Idalia reported that over the past few weeks, she noticed feeling bothered and upset more - several things have been upsetting her and she's been reluctant to express her feelings and concerns to her spouse, so she thought it would be helpful to get back into her therapy sessions to further explore and address.            Answers submitted by the patient for this visit:  Patient Health Questionnaire (Submitted on 7/17/2024)  If you  checked off any problems, how difficult have these problems made it for you to do your work, take care of things at home, or get along with other people?: Somewhat difficult  PHQ9 TOTAL SCORE: 5    Homework:  - She is returning to therapy after a several month break - she has set new goals for homework today -           Episode of Care Goals: Idalia had recently returned to therapy after a several month break - we had been re-evaluating current goals and progress on previous therapy goals to further assess what treatment needs remain and if there are any new treatment goals that need to be incorporated, in light of her returning to therapy.   During her last session, she reported feeling better, was using good coping strategies and practicing good self-care strategies.  She stated since then, she's noticed that several things have been upsetting her that she wasn't sure how to cope with, prompting her to return to therapy.       Current / Ongoing Stressors and Concerns:  Current:  Symptoms of anxiety and depression, relationship concerns with spouse.      Ongoing: Working on building effective emotion regulation strategies for managing daily frustrations and upsets.  Context for previous episode of care included stresses related to behavioral challenges with her daughter, who has been diagnosed with ADHD, and working with her  on addressing parenting differences.   Her  also had previously been diagnosed with cancer and went through treatment, and previous therapy focused on caregiver stress and helping her to attend to her self-care during this very stressful period of time.         Treatment Objective(s) Addressed in This Session:     Identify recent stressors and triggers to feelings of upset and frustration-identify 1-2 steps you can take to actively address stressors  Review of interpersonal effectiveness strategies     Intervention:     CBT and Solution-Focused: Idalia identified recent  "stressors and triggers that have contributed to feelings of upset and frustration.  She noted that she finds herself avoiding expressing what she perceives are \"negative\" emotions to her spouse, and that frustrations have been mounting as a result.  She also shared that it is often hard for her to recognize how she is feeling and why she might be feeling that way.  As an initial step, she'll work on journaling between now and her next session - to give herself time and space to tune into her feelings, pay attention to what may be contributing, and ask herself what she needs.  We also role-played ways she may communicate concerns with her spouse.  Encouraged she have these conversations when she is in a good space emotionally to express her concerns calmly and clearly, versus in the height of frustration, as well as checking in with her spouse about when it is a good time for him. She processed that the time during which he had cancer and was receiving treatment was such a difficult time for both of them, and she wishes they were in a better space now and could enjoy each other and having made it through his cancer treatment.  Suggested they consider couples therapy to provide them with additional support around navigating their relationship challenges.        ASSESSMENT: Current Emotional / Mental Status (status of significant symptoms):   Risk status (Self / Other harm or suicidal ideation)   Patient denies current fears or concerns for personal safety.   Patient reports current suicidal ideation, she denies any current or recent suicidal behaviors.  She reports if suicidal  thoughts do occur, she knows steps she can take to manage them and believes she can manage them, and will reach out for additional help if needed.     Patientdenies current or recent homicidal ideation or behaviors.   Patient denies current or recent self injurious behavior or ideation.   Patient denies other safety concerns.   Patient reports " "there has not been a change in risk factors since their last session -      Recommended that patient call 911 or go to the local ED should there be a change in any of these risk factors.  We reviewed how to contact [facility] crisis/COPE for urgent/crisis concerns 24/7.  Provided patient with crisis resources and she agrees to use safety plan should any safety concerns arise.      Professionals or agencies I can contact during a crisis:  Suicide Prevention Lifeline: call or text 117  Crisis Text Line: text \"[place]\" to 954151  Local Crisis Services: [facility]: [phone]  Call 911 or go to my nearest emergency department, or [facility].         Appearance:   Appropriate    Eye Contact:   Good    Psychomotor Behavior: Normal   Attitude:   Cooperative    Orientation:   All   Speech    Rate / Production: Normal     Volume:  Normal    Mood:    Anxious   Affect:    Congruent with mood   Thought Content:  Clear    Thought Form:  Coherent  Logical    Insight:    Good      Medication Review:   No changes to psychiatric medication      Medication Compliance:   Yes      Changes in Health Issues:    No changes or new health concerns -     Chemical Use Review:  No changes or concerns with alcohol use.         Substance Use: Chemical use reviewed, no active concerns identified.      Tobacco Use: No current tobacco use.       Diagnosis:  Anxiety disorder unspecified    Will further evaluate if previous diagnosis of Major depression, recurrent episode, moderate is still active or meets criteria for remission.          PLAN: (Patient Tasks / Therapist Tasks / Other)    1) Idalia identified the following goals: Spend time journaling - Pay attention to how I'm feeling, what I think might be contributing, and what I think I need.    Continue practicing the strategies that have been helping to support your physical and emotional well-being.       2) If there is a crisis situation, with your daughter or yourself, " remember you are not alone and that there are people who can help you twenty-four hours a day, seven days a week.  Call [facility] ([facility]): [phone].      3) Next appt is scheduled for: July 31st, 2:00-3:00 pm.        Cristy Wilkinson PsyD, KYLIE  7/17/2024                                  ____________________________________    Treatment Plan     Patient's Name: Idalia Hinojosa                        YOB: 1980     Date of Creation: 1/6/2020  Date Treatment Plan Last Reviewed/Revised: 5/22/2024       DSM5 Diagnoses: 300.00 (F41.9) Unspecified Anxiety Disorder  Psychosocial / Contextual Factors: strain in marital relationship, parenting challenges, adjustment challenges, 's cancer diagnosis  PROMIS (reviewed every 90 days):      Anticipated number of session for this episode of care: possibly a few more sessions if needed to support sustained progress and maintenance  Anticipation frequency of session: Monthly   Anticipated Duration of each session: 38-52 minutes  Treatment plan will be reviewed in 90 days or when goals have been changed.         Referral / Collaboration:  No referrals at this time.  Have previously recommended couples therapy referral.  Coordinate with Dr. Han, patient's PCP.        MeasurableTreatment Goal(s) related to diagnosis / functional impairment(s)  Goal 1: Patient will learn emotion regulation strategies to help when she is feeling upset/angry/frustrated/distressed    I will know I've met my goal when I would yell less, I would feel calmer, and I would not push others.  I would be less physical when I'm angry (e.g. wouldn't slam doors or hit things)        Objective #A (Patient Action)                          Patient will learn and practice at least 2 new emotion regulation strategies.  Status: Continued - Date(s):    9/13/2023- Regularly practicing emotion regulation strategies to help manage increase in emotional distress related to  spouse's cancer diagnosis.  She has been successful with using strategies to help her stay calm when her daughter's experiencing heightened emotion and/or challenging behaviors.  She's working on remembering to engage calming strategies when feeling physically tense and/or anxious/stressed.    2/20/24 - Will continue to focus on building emotion regulation strategies    5/22/2024 - Idalia has done an excellent job learning and practicing emotion regulation strategies       Intervention(s)  Therapist will provide psychoeducation on emotion regulation module from DBT and will assign patient homework to help reinforce skill development.     Objective #B  Patient will identify factors that increase vulnerability to emotion mind and identify ways to decrease vulnerability to emotion mind.  Status: Continued - Date(s):9/13/2023 - routinely incorporating mindfulness, distress tolerance and self-awareness strategies to increase attention and focus to factors that increase vulnerability to emotion mind  2/20/2024 - further review of treatment goals is in progress and will continue over the next session, given break in therapy   5/22/2024 - Idalia has made great progress identifying and practicing self-care habits/strategies that help decrease vulnerability to emotion mind     Intervention(s)  Therapist will provide information on factors that increase vulnerabiliyt to emotion mind      Objective #C  Patient will identify warning signs and signals that emotions are escalating and will learn ways to skillfully intervene early on.  Status: Date: 9/13/2023- in progress - has demonstrated good progress and awareness in being able to identify warning signs and intervene skillfully-working on incorporating deep breathing to help with calming and re-centering.    2/20/2024 - further review of treatment goals is in progress and will continue over the next session, given break in therapy   5/22/2024 - Goal completed       "  Intervention(s)  Therapist will help patient identify warning signs and signals, and will guide the patient in identifying skillful ways to intervene when exeriencing warning signs and signals.      Objective #B (Patient Action)                          Status: NEW - Date(s):9/13/2023- Patient will learn mind-body connection, healthy ways to manage stress, calming strategies, and ways she can address cognitive and behavioral factors that can contribute to pain experience.    2/20/2024 - further review of treatment goals is in progress and will continue over the next session, given break in therapy    5/22/2024 - goal completed       Patient will learn and practice at least two strategies that help improve her emotional well-being.       Intervention(s)  Therapist will teach calming strategies and healthy stress management strategies, and will assign homework to help reinforce skill development.  Status - patient has been working on engaging calming strategies and in particular, not trying to \"force herself\" to calm down which counters the relaxation response.  Working currently on how to cue/remind herself to engage these strategies.               Goal 2: Patient will learn new parenting strategies to help with managing parenting challenges.  Parent will work on improving relationship with her daughter and defining what she would like this relationship to look like.      I will know I've met my goal when I'm enjoying time with my daughter, teaching her new things, and not waiting for things to change       Objective #A (Patient Action)                          Status: Continued - Date(s):9/13/2023 2/20/2024 - further review of treatment goals is in progress and will continue over the next session, given break in therapy   5/22/2024 - this goal was not addressed in this most recent episode of care         Patient will increase understanding of developmental norms for her daughter and ways to manage challenging " behaviors.     Intervention(s)  Therapist will provide psychoeducation on developmental norms and parenting strategies.     Objective #B  Patient will spend time reflecting on her relationship with her daughter and defining what she would like this relaitonship to look like.                  Status: Continued - Date(s):9/13/2023     Making good progress - has also been working with daughter's therapist and has been actively practicing and using suggested strategies.   Likely can work towards resolving this goal, as this goal has largely shifted towards the work she is doing with her daughter's individual therapist, where she believes they have been making good progress on helping her in her relationship with her daughter.       2/20/2024 - further review of treatment goals is in progress and will continue over the next session, given break in therapy    5/22/2024 - this goal was not addressed in this most recent episode of care     Intervention(s)  Therapist will guide the patient in reflecting upon her relationship with her daughter and help her define ways to move towards what she vaues in her relationship with her daughter.     Objective #C  Patient will increase time spent on fun activity and play with her daughter.  Status: Continued - Date(s):9/13/2023-has been enjoying time with daughter and devoting regular time to fun activities and play together  2/20/2024 - further review of treatment goals is in progress and will continue over the next session, given break in therapy    5/22/2024 - this goal was not addressed in this most recent episode of care           Intervention(s)  Therapist will guide the patient in identifying ways she can increase positive time with her daughter.  Therapist will also teach mindfulness strategies to help patient with being in the present moment when appropriate - .        Goal 3: Patiient will improve communication with her .      I will know I've met my goal when I feel  "less irritated with my  and communicate more openly with my .  I would like to be more assertive and less aggressive.   I would like to not avoid telling him things because I'm worried about his reaction or don't think he will react well.  I would like to be more present to the moment during times when this would be helpful.\"       Objective #A (Patient Action)                          Status: Continued - Date(s):9/13/2023   continues to work on this goal, though emphasis has shift to managing caregiver stress related to 's cancer diagnosis and treatment.         2/20/2024 - further review of treatment goals is in progress and will continue over the next session, given break in therapy    5/22/2024 - this goal was not addressed in this most recent episode of care      Patient will learn and practice at least two new interpersonal effectiveness strategies.     Intervention(s)  Therapist will teach strategies from DBT module on interpersonal effectiveness, and will assign homework to help reinforce skill development.            Patient has reviewed and agreed to the above plan.        Cristy Wilkinson PsyD,   Licensed Psychologist  Reviewed on 5/22/2024                                                            "

## 2024-07-18 ENCOUNTER — MYC REFILL (OUTPATIENT)
Dept: OBGYN | Facility: CLINIC | Age: 44
End: 2024-07-18
Payer: COMMERCIAL

## 2024-07-18 DIAGNOSIS — F32.81 PMDD (PREMENSTRUAL DYSPHORIC DISORDER): ICD-10-CM

## 2024-07-18 RX ORDER — SERTRALINE HYDROCHLORIDE 100 MG/1
100 TABLET, FILM COATED ORAL DAILY
Qty: 90 TABLET | Refills: 4 | Status: SHIPPED | OUTPATIENT
Start: 2024-07-18 | End: 2024-08-14

## 2024-07-18 NOTE — TELEPHONE ENCOUNTER
Requested Prescriptions   Pending Prescriptions Disp Refills    sertraline (ZOLOFT) 100 MG tablet 90 tablet 4     Sig: Take 1 tablet (100 mg) by mouth daily       SSRIs Protocol Failed - 7/18/2024  7:38 AM        Failed - PHQ-9 score less than 5 in past 6 months     Please review last PHQ-9 score.           Passed - Medication is active on med list        Passed - Recent (12 mo) or future (90 days) visit within the authorizing provider's specialty     The patient must have completed an in-person or virtual visit within the past 12 months or has a future visit scheduled within the next 90 days with the authorizing provider s specialty.  Urgent care and e-visits do not quality as an office visit for this protocol.          Passed - Medication indicated for associated diagnosis     Medication is associated with one or more of the following diagnoses:             Anxiety            Bipolar            Depression            Obsessive-compulsive disorder            Panic disorder            Postmenopausal flushing            Premenstrual dysphoric disorder            Social phobia             Passed - Patient is age 18 or older        Passed - No active pregnancy on record        Passed - No positive pregnancy test in last 12 months           Last Written Prescription Date:  5/30/23  Last Fill Quantity: 90,  # refills: 4   Last office visit: 5/30/2023 ; last virtual visit: Visit date not found with prescribing provider:  Dr. Han   Future Office Visit:    8/14/24 with Dr. Han    PHQ-9 score:        7/17/2024     2:04 PM   PHQ   PHQ-9 Total Score 5   Q9: Thoughts of better off dead/self-harm past 2 weeks Not at all       Medication is being filled for 1 time refill only due to:  Patient needs to be seen because it has been more than one year since last visit.  Reyna Monroy RN on 7/18/2024 at 8:43 AM

## 2024-07-24 ENCOUNTER — THERAPY VISIT (OUTPATIENT)
Dept: PHYSICAL THERAPY | Facility: CLINIC | Age: 44
End: 2024-07-24
Payer: COMMERCIAL

## 2024-07-24 DIAGNOSIS — M47.814 FACET ARTHROPATHY, THORACIC: ICD-10-CM

## 2024-07-24 DIAGNOSIS — M47.812 FACET ARTHROPATHY, CERVICAL: ICD-10-CM

## 2024-07-24 DIAGNOSIS — M79.18 MYALGIA, OTHER SITE: ICD-10-CM

## 2024-07-24 DIAGNOSIS — M79.18 CERVICAL MYOFASCIAL PAIN SYNDROME: Primary | ICD-10-CM

## 2024-07-24 PROCEDURE — 97110 THERAPEUTIC EXERCISES: CPT | Mod: GP | Performed by: PHYSICAL THERAPIST

## 2024-07-25 DIAGNOSIS — J30.89 ALLERGIC RHINITIS DUE TO DUST MITE: ICD-10-CM

## 2024-07-25 DIAGNOSIS — J30.0 CHRONIC VASOMOTOR RHINITIS: ICD-10-CM

## 2024-07-25 RX ORDER — MOMETASONE FUROATE MONOHYDRATE 50 UG/1
2 SPRAY, METERED NASAL DAILY
Qty: 51 G | Refills: 0 | Status: SHIPPED | OUTPATIENT
Start: 2024-07-25 | End: 2024-08-14

## 2024-07-25 NOTE — TELEPHONE ENCOUNTER
RN refilled medication per [facility] Refill Protocol.     mometasone (NASONEX) 50 MCG/ACT nasal spray     Do MARIO RN, BSN, PHN

## 2024-07-31 ENCOUNTER — VIRTUAL VISIT (OUTPATIENT)
Dept: PSYCHOLOGY | Facility: CLINIC | Age: 44
End: 2024-07-31
Payer: COMMERCIAL

## 2024-07-31 DIAGNOSIS — F41.9 ANXIETY DISORDER, UNSPECIFIED TYPE: Primary | ICD-10-CM

## 2024-07-31 PROCEDURE — 90834 PSYTX W PT 45 MINUTES: CPT | Mod: 95 | Performed by: PSYCHOLOGIST

## 2024-07-31 NOTE — PROGRESS NOTES
[facility]                                            Progress Note    Patient Name: Idalia Hinojosa  Date:  7/31/2024         Service Type: Individual  Start time: 2:10 pm  End time:  3:02 pm  Session Length:  52 minutes    Session #: 5    Attendees: Client     Service Modality: Video visit: -       Provider verified identity through the following two step process.  Patient provided:  Patient is known previously to provider    Telemedicine Visit: The patient's condition can be safely assessed and treated via synchronous audio and visual telemedicine encounter.      Reason for Telemedicine Visit: Services only offered telehealth    Originating Site (Patient Location): Patient's home    Distant Site (Provider Location): Provider Remote Setting- Home Office    Consent:  The patient/guardian has verbally consented to: the potential risks and benefits of telemedicine (telephone visit) versus in person care; bill my insurance or make self-payment for services provided; and responsibility for payment of non-covered services.     Patient would like the video invitation sent by:  My Chart    Mode of Communication:  Video Conference via [employer],     As the provider I attest to compliance with applicable laws and regulations related to telemedicine.     Treatment Plan Last Reviewed: 5/22/2024    PHQ-9/DAVID-7: 7/18/2024      DATA  Interactive Complexity: No  Crisis: No        Progress Since Last Session (Related to Symptoms / Goals / Homework):   Symptoms:  Idalia reported she's been struggling more with feeling frustrated and angry over the last few weeks.            PHQ-9 score of 5 at previous visit.      Homework:  - Successful with completing homework to journal, identify her feelings and what was contributing.  She said at times this was helpful for her and not at other times.           Episode of Care Goals: Idalia had recently returned to therapy after a several month break - we  have been re-evaluating current goals and progress on previous therapy goals to further assess what treatment needs remain and if there are any new treatment goals that need to be incorporated, in light of her returning to therapy.  Her current concerns are related to feeling more frustrated and angry and not sure how to cope.  She identifies difficulties in her marriage as contributing factor to feeling more frustrated and angry.       Current / Ongoing Stressors and Concerns:  Current:  Increased feelings of anger, frustration and irritability.  Symptoms of anxiety and depression, relationship concerns with spouse.      Ongoing: Working on building effective emotion regulation strategies for managing daily frustrations and upsets.  Context for previous episode of care included stresses related to behavioral challenges with her daughter, who has been diagnosed with ADHD, and working with her  on addressing parenting differences.   Her  also had previously been diagnosed with cancer and went through treatment, and previous therapy focused on caregiver stress and helping her to attend to her self-care during this very stressful period of time.         Treatment Objective(s) Addressed in This Session:     Identify recent stressors and triggers to feelings of upset and frustration-identify 1-2 steps you can take to actively address stressors  Review of interpersonal effectiveness strategies     Intervention:     CBT and Solution-Focused: Idalia walked through themes from her journal entries related to emotions she's been struggling to manage and what has been contributing.  The main theme has been frustrations and challenges with her spouse.  When she's attempted to discuss some of her concerns with him, it has resulted in unproductive arguing between the two of them without successful resolution.  She reflected that she believes the ways in which she has been expressing her upset to him likely hasn't  "helped, and this is an opportunity to help her look at what it would take to express where she's at and what she needs in more constructive ways so that he may be more receptive.  Asked her to consider what feelings and needs are beneath her anger and what it is she think she needs.      She believes it would be helpful if they had a couples therapist but she's not sure if she's ready to share this suggestion with him.  She'll think about this and knows she can our time together to help her with role-playing how to use interpersonal effectiveness strategies to have this conversation.      ASSESSMENT: Current Emotional / Mental Status (status of significant symptoms):   Risk status (Self / Other harm or suicidal ideation)   Patient denies current fears or concerns for personal safety.   Patient reports current suicidal ideation, she denies any current or recent suicidal behaviors.  She reports if suicidal  thoughts do occur, she knows steps she can take to manage them and believes she can manage them, and will reach out for additional help if needed.     Patientdenies current or recent homicidal ideation or behaviors.   Patient denies current or recent self injurious behavior or ideation.   Patient denies other safety concerns.   Patient reports there has not been a change in risk factors since their last session -      Recommended that patient call 911 or go to the local ED should there be a change in any of these risk factors.  We reviewed how to contact [facility] crisis/[facility] for urgent/crisis concerns 24/7.  Provided patient with crisis resources and she agrees to use safety plan should any safety concerns arise.      Professionals or agencies I can contact during a crisis:  Suicide Prevention Lifeline: call or text 200  Crisis Text Line: text \"[place]\" to 276001  Local Crisis Services: [facility]: [phone]  Call 911 or go to my nearest emergency department, or [facility].  "        Appearance:   Appropriate    Eye Contact:   Good    Psychomotor Behavior: Normal   Attitude:   Cooperative    Orientation:   All   Speech    Rate / Production: Normal     Volume:  Normal    Mood:    Anxious   Affect:    Congruent with mood   Thought Content:  Clear    Thought Form:  Coherent  Logical    Insight:    Good      Medication Review:   No changes to psychiatric medication      Medication Compliance:   Yes      Changes in Health Issues:    No changes or new health concerns -     Chemical Use Review:  No changes or concerns with alcohol use.         Substance Use: Chemical use reviewed, no active concerns identified.      Tobacco Use: No current tobacco use.       Diagnosis:  Anxiety disorder unspecified    Will further evaluate if previous diagnosis of Major depression, recurrent episode, moderate is still active or meets criteria for remission.          PLAN: (Patient Tasks / Therapist Tasks / Other)    1) Idalia identified the following goals: Continue to spend time journaling - Pay attention to how I'm feeling, what I think might be contributing, and what I think I need.  What do you think may be beneath the feelings of anger?      Continue practicing the strategies that have been helping to support your physical and emotional well-being.       2) If there is a crisis situation, with your daughter or yourself, remember you are not alone and that there are people who can help you twenty-four hours a day, seven days a week.  Call [facility] ([facility]): [phone].      3) Next appt is scheduled for: July 31st, 2:00-3:00 pm.        Cristy Wilkinson PsyD, KYLIE  7/31/2024                                ____________________________________    Treatment Plan     Patient's Name: Idalia Hinojosa                        YOB: 1980     Date of Creation: 1/6/2020  Date Treatment Plan Last Reviewed/Revised: 5/22/2024       DSM5 Diagnoses: 300.00 (F41.9) Unspecified Anxiety  Disorder  Psychosocial / Contextual Factors: strain in marital relationship, parenting challenges, adjustment challenges, 's cancer diagnosis  PROMIS (reviewed every 90 days):      Anticipated number of session for this episode of care: possibly a few more sessions if needed to support sustained progress and maintenance  Anticipation frequency of session: Monthly   Anticipated Duration of each session: 38-52 minutes  Treatment plan will be reviewed in 90 days or when goals have been changed.         Referral / Collaboration:  No referrals at this time.  Have previously recommended couples therapy referral.  Coordinate with Dr. Han, patient's PCP.        MeasurableTreatment Goal(s) related to diagnosis / functional impairment(s)  Goal 1: Patient will learn emotion regulation strategies to help when she is feeling upset/angry/frustrated/distressed    I will know I've met my goal when I would yell less, I would feel calmer, and I would not push others.  I would be less physical when I'm angry (e.g. wouldn't slam doors or hit things)        Objective #A (Patient Action)                          Patient will learn and practice at least 2 new emotion regulation strategies.  Status: Continued - Date(s):    9/13/2023- Regularly practicing emotion regulation strategies to help manage increase in emotional distress related to spouse's cancer diagnosis.  She has been successful with using strategies to help her stay calm when her daughter's experiencing heightened emotion and/or challenging behaviors.  She's working on remembering to engage calming strategies when feeling physically tense and/or anxious/stressed.    2/20/24 - Will continue to focus on building emotion regulation strategies    5/22/2024 - Idalia has done an excellent job learning and practicing emotion regulation strategies       Intervention(s)  Therapist will provide psychoeducation on emotion regulation module from DBT and will assign patient  homework to help reinforce skill development.     Objective #B  Patient will identify factors that increase vulnerability to emotion mind and identify ways to decrease vulnerability to emotion mind.  Status: Continued - Date(s):9/13/2023 - routinely incorporating mindfulness, distress tolerance and self-awareness strategies to increase attention and focus to factors that increase vulnerability to emotion mind  2/20/2024 - further review of treatment goals is in progress and will continue over the next session, given break in therapy   5/22/2024 - Idalia has made great progress identifying and practicing self-care habits/strategies that help decrease vulnerability to emotion mind     Intervention(s)  Therapist will provide information on factors that increase vulnerabiliyt to emotion mind      Objective #C  Patient will identify warning signs and signals that emotions are escalating and will learn ways to skillfully intervene early on.  Status: Date: 9/13/2023- in progress - has demonstrated good progress and awareness in being able to identify warning signs and intervene skillfully-working on incorporating deep breathing to help with calming and re-centering.    2/20/2024 - further review of treatment goals is in progress and will continue over the next session, given break in therapy   5/22/2024 - Goal completed        Intervention(s)  Therapist will help patient identify warning signs and signals, and will guide the patient in identifying skillful ways to intervene when exeriencing warning signs and signals.      Objective #B (Patient Action)                          Status: NEW - Date(s):9/13/2023- Patient will learn mind-body connection, healthy ways to manage stress, calming strategies, and ways she can address cognitive and behavioral factors that can contribute to pain experience.    2/20/2024 - further review of treatment goals is in progress and will continue over the next session, given break in therapy  "   5/22/2024 - goal completed       Patient will learn and practice at least two strategies that help improve her emotional well-being.       Intervention(s)  Therapist will teach calming strategies and healthy stress management strategies, and will assign homework to help reinforce skill development.  Status - patient has been working on engaging calming strategies and in particular, not trying to \"force herself\" to calm down which counters the relaxation response.  Working currently on how to cue/remind herself to engage these strategies.               Goal 2: Patient will learn new parenting strategies to help with managing parenting challenges.  Parent will work on improving relationship with her daughter and defining what she would like this relationship to look like.      I will know I've met my goal when I'm enjoying time with my daughter, teaching her new things, and not waiting for things to change       Objective #A (Patient Action)                          Status: Continued - Date(s):9/13/2023 2/20/2024 - further review of treatment goals is in progress and will continue over the next session, given break in therapy   5/22/2024 - this goal was not addressed in this most recent episode of care         Patient will increase understanding of developmental norms for her daughter and ways to manage challenging behaviors.     Intervention(s)  Therapist will provide psychoeducation on developmental norms and parenting strategies.     Objective #B  Patient will spend time reflecting on her relationship with her daughter and defining what she would like this relaitonship to look like.                  Status: Continued - Date(s):9/13/2023     Making good progress - has also been working with daughter's therapist and has been actively practicing and using suggested strategies.   Likely can work towards resolving this goal, as this goal has largely shifted towards the work she is doing with her daughter's " "individual therapist, where she believes they have been making good progress on helping her in her relationship with her daughter.       2/20/2024 - further review of treatment goals is in progress and will continue over the next session, given break in therapy    5/22/2024 - this goal was not addressed in this most recent episode of care     Intervention(s)  Therapist will guide the patient in reflecting upon her relationship with her daughter and help her define ways to move towards what she vaues in her relationship with her daughter.     Objective #C  Patient will increase time spent on fun activity and play with her daughter.  Status: Continued - Date(s):9/13/2023-has been enjoying time with daughter and devoting regular time to fun activities and play together  2/20/2024 - further review of treatment goals is in progress and will continue over the next session, given break in therapy    5/22/2024 - this goal was not addressed in this most recent episode of care           Intervention(s)  Therapist will guide the patient in identifying ways she can increase positive time with her daughter.  Therapist will also teach mindfulness strategies to help patient with being in the present moment when appropriate - .        Goal 3: Patiient will improve communication with her .      I will know I've met my goal when I feel less irritated with my  and communicate more openly with my .  I would like to be more assertive and less aggressive.   I would like to not avoid telling him things because I'm worried about his reaction or don't think he will react well.  I would like to be more present to the moment during times when this would be helpful.\"       Objective #A (Patient Action)                          Status: Continued - Date(s):9/13/2023   continues to work on this goal, though emphasis has shift to managing caregiver stress related to 's cancer diagnosis and treatment.         2/20/2024 - " further review of treatment goals is in progress and will continue over the next session, given break in therapy    5/22/2024 - this goal was not addressed in this most recent episode of care      Patient will learn and practice at least two new interpersonal effectiveness strategies.     Intervention(s)  Therapist will teach strategies from DBT module on interpersonal effectiveness, and will assign homework to help reinforce skill development.            Patient has reviewed and agreed to the above plan.        Cristy Wilkinson PsyD, LP  Licensed Psychologist  Reviewed on 5/22/2024

## 2024-08-07 ENCOUNTER — VIRTUAL VISIT (OUTPATIENT)
Dept: PSYCHOLOGY | Facility: CLINIC | Age: 44
End: 2024-08-07
Payer: COMMERCIAL

## 2024-08-07 DIAGNOSIS — F41.9 ANXIETY DISORDER, UNSPECIFIED TYPE: Primary | ICD-10-CM

## 2024-08-07 PROCEDURE — 90832 PSYTX W PT 30 MINUTES: CPT | Mod: 95 | Performed by: PSYCHOLOGIST

## 2024-08-07 NOTE — PROGRESS NOTES
[facility]                                            Progress Note    Patient Name: Idalia Hinojosa  Date:  8/7/2024         Service Type: Individual  Start time: 2:05 pm  End time:  2:40 pm  Session Length:  35 minutes    Session #: 6    Attendees: Client     Service Modality: Video visit: -       Provider verified identity through the following two step process.  Patient provided:  Patient is known previously to provider    Telemedicine Visit: The patient's condition can be safely assessed and treated via synchronous audio and visual telemedicine encounter.      Reason for Telemedicine Visit: Services only offered telehealth    Originating Site (Patient Location): Patient's home    Distant Site (Provider Location): Provider Remote Setting- Home Office    Consent:  The patient/guardian has verbally consented to: the potential risks and benefits of telemedicine (telephone visit) versus in person care; bill my insurance or make self-payment for services provided; and responsibility for payment of non-covered services.     Patient would like the video invitation sent by:  My Chart    Mode of Communication:  Video Conference via [employer],     As the provider I attest to compliance with applicable laws and regulations related to telemedicine.     Treatment Plan Last Reviewed: 5/22/2024    PHQ-9/DAVID-7: 7/18/2024      DATA  Interactive Complexity: No  Crisis: No        Progress Since Last Session (Related to Symptoms / Goals / Homework):   Symptoms:  Idalia reported an improvement in symptoms - she's been feeling less angry, stressed and frustrated, and things have been going better at home.       PHQ-9 score of 5 at previous visit.      Homework:  - Successful with completing homework.            Episode of Care Goals: Idalia had recently returned to therapy after a several month break.  Her current concerns are related to feeling more frustrated and angry and not sure how to cope.   She identifies difficulties in her marriage as contributing factor to feeling more frustrated and angry.       Current / Ongoing Stressors and Concerns:  Current: Relationship concerns with spouse and deciding how she wants to address these concerns.   Increased feelings of anger, frustration and irritability.  Symptoms of anxiety and depression.      Ongoing: Working on building effective emotion regulation strategies for managing daily frustrations and upsets.  Context for previous episode of care included stresses related to behavioral challenges with her daughter, who has been diagnosed with ADHD, and working with her  on addressing parenting differences.   Her  also had previously been diagnosed with cancer and went through treatment, and previous therapy focused on caregiver stress and helping her to attend to her self-care during this very stressful period of time.         Treatment Objective(s) Addressed in This Session:     Learn and practice cognitive strategies to help with identifying and managing unhelpful thoughts  Continue with: Identify recent stressors and triggers to feelings of upset and frustration and identify 1-2 steps you can take to actively address stressors  Review of interpersonal effectiveness strategies     Intervention:     CBT and Solution-Focused: Idalia noted that she believes negative thinking has been contributing to difficulties she's been having with managing her emotional response and acting more effectively.  She asked what she could do to not have these kinds of negative thoughts.  Provided psychoeducation and encouraged focus to be on paying attention to thinking and recognizing when it is helpful versus and unhelpful  - and using cognitive strategies to help with managing/redirecting unhelpful thoughts.  (Versus focusing on trying to control thoughts).    She and her  will have a special date night/camping trip on Friday while her daughter has a  "sleepover at relatives.  She's continuing to reflect upon thoughts and feelings related to her marriage.  She said she would like to be able to work on her marriage - asked her to consider what this would look like and what she believes she needs to communicate to her .      Recommended the book \"Fighting for your marriage\" as a good resource on improving communication and relationship strategies.        ASSESSMENT: Current Emotional / Mental Status (status of significant symptoms):   Risk status (Self / Other harm or suicidal ideation)   Patient denies current fears or concerns for personal safety.   Patient reports current suicidal ideation, she denies any current or recent suicidal behaviors.  She reports if suicidal  thoughts do occur, she knows steps she can take to manage them and believes she can manage them, and will reach out for additional help if needed.     Patientdenies current or recent homicidal ideation or behaviors.   Patient denies current or recent self injurious behavior or ideation.   Patient denies other safety concerns.   Patient reports there has not been a change in risk factors since their last session -      Recommended that patient call 911 or go to the local ED should there be a change in any of these risk factors.  We reviewed how to contact [facility] crisis/[facility] for urgent/crisis concerns 24/7.  Provided patient with crisis resources and she agrees to use safety plan should any safety concerns arise.      Professionals or agencies I can contact during a crisis:  Suicide Prevention Lifeline: call or text 527  Crisis Text Line: text \"[place]\" to 684817  Local Crisis Services: [facility]: [phone]  Call 911 or go to my nearest emergency department, or [facility].         Appearance:   Appropriate    Eye Contact:   Good    Psychomotor Behavior: Normal   Attitude:   Cooperative    Orientation:   All   Speech    Rate / Production: Normal     Volume:  Normal "    Mood:    Improved - less anxious and stressed    Affect:    Congruent with mood   Thought Content:  Clear    Thought Form:  Coherent  Logical    Insight:    Good      Medication Review:   No changes to psychiatric medication      Medication Compliance:   Yes      Changes in Health Issues:    No changes or new health concerns -     Chemical Use Review:  No changes or concerns with alcohol use.         Substance Use: Chemical use reviewed, no active concerns identified.      Tobacco Use: No current tobacco use.       Diagnosis:  Anxiety disorder unspecified    Will further evaluate if previous diagnosis of Major depression, recurrent episode, moderate is still active or meets criteria for remission.          PLAN: (Patient Tasks / Therapist Tasks / Other)    1) Idalia identified the following goals: Remind myself that it is okay to have my thoughts, I can decide which are helpful and which are not.  Think about what I think would make my marriage better.      Continue practicing the strategies that have been helping to support your physical and emotional well-being.      Consider reading the book - Fighting For your Marriage (Rafi Gomez).  Consider referral to couples therapy to help with marital difficulties.       2) If there is a crisis situation, with your daughter or yourself, remember you are not alone and that there are people who can help you twenty-four hours a day, seven days a week.  Call [facility] ([facility]): [phone].      3) Next appt is scheduled for: August 22nd, 2:00-3:00 pm.        Cristy Wilkinson PsyD, KYLIE  8/7/2024                                  ____________________________________    Treatment Plan     Patient's Name: Idalia Hinojosa                        YOB: 1980     Date of Creation: 1/6/2020  Date Treatment Plan Last Reviewed/Revised: 5/22/2024       DSM5 Diagnoses: 300.00 (F41.9) Unspecified Anxiety Disorder  Psychosocial / Contextual Factors:  strain in marital relationship, parenting challenges, adjustment challenges, 's cancer diagnosis  PROMIS (reviewed every 90 days):      Anticipated number of session for this episode of care: possibly a few more sessions if needed to support sustained progress and maintenance  Anticipation frequency of session: Monthly   Anticipated Duration of each session: 38-52 minutes  Treatment plan will be reviewed in 90 days or when goals have been changed.         Referral / Collaboration:  No referrals at this time.  Have previously recommended couples therapy referral.  Coordinate with Dr. Han, patient's PCP.        MeasurableTreatment Goal(s) related to diagnosis / functional impairment(s)  Goal 1: Patient will learn emotion regulation strategies to help when she is feeling upset/angry/frustrated/distressed    I will know I've met my goal when I would yell less, I would feel calmer, and I would not push others.  I would be less physical when I'm angry (e.g. wouldn't slam doors or hit things)        Objective #A (Patient Action)                          Patient will learn and practice at least 2 new emotion regulation strategies.  Status: Continued - Date(s):    9/13/2023- Regularly practicing emotion regulation strategies to help manage increase in emotional distress related to spouse's cancer diagnosis.  She has been successful with using strategies to help her stay calm when her daughter's experiencing heightened emotion and/or challenging behaviors.  She's working on remembering to engage calming strategies when feeling physically tense and/or anxious/stressed.    2/20/24 - Will continue to focus on building emotion regulation strategies    5/22/2024 - Idalia has done an excellent job learning and practicing emotion regulation strategies       Intervention(s)  Therapist will provide psychoeducation on emotion regulation module from DBT and will assign patient homework to help reinforce skill development.      Objective #B  Patient will identify factors that increase vulnerability to emotion mind and identify ways to decrease vulnerability to emotion mind.  Status: Continued - Date(s):9/13/2023 - routinely incorporating mindfulness, distress tolerance and self-awareness strategies to increase attention and focus to factors that increase vulnerability to emotion mind  2/20/2024 - further review of treatment goals is in progress and will continue over the next session, given break in therapy   5/22/2024 - Idalia has made great progress identifying and practicing self-care habits/strategies that help decrease vulnerability to emotion mind     Intervention(s)  Therapist will provide information on factors that increase vulnerabiliyt to emotion mind      Objective #C  Patient will identify warning signs and signals that emotions are escalating and will learn ways to skillfully intervene early on.  Status: Date: 9/13/2023- in progress - has demonstrated good progress and awareness in being able to identify warning signs and intervene skillfully-working on incorporating deep breathing to help with calming and re-centering.    2/20/2024 - further review of treatment goals is in progress and will continue over the next session, given break in therapy   5/22/2024 - Goal completed        Intervention(s)  Therapist will help patient identify warning signs and signals, and will guide the patient in identifying skillful ways to intervene when exeriencing warning signs and signals.      Objective #B (Patient Action)                          Status: NEW - Date(s):9/13/2023- Patient will learn mind-body connection, healthy ways to manage stress, calming strategies, and ways she can address cognitive and behavioral factors that can contribute to pain experience.    2/20/2024 - further review of treatment goals is in progress and will continue over the next session, given break in therapy    5/22/2024 - goal completed       Patient will  "learn and practice at least two strategies that help improve her emotional well-being.       Intervention(s)  Therapist will teach calming strategies and healthy stress management strategies, and will assign homework to help reinforce skill development.  Status - patient has been working on engaging calming strategies and in particular, not trying to \"force herself\" to calm down which counters the relaxation response.  Working currently on how to cue/remind herself to engage these strategies.               Goal 2: Patient will learn new parenting strategies to help with managing parenting challenges.  Parent will work on improving relationship with her daughter and defining what she would like this relationship to look like.      I will know I've met my goal when I'm enjoying time with my daughter, teaching her new things, and not waiting for things to change       Objective #A (Patient Action)                          Status: Continued - Date(s):9/13/2023 2/20/2024 - further review of treatment goals is in progress and will continue over the next session, given break in therapy   5/22/2024 - this goal was not addressed in this most recent episode of care         Patient will increase understanding of developmental norms for her daughter and ways to manage challenging behaviors.     Intervention(s)  Therapist will provide psychoeducation on developmental norms and parenting strategies.     Objective #B  Patient will spend time reflecting on her relationship with her daughter and defining what she would like this relaitonship to look like.                  Status: Continued - Date(s):9/13/2023     Making good progress - has also been working with daughter's therapist and has been actively practicing and using suggested strategies.   Likely can work towards resolving this goal, as this goal has largely shifted towards the work she is doing with her daughter's individual therapist, where she believes they have been " "making good progress on helping her in her relationship with her daughter.       2/20/2024 - further review of treatment goals is in progress and will continue over the next session, given break in therapy    5/22/2024 - this goal was not addressed in this most recent episode of care     Intervention(s)  Therapist will guide the patient in reflecting upon her relationship with her daughter and help her define ways to move towards what she vaues in her relationship with her daughter.     Objective #C  Patient will increase time spent on fun activity and play with her daughter.  Status: Continued - Date(s):9/13/2023-has been enjoying time with daughter and devoting regular time to fun activities and play together  2/20/2024 - further review of treatment goals is in progress and will continue over the next session, given break in therapy    5/22/2024 - this goal was not addressed in this most recent episode of care           Intervention(s)  Therapist will guide the patient in identifying ways she can increase positive time with her daughter.  Therapist will also teach mindfulness strategies to help patient with being in the present moment when appropriate - .        Goal 3: Patiient will improve communication with her .      I will know I've met my goal when I feel less irritated with my  and communicate more openly with my .  I would like to be more assertive and less aggressive.   I would like to not avoid telling him things because I'm worried about his reaction or don't think he will react well.  I would like to be more present to the moment during times when this would be helpful.\"       Objective #A (Patient Action)                          Status: Continued - Date(s):9/13/2023   continues to work on this goal, though emphasis has shift to managing caregiver stress related to 's cancer diagnosis and treatment.         2/20/2024 - further review of treatment goals is in progress and " will continue over the next session, given break in therapy    5/22/2024 - this goal was not addressed in this most recent episode of care      Patient will learn and practice at least two new interpersonal effectiveness strategies.     Intervention(s)  Therapist will teach strategies from DBT module on interpersonal effectiveness, and will assign homework to help reinforce skill development.            Patient has reviewed and agreed to the above plan.        Cristy Wilkinson PsyD, LP  Licensed Psychologist  Reviewed on 5/22/2024

## 2024-08-09 DIAGNOSIS — F32.81 PMDD (PREMENSTRUAL DYSPHORIC DISORDER): ICD-10-CM

## 2024-08-09 RX ORDER — NORETHINDRONE ACETATE AND ETHINYL ESTRADIOL 1; 20 MG/1; UG/1
TABLET ORAL
Qty: 84 TABLET | Refills: 6 | OUTPATIENT
Start: 2024-08-09

## 2024-08-14 ENCOUNTER — OFFICE VISIT (OUTPATIENT)
Dept: OBGYN | Facility: CLINIC | Age: 44
End: 2024-08-14
Payer: COMMERCIAL

## 2024-08-14 VITALS
BODY MASS INDEX: 26.84 KG/M2 | OXYGEN SATURATION: 98 % | HEIGHT: 66 IN | HEART RATE: 94 BPM | DIASTOLIC BLOOD PRESSURE: 70 MMHG | WEIGHT: 167 LBS | SYSTOLIC BLOOD PRESSURE: 113 MMHG

## 2024-08-14 DIAGNOSIS — J30.0 CHRONIC VASOMOTOR RHINITIS: ICD-10-CM

## 2024-08-14 DIAGNOSIS — J30.89 ALLERGIC RHINITIS DUE TO DUST MITE: ICD-10-CM

## 2024-08-14 DIAGNOSIS — F32.81 PMDD (PREMENSTRUAL DYSPHORIC DISORDER): ICD-10-CM

## 2024-08-14 DIAGNOSIS — Z01.419 ENCOUNTER FOR GYNECOLOGICAL EXAMINATION WITHOUT ABNORMAL FINDING: Primary | ICD-10-CM

## 2024-08-14 DIAGNOSIS — Z13.220 SCREENING FOR LIPID DISORDERS: ICD-10-CM

## 2024-08-14 LAB
CHOLEST SERPL-MCNC: 235 MG/DL
FASTING STATUS PATIENT QL REPORTED: YES
HDLC SERPL-MCNC: 67 MG/DL
LDLC SERPL CALC-MCNC: 141 MG/DL
NONHDLC SERPL-MCNC: 168 MG/DL
TRIGL SERPL-MCNC: 133 MG/DL

## 2024-08-14 PROCEDURE — 80061 LIPID PANEL: CPT | Performed by: OBSTETRICS & GYNECOLOGY

## 2024-08-14 PROCEDURE — 36415 COLL VENOUS BLD VENIPUNCTURE: CPT | Performed by: OBSTETRICS & GYNECOLOGY

## 2024-08-14 PROCEDURE — 99214 OFFICE O/P EST MOD 30 MIN: CPT | Mod: 25 | Performed by: OBSTETRICS & GYNECOLOGY

## 2024-08-14 PROCEDURE — 99396 PREV VISIT EST AGE 40-64: CPT | Performed by: OBSTETRICS & GYNECOLOGY

## 2024-08-14 RX ORDER — SERTRALINE HYDROCHLORIDE 100 MG/1
100 TABLET, FILM COATED ORAL DAILY
Qty: 90 TABLET | Refills: 4 | Status: SHIPPED | OUTPATIENT
Start: 2024-08-14

## 2024-08-14 RX ORDER — NORETHINDRONE ACETATE AND ETHINYL ESTRADIOL .02; 1 MG/1; MG/1
1 TABLET ORAL DAILY
Qty: 112 TABLET | Refills: 4 | Status: SHIPPED | OUTPATIENT
Start: 2024-08-14

## 2024-08-14 RX ORDER — MOMETASONE FUROATE MONOHYDRATE 50 UG/1
2 SPRAY, METERED NASAL DAILY
Qty: 51 G | Refills: 4 | Status: SHIPPED | OUTPATIENT
Start: 2024-08-14

## 2024-08-14 RX ORDER — MONTELUKAST SODIUM 10 MG/1
10 TABLET ORAL AT BEDTIME
Qty: 90 TABLET | Refills: 3 | Status: SHIPPED | OUTPATIENT
Start: 2024-08-14

## 2024-08-14 ASSESSMENT — ANXIETY QUESTIONNAIRES
7. FEELING AFRAID AS IF SOMETHING AWFUL MIGHT HAPPEN: NOT AT ALL
6. BECOMING EASILY ANNOYED OR IRRITABLE: SEVERAL DAYS
GAD7 TOTAL SCORE: 1
GAD7 TOTAL SCORE: 1
IF YOU CHECKED OFF ANY PROBLEMS ON THIS QUESTIONNAIRE, HOW DIFFICULT HAVE THESE PROBLEMS MADE IT FOR YOU TO DO YOUR WORK, TAKE CARE OF THINGS AT HOME, OR GET ALONG WITH OTHER PEOPLE: NOT DIFFICULT AT ALL
1. FEELING NERVOUS, ANXIOUS, OR ON EDGE: NOT AT ALL
3. WORRYING TOO MUCH ABOUT DIFFERENT THINGS: NOT AT ALL
5. BEING SO RESTLESS THAT IT IS HARD TO SIT STILL: NOT AT ALL
2. NOT BEING ABLE TO STOP OR CONTROL WORRYING: NOT AT ALL

## 2024-08-14 ASSESSMENT — PATIENT HEALTH QUESTIONNAIRE - PHQ9
SUM OF ALL RESPONSES TO PHQ QUESTIONS 1-9: 4
5. POOR APPETITE OR OVEREATING: NOT AT ALL

## 2024-08-14 NOTE — PROGRESS NOTES
Idalia is a 44 year old  female who presents for annual exam.     Menses are absent and absent lasting  0  days.  Menses flow: absent.  No LMP recorded. (Menstrual status: Birth Control).. Using oral contraceptives for contraception.  She is not currently considering pregnancy.  Besides routine health maintenance, she has no other health concerns today . Daughter is 8 now and going into 3rd grade.  Margarette is still in [place]isson and starting to work on his mental health.  This should help their relationship and she has been seeing her therapist again. DAVID=1 PHQ=4 today and needs refill.   Has been doing a lot of PT and now not chronic pain, but chronic ache. Better. 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 Weight is down 7 lbs since last visit and happy about that.   GYNECOLOGIC HISTORY:  Menarche: 14  Age at first intercourse: 23 Number of lifetime partners: less than 6  Idalia is sexually active with 1 male partner(s) and is currently in monogamous relationship with .    History sexually transmitted infections:No STD history  STI testing offered?  Declined  DEBORA exposure: No  History of abnormal Pap smear: No - age 30-64 HPV with reflex Pap every 5 years recommended  Family history of breast CA: No  Family history of uterine/ovarian CA: No    Family history of colon CA: No    HEALTH MAINTENANCE:  Cholesterol: (  Cholesterol   Date Value Ref Range Status   2023 214 (H) <200 mg/dL Final   10/24/2022 187 <200 mg/dL Final   2018 193 <200 mg/dL Final   12/10/2013 177 0 - 200 mg/dL Final     Comment:     LDL Cholesterol is the primary guide to therapy.   The NCEP recommends further evaluation of: patients with cholesterol greater   than 200 mg/dL if additional risk factors are present, cholesterol greater   than   240 mg/dL, triglycerides greater than 150 mg/dL, or HDL less than 40 mg/dL.    History of abnormal lipids: Yes  Mammo: 0 . History of abnormal Mammo: Not  applicable.  Regular Self Breast Exams: No  Calcium/Vitamin D intake: source:  dairy, dietary supplement(s) Adequate? Yes  TSH: (  TSH   Date Value Ref Range Status   2023 1.52 0.30 - 4.20 uIU/mL Final   10/24/2022 2.43 0.40 - 4.00 mU/L Final    )  Pap; (  Lab Results   Component Value Date    PAP NIL 2021    PAP ASC-US 2017    PAP NIL 12/10/2013    )    HISTORY:  OB History    Para Term  AB Living   1 1 0 1 0 1   SAB IAB Ectopic Multiple Live Births   0 0 0 0 1      # Outcome Date GA Lbr Alfredito/2nd Weight Sex Type Anes PTL Lv   1  10/28/15 34w5d  2.509 kg (5 lb 8.5 oz) F CS-LTranv   OPHELIA      Birth Comments: arrest of descent, LOP, cat 2 tracing after PPROM      Name: Alma Delia      Apgar1: 5  Apgar5: 7     Past Medical History:   Diagnosis Date    ASCUS of cervix with negative high risk HPV 2017 ASCUS, Neg HPV    Seasonal affective disorder (H24)     no meds     Past Surgical History:   Procedure Laterality Date     SECTION N/A 10/28/2015    Procedure:  SECTION;  Surgeon: Mónica Madrid MD;  Location: [facility] L[facility]    DENTAL SURGERY      wisdom teeth    TONSILLECTOMY  age 10     Family History   Problem Relation Age of Onset    Heart Disease Maternal Grandmother     Skin Cancer Maternal Grandmother     Thyroid Disease Paternal Grandmother     Other Cancer Paternal Grandmother      Social History     Socioeconomic History    Marital status:     Number of children: 1   Occupational History     Employer: [employer]   Tobacco Use    Smoking status: Never    Smokeless tobacco: Never   Vaping Use    Vaping status: Never Used   Substance and Sexual Activity    Alcohol use: Yes     Alcohol/week: 0.0 standard drinks of alcohol     Comment: 0-2 PER WK    Drug use: No    Sexual activity: Yes     Partners: Male     Birth control/protection: Pill   Other Topics Concern    Parent/sibling w/ CABG, MI or angioplasty before 65F 55M? No   Social  "History Narrative               Current Outpatient Medications:     celecoxib (CELEBREX) 100 MG capsule, TAKE 1 CAPSULE (100 MG) BY MOUTH 2 TIMES DAILY AS NEEDED FOR MODERATE PAIN, Disp: 60 capsule, Rfl: 0    ipratropium (ATROVENT) 0.06 % nasal spray, Spray 2 sprays into both nostrils 4 times daily as needed for rhinitis, Disp: 15 mL, Rfl: 5    mometasone (NASONEX) 50 MCG/ACT nasal spray, Spray 2 sprays into both nostrils daily An appointment with Dr. Lewis is needed for further refills., Disp: 51 g, Rfl: 0    montelukast (SINGULAIR) 10 MG tablet, Take 1 tablet (10 mg) by mouth At Bedtime, Disp: 90 tablet, Rfl: 3    norethindrone-ethinyl estradiol (MICROGESTIN 1/20) 1-20 MG-MCG tablet, Take 1 tablet by mouth daily Active tablets only for prevention of menses, Disp: 112 tablet, Rfl: 4    sertraline (ZOLOFT) 100 MG tablet, Take 1 tablet (100 mg) by mouth daily, Disp: 90 tablet, Rfl: 4    tiZANidine (ZANAFLEX) 2 MG tablet, Take 1 tablet (2 mg) by mouth nightly as needed for muscle spasms, Disp: 90 tablet, Rfl: 0    cyclobenzaprine (FLEXERIL) 5 MG tablet, Take 1-2 tablets (5-10 mg) by mouth nightly as needed for muscle spasms You can take up to 3 times per day as needed, but this is sedating so no driving or alcohol while taking. (Patient not taking: Reported on 11/22/2023), Disp: 30 tablet, Rfl: 0   No Known Allergies    Past medical, surgical, social and family history were reviewed and updated in [facility].      EXAM:  /70   Pulse 94   Ht 1.676 m (5' 6\")   Wt 75.8 kg (167 lb)   SpO2 98%   BMI 26.95 kg/m     BMI: Body mass index is 26.95 kg/m .  Constitutional: healthy, alert and no distress  Head: Normocephalic. No masses, lesions, tenderness or abnormalities  Neck: Neck supple. Trachea midline. No adenopathy. Thyroid symmetric, normal size.   Cardiovascular: RRR.   Respiratory: Negative.   Breast: No nodularity, asymmetry or nipple discharge bilaterally.  Gastrointestinal: Abdomen soft, non-tender, " non-distended. No masses, organomegaly.  : deferred  Musculoskeletal: extremities normal  Skin: no suspicious lesions or rashes  Psychiatric: Affect appropriate, cooperative,mentation appears normal.     COUNSELING:   Reviewed preventive health counseling, as reflected in patient instructions       Contraception   reports that she has never smoked. She has never used smokeless tobacco.    Body mass index is 26.95 kg/m .    FRAX Risk Assessment    ASSESSMENT:  44 year old female with satisfactory annual exam  (Z01.419) Encounter for gynecological examination without abnormal finding  (primary encounter diagnosis)  Comment: OCP  Plan: start mammogram age 47 probably. Congratulated on losing weight and will not discuss future per request.     (F32.81) PMDD (premenstrual dysphoric disorder)  Comment: stable  Plan: norethindrone-ethinyl estradiol (MICROGESTIN         1/20) 1-20 MG-MCG tablet, sertraline (ZOLOFT)         100 MG tablet        Refill was done    (J30.89) Allergic rhinitis due to dust mite  Comment: stable  Plan: montelukast (SINGULAIR) 10 MG tablet,         mometasone (NASONEX) 50 MCG/ACT nasal spray        Refill was done    (J30.0) Chronic vasomotor rhinitis  Comment: stable  Plan: mometasone (NASONEX) 50 MCG/ACT nasal spray        Refill was done    (Z13.220) Screening for lipid disorders  Comment: fasting  Plan: Lipid panel reflex to direct LDL Fasting        Check lab today.     Monique Han MD

## 2024-08-22 ENCOUNTER — VIRTUAL VISIT (OUTPATIENT)
Dept: PSYCHOLOGY | Facility: CLINIC | Age: 44
End: 2024-08-22
Payer: COMMERCIAL

## 2024-08-22 DIAGNOSIS — F41.9 ANXIETY DISORDER, UNSPECIFIED TYPE: Primary | ICD-10-CM

## 2024-08-22 PROCEDURE — 90834 PSYTX W PT 45 MINUTES: CPT | Mod: 95 | Performed by: PSYCHOLOGIST

## 2024-08-22 ASSESSMENT — PATIENT HEALTH QUESTIONNAIRE - PHQ9
10. IF YOU CHECKED OFF ANY PROBLEMS, HOW DIFFICULT HAVE THESE PROBLEMS MADE IT FOR YOU TO DO YOUR WORK, TAKE CARE OF THINGS AT HOME, OR GET ALONG WITH OTHER PEOPLE: SOMEWHAT DIFFICULT
SUM OF ALL RESPONSES TO PHQ QUESTIONS 1-9: 4
SUM OF ALL RESPONSES TO PHQ QUESTIONS 1-9: 4

## 2024-08-22 NOTE — PROGRESS NOTES
"           [facility]                                            Progress Note    Patient Name: Idalia Hinojosa  Date:  8/22/2024         Service Type: Individual  Start time: 2:12 pm  End time:  2:59 pm  Session Length:  47 minutes    Session #: 7    Attendees: Client     Service Modality: Video visit: -       Provider verified identity through the following two step process.  Patient provided:  Patient is known previously to provider    Telemedicine Visit: The patient's condition can be safely assessed and treated via synchronous audio and visual telemedicine encounter.      Reason for Telemedicine Visit: Services only offered telehealth    Originating Site (Patient Location): Patient's home    Distant Site (Provider Location): Provider Remote Setting- Home Office    Consent:  The patient/guardian has verbally consented to: the potential risks and benefits of telemedicine (telephone visit) versus in person care; bill my insurance or make self-payment for services provided; and responsibility for payment of non-covered services.     Patient would like the video invitation sent by:  My Chart    Mode of Communication:  Video Conference via [place],     As the provider I attest to compliance with applicable laws and regulations related to telemedicine.     Treatment Plan Last Reviewed: 5/22/2024    PHQ-9/DAVID-7: 8/22/2024      DATA  Interactive Complexity: No  Crisis: No        Progress Since Last Session (Related to Symptoms / Goals / Homework):   Symptoms:  Idalia reported that her symptoms and mood have been \"up and down\".  She said it was a \"tough weekend\" which has left her feeling \"exhausted\".      Answers submitted by the patient for this visit:  Patient Health Questionnaire (Submitted on 8/22/2024)  If you checked off any problems, how difficult have these problems made it for you to do your work, take care of things at home, or get along with other people?: Somewhat difficult  PHQ9 " TOTAL SCORE: 4    PHQ-9 score of 5 at previous visit.      Homework:  - Successful with completing homework.            Episode of Care Goals: Idalia had recently returned to therapy after a several month break.  Her current concerns are related to feeling more frustrated and angry and not sure how to cope.  She identifies difficulties in her marriage as contributing factor to feeling more frustrated and angry.       Current / Ongoing Stressors and Concerns:  Current: Relationship concerns with spouse and deciding how she wants to address these concerns.   Spouses's mental health concerns, and her concerns that he is not getting treatment/support, has also been a stressor for her.  Increased feelings of anger, frustration and irritability.  Symptoms of anxiety and depression.      Ongoing: Working on building effective emotion regulation strategies for managing daily frustrations and upsets.  Context for previous episode of care included stresses related to behavioral challenges with her daughter, who has been diagnosed with ADHD, and working with her  on addressing parenting differences.   Her  also had previously been diagnosed with cancer and went through treatment, and previous therapy focused on caregiver stress and helping her to attend to her self-care during this very stressful period of time.         Treatment Objective(s) Addressed in This Session:     Review of self-care strategies and identification of areas of need   Learn and practice emotion regulation strategies (in particular, how to manage and separate your own emotional reaction when someone around you is escalating)  Learn and practice cognitive strategies to help with identifying and managing unhelpful thoughts  Continue with: Identify recent stressors and triggers to feelings of upset and frustration and identify 1-2 steps you can take to actively address stressors  Review of interpersonal effectiveness  "strategies     Intervention:     DBT and Solution-Focused: Review of self-care strategies (sleep, nutrition, exercise, seeking support, engaging in positive activities) to help with focusing on elements she has control over to help with emotion regulation and feeling at her best.      We also reviewed strategies to help her focus on  her emotional reaction from others, and focusing on how to manage her own emotion reaction when others are escalating (choosing to stay at the train station).      We also reviewed ways she may communicate her concerns to her spouse regarding his mental health and her hopes that he will seek out treatment and support.    ASSESSMENT: Current Emotional / Mental Status (status of significant symptoms):   Risk status (Self / Other harm or suicidal ideation)   Patient denies current fears or concerns for personal safety.   Patient reports current suicidal ideation, she denies any current or recent suicidal behaviors.  She reports if suicidal  thoughts do occur, she knows steps she can take to manage them and believes she can manage them, and will reach out for additional help if needed.     Patientdenies current or recent homicidal ideation or behaviors.   Patient denies current or recent self injurious behavior or ideation.   Patient denies other safety concerns.   Patient reports there has not been a change in risk factors since their last session -      Recommended that patient call 911 or go to the local ED should there be a change in any of these risk factors.  We reviewed how to contact [facility] crisis/COPE for urgent/crisis concerns 24/7.  Provided patient with crisis resources and she agrees to use safety plan should any safety concerns arise.      Professionals or agencies I can contact during a crisis:  Suicide Prevention Lifeline: call or text 263  Crisis Text Line: text \"[place]\" to 837327  Local Crisis Services: [facility]: [phone]  Call 911 " "or go to my nearest emergency department, or [facility].         Appearance:   Appropriate    Eye Contact:   Good    Psychomotor Behavior: Normal   Attitude:   Cooperative    Orientation:   All   Speech    Rate / Production: Normal     Volume:  Normal    Mood:    \"Up and down\"    Affect:    Congruent with mood   Thought Content:  Clear    Thought Form:  Coherent  Logical    Insight:    Good      Medication Review:   No changes to psychiatric medication      Medication Compliance:   Yes      Changes in Health Issues:    No changes or new health concerns -     Chemical Use Review:  No changes or concerns with alcohol use.         Substance Use: Chemical use reviewed, no active concerns identified.      Tobacco Use: No current tobacco use.       Diagnosis:  Anxiety disorder unspecified    Will further evaluate if previous diagnosis of Major depression, recurrent episode, moderate is still active or meets criteria for remission.          PLAN: (Patient Tasks / Therapist Tasks / Other)    1) Idalia identified the following goals: Talk with Donald about nighttime routine  - schedule time to talk about this.      Remind myself that it is okay to have my thoughts, I can decide which are helpful and which are not.  Think about what I think would make my marriage better.      Continue practicing the strategies that have been helping to support your physical and emotional well-being.      Consider reading the book - Fighting For your Marriage (Rafi Gomez).  Consider referral to couples therapy to help with marital difficulties.       2) If there is a crisis situation, with your daughter or yourself, remember you are not alone and that there are people who can help you twenty-four hours a day, seven days a week.  Call [facility] ([facility]): [phone].      3) Next appt is scheduled for: 9/5/2024 at 2:00 pm.          Cristy Wilkinson PsyD, KYLIE  8/22/2024                                  "   ____________________________________    Treatment Plan     Patient's Name: Idalia Hinojosa                        YOB: 1980     Date of Creation: 1/6/2020  Date Treatment Plan Last Reviewed/Revised: 8/22/2024       DSM5 Diagnoses: 300.00 (F41.9) Unspecified Anxiety Disorder  Psychosocial / Contextual Factors: strain in marital relationship, parenting challenges, adjustment challenges, 's cancer diagnosis  PROMIS (reviewed every 90 days):      Anticipated number of session for this episode of care: possibly a few more sessions if needed to support sustained progress and maintenance  Anticipation frequency of session: Monthly   Anticipated Duration of each session: 38-52 minutes  Treatment plan will be reviewed in 90 days or when goals have been changed.         Referral / Collaboration:  No referrals at this time.  Have previously recommended couples therapy referral.  Coordinate with Dr. Han, patient's PCP.        MeasurableTreatment Goal(s) related to diagnosis / functional impairment(s)  Goal 1: Patient will learn emotion regulation strategies to help when she is feeling upset/angry/frustrated/distressed    I will know I've met my goal when I would yell less, I would feel calmer, and I would not push others.  I would be less physical when I'm angry (e.g. wouldn't slam doors or hit things)        Objective #A (Patient Action)                          Patient will learn and practice at least 2 new emotion regulation strategies.  Status: Continued - Date(s):    9/13/2023- Regularly practicing emotion regulation strategies to help manage increase in emotional distress related to spouse's cancer diagnosis.  She has been successful with using strategies to help her stay calm when her daughter's experiencing heightened emotion and/or challenging behaviors.  She's working on remembering to engage calming strategies when feeling physically tense and/or anxious/stressed.    2/20/24 - Will  continue to focus on building emotion regulation strategies    8/22/2024 - this is a primary focus of her current episode of care -is making initial progress on this goal in the context of her return to therapy -        Intervention(s)  Therapist will provide psychoeducation on emotion regulation module from DBT and will assign patient homework to help reinforce skill development.     Objective #B  Patient will identify factors that increase vulnerability to emotion mind and identify ways to decrease vulnerability to emotion mind.  Status: Continued - Date(s):9/13/2023 - routinely incorporating mindfulness, distress tolerance and self-awareness strategies to increase attention and focus to factors that increase vulnerability to emotion mind  2/20/2024 - further review of treatment goals is in progress and will continue over the next session, given break in therapy   5/22/2024 - Idalia has made great progress identifying and practicing self-care habits/strategies that help decrease vulnerability to emotion mind   8/22/2024 -  this is a primary focus of her current episode of care -is making initial progress on this goal in the context of her return to therapy -     Intervention(s)  Therapist will provide information on factors that increase vulnerabiliyt to emotion mind      Objective #C  Patient will identify warning signs and signals that emotions are escalating and will learn ways to skillfully intervene early on.  Status: Date: 9/13/2023- in progress - has demonstrated good progress and awareness in being able to identify warning signs and intervene skillfully-working on incorporating deep breathing to help with calming and re-centering.    2/20/2024 - further review of treatment goals is in progress and will continue over the next session, given break in therapy   5/22/2024 - Goal completed        Intervention(s)  Therapist will help patient identify warning signs and signals, and will guide the patient in  "identifying skillful ways to intervene when exeriencing warning signs and signals.      Objective #B (Patient Action)                          Status: NEW - Date(s):9/13/2023- Patient will learn mind-body connection, healthy ways to manage stress, calming strategies, and ways she can address cognitive and behavioral factors that can contribute to pain experience.    2/20/2024 - further review of treatment goals is in progress and will continue over the next session, given break in therapy    5/22/2024 - goal completed       Patient will learn and practice at least two strategies that help improve her emotional well-being.       Intervention(s)  Therapist will teach calming strategies and healthy stress management strategies, and will assign homework to help reinforce skill development.  Status - patient has been working on engaging calming strategies and in particular, not trying to \"force herself\" to calm down which counters the relaxation response.  Working currently on how to cue/remind herself to engage these strategies.               Goal 2: Patient will learn new parenting strategies to help with managing parenting challenges.  Parent will work on improving relationship with her daughter and defining what she would like this relationship to look like.      I will know I've met my goal when I'm enjoying time with my daughter, teaching her new things, and not waiting for things to change       Objective #A (Patient Action)                          Status: Continued - Date(s):9/13/2023 2/20/2024 - further review of treatment goals is in progress and will continue over the next session, given break in therapy   8/22/2024 - this goal was not addressed in this most recent episode of care         Patient will increase understanding of developmental norms for her daughter and ways to manage challenging behaviors.     Intervention(s)  Therapist will provide psychoeducation on developmental norms and parenting " strategies.     Objective #B  Patient will spend time reflecting on her relationship with her daughter and defining what she would like this relaitonship to look like.                  Status: Continued - Date(s):9/13/2023     Making good progress - has also been working with daughter's therapist and has been actively practicing and using suggested strategies.   Likely can work towards resolving this goal, as this goal has largely shifted towards the work she is doing with her daughter's individual therapist, where she believes they have been making good progress on helping her in her relationship with her daughter.       2/20/2024 - further review of treatment goals is in progress and will continue over the next session, given break in therapy    8/22/2024 - this goal was not addressed in this most recent episode of care     Intervention(s)  Therapist will guide the patient in reflecting upon her relationship with her daughter and help her define ways to move towards what she vaues in her relationship with her daughter.     Objective #C  Patient will increase time spent on fun activity and play with her daughter.  Status: Continued - Date(s):9/13/2023-has been enjoying time with daughter and devoting regular time to fun activities and play together  2/20/2024 - further review of treatment goals is in progress and will continue over the next session, given break in therapy    8/22/2024 - this goal was not addressed in this most recent episode of care           Intervention(s)  Therapist will guide the patient in identifying ways she can increase positive time with her daughter.  Therapist will also teach mindfulness strategies to help patient with being in the present moment when appropriate - .        Goal 3: Patiient will improve communication with her .      I will know I've met my goal when I feel less irritated with my  and communicate more openly with my .  I would like to be more  "assertive and less aggressive.   I would like to not avoid telling him things because I'm worried about his reaction or don't think he will react well.  I would like to be more present to the moment during times when this would be helpful.\"       Objective #A (Patient Action)                          Status: Continued - Date(s):9/13/2023   continues to work on this goal, though emphasis has shift to managing caregiver stress related to 's cancer diagnosis and treatment.         2/20/2024 - further review of treatment goals is in progress and will continue over the next session, given break in therapy    8/22/2024 - this goal was not addressed in this most recent episode of care      Patient will learn and practice at least two new interpersonal effectiveness strategies.     Intervention(s)  Therapist will teach strategies from DBT module on interpersonal effectiveness, and will assign homework to help reinforce skill development.            Patient has reviewed and agreed to the above plan.        Cristy Wilkisnon PsyD,   Licensed Psychologist  Reviewed on 8/22/2024                                                                                                       "

## 2024-09-05 ENCOUNTER — VIRTUAL VISIT (OUTPATIENT)
Dept: PSYCHOLOGY | Facility: CLINIC | Age: 44
End: 2024-09-05
Payer: COMMERCIAL

## 2024-09-05 DIAGNOSIS — F41.9 ANXIETY DISORDER, UNSPECIFIED TYPE: Primary | ICD-10-CM

## 2024-09-05 PROCEDURE — 90834 PSYTX W PT 45 MINUTES: CPT | Mod: 95 | Performed by: PSYCHOLOGIST

## 2024-09-25 ENCOUNTER — VIRTUAL VISIT (OUTPATIENT)
Dept: PSYCHOLOGY | Facility: CLINIC | Age: 44
End: 2024-09-25
Payer: COMMERCIAL

## 2024-09-25 DIAGNOSIS — F41.9 ANXIETY DISORDER, UNSPECIFIED TYPE: Primary | ICD-10-CM

## 2024-09-25 PROCEDURE — 90834 PSYTX W PT 45 MINUTES: CPT | Mod: 95 | Performed by: PSYCHOLOGIST

## 2024-09-25 NOTE — PROGRESS NOTES
Answers submitted by the patient for this visit:  Patient Health Questionnaire (Submitted on 9/25/2024)  If you checked off any problems, how difficult have these problems made it for you to do your work, take care of things at home, or get along with other people?: Not difficult at all  PHQ9 TOTAL SCORE: 1         difficult have these problems made it for you to do your work, take care of things at home, or get along with other people?: Not difficult at all  PHQ9 TOTAL SCORE: 1      PHQ-9 scores of 4 and 5 at previous visits.      Homework:  - Successful with completing homework.            Episode of Care Goals: Idalia had recently returned to therapy after a several month break.  Her current concerns are related to feeling more frustrated and angry and not sure how to cope.  She identifies difficulties in her marriage as contributing factor to feeling more frustrated and angry.       Current / Ongoing Stressors and Concerns:  Current: Difficulties in marriage and co-parenting, as well as spouse's mental health concerns.      Ongoing: (copied forward) Working on building effective emotion regulation strategies for managing daily frustrations and upsets.  Context for previous episode of care included stresses related to behavioral challenges with her daughter, who has been diagnosed with ADHD, and working with her  on addressing parenting differences.   Her  also had previously been diagnosed with cancer and went through treatment, and previous therapy focused on caregiver stress and helping her to attend to her self-care during this very stressful period of time.         Treatment Objective(s) Addressed in This Session:     Identify barriers to actively addressing and talking through concerns with spouse.    Identify costs/consequences of avoiding conflict    Continue with:     Review of self-care strategies and identification of areas of need   Learn and practice emotion regulation strategies (in particular, how to manage and separate your own emotional reaction when someone around you is escalating)  Learn and practice cognitive strategies to help with identifying and managing unhelpful thoughts  Identify recent stressors and triggers to feelings of upset and frustration and identify 1-2 steps you can take  "to actively address stressors  Review of interpersonal effectiveness strategies     Intervention:     CBT and Solution-Focused: Idalia spent time processing today how she believes she and her spouse have always had difficulties working together as a team.  She was able to have a productive conversation with him around their recent difficult interaction and how she believes his drinking alcohol at the time contributed, and they've agreed to not have any alcohol at the house for the time-being, and to only have it when they are out at a restaurant.  She feels good about this, and this was a positive step towards her expressing her feelings.    She reflected that it has been hard for her to acknowledge to herself how unhappy she's been in her marriage, some of this is in part due to [employer] beliefs of her family growing up related to marriage and divorce, which has led to her having difficulties admitting to herself she's thought at times about getting .  She's recognizing how this has led to a pattern of either internalizing and silently suffering, or her anger \"blowing up\", neither of which have helped to resolve core issues and problems.  She also recognizes a tendency in herself to not express things to others that she thinks may make them upset, to the detriment of being able to get her own emotional needs met.  She wants to work on being able to pay attention to and recognize her own feelings and be able to express herself to others without avoiding conversations if she thinks others may feel upset.  We also discussed that while she may have been raised with certain beliefs and values, that she ultimately gets to choose those that are the best for her.      Continued review of how she is doing with incorporating good self-care strategies (sleep, nutrition, exercise, seeking support, engaging in positive activities) to help with focusing on elements she has control over to help with emotion regulation " "and feeling at her best.      Continue to emphasize strategies she can use to help her focus on  her emotional reaction from others, and focusing on how to manage her own emotion reaction when others are escalating.      ASSESSMENT: Current Emotional / Mental Status (status of significant symptoms):   Risk status (Self / Other harm or suicidal ideation)   Patient denies current fears or concerns for personal safety.   Patient reports current suicidal ideation, she denies any current or recent suicidal behaviors.  She reports if suicidal  thoughts do occur, she knows steps she can take to manage them and believes she can manage them, and will reach out for additional help if needed.     Patientdenies current or recent homicidal ideation or behaviors.   Patient denies current or recent self injurious behavior or ideation.   Patient denies other safety concerns.   Patient reports there has not been a change in risk factors since their last session -      Recommended that patient call 911 or go to the local ED should there be a change in any of these risk factors.  We reviewed how to contact [facility] crisis/[facility] for urgent/crisis concerns 24/7.  Provided patient with crisis resources and she agrees to use safety plan should any safety concerns arise.      Professionals or agencies I can contact during a crisis:  Suicide Prevention Lifeline: call or text 865  Crisis Text Line: text \"[place]\" to 378163  Local Crisis Services: [facility]: [phone]  Call 911 or go to my nearest emergency department, or [facility].         Appearance:   Appropriate    Eye Contact:   Good    Psychomotor Behavior: Normal   Attitude:   Cooperative    Orientation:   All   Speech    Rate / Production: Normal     Volume:  Normal    Mood:    \"Up and down\"   Affect:    Congruent with mood   Thought Content:  Clear    Thought Form:  Coherent  Logical    Insight:    Good      Medication Review:   No changes to " psychiatric medication      Medication Compliance:   Yes      Changes in Health Issues:    No changes or new health concerns -     Chemical Use Review:  No changes or concerns with alcohol use.         Substance Use: Chemical use reviewed, no active concerns identified.      Tobacco Use: No current tobacco use.       Diagnosis:  Anxiety disorder unspecified    PHQ scores continue to remain low and have decreased over time         PLAN: (Patient Tasks / Therapist Tasks / Other)    1) Idalia identified the following goals: Pay attention to how I'm feeling.  Talk to Donald about how I'm feeling even when I feel like avoiding the conversation.      Continue practicing the strategies that have been helping to support your physical and emotional well-being.      Consider reading the book - Fighting For your Marriage (Rafi Gomez).  Consider referral to couples therapy to help with marital difficulties.       2) If there is a crisis situation, with your daughter or yourself, remember you are not alone and that there are people who can help you twenty-four hours a day, seven days a week.  Call [facility] ([facility]): [phone].      3) Next appt is scheduled for: 10/24 at 1:30 pm.        Cristy Wilkinson PsyD, KYLIE  9/25/2024                                    ____________________________________    Treatment Plan     Patient's Name: Idalia Hinojosa                        YOB: 1980     Date of Creation: 1/6/2020  Date Treatment Plan Last Reviewed/Revised: 8/22/2024       DSM5 Diagnoses: 300.00 (F41.9) Unspecified Anxiety Disorder  Psychosocial / Contextual Factors: strain in marital relationship, parenting challenges, adjustment challenges, 's cancer diagnosis  PROMIS (reviewed every 90 days):      Anticipated number of session for this episode of care: possibly a few more sessions if needed to support sustained progress and maintenance  Anticipation frequency of session: Monthly    Anticipated Duration of each session: 38-52 minutes  Treatment plan will be reviewed in 90 days or when goals have been changed.         Referral / Collaboration:  No referrals at this time.  Have previously recommended couples therapy referral.  Coordinate with Dr. Han, patient's PCP.        MeasurableTreatment Goal(s) related to diagnosis / functional impairment(s)  Goal 1: Patient will learn emotion regulation strategies to help when she is feeling upset/angry/frustrated/distressed    I will know I've met my goal when I would yell less, I would feel calmer, and I would not push others.  I would be less physical when I'm angry (e.g. wouldn't slam doors or hit things)        Objective #A (Patient Action)                          Patient will learn and practice at least 2 new emotion regulation strategies.  Status: Continued - Date(s):    9/13/2023- Regularly practicing emotion regulation strategies to help manage increase in emotional distress related to spouse's cancer diagnosis.  She has been successful with using strategies to help her stay calm when her daughter's experiencing heightened emotion and/or challenging behaviors.  She's working on remembering to engage calming strategies when feeling physically tense and/or anxious/stressed.    2/20/24 - Will continue to focus on building emotion regulation strategies    8/22/2024 - this is a primary focus of her current episode of care -is making initial progress on this goal in the context of her return to therapy -        Intervention(s)  Therapist will provide psychoeducation on emotion regulation module from DBT and will assign patient homework to help reinforce skill development.     Objective #B  Patient will identify factors that increase vulnerability to emotion mind and identify ways to decrease vulnerability to emotion mind.  Status: Continued - Date(s):9/13/2023 - routinely incorporating mindfulness, distress tolerance and self-awareness  strategies to increase attention and focus to factors that increase vulnerability to emotion mind  2/20/2024 - further review of treatment goals is in progress and will continue over the next session, given break in therapy   5/22/2024 - Idalia has made great progress identifying and practicing self-care habits/strategies that help decrease vulnerability to emotion mind   8/22/2024 -  this is a primary focus of her current episode of care -is making initial progress on this goal in the context of her return to therapy -     Intervention(s)  Therapist will provide information on factors that increase vulnerabiliyt to emotion mind      Objective #C  Patient will identify warning signs and signals that emotions are escalating and will learn ways to skillfully intervene early on.  Status: Date: 9/13/2023- in progress - has demonstrated good progress and awareness in being able to identify warning signs and intervene skillfully-working on incorporating deep breathing to help with calming and re-centering.    2/20/2024 - further review of treatment goals is in progress and will continue over the next session, given break in therapy   5/22/2024 - Goal completed        Intervention(s)  Therapist will help patient identify warning signs and signals, and will guide the patient in identifying skillful ways to intervene when exeriencing warning signs and signals.      Objective #B (Patient Action)                          Status: NEW - Date(s):9/13/2023- Patient will learn mind-body connection, healthy ways to manage stress, calming strategies, and ways she can address cognitive and behavioral factors that can contribute to pain experience.    2/20/2024 - further review of treatment goals is in progress and will continue over the next session, given break in therapy    5/22/2024 - goal completed       Patient will learn and practice at least two strategies that help improve her emotional well-being.      "  Intervention(s)  Therapist will teach calming strategies and healthy stress management strategies, and will assign homework to help reinforce skill development.  Status - patient has been working on engaging calming strategies and in particular, not trying to \"force herself\" to calm down which counters the relaxation response.  Working currently on how to cue/remind herself to engage these strategies.               Goal 2: Patient will learn new parenting strategies to help with managing parenting challenges.  Parent will work on improving relationship with her daughter and defining what she would like this relationship to look like.      I will know I've met my goal when I'm enjoying time with my daughter, teaching her new things, and not waiting for things to change       Objective #A (Patient Action)                          Status: Continued - Date(s):9/13/2023 2/20/2024 - further review of treatment goals is in progress and will continue over the next session, given break in therapy   8/22/2024 - this goal was not addressed in this most recent episode of care         Patient will increase understanding of developmental norms for her daughter and ways to manage challenging behaviors.     Intervention(s)  Therapist will provide psychoeducation on developmental norms and parenting strategies.     Objective #B  Patient will spend time reflecting on her relationship with her daughter and defining what she would like this relaitonship to look like.                  Status: Continued - Date(s):9/13/2023     Making good progress - has also been working with daughter's therapist and has been actively practicing and using suggested strategies.   Likely can work towards resolving this goal, as this goal has largely shifted towards the work she is doing with her daughter's individual therapist, where she believes they have been making good progress on helping her in her relationship with her daughter.       2/20/2024 - " "further review of treatment goals is in progress and will continue over the next session, given break in therapy    8/22/2024 - this goal was not addressed in this most recent episode of care     Intervention(s)  Therapist will guide the patient in reflecting upon her relationship with her daughter and help her define ways to move towards what she vaues in her relationship with her daughter.     Objective #C  Patient will increase time spent on fun activity and play with her daughter.  Status: Continued - Date(s):9/13/2023-has been enjoying time with daughter and devoting regular time to fun activities and play together  2/20/2024 - further review of treatment goals is in progress and will continue over the next session, given break in therapy    8/22/2024 - this goal was not addressed in this most recent episode of care           Intervention(s)  Therapist will guide the patient in identifying ways she can increase positive time with her daughter.  Therapist will also teach mindfulness strategies to help patient with being in the present moment when appropriate - .        Goal 3: Patiient will improve communication with her .      I will know I've met my goal when I feel less irritated with my  and communicate more openly with my .  I would like to be more assertive and less aggressive.   I would like to not avoid telling him things because I'm worried about his reaction or don't think he will react well.  I would like to be more present to the moment during times when this would be helpful.\"       Objective #A (Patient Action)                          Status: Continued - Date(s):9/13/2023   continues to work on this goal, though emphasis has shift to managing caregiver stress related to 's cancer diagnosis and treatment.         2/20/2024 - further review of treatment goals is in progress and will continue over the next session, given break in therapy    8/22/2024 - this goal was not " addressed in this most recent episode of care      Patient will learn and practice at least two new interpersonal effectiveness strategies.     Intervention(s)  Therapist will teach strategies from DBT module on interpersonal effectiveness, and will assign homework to help reinforce skill development.            Patient has reviewed and agreed to the above plan.        Cristy Wilkinson PsyD,   Licensed Psychologist  Reviewed on 8/22/2024

## 2024-09-27 ENCOUNTER — THERAPY VISIT (OUTPATIENT)
Dept: PHYSICAL THERAPY | Facility: CLINIC | Age: 44
End: 2024-09-27
Payer: COMMERCIAL

## 2024-09-27 DIAGNOSIS — M47.814 FACET ARTHROPATHY, THORACIC: ICD-10-CM

## 2024-09-27 DIAGNOSIS — M47.812 FACET ARTHROPATHY, CERVICAL: ICD-10-CM

## 2024-09-27 DIAGNOSIS — M79.18 MYALGIA, OTHER SITE: ICD-10-CM

## 2024-09-27 DIAGNOSIS — M79.18 CERVICAL MYOFASCIAL PAIN SYNDROME: Primary | ICD-10-CM

## 2024-09-27 PROCEDURE — 97110 THERAPEUTIC EXERCISES: CPT | Mod: GP | Performed by: PHYSICAL THERAPIST

## 2024-09-27 NOTE — PROGRESS NOTES
PLAN  Continue therapy per current plan of care.    Beginning/End Dates of Progress Note Reporting Period:  09/27/24 to 09/27/2024    Referring Provider:  Carmelo Saez     09/27/24 0500   Appointment Info   Signing clinician's name / credentials Tosha Costa, PT   Total/Authorized Visits 10 per PT   Visits Used 12   Medical Diagnosis Cervical myofascial pain syndrome  Facet arthropathy, cervical  Facet arthropathy, thoracic  Myalgia, other site   PT Tx Diagnosis Mechanical neck and back pain   Progress Note/Certification   Onset of illness/injury or Date of Surgery 02/21/24  (MD order)   Therapy Frequency 2x.month   Predicted Duration 2 months   Progress Note Due Date 06/02/24   Progress Note Completed Date 09/27/24   GOALS   PT Goals 3   PT Goal 1   Goal Identifier Lifting   Goal Description Patient to be able to lift weights 5# without onset of left side low back   Rationale to maximize safety and independence with performance of ADLs and functional tasks   Target Date 11/22/24   PT Goal 2   Goal Identifier standing   Goal Description patient will stand for 60  minutes at work with 1/10 pain   Rationale to maximize safety and independence with performance of ADLs and functional tasks   Goal Progress able to stand without issues   Target Date 06/03/24   Date Met 05/08/24   PT Goal 3   Goal Identifier Cycling   Goal Description pt will be able to bike up to 1 hour without periscapular pain   Rationale   (in order to commute to work and maintain a healthy activity level)   Goal Progress Mostly been going OK. Slight twinges continue at about 30 minutes.   Target Date 10/25/24   Subjective Report   Subjective Report Was doing great but then things got more stressful at home and then tried to make a big jump in running and it has set off her symptoms again. Left side symptoms are worse. Starts at the bottom ribs and moves up into the shoulder/upper trap. Notes that after running, she had hip pain as well as low  back pain.   Objective Measures   Objective Measures Objective Measure 3   Objective Measure 1   Objective Measure Focused exam on low back and hip today. Suspect playing a part.   Details Lumbar ROM: note no reversal of the curve with flexion, minimal motion in lumbar with extension.   Objective Measure 2   Objective Measure HIP ROM: symmetrical, hyper into external rotation?   Details Weak through proximal hips.   Objective Measure 3   Objective Measure Pain with L1 PA pressure, L2 PA pressure   PT Modalities   PT Modalities Dry Needling   Dry Needling   Number of tissues 3;2;1   Tissue - 1 upper trap   Needle Depth - 1 40mm   Needle Count - 1 3   Position - 1 Supine   Technique - 1 fanning out, winding with bracketing   Tissue - 2 cervical paraspinals, suboccipitals   Needle Depth - 2 30 mm   Needle Count - 2 6   Position - 2 Prone   Technique - 2 winding   Adverse reactions to treatment No   Patient Response/Progress tolerates well, mild soreness but improved mobility following   Treatment Interventions (PT)   Interventions Therapeutic Procedure/Exercise;Therapeutic Activity   Therapeutic Procedure/Exercise   Therapeutic Procedures: strength, endurance, ROM, flexibility minutes (81131) 24   Therapeutic Procedures Ther Proc 2;Ther Proc 4;Ther Proc 3   Ther Proc 1 Thoracic rotation quadruped with thread the needle and elbow   Ther Proc 1 - Details x 10   Ther Proc 2 shoulder sweeps -   Ther Proc 2 - Details vr   Ther Proc 3 Supine #3 double march.   Ther Proc 3 - Details Hold on previous exercises x 2 week then reevaluate.   Ther Proc 4 - Details Scapular W to Y for continued strength.   PTRx Ther Proc 1 Cervical Retraction With Patient Overpressure   PTRx Ther Proc 1 - Details No Notes   PTRx Ther Proc 2 Cervical Extension Supported   PTRx Ther Proc 2 - Details No Notes   PTRx Ther Proc 3 Trunk Rotation Stretch   PTRx Ther Proc 3 - Details No Notes   PTRx Ther Proc 4 All Fours Thoracic Rotation   PTRx Ther Proc 4  - Details No Notes   PTRx Ther Proc 5 Latissimus Dorsi Stretch   PTRx Ther Proc 5 - Details No Notes   PTRx Ther Proc 6 Spinal extensions   PTRx Ther Proc 6 - Details No Notes   PTRx Ther Proc 7 All 4s Cat Cow   PTRx Ther Proc 7 - Details No Notes   PTRx Ther Proc 8 Posterior Pelvic Tilt   PTRx Ther Proc 8 - Details No Notes   PTRx Ther Proc 9 Side Plank Modified Knees   PTRx Ther Proc 9 - Details No Notes   PTRx Ther Proc 10 Supine Abdominal Exercise #3B (Two Leg Marching)   PTRx Ther Proc 10 - Details No Notes   PTRx Ther Proc 11 Ball Prone Scapula W to Y   PTRx Ther Proc 11 - Details No Notes   Skilled Intervention Assessment of strength and functional deficits to determine exercise prescription; tactile and verbal cuing to assist with proper performance; education in loading principles and expected response   Patient Response/Progress tolerates well - ceiling punch is fatiguing and improves thoracic rotation following   PTRx Ther Proc 12 Clamshell with Theraband   PTRx Ther Proc 12 - Details Red band, maintain neutral spine.   PTRx Ther Proc 13 Hip Abduction Straight Leg Raise   PTRx Ther Proc 13 - Details Inst. Keep spine neutral.   PTRx Ther Proc 14 Double Knee to Chest   PTRx Ther Proc 14 - Details Emphasis on flexion of lumbar spine.   PTRx Ther Proc 15 Neutral Spine Slouch Overcorrect   PTRx Ther Proc 15 - Details Emphasis on flexion of lumbar spine.   Therapeutic Activity   Ther Act 1 Posture during the work day, lumbar support to give better tolerance to at home taskrs.   PTRx Ther Act 1 Foam Roller Shoulder Flexion/Horizontal Abduction   PTRx Ther Act 1 - Details No Notes   PTRx Ther Act 2 Foam Roller Stretch: Pectoralis Major   PTRx Ther Act 2 - Details No Notes   PTRx Ther Act 3 Foam Roller stretch: Pectoralis Minor   PTRx Ther Act 3 - Details No Notes   PTRx Ther Act 4 Sitting Posture at Computer   PTRx Ther Act 4 - Details No Notes   Neuromuscular Re-education   Neuromuscular Re-education Neuro Re-ed  3   Neuro Re-ed 1 quadruped cervical rotation 2x10 each side   Neuro Re-ed 1 - Details L is tighter, TC to keep head on one axis   Neuro Re-ed 3 Serratus press knee plank   Neuro Re-ed 3 - Details VC and TC to keep shoulders down away from ears, VC to not allow neck to dip   PTRx Neuro Re-ed 1 Posture Correction with Lumbar Roll   PTRx Neuro Re-ed 1 - Details No Notes   PTRx Neuro Re-ed 2 Scapular Stabilization Serratus Anterior B   PTRx Neuro Re-ed 2 - Details x 8 B with fatigue on R x 10 on L - TC to decreased upper trap engagement   Skilled Intervention cues for proper mm activation   Patient Response/Progress tolerates well   Manual Therapy   Manual Therapy Manual Therapy 3   Manual Therapy 1 - Details PA at thoracic spine.   Manual Therapy 2 Thoracic paraspinals.   Skilled Intervention Identified precautions and contraindications to manual treatment; assessed mobility and pain; adjusted technique based on patient response.   Patient Response/Progress improved ROM following   Intervention (Other)   PTRx Other  1 Ball Massage   PTRx Other 1 - Details No Notes   Education   Learner/Method Patient;No Barriers to Learning   Plan   Plan for next session continue to address serratus pain with focus on building endurance and maintaing good posture on bike and at computer   Total Session Time   Timed Code Treatment Minutes 24   Total Treatment Time (sum of timed and untimed services) 24

## 2024-10-14 DIAGNOSIS — J30.89 ALLERGIC RHINITIS DUE TO DUST MITE: ICD-10-CM

## 2024-10-14 RX ORDER — MONTELUKAST SODIUM 10 MG/1
10 TABLET ORAL AT BEDTIME
Qty: 90 TABLET | Refills: 3 | OUTPATIENT
Start: 2024-10-14

## 2024-10-14 NOTE — TELEPHONE ENCOUNTER
Pending Prescriptions:                       Disp   Refills    montelukast (SINGULAIR) 10 MG tablet      90 tab*3            Sig: Take 1 tablet (10 mg) by mouth at bedtime.    Last OV: 8/10/2023    Jocelyne FALLON MA

## 2024-10-14 NOTE — TELEPHONE ENCOUNTER
montelukast (SINGULAIR) 10 MG tablet refilled on 08/14/2024 with 3 refills by Monique Han MD Sabrina W, BSN, RN, PHN

## 2024-10-24 ENCOUNTER — VIRTUAL VISIT (OUTPATIENT)
Dept: PSYCHOLOGY | Facility: CLINIC | Age: 44
End: 2024-10-24
Payer: COMMERCIAL

## 2024-10-24 DIAGNOSIS — F41.9 ANXIETY DISORDER, UNSPECIFIED TYPE: Primary | ICD-10-CM

## 2024-10-24 PROCEDURE — 90834 PSYTX W PT 45 MINUTES: CPT | Mod: 95 | Performed by: PSYCHOLOGIST

## 2024-10-24 NOTE — PROGRESS NOTES
"         [facility]                                            Progress Note    Patient Name: Idalia Hinojosa  Date: 10/24/2024         Service Type: Individual  Start time:  1: 33 pm  End time:  2:15  pm  Session Length:  42 minutes    Session #: 10    Attendees: Client     Service Modality: Video visit: -       Provider verified identity through the following two step process.  Patient provided:  Patient is known previously to provider    Telemedicine Visit: The patient's condition can be safely assessed and treated via synchronous audio and visual telemedicine encounter.      Reason for Telemedicine Visit: Services only offered telehealth    Originating Site (Patient Location): Patient's home    Distant Site (Provider Location): Provider Remote Setting- Home Office    Consent:  The patient/guardian has verbally consented to: the potential risks and benefits of telemedicine (telephone visit) versus in person care; bill my insurance or make self-payment for services provided; and responsibility for payment of non-covered services.     Patient would like the video invitation sent by:  My Chart    Mode of Communication:  Video Conference via [place],     As the provider I attest to compliance with applicable laws and regulations related to telemedicine.     Treatment Plan Last Reviewed: 8/22/2024    PHQ-9/DAVID-7: 9/25/2024      DATA  Interactive Complexity: No  Crisis: No        Progress Since Last Session (Related to Symptoms / Goals / Homework):   Symptoms: She reports symptoms have been \"mostly ok\" but also endorses feeling more stressed and having back spasms during her session today, and wonders if this is connected to stress and anger/frustrations she has been experiencing.      Answers submitted by the patient for this visit:  Patient Health Questionnaire (Submitted on 9/25/2024)  If you checked off any problems, how difficult have these problems made it for you to do your work, take " care of things at home, or get along with other people?: Not difficult at all  PHQ9 TOTAL SCORE: 1      PHQ-9 scores of 4 and 5 at previous visits.      Homework:  - Successful with completing homework - has been tuning into her feelings and needs and expressing herself to others, even when feeling like avoiding the conversation         Episode of Care Goals: Idalia had recently returned to therapy after a several month break.  Her current concerns are related to feeling more frustrated and angry and not sure how to cope.  She identifies difficulties in her marriage as contributing factor to feeling more frustrated and angry.       Current / Ongoing Stressors and Concerns:  Current: Difficulties in marriage and co-parenting, as well as spouse's mental health concerns.  Difficulties in communication with spouse.  Increased feelings of anger, stress and frustration     Ongoing: (copied forward) Working on building effective emotion regulation strategies for managing daily frustrations and upsets.  Context for previous episode of care included stresses related to behavioral challenges with her daughter, who has been diagnosed with ADHD, and working with her  on addressing parenting differences.   Her  also had previously been diagnosed with cancer and went through treatment, and previous therapy focused on caregiver stress and helping her to attend to her self-care during this very stressful period of time.         Treatment Objective(s) Addressed in This Session:     Build and practice interpersonal effectiveness strategies     Continue with:     Review of self-care strategies and identification of areas of need   Learn and practice emotion regulation strategies (in particular, how to manage and separate your own emotional reaction when someone around you is escalating)  Learn and practice cognitive strategies to help with identifying and managing unhelpful thoughts  Identify recent stressors and  "triggers to feelings of upset and frustration and identify 1-2 steps you can take to actively address stressors  Review of interpersonal effectiveness strategies     Intervention:     DBT and solution-focused: She shared successes with communicating feelings and needs to her spouse, while also voicing some frustrations that his response has not been what she hoped.  Encouraged her emphasis on focusing on her response, and \"learning\" about what she needs to do next based upon his response.  Reviewed and practiced interpersonal effectiveness strategies.  Encouraged communicating with \"I statements\".      Encouraged she prioritize scheduling time to connect with, and have fun with friends.  She said this is something she hasn't been doing.      ASSESSMENT: Current Emotional / Mental Status (status of significant symptoms):   Risk status (Self / Other harm or suicidal ideation)   Patient denies current fears or concerns for personal safety.   Patient reports current suicidal ideation, she denies any current or recent suicidal behaviors.  She reports if suicidal  thoughts do occur, she knows steps she can take to manage them and believes she can manage them, and will reach out for additional help if needed.     Patientdenies current or recent homicidal ideation or behaviors.   Patient denies current or recent self injurious behavior or ideation.   Patient denies other safety concerns.   Patient reports there has not been a change in risk factors since their last session -      Recommended that patient call 911 or go to the local ED should there be a change in any of these risk factors.  We reviewed how to contact [facility] crisis/COPE for urgent/crisis concerns 24/7.  Provided patient with crisis resources and she agrees to use safety plan should any safety concerns arise.      Professionals or agencies I can contact during a crisis:  Suicide Prevention Lifeline: call or text 397  Crisis Text Line: text \"[place]\" to " "028667  Local Crisis Services: [facility]: [phone]  Call 911 or go to my nearest emergency department, or [facility].         Appearance:   Appropriate    Eye Contact:   Good    Psychomotor Behavior: Normal   Attitude:   Cooperative    Orientation:   All   Speech    Rate / Production: Normal     Volume:  Normal    Mood:    Reports \"Mostly ok\" but also reflects episodes of feeling more stressed and upset    Affect:    Congruent with mood   Thought Content:  Clear    Thought Form:  Coherent  Logical    Insight:    Good      Medication Review:   No changes to psychiatric medication      Medication Compliance:   Yes      Changes in Health Issues:    No changes or new health concerns -     Chemical Use Review:  No changes or concerns with alcohol use.         Substance Use: Chemical use reviewed, no active concerns identified.      Tobacco Use: No current tobacco use.       Diagnosis:  Anxiety disorder unspecified    PHQ scores continue to remain low and have decreased over time         PLAN: (Patient Tasks / Therapist Tasks / Other)    1) Idalia identified the following goals: Plan fun halloween costume.  Consider calling friend and arranging a time to connect.  When feeling stressed, ask myself what is contributing and what I think will help - recognize tendency to want to eat sugar/sweets and mindfully decide what I think I most need in the moment.  Focus on what I need, versus restricting/avoiding/depriving self.      Continue to pay attention to how I'm feeling.  Talk to Donald about how I'm feeling even when I feel like avoiding the conversation.      Continue practicing the strategies that have been helping to support your physical and emotional well-being.      Consider reading the book - Fighting For your Marriage (Rafi Gomez).  Consider referral to couples therapy to help with marital difficulties.       2) If there is a crisis situation, with your daughter or yourself, remember you are " not alone and that there are people who can help you twenty-four hours a day, seven days a week.  Call [facility] ([facility]): [phone].      3) Next appt is scheduled for: 11/14 at 1:30 pm.       Cristy Wilkinson PsyD, KYLIE  10/24/2024                                      ____________________________________    Treatment Plan     Patient's Name: Idalia Hinojosa                        YOB: 1980     Date of Creation: 1/6/2020  Date Treatment Plan Last Reviewed/Revised: 8/22/2024       DSM5 Diagnoses: 300.00 (F41.9) Unspecified Anxiety Disorder  Psychosocial / Contextual Factors: strain in marital relationship, parenting challenges, adjustment challenges, 's cancer diagnosis  PROMIS (reviewed every 90 days):      Anticipated number of session for this episode of care: possibly a few more sessions if needed to support sustained progress and maintenance  Anticipation frequency of session: Monthly   Anticipated Duration of each session: 38-52 minutes  Treatment plan will be reviewed in 90 days or when goals have been changed.         Referral / Collaboration:  No referrals at this time.  Have previously recommended couples therapy referral.  Coordinate with Dr. Han, patient's PCP.        MeasurableTreatment Goal(s) related to diagnosis / functional impairment(s)  Goal 1: Patient will learn emotion regulation strategies to help when she is feeling upset/angry/frustrated/distressed    I will know I've met my goal when I would yell less, I would feel calmer, and I would not push others.  I would be less physical when I'm angry (e.g. wouldn't slam doors or hit things)        Objective #A (Patient Action)                          Patient will learn and practice at least 2 new emotion regulation strategies.  Status: Continued - Date(s):    9/13/2023- Regularly practicing emotion regulation strategies to help manage increase in emotional distress related to spouse's cancer diagnosis.   She has been successful with using strategies to help her stay calm when her daughter's experiencing heightened emotion and/or challenging behaviors.  She's working on remembering to engage calming strategies when feeling physically tense and/or anxious/stressed.    2/20/24 - Will continue to focus on building emotion regulation strategies    8/22/2024 - this is a primary focus of her current episode of care -is making initial progress on this goal in the context of her return to therapy -        Intervention(s)  Therapist will provide psychoeducation on emotion regulation module from DBT and will assign patient homework to help reinforce skill development.     Objective #B  Patient will identify factors that increase vulnerability to emotion mind and identify ways to decrease vulnerability to emotion mind.  Status: Continued - Date(s):9/13/2023 - routinely incorporating mindfulness, distress tolerance and self-awareness strategies to increase attention and focus to factors that increase vulnerability to emotion mind  2/20/2024 - further review of treatment goals is in progress and will continue over the next session, given break in therapy   5/22/2024 - Idalia has made great progress identifying and practicing self-care habits/strategies that help decrease vulnerability to emotion mind   8/22/2024 -  this is a primary focus of her current episode of care -is making initial progress on this goal in the context of her return to therapy -     Intervention(s)  Therapist will provide information on factors that increase vulnerabiliyt to emotion mind      Objective #C  Patient will identify warning signs and signals that emotions are escalating and will learn ways to skillfully intervene early on.  Status: Date: 9/13/2023- in progress - has demonstrated good progress and awareness in being able to identify warning signs and intervene skillfully-working on incorporating deep breathing to help with calming and  "re-centering.    2/20/2024 - further review of treatment goals is in progress and will continue over the next session, given break in therapy   5/22/2024 - Goal completed        Intervention(s)  Therapist will help patient identify warning signs and signals, and will guide the patient in identifying skillful ways to intervene when exeriencing warning signs and signals.      Objective #B (Patient Action)                          Status: NEW - Date(s):9/13/2023- Patient will learn mind-body connection, healthy ways to manage stress, calming strategies, and ways she can address cognitive and behavioral factors that can contribute to pain experience.    2/20/2024 - further review of treatment goals is in progress and will continue over the next session, given break in therapy    5/22/2024 - goal completed       Patient will learn and practice at least two strategies that help improve her emotional well-being.       Intervention(s)  Therapist will teach calming strategies and healthy stress management strategies, and will assign homework to help reinforce skill development.  Status - patient has been working on engaging calming strategies and in particular, not trying to \"force herself\" to calm down which counters the relaxation response.  Working currently on how to cue/remind herself to engage these strategies.               Goal 2: Patient will learn new parenting strategies to help with managing parenting challenges.  Parent will work on improving relationship with her daughter and defining what she would like this relationship to look like.      I will know I've met my goal when I'm enjoying time with my daughter, teaching her new things, and not waiting for things to change       Objective #A (Patient Action)                          Status: Continued - Date(s):9/13/2023 2/20/2024 - further review of treatment goals is in progress and will continue over the next session, given break in therapy   8/22/2024 - this " goal was not addressed in this most recent episode of care         Patient will increase understanding of developmental norms for her daughter and ways to manage challenging behaviors.     Intervention(s)  Therapist will provide psychoeducation on developmental norms and parenting strategies.     Objective #B  Patient will spend time reflecting on her relationship with her daughter and defining what she would like this relaitonship to look like.                  Status: Continued - Date(s):9/13/2023     Making good progress - has also been working with daughter's therapist and has been actively practicing and using suggested strategies.   Likely can work towards resolving this goal, as this goal has largely shifted towards the work she is doing with her daughter's individual therapist, where she believes they have been making good progress on helping her in her relationship with her daughter.       2/20/2024 - further review of treatment goals is in progress and will continue over the next session, given break in therapy    8/22/2024 - this goal was not addressed in this most recent episode of care     Intervention(s)  Therapist will guide the patient in reflecting upon her relationship with her daughter and help her define ways to move towards what she vaues in her relationship with her daughter.     Objective #C  Patient will increase time spent on fun activity and play with her daughter.  Status: Continued - Date(s):9/13/2023-has been enjoying time with daughter and devoting regular time to fun activities and play together  2/20/2024 - further review of treatment goals is in progress and will continue over the next session, given break in therapy    8/22/2024 - this goal was not addressed in this most recent episode of care           Intervention(s)  Therapist will guide the patient in identifying ways she can increase positive time with her daughter.  Therapist will also teach mindfulness strategies to help  "patient with being in the present moment when appropriate - .        Goal 3: Patiient will improve communication with her .      I will know I've met my goal when I feel less irritated with my  and communicate more openly with my .  I would like to be more assertive and less aggressive.   I would like to not avoid telling him things because I'm worried about his reaction or don't think he will react well.  I would like to be more present to the moment during times when this would be helpful.\"       Objective #A (Patient Action)                          Status: Continued - Date(s):9/13/2023   continues to work on this goal, though emphasis has shift to managing caregiver stress related to 's cancer diagnosis and treatment.         2/20/2024 - further review of treatment goals is in progress and will continue over the next session, given break in therapy    8/22/2024 - this goal was not addressed in this most recent episode of care      Patient will learn and practice at least two new interpersonal effectiveness strategies.     Intervention(s)  Therapist will teach strategies from DBT module on interpersonal effectiveness, and will assign homework to help reinforce skill development.            Patient has reviewed and agreed to the above plan.        Cristy Wilkinson PsyD, LP  Licensed Psychologist  Reviewed on 8/22/2024                                                                                                             "

## 2024-11-04 ENCOUNTER — THERAPY VISIT (OUTPATIENT)
Dept: PHYSICAL THERAPY | Facility: CLINIC | Age: 44
End: 2024-11-04
Payer: COMMERCIAL

## 2024-11-04 DIAGNOSIS — M47.812 FACET ARTHROPATHY, CERVICAL: ICD-10-CM

## 2024-11-04 DIAGNOSIS — M79.18 CERVICAL MYOFASCIAL PAIN SYNDROME: Primary | ICD-10-CM

## 2024-11-04 DIAGNOSIS — M79.18 MYALGIA, OTHER SITE: ICD-10-CM

## 2024-11-04 DIAGNOSIS — M47.814 FACET ARTHROPATHY, THORACIC: ICD-10-CM

## 2024-11-04 PROCEDURE — 97110 THERAPEUTIC EXERCISES: CPT | Mod: GP | Performed by: PHYSICAL THERAPIST

## 2024-11-14 ENCOUNTER — VIRTUAL VISIT (OUTPATIENT)
Dept: PSYCHOLOGY | Facility: CLINIC | Age: 44
End: 2024-11-14
Payer: COMMERCIAL

## 2024-11-14 DIAGNOSIS — F41.9 ANXIETY DISORDER, UNSPECIFIED TYPE: Primary | ICD-10-CM

## 2024-11-14 PROCEDURE — 90834 PSYTX W PT 45 MINUTES: CPT | Mod: 95 | Performed by: PSYCHOLOGIST

## 2024-11-14 ASSESSMENT — PATIENT HEALTH QUESTIONNAIRE - PHQ9
10. IF YOU CHECKED OFF ANY PROBLEMS, HOW DIFFICULT HAVE THESE PROBLEMS MADE IT FOR YOU TO DO YOUR WORK, TAKE CARE OF THINGS AT HOME, OR GET ALONG WITH OTHER PEOPLE: VERY DIFFICULT
SUM OF ALL RESPONSES TO PHQ QUESTIONS 1-9: 6
SUM OF ALL RESPONSES TO PHQ QUESTIONS 1-9: 6

## 2024-11-14 NOTE — PROGRESS NOTES
[facility]                                            Progress Note    Patient Name: Idalia Hinojosa  Date: 11/14/2024         Service Type: Individual  Start time:  1:34 pm End time:  2:26 pm  Session Length:  52 minutes    Session #: 11    Attendees: Client     Service Modality: Video visit: -       Provider verified identity through the following two step process.  Patient provided:  Patient is known previously to provider    Telemedicine Visit: The patient's condition can be safely assessed and treated via synchronous audio and visual telemedicine encounter.      Reason for Telemedicine Visit: Services only offered telehealth    Originating Site (Patient Location): Patient's home    Distant Site (Provider Location): Provider Remote Setting- Home Office    Consent:  The patient/guardian has verbally consented to: the potential risks and benefits of telemedicine (telephone visit) versus in person care; bill my insurance or make self-payment for services provided; and responsibility for payment of non-covered services.     Patient would like the video invitation sent by:  My Chart    Mode of Communication:  Video Conference via [employer],     As the provider I attest to compliance with applicable laws and regulations related to telemedicine.     Treatment Plan Last Reviewed: 8/22/2024    PHQ-9/DAVID-7: 11/14/2024      DATA  Interactive Complexity: No  Crisis: No        Progress Since Last Session (Related to Symptoms / Goals / Homework):   Symptoms: She reports it has been a tough week for her since the results of the election and has been feeling more worried, down and stressed.      Answers submitted by the patient for this visit:  Patient Health Questionnaire (Submitted on 11/14/2024)  If you checked off any problems, how difficult have these problems made it for you to do your work, take care of things at home, or get along with other people?: Very difficult  PHQ9 TOTAL SCORE:  "6    Homework:  - Successful with completing homework - made herself a fun halloween costume and enjoyed celebrating the holiday.  Continues to work on tuning into her feelings and needs and expressing herself to others, even when feeling like avoiding the conversation.        Episode of Care Goals: Idalia had recently returned to therapy after a several month break.  Her current concerns are related to feeling more frustrated and angry and not sure how to cope.  She identifies difficulties in her marriage as contributing factor to feeling more frustrated and angry.       Current / Ongoing Stressors and Concerns:  Current: Feeling down and upset since results of election.  Continued difficulties in marriage and co-parenting, as well as spouse's mental health concerns.  Difficulties in communication with spouse.  Increased feelings of anger, stress and frustration     Ongoing: (copied forward) Working on building effective emotion regulation strategies for managing daily frustrations and upsets.  Context for previous episode of care included stresses related to behavioral challenges with her daughter, who has been diagnosed with ADHD, and working with her  on addressing parenting differences.   Her  also had previously been diagnosed with cancer and went through treatment, and previous therapy focused on caregiver stress and helping her to attend to her self-care during this very stressful period of time.         Treatment Objective(s) Addressed in This Session:     Identify and practice strategies to help decrease focus on upset related to politics (limit social media, limit googling and scrolling related to politics)  Learn \"middle path\" strategy     Continue with:   Build and practice interpersonal effectiveness strategies   Review of self-care strategies and identification of areas of need   Learn and practice emotion regulation strategies (in particular, how to manage and separate your own " "emotional reaction when someone around you is escalating)  Learn and practice cognitive strategies to help with identifying and managing unhelpful thoughts  Identify recent stressors and triggers to feelings of upset and frustration and identify 1-2 steps you can take to actively address stressors  Review of interpersonal effectiveness strategies     Intervention:     DBT and solution-focused: Education on \"middle path\" strategy and identification of how it may help her with current struggle she is feeling related to difficulties in their marriage and moving away from polarized thinking to walking a more balanced, middle path (\"I can see him working towards making changes while also still needing to actively work through feelings of upset related to things from the past\").      She continues to recognize pattern of avoiding and minimizing feelings to \"keep the peace\" in the short-term, while feelings of anger and resentment build up in the long-term and identifying ways she can more actively express feelings and needs.        Encouraged her continued emphasis on focusing on her response when communicating with others, versus expecting a certain response from others, and \"learning\" about what she needs to do next based upon others response.  Reviewed and practiced interpersonal effectiveness strategies.      Continue to encourage she prioritize scheduling time to connect with, and have fun with friends.      ASSESSMENT: Current Emotional / Mental Status (status of significant symptoms):   Risk status (Self / Other harm or suicidal ideation)   Patient denies current fears or concerns for personal safety.   Patient reports current suicidal ideation, she denies any current or recent suicidal behaviors.  She reports if suicidal  thoughts do occur, she knows steps she can take to manage them and believes she can manage them, and will reach out for additional help if needed.     Patientdenies current or recent homicidal " "ideation or behaviors.   Patient denies current or recent self injurious behavior or ideation.   Patient denies other safety concerns.   Patient reports there has not been a change in risk factors since their last session -      Recommended that patient call 911 or go to the local ED should there be a change in any of these risk factors.  We reviewed how to contact [facility] crisis/[facility] for urgent/crisis concerns 24/7.  Provided patient with crisis resources and she agrees to use safety plan should any safety concerns arise.      Professionals or agencies I can contact during a crisis:  Suicide Prevention Lifeline: call or text 988  Crisis Text Line: text \"[place]\" to 147161  Local Crisis Services: [facility]: [phone]  Call 911 or go to my nearest emergency department, or [facility].         Appearance:   Appropriate    Eye Contact:   Good    Psychomotor Behavior: Normal   Attitude:   Cooperative    Orientation:   All   Speech    Rate / Production: Normal     Volume:  Normal    Mood:    Reports feeling more stressed, worried and down since results of election   Affect:    Congruent with mood   Thought Content:  Clear    Thought Form:  Coherent  Logical    Insight:    Good      Medication Review:   No changes to psychiatric medication      Medication Compliance:   Yes      Changes in Health Issues:    No changes or new health concerns -(has had two recent PT appointments to continue to address back/shoulder/neck pain)     Chemical Use Review:  No changes or concerns with alcohol use.         Substance Use: Chemical use reviewed, no active concerns identified.      Tobacco Use: No current tobacco use.       Diagnosis:  Anxiety disorder unspecified    PHQ scores continue to remain low and have decreased over time         PLAN: (Patient Tasks / Therapist Tasks / Other)    1) Idalia identified the following goals: Pay attention to my feelings rather than denying or ignoring.  Limit social media " and googling related to politics.      Continue with: Consider calling friend and arranging a time to connect.  When feeling stressed, ask myself what is contributing and what I think will help - recognize tendency to want to eat sugar/sweets and mindfully decide what I think I most need in the moment.  Focus on what I need, versus restricting/avoiding/depriving self.      Continue to pay attention to how I'm feeling.  Talk to Donald about how I'm feeling even when I feel like avoiding the conversation.      Continue practicing the strategies that have been helping to support your physical and emotional well-being.      Consider reading the book - Fighting For your Marriage (Rafi Gomez).  Consider referral to couples therapy to help with marital difficulties.       2) If there is a crisis situation, with your daughter or yourself, remember you are not alone and that there are people who can help you twenty-four hours a day, seven days a week.  Call [facility] ([facility]): [phone].      3) Next appt is scheduled for:  11/21 at 12:30 pm.      Cristy Wilkinson PsyD, KYLIE  11/14/2024                                ____________________________________    Treatment Plan     Patient's Name: Idalia Hinojosa                        YOB: 1980     Date of Creation: 1/6/2020  Date Treatment Plan Last Reviewed/Revised: 8/22/2024       DSM5 Diagnoses: 300.00 (F41.9) Unspecified Anxiety Disorder  Psychosocial / Contextual Factors: strain in marital relationship, parenting challenges, adjustment challenges, 's cancer diagnosis  PROMIS (reviewed every 90 days):      Anticipated number of session for this episode of care: possibly a few more sessions if needed to support sustained progress and maintenance  Anticipation frequency of session: Monthly   Anticipated Duration of each session: 38-52 minutes  Treatment plan will be reviewed in 90 days or when goals have been changed.          Referral / Collaboration:  No referrals at this time.  Have previously recommended couples therapy referral.  Coordinate with Dr. Han, patient's PCP.        MeasurableTreatment Goal(s) related to diagnosis / functional impairment(s)  Goal 1: Patient will learn emotion regulation strategies to help when she is feeling upset/angry/frustrated/distressed    I will know I've met my goal when I would yell less, I would feel calmer, and I would not push others.  I would be less physical when I'm angry (e.g. wouldn't slam doors or hit things)        Objective #A (Patient Action)                          Patient will learn and practice at least 2 new emotion regulation strategies.  Status: Continued - Date(s):    9/13/2023- Regularly practicing emotion regulation strategies to help manage increase in emotional distress related to spouse's cancer diagnosis.  She has been successful with using strategies to help her stay calm when her daughter's experiencing heightened emotion and/or challenging behaviors.  She's working on remembering to engage calming strategies when feeling physically tense and/or anxious/stressed.    2/20/24 - Will continue to focus on building emotion regulation strategies    8/22/2024 - this is a primary focus of her current episode of care -is making initial progress on this goal in the context of her return to therapy -        Intervention(s)  Therapist will provide psychoeducation on emotion regulation module from DBT and will assign patient homework to help reinforce skill development.     Objective #B  Patient will identify factors that increase vulnerability to emotion mind and identify ways to decrease vulnerability to emotion mind.  Status: Continued - Date(s):9/13/2023 - routinely incorporating mindfulness, distress tolerance and self-awareness strategies to increase attention and focus to factors that increase vulnerability to emotion mind  2/20/2024 - further review of treatment goals  is in progress and will continue over the next session, given break in therapy   5/22/2024 - Idalia has made great progress identifying and practicing self-care habits/strategies that help decrease vulnerability to emotion mind   8/22/2024 -  this is a primary focus of her current episode of care -is making initial progress on this goal in the context of her return to therapy -     Intervention(s)  Therapist will provide information on factors that increase vulnerabiliyt to emotion mind      Objective #C  Patient will identify warning signs and signals that emotions are escalating and will learn ways to skillfully intervene early on.  Status: Date: 9/13/2023- in progress - has demonstrated good progress and awareness in being able to identify warning signs and intervene skillfully-working on incorporating deep breathing to help with calming and re-centering.    2/20/2024 - further review of treatment goals is in progress and will continue over the next session, given break in therapy   5/22/2024 - Goal completed        Intervention(s)  Therapist will help patient identify warning signs and signals, and will guide the patient in identifying skillful ways to intervene when exeriencing warning signs and signals.      Objective #B (Patient Action)                          Status: NEW - Date(s):9/13/2023- Patient will learn mind-body connection, healthy ways to manage stress, calming strategies, and ways she can address cognitive and behavioral factors that can contribute to pain experience.    2/20/2024 - further review of treatment goals is in progress and will continue over the next session, given break in therapy    5/22/2024 - goal completed       Patient will learn and practice at least two strategies that help improve her emotional well-being.       Intervention(s)  Therapist will teach calming strategies and healthy stress management strategies, and will assign homework to help reinforce skill development.   "Status - patient has been working on engaging calming strategies and in particular, not trying to \"force herself\" to calm down which counters the relaxation response.  Working currently on how to cue/remind herself to engage these strategies.               Goal 2: Patient will learn new parenting strategies to help with managing parenting challenges.  Parent will work on improving relationship with her daughter and defining what she would like this relationship to look like.      I will know I've met my goal when I'm enjoying time with my daughter, teaching her new things, and not waiting for things to change       Objective #A (Patient Action)                          Status: Continued - Date(s):9/13/2023 2/20/2024 - further review of treatment goals is in progress and will continue over the next session, given break in therapy   8/22/2024 - this goal was not addressed in this most recent episode of care         Patient will increase understanding of developmental norms for her daughter and ways to manage challenging behaviors.     Intervention(s)  Therapist will provide psychoeducation on developmental norms and parenting strategies.     Objective #B  Patient will spend time reflecting on her relationship with her daughter and defining what she would like this relaitonship to look like.                  Status: Continued - Date(s):9/13/2023     Making good progress - has also been working with daughter's therapist and has been actively practicing and using suggested strategies.   Likely can work towards resolving this goal, as this goal has largely shifted towards the work she is doing with her daughter's individual therapist, where she believes they have been making good progress on helping her in her relationship with her daughter.       2/20/2024 - further review of treatment goals is in progress and will continue over the next session, given break in therapy    8/22/2024 - this goal was not addressed in this " "most recent episode of care     Intervention(s)  Therapist will guide the patient in reflecting upon her relationship with her daughter and help her define ways to move towards what she vaues in her relationship with her daughter.     Objective #C  Patient will increase time spent on fun activity and play with her daughter.  Status: Continued - Date(s):9/13/2023-has been enjoying time with daughter and devoting regular time to fun activities and play together  2/20/2024 - further review of treatment goals is in progress and will continue over the next session, given break in therapy    8/22/2024 - this goal was not addressed in this most recent episode of care           Intervention(s)  Therapist will guide the patient in identifying ways she can increase positive time with her daughter.  Therapist will also teach mindfulness strategies to help patient with being in the present moment when appropriate - .        Goal 3: Patiient will improve communication with her .      I will know I've met my goal when I feel less irritated with my  and communicate more openly with my .  I would like to be more assertive and less aggressive.   I would like to not avoid telling him things because I'm worried about his reaction or don't think he will react well.  I would like to be more present to the moment during times when this would be helpful.\"       Objective #A (Patient Action)                          Status: Continued - Date(s):9/13/2023   continues to work on this goal, though emphasis has shift to managing caregiver stress related to 's cancer diagnosis and treatment.         2/20/2024 - further review of treatment goals is in progress and will continue over the next session, given break in therapy    8/22/2024 - this goal was not addressed in this most recent episode of care      Patient will learn and practice at least two new interpersonal effectiveness strategies.   "   Intervention(s)  Therapist will teach strategies from DBT module on interpersonal effectiveness, and will assign homework to help reinforce skill development.            Patient has reviewed and agreed to the above plan.        Cristy Wilkinson PsyD, LP  Licensed Psychologist  Reviewed on 8/22/2024

## 2024-11-25 ENCOUNTER — VIRTUAL VISIT (OUTPATIENT)
Dept: PSYCHOLOGY | Facility: CLINIC | Age: 44
End: 2024-11-25
Payer: COMMERCIAL

## 2024-11-25 DIAGNOSIS — Z03.89 NO DIAGNOSIS ON AXIS I: Primary | ICD-10-CM

## 2024-11-25 PROCEDURE — 99207 PR NO CHARGE LOS: CPT | Mod: 95 | Performed by: PSYCHOLOGIST

## 2025-01-30 ENCOUNTER — VIRTUAL VISIT (OUTPATIENT)
Dept: PSYCHOLOGY | Facility: CLINIC | Age: 45
End: 2025-01-30
Payer: COMMERCIAL

## 2025-01-30 DIAGNOSIS — F41.9 ANXIETY DISORDER, UNSPECIFIED TYPE: Primary | ICD-10-CM

## 2025-01-30 PROCEDURE — 90834 PSYTX W PT 45 MINUTES: CPT | Mod: 95 | Performed by: PSYCHOLOGIST

## 2025-01-30 NOTE — PROGRESS NOTES
[facility]                                            Progress Note    Patient Name: Idalia Hinojosa  Date: 1/30/2025           Service Type: Individual  Start time:  9:*** am End time:  9:*** am  Session Length:  *** minutes    Session #: 15    Attendees: Client     Service modality: Video visit      Provider verified identity through the following two step process.  Patient provided:  Patient is known previously to provider    Telemedicine Visit: The patient's condition can be safely assessed and treated via synchronous audio and visual telemedicine encounter.      Reason for Telemedicine Visit: Services only offered telehealth    Originating Site (Patient Location): Patient's home    Distant Site (Provider Location): Provider Remote Setting- Home Office    Consent:  The patient/guardian has verbally consented to: the potential risks and benefits of telemedicine versus in person care; bill my insurance or make self-payment for services provided; and responsibility for payment of non-covered services.     Patient would like the video invitation sent by:  My Chart    Mode of Communication:  Video Conference via [employer],     As the provider I attest to compliance with applicable laws and regulations related to telemedicine.     Treatment Plan Last Reviewed: 12.2.2024    PHQ-9/DAVID-7: 1/15/2025        DATA  Interactive Complexity: No  Crisis: No        Progress Since Last Session (Related to Symptoms / Goals / Homework):   Symptoms: She reports some improvement in mood  (less frustrated, stressed) since working on reframing stressors at work.  She's working on letting go of expecting others to react a certain way and focusing on her reaction.      Recent PHQ scores of 5 and 6.      Homework:  - Successful with completing homework - successful with practicing reframining.  She's recognizing when emotions are escalating, using effective coping strategies, and communicating her  "feelings rather than ignoring.  Continues to work on tuning into her feelings and needs and expressing herself to others, even when feeling like avoiding the conversation.  She's also been able to recognize when experiencing an urge to act impulsively or engage in an unhelpful behavior, and not act on the urge.          Episode of Care Goals: Idalia had recently returned to therapy after a several month break.  Her current concerns are related to feeling more frustrated and angry and not sure how to cope.  She identifies difficulties in her marriage as contributing factor to feeling more frustrated and angry.       Current / Ongoing Stressors and Concerns:  Current: Work related stressors -challenges with a co-worker and her manager.  She reports that things are going better at home - her spouse has been participating in therapy, which has been helpful, and they have been helpful conversations together about the challenges she is experiencing at work, which has been helpful for her.      Ongoing: (copied forward) Working on building effective emotion regulation strategies for managing daily frustrations and upsets.  Context for previous episode of care included stresses related to behavioral challenges with her daughter, who has been diagnosed with ADHD, and working with her  on addressing parenting differences.   Her  also had previously been diagnosed with cancer and went through treatment, and previous therapy focused on caregiver stress and helping her to attend to her self-care during this very stressful period of time.         Treatment Objective(s) Addressed in This Session:     Build and practice interpersonal effectiveness strategies   Build and practice emotion regulation strategies  Learn and practice \"middle path\" strategy     Continue with:     Review of self-care strategies and identification of areas of need   Learn and practice emotion regulation strategies (in particular, how to " "manage and separate your own emotional reaction when someone around you is escalating)  Learn and practice cognitive strategies to help with identifying and managing unhelpful thoughts  Identify recent stressors and triggers to feelings of upset and frustration and identify 1-2 steps you can take to actively address stressors  Review of interpersonal effectiveness strategies     Intervention:     DBT and solution-focused: Continued emphasis on identifying and utilizing emotion regulation and interpersonal effectiveness strategies to help with managing challenging situations at work.  She processed several recent challenges at work.  We reviewed the concept of \"wise mind\" - to help reinforce continued focus on using strategies to engage her \"wise mind\" when her emotion mind has become flooded.        Recognized and celebrated successes with practicing reframing, recognizing emotions, identifying what is contributing to her emotions, problem-solving, recognizing urges and not acting on impulses to engage in behaviors that ultimately don't help the situation.      She has also done a nice job reframing work related stressors in a way that allows her to focus on her response when communicating with others, versus expecting a certain response from others, and \"learning\" about what she needs to do next based upon others response.      Will continue to encourage she prioritize scheduling time to connect with, and have fun with friends.      ASSESSMENT: Current Emotional / Mental Status (status of significant symptoms):   Risk status (Self / Other harm or suicidal ideation)   Patient denies current fears or concerns for personal safety.   Patient reports current suicidal ideation, she denies any current or recent suicidal behaviors.  She reports if suicidal  thoughts do occur, she knows steps she can take to manage them and believes she can manage them, and will reach out for additional help if needed.     Patientdenies " "current or recent homicidal ideation or behaviors.   Patient denies current or recent self injurious behavior or ideation.   Patient denies other safety concerns.   Patient reports there has not been a change in risk factors since their last session -      Recommended that patient call 911 or go to the local ED should there be a change in any of these risk factors.  We reviewed how to contact [facility] crisis/[facility] for urgent/crisis concerns 24/7.  Provided patient with crisis resources and she agrees to use safety plan should any safety concerns arise.      Professionals or agencies I can contact during a crisis:  Suicide Prevention Lifeline: call or text 988  Crisis Text Line: text \"[place]\" to 436051  [facility]: [facility]: [phone]  Call 911 or go to my nearest emergency department, or [facility].         Appearance:   Appropriate    Eye Contact:   Good    Psychomotor Behavior: Normal   Attitude:   Cooperative    Orientation:   All   Speech    Rate / Production: Normal     Volume:  Normal    Mood:    Reports some improvement in mood - feels less frustrated and stressed by work   Affect:    Congruent with mood   Thought Content:  Clear    Thought Form:  Coherent  Logical    Insight:    Good      Medication Review:   No changes to psychiatric medication      Medication Compliance:   Yes      Changes in Health Issues:  Physical pain - the physical pain she had been experiencing in her shoulders/back/neck has been improving -      Chemical Use Review:  No changes or concerns with alcohol use.         Substance Use: Chemical use reviewed, no active concerns identified.      Tobacco Use: No current tobacco use.       Diagnosis:  Anxiety disorder unspecified    PHQ scores continue to remain low and have decreased over time         PLAN: (Patient Tasks / Therapist Tasks / Other)    1) Idalia identified the following goals:     Idalia is going to spend time thinking about what she would " like to communicate with her boss and how to have the conversation.  She will consider writing down and practicing what she'd jarad to say, to help her prepare.  Continue with: Pay attention to my feelings rather than denying or ignoring.  Re-frame how I'm thinking about the situation with Aaron at work- focus on what I have control over and accept what I don't have control over.  Think about what I like to do and how I can have outlets for my creative energy.         Continue with: Consider calling friend and arranging a time to connect.  When feeling stressed, ask myself what is contributing and what I think will help - recognize tendency to want to eat sugar/sweets and mindfully decide what I think I most need in the moment.  Focus on what I need, versus restricting/avoiding/depriving self.      Talk to Donald about how I'm feeling even when I feel like avoiding the conversation.      Continue practicing the strategies that have been helping to support your physical and emotional well-being.      Consider reading the book - Fighting For your Marriage (Rafi Gomez).  Consider referral to couples therapy to help with marital difficulties.       2) If there is a crisis situation, with your daughter or yourself, remember you are not alone and that there are people who can help you twenty-four hours a day, seven days a week.  Call [facility] ([facility]): [phone].      3) Next appt is scheduled for:  1/30/2025 a 9:00 am.      Cristy Wilkinson PsyD, KYLIE  1/30/2025                                    ____________________________________    Treatment Plan     Patient's Name: Idalia Hinojosa                        YOB: 1980     Date of Creation: 1/6/2020  Date Treatment Plan Last Reviewed/Revised: 12.2.2024       DSM5 Diagnoses: 300.00 (F41.9) Unspecified Anxiety Disorder  Psychosocial / Contextual Factors: strain in marital relationship, parenting challenges, adjustment challenges,  's cancer diagnosis  PROMIS (reviewed every 90 days):      Anticipated number of session for this episode of care: possibly a few more sessions if needed to support sustained progress and maintenance  Anticipation frequency of session: Monthly   Anticipated Duration of each session: 38-52 minutes  Treatment plan will be reviewed in 90 days or when goals have been changed.         Referral / Collaboration:  No referrals at this time.  Have previously recommended couples therapy referral.  Coordinate with Dr. Han, patient's PCP.        MeasurableTreatment Goal(s) related to diagnosis / functional impairment(s)  Goal 1: Patient will learn emotion regulation strategies to help when she is feeling upset/angry/frustrated/distressed    I will know I've met my goal when I would yell less, I would feel calmer, and I would not push others.  I would be less physical when I'm angry (e.g. wouldn't slam doors or hit things)        Objective #A (Patient Action)                          Patient will learn and practice at least 2 new emotion regulation strategies.  Status: Continued - Date(s):    9/13/2023- Regularly practicing emotion regulation strategies to help manage increase in emotional distress related to spouse's cancer diagnosis.  She has been successful with using strategies to help her stay calm when her daughter's experiencing heightened emotion and/or challenging behaviors.  She's working on remembering to engage calming strategies when feeling physically tense and/or anxious/stressed.    2/20/24 - Will continue to focus on building emotion regulation strategies    12.2.2024 - this is a primary focus of her current episode of care -is making good progress with learning and practicing strategies        Intervention(s)  Therapist will provide psychoeducation on emotion regulation module from DBT and will assign patient homework to help reinforce skill development.     Objective #B  Patient will identify  factors that increase vulnerability to emotion mind and identify ways to decrease vulnerability to emotion mind.  Status: Continued - Date(s):9/13/2023 - routinely incorporating mindfulness, distress tolerance and self-awareness strategies to increase attention and focus to factors that increase vulnerability to emotion mind  2/20/2024 - further review of treatment goals is in progress and will continue over the next session, given break in therapy   5/22/2024 - Idalia has made great progress identifying and practicing self-care habits/strategies that help decrease vulnerability to emotion mind   12.2.2024-  this is a primary focus of her current episode of care -is making good progress with incorporating self-care strategies that support her emotional and physical well-being     Intervention(s)  Therapist will provide information on factors that increase vulnerabiliyt to emotion mind      Objective #C  Patient will identify warning signs and signals that emotions are escalating and will learn ways to skillfully intervene early on.  Status: Date: 9/13/2023- in progress - has demonstrated good progress and awareness in being able to identify warning signs and intervene skillfully-working on incorporating deep breathing to help with calming and re-centering.    2/20/2024 - further review of treatment goals is in progress and will continue over the next session, given break in therapy   5/22/2024 - Goal completed        Intervention(s)  Therapist will help patient identify warning signs and signals, and will guide the patient in identifying skillful ways to intervene when exeriencing warning signs and signals.      Objective #B (Patient Action)                          Status: NEW - Date(s):9/13/2023- Patient will learn mind-body connection, healthy ways to manage stress, calming strategies, and ways she can address cognitive and behavioral factors that can contribute to pain experience.    2/20/2024 - further review  "of treatment goals is in progress and will continue over the next session, given break in therapy    5/22/2024 - goal completed       Patient will learn and practice at least two strategies that help improve her emotional well-being.       Intervention(s)  Therapist will teach calming strategies and healthy stress management strategies, and will assign homework to help reinforce skill development.  Status - patient has been working on engaging calming strategies and in particular, not trying to \"force herself\" to calm down which counters the relaxation response.  Working currently on how to cue/remind herself to engage these strategies.               Goal 2: Patient will learn new parenting strategies to help with managing parenting challenges.  Parent will work on improving relationship with her daughter and defining what she would like this relationship to look like.      I will know I've met my goal when I'm enjoying time with my daughter, teaching her new things, and not waiting for things to change       Objective #A (Patient Action)                          Status: Continued - Date(s):9/13/2023 2/20/2024 - further review of treatment goals is in progress and will continue over the next session, given break in therapy   8/22/2024 - this goal was not addressed in this most recent episode of care         Patient will increase understanding of developmental norms for her daughter and ways to manage challenging behaviors.     Intervention(s)  Therapist will provide psychoeducation on developmental norms and parenting strategies.     Objective #B  Patient will spend time reflecting on her relationship with her daughter and defining what she would like this relaitonship to look like.                  Status: Continued - Date(s):9/13/2023     Making good progress - has also been working with daughter's therapist and has been actively practicing and using suggested strategies.   Likely can work towards resolving this " "goal, as this goal has largely shifted towards the work she is doing with her daughter's individual therapist, where she believes they have been making good progress on helping her in her relationship with her daughter.       2/20/2024 - further review of treatment goals is in progress and will continue over the next session, given break in therapy    8/22/2024 - this goal was not addressed in this most recent episode of care     Intervention(s)  Therapist will guide the patient in reflecting upon her relationship with her daughter and help her define ways to move towards what she vaues in her relationship with her daughter.     Objective #C  Patient will increase time spent on fun activity and play with her daughter.  Status: Continued - Date(s):9/13/2023-has been enjoying time with daughter and devoting regular time to fun activities and play together  2/20/2024 - further review of treatment goals is in progress and will continue over the next session, given break in therapy    8/22/2024 - this goal was not addressed in this most recent episode of care           Intervention(s)  Therapist will guide the patient in identifying ways she can increase positive time with her daughter.  Therapist will also teach mindfulness strategies to help patient with being in the present moment when appropriate - .        Goal 3: Patiient will improve communication with her .      I will know I've met my goal when I feel less irritated with my  and communicate more openly with my .  I would like to be more assertive and less aggressive.   I would like to not avoid telling him things because I'm worried about his reaction or don't think he will react well.  I would like to be more present to the moment during times when this would be helpful.\"       Objective #A (Patient Action)                          Status: Continued - Date(s):9/13/2023   continues to work on this goal, though emphasis has shift to managing " caregiver stress related to 's cancer diagnosis and treatment.         2/20/2024 - further review of treatment goals is in progress and will continue over the next session, given break in therapy    8/22/2024 - this goal was not addressed in this most recent episode of care      Patient will learn and practice at least two new interpersonal effectiveness strategies.     Intervention(s)  Therapist will teach strategies from DBT module on interpersonal effectiveness, and will assign homework to help reinforce skill development.            Patient has reviewed and agreed to the above plan.        Cristy Wilkinson PsyD,   Licensed Psychologist  Reviewed on 12.2.2024                                                                                                           Answers submitted by the patient for this visit:  Patient Health Questionnaire (Submitted on 1/15/2025)  If you checked off any problems, how difficult have these problems made it for you to do your work, take care of things at home, or get along with other people?: Somewhat difficult  PHQ9 TOTAL SCORE: 5

## 2025-02-06 ENCOUNTER — VIRTUAL VISIT (OUTPATIENT)
Dept: PSYCHOLOGY | Facility: CLINIC | Age: 45
End: 2025-02-06
Payer: COMMERCIAL

## 2025-02-06 DIAGNOSIS — F41.9 ANXIETY DISORDER, UNSPECIFIED TYPE: Primary | ICD-10-CM

## 2025-02-06 PROCEDURE — 90834 PSYTX W PT 45 MINUTES: CPT | Mod: 95 | Performed by: PSYCHOLOGIST

## 2025-02-06 NOTE — PROGRESS NOTES
"           [facility]                                            Progress Note    Patient Name: Idalia Hinojosa  Date: 2/6/2025             Service Type: Individual  Start time:  9:07 am End time:  9:57 am  Session Length:  50 minutes    Session #: 17    Attendees: Client     Service modality: Video visit      Provider verified identity through the following two step process.  Patient provided:  Patient is known previously to provider    Telemedicine Visit: The patient's condition can be safely assessed and treated via synchronous audio and visual telemedicine encounter.      Reason for Telemedicine Visit: Services only offered telehealth    Originating Site (Patient Location): Patient's home    Distant Site (Provider Location): Provider Remote Setting- Home Office    Consent:  The patient/guardian has verbally consented to: the potential risks and benefits of telemedicine versus in person care; bill my insurance or make self-payment for services provided; and responsibility for payment of non-covered services.     Patient would like the video invitation sent by:  My Chart    Mode of Communication:  Video Conference via [employer],     As the provider I attest to compliance with applicable laws and regulations related to telemedicine.     Treatment Plan Last Reviewed: 12.2.2024    PHQ-9/DAVID-7: 1/15/2025        DATA  Interactive Complexity: No  Crisis: No        Progress Since Last Session (Related to Symptoms / Goals / Homework):   Symptoms: She reports her mood has been \"up and down\".  Continues to be successful with recognizing escalating emotions, managing impulsivity, recognizing urges without acting, and using problem-solving strategies to help with managing difficult emotions and situations.      Recent PHQ scores of 5 and 6.      Homework:  - Successful with completing homework - thought through how to have her conversation with her boss and had the conversation.        Episode of Care " "Goals: Idalia had recently returned to therapy after a several month break.  Her current concerns are related to feeling more frustrated and angry and not sure how to cope.  She identifies difficulties in her marriage as contributing factor to feeling more frustrated and angry.       Current / Ongoing Stressors and Concerns:  Current: Continued work related stressors -in particular, difficulties with her manager and a co-worker.      Ongoing: (copied forward) Working on building effective emotion regulation strategies for managing daily frustrations and upsets.  Context for previous episode of care included stresses related to behavioral challenges with her daughter, who has been diagnosed with ADHD, and working with her  on addressing parenting differences.   Her  also had previously been diagnosed with cancer and went through treatment, and previous therapy focused on caregiver stress and helping her to attend to her self-care during this very stressful period of time.         Treatment Objective(s) Addressed in This Session:     Build and practice interpersonal effectiveness strategies   Build and practice emotion regulation strategies  Learn and practice \"middle path\" strategy     Continue with:     Review of self-care strategies and identification of areas of need   Learn and practice emotion regulation strategies (in particular, how to manage and separate your own emotional reaction when someone around you is escalating)  Learn and practice cognitive strategies to help with identifying and managing unhelpful thoughts  Identify recent stressors and triggers to feelings of upset and frustration and identify 1-2 steps you can take to actively address stressors  Review of interpersonal effectiveness strategies     Intervention:     DBT and solution-focused: Continued emphasis on identifying and utilizing emotion regulation and interpersonal effectiveness strategies to help with managing challenging " "situations at work.  She processed recent conversation with her boss at work, and resulting feelings of still feeling \"unsettled\" and \"angry\" - which she was able to work through and led to a follow-up conversation with her boss around things she didn't initially address.  She was able to establish healthy boundaries in this conversation.      Recognized successes with identifying emotions, identifying what is contributing to her emotions, slowing herself down and thinking through how to best manage.      Will continue to encourage she prioritize scheduling time to connect with, and have fun with friends.      ASSESSMENT: Current Emotional / Mental Status (status of significant symptoms):   Risk status (Self / Other harm or suicidal ideation)   Patient denies current fears or concerns for personal safety.   Patient reports current suicidal ideation, she denies any current or recent suicidal behaviors.  She reports if suicidal  thoughts do occur, she knows steps she can take to manage them and believes she can manage them, and will reach out for additional help if needed.     Patientdenies current or recent homicidal ideation or behaviors.   Patient denies current or recent self injurious behavior or ideation.   Patient denies other safety concerns.   Patient reports there has not been a change in risk factors since their last session -      Recommended that patient call 911 or go to the local ED should there be a change in any of these risk factors.  We reviewed how to contact [facility] crisis/COPE for urgent/crisis concerns 24/7.  Provided patient with crisis resources and she agrees to use safety plan should any safety concerns arise.      Professionals or agencies I can contact during a crisis:  Suicide Prevention Lifeline: call or text 439  Crisis Text Line: text \"[place]\" to 420459  Local Crisis Services: [facility] Crisis [facility]: [phone]  Call 911 or go to my nearest emergency department, or " "Empath.         Appearance:   Appropriate    Eye Contact:   Good    Psychomotor Behavior: Normal   Attitude:   Cooperative    Orientation:   All   Speech    Rate / Production: Normal     Volume:  Normal    Mood:    Reports mood has been \"up and down\"   Affect:    Congruent with mood   Thought Content:  Clear    Thought Form:  Coherent  Logical    Insight:    Good      Medication Review:   No changes to psychiatric medication      Medication Compliance:   Yes      Changes in Health Issues:  Physical pain - continued improvement with the physical pain she had been experiencing in her back/shoulders/neck     Chemical Use Review:  No changes or concerns with alcohol use.         Substance Use: Chemical use reviewed, no active concerns identified.      Tobacco Use: No current tobacco use.       Diagnosis:  Anxiety disorder unspecified    PHQ scores continue to remain low and have decreased over time         PLAN: (Patient Tasks / Therapist Tasks / Other)    1) Idalia identified the following goals: Pay attention to self-talk - It is kind, is it compassionate, it is helpful?  Be mindful of all or nothing thinkng - look for the gray      Stop and take a deep breath when feeling strong emotions, give myself permission to slow down and not rush when I don't need to - starting with biking to work - taking my breaks at work - and listen to low-key music-    Continue with: Pay attention to my feelings rather than denying or ignoring.  When feeling stressed, ask myself what is contributing and what I think will help.  Re-frame how I'm thinking about the situation with Aaron at work- focus on what I have control over and accept what I don't have control over.  Think about what I like to do and how I can have outlets for my creative energy.       Consider calling friend and arranging a time to connect.      Talk to Donald about how I'm feeling even when I feel like avoiding the conversation.      Continue practicing the strategies " that have been helping to support your physical and emotional well-being.      Consider reading the book - Fighting For your Marriage (Rafi Gomez).  Consider referral to couples therapy to help with marital difficulties.       2) If there is a crisis situation, with your daughter or yourself, remember you are not alone and that there are people who can help you twenty-four hours a day, seven days a week.  Call [facility] ([facility]): [phone].      3) Next appt is scheduled for:  2/6/2025 at 9:00 am     Cristy Wilkinson PsyD, LP  1/30/2025                                    ____________________________________    Treatment Plan     Patient's Name: Idalia Hinojosa                        YOB: 1980     Date of Creation: 1/6/2020  Date Treatment Plan Last Reviewed/Revised: 12.2.2024       DSM5 Diagnoses: 300.00 (F41.9) Unspecified Anxiety Disorder  Psychosocial / Contextual Factors: strain in marital relationship, parenting challenges, adjustment challenges, 's cancer diagnosis  PROMIS (reviewed every 90 days):      Anticipated number of session for this episode of care: possibly a few more sessions if needed to support sustained progress and maintenance  Anticipation frequency of session: Monthly   Anticipated Duration of each session: 38-52 minutes  Treatment plan will be reviewed in 90 days or when goals have been changed.         Referral / Collaboration:  No referrals at this time.  Have previously recommended couples therapy referral.  Coordinate with Dr. Han, patient's PCP.        MeasurableTreatment Goal(s) related to diagnosis / functional impairment(s)  Goal 1: Patient will learn emotion regulation strategies to help when she is feeling upset/angry/frustrated/distressed    I will know I've met my goal when I would yell less, I would feel calmer, and I would not push others.  I would be less physical when I'm angry (e.g. wouldn't slam doors or hit things)         Objective #A (Patient Action)                          Patient will learn and practice at least 2 new emotion regulation strategies.  Status: Continued - Date(s):    9/13/2023- Regularly practicing emotion regulation strategies to help manage increase in emotional distress related to spouse's cancer diagnosis.  She has been successful with using strategies to help her stay calm when her daughter's experiencing heightened emotion and/or challenging behaviors.  She's working on remembering to engage calming strategies when feeling physically tense and/or anxious/stressed.    2/20/24 - Will continue to focus on building emotion regulation strategies    12.2.2024 - this is a primary focus of her current episode of care -is making good progress with learning and practicing strategies        Intervention(s)  Therapist will provide psychoeducation on emotion regulation module from DBT and will assign patient homework to help reinforce skill development.     Objective #B  Patient will identify factors that increase vulnerability to emotion mind and identify ways to decrease vulnerability to emotion mind.  Status: Continued - Date(s):9/13/2023 - routinely incorporating mindfulness, distress tolerance and self-awareness strategies to increase attention and focus to factors that increase vulnerability to emotion mind  2/20/2024 - further review of treatment goals is in progress and will continue over the next session, given break in therapy   5/22/2024 - Idalia has made great progress identifying and practicing self-care habits/strategies that help decrease vulnerability to emotion mind   12.2.2024-  this is a primary focus of her current episode of care -is making good progress with incorporating self-care strategies that support her emotional and physical well-being     Intervention(s)  Therapist will provide information on factors that increase vulnerabiliyt to emotion mind      Objective #C  Patient will identify  "warning signs and signals that emotions are escalating and will learn ways to skillfully intervene early on.  Status: Date: 9/13/2023- in progress - has demonstrated good progress and awareness in being able to identify warning signs and intervene skillfully-working on incorporating deep breathing to help with calming and re-centering.    2/20/2024 - further review of treatment goals is in progress and will continue over the next session, given break in therapy   5/22/2024 - Goal completed        Intervention(s)  Therapist will help patient identify warning signs and signals, and will guide the patient in identifying skillful ways to intervene when exeriencing warning signs and signals.      Objective #B (Patient Action)                          Status: NEW - Date(s):9/13/2023- Patient will learn mind-body connection, healthy ways to manage stress, calming strategies, and ways she can address cognitive and behavioral factors that can contribute to pain experience.    2/20/2024 - further review of treatment goals is in progress and will continue over the next session, given break in therapy    5/22/2024 - goal completed       Patient will learn and practice at least two strategies that help improve her emotional well-being.       Intervention(s)  Therapist will teach calming strategies and healthy stress management strategies, and will assign homework to help reinforce skill development.  Status - patient has been working on engaging calming strategies and in particular, not trying to \"force herself\" to calm down which counters the relaxation response.  Working currently on how to cue/remind herself to engage these strategies.               Goal 2: Patient will learn new parenting strategies to help with managing parenting challenges.  Parent will work on improving relationship with her daughter and defining what she would like this relationship to look like.      I will know I've met my goal when I'm enjoying time " with my daughter, teaching her new things, and not waiting for things to change       Objective #A (Patient Action)                          Status: Continued - Date(s):9/13/2023 2/20/2024 - further review of treatment goals is in progress and will continue over the next session, given break in therapy   8/22/2024 - this goal was not addressed in this most recent episode of care         Patient will increase understanding of developmental norms for her daughter and ways to manage challenging behaviors.     Intervention(s)  Therapist will provide psychoeducation on developmental norms and parenting strategies.     Objective #B  Patient will spend time reflecting on her relationship with her daughter and defining what she would like this relaitonship to look like.                  Status: Continued - Date(s):9/13/2023     Making good progress - has also been working with daughter's therapist and has been actively practicing and using suggested strategies.   Likely can work towards resolving this goal, as this goal has largely shifted towards the work she is doing with her daughter's individual therapist, where she believes they have been making good progress on helping her in her relationship with her daughter.       2/20/2024 - further review of treatment goals is in progress and will continue over the next session, given break in therapy    8/22/2024 - this goal was not addressed in this most recent episode of care     Intervention(s)  Therapist will guide the patient in reflecting upon her relationship with her daughter and help her define ways to move towards what she vaues in her relationship with her daughter.     Objective #C  Patient will increase time spent on fun activity and play with her daughter.  Status: Continued - Date(s):9/13/2023-has been enjoying time with daughter and devoting regular time to fun activities and play together  2/20/2024 - further review of treatment goals is in progress and will  "continue over the next session, given break in therapy    8/22/2024 - this goal was not addressed in this most recent episode of care           Intervention(s)  Therapist will guide the patient in identifying ways she can increase positive time with her daughter.  Therapist will also teach mindfulness strategies to help patient with being in the present moment when appropriate - .        Goal 3: Patiient will improve communication with her .      I will know I've met my goal when I feel less irritated with my  and communicate more openly with my .  I would like to be more assertive and less aggressive.   I would like to not avoid telling him things because I'm worried about his reaction or don't think he will react well.  I would like to be more present to the moment during times when this would be helpful.\"       Objective #A (Patient Action)                          Status: Continued - Date(s):9/13/2023   continues to work on this goal, though emphasis has shift to managing caregiver stress related to 's cancer diagnosis and treatment.         2/20/2024 - further review of treatment goals is in progress and will continue over the next session, given break in therapy    8/22/2024 - this goal was not addressed in this most recent episode of care      Patient will learn and practice at least two new interpersonal effectiveness strategies.     Intervention(s)  Therapist will teach strategies from DBT module on interpersonal effectiveness, and will assign homework to help reinforce skill development.            Patient has reviewed and agreed to the above plan.        Cristy Wilkinson PsyD,   Licensed Psychologist  Reviewed on 12.2.2024                                                                                                           Answers submitted by the patient for this visit:  Patient Health Questionnaire (Submitted on 1/15/2025)  If you checked off any problems, how " difficult have these problems made it for you to do your work, take care of things at home, or get along with other people?: Somewhat difficult  PHQ9 TOTAL SCORE: 5

## 2025-02-13 ENCOUNTER — VIRTUAL VISIT (OUTPATIENT)
Dept: PSYCHOLOGY | Facility: CLINIC | Age: 45
End: 2025-02-13
Payer: COMMERCIAL

## 2025-02-13 DIAGNOSIS — F41.9 ANXIETY DISORDER, UNSPECIFIED TYPE: Primary | ICD-10-CM

## 2025-02-13 PROCEDURE — 90834 PSYTX W PT 45 MINUTES: CPT | Mod: 95 | Performed by: PSYCHOLOGIST

## 2025-02-13 NOTE — PROGRESS NOTES
"           [facility]                                            Progress Note    Patient Name: Idalia Hinojosa  Date: 2/13/2025           Service Type: Individual  Start time:  9:06 am End time:  *** am  Session Length:  5*** minutes    Session #: 18    Attendees: Client     Service modality: Video visit      Provider verified identity through the following two step process.  Patient provided:  Patient is known previously to provider    Telemedicine Visit: The patient's condition can be safely assessed and treated via synchronous audio and visual telemedicine encounter.      Reason for Telemedicine Visit: Services only offered telehealth    Originating Site (Patient Location): Patient's home    Distant Site (Provider Location): Provider Remote Setting- Home Office    Consent:  The patient/guardian has verbally consented to: the potential risks and benefits of telemedicine versus in person care; bill my insurance or make self-payment for services provided; and responsibility for payment of non-covered services.     Patient would like the video invitation sent by:  My Chart    Mode of Communication:  Video Conference via [employer],     As the provider I attest to compliance with applicable laws and regulations related to telemedicine.     Treatment Plan Last Reviewed: 12.2.2024    PHQ-9/DAVID-7: 1/15/2025        DATA  Interactive Complexity: No  Crisis: No        Progress Since Last Session (Related to Symptoms / Goals / Homework):   Symptoms: She reports she has been feeling more tired, low energy, \"blah\" and \"bored\" at work.  C    Recent PHQ scores of 5 and 6.      Homework:  - Successful with completing homework - successful with using strategies to recognize emotions, think through how to manage.        Episode of Care Goals: Idalia had recently returned to therapy after a several month break.  Her current concerns are related to feeling more frustrated and angry and not sure how to cope.  " "She identifies difficulties in her marriage as contributing factor to feeling more frustrated and angry.       Current / Ongoing Stressors and Concerns:  Current: Continued work on managing strong emotions, in particular in the context of work related stressors and managing conflict with a co-worker and her manager.      Ongoing: (copied forward) Working on building effective emotion regulation strategies for managing daily frustrations and upsets.  Context for previous episode of care included stresses related to behavioral challenges with her daughter, who has been diagnosed with ADHD, and working with her  on addressing parenting differences.   Her  also had previously been diagnosed with cancer and went through treatment, and previous therapy focused on caregiver stress and helping her to attend to her self-care during this very stressful period of time.         Treatment Objective(s) Addressed in This Session:     Main emphasis today - awareness of self-talk and impact on emotions, encouragement to find more positive, affirming, rational, kind, compassionate and helpful self-talk  Build and practice interpersonal effectiveness strategies   Build and practice emotion regulation strategies      Continue with:     Review of self-care strategies and identification of areas of need   Learn and practice emotion regulation strategies (in particular, how to manage and separate your own emotional reaction when someone around you is escalating)  Learn and practice cognitive strategies to help with identifying and managing unhelpful thoughts  Identify recent stressors and triggers to feelings of upset and frustration and identify 1-2 steps you can take to actively address stressors  Review of interpersonal effectiveness strategies  Learn and practice \"middle path\" strategy      Intervention:     CBT and DBT: She processed recent interactions at work with her co-worker and manager that have contributed to " "feeling strong emotion.  Focus today was on encouraging mindful awareness of self-talk and impact on her emotions- in particular, polarized self-talk that is harsh, critical and judgmental of her and others.  Psychoed on the impact of cognitions on emotions and encouraged mindful awareness of her inner dialogue and movement towards finding more \"balanced\" thinking that is compassionate, non-judgmental, rational and helpful.  She also reflected that she recognizes her daughter engaging in a lot of negative self-talk, and suggested that as she becomes more aware of her own self-talk and works to re-frame, this can also be an opportunity to help her daughter with her self-talk.      Continued emphasis on identifying and utilizing emotion regulation and interpersonal effectiveness strategies to help with managing challenging situations at work.  She shared a success with a good follow-up conversation with her co-worker where she apologized for a previous incident between the two of them, which has since made things better between the two of them.      Recognized successes with identifying emotions, identifying what is contributing to her emotions, slowing herself down and thinking through how to best manage.      Will continue to encourage she prioritize scheduling time to connect with, and have fun with friends.      ASSESSMENT: Current Emotional / Mental Status (status of significant symptoms):   Risk status (Self / Other harm or suicidal ideation)   Patient denies current fears or concerns for personal safety.   Patient reports current suicidal ideation, she denies any current or recent suicidal behaviors.  She reports if suicidal  thoughts do occur, she knows steps she can take to manage them and believes she can manage them, and will reach out for additional help if needed.     Patientdenies current or recent homicidal ideation or behaviors.   Patient denies current or recent self injurious behavior or " "ideation.   Patient denies other safety concerns.   Patient reports there has not been a change in risk factors since their last session -      Recommended that patient call 911 or go to the local ED should there be a change in any of these risk factors.  We reviewed how to contact [facility] crisis/[facility] for urgent/crisis concerns 24/7.  Provided patient with crisis resources and she agrees to use safety plan should any safety concerns arise.      Professionals or agencies I can contact during a crisis:  Suicide Prevention Lifeline: call or text 988  Crisis Text Line: text \"[place]\" to 683877  Local Crisis Services: [facility]: [phone]  Call 911 or go to my nearest emergency department, or [facility].         Appearance:   Appropriate    Eye Contact:   Good    Psychomotor Behavior: Normal   Attitude:   Cooperative    Orientation:   All   Speech    Rate / Production: Normal     Volume:  Normal    Mood:    Reports mood has been more \"blah\", tired and low energy   Affect:    Congruent with mood   Thought Content:  Clear    Thought Form:  Coherent  Logical    Insight:    Good      Medication Review:   No changes to psychiatric medication      Medication Compliance:   Yes      Changes in Health Issues:  No new health concerns or changes to health     Chemical Use Review:  No changes or concerns with alcohol use.         Substance Use: Chemical use reviewed, no active concerns identified.      Tobacco Use: No current tobacco use.       Diagnosis:  Anxiety disorder unspecified    PHQ scores continue to remain low and have decreased over time         PLAN: (Patient Tasks / Therapist Tasks / Other)    1) Idalia identified the following goals: Pay attention to self-talk - It is kind, is it compassionate, it is helpful?  Be mindful of all or nothing thinkng - look for the gray.  Stop and take a deep breath when feeling strong emotions, give myself permission to slow down and not rush when I don't need to " - starting with biking to work - take my breaks at work - and listen to low-key music-    Continue with: Pay attention to my feelings rather than denying or ignoring.  When feeling stressed, ask myself what is contributing and what I think will help.  Re-frame how I'm thinking about the situation with Aaron at work- focus on what I have control over and accept what I don't have control over.  Think about what I like to do and how I can have outlets for my creative energy.       Consider calling friend and arranging a time to connect.      Talk to Donald about how I'm feeling even when I feel like avoiding the conversation.      Continue practicing the strategies that have been helping to support your physical and emotional well-being.      Consider reading the book - Fighting For your Marriage (Rafi Gomez).  Consider referral to couples therapy to help with marital difficulties.       2) If there is a crisis situation, with your daughter or yourself, remember you are not alone and that there are people who can help you twenty-four hours a day, seven days a week.  Call [facility] ([facility]): [phone].      3) Next appt is scheduled for:  2/12/2025 at 9:00 am     Cristy Wilkinson PsyD, LP  2/6/2025                                    ____________________________________    Treatment Plan     Patient's Name: Idalia Hinojosa                        YOB: 1980     Date of Creation: 1/6/2020  Date Treatment Plan Last Reviewed/Revised: 12.2.2024       DSM5 Diagnoses: 300.00 (F41.9) Unspecified Anxiety Disorder  Psychosocial / Contextual Factors: strain in marital relationship, parenting challenges, adjustment challenges, 's cancer diagnosis  PROMIS (reviewed every 90 days):      Anticipated number of session for this episode of care: possibly a few more sessions if needed to support sustained progress and maintenance  Anticipation frequency of session: Monthly   Anticipated Duration  of each session: 38-52 minutes  Treatment plan will be reviewed in 90 days or when goals have been changed.         Referral / Collaboration:  No referrals at this time.  Have previously recommended couples therapy referral.  Coordinate with Dr. Han, patient's PCP.        MeasurableTreatment Goal(s) related to diagnosis / functional impairment(s)  Goal 1: Patient will learn emotion regulation strategies to help when she is feeling upset/angry/frustrated/distressed    I will know I've met my goal when I would yell less, I would feel calmer, and I would not push others.  I would be less physical when I'm angry (e.g. wouldn't slam doors or hit things)        Objective #A (Patient Action)                          Patient will learn and practice at least 2 new emotion regulation strategies.  Status: Continued - Date(s):    9/13/2023- Regularly practicing emotion regulation strategies to help manage increase in emotional distress related to spouse's cancer diagnosis.  She has been successful with using strategies to help her stay calm when her daughter's experiencing heightened emotion and/or challenging behaviors.  She's working on remembering to engage calming strategies when feeling physically tense and/or anxious/stressed.    2/20/24 - Will continue to focus on building emotion regulation strategies    12.2.2024 - this is a primary focus of her current episode of care -is making good progress with learning and practicing strategies        Intervention(s)  Therapist will provide psychoeducation on emotion regulation module from DBT and will assign patient homework to help reinforce skill development.     Objective #B  Patient will identify factors that increase vulnerability to emotion mind and identify ways to decrease vulnerability to emotion mind.  Status: Continued - Date(s):9/13/2023 - routinely incorporating mindfulness, distress tolerance and self-awareness strategies to increase attention and focus to  factors that increase vulnerability to emotion mind  2/20/2024 - further review of treatment goals is in progress and will continue over the next session, given break in therapy   5/22/2024 - Idalia has made great progress identifying and practicing self-care habits/strategies that help decrease vulnerability to emotion mind   12.2.2024-  this is a primary focus of her current episode of care -is making good progress with incorporating self-care strategies that support her emotional and physical well-being     Intervention(s)  Therapist will provide information on factors that increase vulnerabiliyt to emotion mind      Objective #C  Patient will identify warning signs and signals that emotions are escalating and will learn ways to skillfully intervene early on.  Status: Date: 9/13/2023- in progress - has demonstrated good progress and awareness in being able to identify warning signs and intervene skillfully-working on incorporating deep breathing to help with calming and re-centering.    2/20/2024 - further review of treatment goals is in progress and will continue over the next session, given break in therapy   5/22/2024 - Goal completed        Intervention(s)  Therapist will help patient identify warning signs and signals, and will guide the patient in identifying skillful ways to intervene when exeriencing warning signs and signals.      Objective #B (Patient Action)                          Status: NEW - Date(s):9/13/2023- Patient will learn mind-body connection, healthy ways to manage stress, calming strategies, and ways she can address cognitive and behavioral factors that can contribute to pain experience.    2/20/2024 - further review of treatment goals is in progress and will continue over the next session, given break in therapy    5/22/2024 - goal completed       Patient will learn and practice at least two strategies that help improve her emotional well-being.       Intervention(s)  Therapist will  "teach calming strategies and healthy stress management strategies, and will assign homework to help reinforce skill development.  Status - patient has been working on engaging calming strategies and in particular, not trying to \"force herself\" to calm down which counters the relaxation response.  Working currently on how to cue/remind herself to engage these strategies.               Goal 2: Patient will learn new parenting strategies to help with managing parenting challenges.  Parent will work on improving relationship with her daughter and defining what she would like this relationship to look like.      I will know I've met my goal when I'm enjoying time with my daughter, teaching her new things, and not waiting for things to change       Objective #A (Patient Action)                          Status: Continued - Date(s):9/13/2023 2/20/2024 - further review of treatment goals is in progress and will continue over the next session, given break in therapy   8/22/2024 - this goal was not addressed in this most recent episode of care         Patient will increase understanding of developmental norms for her daughter and ways to manage challenging behaviors.     Intervention(s)  Therapist will provide psychoeducation on developmental norms and parenting strategies.     Objective #B  Patient will spend time reflecting on her relationship with her daughter and defining what she would like this relaitonship to look like.                  Status: Continued - Date(s):9/13/2023     Making good progress - has also been working with daughter's therapist and has been actively practicing and using suggested strategies.   Likely can work towards resolving this goal, as this goal has largely shifted towards the work she is doing with her daughter's individual therapist, where she believes they have been making good progress on helping her in her relationship with her daughter.       2/20/2024 - further review of treatment goals " "is in progress and will continue over the next session, given break in therapy    8/22/2024 - this goal was not addressed in this most recent episode of care     Intervention(s)  Therapist will guide the patient in reflecting upon her relationship with her daughter and help her define ways to move towards what she vaues in her relationship with her daughter.     Objective #C  Patient will increase time spent on fun activity and play with her daughter.  Status: Continued - Date(s):9/13/2023-has been enjoying time with daughter and devoting regular time to fun activities and play together  2/20/2024 - further review of treatment goals is in progress and will continue over the next session, given break in therapy    8/22/2024 - this goal was not addressed in this most recent episode of care           Intervention(s)  Therapist will guide the patient in identifying ways she can increase positive time with her daughter.  Therapist will also teach mindfulness strategies to help patient with being in the present moment when appropriate - .        Goal 3: Patiient will improve communication with her .      I will know I've met my goal when I feel less irritated with my  and communicate more openly with my .  I would like to be more assertive and less aggressive.   I would like to not avoid telling him things because I'm worried about his reaction or don't think he will react well.  I would like to be more present to the moment during times when this would be helpful.\"       Objective #A (Patient Action)                          Status: Continued - Date(s):9/13/2023   continues to work on this goal, though emphasis has shift to managing caregiver stress related to 's cancer diagnosis and treatment.         2/20/2024 - further review of treatment goals is in progress and will continue over the next session, given break in therapy    8/22/2024 - this goal was not addressed in this most recent " episode of care      Patient will learn and practice at least two new interpersonal effectiveness strategies.     Intervention(s)  Therapist will teach strategies from DBT module on interpersonal effectiveness, and will assign homework to help reinforce skill development.            Patient has reviewed and agreed to the above plan.        Cristy Wilkinson PsyD,   Licensed Psychologist  Reviewed on 12.2.2024                                                                                                           Answers submitted by the patient for this visit:  Patient Health Questionnaire (Submitted on 1/15/2025)  If you checked off any problems, how difficult have these problems made it for you to do your work, take care of things at home, or get along with other people?: Somewhat difficult  PHQ9 TOTAL SCORE: 5

## 2025-02-27 ENCOUNTER — VIRTUAL VISIT (OUTPATIENT)
Dept: PSYCHOLOGY | Facility: CLINIC | Age: 45
End: 2025-02-27
Payer: COMMERCIAL

## 2025-02-27 DIAGNOSIS — F41.9 ANXIETY DISORDER, UNSPECIFIED TYPE: Primary | ICD-10-CM

## 2025-02-27 PROCEDURE — 90834 PSYTX W PT 45 MINUTES: CPT | Mod: 95 | Performed by: PSYCHOLOGIST

## 2025-02-27 ASSESSMENT — PATIENT HEALTH QUESTIONNAIRE - PHQ9
10. IF YOU CHECKED OFF ANY PROBLEMS, HOW DIFFICULT HAVE THESE PROBLEMS MADE IT FOR YOU TO DO YOUR WORK, TAKE CARE OF THINGS AT HOME, OR GET ALONG WITH OTHER PEOPLE: NOT DIFFICULT AT ALL
SUM OF ALL RESPONSES TO PHQ QUESTIONS 1-9: 5
SUM OF ALL RESPONSES TO PHQ QUESTIONS 1-9: 5

## 2025-02-27 NOTE — PROGRESS NOTES
[facility]                                            Progress Note    Patient Name: Idalia Hinojosa  Date: 2/27/2025           Service Type: Individual  Start time:  9:06 am End time:  9:56 am  Session Length:  50 minutes    Session #: 19    Attendees: Client     Service modality: Video visit      Provider verified identity through the following two step process.  Patient provided:  Patient is known previously to provider    Telemedicine Visit: The patient's condition can be safely assessed and treated via synchronous audio and visual telemedicine encounter.      Reason for Telemedicine Visit: Services only offered telehealth    Originating Site (Patient Location): Patient's home    Distant Site (Provider Location): Provider Remote Setting- Home Office    Consent:  The patient/guardian has verbally consented to: the potential risks and benefits of telemedicine versus in person care; bill my insurance or make self-payment for services provided; and responsibility for payment of non-covered services.     Patient would like the video invitation sent by:  My Chart    Mode of Communication:  Video Conference via [employer],     As the provider I attest to compliance with applicable laws and regulations related to telemedicine.     Treatment Plan Last Reviewed: 2.27.2025    PHQ-9/DAVID-7: 2.27.2025        DATA  Interactive Complexity: No  Crisis: No        Progress Since Last Session (Related to Symptoms / Goals / Homework):   Symptoms: She reports feelings of anger and resentment she thought she had previously worked through re-surfaced since her last session - she was able to practice awareness of the feelings - identified what was contributing - and used skills she has learned to help her continue to work through these feelings -     PHQ score today of 5, consistent with recent scores of 5 and 6.      Homework:  - Successful with completing homework - wrote a letter to her 18 year old  "self which was a helpful experience for her and helped her to recognize patterns of \"all or nothing\" thinking from how she thought about her future and goals, which allowed her to provide some compassionate and more balanced thinking in response.  Continued succes with increasing awareness of thoughts, identifying negative thinking and reframing to more helpful thinking.        Episode of Care Goals: Idalia had recently returned to therapy after a several month break.  Her current concerns are related to feeling more frustrated and angry and not sure how to cope.  She identifies difficulties in her marriage as contributing factor to feeling more frustrated and angry.       Current / Ongoing Stressors and Concerns:  Current: Continued work on managing feelings of anger, resentment and underlying feelings of disappointment and loss, largely in the context of work related stressors, as well as phase of life stressors as she explores her career aspirations in the context of work related stressors and challenges.      Ongoing: (copied forward) Working on building effective emotion regulation strategies for managing daily frustrations and upsets.  Context for previous episode of care included stresses related to behavioral challenges with her daughter, who has been diagnosed with ADHD, and working with her  on addressing parenting differences.   Her  also had previously been diagnosed with cancer and went through treatment, and previous therapy focused on caregiver stress and helping her to attend to her self-care during this very stressful period of time.         Treatment Objective(s) Addressed in This Session:     Main emphasis today - continued focus on awareness of self-talk and impact on emotions, encouragement to find more positive, affirming, rational, kind, compassionate and helpful self-talk  Exploration of feelings of self-worth  - connecting self-worth to who she is rather than what she " "does  Build and practice interpersonal effectiveness strategies   Build and practice emotion regulation strategies      Continue with:     Review of self-care strategies and identification of areas of need   Learn and practice emotion regulation strategies (in particular, how to manage and separate your own emotional reaction when someone around you is escalating)  Learn and practice cognitive strategies to help with identifying and managing unhelpful thoughts  Identify recent stressors and triggers to feelings of upset and frustration and identify 1-2 steps you can take to actively address stressors  Review of interpersonal effectiveness strategies  Learn and practice \"middle path\" strategy      Intervention:     CBT and DBT: Idalia processed what it was like for her to write a letter to her 18 year old self.  She found this to be a helpful experience as she realized powerful \"all or nothing\" thoughts she had at the time about her future and what she hoped and wanted to accomplish, and the impact this has had on her in light of how her life has unfolded since then.  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      We talked about ways for her to find balanced thinking - and a balanced approach - with spending time working towards her future job goals, without this becoming \"all-consuming\" - giving herself permission to take a break, attend to other things she needs to, as well as to take time to plan for her trip to [facility], something she is very much looking forward to.      Copied forward - Continued emphasis on identifying and utilizing emotion regulation and interpersonal effectiveness strategies to help with managing challenging situations at work.    Will continue to encourage she prioritize scheduling time to " "connect with, and have fun with friends.      ASSESSMENT: Current Emotional / Mental Status (status of significant symptoms):   Risk status (Self / Other harm or suicidal ideation)   Patient denies current fears or concerns for personal safety.   Patient reports current suicidal ideation, she denies any current or recent suicidal behaviors.  She reports if suicidal  thoughts do occur, she knows steps she can take to manage them and believes she can manage them, and will reach out for additional help if needed.     Patientdenies current or recent homicidal ideation or behaviors.   Patient denies current or recent self injurious behavior or ideation.   Patient denies other safety concerns.   Patient reports there has not been a change in risk factors since their last session -      Recommended that patient call 911 or go to the local ED should there be a change in any of these risk factors.  We reviewed how to contact [facility] crisis/[facility] for urgent/crisis concerns 24/7.  Provided patient with crisis resources and she agrees to use safety plan should any safety concerns arise.      Professionals or agencies I can contact during a crisis:  Suicide Prevention Lifeline: call or text 215  Crisis Text Line: text \"[place]\" to 994039  Local Crisis Services: [facility]: [phone]  Call 911 or go to my nearest emergency department, or [facility].         Appearance:   Appropriate    Eye Contact:   Good    Psychomotor Behavior: Normal   Attitude:   Cooperative    Orientation:   All   Speech    Rate / Production: Normal     Volume:  Normal    Mood:    Feelings of anger and resentment have re-surfaced for her    Affect:    Congruent with mood   Thought Content:  Clear    Thought Form:  Coherent  Logical    Insight:    Good      Medication Review:   No changes to psychiatric medication      Medication Compliance:   Yes      Changes in Health Issues:  No new health concerns or changes to health     Chemical " Use Review:  No changes or concerns with alcohol use.         Substance Use: Chemical use reviewed, no active concerns identified.      Tobacco Use: No current tobacco use.       Diagnosis:  Anxiety disorder unspecified    PHQ scores continue to remain low and have decreased over time         PLAN: (Patient Tasks / Therapist Tasks / Other)    1) Idalia identified the following goals: practice balance - and patience - with working towards future job goals in a way that is not all time-consuming - continue to find balance with taking time for myself and giving myself permission to take a break.  Spend time planing for Mar!    Consider writing a letter to your current self and future self - what does she need to hear?        Continue with: Pay attention to self-talk - It is kind, is it compassionate, it is helpful?  Be mindful of all or nothing thinkng - look for the gray.  Stop and take a deep breath when feeling strong emotions, give myself permission to slow down and not rush when I don't need to - starting with biking to work - take my breaks at work - and listen to low-key music-    Continue with: Pay attention to my feelings rather than denying or ignoring.  When feeling stressed, ask myself what is contributing and what I think will help.  Re-frame how I'm thinking about the situation with Aaron at work- focus on what I have control over and accept what I don't have control over.  Think about what I like to do and how I can have outlets for my creative energy.       2) If there is a crisis situation, with your daughter or yourself, remember you are not alone and that there are people who can help you twenty-four hours a day, seven days a week.  Call [facility] ([facility]): [phone].      3) Next appt is scheduled for:  March 19th at at 9:00 am     Cristy Wilkinson PsyD, KYLIE  2/27/2025                                    ____________________________________    Treatment Plan     Patient's Name:  Idalia Hinojosa                        YOB: 1980     Date of Creation: 1/6/2020  Date Treatment Plan Last Reviewed/Revised: 12.2.2024       DSM5 Diagnoses: 300.00 (F41.9) Unspecified Anxiety Disorder  Psychosocial / Contextual Factors: strain in marital relationship, parenting challenges, adjustment challenges, 's cancer diagnosis  PROMIS (reviewed every 90 days):      Anticipated number of session for this episode of care: possibly a few more sessions if needed to support sustained progress and maintenance  Anticipation frequency of session: Monthly   Anticipated Duration of each session: 38-52 minutes  Treatment plan will be reviewed in 90 days or when goals have been changed.         Referral / Collaboration:  No referrals at this time.  Have previously recommended couples therapy referral.  Coordinate with Dr. Han, patient's PCP.        MeasurableTreatment Goal(s) related to diagnosis / functional impairment(s)  Goal 1: Patient will learn emotion regulation strategies to help when she is feeling upset/angry/frustrated/distressed    I will know I've met my goal when I would yell less, I would feel calmer, and I would not push others.  I would be less physical when I'm angry (e.g. wouldn't slam doors or hit things)        Objective #A (Patient Action)                          Patient will learn and practice at least 2 new emotion regulation strategies.  Status: Continued - Date(s):    9/13/2023- Regularly practicing emotion regulation strategies to help manage increase in emotional distress related to spouse's cancer diagnosis.  She has been successful with using strategies to help her stay calm when her daughter's experiencing heightened emotion and/or challenging behaviors.  She's working on remembering to engage calming strategies when feeling physically tense and/or anxious/stressed.    2/20/24 - Will continue to focus on building emotion regulation strategies    2.27.25.2024 -  this is a primary focus of her current episode of care -is making good progress with learning and practicing strategies, has been demonstrating ability to skillfully utilize strategies to manage challenging situations and emotions.           Intervention(s)  Therapist will provide psychoeducation on emotion regulation module from DBT and will assign patient homework to help reinforce skill development.     Objective #B  Patient will identify factors that increase vulnerability to emotion mind and identify ways to decrease vulnerability to emotion mind.  Status: Continued - Date(s):9/13/2023 - routinely incorporating mindfulness, distress tolerance and self-awareness strategies to increase attention and focus to factors that increase vulnerability to emotion mind  2/20/2024 - further review of treatment goals is in progress and will continue over the next session, given break in therapy   5/22/2024 - Idalia has made great progress identifying and practicing self-care habits/strategies that help decrease vulnerability to emotion mind   [phone]-  this is a primary focus of her current episode of care -is making good progress with incorporating self-care strategies that support her emotional and physical well-being.  Helping her to practice balanced thinking to help with incorporating more balance in to how she approaches her day.      Intervention(s)  Therapist will provide information on factors that increase vulnerabiliyt to emotion mind      Objective #C  Patient will identify warning signs and signals that emotions are escalating and will learn ways to skillfully intervene early on.  Status: Date: 9/13/2023- in progress - has demonstrated good progress and awareness in being able to identify warning signs and intervene skillfully-working on incorporating deep breathing to help with calming and re-centering.    2/20/2024 - further review of treatment goals is in progress and will continue over the next session,  "given break in therapy   5/22/2024 - Goal completed        Intervention(s)  Therapist will help patient identify warning signs and signals, and will guide the patient in identifying skillful ways to intervene when exeriencing warning signs and signals.      Objective #B (Patient Action)                          Status: NEW - Date(s):9/13/2023- Patient will learn mind-body connection, healthy ways to manage stress, calming strategies, and ways she can address cognitive and behavioral factors that can contribute to pain experience.    2/20/2024 - further review of treatment goals is in progress and will continue over the next session, given break in therapy    5/22/2024 - goal completed       Patient will learn and practice at least two strategies that help improve her emotional well-being.       Intervention(s)  Therapist will teach calming strategies and healthy stress management strategies, and will assign homework to help reinforce skill development.  Status - patient has been working on engaging calming strategies and in particular, not trying to \"force herself\" to calm down which counters the relaxation response.  Working currently on how to cue/remind herself to engage these strategies.               Goal 2: Patient will learn new parenting strategies to help with managing parenting challenges.  Parent will work on improving relationship with her daughter and defining what she would like this relationship to look like.      I will know I've met my goal when I'm enjoying time with my daughter, teaching her new things, and not waiting for things to change       Objective #A (Patient Action)                          Status: Continued - Date(s):9/13/2023 2/20/2024 - further review of treatment goals is in progress and will continue over the next session, given break in therapy   8/22/2024 - this goal was not addressed in this most recent episode of care         Patient will increase understanding of developmental " norms for her daughter and ways to manage challenging behaviors.     Intervention(s)  Therapist will provide psychoeducation on developmental norms and parenting strategies.     Objective #B  Patient will spend time reflecting on her relationship with her daughter and defining what she would like this relaitonship to look like.                  Status: Continued - Date(s):9/13/2023     Making good progress - has also been working with daughter's therapist and has been actively practicing and using suggested strategies.   Likely can work towards resolving this goal, as this goal has largely shifted towards the work she is doing with her daughter's individual therapist, where she believes they have been making good progress on helping her in her relationship with her daughter.       2/20/2024 - further review of treatment goals is in progress and will continue over the next session, given break in therapy    8/22/2024 - this goal was not addressed in this most recent episode of care     Intervention(s)  Therapist will guide the patient in reflecting upon her relationship with her daughter and help her define ways to move towards what she vaues in her relationship with her daughter.     Objective #C  Patient will increase time spent on fun activity and play with her daughter.  Status: Continued - Date(s):9/13/2023-has been enjoying time with daughter and devoting regular time to fun activities and play together  2/20/2024 - further review of treatment goals is in progress and will continue over the next session, given break in therapy    8/22/2024 - this goal was not addressed in this most recent episode of care           Intervention(s)  Therapist will guide the patient in identifying ways she can increase positive time with her daughter.  Therapist will also teach mindfulness strategies to help patient with being in the present moment when appropriate - .        Goal 3: Patiient will improve communication with her  ".      I will know I've met my goal when I feel less irritated with my  and communicate more openly with my .  I would like to be more assertive and less aggressive.   I would like to not avoid telling him things because I'm worried about his reaction or don't think he will react well.  I would like to be more present to the moment during times when this would be helpful.\"       Objective #A (Patient Action)                          Status: Continued - Date(s):9/13/2023   continues to work on this goal, though emphasis has shift to managing caregiver stress related to 's cancer diagnosis and treatment.         2/20/2024 - further review of treatment goals is in progress and will continue over the next session, given break in therapy    8/22/2024 - this goal was not addressed in this most recent episode of care      Patient will learn and practice at least two new interpersonal effectiveness strategies.     Intervention(s)  Therapist will teach strategies from DBT module on interpersonal effectiveness, and will assign homework to help reinforce skill development.            Patient has reviewed and agreed to the above plan.        Cristy Wilkinson PsyD,   Licensed Psychologist  Reviewed on 2.27.2025                                                                                               =                                                             Answers submitted by the patient for this visit:  Patient Health Questionnaire (Submitted on 1/15/2025)  If you checked off any problems, how difficult have these problems made it for you to do your work, take care of things at home, or get along with other people?: Somewhat difficult  PHQ9 TOTAL SCORE: 5            Answers submitted by the patient for this visit:  Patient Health Questionnaire (Submitted on 1/15/2025)  If you checked off any problems, how difficult have these problems made it for you to do your work, take care of things " at home, or get along with other people?: Somewhat difficult  PHQ9 TOTAL SCORE: 5

## 2025-03-18 ENCOUNTER — MYC MEDICAL ADVICE (OUTPATIENT)
Dept: OBGYN | Facility: CLINIC | Age: 45
End: 2025-03-18
Payer: COMMERCIAL

## 2025-03-19 ENCOUNTER — VIRTUAL VISIT (OUTPATIENT)
Dept: PSYCHOLOGY | Facility: CLINIC | Age: 45
End: 2025-03-19
Payer: COMMERCIAL

## 2025-03-19 DIAGNOSIS — F41.9 ANXIETY DISORDER, UNSPECIFIED TYPE: Primary | ICD-10-CM

## 2025-03-19 PROCEDURE — 90834 PSYTX W PT 45 MINUTES: CPT | Mod: 95 | Performed by: PSYCHOLOGIST

## 2025-03-20 NOTE — TELEPHONE ENCOUNTER
Left voicemail for patient regarding upcoming trigger point injections on 5/30/24. Patient given number to call back. No medication to be held.       used

## 2025-03-24 NOTE — PROGRESS NOTES
"           [facility]                                            Progress Note    Patient Name: Idalia Hinojosa  Date: March 19th, 2025           Service Type: Individual  Start time:  9:06 am End time:  9:56 am  Session Length:  50 minutes    Session #: 20    Attendees: Client     Service modality: Video visit      Provider verified identity through the following two step process.  Patient provided:  Patient is known previously to provider    Telemedicine Visit: The patient's condition can be safely assessed and treated via synchronous audio and visual telemedicine encounter.      Reason for Telemedicine Visit: Services only offered telehealth    Originating Site (Patient Location): Patient's home    Distant Site (Provider Location): Provider Remote Setting- Home Office    Consent:  The patient/guardian has verbally consented to: the potential risks and benefits of telemedicine versus in person care; bill my insurance or make self-payment for services provided; and responsibility for payment of non-covered services.     Patient would like the video invitation sent by:  My Chart    Mode of Communication:  Video Conference via [employer],     As the provider I attest to compliance with applicable laws and regulations related to telemedicine.     Treatment Plan Last Reviewed: 2.27.2025    PHQ-9/DAVID-7: 2.27.2025        DATA  Interactive Complexity: No  Crisis: No        Progress Since Last Session (Related to Symptoms / Goals / Homework):   Symptoms: She reports symptoms have been \"up and down\" - she's experienced some difficulties with her mood related to her spouse's heightened anxiety with their upcoming trip.  She's also struggling with some work related frustrations, though trying to skillfully manage working through her emotions.      PHQ score-recent scores of 5 and 6.      Homework:  - Successful with completing homework - was able to find good \"balance\" in spending time working on " coding while also creating space to plan for her trip and time for herself.   Continued succes with increasing awareness of thoughts, identifying negative thinking and reframing to more helpful thinking.        Episode of Care Goals: Idalia had recently returned to therapy after a several month break.  Her current concerns are related to feeling more frustrated and angry and not sure how to cope.  She identifies difficulties in her marriage as contributing factor to feeling more frustrated and angry.       Current / Ongoing Stressors and Concerns:  Current: She's looking forward to an upcoming trip to [facility] with her spouse to visit her sister.  Continued work on managing feelings of anger, resentment and underlying feelings of disappointment and loss, largely in the context of work related stressors, as well as phase of life stressors as she explores her career aspirations in the context of work related stressors and challenges.      Ongoing: (copied forward) Working on building effective emotion regulation strategies for managing daily frustrations and upsets.  Context for previous episode of care included stresses related to behavioral challenges with her daughter, who has been diagnosed with ADHD, and working with her  on addressing parenting differences.   Her  also had previously been diagnosed with cancer and went through treatment, and previous therapy focused on caregiver stress and helping her to attend to her self-care during this very stressful period of time.         Treatment Objective(s) Addressed in This Session:     Main emphasis today - Build healthy sense of identity and self-worth that is not contingent upon what you do    Continued focus on awareness of self-talk and impact on emotions, encouragement to find more positive, affirming, rational, kind, compassionate and helpful self-talk  Exploration of feelings of self-worth  - connecting self-worth to who she is rather than what  "she does  Build and practice interpersonal effectiveness strategies   Build and practice emotion regulation strategies      Continue with:     Review of self-care strategies and identification of areas of need   Learn and practice emotion regulation strategies (in particular, how to manage and separate your own emotional reaction when someone around you is escalating)  Learn and practice cognitive strategies to help with identifying and managing unhelpful thoughts  Identify recent stressors and triggers to feelings of upset and frustration and identify 1-2 steps you can take to actively address stressors  Review of interpersonal effectiveness strategies  Learn and practice \"middle path\" strategy      Intervention:     CBT and DBT: Processed work related stressors and frustrations she is experiencing at work.  Encouraged emphasis on using DBT skills to find and practice \"radical acceptance\" - what is hers to own and focus on at work, and what is others to let them have and let go of.  She finds herself feeling impatient with the process of finding and securing a new job, which contributes to frustration at work.  She identified ways she can practice patience with the process and trusting the process and the time it will take to move towards something important and different for her.  This also led to further exploration of her sense of identity and how closely she has connected feelings of self-worth to what she does, rather than who she is, which contributes to negative thinking and interpretation around herself and her career and where she thinks she \"should\" be.  Encourage her to continue to gently challenge and update her beliefs in a way that supports a positive sense of self.      We talked about her upcoming trip to [facility] and what will help her to enjoy and savor the trip.  It's a great opportunity for her to practice \"mindfulness\" to the moment and not anticipating/thinking about the trip ending and " "returning to work.  It will also be an opportunity for her to continue to practice  her emotional reaction/response from her spouse's - as she believes this trip has triggered anxiety for him that may come out while on their trip.      We talked about ways for her to find balanced thinking - and a balanced approach - with spending time working towards her future job goals, without this becoming \"all-consuming\" - giving herself permission to take a break, attend to other things she needs to, as well as to take time to plan for her trip to [facility], something she is very much looking forward to.      Copied forward - Continued emphasis on identifying and utilizing emotion regulation and interpersonal effectiveness strategies to help with managing challenging situations at work.    Will continue to encourage she prioritize scheduling time to connect with, and have fun with friends.      ASSESSMENT: Current Emotional / Mental Status (status of significant symptoms):   Risk status (Self / Other harm or suicidal ideation)   Patient denies current fears or concerns for personal safety.   Patient reports current suicidal ideation, she denies any current or recent suicidal behaviors.  She reports if suicidal  thoughts do occur, she knows steps she can take to manage them and believes she can manage them, and will reach out for additional help if needed.     Patientdenies current or recent homicidal ideation or behaviors.   Patient denies current or recent self injurious behavior or ideation.   Patient denies other safety concerns.   Patient reports there has not been a change in risk factors since their last session -      Recommended that patient call 911 or go to the local ED should there be a change in any of these risk factors.  We reviewed how to contact [facility] crisis/COPE for urgent/crisis concerns 24/7.  Provided patient with crisis resources and she agrees to use safety plan should any safety " "concerns arise.      Professionals or agencies I can contact during a crisis:  Suicide Prevention Lifeline: call or text 988  Crisis Text Line: text \"[place]\" to 941654  [facility] Crisis Services: [facility]: [phone]  Call 911 or go to my nearest emergency department, or [facility].         Appearance:   Appropriate    Eye Contact:   Good    Psychomotor Behavior: Normal   Attitude:   Cooperative    Orientation:   All   Speech    Rate / Production: Normal     Volume:  Normal    Mood:    Mood has been \"up and down\" in light of work related stressors and stressors with spouse.     Affect:    Congruent with mood   Thought Content:  Clear    Thought Form:  Coherent  Logical    Insight:    Good      Medication Review:   No changes to psychiatric medication      Medication Compliance:   Yes      Changes in Health Issues:  No new health concerns or changes to health     Chemical Use Review:  No changes or concerns with alcohol use.         Substance Use: Chemical use reviewed, no active concerns identified.      Tobacco Use: No current tobacco use.       Diagnosis:  Anxiety disorder unspecified    PHQ scores continue to remain low and have decreased over time         PLAN: (Patient Tasks / Therapist Tasks / Other)    1) Idalia identified the following goals: Practice mindfulness to the moment in [facility] - don't anticipate/think about trip ending, rather allow yourself to be in the moment of enjoying your time in [place]  Allow yourself to separate your emotional response/reaction from Donald's.  Practice balance - and patience - with working towards future job goals in a way that is not all time-consuming - continue to find balance with taking time for myself and giving myself permission to take a break.      Consider writing a letter to your current self and future self - what does she need to hear?        Continue with: Pay attention to self-talk - It is kind, is it compassionate, it is helpful?  Be mindful of " all or nothing thinkng - look for the gray.  Stop and take a deep breath when feeling strong emotions, give myself permission to slow down and not rush when I don't need to - starting with biking to work - take my breaks at work - and listen to low-key music-    Continue with: Pay attention to my feelings rather than denying or ignoring.  When feeling stressed, ask myself what is contributing and what I think will help.  Re-frame how I'm thinking about the situation with Aaron at work- focus on what I have control over and accept what I don't have control over.  Think about what I like to do and how I can have outlets for my creative energy.       2) If there is a crisis situation, with your daughter or yourself, remember you are not alone and that there are people who can help you twenty-four hours a day, seven days a week.  Call [place] ([facility] Crisis [facility]): [phone].      3) Next appt is scheduled for:  April 9th at 9 am.      Cristy Wilkinson PsyD, KYLIE  3/19/2025                                    ____________________________________    Treatment Plan     Patient's Name: Idalia Hinojosa                        YOB: 1980     Date of Creation: 1/6/2020  Date Treatment Plan Last Reviewed/Revised: 12.2.2024       DSM5 Diagnoses: 300.00 (F41.9) Unspecified Anxiety Disorder  Psychosocial / Contextual Factors: strain in marital relationship, parenting challenges, adjustment challenges, 's cancer diagnosis  PROMIS (reviewed every 90 days):      Anticipated number of session for this episode of care: possibly a few more sessions if needed to support sustained progress and maintenance  Anticipation frequency of session: Monthly   Anticipated Duration of each session: 38-52 minutes  Treatment plan will be reviewed in 90 days or when goals have been changed.         Referral / Collaboration:  No referrals at this time.  Have previously recommended couples therapy referral.  Coordinate with  Dr. Han, patient's PCP.        MeasurableTreatment Goal(s) related to diagnosis / functional impairment(s)  Goal 1: Patient will learn emotion regulation strategies to help when she is feeling upset/angry/frustrated/distressed    I will know I've met my goal when I would yell less, I would feel calmer, and I would not push others.  I would be less physical when I'm angry (e.g. wouldn't slam doors or hit things)        Objective #A (Patient Action)                          Patient will learn and practice at least 2 new emotion regulation strategies.  Status: Continued - Date(s):    9/13/2023- Regularly practicing emotion regulation strategies to help manage increase in emotional distress related to spouse's cancer diagnosis.  She has been successful with using strategies to help her stay calm when her daughter's experiencing heightened emotion and/or challenging behaviors.  She's working on remembering to engage calming strategies when feeling physically tense and/or anxious/stressed.    2/20/24 - Will continue to focus on building emotion regulation strategies    2.27.25.2024 - this is a primary focus of her current episode of care -is making good progress with learning and practicing strategies, has been demonstrating ability to skillfully utilize strategies to manage challenging situations and emotions.           Intervention(s)  Therapist will provide psychoeducation on emotion regulation module from DBT and will assign patient homework to help reinforce skill development.     Objective #B  Patient will identify factors that increase vulnerability to emotion mind and identify ways to decrease vulnerability to emotion mind.  Status: Continued - Date(s):9/13/2023 - routinely incorporating mindfulness, distress tolerance and self-awareness strategies to increase attention and focus to factors that increase vulnerability to emotion mind  2/20/2024 - further review of treatment goals is in progress and will  continue over the next session, given break in therapy   5/22/2024 - Idalia has made great progress identifying and practicing self-care habits/strategies that help decrease vulnerability to emotion mind   2.24.1316-  this is a primary focus of her current episode of care -is making good progress with incorporating self-care strategies that support her emotional and physical well-being.  Helping her to practice balanced thinking to help with incorporating more balance in to how she approaches her day.      Intervention(s)  Therapist will provide information on factors that increase vulnerabiliyt to emotion mind      Objective #C  Patient will identify warning signs and signals that emotions are escalating and will learn ways to skillfully intervene early on.  Status: Date: 9/13/2023- in progress - has demonstrated good progress and awareness in being able to identify warning signs and intervene skillfully-working on incorporating deep breathing to help with calming and re-centering.    2/20/2024 - further review of treatment goals is in progress and will continue over the next session, given break in therapy   5/22/2024 - Goal completed        Intervention(s)  Therapist will help patient identify warning signs and signals, and will guide the patient in identifying skillful ways to intervene when exeriencing warning signs and signals.      Objective #B (Patient Action)                          Status: NEW - Date(s):9/13/2023- Patient will learn mind-body connection, healthy ways to manage stress, calming strategies, and ways she can address cognitive and behavioral factors that can contribute to pain experience.    2/20/2024 - further review of treatment goals is in progress and will continue over the next session, given break in therapy    5/22/2024 - goal completed       Patient will learn and practice at least two strategies that help improve her emotional well-being.       Intervention(s)  Therapist will teach  "calming strategies and healthy stress management strategies, and will assign homework to help reinforce skill development.  Status - patient has been working on engaging calming strategies and in particular, not trying to \"force herself\" to calm down which counters the relaxation response.  Working currently on how to cue/remind herself to engage these strategies.               Goal 2: Patient will learn new parenting strategies to help with managing parenting challenges.  Parent will work on improving relationship with her daughter and defining what she would like this relationship to look like.      I will know I've met my goal when I'm enjoying time with my daughter, teaching her new things, and not waiting for things to change       Objective #A (Patient Action)                          Status: Continued - Date(s):9/13/2023 2/20/2024 - further review of treatment goals is in progress and will continue over the next session, given break in therapy   8/22/2024 - this goal was not addressed in this most recent episode of care         Patient will increase understanding of developmental norms for her daughter and ways to manage challenging behaviors.     Intervention(s)  Therapist will provide psychoeducation on developmental norms and parenting strategies.     Objective #B  Patient will spend time reflecting on her relationship with her daughter and defining what she would like this relaitonship to look like.                  Status: Continued - Date(s):9/13/2023     Making good progress - has also been working with daughter's therapist and has been actively practicing and using suggested strategies.   Likely can work towards resolving this goal, as this goal has largely shifted towards the work she is doing with her daughter's individual therapist, where she believes they have been making good progress on helping her in her relationship with her daughter.       2/20/2024 - further review of treatment goals is in " "progress and will continue over the next session, given break in therapy    8/22/2024 - this goal was not addressed in this most recent episode of care     Intervention(s)  Therapist will guide the patient in reflecting upon her relationship with her daughter and help her define ways to move towards what she vaues in her relationship with her daughter.     Objective #C  Patient will increase time spent on fun activity and play with her daughter.  Status: Continued - Date(s):9/13/2023-has been enjoying time with daughter and devoting regular time to fun activities and play together  2/20/2024 - further review of treatment goals is in progress and will continue over the next session, given break in therapy    8/22/2024 - this goal was not addressed in this most recent episode of care           Intervention(s)  Therapist will guide the patient in identifying ways she can increase positive time with her daughter.  Therapist will also teach mindfulness strategies to help patient with being in the present moment when appropriate - .        Goal 3: Patiient will improve communication with her .      I will know I've met my goal when I feel less irritated with my  and communicate more openly with my .  I would like to be more assertive and less aggressive.   I would like to not avoid telling him things because I'm worried about his reaction or don't think he will react well.  I would like to be more present to the moment during times when this would be helpful.\"       Objective #A (Patient Action)                          Status: Continued - Date(s):9/13/2023   continues to work on this goal, though emphasis has shift to managing caregiver stress related to 's cancer diagnosis and treatment.         2/20/2024 - further review of treatment goals is in progress and will continue over the next session, given break in therapy    8/22/2024 - this goal was not addressed in this most recent episode of " care      Patient will learn and practice at least two new interpersonal effectiveness strategies.     Intervention(s)  Therapist will teach strategies from DBT module on interpersonal effectiveness, and will assign homework to help reinforce skill development.            Patient has reviewed and agreed to the above plan.        Cristy Wilkinson PsyD, LP  Licensed Psychologist  Reviewed on 2.27.2025                                                                                               =

## 2025-04-09 ENCOUNTER — VIRTUAL VISIT (OUTPATIENT)
Dept: PSYCHOLOGY | Facility: CLINIC | Age: 45
End: 2025-04-09
Payer: COMMERCIAL

## 2025-04-09 DIAGNOSIS — F41.9 ANXIETY DISORDER, UNSPECIFIED TYPE: Primary | ICD-10-CM

## 2025-04-09 PROCEDURE — 90834 PSYTX W PT 45 MINUTES: CPT | Mod: 95 | Performed by: PSYCHOLOGIST

## 2025-04-09 NOTE — PROGRESS NOTES
"           [facility]                                            Progress Note    Patient Name: Idalia Hinojosa  Date: April 9th, 2025           Service Type: Individual  Start time:  9:04 am End time:  9:56 am  Session Length:  52 minutes    Session #: 21    Attendees: Client     Service modality: Video visit      Provider verified identity through the following two step process.  Patient provided:  Patient is known previously to provider    Telemedicine Visit: The patient's condition can be safely assessed and treated via synchronous audio and visual telemedicine encounter.      Reason for Telemedicine Visit: Services only offered telehealth    Originating Site (Patient Location): Patient's home    Distant Site (Provider Location): Provider Remote Setting- Home Office    Consent:  The patient/guardian has verbally consented to: the potential risks and benefits of telemedicine versus in person care; bill my insurance or make self-payment for services provided; and responsibility for payment of non-covered services.     Patient would like the video invitation sent by:  My Chart    Mode of Communication:  Video Conference via [employer],     As the provider I attest to compliance with applicable laws and regulations related to telemedicine.     Treatment Plan Last Reviewed: 2.27.2025    PHQ-9/DAVID-7: 2.27.2025        DATA  Interactive Complexity: No  Crisis: No        Progress Since Last Session (Related to Symptoms / Goals / Homework):   Symptoms: She reported she had a really wonderful trip to [place] with her spouse -feels less tense and more relaxed - it was relaxing and she \"loved it\" - it made her realize how much she rushes around in her typical day to day and gave her a renewed focus on slowing down and making time for fun moments and taking in the moment, since returning back home.      PHQ score of 1 today - decrease from recent scores of 5 and 6.      Homework:  - Successful -was able " "to be in the moment during her trip and really enjoyed it.  Didn't think about it being over.  Continued succes with increasing awareness of thoughts, identifying negative thinking and reframing to more helpful thinking.        Episode of Care Goals: . Her current concerns are related to feeling more frustrated and angry and not sure how to cope.  She identifies difficulties in her marriage as contributing factor to feeling more frustrated and angry.       Current / Ongoing Stressors and Concerns:  Current: She had a wonderful trip to [facility] where she visited her sister.  This has re-energized her to work on slowing down and finding balance - she worries she'll have difficulties finding balance now that she's returned and is back in the day to day.  Continued work on managing feelings of anger, resentment and underlying feelings of disappointment and loss, largely in the context of work related stressors, as well as phase of life stressors as she explores her career aspirations in the context of work related stressors and challenges.      Ongoing: (copied forward) Working on building effective emotion regulation strategies for managing daily frustrations and upsets.  Context for previous episode of care included stresses related to behavioral challenges with her daughter, who has been diagnosed with ADHD, and working with her  on addressing parenting differences.   Her  also had previously been diagnosed with cancer and went through treatment, and previous therapy focused on caregiver stress and helping her to attend to her self-care during this very stressful period of time.         Treatment Objective(s) Addressed in This Session:     Main emphasis today -   Identify what \"balance\" looks like to you - what changes can you make in your behaviors, actions and thoughts that will support you in finding more balance in your day to day life?    Identify what would be helpful to talk with spouse about to " "support you in working towards more balance  Build healthy sense of identity and self-worth that is not contingent upon what you do    Continued focus on awareness of self-talk and impact on emotions, encouragement to find more positive, affirming, rational, kind, compassionate and helpful self-talk  Exploration of feelings of self-worth  - connecting self-worth to who she is rather than what she does  Build and practice interpersonal effectiveness strategies   Build and practice emotion regulation strategies      Continue with:     Review of self-care strategies and identification of areas of need   Learn and practice emotion regulation strategies (in particular, how to manage and separate your own emotional reaction when someone around you is escalating)  Learn and practice cognitive strategies to help with identifying and managing unhelpful thoughts  Identify recent stressors and triggers to feelings of upset and frustration and identify 1-2 steps you can take to actively address stressors  Review of interpersonal effectiveness strategies  Learn and practice \"middle path\" strategy      Intervention:     CBT, DBT and solution focused: Exploration around what \"balance\" would like in her day to day, identification of changes she can make (in behaviors, actions and thoughts) that would support her in finding a better balance.  Identified barriers or obstacles that she may experience and how to overcome.  She will also identify what she believes would be helpful to communicate with her spouse about - in particular, ways he can help support her with this.      She will work on practicing flexible thinking and radical acceptance,      Copied forward - Continued emphasis on identifying and utilizing emotion regulation and interpersonal effectiveness strategies to help with managing challenging situations at work.    Will continue to encourage she prioritize scheduling time to connect with, and have fun with friends.  " "    ASSESSMENT: Current Emotional / Mental Status (status of significant symptoms):   Risk status (Self / Other harm or suicidal ideation)   Patient denies current fears or concerns for personal safety.   Patient reports current suicidal ideation, she denies any current or recent suicidal behaviors.  She reports if suicidal  thoughts do occur, she knows steps she can take to manage them and believes she can manage them, and will reach out for additional help if needed.     Patientdenies current or recent homicidal ideation or behaviors.   Patient denies current or recent self injurious behavior or ideation.   Patient denies other safety concerns.   Patient reports there has not been a change in risk factors since their last session -      Recommended that patient call 911 or go to the local ED should there be a change in any of these risk factors.  We reviewed how to contact [facility] crisis/[facility] for urgent/crisis concerns 24/7.  Provided patient with crisis resources and she agrees to use safety plan should any safety concerns arise.      Professionals or agencies I can contact during a crisis:  Suicide Prevention Lifeline: call or text 314  Crisis Text Line: text \"[place]\" to 660562  Local Crisis Services: [facility]: [phone]  Call 911 or go to my nearest emergency department, or [facility].         Appearance:   Appropriate    Eye Contact:   Good    Psychomotor Behavior: Normal   Attitude:   Cooperative    Orientation:   All   Speech    Rate / Production: Normal     Volume:  Normal    Mood:    Mood has been \"less tense\" and more \"relaxed\"   Affect:    Congruent with mood   Thought Content:  Clear    Thought Form:  Coherent  Logical    Insight:    Good      Medication Review:   No changes to psychiatric medication      Medication Compliance:   Yes      Changes in Health Issues:  No new health concerns or changes to health     Chemical Use Review:  No changes or concerns with alcohol use.    " "     Substance Use: Chemical use reviewed, no active concerns identified.      Tobacco Use: No current tobacco use.       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  Practice \"flexible mindset\" and \"radical acceptance\".    Consider writing a letter to your current self and future self - what does she need to hear?        Continue with: Pay attention to self-talk - It is kind, is it compassionate, it is helpful?  Be mindful of all or nothing thinkng - look for the gray.  Stop and take a deep breath when feeling strong emotions, give myself permission to slow down and not rush when I don't need to - starting with biking to work - take my breaks at work - and listen to low-key music-    Continue with: Pay attention to my feelings rather than denying or ignoring.  When feeling stressed, ask myself what is contributing and what I think will help.  Re-frame how I'm thinking about the situation with Aaron at work- focus on what I have control over and accept what I don't have control over.  Think about what I like to do and how I can have outlets for my creative energy.       2) If there is a crisis situation, with your daughter or yourself, remember you are not alone and that there are people who can help you twenty-four hours a day, seven days a week.  Call [facility] ([facility]): [phone].      3) Next appt is scheduled for:  April 23rd at 9 am.    Cristy Wilkinson PsyD, " LP  4/9/2025                                    ____________________________________    Treatment Plan     Patient's Name: Idalia Hinojosa                        YOB: 1980     Date of Creation: 1/6/2020  Date Treatment Plan Last Reviewed/Revised: 12.2.2024       DSM5 Diagnoses: 300.00 (F41.9) Unspecified Anxiety Disorder  Psychosocial / Contextual Factors: strain in marital relationship, parenting challenges, adjustment challenges, 's cancer diagnosis  PROMIS (reviewed every 90 days):      Anticipated number of session for this episode of care: possibly a few more sessions if needed to support sustained progress and maintenance  Anticipation frequency of session: Monthly   Anticipated Duration of each session: 38-52 minutes  Treatment plan will be reviewed in 90 days or when goals have been changed.         Referral / Collaboration:  No referrals at this time.  Have previously recommended couples therapy referral.  Coordinate with Dr. Han, patient's PCP.        MeasurableTreatment Goal(s) related to diagnosis / functional impairment(s)  Goal 1: Patient will learn emotion regulation strategies to help when she is feeling upset/angry/frustrated/distressed    I will know I've met my goal when I would yell less, I would feel calmer, and I would not push others.  I would be less physical when I'm angry (e.g. wouldn't slam doors or hit things)        Objective #A (Patient Action)                          Patient will learn and practice at least 2 new emotion regulation strategies.  Status: Continued - Date(s):    9/13/2023- Regularly practicing emotion regulation strategies to help manage increase in emotional distress related to spouse's cancer diagnosis.  She has been successful with using strategies to help her stay calm when her daughter's experiencing heightened emotion and/or challenging behaviors.  She's working on remembering to engage calming strategies when feeling physically tense  and/or anxious/stressed.    2/20/24 - Will continue to focus on building emotion regulation strategies    2.27.25.2024 - this is a primary focus of her current episode of care -is making good progress with learning and practicing strategies, has been demonstrating ability to skillfully utilize strategies to manage challenging situations and emotions.           Intervention(s)  Therapist will provide psychoeducation on emotion regulation module from DBT and will assign patient homework to help reinforce skill development.     Objective #B  Patient will identify factors that increase vulnerability to emotion mind and identify ways to decrease vulnerability to emotion mind.  Status: Continued - Date(s):9/13/2023 - routinely incorporating mindfulness, distress tolerance and self-awareness strategies to increase attention and focus to factors that increase vulnerability to emotion mind  2/20/2024 - further review of treatment goals is in progress and will continue over the next session, given break in therapy   5/22/2024 - Idalia has made great progress identifying and practicing self-care habits/strategies that help decrease vulnerability to emotion mind   2.27.2925-  this is a primary focus of her current episode of care -is making good progress with incorporating self-care strategies that support her emotional and physical well-being.  Helping her to practice balanced thinking to help with incorporating more balance in to how she approaches her day.      Intervention(s)  Therapist will provide information on factors that increase vulnerabiliyt to emotion mind      Objective #C  Patient will identify warning signs and signals that emotions are escalating and will learn ways to skillfully intervene early on.  Status: Date: 9/13/2023- in progress - has demonstrated good progress and awareness in being able to identify warning signs and intervene skillfully-working on incorporating deep breathing to help with calming and  "re-centering.    2/20/2024 - further review of treatment goals is in progress and will continue over the next session, given break in therapy   5/22/2024 - Goal completed        Intervention(s)  Therapist will help patient identify warning signs and signals, and will guide the patient in identifying skillful ways to intervene when exeriencing warning signs and signals.      Objective #B (Patient Action)                          Status: NEW - Date(s):9/13/2023- Patient will learn mind-body connection, healthy ways to manage stress, calming strategies, and ways she can address cognitive and behavioral factors that can contribute to pain experience.    2/20/2024 - further review of treatment goals is in progress and will continue over the next session, given break in therapy    5/22/2024 - goal completed       Patient will learn and practice at least two strategies that help improve her emotional well-being.       Intervention(s)  Therapist will teach calming strategies and healthy stress management strategies, and will assign homework to help reinforce skill development.  Status - patient has been working on engaging calming strategies and in particular, not trying to \"force herself\" to calm down which counters the relaxation response.  Working currently on how to cue/remind herself to engage these strategies.               Goal 2: Patient will learn new parenting strategies to help with managing parenting challenges.  Parent will work on improving relationship with her daughter and defining what she would like this relationship to look like.      I will know I've met my goal when I'm enjoying time with my daughter, teaching her new things, and not waiting for things to change       Objective #A (Patient Action)                          Status: Continued - Date(s):9/13/2023 2/20/2024 - further review of treatment goals is in progress and will continue over the next session, given break in therapy   8/22/2024 - this " goal was not addressed in this most recent episode of care         Patient will increase understanding of developmental norms for her daughter and ways to manage challenging behaviors.     Intervention(s)  Therapist will provide psychoeducation on developmental norms and parenting strategies.     Objective #B  Patient will spend time reflecting on her relationship with her daughter and defining what she would like this relaitonship to look like.                  Status: Continued - Date(s):9/13/2023     Making good progress - has also been working with daughter's therapist and has been actively practicing and using suggested strategies.   Likely can work towards resolving this goal, as this goal has largely shifted towards the work she is doing with her daughter's individual therapist, where she believes they have been making good progress on helping her in her relationship with her daughter.       2/20/2024 - further review of treatment goals is in progress and will continue over the next session, given break in therapy    8/22/2024 - this goal was not addressed in this most recent episode of care     Intervention(s)  Therapist will guide the patient in reflecting upon her relationship with her daughter and help her define ways to move towards what she vaues in her relationship with her daughter.     Objective #C  Patient will increase time spent on fun activity and play with her daughter.  Status: Continued - Date(s):9/13/2023-has been enjoying time with daughter and devoting regular time to fun activities and play together  2/20/2024 - further review of treatment goals is in progress and will continue over the next session, given break in therapy    8/22/2024 - this goal was not addressed in this most recent episode of care           Intervention(s)  Therapist will guide the patient in identifying ways she can increase positive time with her daughter.  Therapist will also teach mindfulness strategies to help  "patient with being in the present moment when appropriate - .        Goal 3: Patiient will improve communication with her .      I will know I've met my goal when I feel less irritated with my  and communicate more openly with my .  I would like to be more assertive and less aggressive.   I would like to not avoid telling him things because I'm worried about his reaction or don't think he will react well.  I would like to be more present to the moment during times when this would be helpful.\"       Objective #A (Patient Action)                          Status: Continued - Date(s):9/13/2023   continues to work on this goal, though emphasis has shift to managing caregiver stress related to 's cancer diagnosis and treatment.         2/20/2024 - further review of treatment goals is in progress and will continue over the next session, given break in therapy    8/22/2024 - this goal was not addressed in this most recent episode of care      Patient will learn and practice at least two new interpersonal effectiveness strategies.     Intervention(s)  Therapist will teach strategies from DBT module on interpersonal effectiveness, and will assign homework to help reinforce skill development.            Patient has reviewed and agreed to the above plan.        Cristy Wilkinson PsyD,   Licensed Psychologist  Reviewed on 2.27.2025                                                                                               =                                                          Answers submitted by the patient for this visit:  Patient Health Questionnaire (Submitted on 4/9/2025)  If you checked off any problems, how difficult have these problems made it for you to do your work, take care of things at home, or get along with other people?: Not difficult at all  PHQ9 TOTAL SCORE: 1    "

## 2025-04-23 ENCOUNTER — VIRTUAL VISIT (OUTPATIENT)
Dept: PSYCHOLOGY | Facility: CLINIC | Age: 45
End: 2025-04-23
Payer: COMMERCIAL

## 2025-04-23 DIAGNOSIS — F41.9 ANXIETY DISORDER, UNSPECIFIED TYPE: Primary | ICD-10-CM

## 2025-04-23 PROCEDURE — 90832 PSYTX W PT 30 MINUTES: CPT | Mod: 95 | Performed by: PSYCHOLOGIST

## 2025-04-23 NOTE — PROGRESS NOTES
"           [facility]                                            Progress Note    Patient Name: Idalia Hinojosa  Date: 4/23/2025         Service Type: Individual  Start time:  9:07 am End time:  9:36 am  Session Length:  29 minutes    Session #: 22    Attendees: Client     Service modality: Video visit      Provider verified identity through the following two step process.  Patient provided:  Patient is known previously to provider    Telemedicine Visit: The patient's condition can be safely assessed and treated via synchronous audio and visual telemedicine encounter.      Reason for Telemedicine Visit: Services only offered telehealth    Originating Site (Patient Location): Patient's home    Distant Site (Provider Location): Provider Remote Setting- Home Office    Consent:  The patient/guardian has verbally consented to: the potential risks and benefits of telemedicine versus in person care; bill my insurance or make self-payment for services provided; and responsibility for payment of non-covered services.     Patient would like the video invitation sent by:  My Chart    Mode of Communication:  Video Conference via [employer],     As the provider I attest to compliance with applicable laws and regulations related to telemedicine.     Treatment Plan Last Reviewed: 2.27.2025    PHQ-9/DAVID-7: 2.27.2025        DATA  Interactive Complexity: No  Crisis: No        Progress Since Last Session (Related to Symptoms / Goals / Homework):   Symptoms: She reported she's been doing \"pretty well\" - finding success with slowing down, being more mindful, enjoying the moment, practicing a flexible mindset - all of which have been helping her to feel better.        PHQ score of 1 at previous visit - decrease from recent scores of 5 and 6.      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  Has been successful with " "slowing down -and is reaping the positives of this.        Episode of Care Goals: . Her current concerns are related to feeling more frustrated and angry and not sure how to cope.  She identifies difficulties in her marriage as contributing factor to feeling more frustrated and angry.       Current / Ongoing Stressors and Concerns:  Current: Her recent trip to [facility] had helped inspire her to slow down and not rush so much throughout the day to day.  She's been actively practicing this and experienced a lot of positives and successes with this.  She's noted it has been especially helpful when she's eating - she's eating more slowly, enjoying her food more, less likely to overeat, and overall feeling physically and emotionally better.  She was also able to communicate with her spouse that she's working on this and how he could help support her.  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      Copied forward: Continued work on managing feelings of anger, resentment and underlying feelings of disappointment and loss, largely in the context of work related stressors, as well as phase of life stressors as she explores her career aspirations in the context of work related stressors and challenges.      Ongoing: (copied forward) Working on building effective emotion regulation strategies for managing daily frustrations and upsets.  Context for previous episode of care included stresses related to behavioral challenges with her daughter, who has been diagnosed with ADHD, and working with her  on addressing parenting differences.   Her  also had previously been diagnosed with cancer and went through treatment, and previous therapy focused on caregiver stress and helping " "her to attend to her self-care during this very stressful period of time.         Treatment Objective(s) Addressed in This Session:     Main emphasis today -   Continuing to work on mindfulness - slowing down, not rushing through the day, noticing the moment  Continuing to work on what \"balance\" looks like  - review of the changes she's been making in behaviors, actions and thoughts to support her in finding more balance in your day to day life      Continued focus on awareness of self-talk and impact on emotions, encouragement to find more positive, affirming, rational, kind, compassionate and helpful self-talk  Exploration of feelings of self-worth  - connecting self-worth to who she is rather than what she does  Build and practice interpersonal effectiveness strategies   Build and practice emotion regulation strategies      Continue with:     Review of self-care strategies and identification of areas of need   Learn and practice emotion regulation strategies (in particular, how to manage and separate your own emotional reaction when someone around you is escalating)  Learn and practice cognitive strategies to help with identifying and managing unhelpful thoughts  Identify recent stressors and triggers to feelings of upset and frustration and identify 1-2 steps you can take to actively address stressors  Review of interpersonal effectiveness strategies  Learn and practice \"middle path\" strategy      Intervention:     CBT and solution focused: We reviewed the goals she set to work towards slowing down and finding more balance - reviewed successes and what helped her to incorporate these positives changes and how she can continue to incorporate changes that have been helpful for her.      We talked about the 3 good things exercise - she'll practice spending time each day identifying three good things - to help her with recognizing the positive steps she's taking and the \"good things\" around her.      Copied forward " "- Continued emphasis on identifying and utilizing emotion regulation and interpersonal effectiveness strategies to help with managing challenging situations at work.    Will continue to encourage she prioritize scheduling time to connect with, and have fun with friends.      ASSESSMENT: Current Emotional / Mental Status (status of significant symptoms):   Risk status (Self / Other harm or suicidal ideation)   Patient denies current fears or concerns for personal safety.   Patient reports current suicidal ideation, she denies any current or recent suicidal behaviors.  She reports if suicidal  thoughts do occur, she knows steps she can take to manage them and believes she can manage them, and will reach out for additional help if needed.     Patientdenies current or recent homicidal ideation or behaviors.   Patient denies current or recent self injurious behavior or ideation.   Patient denies other safety concerns.   Patient reports there has not been a change in risk factors since their last session -      Recommended that patient call 911 or go to the local ED should there be a change in any of these risk factors.  We reviewed how to contact [facility] crisis/COPE for urgent/crisis concerns 24/7.  Provided patient with crisis resources and she agrees to use safety plan should any safety concerns arise.      Professionals or agencies I can contact during a crisis:  Suicide Prevention Lifeline: call or text 076  Crisis Text Line: text \"[place]\" to 087482  Local Crisis Services: [facility]: [phone]  Call 911 or go to my nearest emergency department, or [facility].         Appearance:   Appropriate    Eye Contact:   Good    Psychomotor Behavior: Normal   Attitude:   Cooperative    Orientation:   All   Speech    Rate / Production: Normal     Volume:  Normal    Mood:    Mood has been improved - she continues to feel less stressed and anxious   Affect:    Congruent with mood   Thought Content:  Clear "    Thought Form:  Coherent  Logical    Insight:    Good      Medication Review:   No changes to psychiatric medication      Medication Compliance:   Yes      Changes in Health Issues:  No new health concerns or changes to health (she continues to experience some physical pain/tightness in her neck/shoulder/back areas which she has been working with a physical therapist to address)     Chemical Use Review:  No changes or concerns with alcohol use.         Substance Use: Chemical use reviewed, no active concerns identified.      Tobacco Use: No current tobacco use.       Diagnosis:  Anxiety disorder unspecified    PHQ scores continue to remain low and have decreased over time         PLAN: (Patient Tasks / Therapist Tasks / Other)    1) Idalia identified the following goals: Practice Three Good Things Exercise each day.      Continue great work you've been doing to practice slowing down, not rushing, finding balance and patience with working towards future career goals in a way that is not all time-consuming, practicing flexible mind-set and radical acceptance.      Consider writing a letter to your current self and future self - what does she need to hear?        Continue with: Pay attention to self-talk - It is kind, is it compassionate, it is helpful?  Be mindful of all or nothing thinkng - look for the gray.  Stop and take a deep breath when feeling strong emotions, give myself permission to slow down and not rush when I don't need to - starting with biking to work - take my breaks at work - and listen to low-key music-    Continue with: Pay attention to my feelings rather than denying or ignoring.  When feeling stressed, ask myself what is contributing and what I think will help.  Re-frame how I'm thinking about the situation with Aaron at work- focus on what I have control over and accept what I don't have control over.  Think about what I like to do and how I can have outlets for my creative energy.       2) If  there is a crisis situation, with your daughter or yourself, remember you are not alone and that there are people who can help you twenty-four hours a day, seven days a week.  Call [facility] ([facility]): [phone].      3) Next appt is scheduled for:  May 21st at 9 am.      Cristy Wilkinson PsyD, KYLIE  4/23/2025                                      ____________________________________    Treatment Plan     Patient's Name: Idalia Hinojosa                        YOB: 1980     Date of Creation: 1/6/2020  Date Treatment Plan Last Reviewed/Revised: 12.2.2024       DSM5 Diagnoses: 300.00 (F41.9) Unspecified Anxiety Disorder  Psychosocial / Contextual Factors: strain in marital relationship, parenting challenges, adjustment challenges, 's cancer diagnosis  PROMIS (reviewed every 90 days):      Anticipated number of session for this episode of care: possibly a few more sessions if needed to support sustained progress and maintenance  Anticipation frequency of session: Monthly   Anticipated Duration of each session: 38-52 minutes  Treatment plan will be reviewed in 90 days or when goals have been changed.         Referral / Collaboration:  No referrals at this time.  Have previously recommended couples therapy referral.  Coordinate with Dr. Han, patient's PCP.        MeasurableTreatment Goal(s) related to diagnosis / functional impairment(s)  Goal 1: Patient will learn emotion regulation strategies to help when she is feeling upset/angry/frustrated/distressed    I will know I've met my goal when I would yell less, I would feel calmer, and I would not push others.  I would be less physical when I'm angry (e.g. wouldn't slam doors or hit things)        Objective #A (Patient Action)                          Patient will learn and practice at least 2 new emotion regulation strategies.  Status: Continued - Date(s):    9/13/2023- Regularly practicing emotion regulation strategies to  help manage increase in emotional distress related to spouse's cancer diagnosis.  She has been successful with using strategies to help her stay calm when her daughter's experiencing heightened emotion and/or challenging behaviors.  She's working on remembering to engage calming strategies when feeling physically tense and/or anxious/stressed.    2/20/24 - Will continue to focus on building emotion regulation strategies    2.27.25.2024 - this is a primary focus of her current episode of care -is making good progress with learning and practicing strategies, has been demonstrating ability to skillfully utilize strategies to manage challenging situations and emotions.           Intervention(s)  Therapist will provide psychoeducation on emotion regulation module from DBT and will assign patient homework to help reinforce skill development.     Objective #B  Patient will identify factors that increase vulnerability to emotion mind and identify ways to decrease vulnerability to emotion mind.  Status: Continued - Date(s):9/13/2023 - routinely incorporating mindfulness, distress tolerance and self-awareness strategies to increase attention and focus to factors that increase vulnerability to emotion mind  2/20/2024 - further review of treatment goals is in progress and will continue over the next session, given break in therapy   5/22/2024 - Idalia has made great progress identifying and practicing self-care habits/strategies that help decrease vulnerability to emotion mind   2.27.2925-  this is a primary focus of her current episode of care -is making good progress with incorporating self-care strategies that support her emotional and physical well-being.  Helping her to practice balanced thinking to help with incorporating more balance in to how she approaches her day.      Intervention(s)  Therapist will provide information on factors that increase vulnerabiliyt to emotion mind      Objective #C  Patient will identify  "warning signs and signals that emotions are escalating and will learn ways to skillfully intervene early on.  Status: Date: 9/13/2023- in progress - has demonstrated good progress and awareness in being able to identify warning signs and intervene skillfully-working on incorporating deep breathing to help with calming and re-centering.    2/20/2024 - further review of treatment goals is in progress and will continue over the next session, given break in therapy   5/22/2024 - Goal completed        Intervention(s)  Therapist will help patient identify warning signs and signals, and will guide the patient in identifying skillful ways to intervene when exeriencing warning signs and signals.      Objective #B (Patient Action)                          Status: NEW - Date(s):9/13/2023- Patient will learn mind-body connection, healthy ways to manage stress, calming strategies, and ways she can address cognitive and behavioral factors that can contribute to pain experience.    2/20/2024 - further review of treatment goals is in progress and will continue over the next session, given break in therapy    5/22/2024 - goal completed       Patient will learn and practice at least two strategies that help improve her emotional well-being.       Intervention(s)  Therapist will teach calming strategies and healthy stress management strategies, and will assign homework to help reinforce skill development.  Status - patient has been working on engaging calming strategies and in particular, not trying to \"force herself\" to calm down which counters the relaxation response.  Working currently on how to cue/remind herself to engage these strategies.               Goal 2: Patient will learn new parenting strategies to help with managing parenting challenges.  Parent will work on improving relationship with her daughter and defining what she would like this relationship to look like.      I will know I've met my goal when I'm enjoying time " with my daughter, teaching her new things, and not waiting for things to change       Objective #A (Patient Action)                          Status: Continued - Date(s):9/13/2023 2/20/2024 - further review of treatment goals is in progress and will continue over the next session, given break in therapy   8/22/2024 - this goal was not addressed in this most recent episode of care         Patient will increase understanding of developmental norms for her daughter and ways to manage challenging behaviors.     Intervention(s)  Therapist will provide psychoeducation on developmental norms and parenting strategies.     Objective #B  Patient will spend time reflecting on her relationship with her daughter and defining what she would like this relaitonship to look like.                  Status: Continued - Date(s):9/13/2023     Making good progress - has also been working with daughter's therapist and has been actively practicing and using suggested strategies.   Likely can work towards resolving this goal, as this goal has largely shifted towards the work she is doing with her daughter's individual therapist, where she believes they have been making good progress on helping her in her relationship with her daughter.       2/20/2024 - further review of treatment goals is in progress and will continue over the next session, given break in therapy    8/22/2024 - this goal was not addressed in this most recent episode of care     Intervention(s)  Therapist will guide the patient in reflecting upon her relationship with her daughter and help her define ways to move towards what she vaues in her relationship with her daughter.     Objective #C  Patient will increase time spent on fun activity and play with her daughter.  Status: Continued - Date(s):9/13/2023-has been enjoying time with daughter and devoting regular time to fun activities and play together  2/20/2024 - further review of treatment goals is in progress and will  "continue over the next session, given break in therapy    8/22/2024 - this goal was not addressed in this most recent episode of care           Intervention(s)  Therapist will guide the patient in identifying ways she can increase positive time with her daughter.  Therapist will also teach mindfulness strategies to help patient with being in the present moment when appropriate - .        Goal 3: Patiient will improve communication with her .      I will know I've met my goal when I feel less irritated with my  and communicate more openly with my .  I would like to be more assertive and less aggressive.   I would like to not avoid telling him things because I'm worried about his reaction or don't think he will react well.  I would like to be more present to the moment during times when this would be helpful.\"       Objective #A (Patient Action)                          Status: Continued - Date(s):9/13/2023   continues to work on this goal, though emphasis has shift to managing caregiver stress related to 's cancer diagnosis and treatment.         2/20/2024 - further review of treatment goals is in progress and will continue over the next session, given break in therapy    8/22/2024 - this goal was not addressed in this most recent episode of care      Patient will learn and practice at least two new interpersonal effectiveness strategies.     Intervention(s)  Therapist will teach strategies from DBT module on interpersonal effectiveness, and will assign homework to help reinforce skill development.            Patient has reviewed and agreed to the above plan.        Cristy Wilkinson PsyD,   Licensed Psychologist  Reviewed on 2.27.2025                                                                                               =                                                          "

## 2025-05-28 ENCOUNTER — VIRTUAL VISIT (OUTPATIENT)
Dept: PSYCHOLOGY | Facility: CLINIC | Age: 45
End: 2025-05-28
Payer: COMMERCIAL

## 2025-05-28 DIAGNOSIS — F33.0 MILD EPISODE OF RECURRENT MAJOR DEPRESSIVE DISORDER: ICD-10-CM

## 2025-05-28 DIAGNOSIS — F41.9 ANXIETY DISORDER, UNSPECIFIED TYPE: Primary | ICD-10-CM

## 2025-05-28 PROCEDURE — 90834 PSYTX W PT 45 MINUTES: CPT | Mod: 95 | Performed by: PSYCHOLOGIST

## 2025-05-28 NOTE — PROGRESS NOTES
"             [facility]                                            Progress Note    Patient Name: Idalia Hinojosa  Date: 5/28/2025         Service Type: Individual  Start time:  9:07 am End time:  9:59 am  Session Length:  52 minutes    Session #: 23    Attendees: Client     Service modality: Video visit      Provider verified identity through the following two step process.  Patient provided:  Patient is known previously to provider    Telemedicine Visit: The patient's condition can be safely assessed and treated via synchronous audio and visual telemedicine encounter.      Reason for Telemedicine Visit: Services only offered telehealth    Originating Site (Patient Location): Patient's home    Distant Site (Provider Location): Provider Remote Setting- Home Office    Consent:  The patient/guardian has verbally consented to: the potential risks and benefits of telemedicine versus in person care; bill my insurance or make self-payment for services provided; and responsibility for payment of non-covered services.     Patient would like the video invitation sent by:  My Chart    Mode of Communication:  Video Conference via [employer],     As the provider I attest to compliance with applicable laws and regulations related to telemedicine.     Treatment Plan Last Reviewed: 5.28.2025    PHQ-9/DAVID-7: 5.28.2025        DATA  Interactive Complexity: No  Crisis: No        Progress Since Last Session (Related to Symptoms / Goals / Homework):   Symptoms: Idalia reported she's been doing \"pretty good\" .  Symptoms of anxiety and depression are improved.  She reported she's struggled a little more lately with practicing slowing down and mindfulness.      Answers submitted by the patient for this visit:  Patient Health Questionnaire - score of  2- decrease from recent scores of 5 and 6.      Homework:  - Successful -has been practicing the \"three good things\" exercise which has been helpful for her to " "recognize and focus on the positives.        Episode of Care Goals: . Her current concerns are related to feeling more frustrated and angry and not sure how to cope.  She identifies difficulties in her marriage as contributing factor to feeling more frustrated and angry.       Current / Ongoing Stressors and Concerns:  Current: She shared some of the struggles she's been experiencing with sustaining her practices of mindfulness and \"slowing down\", which she found had been helpful for her after she returned from her vacation.  Normalized this as a part of the change process, and an opportunity to be curious about what both helped her with the changes and the obstacles that have surfaced as she works to incorporate these practices into her daily routines.  Encouraged she practice \"balanced thinking\" that recognizes the normal ups and downs that come with making and sustaining changes and not get caught up in \"all or nothing\"/\"success/failure\" thinking.      Copied forward: Continued work on managing feelings of anger, resentment and underlying feelings of disappointment and loss, largely in the context of work related stressors, as well as phase of life stressors as she explores her career aspirations in the context of work related stressors and challenges.      Ongoing: (copied forward) Working on building effective emotion regulation strategies for managing daily frustrations and upsets.  Context for previous episode of care included stresses related to behavioral challenges with her daughter, who has been diagnosed with ADHD, and working with her  on addressing parenting differences.   Her  also had previously been diagnosed with cancer and went through treatment, and previous therapy focused on caregiver stress and helping her to attend to her self-care during this very stressful period of time.         Treatment Objective(s) Addressed in This Session:     Main emphasis today -   Identified what has " "helped her with incorporating mindfulness practices into her daily routines and identified challenges and barriers that have surfaced, assisted with problem-solving ways she can address challenges and barriers.    Continuing to work on mindfulness - slowing down, not rushing through the day, noticing the moment      Prior objectives and focus:     Continuing to work on what \"balance\" looks like  - review of the changes she's been making in behaviors, actions and thoughts to support her in finding more balance in your day to day life  Continued focus on awareness of self-talk and impact on emotions, encouragement to find more positive, affirming, rational, kind, compassionate and helpful self-talk  Exploration of feelings of self-worth  - connecting self-worth to who she is rather than what she does  Build and practice interpersonal effectiveness strategies   Review of self-care strategies and identification of areas of need   Learn and practice emotion regulation strategies (in particular, how to manage and separate your own emotional reaction when someone around you is escalating)  Learn and practice cognitive strategies to help with identifying and managing unhelpful thoughts  Identify recent stressors and triggers to feelings of upset and frustration and identify 1-2 steps you can take to actively address stressors  Learn and practice \"middle path\" strategy      Intervention:     CBT and solution focused: She identified that after coming back from her vacation, she had a renewed focus on how helpful it is for her to slow down, not rush through the day, and be more mindful, because of how helpful it is to her overall mood and well-being.  She acknowledged the challenges in sustaining these changes while expressing a desire to look at how she can renew her focus again.  She identified the barriers that make it difficult for her to engage in these practices - including creating unrealistic expectations for herself, " "black and white thinking, and feeling guilty.    We talked about ways she can work through these challenges by creating more realistic expectations, practicing a flexible mindset, and practicing a compassionate and non-judgmental stance.      Encouraged she continue with the 3 good things exercise, as she has found this to be helpful in mindfully shifting her focus to the good.        ASSESSMENT: Current Emotional / Mental Status (status of significant symptoms):   Risk status (Self / Other harm or suicidal ideation)   Patient denies current fears or concerns for personal safety.   Patient reports current suicidal ideation, she denies any current or recent suicidal behaviors.  She reports if suicidal  thoughts do occur, she knows steps she can take to manage them and believes she can manage them, and will reach out for additional help if needed.     Patientdenies current or recent homicidal ideation or behaviors.   Patient denies current or recent self injurious behavior or ideation.   Patient denies other safety concerns.   Patient reports there has not been a change in risk factors since their last session -      Recommended that patient call 911 or go to the local ED should there be a change in any of these risk factors.  We reviewed how to contact [facility] crisis/COPE for urgent/crisis concerns 24/7.  Provided patient with crisis resources and she agrees to use safety plan should any safety concerns arise.      Professionals or agencies I can contact during a crisis:  Suicide Prevention Lifeline: call or text 784  Crisis Text Line: text \"[place]\" to 215285  Local Crisis Services: [facility]: [phone]  Call 911 or go to my nearest emergency department, or [facility].         Appearance:   Appropriate    Eye Contact:   Good    Psychomotor Behavior: Normal   Attitude:   Cooperative    Orientation:   All   Speech    Rate / Production: Normal     Volume:  Normal    Mood:    Reports mood has " "been \"pretty good\"  - overall feeling less stressed and anxious    Affect:    Congruent with mood   Thought Content:  Clear    Thought Form:  Coherent  Logical    Insight:    Good      Medication Review:   No changes to psychiatric medication      Medication Compliance:   Yes      Changes in Health Issues:  No new health concerns or changes to health.       Chemical Use Review:  No changes or concerns with alcohol use.         Substance Use: Chemical use reviewed, no active concerns identified.      Tobacco Use: No current tobacco use.       Diagnosis:  Anxiety disorder unspecified    PHQ scores continue to remain low and have decreased over time         PLAN: (Patient Tasks / Therapist Tasks / Other)    1) Idalia identified the following goals: Continue to practice \"three good things\" exercise.  Practice mindfulness - practice slowing down when having snacks, take your breaks at work, practice compassionate, non-judgmental stance.       Practice Three Good Things Exercise each day.      Continue great work you've been doing to practice  finding balance and patience with working towards future career goals in a way that is not all time-consuming, practicing flexible mind-set and radical acceptance.      Consider writing a letter to your current self and future self - what does she need to hear?         2) If there is a crisis situation, with your daughter or yourself, remember you are not alone and that there are people who can help you twenty-four hours a day, seven days a week.  Call [facility] ([facility]): [phone].      3) Next appt is scheduled for:  June 25th, 9:00 am.      Cristy Wilkinson PsyD, KYLIE  5/28/2025                                        ____________________________________    Treatment Plan     Patient's Name: Idalia Hinojosa                        YOB: 1980     Date of Creation: 1/6/2020  Date Treatment Plan Last Reviewed/Revised: 12.2.2024       DSM5 Diagnoses: " 300.00 (F41.9) Unspecified Anxiety Disorder  Psychosocial / Contextual Factors: strain in marital relationship, parenting challenges, adjustment challenges, 's cancer diagnosis  PROMIS (reviewed every 90 days):      Anticipated number of session for this episode of care: possibly a few more sessions if needed to support sustained progress and maintenance  Anticipation frequency of session: Monthly   Anticipated Duration of each session: 38-52 minutes  Treatment plan will be reviewed in 90 days or when goals have been changed.         Referral / Collaboration:  No referrals at this time.  Have previously recommended couples therapy referral.  Coordinate with Dr. Han, patient's PCP.        MeasurableTreatment Goal(s) related to diagnosis / functional impairment(s)  Goal 1: Patient will learn emotion regulation strategies to help when she is feeling upset/angry/frustrated/distressed    I will know I've met my goal when I would yell less, I would feel calmer, and I would not push others.  I would be less physical when I'm angry (e.g. wouldn't slam doors or hit things)        Objective #A (Patient Action)                          Patient will learn and practice at least 2 new emotion regulation strategies.  Status: Continued - Date(s):    9/13/2023- Regularly practicing emotion regulation strategies to help manage increase in emotional distress related to spouse's cancer diagnosis.  She has been successful with using strategies to help her stay calm when her daughter's experiencing heightened emotion and/or challenging behaviors.  She's working on remembering to engage calming strategies when feeling physically tense and/or anxious/stressed.    2/20/24 - Will continue to focus on building emotion regulation strategies   5.27.25- this remains a primary focus of her current episode of care -is making good progress with learning and applying strategies to help her manage challenging emotions and situations.         Intervention(s)  Therapist will provide psychoeducation on emotion regulation module from DBT and will assign patient homework to help reinforce skill development.     Objective #B  Patient will identify factors that increase vulnerability to emotion mind and identify ways to decrease vulnerability to emotion mind.  Status: Continued - Date(s):9/13/2023 - routinely incorporating mindfulness, distress tolerance and self-awareness strategies to increase attention and focus to factors that increase vulnerability to emotion mind  2/20/2024 - further review of treatment goals is in progress and will continue over the next session, given break in therapy   5/22/2024 - Idalia has made great progress identifying and practicing self-care habits/strategies that help decrease vulnerability to emotion mind   5.27.25-  this also remains primary focus of her current episode of care -is making good progress with incorporating self-care strategies that support her emotional and physical well-being.  Helping her to practice balanced thinking to help with incorporating more balance in to how she approaches her day.      Intervention(s)  Therapist will provide information on factors that increase vulnerabiliyt to emotion mind      Objective #C  Patient will identify warning signs and signals that emotions are escalating and will learn ways to skillfully intervene early on.  Status: Date: 9/13/2023- in progress - has demonstrated good progress and awareness in being able to identify warning signs and intervene skillfully-working on incorporating deep breathing to help with calming and re-centering.    2/20/2024 - further review of treatment goals is in progress and will continue over the next session, given break in therapy   5/22/2024 - Goal completed        Intervention(s)  Therapist will help patient identify warning signs and signals, and will guide the patient in identifying skillful ways to intervene when exeriencing warning  "signs and signals.      Objective #B (Patient Action)                          Status: NEW - Date(s):9/13/2023- Patient will learn mind-body connection, healthy ways to manage stress, calming strategies, and ways she can address cognitive and behavioral factors that can contribute to pain experience.    2/20/2024 - further review of treatment goals is in progress and will continue over the next session, given break in therapy    5/22/2024 - goal completed       Patient will learn and practice at least two strategies that help improve her emotional well-being.       Intervention(s)  Therapist will teach calming strategies and healthy stress management strategies, and will assign homework to help reinforce skill development.  Status - patient has been working on engaging calming strategies and in particular, not trying to \"force herself\" to calm down which counters the relaxation response.  Working currently on how to cue/remind herself to engage these strategies.               Goal 2: Patient will learn new parenting strategies to help with managing parenting challenges.  Parent will work on improving relationship with her daughter and defining what she would like this relationship to look like.      I will know I've met my goal when I'm enjoying time with my daughter, teaching her new things, and not waiting for things to change       Objective #A (Patient Action)                          Status: Continued - Date(s):9/13/2023 2/20/2024 - further review of treatment goals is in progress and will continue over the next session, given break in therapy   8/22/2024 - this goal was not addressed in this most recent episode of care         Patient will increase understanding of developmental norms for her daughter and ways to manage challenging behaviors.     Intervention(s)  Therapist will provide psychoeducation on developmental norms and parenting strategies.     Objective #B  Patient will spend time reflecting on her " relationship with her daughter and defining what she would like this relaitonship to look like.                  Status: Continued - Date(s):9/13/2023     Making good progress - has also been working with daughter's therapist and has been actively practicing and using suggested strategies.   Likely can work towards resolving this goal, as this goal has largely shifted towards the work she is doing with her daughter's individual therapist, where she believes they have been making good progress on helping her in her relationship with her daughter.       2/20/2024 - further review of treatment goals is in progress and will continue over the next session, given break in therapy    8/22/2024 - this goal was not addressed in this most recent episode of care     Intervention(s)  Therapist will guide the patient in reflecting upon her relationship with her daughter and help her define ways to move towards what she vaues in her relationship with her daughter.     Objective #C  Patient will increase time spent on fun activity and play with her daughter.  Status: Continued - Date(s):9/13/2023-has been enjoying time with daughter and devoting regular time to fun activities and play together  2/20/2024 - further review of treatment goals is in progress and will continue over the next session, given break in therapy    8/22/2024 - this goal was not addressed in this most recent episode of care           Intervention(s)  Therapist will guide the patient in identifying ways she can increase positive time with her daughter.  Therapist will also teach mindfulness strategies to help patient with being in the present moment when appropriate - .        Goal 3: Patiient will improve communication with her .      I will know I've met my goal when I feel less irritated with my  and communicate more openly with my .  I would like to be more assertive and less aggressive.   I would like to not avoid telling him things  "because I'm worried about his reaction or don't think he will react well.  I would like to be more present to the moment during times when this would be helpful.\"       Objective #A (Patient Action)                          Status: Continued - Date(s):9/13/2023   continues to work on this goal, though emphasis has shift to managing caregiver stress related to 's cancer diagnosis and treatment.         2/20/2024 - further review of treatment goals is in progress and will continue over the next session, given break in therapy    8/22/2024 - this goal was not addressed in this most recent episode of care      Patient will learn and practice at least two new interpersonal effectiveness strategies.     Intervention(s)  Therapist will teach strategies from DBT module on interpersonal effectiveness, and will assign homework to help reinforce skill development.            Patient has reviewed and agreed to the above plan.        Cristy Wilkinson PsyD,   Licensed Psychologist  Reviewed on 5.27.25                                                                                               =                                                          "

## 2025-06-25 ENCOUNTER — VIRTUAL VISIT (OUTPATIENT)
Dept: PSYCHOLOGY | Facility: CLINIC | Age: 45
End: 2025-06-25
Payer: COMMERCIAL

## 2025-06-25 DIAGNOSIS — F33.0 MILD EPISODE OF RECURRENT MAJOR DEPRESSIVE DISORDER: ICD-10-CM

## 2025-06-25 DIAGNOSIS — F41.9 ANXIETY DISORDER, UNSPECIFIED TYPE: Primary | ICD-10-CM

## 2025-06-25 PROCEDURE — 90834 PSYTX W PT 45 MINUTES: CPT | Mod: 95 | Performed by: PSYCHOLOGIST

## 2025-06-25 NOTE — PROGRESS NOTES
"         [facility]                                            Progress Note    Patient Name: Idalia Hinojosa  Date:6/25/2025         Service Type: Individual  Start time:  9:05 am End time:  9:57 am  Session Length:  52 minutes    Session #: 24    Attendees: Client     Service modality: Video visit      Provider verified identity through the following two step process.  Patient provided:  Patient is known previously to provider    Telemedicine Visit: The patient's condition can be safely assessed and treated via synchronous audio and visual telemedicine encounter.      Reason for Telemedicine Visit: Services only offered telehealth    Originating Site (Patient Location): Patient's home    Distant Site (Provider Location): Provider Remote Setting- Home Office    Consent:  The patient/guardian has verbally consented to: the potential risks and benefits of telemedicine versus in person care; bill my insurance or make self-payment for services provided; and responsibility for payment of non-covered services.     Patient would like the video invitation sent by:  My Chart    Mode of Communication:  Video Conference via [employer],     As the provider I attest to compliance with applicable laws and regulations related to telemedicine.     Treatment Plan Last Reviewed: 5.28.2025    PHQ-9/DAVID-7: 5.28.2025        DATA  Interactive Complexity: No  Crisis: No        Progress Since Last Session (Related to Symptoms / Goals / Homework):   Symptoms: Idalia reported that overall, things are going \"pretty well\".  Symptoms of depression and anxiety remain improved.  They had a difficult episode of family conflict while on vacation in [place], which has left some feelings of unresolve and uncertainty about how to address.        Patient Health Questionnaire - score of  2 on last visit decrease from recent scores of 5 and 6.      Homework:  - Successful -has been successfully using emotion regulation " "strategies.        Episode of Care Goals: . Her current concerns are related to feeling more frustrated and angry and not sure how to cope.  She identifies difficulties in her marriage as contributing factor to feeling more frustrated and angry.       Current / Ongoing Stressors and Concerns:  Current:  They recently had a family vacation to [place] and a trip to the cabin, as well as summer vacation for her daughter/summer routines they are adjusting to.  Feels some anxiety about looking for a new job.      Copied forward: Continued work on managing feelings of anger, resentment and underlying feelings of disappointment and loss, largely in the context of work related stressors, as well as phase of life stressors as she explores her career aspirations in the context of work related stressors and challenges.      Ongoing: (copied forward) Working on building effective emotion regulation strategies for managing daily frustrations and upsets.  Context for previous episode of care included stresses related to behavioral challenges with her daughter, who has been diagnosed with ADHD, and working with her  on addressing parenting differences.   Her  also had previously been diagnosed with cancer and went through treatment, and previous therapy focused on caregiver stress and helping her to attend to her self-care during this very stressful period of time.         Treatment Objective(s) Addressed in This Session:     Main emphasis today -      Process recent family conflict/escalation and identify steps you can take to address  Identify and practice interpersonal effectiveness strategies that will support you addressing concerns      Prior objectives and focus:   Mindfulness - slowing down, not rushing through the day, noticing the moment  Continuing to work on what \"balance\" looks like  - review of the changes she's been making in behaviors, actions and thoughts to support her in finding more balance in " "your day to day life  Continued focus on awareness of self-talk and impact on emotions, encouragement to find more positive, affirming, rational, kind, compassionate and helpful self-talk  Exploration of feelings of self-worth  - connecting self-worth to who she is rather than what she does  Build and practice interpersonal effectiveness strategies   Review of self-care strategies and identification of areas of need   Learn and practice emotion regulation strategies (in particular, how to manage and separate your own emotional reaction when someone around you is escalating)  Learn and practice cognitive strategies to help with identifying and managing unhelpful thoughts  Identify recent stressors and triggers to feelings of upset and frustration and identify 1-2 steps you can take to actively address stressors  Learn and practice \"middle path\" strategy      Intervention:     CBT and solution focused: She processed a recent escalation that occurred in the family, which happened when they were on vacation in [place].  Underneath the external factors of this situation, she identified a pattern of suppressing her thoughts, feelings and needs, which she stated she has done for many years, and that while this keeps things \"at bay\" in the short-term, in the long-term it often results in coming out in moments where she is frustrated or angry about something and then typically leads to an argument between she and her spouse, where he feels attacked and she doesn't believe they are able to constructively work through the problems.   We looked at the costs and consequences of this and began to explore alternatives, including considering returning to family therapy with her spouse to focus on improving family dynamics.  We also role-played some ways of talking with her spouse about her concerns.      Encouraged she continue with the 3 good things exercise, as she has found this to be helpful in mindfully shifting her focus " "to the good.        ASSESSMENT: Current Emotional / Mental Status (status of significant symptoms):   Risk status (Self / Other harm or suicidal ideation)   Patient denies current fears or concerns for personal safety.   Patient reports current suicidal ideation, she denies any current or recent suicidal behaviors.  She reports if suicidal  thoughts do occur, she knows steps she can take to manage them and believes she can manage them, and will reach out for additional help if needed.     Patientdenies current or recent homicidal ideation or behaviors.   Patient denies current or recent self injurious behavior or ideation.   Patient denies other safety concerns.   Patient reports there has not been a change in risk factors since their last session -      Recommended that patient call 911 or go to the local ED should there be a change in any of these risk factors.  We reviewed how to contact [facility] crisis/[facility] for urgent/crisis concerns 24/7.  Provided patient with crisis resources and she agrees to use safety plan should any safety concerns arise.      Professionals or agencies I can contact during a crisis:  Suicide Prevention Lifeline: call or text 004  Crisis Text Line: text \"[place]\" to 306322  Local Crisis Services: [facility]: [phone]  Call 911 or go to my nearest emergency department, or [facility].         Appearance:   Appropriate    Eye Contact:   Good    Psychomotor Behavior: Normal   Attitude:   Cooperative    Orientation:   All   Speech    Rate / Production: Normal     Volume:  Normal    Mood:    Reports that overall, mood has been improved/less anxious and depressed.     Affect:    Congruent with mood   Thought Content:  Clear    Thought Form:  Coherent  Logical    Insight:    Good      Medication Review:   No changes to psychiatric medication      Medication Compliance:   Yes      Changes in Health Issues:  No new health concerns or changes to health.  Some continued " difficulties with back pain.       Chemical Use Review:  No changes or concerns with alcohol use.         Substance Use: Chemical use reviewed, no active concerns identified.      Tobacco Use: No current tobacco use.       Diagnosis:  Anxiety disorder unspecified    PHQ scores continue to remain low and have decreased over time         PLAN: (Patient Tasks / Therapist Tasks / Other)    1) Idalia identified the following goals: Express to Kellee desire to work together on addressing family conflict in a different/new way.  I encouraged they contact a family therapist to get additional support and assistance and that they continue to work with their daughter's therapist (or, she thinks finding a new therapist may be helpful - closer to home).       Practice Three Good Things Exercise each day.      Continue great work you've been doing to practice  finding balance and patience with working towards future career goals in a way that is not all time-consuming, practicing flexible mind-set and radical acceptance.       2) If there is a crisis situation, with your daughter or yourself, remember you are not alone and that there are people who can help you twenty-four hours a day, seven days a week.  Call [facility] ([facility]): [phone].      3) Next appt is scheduled for:  July 16th at 9:00 am.      Cristy Wilkinson PsyD, KYLIE  6/25/2025                                ____________________________________    Treatment Plan     Patient's Name: Idalia Hinojosa                        YOB: 1980     Date of Creation: 1/6/2020  Date Treatment Plan Last Reviewed/Revised: 12.2.2024       DSM5 Diagnoses: 300.00 (F41.9) Unspecified Anxiety Disorder  Psychosocial / Contextual Factors: strain in marital relationship, parenting challenges, adjustment challenges, 's cancer diagnosis  PROMIS (reviewed every 90 days):      Anticipated number of session for this episode of care: possibly a few more  sessions if needed to support sustained progress and maintenance  Anticipation frequency of session: Monthly   Anticipated Duration of each session: 38-52 minutes  Treatment plan will be reviewed in 90 days or when goals have been changed.         Referral / Collaboration:  No referrals at this time.  Have previously recommended couples therapy referral.  Coordinate with Dr. Han, patient's PCP.        MeasurableTreatment Goal(s) related to diagnosis / functional impairment(s)  Goal 1: Patient will learn emotion regulation strategies to help when she is feeling upset/angry/frustrated/distressed    I will know I've met my goal when I would yell less, I would feel calmer, and I would not push others.  I would be less physical when I'm angry (e.g. wouldn't slam doors or hit things)        Objective #A (Patient Action)                          Patient will learn and practice at least 2 new emotion regulation strategies.  Status: Continued - Date(s):    9/13/2023- Regularly practicing emotion regulation strategies to help manage increase in emotional distress related to spouse's cancer diagnosis.  She has been successful with using strategies to help her stay calm when her daughter's experiencing heightened emotion and/or challenging behaviors.  She's working on remembering to engage calming strategies when feeling physically tense and/or anxious/stressed.    2/20/24 - Will continue to focus on building emotion regulation strategies   5.27.25- this remains a primary focus of her current episode of care -is making good progress with learning and applying strategies to help her manage challenging emotions and situations.        Intervention(s)  Therapist will provide psychoeducation on emotion regulation module from DBT and will assign patient homework to help reinforce skill development.     Objective #B  Patient will identify factors that increase vulnerability to emotion mind and identify ways to decrease  vulnerability to emotion mind.  Status: Continued - Date(s):9/13/2023 - routinely incorporating mindfulness, distress tolerance and self-awareness strategies to increase attention and focus to factors that increase vulnerability to emotion mind  2/20/2024 - further review of treatment goals is in progress and will continue over the next session, given break in therapy   5/22/2024 - Idalia has made great progress identifying and practicing self-care habits/strategies that help decrease vulnerability to emotion mind   5.27.25-  this also remains primary focus of her current episode of care -is making good progress with incorporating self-care strategies that support her emotional and physical well-being.  Helping her to practice balanced thinking to help with incorporating more balance in to how she approaches her day.      Intervention(s)  Therapist will provide information on factors that increase vulnerabiliyt to emotion mind      Objective #C  Patient will identify warning signs and signals that emotions are escalating and will learn ways to skillfully intervene early on.  Status: Date: 9/13/2023- in progress - has demonstrated good progress and awareness in being able to identify warning signs and intervene skillfully-working on incorporating deep breathing to help with calming and re-centering.    2/20/2024 - further review of treatment goals is in progress and will continue over the next session, given break in therapy   5/22/2024 - Goal completed        Intervention(s)  Therapist will help patient identify warning signs and signals, and will guide the patient in identifying skillful ways to intervene when exeriencing warning signs and signals.      Objective #B (Patient Action)                          Status: NEW - Date(s):9/13/2023- Patient will learn mind-body connection, healthy ways to manage stress, calming strategies, and ways she can address cognitive and behavioral factors that can contribute to pain  "experience.    2/20/2024 - further review of treatment goals is in progress and will continue over the next session, given break in therapy    5/22/2024 - goal completed       Patient will learn and practice at least two strategies that help improve her emotional well-being.       Intervention(s)  Therapist will teach calming strategies and healthy stress management strategies, and will assign homework to help reinforce skill development.  Status - patient has been working on engaging calming strategies and in particular, not trying to \"force herself\" to calm down which counters the relaxation response.  Working currently on how to cue/remind herself to engage these strategies.               Goal 2: Patient will learn new parenting strategies to help with managing parenting challenges.  Parent will work on improving relationship with her daughter and defining what she would like this relationship to look like.      I will know I've met my goal when I'm enjoying time with my daughter, teaching her new things, and not waiting for things to change       Objective #A (Patient Action)                          Status: Continued - Date(s):9/13/2023 2/20/2024 - further review of treatment goals is in progress and will continue over the next session, given break in therapy   8/22/2024 - this goal was not addressed in this most recent episode of care         Patient will increase understanding of developmental norms for her daughter and ways to manage challenging behaviors.     Intervention(s)  Therapist will provide psychoeducation on developmental norms and parenting strategies.     Objective #B  Patient will spend time reflecting on her relationship with her daughter and defining what she would like this relaitonship to look like.                  Status: Continued - Date(s):9/13/2023     Making good progress - has also been working with daughter's therapist and has been actively practicing and using suggested strategies. " "  Likely can work towards resolving this goal, as this goal has largely shifted towards the work she is doing with her daughter's individual therapist, where she believes they have been making good progress on helping her in her relationship with her daughter.       2/20/2024 - further review of treatment goals is in progress and will continue over the next session, given break in therapy    8/22/2024 - this goal was not addressed in this most recent episode of care     Intervention(s)  Therapist will guide the patient in reflecting upon her relationship with her daughter and help her define ways to move towards what she vaues in her relationship with her daughter.     Objective #C  Patient will increase time spent on fun activity and play with her daughter.  Status: Continued - Date(s):9/13/2023-has been enjoying time with daughter and devoting regular time to fun activities and play together  2/20/2024 - further review of treatment goals is in progress and will continue over the next session, given break in therapy    8/22/2024 - this goal was not addressed in this most recent episode of care           Intervention(s)  Therapist will guide the patient in identifying ways she can increase positive time with her daughter.  Therapist will also teach mindfulness strategies to help patient with being in the present moment when appropriate - .        Goal 3: Patiient will improve communication with her .      I will know I've met my goal when I feel less irritated with my  and communicate more openly with my .  I would like to be more assertive and less aggressive.   I would like to not avoid telling him things because I'm worried about his reaction or don't think he will react well.  I would like to be more present to the moment during times when this would be helpful.\"       Objective #A (Patient Action)                          Status: Continued - Date(s):9/13/2023   continues to work on this " goal, though emphasis has shift to managing caregiver stress related to 's cancer diagnosis and treatment.         2/20/2024 - further review of treatment goals is in progress and will continue over the next session, given break in therapy    8/22/2024 - this goal was not addressed in this most recent episode of care      Patient will learn and practice at least two new interpersonal effectiveness strategies.     Intervention(s)  Therapist will teach strategies from DBT module on interpersonal effectiveness, and will assign homework to help reinforce skill development.            Patient has reviewed and agreed to the above plan.        Cristy Wilkinson PsyD, LP  Licensed Psychologist  Reviewed on 5.27.25                                                                                               =

## 2025-07-15 ENCOUNTER — PATIENT OUTREACH (OUTPATIENT)
Dept: CARE COORDINATION | Facility: CLINIC | Age: 45
End: 2025-07-15
Payer: COMMERCIAL

## 2025-07-17 ENCOUNTER — VIRTUAL VISIT (OUTPATIENT)
Dept: PSYCHOLOGY | Facility: CLINIC | Age: 45
End: 2025-07-17
Payer: COMMERCIAL

## 2025-07-17 NOTE — PROGRESS NOTES
"Answers submitted by the patient for this visit:  Patient Health Questionnaire (Submitted on 7/17/2025)  If you checked off any problems, how difficult have these problems made it for you to do your work, take care of things at home, or get along with other people?: Not difficult at all  PHQ9 TOTAL SCORE: 2           [facility]                                            Progress Note    Patient Name: Idalia Hinojosa  Date:6/25/2025         Service Type: Individual  Start time:  9:05 am End time:  9:57 am  Session Length:  52 minutes    Session #: 24    Attendees: Client     Service modality: Video visit      Provider verified identity through the following two step process.  Patient provided:  Patient is known previously to provider    Telemedicine Visit: The patient's condition can be safely assessed and treated via synchronous audio and visual telemedicine encounter.      Reason for Telemedicine Visit: Services only offered telehealth    Originating Site (Patient Location): Patient's home    Distant Site (Provider Location): Provider Remote Setting- Home Office    Consent:  The patient/guardian has verbally consented to: the potential risks and benefits of telemedicine versus in person care; bill my insurance or make self-payment for services provided; and responsibility for payment of non-covered services.     Patient would like the video invitation sent by:  My Chart    Mode of Communication:  Video Conference via Slack,     As the provider I attest to compliance with applicable laws and regulations related to telemedicine.     Treatment Plan Last Reviewed: 5.28.2025    PHQ-9/DAVID-7: 5.28.2025        DATA  Interactive Complexity: No  Crisis: No        Progress Since Last Session (Related to Symptoms / Goals / Homework):   Symptoms: Idalia reported that overall, things are going \"pretty well\".  Symptoms of depression and anxiety remain improved.  They had a difficult episode of family " conflict while on vacation in [place], which has left some feelings of unresolve and uncertainty about how to address.        Patient Health Questionnaire - score of  2 on last visit decrease from recent scores of 5 and 6.      Homework:  - Successful -has been successfully using emotion regulation strategies.        Episode of Care Goals: . Her current concerns are related to feeling more frustrated and angry and not sure how to cope.  She identifies difficulties in her marriage as contributing factor to feeling more frustrated and angry.       Current / Ongoing Stressors and Concerns:  Current:  They recently had a family vacation to [place] and a trip to the [facility], as well as summer vacation for her daughter/summer routines they are adjusting to.  Feels some anxiety about looking for a new job.      Copied forward: Continued work on managing feelings of anger, resentment and underlying feelings of disappointment and loss, largely in the context of work related stressors, as well as phase of life stressors as she explores her career aspirations in the context of work related stressors and challenges.      Ongoing: (copied forward) Working on building effective emotion regulation strategies for managing daily frustrations and upsets.  Context for previous episode of care included stresses related to behavioral challenges with her daughter, who has been diagnosed with ADHD, and working with her  on addressing parenting differences.   Her  also had previously been diagnosed with cancer and went through treatment, and previous therapy focused on caregiver stress and helping her to attend to her self-care during this very stressful period of time.         Treatment Objective(s) Addressed in This Session:     Main emphasis today -      Process recent family conflict/escalation and identify steps you can take to address  Identify and practice interpersonal effectiveness strategies that will support you  "addressing concerns      Prior objectives and focus:   Mindfulness - slowing down, not rushing through the day, noticing the moment  Continuing to work on what \"balance\" looks like  - review of the changes she's been making in behaviors, actions and thoughts to support her in finding more balance in your day to day life  Continued focus on awareness of self-talk and impact on emotions, encouragement to find more positive, affirming, rational, kind, compassionate and helpful self-talk  Exploration of feelings of self-worth  - connecting self-worth to who she is rather than what she does  Build and practice interpersonal effectiveness strategies   Review of self-care strategies and identification of areas of need   Learn and practice emotion regulation strategies (in particular, how to manage and separate your own emotional reaction when someone around you is escalating)  Learn and practice cognitive strategies to help with identifying and managing unhelpful thoughts  Identify recent stressors and triggers to feelings of upset and frustration and identify 1-2 steps you can take to actively address stressors  Learn and practice \"middle path\" strategy      Intervention:     CBT and solution focused: She processed a recent escalation that occurred in the family, which happened when they were on vacation in [place].  Underneath the external factors of this situation, she identified a pattern of suppressing her thoughts, feelings and needs, which she stated she has done for many years, and that while this keeps things \"at bay\" in the short-term, in the long-term it often results in coming out in moments where she is frustrated or angry about something and then typically leads to an argument between she and her spouse, where he feels attacked and she doesn't believe they are able to constructively work through the problems.   We looked at the costs and consequences of this and began to explore alternatives, including " "considering returning to family therapy with her spouse to focus on improving family dynamics.  We also role-played some ways of talking with her spouse about her concerns.      Encouraged she continue with the 3 good things exercise, as she has found this to be helpful in mindfully shifting her focus to the good.        ASSESSMENT: Current Emotional / Mental Status (status of significant symptoms):   Risk status (Self / Other harm or suicidal ideation)   Patient denies current fears or concerns for personal safety.   Patient reports current suicidal ideation, she denies any current or recent suicidal behaviors.  She reports if suicidal  thoughts do occur, she knows steps she can take to manage them and believes she can manage them, and will reach out for additional help if needed.     Patientdenies current or recent homicidal ideation or behaviors.   Patient denies current or recent self injurious behavior or ideation.   Patient denies other safety concerns.   Patient reports there has not been a change in risk factors since their last session -      Recommended that patient call 911 or go to the local ED should there be a change in any of these risk factors.  We reviewed how to contact [facility] crisis/[facility] for urgent/crisis concerns 24/7.  Provided patient with crisis resources and she agrees to use safety plan should any safety concerns arise.      Professionals or agencies I can contact during a crisis:  Suicide Prevention Lifeline: call or text 213  Crisis Text Line: text \"[place]\" to 664482  Local Crisis Services: [facility]: [phone]  Call 911 or go to my nearest emergency department, or [facility].         Appearance:   Appropriate    Eye Contact:   Good    Psychomotor Behavior: Normal   Attitude:   Cooperative    Orientation:   All   Speech    Rate / Production: Normal     Volume:  Normal    Mood:    Reports that overall, mood has been improved/less anxious and depressed.  "    Affect:    Congruent with mood   Thought Content:  Clear    Thought Form:  Coherent  Logical    Insight:    Good      Medication Review:   No changes to psychiatric medication      Medication Compliance:   Yes      Changes in Health Issues:  No new health concerns or changes to health.  Some continued difficulties with back pain.       Chemical Use Review:  No changes or concerns with alcohol use.         Substance Use: Chemical use reviewed, no active concerns identified.      Tobacco Use: No current tobacco use.       Diagnosis:  Anxiety disorder unspecified    PHQ scores continue to remain low and have decreased over time         PLAN: (Patient Tasks / Therapist Tasks / Other)    1) Idalia identified the following goals: Express to Kellee desire to work together on addressing family conflict in a different/new way.  I encouraged they contact a family therapist to get additional support and assistance and that they continue to work with their daughter's therapist (or, she thinks finding a new therapist may be helpful - closer to home).       Practice Three Good Things Exercise each day.      Continue great work you've been doing to practice  finding balance and patience with working towards future career goals in a way that is not all time-consuming, practicing flexible mind-set and radical acceptance.       2) If there is a crisis situation, with your daughter or yourself, remember you are not alone and that there are people who can help you twenty-four hours a day, seven days a week.  Call [facility] ([facility]): [phone].      3) Next appt is scheduled for:  July 16th at 9:00 am.      Cristy Wilkinson PsyD, KYLIE  6/25/2025                                ____________________________________    Treatment Plan     Patient's Name: Idalia Hinojosa                        YOB: 1980     Date of Creation: 1/6/2020  Date Treatment Plan Last Reviewed/Revised: 12.2.2024       DSM5  Diagnoses: 300.00 (F41.9) Unspecified Anxiety Disorder  Psychosocial / Contextual Factors: strain in marital relationship, parenting challenges, adjustment challenges, 's cancer diagnosis  PROMIS (reviewed every 90 days):      Anticipated number of session for this episode of care: possibly a few more sessions if needed to support sustained progress and maintenance  Anticipation frequency of session: Monthly   Anticipated Duration of each session: 38-52 minutes  Treatment plan will be reviewed in 90 days or when goals have been changed.         Referral / Collaboration:  No referrals at this time.  Have previously recommended couples therapy referral.  Coordinate with Dr. Han, patient's PCP.        MeasurableTreatment Goal(s) related to diagnosis / functional impairment(s)  Goal 1: Patient will learn emotion regulation strategies to help when she is feeling upset/angry/frustrated/distressed    I will know I've met my goal when I would yell less, I would feel calmer, and I would not push others.  I would be less physical when I'm angry (e.g. wouldn't slam doors or hit things)        Objective #A (Patient Action)                          Patient will learn and practice at least 2 new emotion regulation strategies.  Status: Continued - Date(s):    9/13/2023- Regularly practicing emotion regulation strategies to help manage increase in emotional distress related to spouse's cancer diagnosis.  She has been successful with using strategies to help her stay calm when her daughter's experiencing heightened emotion and/or challenging behaviors.  She's working on remembering to engage calming strategies when feeling physically tense and/or anxious/stressed.    2/20/24 - Will continue to focus on building emotion regulation strategies   5.27.25- this remains a primary focus of her current episode of care -is making good progress with learning and applying strategies to help her manage challenging emotions and  situations.        Intervention(s)  Therapist will provide psychoeducation on emotion regulation module from DBT and will assign patient homework to help reinforce skill development.     Objective #B  Patient will identify factors that increase vulnerability to emotion mind and identify ways to decrease vulnerability to emotion mind.  Status: Continued - Date(s):9/13/2023 - routinely incorporating mindfulness, distress tolerance and self-awareness strategies to increase attention and focus to factors that increase vulnerability to emotion mind  2/20/2024 - further review of treatment goals is in progress and will continue over the next session, given break in therapy   5/22/2024 - Idalia has made great progress identifying and practicing self-care habits/strategies that help decrease vulnerability to emotion mind   5.27.25-  this also remains primary focus of her current episode of care -is making good progress with incorporating self-care strategies that support her emotional and physical well-being.  Helping her to practice balanced thinking to help with incorporating more balance in to how she approaches her day.      Intervention(s)  Therapist will provide information on factors that increase vulnerabiliyt to emotion mind      Objective #C  Patient will identify warning signs and signals that emotions are escalating and will learn ways to skillfully intervene early on.  Status: Date: 9/13/2023- in progress - has demonstrated good progress and awareness in being able to identify warning signs and intervene skillfully-working on incorporating deep breathing to help with calming and re-centering.    2/20/2024 - further review of treatment goals is in progress and will continue over the next session, given break in therapy   5/22/2024 - Goal completed        Intervention(s)  Therapist will help patient identify warning signs and signals, and will guide the patient in identifying skillful ways to intervene when  "exeriencing warning signs and signals.      Objective #B (Patient Action)                          Status: NEW - Date(s):9/13/2023- Patient will learn mind-body connection, healthy ways to manage stress, calming strategies, and ways she can address cognitive and behavioral factors that can contribute to pain experience.    2/20/2024 - further review of treatment goals is in progress and will continue over the next session, given break in therapy    5/22/2024 - goal completed       Patient will learn and practice at least two strategies that help improve her emotional well-being.       Intervention(s)  Therapist will teach calming strategies and healthy stress management strategies, and will assign homework to help reinforce skill development.  Status - patient has been working on engaging calming strategies and in particular, not trying to \"force herself\" to calm down which counters the relaxation response.  Working currently on how to cue/remind herself to engage these strategies.               Goal 2: Patient will learn new parenting strategies to help with managing parenting challenges.  Parent will work on improving relationship with her daughter and defining what she would like this relationship to look like.      I will know I've met my goal when I'm enjoying time with my daughter, teaching her new things, and not waiting for things to change       Objective #A (Patient Action)                          Status: Continued - Date(s):9/13/2023 2/20/2024 - further review of treatment goals is in progress and will continue over the next session, given break in therapy   8/22/2024 - this goal was not addressed in this most recent episode of care         Patient will increase understanding of developmental norms for her daughter and ways to manage challenging behaviors.     Intervention(s)  Therapist will provide psychoeducation on developmental norms and parenting strategies.     Objective #B  Patient will spend " time reflecting on her relationship with her daughter and defining what she would like this relaitonship to look like.                  Status: Continued - Date(s):9/13/2023     Making good progress - has also been working with daughter's therapist and has been actively practicing and using suggested strategies.   Likely can work towards resolving this goal, as this goal has largely shifted towards the work she is doing with her daughter's individual therapist, where she believes they have been making good progress on helping her in her relationship with her daughter.       2/20/2024 - further review of treatment goals is in progress and will continue over the next session, given break in therapy    8/22/2024 - this goal was not addressed in this most recent episode of care     Intervention(s)  Therapist will guide the patient in reflecting upon her relationship with her daughter and help her define ways to move towards what she vaues in her relationship with her daughter.     Objective #C  Patient will increase time spent on fun activity and play with her daughter.  Status: Continued - Date(s):9/13/2023-has been enjoying time with daughter and devoting regular time to fun activities and play together  2/20/2024 - further review of treatment goals is in progress and will continue over the next session, given break in therapy    8/22/2024 - this goal was not addressed in this most recent episode of care           Intervention(s)  Therapist will guide the patient in identifying ways she can increase positive time with her daughter.  Therapist will also teach mindfulness strategies to help patient with being in the present moment when appropriate - .        Goal 3: Patiient will improve communication with her .      I will know I've met my goal when I feel less irritated with my  and communicate more openly with my .  I would like to be more assertive and less aggressive.   I would like to not  "avoid telling him things because I'm worried about his reaction or don't think he will react well.  I would like to be more present to the moment during times when this would be helpful.\"       Objective #A (Patient Action)                          Status: Continued - Date(s):9/13/2023   continues to work on this goal, though emphasis has shift to managing caregiver stress related to 's cancer diagnosis and treatment.         2/20/2024 - further review of treatment goals is in progress and will continue over the next session, given break in therapy    8/22/2024 - this goal was not addressed in this most recent episode of care      Patient will learn and practice at least two new interpersonal effectiveness strategies.     Intervention(s)  Therapist will teach strategies from DBT module on interpersonal effectiveness, and will assign homework to help reinforce skill development.            Patient has reviewed and agreed to the above plan.        Cristy Wilkinson PsyD, LP  Licensed Psychologist  Reviewed on 5.27.25                                                                                               =                                                          "

## 2025-07-29 ENCOUNTER — PATIENT OUTREACH (OUTPATIENT)
Dept: CARE COORDINATION | Facility: CLINIC | Age: 45
End: 2025-07-29
Payer: COMMERCIAL

## 2025-08-20 ENCOUNTER — VIRTUAL VISIT (OUTPATIENT)
Dept: PSYCHOLOGY | Facility: CLINIC | Age: 45
End: 2025-08-20
Payer: COMMERCIAL

## 2025-08-20 DIAGNOSIS — F33.0 MILD EPISODE OF RECURRENT MAJOR DEPRESSIVE DISORDER: Primary | ICD-10-CM

## 2025-08-20 DIAGNOSIS — F41.9 ANXIETY DISORDER, UNSPECIFIED TYPE: ICD-10-CM

## 2025-08-20 PROCEDURE — 90834 PSYTX W PT 45 MINUTES: CPT | Mod: 95 | Performed by: PSYCHOLOGIST

## 2025-08-20 ASSESSMENT — PATIENT HEALTH QUESTIONNAIRE - PHQ9
SUM OF ALL RESPONSES TO PHQ QUESTIONS 1-9: 4
10. IF YOU CHECKED OFF ANY PROBLEMS, HOW DIFFICULT HAVE THESE PROBLEMS MADE IT FOR YOU TO DO YOUR WORK, TAKE CARE OF THINGS AT HOME, OR GET ALONG WITH OTHER PEOPLE: SOMEWHAT DIFFICULT
SUM OF ALL RESPONSES TO PHQ QUESTIONS 1-9: 4

## 2025-08-28 ENCOUNTER — VIRTUAL VISIT (OUTPATIENT)
Dept: PSYCHOLOGY | Facility: CLINIC | Age: 45
End: 2025-08-28
Payer: COMMERCIAL

## 2025-09-04 ENCOUNTER — OFFICE VISIT (OUTPATIENT)
Dept: OBGYN | Facility: CLINIC | Age: 45
End: 2025-09-04
Attending: OBSTETRICS & GYNECOLOGY
Payer: COMMERCIAL

## 2025-09-04 VITALS
BODY MASS INDEX: 28.19 KG/M2 | WEIGHT: 175.4 LBS | HEART RATE: 77 BPM | SYSTOLIC BLOOD PRESSURE: 115 MMHG | DIASTOLIC BLOOD PRESSURE: 71 MMHG | OXYGEN SATURATION: 99 % | HEIGHT: 66 IN

## 2025-09-04 DIAGNOSIS — Z13.29 SCREENING FOR THYROID DISORDER: ICD-10-CM

## 2025-09-04 DIAGNOSIS — Z12.4 PAP SMEAR FOR CERVICAL CANCER SCREENING: ICD-10-CM

## 2025-09-04 DIAGNOSIS — Z01.419 ENCOUNTER FOR GYNECOLOGICAL EXAMINATION WITHOUT ABNORMAL FINDING: Primary | ICD-10-CM

## 2025-09-04 DIAGNOSIS — Z13.0 SCREENING, ANEMIA, DEFICIENCY, IRON: ICD-10-CM

## 2025-09-04 DIAGNOSIS — Z13.220 SCREENING FOR LIPID DISORDERS: ICD-10-CM

## 2025-09-04 DIAGNOSIS — Z13.1 SCREENING FOR DIABETES MELLITUS: ICD-10-CM

## 2025-09-04 DIAGNOSIS — F32.81 PMDD (PREMENSTRUAL DYSPHORIC DISORDER): ICD-10-CM

## 2025-09-04 DIAGNOSIS — J30.89 ALLERGIC RHINITIS DUE TO DUST MITE: ICD-10-CM

## 2025-09-04 RX ORDER — NORETHINDRONE ACETATE AND ETHINYL ESTRADIOL .02; 1 MG/1; MG/1
1 TABLET ORAL DAILY
Qty: 112 TABLET | Refills: 4 | Status: SHIPPED | OUTPATIENT
Start: 2025-09-04

## 2025-09-04 RX ORDER — MONTELUKAST SODIUM 10 MG/1
10 TABLET ORAL AT BEDTIME
Qty: 90 TABLET | Refills: 4 | Status: SHIPPED | OUTPATIENT
Start: 2025-09-04

## 2025-09-04 RX ORDER — SERTRALINE HYDROCHLORIDE 100 MG/1
100 TABLET, FILM COATED ORAL DAILY
Qty: 90 TABLET | Refills: 4 | Status: SHIPPED | OUTPATIENT
Start: 2025-09-04

## 2025-09-04 ASSESSMENT — ANXIETY QUESTIONNAIRES
IF YOU CHECKED OFF ANY PROBLEMS ON THIS QUESTIONNAIRE, HOW DIFFICULT HAVE THESE PROBLEMS MADE IT FOR YOU TO DO YOUR WORK, TAKE CARE OF THINGS AT HOME, OR GET ALONG WITH OTHER PEOPLE: SOMEWHAT DIFFICULT
6. BECOMING EASILY ANNOYED OR IRRITABLE: SEVERAL DAYS
2. NOT BEING ABLE TO STOP OR CONTROL WORRYING: SEVERAL DAYS
7. FEELING AFRAID AS IF SOMETHING AWFUL MIGHT HAPPEN: SEVERAL DAYS
GAD7 TOTAL SCORE: 8
GAD7 TOTAL SCORE: 8
1. FEELING NERVOUS, ANXIOUS, OR ON EDGE: MORE THAN HALF THE DAYS
5. BEING SO RESTLESS THAT IT IS HARD TO SIT STILL: NOT AT ALL
3. WORRYING TOO MUCH ABOUT DIFFERENT THINGS: MORE THAN HALF THE DAYS

## 2025-09-04 ASSESSMENT — PATIENT HEALTH QUESTIONNAIRE - PHQ9
5. POOR APPETITE OR OVEREATING: SEVERAL DAYS
SUM OF ALL RESPONSES TO PHQ QUESTIONS 1-9: 8